# Patient Record
Sex: FEMALE | Race: BLACK OR AFRICAN AMERICAN | Employment: OTHER | URBAN - METROPOLITAN AREA
[De-identification: names, ages, dates, MRNs, and addresses within clinical notes are randomized per-mention and may not be internally consistent; named-entity substitution may affect disease eponyms.]

---

## 2017-01-08 PROBLEM — N39.0 UTI (URINARY TRACT INFECTION): Status: ACTIVE | Noted: 2017-01-08

## 2017-01-08 PROBLEM — J98.11 PULMONARY ATELECTASIS: Status: ACTIVE | Noted: 2017-01-08

## 2017-01-09 PROBLEM — N39.0 BACTERIAL UTI: Status: ACTIVE | Noted: 2017-01-09

## 2017-01-09 PROBLEM — N30.91 HEMORRHAGIC CYSTITIS: Status: ACTIVE | Noted: 2017-01-09

## 2017-01-09 PROBLEM — A49.9 BACTERIAL UTI: Status: ACTIVE | Noted: 2017-01-09

## 2017-01-09 PROBLEM — E87.6 HYPOKALEMIA: Status: ACTIVE | Noted: 2017-01-09

## 2017-01-11 PROBLEM — R31.0 GROSS HEMATURIA: Status: ACTIVE | Noted: 2017-01-11

## 2017-02-05 ENCOUNTER — HOSPITAL ENCOUNTER (EMERGENCY)
Age: 37
Discharge: OTHER INSTITUTION | End: 2017-02-05
Attending: EMERGENCY MEDICINE
Payer: MEDICARE

## 2017-02-05 VITALS
OXYGEN SATURATION: 100 % | HEART RATE: 72 BPM | RESPIRATION RATE: 16 BRPM | DIASTOLIC BLOOD PRESSURE: 76 MMHG | TEMPERATURE: 98.4 F | SYSTOLIC BLOOD PRESSURE: 105 MMHG

## 2017-02-05 DIAGNOSIS — R31.9 URINARY TRACT INFECTION WITH HEMATURIA, SITE UNSPECIFIED: ICD-10-CM

## 2017-02-05 DIAGNOSIS — R31.9 HEMATURIA: Primary | ICD-10-CM

## 2017-02-05 DIAGNOSIS — N39.0 URINARY TRACT INFECTION WITH HEMATURIA, SITE UNSPECIFIED: ICD-10-CM

## 2017-02-05 LAB
-: ABNORMAL
AMORPHOUS: ABNORMAL
ANION GAP SERPL CALCULATED.3IONS-SCNC: 13 MMOL/L (ref 9–17)
BACTERIA: ABNORMAL
BILIRUBIN URINE: ABNORMAL
BUN BLDV-MCNC: 7 MG/DL (ref 6–20)
BUN/CREAT BLD: ABNORMAL (ref 9–20)
CALCIUM SERPL-MCNC: 9.5 MG/DL (ref 8.6–10.4)
CASTS UA: ABNORMAL /LPF (ref 0–8)
CHLORIDE BLD-SCNC: 104 MMOL/L (ref 98–107)
CO2: 22 MMOL/L (ref 20–31)
COLOR: ABNORMAL
CREAT SERPL-MCNC: 0.31 MG/DL (ref 0.5–0.9)
CRYSTALS, UA: ABNORMAL /HPF
EPITHELIAL CELLS UA: ABNORMAL /HPF (ref 0–5)
GFR AFRICAN AMERICAN: >60 ML/MIN
GFR NON-AFRICAN AMERICAN: >60 ML/MIN
GFR SERPL CREATININE-BSD FRML MDRD: ABNORMAL ML/MIN/{1.73_M2}
GFR SERPL CREATININE-BSD FRML MDRD: ABNORMAL ML/MIN/{1.73_M2}
GLUCOSE BLD-MCNC: 74 MG/DL (ref 70–99)
GLUCOSE URINE: NEGATIVE
HCT VFR BLD CALC: 39.2 % (ref 36–46)
HEMOGLOBIN: 12.5 G/DL (ref 12–16)
KETONES, URINE: NEGATIVE
LEUKOCYTE ESTERASE, URINE: ABNORMAL
MCH RBC QN AUTO: 26.5 PG (ref 26–34)
MCHC RBC AUTO-ENTMCNC: 31.9 G/DL (ref 31–37)
MCV RBC AUTO: 82.8 FL (ref 80–100)
MUCUS: ABNORMAL
NITRITE, URINE: NEGATIVE
OTHER OBSERVATIONS UA: ABNORMAL
PDW BLD-RTO: 16.6 % (ref 12.5–15.4)
PH UA: >9 (ref 5–8)
PLATELET # BLD: 294 K/UL (ref 140–450)
PMV BLD AUTO: 9.6 FL (ref 6–12)
POC LACTIC ACID, VEN: 1.06 MMOL/L (ref 0.9–1.7)
POTASSIUM SERPL-SCNC: 4.3 MMOL/L (ref 3.7–5.3)
PROTEIN UA: ABNORMAL
RBC # BLD: 4.73 M/UL (ref 4–5.2)
RBC UA: ABNORMAL /HPF (ref 0–2)
RENAL EPITHELIAL, UA: ABNORMAL /HPF
SODIUM BLD-SCNC: 139 MMOL/L (ref 135–144)
SPECIFIC GRAVITY UA: 1.01 (ref 1–1.03)
TRICHOMONAS: ABNORMAL
TURBIDITY: ABNORMAL
URINE HGB: ABNORMAL
UROBILINOGEN, URINE: NORMAL
WBC # BLD: 7.1 K/UL (ref 3.5–11)
WBC UA: ABNORMAL /HPF (ref 0–5)
YEAST: ABNORMAL

## 2017-02-05 PROCEDURE — 6360000002 HC RX W HCPCS: Performed by: EMERGENCY MEDICINE

## 2017-02-05 PROCEDURE — 2580000003 HC RX 258: Performed by: EMERGENCY MEDICINE

## 2017-02-05 PROCEDURE — 81001 URINALYSIS AUTO W/SCOPE: CPT

## 2017-02-05 PROCEDURE — 85027 COMPLETE CBC AUTOMATED: CPT

## 2017-02-05 PROCEDURE — 99284 EMERGENCY DEPT VISIT MOD MDM: CPT

## 2017-02-05 PROCEDURE — 87086 URINE CULTURE/COLONY COUNT: CPT

## 2017-02-05 PROCEDURE — 96365 THER/PROPH/DIAG IV INF INIT: CPT

## 2017-02-05 PROCEDURE — 80048 BASIC METABOLIC PNL TOTAL CA: CPT

## 2017-02-05 PROCEDURE — 83605 ASSAY OF LACTIC ACID: CPT

## 2017-02-05 RX ORDER — CEPHALEXIN 500 MG/1
500 CAPSULE ORAL 3 TIMES DAILY
Qty: 30 CAPSULE | Refills: 0 | Status: SHIPPED | OUTPATIENT
Start: 2017-02-05 | End: 2017-02-15

## 2017-02-05 RX ORDER — 0.9 % SODIUM CHLORIDE 0.9 %
500 INTRAVENOUS SOLUTION INTRAVENOUS ONCE
Status: DISCONTINUED | OUTPATIENT
Start: 2017-02-05 | End: 2017-02-05

## 2017-02-05 RX ORDER — 0.9 % SODIUM CHLORIDE 0.9 %
1000 INTRAVENOUS SOLUTION INTRAVENOUS ONCE
Status: COMPLETED | OUTPATIENT
Start: 2017-02-05 | End: 2017-02-05

## 2017-02-05 RX ADMIN — SODIUM CHLORIDE 1000 ML: 9 INJECTION, SOLUTION INTRAVENOUS at 16:03

## 2017-02-05 RX ADMIN — CEFTRIAXONE SODIUM 1 G: 1 INJECTION, POWDER, FOR SOLUTION INTRAMUSCULAR; INTRAVENOUS at 16:53

## 2017-02-06 LAB
CULTURE: NORMAL
CULTURE: NORMAL
Lab: NORMAL
SPECIMEN DESCRIPTION: NORMAL
STATUS: NORMAL

## 2017-03-11 ENCOUNTER — HOSPITAL ENCOUNTER (OUTPATIENT)
Age: 37
Setting detail: SPECIMEN
Discharge: HOME | End: 2017-03-11

## 2017-03-11 LAB
-: ABNORMAL
AMORPHOUS: ABNORMAL
BACTERIA: ABNORMAL
BILIRUBIN URINE: NEGATIVE
CASTS UA: ABNORMAL /LPF (ref 0–2)
COLOR: ABNORMAL
COMMENT UA: ABNORMAL
CRYSTALS, UA: ABNORMAL /HPF
EPITHELIAL CELLS UA: ABNORMAL /HPF (ref 0–5)
GLUCOSE URINE: NEGATIVE
KETONES, URINE: NEGATIVE
LEUKOCYTE ESTERASE, URINE: ABNORMAL
MUCUS: ABNORMAL
NITRITE, URINE: POSITIVE
OTHER OBSERVATIONS UA: ABNORMAL
PH UA: >9 (ref 5–8)
PROTEIN UA: ABNORMAL
RBC UA: ABNORMAL /HPF (ref 0–2)
RENAL EPITHELIAL, UA: ABNORMAL /HPF
SPECIFIC GRAVITY UA: 1.02 (ref 1–1.03)
TRICHOMONAS: ABNORMAL
TURBIDITY: ABNORMAL
URINE HGB: ABNORMAL
UROBILINOGEN, URINE: NORMAL
WBC UA: ABNORMAL /HPF (ref 0–5)
YEAST: ABNORMAL

## 2017-03-12 LAB
CULTURE: ABNORMAL
CULTURE: ABNORMAL
Lab: ABNORMAL
Lab: ABNORMAL
ORGANISM: ABNORMAL
SPECIMEN DESCRIPTION: ABNORMAL
SPECIMEN DESCRIPTION: ABNORMAL
STATUS: ABNORMAL

## 2017-03-15 ENCOUNTER — HOSPITAL ENCOUNTER (OUTPATIENT)
Age: 37
Setting detail: SPECIMEN
Discharge: HOME | End: 2017-03-15
Payer: MEDICARE

## 2017-03-20 ENCOUNTER — HOSPITAL ENCOUNTER (OUTPATIENT)
Age: 37
Setting detail: SPECIMEN
Discharge: HOME | End: 2017-03-20
Payer: MEDICARE

## 2017-03-20 LAB
-: ABNORMAL
AMORPHOUS: ABNORMAL
BACTERIA: ABNORMAL
BILIRUBIN URINE: NEGATIVE
CASTS UA: ABNORMAL /LPF (ref 0–2)
COLOR: ABNORMAL
COMMENT UA: ABNORMAL
CRYSTALS, UA: ABNORMAL /HPF
EPITHELIAL CELLS UA: ABNORMAL /HPF (ref 0–5)
GLUCOSE URINE: NEGATIVE
KETONES, URINE: NEGATIVE
LEUKOCYTE ESTERASE, URINE: ABNORMAL
MUCUS: ABNORMAL
NITRITE, URINE: NEGATIVE
OTHER OBSERVATIONS UA: ABNORMAL
PH UA: 7 (ref 5–8)
PROTEIN UA: ABNORMAL
RBC UA: ABNORMAL /HPF (ref 0–2)
RENAL EPITHELIAL, UA: ABNORMAL /HPF
SPECIFIC GRAVITY UA: 1.02 (ref 1–1.03)
TRICHOMONAS: ABNORMAL
TURBIDITY: ABNORMAL
URINE HGB: ABNORMAL
UROBILINOGEN, URINE: NORMAL
WBC UA: ABNORMAL /HPF (ref 0–5)
YEAST: ABNORMAL

## 2017-03-20 PROCEDURE — 81001 URINALYSIS AUTO W/SCOPE: CPT

## 2017-03-20 PROCEDURE — 87186 SC STD MICRODIL/AGAR DIL: CPT

## 2017-03-20 PROCEDURE — 87077 CULTURE AEROBIC IDENTIFY: CPT

## 2017-03-20 PROCEDURE — 87086 URINE CULTURE/COLONY COUNT: CPT

## 2017-03-22 ENCOUNTER — HOSPITAL ENCOUNTER (OUTPATIENT)
Age: 37
Setting detail: SPECIMEN
Discharge: HOME | End: 2017-03-22
Payer: MEDICARE

## 2017-03-22 LAB
ANION GAP SERPL CALCULATED.3IONS-SCNC: 13 MMOL/L (ref 9–17)
BUN BLDV-MCNC: 12 MG/DL (ref 6–20)
BUN/CREAT BLD: ABNORMAL (ref 9–20)
CALCIUM SERPL-MCNC: 9.1 MG/DL (ref 8.6–10.4)
CHLORIDE BLD-SCNC: 106 MMOL/L (ref 98–107)
CO2: 24 MMOL/L (ref 20–31)
CREAT SERPL-MCNC: 0.32 MG/DL (ref 0.5–0.9)
GFR AFRICAN AMERICAN: >60 ML/MIN
GFR NON-AFRICAN AMERICAN: >60 ML/MIN
GFR SERPL CREATININE-BSD FRML MDRD: ABNORMAL ML/MIN/{1.73_M2}
GFR SERPL CREATININE-BSD FRML MDRD: ABNORMAL ML/MIN/{1.73_M2}
GLUCOSE BLD-MCNC: 73 MG/DL (ref 70–99)
HCT VFR BLD CALC: 37.7 % (ref 36–46)
HEMOGLOBIN: 12.2 G/DL (ref 12–16)
MCH RBC QN AUTO: 25.7 PG (ref 26–34)
MCHC RBC AUTO-ENTMCNC: 32.4 G/DL (ref 31–37)
MCV RBC AUTO: 79.1 FL (ref 80–100)
PDW BLD-RTO: 16.6 % (ref 12.5–15.4)
PLATELET # BLD: 271 K/UL (ref 140–450)
PMV BLD AUTO: 10.8 FL (ref 6–12)
POTASSIUM SERPL-SCNC: 3.8 MMOL/L (ref 3.7–5.3)
RBC # BLD: 4.77 M/UL (ref 4–5.2)
SODIUM BLD-SCNC: 143 MMOL/L (ref 135–144)
WBC # BLD: 3.3 K/UL (ref 3.5–11)

## 2017-03-22 PROCEDURE — 80048 BASIC METABOLIC PNL TOTAL CA: CPT

## 2017-03-22 PROCEDURE — 85027 COMPLETE CBC AUTOMATED: CPT

## 2017-03-22 PROCEDURE — 36415 COLL VENOUS BLD VENIPUNCTURE: CPT

## 2017-03-22 PROCEDURE — P9603 ONE-WAY ALLOW PRORATED MILES: HCPCS

## 2017-04-04 ENCOUNTER — HOSPITAL ENCOUNTER (OUTPATIENT)
Age: 37
Setting detail: SPECIMEN
Discharge: HOME | End: 2017-04-04
Payer: MEDICARE

## 2017-04-04 LAB
ALBUMIN SERPL-MCNC: 3.6 G/DL (ref 3.5–5.2)
TOTAL CK: 46 U/L (ref 26–192)

## 2017-04-04 PROCEDURE — P9603 ONE-WAY ALLOW PRORATED MILES: HCPCS

## 2017-04-04 PROCEDURE — 36415 COLL VENOUS BLD VENIPUNCTURE: CPT

## 2017-04-04 PROCEDURE — 82550 ASSAY OF CK (CPK): CPT

## 2017-04-04 PROCEDURE — 82040 ASSAY OF SERUM ALBUMIN: CPT

## 2017-04-19 ENCOUNTER — HOSPITAL ENCOUNTER (OUTPATIENT)
Age: 37
Setting detail: SPECIMEN
Discharge: HOME | End: 2017-04-19
Payer: MEDICARE

## 2017-04-19 LAB
ANION GAP SERPL CALCULATED.3IONS-SCNC: 17 MMOL/L (ref 9–17)
BUN BLDV-MCNC: 9 MG/DL (ref 6–20)
BUN/CREAT BLD: ABNORMAL (ref 9–20)
CALCIUM SERPL-MCNC: 8.9 MG/DL (ref 8.6–10.4)
CHLORIDE BLD-SCNC: 100 MMOL/L (ref 98–107)
CO2: 22 MMOL/L (ref 20–31)
CREAT SERPL-MCNC: 0.33 MG/DL (ref 0.5–0.9)
GFR AFRICAN AMERICAN: >60 ML/MIN
GFR NON-AFRICAN AMERICAN: >60 ML/MIN
GFR SERPL CREATININE-BSD FRML MDRD: ABNORMAL ML/MIN/{1.73_M2}
GFR SERPL CREATININE-BSD FRML MDRD: ABNORMAL ML/MIN/{1.73_M2}
GLUCOSE BLD-MCNC: 75 MG/DL (ref 70–99)
HCT VFR BLD CALC: 40.8 % (ref 36–46)
HEMOGLOBIN: 13.1 G/DL (ref 12–16)
MCH RBC QN AUTO: 24.8 PG (ref 26–34)
MCHC RBC AUTO-ENTMCNC: 32.1 G/DL (ref 31–37)
MCV RBC AUTO: 77.2 FL (ref 80–100)
PDW BLD-RTO: 17.1 % (ref 12.5–15.4)
PLATELET # BLD: 264 K/UL (ref 140–450)
PMV BLD AUTO: 10.9 FL (ref 6–12)
POTASSIUM SERPL-SCNC: 4.1 MMOL/L (ref 3.7–5.3)
RBC # BLD: 5.28 M/UL (ref 4–5.2)
SODIUM BLD-SCNC: 139 MMOL/L (ref 135–144)
WBC # BLD: 6 K/UL (ref 3.5–11)

## 2017-04-19 PROCEDURE — 85027 COMPLETE CBC AUTOMATED: CPT

## 2017-04-19 PROCEDURE — P9603 ONE-WAY ALLOW PRORATED MILES: HCPCS

## 2017-04-19 PROCEDURE — 36415 COLL VENOUS BLD VENIPUNCTURE: CPT

## 2017-04-19 PROCEDURE — 80048 BASIC METABOLIC PNL TOTAL CA: CPT

## 2017-05-08 ENCOUNTER — HOSPITAL ENCOUNTER (OUTPATIENT)
Age: 37
Setting detail: SPECIMEN
Discharge: HOME | End: 2017-05-08
Payer: MEDICARE

## 2017-05-08 LAB
ALBUMIN SERPL-MCNC: 3.2 G/DL (ref 3.5–5.2)
ALBUMIN/GLOBULIN RATIO: 0.7 (ref 1–2.5)
ALP BLD-CCNC: 57 U/L (ref 35–104)
ALT SERPL-CCNC: 21 U/L (ref 5–33)
AST SERPL-CCNC: 28 U/L
BILIRUB SERPL-MCNC: 0.21 MG/DL (ref 0.3–1.2)
BILIRUBIN DIRECT: <0.08 MG/DL
BILIRUBIN, INDIRECT: ABNORMAL MG/DL (ref 0–1)
CHOLESTEROL/HDL RATIO: 4
CHOLESTEROL: 145 MG/DL
GLOBULIN: ABNORMAL G/DL (ref 1.5–3.8)
HDLC SERPL-MCNC: 36 MG/DL
LDL CHOLESTEROL: 92 MG/DL (ref 0–130)
TOTAL PROTEIN: 8 G/DL (ref 6.4–8.3)
TRIGL SERPL-MCNC: 86 MG/DL
VITAMIN D 25-HYDROXY: 20.5 NG/ML (ref 30–100)
VLDLC SERPL CALC-MCNC: ABNORMAL MG/DL (ref 1–30)

## 2017-05-08 PROCEDURE — 80061 LIPID PANEL: CPT

## 2017-05-08 PROCEDURE — 80076 HEPATIC FUNCTION PANEL: CPT

## 2017-05-08 PROCEDURE — 36415 COLL VENOUS BLD VENIPUNCTURE: CPT

## 2017-05-08 PROCEDURE — P9603 ONE-WAY ALLOW PRORATED MILES: HCPCS

## 2017-05-08 PROCEDURE — 82306 VITAMIN D 25 HYDROXY: CPT

## 2017-05-17 ENCOUNTER — HOSPITAL ENCOUNTER (OUTPATIENT)
Age: 37
Setting detail: SPECIMEN
Discharge: HOME | End: 2017-05-17
Payer: MEDICAID

## 2017-05-17 LAB
ANION GAP SERPL CALCULATED.3IONS-SCNC: 16 MMOL/L (ref 9–17)
BUN BLDV-MCNC: 10 MG/DL (ref 6–20)
BUN/CREAT BLD: ABNORMAL (ref 9–20)
CALCIUM SERPL-MCNC: 9.5 MG/DL (ref 8.6–10.4)
CHLORIDE BLD-SCNC: 102 MMOL/L (ref 98–107)
CO2: 23 MMOL/L (ref 20–31)
CREAT SERPL-MCNC: 0.33 MG/DL (ref 0.5–0.9)
GFR AFRICAN AMERICAN: >60 ML/MIN
GFR NON-AFRICAN AMERICAN: >60 ML/MIN
GFR SERPL CREATININE-BSD FRML MDRD: ABNORMAL ML/MIN/{1.73_M2}
GFR SERPL CREATININE-BSD FRML MDRD: ABNORMAL ML/MIN/{1.73_M2}
GLUCOSE BLD-MCNC: 59 MG/DL (ref 70–99)
HCT VFR BLD CALC: 41.4 % (ref 36–46)
HEMOGLOBIN: 13.3 G/DL (ref 12–16)
MCH RBC QN AUTO: 25.2 PG (ref 26–34)
MCHC RBC AUTO-ENTMCNC: 32 G/DL (ref 31–37)
MCV RBC AUTO: 78.9 FL (ref 80–100)
PDW BLD-RTO: 18.4 % (ref 12.5–15.4)
PLATELET # BLD: 293 K/UL (ref 140–450)
PMV BLD AUTO: 10.4 FL (ref 6–12)
POTASSIUM SERPL-SCNC: 3.9 MMOL/L (ref 3.7–5.3)
RBC # BLD: 5.25 M/UL (ref 4–5.2)
SODIUM BLD-SCNC: 141 MMOL/L (ref 135–144)
WBC # BLD: 5.8 K/UL (ref 3.5–11)

## 2017-05-17 PROCEDURE — 80048 BASIC METABOLIC PNL TOTAL CA: CPT

## 2017-05-17 PROCEDURE — P9603 ONE-WAY ALLOW PRORATED MILES: HCPCS

## 2017-05-17 PROCEDURE — 85027 COMPLETE CBC AUTOMATED: CPT

## 2017-05-17 PROCEDURE — 36415 COLL VENOUS BLD VENIPUNCTURE: CPT

## 2017-05-22 ENCOUNTER — HOSPITAL ENCOUNTER (OUTPATIENT)
Age: 37
Setting detail: SPECIMEN
Discharge: HOME | End: 2017-05-22
Payer: MEDICARE

## 2017-05-22 LAB
C-REACTIVE PROTEIN: 60.6 MG/L (ref 0–5)
SEDIMENTATION RATE, ERYTHROCYTE: 52 MM (ref 0–20)

## 2017-05-22 PROCEDURE — 86403 PARTICLE AGGLUT ANTBDY SCRN: CPT

## 2017-05-22 PROCEDURE — 87186 SC STD MICRODIL/AGAR DIL: CPT

## 2017-05-22 PROCEDURE — P9603 ONE-WAY ALLOW PRORATED MILES: HCPCS

## 2017-05-22 PROCEDURE — 87070 CULTURE OTHR SPECIMN AEROBIC: CPT

## 2017-05-22 PROCEDURE — 87176 TISSUE HOMOGENIZATION CULTR: CPT

## 2017-05-22 PROCEDURE — 36415 COLL VENOUS BLD VENIPUNCTURE: CPT

## 2017-05-22 PROCEDURE — 87075 CULTR BACTERIA EXCEPT BLOOD: CPT

## 2017-05-22 PROCEDURE — 87040 BLOOD CULTURE FOR BACTERIA: CPT

## 2017-05-22 PROCEDURE — 87205 SMEAR GRAM STAIN: CPT

## 2017-05-22 PROCEDURE — 85651 RBC SED RATE NONAUTOMATED: CPT

## 2017-05-22 PROCEDURE — 87077 CULTURE AEROBIC IDENTIFY: CPT

## 2017-05-22 PROCEDURE — 86140 C-REACTIVE PROTEIN: CPT

## 2017-05-24 ENCOUNTER — HOSPITAL ENCOUNTER (OUTPATIENT)
Age: 37
Setting detail: SPECIMEN
Discharge: HOME | End: 2017-05-24
Payer: MEDICARE

## 2017-05-24 LAB
-: ABNORMAL
AMORPHOUS: ABNORMAL
BACTERIA: ABNORMAL
BILIRUBIN URINE: NEGATIVE
CASTS UA: ABNORMAL /LPF (ref 0–2)
COLOR: YELLOW
COMMENT UA: ABNORMAL
CRYSTALS, UA: ABNORMAL /HPF
CULTURE: ABNORMAL
EPITHELIAL CELLS UA: ABNORMAL /HPF (ref 0–5)
GLUCOSE URINE: NEGATIVE
KETONES, URINE: NEGATIVE
LEUKOCYTE ESTERASE, URINE: ABNORMAL
Lab: ABNORMAL
Lab: ABNORMAL
MUCUS: ABNORMAL
NITRITE, URINE: NEGATIVE
OTHER OBSERVATIONS UA: ABNORMAL
PH UA: 8 (ref 5–8)
PROTEIN UA: ABNORMAL
RBC UA: ABNORMAL /HPF (ref 0–2)
RENAL EPITHELIAL, UA: ABNORMAL /HPF
SPECIFIC GRAVITY UA: 1.01 (ref 1–1.03)
SPECIMEN DESCRIPTION: ABNORMAL
SPECIMEN DESCRIPTION: ABNORMAL
STATUS: ABNORMAL
TRICHOMONAS: ABNORMAL
TURBIDITY: ABNORMAL
URINE HGB: ABNORMAL
UROBILINOGEN, URINE: NORMAL
WBC UA: ABNORMAL /HPF (ref 0–5)
YEAST: ABNORMAL

## 2017-05-24 PROCEDURE — 87086 URINE CULTURE/COLONY COUNT: CPT

## 2017-05-24 PROCEDURE — 81001 URINALYSIS AUTO W/SCOPE: CPT

## 2017-05-25 ENCOUNTER — HOSPITAL ENCOUNTER (OUTPATIENT)
Age: 37
Setting detail: SPECIMEN
Discharge: HOME | End: 2017-05-25
Payer: MEDICAID

## 2017-05-25 LAB
CREAT SERPL-MCNC: <0.4 MG/DL (ref 0.5–0.9)
CULTURE: NORMAL
CULTURE: NORMAL
GFR AFRICAN AMERICAN: ABNORMAL ML/MIN
GFR NON-AFRICAN AMERICAN: ABNORMAL ML/MIN
GFR SERPL CREATININE-BSD FRML MDRD: ABNORMAL ML/MIN/{1.73_M2}
GFR SERPL CREATININE-BSD FRML MDRD: ABNORMAL ML/MIN/{1.73_M2}
Lab: NORMAL
Lab: NORMAL
SPECIMEN DESCRIPTION: NORMAL
SPECIMEN DESCRIPTION: NORMAL
STATUS: NORMAL
VANCOMYCIN TROUGH DATE LAST DOSE: ABNORMAL
VANCOMYCIN TROUGH DOSE AMOUNT: ABNORMAL
VANCOMYCIN TROUGH TIME LAST DOSE: ABNORMAL
VANCOMYCIN TROUGH: 7.1 UG/ML (ref 10–20)

## 2017-05-25 PROCEDURE — 82565 ASSAY OF CREATININE: CPT

## 2017-05-25 PROCEDURE — 36415 COLL VENOUS BLD VENIPUNCTURE: CPT

## 2017-05-25 PROCEDURE — P9603 ONE-WAY ALLOW PRORATED MILES: HCPCS

## 2017-05-25 PROCEDURE — 80202 ASSAY OF VANCOMYCIN: CPT

## 2017-05-27 ENCOUNTER — HOSPITAL ENCOUNTER (OUTPATIENT)
Age: 37
Setting detail: SPECIMEN
Discharge: HOME | End: 2017-05-27
Payer: MEDICAID

## 2017-05-27 LAB
CULTURE: ABNORMAL
DIRECT EXAM: ABNORMAL
DIRECT EXAM: ABNORMAL
Lab: ABNORMAL
Lab: ABNORMAL
ORGANISM: ABNORMAL
ORGANISM: ABNORMAL
SPECIMEN DESCRIPTION: ABNORMAL
SPECIMEN DESCRIPTION: ABNORMAL
STATUS: ABNORMAL
VANCOMYCIN TROUGH DATE LAST DOSE: NORMAL
VANCOMYCIN TROUGH DOSE AMOUNT: NORMAL
VANCOMYCIN TROUGH TIME LAST DOSE: NORMAL
VANCOMYCIN TROUGH: 11.9 UG/ML (ref 10–20)

## 2017-05-27 PROCEDURE — 80202 ASSAY OF VANCOMYCIN: CPT

## 2017-05-27 PROCEDURE — 36415 COLL VENOUS BLD VENIPUNCTURE: CPT

## 2017-05-27 PROCEDURE — P9603 ONE-WAY ALLOW PRORATED MILES: HCPCS

## 2017-05-28 LAB
CULTURE: NORMAL
CULTURE: NORMAL
Lab: NORMAL
Lab: NORMAL
SPECIMEN DESCRIPTION: NORMAL
SPECIMEN DESCRIPTION: NORMAL
STATUS: NORMAL

## 2017-05-31 ENCOUNTER — HOSPITAL ENCOUNTER (OUTPATIENT)
Age: 37
Setting detail: SPECIMEN
Discharge: HOME | End: 2017-05-31
Payer: MEDICARE

## 2017-05-31 LAB
VANCOMYCIN TROUGH DATE LAST DOSE: ABNORMAL
VANCOMYCIN TROUGH DOSE AMOUNT: ABNORMAL
VANCOMYCIN TROUGH TIME LAST DOSE: ABNORMAL
VANCOMYCIN TROUGH: 3.4 UG/ML (ref 10–20)

## 2017-05-31 PROCEDURE — 80202 ASSAY OF VANCOMYCIN: CPT

## 2017-05-31 PROCEDURE — P9603 ONE-WAY ALLOW PRORATED MILES: HCPCS

## 2017-05-31 PROCEDURE — 36415 COLL VENOUS BLD VENIPUNCTURE: CPT

## 2017-06-02 ENCOUNTER — HOSPITAL ENCOUNTER (OUTPATIENT)
Age: 37
Setting detail: SPECIMEN
Discharge: HOME | End: 2017-06-02
Payer: MEDICARE

## 2017-06-02 LAB
ABSOLUTE EOS #: 0.52 K/UL (ref 0–0.4)
ABSOLUTE LYMPH #: 1.68 K/UL (ref 1–4.8)
ABSOLUTE MONO #: 0.64 K/UL (ref 0.1–0.8)
ANION GAP SERPL CALCULATED.3IONS-SCNC: 17 MMOL/L (ref 9–17)
BASOPHILS # BLD: 0 %
BASOPHILS ABSOLUTE: 0 K/UL (ref 0–0.2)
BUN BLDV-MCNC: 9 MG/DL (ref 6–20)
BUN/CREAT BLD: ABNORMAL (ref 9–20)
CALCIUM SERPL-MCNC: 8.9 MG/DL (ref 8.6–10.4)
CHLORIDE BLD-SCNC: 105 MMOL/L (ref 98–107)
CO2: 19 MMOL/L (ref 20–31)
CREAT SERPL-MCNC: 0.36 MG/DL (ref 0.5–0.9)
DIFFERENTIAL TYPE: ABNORMAL
EOSINOPHILS RELATIVE PERCENT: 9 %
GFR AFRICAN AMERICAN: >60 ML/MIN
GFR NON-AFRICAN AMERICAN: >60 ML/MIN
GFR SERPL CREATININE-BSD FRML MDRD: ABNORMAL ML/MIN/{1.73_M2}
GFR SERPL CREATININE-BSD FRML MDRD: ABNORMAL ML/MIN/{1.73_M2}
GLUCOSE BLD-MCNC: 46 MG/DL (ref 70–99)
HCT VFR BLD CALC: 39.4 % (ref 36–46)
HEMOGLOBIN: 12.6 G/DL (ref 12–16)
LYMPHOCYTES # BLD: 29 %
MCH RBC QN AUTO: 25.5 PG (ref 26–34)
MCHC RBC AUTO-ENTMCNC: 32.1 G/DL (ref 31–37)
MCV RBC AUTO: 79.6 FL (ref 80–100)
MONOCYTES # BLD: 11 %
MORPHOLOGY: ABNORMAL
PDW BLD-RTO: 19.4 % (ref 12.5–15.4)
PLATELET # BLD: 299 K/UL (ref 140–450)
PLATELET ESTIMATE: ABNORMAL
PMV BLD AUTO: 10.6 FL (ref 6–12)
POTASSIUM SERPL-SCNC: 4.4 MMOL/L (ref 3.7–5.3)
RBC # BLD: 4.95 M/UL (ref 4–5.2)
RBC # BLD: ABNORMAL 10*6/UL
SEG NEUTROPHILS: 51 %
SEGMENTED NEUTROPHILS ABSOLUTE COUNT: 2.96 K/UL (ref 1.8–7.7)
SODIUM BLD-SCNC: 141 MMOL/L (ref 135–144)
VITAMIN D 25-HYDROXY: 24 NG/ML (ref 30–100)
WBC # BLD: 5.8 K/UL (ref 3.5–11)
WBC # BLD: ABNORMAL 10*3/UL

## 2017-06-02 PROCEDURE — 85025 COMPLETE CBC W/AUTO DIFF WBC: CPT

## 2017-06-02 PROCEDURE — P9603 ONE-WAY ALLOW PRORATED MILES: HCPCS

## 2017-06-02 PROCEDURE — 36415 COLL VENOUS BLD VENIPUNCTURE: CPT

## 2017-06-02 PROCEDURE — 87040 BLOOD CULTURE FOR BACTERIA: CPT

## 2017-06-02 PROCEDURE — 82306 VITAMIN D 25 HYDROXY: CPT

## 2017-06-02 PROCEDURE — 80048 BASIC METABOLIC PNL TOTAL CA: CPT

## 2017-06-04 ENCOUNTER — HOSPITAL ENCOUNTER (OUTPATIENT)
Age: 37
Setting detail: SPECIMEN
Discharge: HOME | End: 2017-06-04
Payer: MEDICARE

## 2017-06-04 PROCEDURE — 36415 COLL VENOUS BLD VENIPUNCTURE: CPT

## 2017-06-04 PROCEDURE — 87040 BLOOD CULTURE FOR BACTERIA: CPT

## 2017-06-08 ENCOUNTER — HOSPITAL ENCOUNTER (OUTPATIENT)
Age: 37
Setting detail: SPECIMEN
Discharge: HOME | End: 2017-06-08
Payer: MEDICARE

## 2017-06-08 LAB
ABSOLUTE EOS #: 0.58 K/UL (ref 0–0.4)
ABSOLUTE LYMPH #: 2.3 K/UL (ref 1–4.8)
ABSOLUTE MONO #: 0.77 K/UL (ref 0.1–0.8)
ANION GAP SERPL CALCULATED.3IONS-SCNC: 15 MMOL/L (ref 9–17)
BASOPHILS # BLD: 0 %
BASOPHILS ABSOLUTE: 0 K/UL (ref 0–0.2)
BUN BLDV-MCNC: 10 MG/DL (ref 6–20)
BUN/CREAT BLD: 24 (ref 9–20)
C-REACTIVE PROTEIN: 10.6 MG/L (ref 0–5)
CALCIUM SERPL-MCNC: 9.2 MG/DL (ref 8.6–10.4)
CHLORIDE BLD-SCNC: 99 MMOL/L (ref 98–107)
CO2: 25 MMOL/L (ref 20–31)
CREAT SERPL-MCNC: 0.42 MG/DL (ref 0.5–0.9)
CULTURE: NORMAL
DIFFERENTIAL TYPE: ABNORMAL
EOSINOPHILS RELATIVE PERCENT: 9 %
GFR AFRICAN AMERICAN: >60 ML/MIN
GFR NON-AFRICAN AMERICAN: >60 ML/MIN
GFR SERPL CREATININE-BSD FRML MDRD: ABNORMAL ML/MIN/{1.73_M2}
GFR SERPL CREATININE-BSD FRML MDRD: ABNORMAL ML/MIN/{1.73_M2}
GLUCOSE BLD-MCNC: 55 MG/DL (ref 70–99)
HCT VFR BLD CALC: 42.3 % (ref 36–46)
HEMOGLOBIN: 13.5 G/DL (ref 12–16)
LYMPHOCYTES # BLD: 36 %
Lab: NORMAL
MCH RBC QN AUTO: 25.8 PG (ref 26–34)
MCHC RBC AUTO-ENTMCNC: 31.9 G/DL (ref 31–37)
MCV RBC AUTO: 80.8 FL (ref 80–100)
MONOCYTES # BLD: 12 %
MORPHOLOGY: ABNORMAL
PDW BLD-RTO: 19.5 % (ref 12.5–15.4)
PLATELET # BLD: 276 K/UL (ref 140–450)
PLATELET ESTIMATE: ABNORMAL
PMV BLD AUTO: 10.5 FL (ref 6–12)
POTASSIUM SERPL-SCNC: 4.3 MMOL/L (ref 3.7–5.3)
RBC # BLD: 5.23 M/UL (ref 4–5.2)
RBC # BLD: ABNORMAL 10*6/UL
SEDIMENTATION RATE, ERYTHROCYTE: 51 MM (ref 0–20)
SEG NEUTROPHILS: 43 %
SEGMENTED NEUTROPHILS ABSOLUTE COUNT: 2.75 K/UL (ref 1.8–7.7)
SODIUM BLD-SCNC: 139 MMOL/L (ref 135–144)
SPECIMEN DESCRIPTION: NORMAL
STATUS: NORMAL
STATUS: NORMAL
WBC # BLD: 6.4 K/UL (ref 3.5–11)
WBC # BLD: ABNORMAL 10*3/UL

## 2017-06-08 PROCEDURE — 36415 COLL VENOUS BLD VENIPUNCTURE: CPT

## 2017-06-08 PROCEDURE — 85651 RBC SED RATE NONAUTOMATED: CPT

## 2017-06-08 PROCEDURE — 86140 C-REACTIVE PROTEIN: CPT

## 2017-06-08 PROCEDURE — 85025 COMPLETE CBC W/AUTO DIFF WBC: CPT

## 2017-06-08 PROCEDURE — 80048 BASIC METABOLIC PNL TOTAL CA: CPT

## 2017-06-08 PROCEDURE — P9603 ONE-WAY ALLOW PRORATED MILES: HCPCS

## 2017-06-10 LAB
CULTURE: NORMAL
Lab: NORMAL
SPECIMEN DESCRIPTION: NORMAL
STATUS: NORMAL
STATUS: NORMAL

## 2017-06-14 ENCOUNTER — HOSPITAL ENCOUNTER (OUTPATIENT)
Age: 37
Setting detail: SPECIMEN
Discharge: HOME | End: 2017-06-14
Payer: MEDICARE

## 2017-06-14 LAB
ABSOLUTE EOS #: 0.99 K/UL (ref 0–0.4)
ABSOLUTE LYMPH #: 1.99 K/UL (ref 1–4.8)
ABSOLUTE MONO #: 0.5 K/UL (ref 0.1–0.8)
ANION GAP SERPL CALCULATED.3IONS-SCNC: 18 MMOL/L (ref 9–17)
BASOPHILS # BLD: 0 %
BASOPHILS ABSOLUTE: 0 K/UL (ref 0–0.2)
BUN BLDV-MCNC: 8 MG/DL (ref 6–20)
BUN/CREAT BLD: ABNORMAL (ref 9–20)
C-REACTIVE PROTEIN: 8.7 MG/L (ref 0–5)
CALCIUM SERPL-MCNC: 9.2 MG/DL (ref 8.6–10.4)
CHLORIDE BLD-SCNC: 107 MMOL/L (ref 98–107)
CO2: 18 MMOL/L (ref 20–31)
CREAT SERPL-MCNC: 0.42 MG/DL (ref 0.5–0.9)
DIFFERENTIAL TYPE: ABNORMAL
EOSINOPHILS RELATIVE PERCENT: 14 %
GFR AFRICAN AMERICAN: >60 ML/MIN
GFR NON-AFRICAN AMERICAN: >60 ML/MIN
GFR SERPL CREATININE-BSD FRML MDRD: ABNORMAL ML/MIN/{1.73_M2}
GFR SERPL CREATININE-BSD FRML MDRD: ABNORMAL ML/MIN/{1.73_M2}
GLUCOSE BLD-MCNC: 69 MG/DL (ref 70–99)
HCT VFR BLD CALC: 42.2 % (ref 36–46)
HEMOGLOBIN: 13.3 G/DL (ref 12–16)
LYMPHOCYTES # BLD: 28 %
MCH RBC QN AUTO: 25.8 PG (ref 26–34)
MCHC RBC AUTO-ENTMCNC: 31.5 G/DL (ref 31–37)
MCV RBC AUTO: 82 FL (ref 80–100)
MONOCYTES # BLD: 7 %
MORPHOLOGY: ABNORMAL
PDW BLD-RTO: 20 % (ref 12.5–15.4)
PLATELET # BLD: 239 K/UL (ref 140–450)
PLATELET ESTIMATE: ABNORMAL
PMV BLD AUTO: 10.7 FL (ref 6–12)
POTASSIUM SERPL-SCNC: 4.2 MMOL/L (ref 3.7–5.3)
RBC # BLD: 5.15 M/UL (ref 4–5.2)
RBC # BLD: ABNORMAL 10*6/UL
SEDIMENTATION RATE, ERYTHROCYTE: 35 MM (ref 0–20)
SEG NEUTROPHILS: 51 %
SEGMENTED NEUTROPHILS ABSOLUTE COUNT: 3.62 K/UL (ref 1.8–7.7)
SODIUM BLD-SCNC: 143 MMOL/L (ref 135–144)
WBC # BLD: 7.1 K/UL (ref 3.5–11)
WBC # BLD: ABNORMAL 10*3/UL

## 2017-06-14 PROCEDURE — 86140 C-REACTIVE PROTEIN: CPT

## 2017-06-14 PROCEDURE — 80048 BASIC METABOLIC PNL TOTAL CA: CPT

## 2017-06-14 PROCEDURE — 36415 COLL VENOUS BLD VENIPUNCTURE: CPT

## 2017-06-14 PROCEDURE — 85651 RBC SED RATE NONAUTOMATED: CPT

## 2017-06-14 PROCEDURE — 85025 COMPLETE CBC W/AUTO DIFF WBC: CPT

## 2017-06-14 PROCEDURE — P9603 ONE-WAY ALLOW PRORATED MILES: HCPCS

## 2017-06-22 ENCOUNTER — HOSPITAL ENCOUNTER (OUTPATIENT)
Age: 37
Setting detail: SPECIMEN
Discharge: HOME | End: 2017-06-22
Payer: MEDICARE

## 2017-06-22 LAB
ABSOLUTE EOS #: 0.76 K/UL (ref 0–0.4)
ABSOLUTE LYMPH #: 1.64 K/UL (ref 1–4.8)
ABSOLUTE MONO #: 0.76 K/UL (ref 0.1–0.8)
ANION GAP SERPL CALCULATED.3IONS-SCNC: 16 MMOL/L (ref 9–17)
BASOPHILS # BLD: 0 %
BASOPHILS ABSOLUTE: 0 K/UL (ref 0–0.2)
BUN BLDV-MCNC: 10 MG/DL (ref 6–20)
BUN/CREAT BLD: ABNORMAL (ref 9–20)
C-REACTIVE PROTEIN: 15.7 MG/L (ref 0–5)
CALCIUM SERPL-MCNC: 9.7 MG/DL (ref 8.6–10.4)
CHLORIDE BLD-SCNC: 103 MMOL/L (ref 98–107)
CO2: 21 MMOL/L (ref 20–31)
CREAT SERPL-MCNC: 0.35 MG/DL (ref 0.5–0.9)
DIFFERENTIAL TYPE: ABNORMAL
EOSINOPHILS RELATIVE PERCENT: 12 %
GFR AFRICAN AMERICAN: >60 ML/MIN
GFR NON-AFRICAN AMERICAN: >60 ML/MIN
GFR SERPL CREATININE-BSD FRML MDRD: ABNORMAL ML/MIN/{1.73_M2}
GFR SERPL CREATININE-BSD FRML MDRD: ABNORMAL ML/MIN/{1.73_M2}
GLUCOSE BLD-MCNC: 57 MG/DL (ref 70–99)
HCT VFR BLD CALC: 42.3 % (ref 36–46)
HEMOGLOBIN: 13.6 G/DL (ref 12–16)
LYMPHOCYTES # BLD: 26 %
MCH RBC QN AUTO: 26.2 PG (ref 26–34)
MCHC RBC AUTO-ENTMCNC: 32.2 G/DL (ref 31–37)
MCV RBC AUTO: 81.3 FL (ref 80–100)
MONOCYTES # BLD: 12 %
MORPHOLOGY: ABNORMAL
PDW BLD-RTO: 19.6 % (ref 12.5–15.4)
PLATELET # BLD: 240 K/UL (ref 140–450)
PLATELET ESTIMATE: ABNORMAL
PMV BLD AUTO: 10.4 FL (ref 6–12)
POTASSIUM SERPL-SCNC: 4.5 MMOL/L (ref 3.7–5.3)
RBC # BLD: 5.2 M/UL (ref 4–5.2)
RBC # BLD: ABNORMAL 10*6/UL
SEDIMENTATION RATE, ERYTHROCYTE: 45 MM (ref 0–20)
SEG NEUTROPHILS: 50 %
SEGMENTED NEUTROPHILS ABSOLUTE COUNT: 3.14 K/UL (ref 1.8–7.7)
SODIUM BLD-SCNC: 140 MMOL/L (ref 135–144)
WBC # BLD: 6.3 K/UL (ref 3.5–11)
WBC # BLD: ABNORMAL 10*3/UL

## 2017-06-22 PROCEDURE — 85651 RBC SED RATE NONAUTOMATED: CPT

## 2017-06-22 PROCEDURE — 85025 COMPLETE CBC W/AUTO DIFF WBC: CPT

## 2017-06-22 PROCEDURE — 36415 COLL VENOUS BLD VENIPUNCTURE: CPT

## 2017-06-22 PROCEDURE — P9603 ONE-WAY ALLOW PRORATED MILES: HCPCS

## 2017-06-22 PROCEDURE — 86140 C-REACTIVE PROTEIN: CPT

## 2017-06-22 PROCEDURE — 80048 BASIC METABOLIC PNL TOTAL CA: CPT

## 2017-06-29 ENCOUNTER — HOSPITAL ENCOUNTER (OUTPATIENT)
Age: 37
Setting detail: SPECIMEN
Discharge: HOME | End: 2017-06-29
Payer: MEDICARE

## 2017-06-29 LAB
ABSOLUTE EOS #: 1.13 K/UL (ref 0–0.4)
ABSOLUTE LYMPH #: 1.64 K/UL (ref 1–4.8)
ABSOLUTE MONO #: 0.76 K/UL (ref 0.1–1.2)
ANION GAP SERPL CALCULATED.3IONS-SCNC: 14 MMOL/L (ref 9–17)
BASOPHILS # BLD: 0 %
BASOPHILS ABSOLUTE: 0 K/UL (ref 0–0.2)
BUN BLDV-MCNC: 7 MG/DL (ref 6–20)
BUN/CREAT BLD: ABNORMAL (ref 9–20)
C-REACTIVE PROTEIN: 13.5 MG/L (ref 0–5)
CALCIUM SERPL-MCNC: 9.4 MG/DL (ref 8.6–10.4)
CHLORIDE BLD-SCNC: 102 MMOL/L (ref 98–107)
CO2: 21 MMOL/L (ref 20–31)
CREAT SERPL-MCNC: 0.43 MG/DL (ref 0.5–0.9)
DIFFERENTIAL TYPE: ABNORMAL
EOSINOPHILS RELATIVE PERCENT: 18 %
GFR AFRICAN AMERICAN: >60 ML/MIN
GFR NON-AFRICAN AMERICAN: >60 ML/MIN
GFR SERPL CREATININE-BSD FRML MDRD: ABNORMAL ML/MIN/{1.73_M2}
GFR SERPL CREATININE-BSD FRML MDRD: ABNORMAL ML/MIN/{1.73_M2}
GLUCOSE BLD-MCNC: 82 MG/DL (ref 70–99)
HCT VFR BLD CALC: 42.3 % (ref 36–46)
HEMOGLOBIN: 13.9 G/DL (ref 12–16)
LYMPHOCYTES # BLD: 26 %
MCH RBC QN AUTO: 27 PG (ref 26–34)
MCHC RBC AUTO-ENTMCNC: 32.8 G/DL (ref 31–37)
MCV RBC AUTO: 82.2 FL (ref 80–100)
MONOCYTES # BLD: 12 %
MORPHOLOGY: ABNORMAL
PDW BLD-RTO: 19.4 % (ref 12.5–15.4)
PLATELET # BLD: 253 K/UL (ref 140–450)
PLATELET ESTIMATE: ABNORMAL
PMV BLD AUTO: 10.7 FL (ref 6–12)
POTASSIUM SERPL-SCNC: 4.2 MMOL/L (ref 3.7–5.3)
RBC # BLD: 5.14 M/UL (ref 4–5.2)
RBC # BLD: ABNORMAL 10*6/UL
SEDIMENTATION RATE, ERYTHROCYTE: 44 MM (ref 0–20)
SEG NEUTROPHILS: 44 %
SEGMENTED NEUTROPHILS ABSOLUTE COUNT: 2.77 K/UL (ref 1.8–7.7)
SODIUM BLD-SCNC: 137 MMOL/L (ref 135–144)
WBC # BLD: 6.3 K/UL (ref 3.5–11)
WBC # BLD: ABNORMAL 10*3/UL

## 2017-06-29 PROCEDURE — 80048 BASIC METABOLIC PNL TOTAL CA: CPT

## 2017-06-29 PROCEDURE — 85025 COMPLETE CBC W/AUTO DIFF WBC: CPT

## 2017-06-29 PROCEDURE — P9603 ONE-WAY ALLOW PRORATED MILES: HCPCS

## 2017-06-29 PROCEDURE — 85651 RBC SED RATE NONAUTOMATED: CPT

## 2017-06-29 PROCEDURE — 36415 COLL VENOUS BLD VENIPUNCTURE: CPT

## 2017-06-29 PROCEDURE — 86140 C-REACTIVE PROTEIN: CPT

## 2017-07-06 ENCOUNTER — HOSPITAL ENCOUNTER (OUTPATIENT)
Age: 37
Setting detail: SPECIMEN
Discharge: HOME | End: 2017-07-06
Payer: MEDICARE

## 2017-07-06 LAB
HEPATITIS B SURFACE ANTIGEN: NONREACTIVE
HEPATITIS C ANTIBODY: NONREACTIVE
HIV AG/AB: NONREACTIVE
T. PALLIDUM, IGG: NONREACTIVE

## 2017-07-06 PROCEDURE — 36415 COLL VENOUS BLD VENIPUNCTURE: CPT

## 2017-07-06 PROCEDURE — 87340 HEPATITIS B SURFACE AG IA: CPT

## 2017-07-06 PROCEDURE — P9603 ONE-WAY ALLOW PRORATED MILES: HCPCS

## 2017-07-06 PROCEDURE — 86803 HEPATITIS C AB TEST: CPT

## 2017-07-06 PROCEDURE — 86780 TREPONEMA PALLIDUM: CPT

## 2017-07-06 PROCEDURE — 87389 HIV-1 AG W/HIV-1&-2 AB AG IA: CPT

## 2017-07-07 ENCOUNTER — HOSPITAL ENCOUNTER (OUTPATIENT)
Age: 37
Setting detail: SPECIMEN
Discharge: HOME | End: 2017-07-07
Payer: MEDICARE

## 2017-07-07 LAB
ANION GAP SERPL CALCULATED.3IONS-SCNC: 17 MMOL/L (ref 9–17)
BUN BLDV-MCNC: 8 MG/DL (ref 6–20)
BUN/CREAT BLD: ABNORMAL (ref 9–20)
CALCIUM SERPL-MCNC: 9.6 MG/DL (ref 8.6–10.4)
CHLORIDE BLD-SCNC: 102 MMOL/L (ref 98–107)
CO2: 22 MMOL/L (ref 20–31)
CREAT SERPL-MCNC: 0.44 MG/DL (ref 0.5–0.9)
GFR AFRICAN AMERICAN: >60 ML/MIN
GFR NON-AFRICAN AMERICAN: >60 ML/MIN
GFR SERPL CREATININE-BSD FRML MDRD: ABNORMAL ML/MIN/{1.73_M2}
GFR SERPL CREATININE-BSD FRML MDRD: ABNORMAL ML/MIN/{1.73_M2}
GLUCOSE BLD-MCNC: 56 MG/DL (ref 70–99)
HBV SURFACE AB TITR SER: <3.5 MIU/ML
HCT VFR BLD CALC: 44.9 % (ref 36–46)
HEMOGLOBIN: 14.5 G/DL (ref 12–16)
HEPATITIS B SURFACE ANTIGEN: NONREACTIVE
HIV AG/AB: NONREACTIVE
MCH RBC QN AUTO: 27 PG (ref 26–34)
MCHC RBC AUTO-ENTMCNC: 32.3 G/DL (ref 31–37)
MCV RBC AUTO: 83.6 FL (ref 80–100)
PDW BLD-RTO: 18.4 % (ref 12.5–15.4)
PLATELET # BLD: 237 K/UL (ref 140–450)
PMV BLD AUTO: 11 FL (ref 6–12)
POTASSIUM SERPL-SCNC: 3.8 MMOL/L (ref 3.7–5.3)
RBC # BLD: 5.37 M/UL (ref 4–5.2)
SODIUM BLD-SCNC: 141 MMOL/L (ref 135–144)
T. PALLIDUM, IGG: NONREACTIVE
WBC # BLD: 5.2 K/UL (ref 3.5–11)

## 2017-07-07 PROCEDURE — 87389 HIV-1 AG W/HIV-1&-2 AB AG IA: CPT

## 2017-07-07 PROCEDURE — 86780 TREPONEMA PALLIDUM: CPT

## 2017-07-07 PROCEDURE — 85027 COMPLETE CBC AUTOMATED: CPT

## 2017-07-07 PROCEDURE — 80048 BASIC METABOLIC PNL TOTAL CA: CPT

## 2017-07-07 PROCEDURE — 87340 HEPATITIS B SURFACE AG IA: CPT

## 2017-07-07 PROCEDURE — 86706 HEP B SURFACE ANTIBODY: CPT

## 2017-07-07 PROCEDURE — 36415 COLL VENOUS BLD VENIPUNCTURE: CPT

## 2017-07-07 PROCEDURE — P9603 ONE-WAY ALLOW PRORATED MILES: HCPCS

## 2017-08-10 ENCOUNTER — HOSPITAL ENCOUNTER (OUTPATIENT)
Age: 37
Setting detail: SPECIMEN
Discharge: HOME | End: 2017-08-10
Payer: MEDICARE

## 2017-08-10 LAB
ANION GAP SERPL CALCULATED.3IONS-SCNC: 16 MMOL/L (ref 9–17)
BUN BLDV-MCNC: 14 MG/DL (ref 6–20)
BUN/CREAT BLD: ABNORMAL (ref 9–20)
CALCIUM SERPL-MCNC: 9.3 MG/DL (ref 8.6–10.4)
CHLORIDE BLD-SCNC: 103 MMOL/L (ref 98–107)
CO2: 20 MMOL/L (ref 20–31)
CREAT SERPL-MCNC: <0.4 MG/DL (ref 0.5–0.9)
GFR AFRICAN AMERICAN: ABNORMAL ML/MIN
GFR NON-AFRICAN AMERICAN: ABNORMAL ML/MIN
GFR SERPL CREATININE-BSD FRML MDRD: ABNORMAL ML/MIN/{1.73_M2}
GFR SERPL CREATININE-BSD FRML MDRD: ABNORMAL ML/MIN/{1.73_M2}
GLUCOSE BLD-MCNC: 66 MG/DL (ref 70–99)
HCT VFR BLD CALC: 43.9 % (ref 36–46)
HEMOGLOBIN: 14.4 G/DL (ref 12–16)
MCH RBC QN AUTO: 27.5 PG (ref 26–34)
MCHC RBC AUTO-ENTMCNC: 32.8 G/DL (ref 31–37)
MCV RBC AUTO: 84 FL (ref 80–100)
PDW BLD-RTO: 14.8 % (ref 11.5–14.5)
PLATELET # BLD: 267 K/UL (ref 130–400)
PMV BLD AUTO: ABNORMAL FL (ref 6–12)
POTASSIUM SERPL-SCNC: 4.5 MMOL/L (ref 3.7–5.3)
RBC # BLD: 5.23 M/UL (ref 4–5.2)
SODIUM BLD-SCNC: 139 MMOL/L (ref 135–144)
WBC # BLD: 6.6 K/UL (ref 3.5–11)

## 2017-08-10 PROCEDURE — 36415 COLL VENOUS BLD VENIPUNCTURE: CPT

## 2017-08-10 PROCEDURE — P9603 ONE-WAY ALLOW PRORATED MILES: HCPCS

## 2017-08-10 PROCEDURE — 85027 COMPLETE CBC AUTOMATED: CPT

## 2017-08-10 PROCEDURE — 80048 BASIC METABOLIC PNL TOTAL CA: CPT

## 2017-09-05 ENCOUNTER — HOSPITAL ENCOUNTER (OUTPATIENT)
Age: 37
Setting detail: SPECIMEN
Discharge: HOME | End: 2017-09-05
Payer: MEDICARE

## 2017-09-05 LAB
AMMONIA: 54 UMOL/L (ref 11–51)
ANION GAP SERPL CALCULATED.3IONS-SCNC: 15 MMOL/L (ref 9–17)
BUN BLDV-MCNC: 16 MG/DL (ref 6–20)
BUN/CREAT BLD: NORMAL (ref 9–20)
CALCIUM SERPL-MCNC: 9.4 MG/DL (ref 8.6–10.4)
CHLORIDE BLD-SCNC: 107 MMOL/L (ref 98–107)
CO2: 22 MMOL/L (ref 20–31)
CREAT SERPL-MCNC: 0.65 MG/DL (ref 0.5–0.9)
GFR AFRICAN AMERICAN: >60 ML/MIN
GFR NON-AFRICAN AMERICAN: >60 ML/MIN
GFR SERPL CREATININE-BSD FRML MDRD: NORMAL ML/MIN/{1.73_M2}
GFR SERPL CREATININE-BSD FRML MDRD: NORMAL ML/MIN/{1.73_M2}
GLUCOSE BLD-MCNC: 78 MG/DL (ref 70–99)
HCT VFR BLD CALC: 40.5 % (ref 36–46)
HEMOGLOBIN: 13.3 G/DL (ref 12–16)
MAGNESIUM: 2.2 MG/DL (ref 1.6–2.6)
MCH RBC QN AUTO: 27.6 PG (ref 26–34)
MCHC RBC AUTO-ENTMCNC: 32.7 G/DL (ref 31–37)
MCV RBC AUTO: 84.5 FL (ref 80–100)
PDW BLD-RTO: 16.2 % (ref 12.5–15.4)
PHOSPHORUS: 3.4 MG/DL (ref 2.6–4.5)
PLATELET # BLD: 282 K/UL (ref 140–450)
PMV BLD AUTO: 10.4 FL (ref 6–12)
POTASSIUM SERPL-SCNC: 4.1 MMOL/L (ref 3.7–5.3)
RBC # BLD: 4.8 M/UL (ref 4–5.2)
SODIUM BLD-SCNC: 144 MMOL/L (ref 135–144)
WBC # BLD: 11.3 K/UL (ref 3.5–11)

## 2017-09-05 PROCEDURE — 80048 BASIC METABOLIC PNL TOTAL CA: CPT

## 2017-09-05 PROCEDURE — P9603 ONE-WAY ALLOW PRORATED MILES: HCPCS

## 2017-09-05 PROCEDURE — 85027 COMPLETE CBC AUTOMATED: CPT

## 2017-09-05 PROCEDURE — 82140 ASSAY OF AMMONIA: CPT

## 2017-09-05 PROCEDURE — 84100 ASSAY OF PHOSPHORUS: CPT

## 2017-09-05 PROCEDURE — 36415 COLL VENOUS BLD VENIPUNCTURE: CPT

## 2017-09-05 PROCEDURE — 83735 ASSAY OF MAGNESIUM: CPT

## 2017-09-07 ENCOUNTER — HOSPITAL ENCOUNTER (OUTPATIENT)
Age: 37
Setting detail: SPECIMEN
Discharge: HOME | End: 2017-09-07
Payer: MEDICARE

## 2017-09-07 LAB
ANION GAP SERPL CALCULATED.3IONS-SCNC: 15 MMOL/L (ref 9–17)
BUN BLDV-MCNC: 19 MG/DL (ref 6–20)
BUN/CREAT BLD: NORMAL (ref 9–20)
CALCIUM SERPL-MCNC: 9.6 MG/DL (ref 8.6–10.4)
CHLORIDE BLD-SCNC: 101 MMOL/L (ref 98–107)
CO2: 20 MMOL/L (ref 20–31)
CREAT SERPL-MCNC: 0.68 MG/DL (ref 0.5–0.9)
GFR AFRICAN AMERICAN: >60 ML/MIN
GFR NON-AFRICAN AMERICAN: >60 ML/MIN
GFR SERPL CREATININE-BSD FRML MDRD: NORMAL ML/MIN/{1.73_M2}
GFR SERPL CREATININE-BSD FRML MDRD: NORMAL ML/MIN/{1.73_M2}
GLUCOSE BLD-MCNC: 84 MG/DL (ref 70–99)
HCT VFR BLD CALC: 42.4 % (ref 36–46)
HEMOGLOBIN: 13.5 G/DL (ref 12–16)
MCH RBC QN AUTO: 26.9 PG (ref 26–34)
MCHC RBC AUTO-ENTMCNC: 31.9 G/DL (ref 31–37)
MCV RBC AUTO: 84.3 FL (ref 80–100)
PDW BLD-RTO: 16.1 % (ref 12.5–15.4)
PLATELET # BLD: 345 K/UL (ref 140–450)
PMV BLD AUTO: 10.5 FL (ref 6–12)
POTASSIUM SERPL-SCNC: 4.3 MMOL/L (ref 3.7–5.3)
RBC # BLD: 5.03 M/UL (ref 4–5.2)
SODIUM BLD-SCNC: 136 MMOL/L (ref 135–144)
WBC # BLD: 9.8 K/UL (ref 3.5–11)

## 2017-09-07 PROCEDURE — 85027 COMPLETE CBC AUTOMATED: CPT

## 2017-09-07 PROCEDURE — P9603 ONE-WAY ALLOW PRORATED MILES: HCPCS

## 2017-09-07 PROCEDURE — 80048 BASIC METABOLIC PNL TOTAL CA: CPT

## 2017-09-07 PROCEDURE — 36415 COLL VENOUS BLD VENIPUNCTURE: CPT

## 2017-09-11 ENCOUNTER — HOSPITAL ENCOUNTER (OUTPATIENT)
Age: 37
Setting detail: SPECIMEN
Discharge: HOME | End: 2017-09-11
Payer: MEDICARE

## 2017-09-11 LAB — AMMONIA: 44 UMOL/L (ref 11–51)

## 2017-09-11 PROCEDURE — 82140 ASSAY OF AMMONIA: CPT

## 2017-09-11 PROCEDURE — 36415 COLL VENOUS BLD VENIPUNCTURE: CPT

## 2017-09-11 PROCEDURE — P9603 ONE-WAY ALLOW PRORATED MILES: HCPCS

## 2017-09-18 ENCOUNTER — HOSPITAL ENCOUNTER (OUTPATIENT)
Age: 37
Setting detail: SPECIMEN
Discharge: HOME | End: 2017-09-18
Payer: MEDICARE

## 2017-09-18 LAB — AMMONIA: 54 UMOL/L (ref 11–51)

## 2017-09-18 PROCEDURE — 82140 ASSAY OF AMMONIA: CPT

## 2017-09-18 PROCEDURE — 36415 COLL VENOUS BLD VENIPUNCTURE: CPT

## 2017-09-18 PROCEDURE — P9603 ONE-WAY ALLOW PRORATED MILES: HCPCS

## 2017-09-25 ENCOUNTER — HOSPITAL ENCOUNTER (OUTPATIENT)
Age: 37
Setting detail: SPECIMEN
Discharge: HOME | End: 2017-09-25
Payer: MEDICARE

## 2017-09-25 LAB
ALBUMIN SERPL-MCNC: 3.2 G/DL (ref 3.5–5.2)
ALBUMIN/GLOBULIN RATIO: 0.6 (ref 1–2.5)
ALP BLD-CCNC: 78 U/L (ref 35–104)
ALT SERPL-CCNC: 27 U/L (ref 5–33)
AMMONIA: 35 UMOL/L (ref 11–51)
AMYLASE: 45 U/L (ref 28–100)
AST SERPL-CCNC: 21 U/L
BILIRUB SERPL-MCNC: 0.26 MG/DL (ref 0.3–1.2)
BILIRUBIN DIRECT: <0.08 MG/DL
BILIRUBIN, INDIRECT: ABNORMAL MG/DL (ref 0–1)
GLOBULIN: ABNORMAL G/DL (ref 1.5–3.8)
LIPASE: 30 U/L (ref 13–60)
TOTAL PROTEIN: 8.7 G/DL (ref 6.4–8.3)

## 2017-09-25 PROCEDURE — 82150 ASSAY OF AMYLASE: CPT

## 2017-09-25 PROCEDURE — P9603 ONE-WAY ALLOW PRORATED MILES: HCPCS

## 2017-09-25 PROCEDURE — 83690 ASSAY OF LIPASE: CPT

## 2017-09-25 PROCEDURE — 36415 COLL VENOUS BLD VENIPUNCTURE: CPT

## 2017-09-25 PROCEDURE — 82140 ASSAY OF AMMONIA: CPT

## 2017-09-25 PROCEDURE — 80076 HEPATIC FUNCTION PANEL: CPT

## 2017-10-05 ENCOUNTER — HOSPITAL ENCOUNTER (OUTPATIENT)
Age: 37
Setting detail: SPECIMEN
Discharge: HOME | End: 2017-10-05
Payer: MEDICARE

## 2017-10-05 LAB
ANION GAP SERPL CALCULATED.3IONS-SCNC: 16 MMOL/L (ref 9–17)
BUN BLDV-MCNC: 15 MG/DL (ref 6–20)
BUN/CREAT BLD: ABNORMAL (ref 9–20)
CALCIUM SERPL-MCNC: 9.4 MG/DL (ref 8.6–10.4)
CHLORIDE BLD-SCNC: 102 MMOL/L (ref 98–107)
CO2: 22 MMOL/L (ref 20–31)
CREAT SERPL-MCNC: 0.37 MG/DL (ref 0.5–0.9)
GFR AFRICAN AMERICAN: >60 ML/MIN
GFR NON-AFRICAN AMERICAN: >60 ML/MIN
GFR SERPL CREATININE-BSD FRML MDRD: ABNORMAL ML/MIN/{1.73_M2}
GFR SERPL CREATININE-BSD FRML MDRD: ABNORMAL ML/MIN/{1.73_M2}
GLUCOSE BLD-MCNC: 58 MG/DL (ref 70–99)
HCT VFR BLD CALC: 40.1 % (ref 36–46)
HEMOGLOBIN: 12.9 G/DL (ref 12–16)
MCH RBC QN AUTO: 27.3 PG (ref 26–34)
MCHC RBC AUTO-ENTMCNC: 32.2 G/DL (ref 31–37)
MCV RBC AUTO: 84.7 FL (ref 80–100)
PDW BLD-RTO: 16.4 % (ref 12.5–15.4)
PLATELET # BLD: 280 K/UL (ref 140–450)
PMV BLD AUTO: 10 FL (ref 6–12)
POTASSIUM SERPL-SCNC: 4.3 MMOL/L (ref 3.7–5.3)
RBC # BLD: 4.74 M/UL (ref 4–5.2)
SODIUM BLD-SCNC: 140 MMOL/L (ref 135–144)
WBC # BLD: 7.3 K/UL (ref 3.5–11)

## 2017-10-05 PROCEDURE — P9603 ONE-WAY ALLOW PRORATED MILES: HCPCS

## 2017-10-05 PROCEDURE — 85027 COMPLETE CBC AUTOMATED: CPT

## 2017-10-05 PROCEDURE — 36415 COLL VENOUS BLD VENIPUNCTURE: CPT

## 2017-10-05 PROCEDURE — 80048 BASIC METABOLIC PNL TOTAL CA: CPT

## 2017-11-02 ENCOUNTER — HOSPITAL ENCOUNTER (OUTPATIENT)
Age: 37
Setting detail: SPECIMEN
Discharge: HOME | End: 2017-11-02
Payer: MEDICARE

## 2017-11-02 LAB
ANION GAP SERPL CALCULATED.3IONS-SCNC: 15 MMOL/L (ref 9–17)
BUN BLDV-MCNC: 12 MG/DL (ref 6–20)
BUN/CREAT BLD: ABNORMAL (ref 9–20)
CALCIUM SERPL-MCNC: 9.1 MG/DL (ref 8.6–10.4)
CHLORIDE BLD-SCNC: 102 MMOL/L (ref 98–107)
CO2: 22 MMOL/L (ref 20–31)
CREAT SERPL-MCNC: 0.39 MG/DL (ref 0.5–0.9)
GFR AFRICAN AMERICAN: >60 ML/MIN
GFR NON-AFRICAN AMERICAN: >60 ML/MIN
GFR SERPL CREATININE-BSD FRML MDRD: ABNORMAL ML/MIN/{1.73_M2}
GFR SERPL CREATININE-BSD FRML MDRD: ABNORMAL ML/MIN/{1.73_M2}
GLUCOSE BLD-MCNC: 66 MG/DL (ref 70–99)
HCT VFR BLD CALC: 43.1 % (ref 36.3–47.1)
HEMOGLOBIN: 13 G/DL (ref 11.9–15.1)
MCH RBC QN AUTO: 26.8 PG (ref 25.2–33.5)
MCHC RBC AUTO-ENTMCNC: 30.2 G/DL (ref 29.9–34.7)
MCV RBC AUTO: 88.9 FL (ref 82.6–102.9)
PDW BLD-RTO: 16.8 % (ref 11.8–14.4)
PLATELET # BLD: 264 K/UL (ref 138–453)
PMV BLD AUTO: 12.3 FL (ref 8.1–13.5)
POTASSIUM SERPL-SCNC: 3.8 MMOL/L (ref 3.7–5.3)
RBC # BLD: 4.85 M/UL (ref 3.95–5.11)
SODIUM BLD-SCNC: 139 MMOL/L (ref 135–144)
WBC # BLD: 5.4 K/UL (ref 3.5–11.3)

## 2017-11-02 PROCEDURE — 85027 COMPLETE CBC AUTOMATED: CPT

## 2017-11-02 PROCEDURE — P9603 ONE-WAY ALLOW PRORATED MILES: HCPCS

## 2017-11-02 PROCEDURE — 80048 BASIC METABOLIC PNL TOTAL CA: CPT

## 2017-11-02 PROCEDURE — 36415 COLL VENOUS BLD VENIPUNCTURE: CPT

## 2017-12-01 ENCOUNTER — HOSPITAL ENCOUNTER (OUTPATIENT)
Age: 37
Setting detail: SPECIMEN
Discharge: HOME | End: 2017-12-01
Payer: MEDICARE

## 2017-12-01 LAB
ANION GAP SERPL CALCULATED.3IONS-SCNC: 14 MMOL/L (ref 9–17)
BUN BLDV-MCNC: 12 MG/DL (ref 6–20)
BUN/CREAT BLD: ABNORMAL (ref 9–20)
CALCIUM SERPL-MCNC: 9.3 MG/DL (ref 8.6–10.4)
CHLORIDE BLD-SCNC: 103 MMOL/L (ref 98–107)
CO2: 23 MMOL/L (ref 20–31)
CREAT SERPL-MCNC: <0.4 MG/DL (ref 0.5–0.9)
GFR AFRICAN AMERICAN: ABNORMAL ML/MIN
GFR NON-AFRICAN AMERICAN: ABNORMAL ML/MIN
GFR SERPL CREATININE-BSD FRML MDRD: ABNORMAL ML/MIN/{1.73_M2}
GFR SERPL CREATININE-BSD FRML MDRD: ABNORMAL ML/MIN/{1.73_M2}
GLUCOSE BLD-MCNC: 70 MG/DL (ref 70–99)
HCT VFR BLD CALC: 42.6 % (ref 36–46)
HEMOGLOBIN: 13.8 G/DL (ref 12–16)
MCH RBC QN AUTO: 28 PG (ref 26–34)
MCHC RBC AUTO-ENTMCNC: 32.4 G/DL (ref 31–37)
MCV RBC AUTO: 86.5 FL (ref 80–100)
PDW BLD-RTO: 16 % (ref 11.5–14.5)
PLATELET # BLD: 314 K/UL (ref 130–400)
PMV BLD AUTO: 9.8 FL (ref 6–12)
POTASSIUM SERPL-SCNC: 3.9 MMOL/L (ref 3.7–5.3)
RBC # BLD: 4.93 M/UL (ref 4–5.2)
SODIUM BLD-SCNC: 140 MMOL/L (ref 135–144)
WBC # BLD: 6.5 K/UL (ref 3.5–11)

## 2017-12-01 PROCEDURE — 36415 COLL VENOUS BLD VENIPUNCTURE: CPT

## 2017-12-01 PROCEDURE — 80048 BASIC METABOLIC PNL TOTAL CA: CPT

## 2017-12-01 PROCEDURE — P9603 ONE-WAY ALLOW PRORATED MILES: HCPCS

## 2017-12-01 PROCEDURE — 85027 COMPLETE CBC AUTOMATED: CPT

## 2018-01-03 ENCOUNTER — HOSPITAL ENCOUNTER (OUTPATIENT)
Age: 38
Setting detail: SPECIMEN
Discharge: HOME | End: 2018-01-03
Payer: MEDICARE

## 2018-01-03 LAB
ANION GAP SERPL CALCULATED.3IONS-SCNC: 13 MMOL/L (ref 9–17)
BUN BLDV-MCNC: 11 MG/DL (ref 6–20)
BUN/CREAT BLD: ABNORMAL (ref 9–20)
CALCIUM SERPL-MCNC: 9.2 MG/DL (ref 8.6–10.4)
CHLORIDE BLD-SCNC: 103 MMOL/L (ref 98–107)
CO2: 25 MMOL/L (ref 20–31)
CREAT SERPL-MCNC: 0.47 MG/DL (ref 0.5–0.9)
GFR AFRICAN AMERICAN: >60 ML/MIN
GFR NON-AFRICAN AMERICAN: >60 ML/MIN
GFR SERPL CREATININE-BSD FRML MDRD: ABNORMAL ML/MIN/{1.73_M2}
GFR SERPL CREATININE-BSD FRML MDRD: ABNORMAL ML/MIN/{1.73_M2}
GLUCOSE BLD-MCNC: 65 MG/DL (ref 70–99)
HCT VFR BLD CALC: 50.1 % (ref 36.3–47.1)
HEMOGLOBIN: 15.3 G/DL (ref 11.9–15.1)
MCH RBC QN AUTO: 27.3 PG (ref 25.2–33.5)
MCHC RBC AUTO-ENTMCNC: 30.5 G/DL (ref 28.4–34.8)
MCV RBC AUTO: 89.3 FL (ref 82.6–102.9)
PDW BLD-RTO: 14.3 % (ref 11.8–14.4)
PLATELET # BLD: 307 K/UL (ref 138–453)
PMV BLD AUTO: 12 FL (ref 8.1–13.5)
POTASSIUM SERPL-SCNC: 4 MMOL/L (ref 3.7–5.3)
RBC # BLD: 5.61 M/UL (ref 3.95–5.11)
SODIUM BLD-SCNC: 141 MMOL/L (ref 135–144)
WBC # BLD: 7.2 K/UL (ref 3.5–11.3)

## 2018-01-03 PROCEDURE — 36415 COLL VENOUS BLD VENIPUNCTURE: CPT

## 2018-01-03 PROCEDURE — P9603 ONE-WAY ALLOW PRORATED MILES: HCPCS

## 2018-01-03 PROCEDURE — 85027 COMPLETE CBC AUTOMATED: CPT

## 2018-01-03 PROCEDURE — 80048 BASIC METABOLIC PNL TOTAL CA: CPT

## 2018-01-17 ENCOUNTER — HOSPITAL ENCOUNTER (OUTPATIENT)
Age: 38
Setting detail: SPECIMEN
Discharge: HOME | End: 2018-01-17
Payer: MEDICARE

## 2018-01-17 LAB
-: ABNORMAL
AMORPHOUS: ABNORMAL
BACTERIA: ABNORMAL
BILIRUBIN URINE: NEGATIVE
CASTS UA: ABNORMAL /LPF (ref 0–2)
COLOR: YELLOW
COMMENT UA: ABNORMAL
CRYSTALS, UA: ABNORMAL /HPF
EPITHELIAL CELLS UA: ABNORMAL /HPF (ref 0–5)
GLUCOSE URINE: NEGATIVE
KETONES, URINE: NEGATIVE
LEUKOCYTE ESTERASE, URINE: ABNORMAL
MUCUS: ABNORMAL
NITRITE, URINE: NEGATIVE
OTHER OBSERVATIONS UA: ABNORMAL
PH UA: >9 (ref 5–8)
PROTEIN UA: ABNORMAL
RBC UA: ABNORMAL /HPF (ref 0–2)
RENAL EPITHELIAL, UA: ABNORMAL /HPF
SPECIFIC GRAVITY UA: 1.01 (ref 1–1.03)
TRICHOMONAS: ABNORMAL
TURBIDITY: ABNORMAL
URINE HGB: ABNORMAL
UROBILINOGEN, URINE: NORMAL
WBC UA: ABNORMAL /HPF (ref 0–5)
YEAST: ABNORMAL

## 2018-01-17 PROCEDURE — 87186 SC STD MICRODIL/AGAR DIL: CPT

## 2018-01-17 PROCEDURE — 81001 URINALYSIS AUTO W/SCOPE: CPT

## 2018-01-17 PROCEDURE — 87086 URINE CULTURE/COLONY COUNT: CPT

## 2018-01-17 PROCEDURE — 87077 CULTURE AEROBIC IDENTIFY: CPT

## 2018-01-17 PROCEDURE — 87184 SC STD DISK METHOD PER PLATE: CPT

## 2018-01-21 LAB
CULTURE: ABNORMAL
Lab: ABNORMAL
Lab: ABNORMAL
ORGANISM: ABNORMAL
ORGANISM: ABNORMAL
SPECIMEN DESCRIPTION: ABNORMAL
SPECIMEN DESCRIPTION: ABNORMAL
STATUS: ABNORMAL

## 2018-02-02 ENCOUNTER — HOSPITAL ENCOUNTER (OUTPATIENT)
Age: 38
Setting detail: SPECIMEN
Discharge: HOME | End: 2018-02-02
Payer: MEDICARE

## 2018-02-02 LAB
ANION GAP SERPL CALCULATED.3IONS-SCNC: 15 MMOL/L (ref 9–17)
BUN BLDV-MCNC: 11 MG/DL (ref 6–20)
BUN/CREAT BLD: ABNORMAL (ref 9–20)
CALCIUM SERPL-MCNC: 9.1 MG/DL (ref 8.6–10.4)
CHLORIDE BLD-SCNC: 103 MMOL/L (ref 98–107)
CO2: 22 MMOL/L (ref 20–31)
CREAT SERPL-MCNC: 0.32 MG/DL (ref 0.5–0.9)
GFR AFRICAN AMERICAN: >60 ML/MIN
GFR NON-AFRICAN AMERICAN: >60 ML/MIN
GFR SERPL CREATININE-BSD FRML MDRD: ABNORMAL ML/MIN/{1.73_M2}
GFR SERPL CREATININE-BSD FRML MDRD: ABNORMAL ML/MIN/{1.73_M2}
GLUCOSE BLD-MCNC: 83 MG/DL (ref 70–99)
HCT VFR BLD CALC: 43.7 % (ref 36.3–47.1)
HEMOGLOBIN: 13.6 G/DL (ref 11.9–15.1)
MCH RBC QN AUTO: 26.8 PG (ref 25.2–33.5)
MCHC RBC AUTO-ENTMCNC: 31.1 G/DL (ref 28.4–34.8)
MCV RBC AUTO: 86 FL (ref 82.6–102.9)
NRBC AUTOMATED: 0 PER 100 WBC
PDW BLD-RTO: 14.7 % (ref 11.8–14.4)
PLATELET # BLD: 286 K/UL (ref 138–453)
PMV BLD AUTO: 11.5 FL (ref 8.1–13.5)
POTASSIUM SERPL-SCNC: 3.9 MMOL/L (ref 3.7–5.3)
RBC # BLD: 5.08 M/UL (ref 3.95–5.11)
SODIUM BLD-SCNC: 140 MMOL/L (ref 135–144)
WBC # BLD: 5.9 K/UL (ref 3.5–11.3)

## 2018-02-02 PROCEDURE — P9603 ONE-WAY ALLOW PRORATED MILES: HCPCS

## 2018-02-02 PROCEDURE — 80048 BASIC METABOLIC PNL TOTAL CA: CPT

## 2018-02-02 PROCEDURE — 85027 COMPLETE CBC AUTOMATED: CPT

## 2018-02-02 PROCEDURE — 36415 COLL VENOUS BLD VENIPUNCTURE: CPT

## 2018-03-02 ENCOUNTER — HOSPITAL ENCOUNTER (OUTPATIENT)
Age: 38
Setting detail: SPECIMEN
Discharge: HOME | End: 2018-03-02
Payer: MEDICARE

## 2018-03-02 LAB
ANION GAP SERPL CALCULATED.3IONS-SCNC: 14 MMOL/L (ref 9–17)
BUN BLDV-MCNC: 11 MG/DL (ref 6–20)
BUN/CREAT BLD: ABNORMAL (ref 9–20)
CALCIUM SERPL-MCNC: 9.3 MG/DL (ref 8.6–10.4)
CHLORIDE BLD-SCNC: 104 MMOL/L (ref 98–107)
CO2: 21 MMOL/L (ref 20–31)
CREAT SERPL-MCNC: 0.31 MG/DL (ref 0.5–0.9)
GFR AFRICAN AMERICAN: >60 ML/MIN
GFR NON-AFRICAN AMERICAN: >60 ML/MIN
GFR SERPL CREATININE-BSD FRML MDRD: ABNORMAL ML/MIN/{1.73_M2}
GFR SERPL CREATININE-BSD FRML MDRD: ABNORMAL ML/MIN/{1.73_M2}
GLUCOSE BLD-MCNC: 70 MG/DL (ref 70–99)
HCT VFR BLD CALC: 46.4 % (ref 36.3–47.1)
HEMOGLOBIN: 14.3 G/DL (ref 11.9–15.1)
MCH RBC QN AUTO: 27.2 PG (ref 25.2–33.5)
MCHC RBC AUTO-ENTMCNC: 30.8 G/DL (ref 28.4–34.8)
MCV RBC AUTO: 88.4 FL (ref 82.6–102.9)
NRBC AUTOMATED: 0 PER 100 WBC
PDW BLD-RTO: 15.7 % (ref 11.8–14.4)
PLATELET # BLD: 224 K/UL (ref 138–453)
PMV BLD AUTO: 12.8 FL (ref 8.1–13.5)
POTASSIUM SERPL-SCNC: 4.2 MMOL/L (ref 3.7–5.3)
RBC # BLD: 5.25 M/UL (ref 3.95–5.11)
SODIUM BLD-SCNC: 139 MMOL/L (ref 135–144)
WBC # BLD: 7.7 K/UL (ref 3.5–11.3)

## 2018-03-02 PROCEDURE — 80048 BASIC METABOLIC PNL TOTAL CA: CPT

## 2018-03-02 PROCEDURE — P9603 ONE-WAY ALLOW PRORATED MILES: HCPCS

## 2018-03-02 PROCEDURE — 85027 COMPLETE CBC AUTOMATED: CPT

## 2018-03-02 PROCEDURE — 36415 COLL VENOUS BLD VENIPUNCTURE: CPT

## 2018-04-02 ENCOUNTER — HOSPITAL ENCOUNTER (OUTPATIENT)
Age: 38
Setting detail: SPECIMEN
Discharge: HOME | End: 2018-04-02
Payer: MEDICARE

## 2018-04-02 LAB
ANION GAP SERPL CALCULATED.3IONS-SCNC: 16 MMOL/L (ref 9–17)
BUN BLDV-MCNC: 10 MG/DL (ref 6–20)
BUN/CREAT BLD: ABNORMAL (ref 9–20)
CALCIUM SERPL-MCNC: 8.9 MG/DL (ref 8.6–10.4)
CHLORIDE BLD-SCNC: 103 MMOL/L (ref 98–107)
CO2: 21 MMOL/L (ref 20–31)
CREAT SERPL-MCNC: 0.33 MG/DL (ref 0.5–0.9)
GFR AFRICAN AMERICAN: >60 ML/MIN
GFR NON-AFRICAN AMERICAN: >60 ML/MIN
GFR SERPL CREATININE-BSD FRML MDRD: ABNORMAL ML/MIN/{1.73_M2}
GFR SERPL CREATININE-BSD FRML MDRD: ABNORMAL ML/MIN/{1.73_M2}
GLUCOSE BLD-MCNC: 53 MG/DL (ref 70–99)
HCT VFR BLD CALC: 42.8 % (ref 36.3–47.1)
HEMOGLOBIN: 13.4 G/DL (ref 11.9–15.1)
MCH RBC QN AUTO: 27.2 PG (ref 25.2–33.5)
MCHC RBC AUTO-ENTMCNC: 31.3 G/DL (ref 28.4–34.8)
MCV RBC AUTO: 87 FL (ref 82.6–102.9)
NRBC AUTOMATED: 0 PER 100 WBC
PDW BLD-RTO: 15.1 % (ref 11.8–14.4)
PLATELET # BLD: 373 K/UL (ref 138–453)
PMV BLD AUTO: 11.2 FL (ref 8.1–13.5)
POTASSIUM SERPL-SCNC: 4 MMOL/L (ref 3.7–5.3)
RBC # BLD: 4.92 M/UL (ref 3.95–5.11)
SODIUM BLD-SCNC: 140 MMOL/L (ref 135–144)
WBC # BLD: 8.1 K/UL (ref 3.5–11.3)

## 2018-04-02 PROCEDURE — 36415 COLL VENOUS BLD VENIPUNCTURE: CPT

## 2018-04-02 PROCEDURE — 85027 COMPLETE CBC AUTOMATED: CPT

## 2018-04-02 PROCEDURE — P9603 ONE-WAY ALLOW PRORATED MILES: HCPCS

## 2018-04-02 PROCEDURE — 80048 BASIC METABOLIC PNL TOTAL CA: CPT

## 2018-05-02 ENCOUNTER — HOSPITAL ENCOUNTER (OUTPATIENT)
Age: 38
Setting detail: SPECIMEN
Discharge: HOME | End: 2018-05-02
Payer: MEDICARE

## 2018-05-02 LAB
ANION GAP SERPL CALCULATED.3IONS-SCNC: 13 MMOL/L (ref 9–17)
BUN BLDV-MCNC: 10 MG/DL (ref 6–20)
BUN/CREAT BLD: ABNORMAL (ref 9–20)
CALCIUM SERPL-MCNC: 9.1 MG/DL (ref 8.6–10.4)
CHLORIDE BLD-SCNC: 106 MMOL/L (ref 98–107)
CO2: 22 MMOL/L (ref 20–31)
CREAT SERPL-MCNC: 0.43 MG/DL (ref 0.5–0.9)
GFR AFRICAN AMERICAN: >60 ML/MIN
GFR NON-AFRICAN AMERICAN: >60 ML/MIN
GFR SERPL CREATININE-BSD FRML MDRD: ABNORMAL ML/MIN/{1.73_M2}
GFR SERPL CREATININE-BSD FRML MDRD: ABNORMAL ML/MIN/{1.73_M2}
GLUCOSE BLD-MCNC: 69 MG/DL (ref 70–99)
HCT VFR BLD CALC: 44.9 % (ref 36.3–47.1)
HEMOGLOBIN: 13.8 G/DL (ref 11.9–15.1)
MCH RBC QN AUTO: 27 PG (ref 25.2–33.5)
MCHC RBC AUTO-ENTMCNC: 30.7 G/DL (ref 28.4–34.8)
MCV RBC AUTO: 87.7 FL (ref 82.6–102.9)
NRBC AUTOMATED: 0 PER 100 WBC
PDW BLD-RTO: 14.9 % (ref 11.8–14.4)
PLATELET # BLD: 297 K/UL (ref 138–453)
PMV BLD AUTO: 11.4 FL (ref 8.1–13.5)
POTASSIUM SERPL-SCNC: 3.9 MMOL/L (ref 3.7–5.3)
RBC # BLD: 5.12 M/UL (ref 3.95–5.11)
SODIUM BLD-SCNC: 141 MMOL/L (ref 135–144)
WBC # BLD: 6.6 K/UL (ref 3.5–11.3)

## 2018-05-02 PROCEDURE — 85027 COMPLETE CBC AUTOMATED: CPT

## 2018-05-02 PROCEDURE — 80048 BASIC METABOLIC PNL TOTAL CA: CPT

## 2018-05-02 PROCEDURE — P9603 ONE-WAY ALLOW PRORATED MILES: HCPCS

## 2018-05-02 PROCEDURE — 36415 COLL VENOUS BLD VENIPUNCTURE: CPT

## 2018-06-25 ENCOUNTER — HOSPITAL ENCOUNTER (OUTPATIENT)
Age: 38
Setting detail: SPECIMEN
Discharge: HOME | End: 2018-06-25
Payer: MEDICARE

## 2018-06-25 LAB
-: ABNORMAL
AMORPHOUS: ABNORMAL
BACTERIA: ABNORMAL
BILIRUBIN URINE: NEGATIVE
CASTS UA: ABNORMAL /LPF (ref 0–2)
COLOR: YELLOW
COMMENT UA: ABNORMAL
CRYSTALS, UA: ABNORMAL /HPF
EPITHELIAL CELLS UA: ABNORMAL /HPF (ref 0–5)
GLUCOSE URINE: NEGATIVE
KETONES, URINE: NEGATIVE
LEUKOCYTE ESTERASE, URINE: ABNORMAL
MUCUS: ABNORMAL
NITRITE, URINE: NEGATIVE
OTHER OBSERVATIONS UA: ABNORMAL
PH UA: 7.5 (ref 5–8)
PROTEIN UA: NEGATIVE
RBC UA: ABNORMAL /HPF (ref 0–2)
RENAL EPITHELIAL, UA: ABNORMAL /HPF
SPECIFIC GRAVITY UA: 1 (ref 1–1.03)
TRICHOMONAS: ABNORMAL
TURBIDITY: ABNORMAL
URINE HGB: ABNORMAL
UROBILINOGEN, URINE: NORMAL
WBC UA: ABNORMAL /HPF (ref 0–5)
YEAST: ABNORMAL

## 2018-06-25 PROCEDURE — 81001 URINALYSIS AUTO W/SCOPE: CPT

## 2018-06-25 PROCEDURE — 87086 URINE CULTURE/COLONY COUNT: CPT

## 2018-06-26 LAB
CULTURE: ABNORMAL
Lab: ABNORMAL
SPECIMEN DESCRIPTION: ABNORMAL
STATUS: ABNORMAL

## 2018-06-27 ENCOUNTER — HOSPITAL ENCOUNTER (OUTPATIENT)
Age: 38
Setting detail: SPECIMEN
Discharge: HOME | End: 2018-06-27
Payer: MEDICARE

## 2018-06-27 LAB
-: ABNORMAL
AMORPHOUS: ABNORMAL
BACTERIA: ABNORMAL
BILIRUBIN URINE: NEGATIVE
CASTS UA: ABNORMAL /LPF (ref 0–2)
COLOR: YELLOW
COMMENT UA: ABNORMAL
CRYSTALS, UA: ABNORMAL /HPF
EPITHELIAL CELLS UA: ABNORMAL /HPF (ref 0–5)
GLUCOSE URINE: NEGATIVE
KETONES, URINE: NEGATIVE
LEUKOCYTE ESTERASE, URINE: ABNORMAL
MUCUS: ABNORMAL
NITRITE, URINE: POSITIVE
OTHER OBSERVATIONS UA: ABNORMAL
PH UA: 8.5 (ref 5–8)
PROTEIN UA: ABNORMAL
RBC UA: ABNORMAL /HPF (ref 0–2)
RENAL EPITHELIAL, UA: ABNORMAL /HPF
SPECIFIC GRAVITY UA: 1.01 (ref 1–1.03)
TRICHOMONAS: ABNORMAL
TURBIDITY: ABNORMAL
URINE HGB: ABNORMAL
UROBILINOGEN, URINE: NORMAL
WBC UA: ABNORMAL /HPF (ref 0–5)
YEAST: ABNORMAL

## 2018-06-27 PROCEDURE — 87086 URINE CULTURE/COLONY COUNT: CPT

## 2018-06-27 PROCEDURE — 81001 URINALYSIS AUTO W/SCOPE: CPT

## 2018-06-27 PROCEDURE — 87186 SC STD MICRODIL/AGAR DIL: CPT

## 2018-06-27 PROCEDURE — 87077 CULTURE AEROBIC IDENTIFY: CPT

## 2018-06-27 PROCEDURE — 87184 SC STD DISK METHOD PER PLATE: CPT

## 2018-06-30 LAB
CULTURE: ABNORMAL
Lab: ABNORMAL
ORGANISM: ABNORMAL
ORGANISM: ABNORMAL
SPECIMEN DESCRIPTION: ABNORMAL
STATUS: ABNORMAL

## 2018-07-02 ENCOUNTER — HOSPITAL ENCOUNTER (OUTPATIENT)
Age: 38
Setting detail: SPECIMEN
Discharge: HOME | End: 2018-07-02
Payer: MEDICARE

## 2018-07-02 LAB
ANION GAP SERPL CALCULATED.3IONS-SCNC: 19 MMOL/L (ref 9–17)
BUN BLDV-MCNC: 13 MG/DL (ref 6–20)
BUN/CREAT BLD: 31 (ref 9–20)
CALCIUM SERPL-MCNC: 9.2 MG/DL (ref 8.6–10.4)
CHLORIDE BLD-SCNC: 101 MMOL/L (ref 98–107)
CO2: 22 MMOL/L (ref 20–31)
CREAT SERPL-MCNC: 0.42 MG/DL (ref 0.5–0.9)
GFR AFRICAN AMERICAN: >60 ML/MIN
GFR NON-AFRICAN AMERICAN: >60 ML/MIN
GFR SERPL CREATININE-BSD FRML MDRD: ABNORMAL ML/MIN/{1.73_M2}
GFR SERPL CREATININE-BSD FRML MDRD: ABNORMAL ML/MIN/{1.73_M2}
GLUCOSE BLD-MCNC: 36 MG/DL (ref 70–99)
HCT VFR BLD CALC: 42 % (ref 36–46)
HEMOGLOBIN: 13.3 G/DL (ref 12–16)
MCH RBC QN AUTO: 27.3 PG (ref 26–34)
MCHC RBC AUTO-ENTMCNC: 31.5 G/DL (ref 31–37)
MCV RBC AUTO: 86.6 FL (ref 80–100)
NRBC AUTOMATED: ABNORMAL PER 100 WBC
PDW BLD-RTO: 16.7 % (ref 11.5–14.5)
PLATELET # BLD: 281 K/UL (ref 130–400)
PMV BLD AUTO: 9.7 FL (ref 6–12)
POTASSIUM SERPL-SCNC: 4.7 MMOL/L (ref 3.7–5.3)
RBC # BLD: 4.85 M/UL (ref 4–5.2)
SODIUM BLD-SCNC: 142 MMOL/L (ref 135–144)
WBC # BLD: 6.2 K/UL (ref 3.5–11)

## 2018-07-02 PROCEDURE — 80048 BASIC METABOLIC PNL TOTAL CA: CPT

## 2018-07-02 PROCEDURE — 36415 COLL VENOUS BLD VENIPUNCTURE: CPT

## 2018-07-02 PROCEDURE — P9603 ONE-WAY ALLOW PRORATED MILES: HCPCS

## 2018-07-02 PROCEDURE — 85027 COMPLETE CBC AUTOMATED: CPT

## 2018-07-09 ENCOUNTER — HOSPITAL ENCOUNTER (OUTPATIENT)
Age: 38
Setting detail: SPECIMEN
Discharge: HOME | End: 2018-07-09
Payer: MEDICARE

## 2018-07-09 LAB
-: NORMAL
AMORPHOUS: NORMAL
BACTERIA: NORMAL
BILIRUBIN URINE: NEGATIVE
CASTS UA: NORMAL /LPF (ref 0–8)
COLOR: YELLOW
COMMENT UA: ABNORMAL
CRYSTALS, UA: NORMAL /HPF
EPITHELIAL CELLS UA: NORMAL /HPF (ref 0–5)
GLUCOSE URINE: NEGATIVE
KETONES, URINE: NEGATIVE
LEUKOCYTE ESTERASE, URINE: ABNORMAL
MUCUS: NORMAL
NITRITE, URINE: NEGATIVE
OTHER OBSERVATIONS UA: NORMAL
PH UA: >9 (ref 5–8)
PROTEIN UA: ABNORMAL
RBC UA: NORMAL /HPF (ref 0–4)
RENAL EPITHELIAL, UA: NORMAL /HPF
SPECIFIC GRAVITY UA: 1.01 (ref 1–1.03)
TRICHOMONAS: NORMAL
TURBIDITY: ABNORMAL
URINE HGB: ABNORMAL
UROBILINOGEN, URINE: NORMAL
WBC UA: NORMAL /HPF (ref 0–5)
YEAST: NORMAL

## 2018-07-09 PROCEDURE — 87186 SC STD MICRODIL/AGAR DIL: CPT

## 2018-07-09 PROCEDURE — 87077 CULTURE AEROBIC IDENTIFY: CPT

## 2018-07-09 PROCEDURE — 87086 URINE CULTURE/COLONY COUNT: CPT

## 2018-07-09 PROCEDURE — 81001 URINALYSIS AUTO W/SCOPE: CPT

## 2018-07-11 LAB
CULTURE: ABNORMAL
Lab: ABNORMAL
ORGANISM: ABNORMAL
SPECIMEN DESCRIPTION: ABNORMAL
STATUS: ABNORMAL

## 2018-08-02 ENCOUNTER — HOSPITAL ENCOUNTER (OUTPATIENT)
Age: 38
Setting detail: SPECIMEN
Discharge: HOME | End: 2018-08-02
Payer: MEDICARE

## 2018-08-02 LAB
ANION GAP SERPL CALCULATED.3IONS-SCNC: 11 MMOL/L (ref 9–17)
BUN BLDV-MCNC: 10 MG/DL (ref 6–20)
BUN/CREAT BLD: ABNORMAL (ref 9–20)
CALCIUM SERPL-MCNC: 9.3 MG/DL (ref 8.6–10.4)
CHLORIDE BLD-SCNC: 104 MMOL/L (ref 98–107)
CO2: 23 MMOL/L (ref 20–31)
CREAT SERPL-MCNC: 0.37 MG/DL (ref 0.5–0.9)
GFR AFRICAN AMERICAN: >60 ML/MIN
GFR NON-AFRICAN AMERICAN: >60 ML/MIN
GFR SERPL CREATININE-BSD FRML MDRD: ABNORMAL ML/MIN/{1.73_M2}
GFR SERPL CREATININE-BSD FRML MDRD: ABNORMAL ML/MIN/{1.73_M2}
GLUCOSE BLD-MCNC: 83 MG/DL (ref 70–99)
HCT VFR BLD CALC: 43.7 % (ref 36.3–47.1)
HEMOGLOBIN: 13.5 G/DL (ref 11.9–15.1)
MCH RBC QN AUTO: 26.7 PG (ref 25.2–33.5)
MCHC RBC AUTO-ENTMCNC: 30.9 G/DL (ref 28.4–34.8)
MCV RBC AUTO: 86.5 FL (ref 82.6–102.9)
NRBC AUTOMATED: 0 PER 100 WBC
PDW BLD-RTO: 15.3 % (ref 11.8–14.4)
PLATELET # BLD: 357 K/UL (ref 138–453)
PMV BLD AUTO: 11.2 FL (ref 8.1–13.5)
POTASSIUM SERPL-SCNC: 4 MMOL/L (ref 3.7–5.3)
RBC # BLD: 5.05 M/UL (ref 3.95–5.11)
SODIUM BLD-SCNC: 138 MMOL/L (ref 135–144)
WBC # BLD: 7 K/UL (ref 3.5–11.3)

## 2018-08-02 PROCEDURE — 36415 COLL VENOUS BLD VENIPUNCTURE: CPT

## 2018-08-02 PROCEDURE — 80048 BASIC METABOLIC PNL TOTAL CA: CPT

## 2018-08-02 PROCEDURE — 85027 COMPLETE CBC AUTOMATED: CPT

## 2018-08-02 PROCEDURE — P9603 ONE-WAY ALLOW PRORATED MILES: HCPCS

## 2018-08-07 ENCOUNTER — HOSPITAL ENCOUNTER (OUTPATIENT)
Age: 38
Setting detail: SPECIMEN
Discharge: HOME | End: 2018-08-07
Payer: MEDICARE

## 2018-08-07 LAB
CHOLESTEROL: 149 MG/DL
T4 TOTAL: 9.5 UG/DL (ref 4.5–12)
TSH SERPL DL<=0.05 MIU/L-ACNC: 1.46 MIU/L (ref 0.3–5)
VITAMIN D 25-HYDROXY: 36.3 NG/ML (ref 30–100)

## 2018-08-07 PROCEDURE — 82306 VITAMIN D 25 HYDROXY: CPT

## 2018-08-07 PROCEDURE — 84443 ASSAY THYROID STIM HORMONE: CPT

## 2018-08-07 PROCEDURE — P9603 ONE-WAY ALLOW PRORATED MILES: HCPCS

## 2018-08-07 PROCEDURE — 36415 COLL VENOUS BLD VENIPUNCTURE: CPT

## 2018-08-07 PROCEDURE — 84436 ASSAY OF TOTAL THYROXINE: CPT

## 2018-08-07 PROCEDURE — 82465 ASSAY BLD/SERUM CHOLESTEROL: CPT

## 2018-09-06 ENCOUNTER — HOSPITAL ENCOUNTER (OUTPATIENT)
Age: 38
Setting detail: SPECIMEN
Discharge: HOME | End: 2018-09-06
Payer: MEDICARE

## 2018-09-06 LAB
ANION GAP SERPL CALCULATED.3IONS-SCNC: 12 MMOL/L (ref 9–17)
BUN BLDV-MCNC: 11 MG/DL (ref 6–20)
BUN/CREAT BLD: ABNORMAL (ref 9–20)
CALCIUM SERPL-MCNC: 9.2 MG/DL (ref 8.6–10.4)
CHLORIDE BLD-SCNC: 101 MMOL/L (ref 98–107)
CO2: 21 MMOL/L (ref 20–31)
CREAT SERPL-MCNC: 0.37 MG/DL (ref 0.5–0.9)
GFR AFRICAN AMERICAN: >60 ML/MIN
GFR NON-AFRICAN AMERICAN: >60 ML/MIN
GFR SERPL CREATININE-BSD FRML MDRD: ABNORMAL ML/MIN/{1.73_M2}
GFR SERPL CREATININE-BSD FRML MDRD: ABNORMAL ML/MIN/{1.73_M2}
GLUCOSE BLD-MCNC: 70 MG/DL (ref 70–99)
HCT VFR BLD CALC: 44.7 % (ref 36.3–47.1)
HEMOGLOBIN: 14.3 G/DL (ref 11.9–15.1)
MCH RBC QN AUTO: 28 PG (ref 25.2–33.5)
MCHC RBC AUTO-ENTMCNC: 32 G/DL (ref 28.4–34.8)
MCV RBC AUTO: 87.5 FL (ref 82.6–102.9)
NRBC AUTOMATED: 0 PER 100 WBC
PDW BLD-RTO: 15.1 % (ref 11.8–14.4)
PLATELET # BLD: 310 K/UL (ref 138–453)
PMV BLD AUTO: 11.4 FL (ref 8.1–13.5)
POTASSIUM SERPL-SCNC: 4.3 MMOL/L (ref 3.7–5.3)
RBC # BLD: 5.11 M/UL (ref 3.95–5.11)
SODIUM BLD-SCNC: 134 MMOL/L (ref 135–144)
WBC # BLD: 6.2 K/UL (ref 3.5–11.3)

## 2018-09-06 PROCEDURE — 85027 COMPLETE CBC AUTOMATED: CPT

## 2018-09-06 PROCEDURE — P9603 ONE-WAY ALLOW PRORATED MILES: HCPCS

## 2018-09-06 PROCEDURE — 80048 BASIC METABOLIC PNL TOTAL CA: CPT

## 2018-09-06 PROCEDURE — 36415 COLL VENOUS BLD VENIPUNCTURE: CPT

## 2018-10-03 ENCOUNTER — HOSPITAL ENCOUNTER (OUTPATIENT)
Age: 38
Setting detail: SPECIMEN
Discharge: HOME | End: 2018-10-03
Payer: MEDICARE

## 2018-10-03 LAB
ANION GAP SERPL CALCULATED.3IONS-SCNC: 12 MMOL/L (ref 9–17)
BUN BLDV-MCNC: 14 MG/DL (ref 6–20)
BUN/CREAT BLD: ABNORMAL (ref 9–20)
CALCIUM SERPL-MCNC: 9 MG/DL (ref 8.6–10.4)
CHLORIDE BLD-SCNC: 104 MMOL/L (ref 98–107)
CO2: 22 MMOL/L (ref 20–31)
CREAT SERPL-MCNC: 0.34 MG/DL (ref 0.5–0.9)
GFR AFRICAN AMERICAN: >60 ML/MIN
GFR NON-AFRICAN AMERICAN: >60 ML/MIN
GFR SERPL CREATININE-BSD FRML MDRD: ABNORMAL ML/MIN/{1.73_M2}
GFR SERPL CREATININE-BSD FRML MDRD: ABNORMAL ML/MIN/{1.73_M2}
GLUCOSE BLD-MCNC: 89 MG/DL (ref 70–99)
HCT VFR BLD CALC: 45.4 % (ref 36.3–47.1)
HEMOGLOBIN: 13.8 G/DL (ref 11.9–15.1)
MCH RBC QN AUTO: 27.2 PG (ref 25.2–33.5)
MCHC RBC AUTO-ENTMCNC: 30.4 G/DL (ref 28.4–34.8)
MCV RBC AUTO: 89.4 FL (ref 82.6–102.9)
NRBC AUTOMATED: 0 PER 100 WBC
PDW BLD-RTO: 14.8 % (ref 11.8–14.4)
PLATELET # BLD: 287 K/UL (ref 138–453)
PMV BLD AUTO: 11.9 FL (ref 8.1–13.5)
POTASSIUM SERPL-SCNC: 4 MMOL/L (ref 3.7–5.3)
RBC # BLD: 5.08 M/UL (ref 3.95–5.11)
SODIUM BLD-SCNC: 138 MMOL/L (ref 135–144)
WBC # BLD: 6.4 K/UL (ref 3.5–11.3)

## 2018-10-03 PROCEDURE — 36415 COLL VENOUS BLD VENIPUNCTURE: CPT

## 2018-10-03 PROCEDURE — 85027 COMPLETE CBC AUTOMATED: CPT

## 2018-10-03 PROCEDURE — 80048 BASIC METABOLIC PNL TOTAL CA: CPT

## 2018-10-03 PROCEDURE — P9603 ONE-WAY ALLOW PRORATED MILES: HCPCS

## 2018-10-17 ENCOUNTER — HOSPITAL ENCOUNTER (OUTPATIENT)
Age: 38
Setting detail: SPECIMEN
Discharge: HOME | End: 2018-10-17
Payer: MEDICARE

## 2018-10-17 LAB
-: ABNORMAL
AMORPHOUS: ABNORMAL
BACTERIA: ABNORMAL
BILIRUBIN URINE: ABNORMAL
CASTS UA: ABNORMAL /LPF (ref 0–2)
COLOR: ABNORMAL
COMMENT UA: ABNORMAL
CRYSTALS, UA: ABNORMAL /HPF
EPITHELIAL CELLS UA: ABNORMAL /HPF (ref 0–5)
GLUCOSE URINE: NEGATIVE
HCT VFR BLD CALC: 40.8 % (ref 36–46)
HEMOGLOBIN: 13.5 G/DL (ref 12–16)
KETONES, URINE: NEGATIVE
LEUKOCYTE ESTERASE, URINE: ABNORMAL
MCH RBC QN AUTO: 28.6 PG (ref 26–34)
MCHC RBC AUTO-ENTMCNC: 33.2 G/DL (ref 31–37)
MCV RBC AUTO: 85.9 FL (ref 80–100)
MUCUS: ABNORMAL
NITRITE, URINE: POSITIVE
NRBC AUTOMATED: ABNORMAL PER 100 WBC
OTHER OBSERVATIONS UA: ABNORMAL
PDW BLD-RTO: 15.4 % (ref 11.5–14.5)
PH UA: 7 (ref 5–8)
PLATELET # BLD: 256 K/UL (ref 130–400)
PMV BLD AUTO: 10 FL (ref 6–12)
PROTEIN UA: ABNORMAL
RBC # BLD: 4.74 M/UL (ref 4–5.2)
RBC UA: ABNORMAL /HPF (ref 0–2)
RENAL EPITHELIAL, UA: ABNORMAL /HPF
SPECIFIC GRAVITY UA: 1.02 (ref 1–1.03)
TRICHOMONAS: ABNORMAL
TURBIDITY: ABNORMAL
URINE HGB: ABNORMAL
UROBILINOGEN, URINE: NORMAL
WBC # BLD: 6.6 K/UL (ref 3.5–11)
WBC UA: ABNORMAL /HPF (ref 0–5)
YEAST: ABNORMAL

## 2018-10-17 PROCEDURE — 36415 COLL VENOUS BLD VENIPUNCTURE: CPT

## 2018-10-17 PROCEDURE — 81001 URINALYSIS AUTO W/SCOPE: CPT

## 2018-10-17 PROCEDURE — 85027 COMPLETE CBC AUTOMATED: CPT

## 2018-10-17 PROCEDURE — P9603 ONE-WAY ALLOW PRORATED MILES: HCPCS

## 2018-10-17 PROCEDURE — 87086 URINE CULTURE/COLONY COUNT: CPT

## 2018-10-19 LAB
CULTURE: ABNORMAL
Lab: ABNORMAL
SPECIMEN DESCRIPTION: ABNORMAL
STATUS: ABNORMAL

## 2018-11-02 ENCOUNTER — HOSPITAL ENCOUNTER (OUTPATIENT)
Age: 38
Setting detail: SPECIMEN
Discharge: HOME | End: 2018-11-02
Payer: MEDICARE

## 2018-11-02 LAB
ANION GAP SERPL CALCULATED.3IONS-SCNC: 15 MMOL/L (ref 9–17)
BUN BLDV-MCNC: 11 MG/DL (ref 6–20)
BUN/CREAT BLD: ABNORMAL (ref 9–20)
CALCIUM SERPL-MCNC: 9.1 MG/DL (ref 8.6–10.4)
CHLORIDE BLD-SCNC: 104 MMOL/L (ref 98–107)
CO2: 21 MMOL/L (ref 20–31)
CREAT SERPL-MCNC: 0.32 MG/DL (ref 0.5–0.9)
GFR AFRICAN AMERICAN: >60 ML/MIN
GFR NON-AFRICAN AMERICAN: >60 ML/MIN
GFR SERPL CREATININE-BSD FRML MDRD: ABNORMAL ML/MIN/{1.73_M2}
GFR SERPL CREATININE-BSD FRML MDRD: ABNORMAL ML/MIN/{1.73_M2}
GLUCOSE BLD-MCNC: 81 MG/DL (ref 70–99)
HCT VFR BLD CALC: 45 % (ref 36.3–47.1)
HEMOGLOBIN: 13.8 G/DL (ref 11.9–15.1)
MCH RBC QN AUTO: 27.4 PG (ref 25.2–33.5)
MCHC RBC AUTO-ENTMCNC: 30.7 G/DL (ref 28.4–34.8)
MCV RBC AUTO: 89.5 FL (ref 82.6–102.9)
NRBC AUTOMATED: 0 PER 100 WBC
PDW BLD-RTO: 14.9 % (ref 11.8–14.4)
PLATELET # BLD: 277 K/UL (ref 138–453)
PMV BLD AUTO: 12.1 FL (ref 8.1–13.5)
POTASSIUM SERPL-SCNC: 4.1 MMOL/L (ref 3.7–5.3)
RBC # BLD: 5.03 M/UL (ref 3.95–5.11)
SODIUM BLD-SCNC: 140 MMOL/L (ref 135–144)
WBC # BLD: 6.8 K/UL (ref 3.5–11.3)

## 2018-11-02 PROCEDURE — 80048 BASIC METABOLIC PNL TOTAL CA: CPT

## 2018-11-02 PROCEDURE — 36415 COLL VENOUS BLD VENIPUNCTURE: CPT

## 2018-11-02 PROCEDURE — 85027 COMPLETE CBC AUTOMATED: CPT

## 2018-11-02 PROCEDURE — P9603 ONE-WAY ALLOW PRORATED MILES: HCPCS

## 2018-12-03 ENCOUNTER — HOSPITAL ENCOUNTER (OUTPATIENT)
Age: 38
Setting detail: SPECIMEN
Discharge: HOME | End: 2018-12-03
Payer: COMMERCIAL

## 2018-12-03 LAB
ANION GAP SERPL CALCULATED.3IONS-SCNC: 12 MMOL/L (ref 9–17)
BUN BLDV-MCNC: 19 MG/DL (ref 6–20)
BUN/CREAT BLD: ABNORMAL (ref 9–20)
CALCIUM SERPL-MCNC: 9.6 MG/DL (ref 8.6–10.4)
CHLORIDE BLD-SCNC: 106 MMOL/L (ref 98–107)
CO2: 22 MMOL/L (ref 20–31)
CREAT SERPL-MCNC: 0.42 MG/DL (ref 0.5–0.9)
GFR AFRICAN AMERICAN: >60 ML/MIN
GFR NON-AFRICAN AMERICAN: >60 ML/MIN
GFR SERPL CREATININE-BSD FRML MDRD: ABNORMAL ML/MIN/{1.73_M2}
GFR SERPL CREATININE-BSD FRML MDRD: ABNORMAL ML/MIN/{1.73_M2}
GLUCOSE BLD-MCNC: 72 MG/DL (ref 70–99)
HCT VFR BLD CALC: 46.7 % (ref 36.3–47.1)
HEMOGLOBIN: 14.1 G/DL (ref 11.9–15.1)
MCH RBC QN AUTO: 27.3 PG (ref 25.2–33.5)
MCHC RBC AUTO-ENTMCNC: 30.2 G/DL (ref 28.4–34.8)
MCV RBC AUTO: 90.3 FL (ref 82.6–102.9)
NRBC AUTOMATED: 0 PER 100 WBC
PDW BLD-RTO: 14.8 % (ref 11.8–14.4)
PLATELET # BLD: 297 K/UL (ref 138–453)
PMV BLD AUTO: 12.2 FL (ref 8.1–13.5)
POTASSIUM SERPL-SCNC: 4.4 MMOL/L (ref 3.7–5.3)
RBC # BLD: 5.17 M/UL (ref 3.95–5.11)
SODIUM BLD-SCNC: 140 MMOL/L (ref 135–144)
WBC # BLD: 7.3 K/UL (ref 3.5–11.3)

## 2018-12-03 PROCEDURE — P9603 ONE-WAY ALLOW PRORATED MILES: HCPCS

## 2018-12-03 PROCEDURE — 36415 COLL VENOUS BLD VENIPUNCTURE: CPT

## 2018-12-03 PROCEDURE — 80048 BASIC METABOLIC PNL TOTAL CA: CPT

## 2018-12-03 PROCEDURE — 85027 COMPLETE CBC AUTOMATED: CPT

## 2018-12-10 ENCOUNTER — HOSPITAL ENCOUNTER (OUTPATIENT)
Age: 38
Setting detail: SPECIMEN
Discharge: HOME | End: 2018-12-10
Payer: MEDICARE

## 2018-12-10 LAB
-: ABNORMAL
AMORPHOUS: ABNORMAL
BACTERIA: ABNORMAL
BILIRUBIN URINE: NEGATIVE
CASTS UA: ABNORMAL /LPF (ref 0–8)
COLOR: YELLOW
COMMENT UA: ABNORMAL
CRYSTALS, UA: ABNORMAL /HPF
EPITHELIAL CELLS UA: ABNORMAL /HPF (ref 0–5)
GLUCOSE URINE: NEGATIVE
KETONES, URINE: NEGATIVE
LEUKOCYTE ESTERASE, URINE: ABNORMAL
MUCUS: ABNORMAL
NITRITE, URINE: POSITIVE
OTHER OBSERVATIONS UA: ABNORMAL
PH UA: 8.5 (ref 5–8)
PROTEIN UA: NEGATIVE
RBC UA: ABNORMAL /HPF (ref 0–4)
RENAL EPITHELIAL, UA: ABNORMAL /HPF
SPECIFIC GRAVITY UA: 1.01 (ref 1–1.03)
TRICHOMONAS: ABNORMAL
TURBIDITY: ABNORMAL
URINE HGB: ABNORMAL
UROBILINOGEN, URINE: NORMAL
WBC UA: ABNORMAL /HPF (ref 0–5)
YEAST: ABNORMAL

## 2018-12-10 PROCEDURE — 87186 SC STD MICRODIL/AGAR DIL: CPT

## 2018-12-10 PROCEDURE — 87077 CULTURE AEROBIC IDENTIFY: CPT

## 2018-12-10 PROCEDURE — 87086 URINE CULTURE/COLONY COUNT: CPT

## 2018-12-10 PROCEDURE — 87088 URINE BACTERIA CULTURE: CPT

## 2018-12-10 PROCEDURE — 81001 URINALYSIS AUTO W/SCOPE: CPT

## 2019-01-04 ENCOUNTER — HOSPITAL ENCOUNTER (OUTPATIENT)
Age: 39
Setting detail: SPECIMEN
Discharge: HOME | End: 2019-01-04
Payer: MEDICARE

## 2019-01-04 LAB
-: ABNORMAL
AMORPHOUS: ABNORMAL
BACTERIA: ABNORMAL
BILIRUBIN URINE: NEGATIVE
CASTS UA: ABNORMAL /LPF (ref 0–8)
COLOR: YELLOW
COMMENT UA: ABNORMAL
CRYSTALS, UA: ABNORMAL /HPF
EPITHELIAL CELLS UA: ABNORMAL /HPF (ref 0–5)
GLUCOSE URINE: NEGATIVE
KETONES, URINE: NEGATIVE
LEUKOCYTE ESTERASE, URINE: ABNORMAL
MUCUS: ABNORMAL
NITRITE, URINE: NEGATIVE
OTHER OBSERVATIONS UA: ABNORMAL
PH UA: >9 (ref 5–8)
PROTEIN UA: NEGATIVE
RBC UA: ABNORMAL /HPF (ref 0–4)
RENAL EPITHELIAL, UA: ABNORMAL /HPF
SPECIFIC GRAVITY UA: 1.01 (ref 1–1.03)
TRICHOMONAS: ABNORMAL
TURBIDITY: ABNORMAL
URINE HGB: ABNORMAL
UROBILINOGEN, URINE: NORMAL
WBC UA: ABNORMAL /HPF (ref 0–5)
YEAST: ABNORMAL

## 2019-01-04 PROCEDURE — 87086 URINE CULTURE/COLONY COUNT: CPT

## 2019-01-04 PROCEDURE — 81001 URINALYSIS AUTO W/SCOPE: CPT

## 2019-01-06 LAB
CULTURE: ABNORMAL
Lab: 124
SPECIMEN DESCRIPTION: ABNORMAL
STATUS: ABNORMAL

## 2019-01-10 ENCOUNTER — HOSPITAL ENCOUNTER (OUTPATIENT)
Age: 39
Setting detail: SPECIMEN
Discharge: HOME | End: 2019-01-10
Payer: MEDICARE

## 2019-01-10 LAB
-: ABNORMAL
AMORPHOUS: ABNORMAL
BACTERIA: ABNORMAL
BILIRUBIN URINE: NEGATIVE
CASTS UA: ABNORMAL /LPF (ref 0–2)
COLOR: YELLOW
COMMENT UA: ABNORMAL
CRYSTALS, UA: ABNORMAL /HPF
EPITHELIAL CELLS UA: ABNORMAL /HPF (ref 0–5)
GLUCOSE URINE: NEGATIVE
KETONES, URINE: ABNORMAL
LEUKOCYTE ESTERASE, URINE: ABNORMAL
MUCUS: ABNORMAL
NITRITE, URINE: POSITIVE
OTHER OBSERVATIONS UA: ABNORMAL
PH UA: >9 (ref 5–8)
PROTEIN UA: ABNORMAL
RBC UA: ABNORMAL /HPF (ref 0–2)
RENAL EPITHELIAL, UA: ABNORMAL /HPF
SPECIFIC GRAVITY UA: 1.02 (ref 1–1.03)
TRICHOMONAS: ABNORMAL
TURBIDITY: ABNORMAL
URINE HGB: ABNORMAL
UROBILINOGEN, URINE: NORMAL
WBC UA: ABNORMAL /HPF (ref 0–5)
YEAST: ABNORMAL

## 2019-01-10 PROCEDURE — 87086 URINE CULTURE/COLONY COUNT: CPT

## 2019-01-10 PROCEDURE — 81001 URINALYSIS AUTO W/SCOPE: CPT

## 2019-01-12 LAB
CULTURE: ABNORMAL
Lab: ABNORMAL
SPECIMEN DESCRIPTION: ABNORMAL
STATUS: ABNORMAL

## 2019-01-22 ENCOUNTER — OFFICE VISIT (OUTPATIENT)
Dept: INFECTIOUS DISEASES | Age: 39
End: 2019-01-22
Payer: MEDICARE

## 2019-01-22 VITALS
WEIGHT: 174 LBS | HEIGHT: 60 IN | RESPIRATION RATE: 18 BRPM | HEART RATE: 80 BPM | SYSTOLIC BLOOD PRESSURE: 102 MMHG | OXYGEN SATURATION: 90 % | DIASTOLIC BLOOD PRESSURE: 68 MMHG | BODY MASS INDEX: 34.16 KG/M2

## 2019-01-22 DIAGNOSIS — N31.9: ICD-10-CM

## 2019-01-22 DIAGNOSIS — Z97.8 CHRONIC INDWELLING FOLEY CATHETER: ICD-10-CM

## 2019-01-22 DIAGNOSIS — G35 MULTIPLE SCLEROSIS (HCC): Primary | ICD-10-CM

## 2019-01-22 DIAGNOSIS — Z22.39 ESBL E. COLI CARRIER: ICD-10-CM

## 2019-01-22 DIAGNOSIS — Z87.440 HISTORY OF RECURRENT UTIS: ICD-10-CM

## 2019-01-22 PROCEDURE — 1036F TOBACCO NON-USER: CPT | Performed by: INTERNAL MEDICINE

## 2019-01-22 PROCEDURE — G8417 CALC BMI ABV UP PARAM F/U: HCPCS | Performed by: INTERNAL MEDICINE

## 2019-01-22 PROCEDURE — G8484 FLU IMMUNIZE NO ADMIN: HCPCS | Performed by: INTERNAL MEDICINE

## 2019-01-22 PROCEDURE — 99204 OFFICE O/P NEW MOD 45 MIN: CPT | Performed by: INTERNAL MEDICINE

## 2019-01-22 PROCEDURE — G8427 DOCREV CUR MEDS BY ELIG CLIN: HCPCS | Performed by: INTERNAL MEDICINE

## 2019-02-01 ENCOUNTER — HOSPITAL ENCOUNTER (OUTPATIENT)
Age: 39
Setting detail: SPECIMEN
Discharge: HOME | End: 2019-02-01
Payer: MEDICARE

## 2019-02-01 LAB
ANION GAP SERPL CALCULATED.3IONS-SCNC: 10 MMOL/L (ref 9–17)
BUN BLDV-MCNC: 11 MG/DL (ref 6–20)
BUN/CREAT BLD: ABNORMAL (ref 9–20)
CALCIUM SERPL-MCNC: 9.8 MG/DL (ref 8.6–10.4)
CHLORIDE BLD-SCNC: 105 MMOL/L (ref 98–107)
CO2: 23 MMOL/L (ref 20–31)
CREAT SERPL-MCNC: 0.39 MG/DL (ref 0.5–0.9)
GFR AFRICAN AMERICAN: >60 ML/MIN
GFR NON-AFRICAN AMERICAN: >60 ML/MIN
GFR SERPL CREATININE-BSD FRML MDRD: ABNORMAL ML/MIN/{1.73_M2}
GFR SERPL CREATININE-BSD FRML MDRD: ABNORMAL ML/MIN/{1.73_M2}
GLUCOSE BLD-MCNC: 71 MG/DL (ref 70–99)
HCT VFR BLD CALC: 46.1 % (ref 36.3–47.1)
HEMOGLOBIN: 14.3 G/DL (ref 11.9–15.1)
MCH RBC QN AUTO: 27.8 PG (ref 25.2–33.5)
MCHC RBC AUTO-ENTMCNC: 31 G/DL (ref 28.4–34.8)
MCV RBC AUTO: 89.7 FL (ref 82.6–102.9)
NRBC AUTOMATED: 0 PER 100 WBC
PDW BLD-RTO: 15.1 % (ref 11.8–14.4)
PLATELET # BLD: 256 K/UL (ref 138–453)
PMV BLD AUTO: 11.8 FL (ref 8.1–13.5)
POTASSIUM SERPL-SCNC: 4.3 MMOL/L (ref 3.7–5.3)
RBC # BLD: 5.14 M/UL (ref 3.95–5.11)
SODIUM BLD-SCNC: 138 MMOL/L (ref 135–144)
WBC # BLD: 7 K/UL (ref 3.5–11.3)

## 2019-02-01 PROCEDURE — P9603 ONE-WAY ALLOW PRORATED MILES: HCPCS

## 2019-02-01 PROCEDURE — 85027 COMPLETE CBC AUTOMATED: CPT

## 2019-02-01 PROCEDURE — 36415 COLL VENOUS BLD VENIPUNCTURE: CPT

## 2019-02-01 PROCEDURE — 80048 BASIC METABOLIC PNL TOTAL CA: CPT

## 2019-03-05 ENCOUNTER — HOSPITAL ENCOUNTER (OUTPATIENT)
Age: 39
Setting detail: SPECIMEN
Discharge: HOME | End: 2019-03-05
Payer: MEDICARE

## 2019-03-05 LAB
ANION GAP SERPL CALCULATED.3IONS-SCNC: 15 MMOL/L (ref 9–17)
BUN BLDV-MCNC: 11 MG/DL (ref 6–20)
BUN/CREAT BLD: ABNORMAL (ref 9–20)
CALCIUM SERPL-MCNC: 9.9 MG/DL (ref 8.6–10.4)
CHLORIDE BLD-SCNC: 106 MMOL/L (ref 98–107)
CO2: 22 MMOL/L (ref 20–31)
CREAT SERPL-MCNC: 0.44 MG/DL (ref 0.5–0.9)
GFR AFRICAN AMERICAN: >60 ML/MIN
GFR NON-AFRICAN AMERICAN: >60 ML/MIN
GFR SERPL CREATININE-BSD FRML MDRD: ABNORMAL ML/MIN/{1.73_M2}
GFR SERPL CREATININE-BSD FRML MDRD: ABNORMAL ML/MIN/{1.73_M2}
GLUCOSE BLD-MCNC: 74 MG/DL (ref 70–99)
HCT VFR BLD CALC: 49.9 % (ref 36.3–47.1)
HEMOGLOBIN: 15.1 G/DL (ref 11.9–15.1)
MCH RBC QN AUTO: 27.3 PG (ref 25.2–33.5)
MCHC RBC AUTO-ENTMCNC: 30.3 G/DL (ref 28.4–34.8)
MCV RBC AUTO: 90.2 FL (ref 82.6–102.9)
NRBC AUTOMATED: 0 PER 100 WBC
PDW BLD-RTO: 15.4 % (ref 11.8–14.4)
PLATELET # BLD: 352 K/UL (ref 138–453)
PMV BLD AUTO: 11.5 FL (ref 8.1–13.5)
POTASSIUM SERPL-SCNC: 4.2 MMOL/L (ref 3.7–5.3)
RBC # BLD: 5.53 M/UL (ref 3.95–5.11)
SODIUM BLD-SCNC: 143 MMOL/L (ref 135–144)
WBC # BLD: 7.4 K/UL (ref 3.5–11.3)

## 2019-03-05 PROCEDURE — 80048 BASIC METABOLIC PNL TOTAL CA: CPT

## 2019-03-05 PROCEDURE — 85027 COMPLETE CBC AUTOMATED: CPT

## 2019-03-05 PROCEDURE — 36415 COLL VENOUS BLD VENIPUNCTURE: CPT

## 2019-03-05 PROCEDURE — P9603 ONE-WAY ALLOW PRORATED MILES: HCPCS

## 2019-03-19 ENCOUNTER — HOSPITAL ENCOUNTER (OUTPATIENT)
Age: 39
Setting detail: SPECIMEN
Discharge: HOME | End: 2019-03-19
Payer: MEDICARE

## 2019-03-19 LAB
ANION GAP SERPL CALCULATED.3IONS-SCNC: 11 MMOL/L (ref 9–17)
BUN BLDV-MCNC: 11 MG/DL (ref 6–20)
BUN/CREAT BLD: ABNORMAL (ref 9–20)
CALCIUM SERPL-MCNC: 10 MG/DL (ref 8.6–10.4)
CHLORIDE BLD-SCNC: 105 MMOL/L (ref 98–107)
CO2: 25 MMOL/L (ref 20–31)
CREAT SERPL-MCNC: 0.4 MG/DL (ref 0.5–0.9)
DIRECT EXAM: NORMAL
GFR AFRICAN AMERICAN: >60 ML/MIN
GFR NON-AFRICAN AMERICAN: >60 ML/MIN
GFR SERPL CREATININE-BSD FRML MDRD: ABNORMAL ML/MIN/{1.73_M2}
GFR SERPL CREATININE-BSD FRML MDRD: ABNORMAL ML/MIN/{1.73_M2}
GLUCOSE BLD-MCNC: 84 MG/DL (ref 70–99)
HCT VFR BLD CALC: 49.2 % (ref 36.3–47.1)
HEMOGLOBIN: 15 G/DL (ref 11.9–15.1)
Lab: NORMAL
MCH RBC QN AUTO: 27.7 PG (ref 25.2–33.5)
MCHC RBC AUTO-ENTMCNC: 30.5 G/DL (ref 28.4–34.8)
MCV RBC AUTO: 90.8 FL (ref 82.6–102.9)
NRBC AUTOMATED: 0 PER 100 WBC
PDW BLD-RTO: 15 % (ref 11.8–14.4)
PLATELET # BLD: 283 K/UL (ref 138–453)
PMV BLD AUTO: 11.8 FL (ref 8.1–13.5)
POTASSIUM SERPL-SCNC: 4.4 MMOL/L (ref 3.7–5.3)
RBC # BLD: 5.42 M/UL (ref 3.95–5.11)
SODIUM BLD-SCNC: 141 MMOL/L (ref 135–144)
SPECIMEN DESCRIPTION: NORMAL
WBC # BLD: 6.7 K/UL (ref 3.5–11.3)

## 2019-03-19 PROCEDURE — 36415 COLL VENOUS BLD VENIPUNCTURE: CPT

## 2019-03-19 PROCEDURE — 85027 COMPLETE CBC AUTOMATED: CPT

## 2019-03-19 PROCEDURE — P9603 ONE-WAY ALLOW PRORATED MILES: HCPCS

## 2019-03-19 PROCEDURE — 80048 BASIC METABOLIC PNL TOTAL CA: CPT

## 2019-03-19 PROCEDURE — 87804 INFLUENZA ASSAY W/OPTIC: CPT

## 2019-04-01 ENCOUNTER — HOSPITAL ENCOUNTER (OUTPATIENT)
Age: 39
Setting detail: SPECIMEN
Discharge: HOME | End: 2019-04-01
Payer: MEDICARE

## 2019-04-01 LAB
ANION GAP SERPL CALCULATED.3IONS-SCNC: 10 MMOL/L (ref 9–17)
BUN BLDV-MCNC: 10 MG/DL (ref 6–20)
BUN/CREAT BLD: ABNORMAL (ref 9–20)
CALCIUM SERPL-MCNC: 9.8 MG/DL (ref 8.6–10.4)
CHLORIDE BLD-SCNC: 106 MMOL/L (ref 98–107)
CO2: 25 MMOL/L (ref 20–31)
CREAT SERPL-MCNC: 0.41 MG/DL (ref 0.5–0.9)
GFR AFRICAN AMERICAN: >60 ML/MIN
GFR NON-AFRICAN AMERICAN: >60 ML/MIN
GFR SERPL CREATININE-BSD FRML MDRD: ABNORMAL ML/MIN/{1.73_M2}
GFR SERPL CREATININE-BSD FRML MDRD: ABNORMAL ML/MIN/{1.73_M2}
GLUCOSE BLD-MCNC: 76 MG/DL (ref 70–99)
HCT VFR BLD CALC: 46.7 % (ref 36.3–47.1)
HEMOGLOBIN: 14.4 G/DL (ref 11.9–15.1)
MCH RBC QN AUTO: 28.1 PG (ref 25.2–33.5)
MCHC RBC AUTO-ENTMCNC: 30.8 G/DL (ref 28.4–34.8)
MCV RBC AUTO: 91 FL (ref 82.6–102.9)
NRBC AUTOMATED: 0 PER 100 WBC
PDW BLD-RTO: 14.8 % (ref 11.8–14.4)
PLATELET # BLD: 273 K/UL (ref 138–453)
PMV BLD AUTO: 12 FL (ref 8.1–13.5)
POTASSIUM SERPL-SCNC: 4.1 MMOL/L (ref 3.7–5.3)
RBC # BLD: 5.13 M/UL (ref 3.95–5.11)
SODIUM BLD-SCNC: 141 MMOL/L (ref 135–144)
WBC # BLD: 6.3 K/UL (ref 3.5–11.3)

## 2019-04-01 PROCEDURE — 85027 COMPLETE CBC AUTOMATED: CPT

## 2019-04-01 PROCEDURE — P9603 ONE-WAY ALLOW PRORATED MILES: HCPCS

## 2019-04-01 PROCEDURE — 36415 COLL VENOUS BLD VENIPUNCTURE: CPT

## 2019-04-01 PROCEDURE — 80048 BASIC METABOLIC PNL TOTAL CA: CPT

## 2019-04-08 ENCOUNTER — HOSPITAL ENCOUNTER (OUTPATIENT)
Age: 39
Setting detail: SPECIMEN
Discharge: HOME | End: 2019-04-08
Payer: MEDICARE

## 2019-04-08 LAB — PREALBUMIN: 23.2 MG/DL (ref 20–40)

## 2019-04-08 PROCEDURE — 84134 ASSAY OF PREALBUMIN: CPT

## 2019-04-08 PROCEDURE — 36415 COLL VENOUS BLD VENIPUNCTURE: CPT

## 2019-04-08 PROCEDURE — P9603 ONE-WAY ALLOW PRORATED MILES: HCPCS

## 2019-05-01 ENCOUNTER — HOSPITAL ENCOUNTER (OUTPATIENT)
Age: 39
Setting detail: SPECIMEN
Discharge: HOME | End: 2019-05-01
Payer: MEDICARE

## 2019-05-01 LAB
ANION GAP SERPL CALCULATED.3IONS-SCNC: 14 MMOL/L (ref 9–17)
BUN BLDV-MCNC: 9 MG/DL (ref 6–20)
BUN/CREAT BLD: ABNORMAL (ref 9–20)
CALCIUM SERPL-MCNC: 9.8 MG/DL (ref 8.6–10.4)
CHLORIDE BLD-SCNC: 102 MMOL/L (ref 98–107)
CO2: 26 MMOL/L (ref 20–31)
CREAT SERPL-MCNC: <0.4 MG/DL (ref 0.5–0.9)
GFR AFRICAN AMERICAN: ABNORMAL ML/MIN
GFR NON-AFRICAN AMERICAN: ABNORMAL ML/MIN
GFR SERPL CREATININE-BSD FRML MDRD: ABNORMAL ML/MIN/{1.73_M2}
GFR SERPL CREATININE-BSD FRML MDRD: ABNORMAL ML/MIN/{1.73_M2}
GLUCOSE BLD-MCNC: 77 MG/DL (ref 70–99)
HCT VFR BLD CALC: 51.8 % (ref 36.3–47.1)
HEMOGLOBIN: 15.8 G/DL (ref 11.9–15.1)
MCH RBC QN AUTO: 26.8 PG (ref 25.2–33.5)
MCHC RBC AUTO-ENTMCNC: 30.5 G/DL (ref 28.4–34.8)
MCV RBC AUTO: 87.8 FL (ref 82.6–102.9)
NRBC AUTOMATED: ABNORMAL PER 100 WBC
PDW BLD-RTO: 14.5 % (ref 11.8–14.4)
PLATELET # BLD: 262 K/UL (ref 138–453)
PMV BLD AUTO: 11 FL (ref 8.1–13.5)
POTASSIUM SERPL-SCNC: 4.2 MMOL/L (ref 3.7–5.3)
RBC # BLD: 5.9 M/UL (ref 3.95–5.11)
SODIUM BLD-SCNC: 142 MMOL/L (ref 135–144)
WBC # BLD: 5.9 K/UL (ref 3.5–11.3)

## 2019-05-01 PROCEDURE — 85027 COMPLETE CBC AUTOMATED: CPT

## 2019-05-01 PROCEDURE — 36415 COLL VENOUS BLD VENIPUNCTURE: CPT

## 2019-05-01 PROCEDURE — 80048 BASIC METABOLIC PNL TOTAL CA: CPT

## 2019-05-01 PROCEDURE — P9603 ONE-WAY ALLOW PRORATED MILES: HCPCS

## 2019-05-17 ENCOUNTER — APPOINTMENT (OUTPATIENT)
Dept: GENERAL RADIOLOGY | Age: 39
DRG: 698 | End: 2019-05-17
Payer: MEDICARE

## 2019-05-17 ENCOUNTER — HOSPITAL ENCOUNTER (INPATIENT)
Age: 39
LOS: 8 days | Discharge: NURSING, REHAB OR LONG-TERM CARE | DRG: 698 | End: 2019-05-25
Attending: EMERGENCY MEDICINE | Admitting: INTERNAL MEDICINE
Payer: MEDICARE

## 2019-05-17 ENCOUNTER — APPOINTMENT (OUTPATIENT)
Dept: CT IMAGING | Age: 39
DRG: 698 | End: 2019-05-17
Payer: MEDICARE

## 2019-05-17 DIAGNOSIS — R41.82 ALTERED MENTAL STATUS, UNSPECIFIED ALTERED MENTAL STATUS TYPE: Primary | ICD-10-CM

## 2019-05-17 PROBLEM — I10 HYPERTENSION: Status: ACTIVE | Noted: 2019-05-17

## 2019-05-17 PROBLEM — Z86.711 HISTORY OF PULMONARY EMBOLISM: Status: ACTIVE | Noted: 2019-05-17

## 2019-05-17 PROBLEM — N39.0 RECURRENT UTI (URINARY TRACT INFECTION): Status: ACTIVE | Noted: 2019-05-17

## 2019-05-17 PROBLEM — N39.0 URINARY TRACT INFECTION: Status: ACTIVE | Noted: 2019-05-17

## 2019-05-17 PROBLEM — E87.20 LACTIC ACIDOSIS: Status: ACTIVE | Noted: 2019-05-17

## 2019-05-17 PROBLEM — Z97.8 CHRONIC INDWELLING FOLEY CATHETER: Status: ACTIVE | Noted: 2019-05-17

## 2019-05-17 PROBLEM — I73.9 PERIPHERAL VASCULAR DISEASE (HCC): Status: ACTIVE | Noted: 2019-05-17

## 2019-05-17 LAB
-: ABNORMAL
ABSOLUTE EOS #: 0.03 K/UL (ref 0–0.44)
ABSOLUTE IMMATURE GRANULOCYTE: 0.04 K/UL (ref 0–0.3)
ABSOLUTE LYMPH #: 0.94 K/UL (ref 1.1–3.7)
ABSOLUTE MONO #: 0.36 K/UL (ref 0.1–1.2)
ALBUMIN SERPL-MCNC: 4.1 G/DL (ref 3.5–5.2)
ALBUMIN/GLOBULIN RATIO: 0.7 (ref 1–2.5)
ALP BLD-CCNC: 72 U/L (ref 35–104)
ALT SERPL-CCNC: 33 U/L (ref 5–33)
AMORPHOUS: ABNORMAL
ANION GAP SERPL CALCULATED.3IONS-SCNC: 18 MMOL/L (ref 9–17)
AST SERPL-CCNC: 23 U/L
BACTERIA: ABNORMAL
BASOPHILS # BLD: 0 % (ref 0–2)
BASOPHILS ABSOLUTE: 0.03 K/UL (ref 0–0.2)
BILIRUB SERPL-MCNC: 0.26 MG/DL (ref 0.3–1.2)
BILIRUBIN URINE: NEGATIVE
BUN BLDV-MCNC: 40 MG/DL (ref 6–20)
BUN/CREAT BLD: ABNORMAL (ref 9–20)
CALCIUM SERPL-MCNC: 10.1 MG/DL (ref 8.6–10.4)
CASTS UA: ABNORMAL /LPF (ref 0–8)
CHLORIDE BLD-SCNC: 101 MMOL/L (ref 98–107)
CO2: 18 MMOL/L (ref 20–31)
COLOR: ABNORMAL
CREAT SERPL-MCNC: 0.93 MG/DL (ref 0.5–0.9)
CRYSTALS, UA: ABNORMAL /HPF
DIFFERENTIAL TYPE: ABNORMAL
EOSINOPHILS RELATIVE PERCENT: 0 % (ref 1–4)
EPITHELIAL CELLS UA: ABNORMAL /HPF (ref 0–5)
GFR AFRICAN AMERICAN: >60 ML/MIN
GFR NON-AFRICAN AMERICAN: >60 ML/MIN
GFR SERPL CREATININE-BSD FRML MDRD: ABNORMAL ML/MIN/{1.73_M2}
GFR SERPL CREATININE-BSD FRML MDRD: ABNORMAL ML/MIN/{1.73_M2}
GLUCOSE BLD-MCNC: 115 MG/DL (ref 70–99)
GLUCOSE URINE: NEGATIVE
HCT VFR BLD CALC: 57.4 % (ref 36.3–47.1)
HEMOGLOBIN: 17.5 G/DL (ref 11.9–15.1)
IMMATURE GRANULOCYTES: 0 %
INR BLD: 1
KETONES, URINE: ABNORMAL
LACTIC ACID, SEPSIS WHOLE BLOOD: 2.4 MMOL/L (ref 0.5–1.9)
LACTIC ACID, SEPSIS WHOLE BLOOD: 4 MMOL/L (ref 0.5–1.9)
LACTIC ACID, SEPSIS: ABNORMAL MMOL/L (ref 0.5–1.9)
LACTIC ACID, SEPSIS: ABNORMAL MMOL/L (ref 0.5–1.9)
LEUKOCYTE ESTERASE, URINE: ABNORMAL
LYMPHOCYTES # BLD: 9 % (ref 24–43)
MCH RBC QN AUTO: 26.7 PG (ref 25.2–33.5)
MCHC RBC AUTO-ENTMCNC: 30.5 G/DL (ref 28.4–34.8)
MCV RBC AUTO: 87.6 FL (ref 82.6–102.9)
MONOCYTES # BLD: 3 % (ref 3–12)
MUCUS: ABNORMAL
NITRITE, URINE: NEGATIVE
NRBC AUTOMATED: 0.2 PER 100 WBC
OTHER OBSERVATIONS UA: ABNORMAL
PARTIAL THROMBOPLASTIN TIME: 22.8 SEC (ref 20.5–30.5)
PDW BLD-RTO: 14.9 % (ref 11.8–14.4)
PH UA: 8 (ref 5–8)
PLATELET # BLD: 361 K/UL (ref 138–453)
PLATELET ESTIMATE: ABNORMAL
PMV BLD AUTO: 10.8 FL (ref 8.1–13.5)
POTASSIUM SERPL-SCNC: 4.7 MMOL/L (ref 3.7–5.3)
PROTEIN UA: ABNORMAL
PROTHROMBIN TIME: 11 SEC (ref 9–12)
RBC # BLD: 6.55 M/UL (ref 3.95–5.11)
RBC # BLD: ABNORMAL 10*6/UL
RBC UA: ABNORMAL /HPF (ref 0–4)
RENAL EPITHELIAL, UA: ABNORMAL /HPF
SEG NEUTROPHILS: 88 % (ref 36–65)
SEGMENTED NEUTROPHILS ABSOLUTE COUNT: 9.7 K/UL (ref 1.5–8.1)
SODIUM BLD-SCNC: 137 MMOL/L (ref 135–144)
SPECIFIC GRAVITY UA: 1.02 (ref 1–1.03)
TOTAL PROTEIN: 9.6 G/DL (ref 6.4–8.3)
TRICHOMONAS: ABNORMAL
TROPONIN INTERP: NORMAL
TROPONIN T: NORMAL NG/ML
TROPONIN, HIGH SENSITIVITY: <6 NG/L (ref 0–14)
TURBIDITY: ABNORMAL
URINE HGB: ABNORMAL
UROBILINOGEN, URINE: NORMAL
WBC # BLD: 11.1 K/UL (ref 3.5–11.3)
WBC # BLD: ABNORMAL 10*3/UL
WBC UA: ABNORMAL /HPF (ref 0–5)
YEAST: ABNORMAL

## 2019-05-17 PROCEDURE — 80053 COMPREHEN METABOLIC PANEL: CPT

## 2019-05-17 PROCEDURE — 84484 ASSAY OF TROPONIN QUANT: CPT

## 2019-05-17 PROCEDURE — 85610 PROTHROMBIN TIME: CPT

## 2019-05-17 PROCEDURE — 93005 ELECTROCARDIOGRAM TRACING: CPT

## 2019-05-17 PROCEDURE — 96368 THER/DIAG CONCURRENT INF: CPT

## 2019-05-17 PROCEDURE — 2580000003 HC RX 258: Performed by: EMERGENCY MEDICINE

## 2019-05-17 PROCEDURE — 6360000004 HC RX CONTRAST MEDICATION: Performed by: EMERGENCY MEDICINE

## 2019-05-17 PROCEDURE — 87077 CULTURE AEROBIC IDENTIFY: CPT

## 2019-05-17 PROCEDURE — 85025 COMPLETE CBC W/AUTO DIFF WBC: CPT

## 2019-05-17 PROCEDURE — 87205 SMEAR GRAM STAIN: CPT

## 2019-05-17 PROCEDURE — 96365 THER/PROPH/DIAG IV INF INIT: CPT

## 2019-05-17 PROCEDURE — 81001 URINALYSIS AUTO W/SCOPE: CPT

## 2019-05-17 PROCEDURE — 87040 BLOOD CULTURE FOR BACTERIA: CPT

## 2019-05-17 PROCEDURE — 87088 URINE BACTERIA CULTURE: CPT

## 2019-05-17 PROCEDURE — 99285 EMERGENCY DEPT VISIT HI MDM: CPT

## 2019-05-17 PROCEDURE — 87086 URINE CULTURE/COLONY COUNT: CPT

## 2019-05-17 PROCEDURE — 2500000003 HC RX 250 WO HCPCS: Performed by: EMERGENCY MEDICINE

## 2019-05-17 PROCEDURE — 83605 ASSAY OF LACTIC ACID: CPT

## 2019-05-17 PROCEDURE — 71045 X-RAY EXAM CHEST 1 VIEW: CPT

## 2019-05-17 PROCEDURE — 87186 SC STD MICRODIL/AGAR DIL: CPT

## 2019-05-17 PROCEDURE — 74177 CT ABD & PELVIS W/CONTRAST: CPT

## 2019-05-17 PROCEDURE — 87149 DNA/RNA DIRECT PROBE: CPT

## 2019-05-17 PROCEDURE — 99222 1ST HOSP IP/OBS MODERATE 55: CPT | Performed by: INTERNAL MEDICINE

## 2019-05-17 PROCEDURE — 6360000002 HC RX W HCPCS: Performed by: EMERGENCY MEDICINE

## 2019-05-17 PROCEDURE — 2060000000 HC ICU INTERMEDIATE R&B

## 2019-05-17 PROCEDURE — 85730 THROMBOPLASTIN TIME PARTIAL: CPT

## 2019-05-17 RX ORDER — 0.9 % SODIUM CHLORIDE 0.9 %
80 INTRAVENOUS SOLUTION INTRAVENOUS ONCE
Status: COMPLETED | OUTPATIENT
Start: 2019-05-17 | End: 2019-05-17

## 2019-05-17 RX ORDER — SODIUM CHLORIDE, SODIUM LACTATE, POTASSIUM CHLORIDE, AND CALCIUM CHLORIDE .6; .31; .03; .02 G/100ML; G/100ML; G/100ML; G/100ML
1000 INJECTION, SOLUTION INTRAVENOUS ONCE
Status: DISCONTINUED | OUTPATIENT
Start: 2019-05-17 | End: 2019-05-17

## 2019-05-17 RX ORDER — SODIUM CHLORIDE 0.9 % (FLUSH) 0.9 %
10 SYRINGE (ML) INJECTION
Status: COMPLETED | OUTPATIENT
Start: 2019-05-17 | End: 2019-05-17

## 2019-05-17 RX ORDER — 0.9 % SODIUM CHLORIDE 0.9 %
1000 INTRAVENOUS SOLUTION INTRAVENOUS ONCE
Status: COMPLETED | OUTPATIENT
Start: 2019-05-17 | End: 2019-05-18

## 2019-05-17 RX ORDER — 0.9 % SODIUM CHLORIDE 0.9 %
1000 INTRAVENOUS SOLUTION INTRAVENOUS ONCE
Status: COMPLETED | OUTPATIENT
Start: 2019-05-17 | End: 2019-05-17

## 2019-05-17 RX ADMIN — IOHEXOL 75 ML: 350 INJECTION, SOLUTION INTRAVENOUS at 15:52

## 2019-05-17 RX ADMIN — Medication 10 ML: at 15:53

## 2019-05-17 RX ADMIN — SODIUM CHLORIDE 1000 ML: 9 INJECTION, SOLUTION INTRAVENOUS at 22:30

## 2019-05-17 RX ADMIN — SODIUM CHLORIDE 80 ML: 9 INJECTION, SOLUTION INTRAVENOUS at 15:53

## 2019-05-17 RX ADMIN — METRONIDAZOLE 500 MG: 500 INJECTION, SOLUTION INTRAVENOUS at 15:38

## 2019-05-17 RX ADMIN — SODIUM CHLORIDE 1000 ML: 9 INJECTION, SOLUTION INTRAVENOUS at 15:26

## 2019-05-17 RX ADMIN — CEFTRIAXONE SODIUM 1 G: 1 INJECTION, POWDER, FOR SOLUTION INTRAMUSCULAR; INTRAVENOUS at 15:34

## 2019-05-17 NOTE — ED PROVIDER NOTES
Emergency Medicine Attending Note    I have seen and examined the patient in conjunction with the Resident/MLP. Please see my key portion documented:      I agree with the assessment and plan as discussed with Dr. Eduardo Godinez. Electronically signed:  Rita Byrd M.D.            Luis F Latif MD  05/17/19 [id number]

## 2019-05-17 NOTE — H&P
in 2013. Peripheral vascular disease. She has a history of chronic retention of urine and hydronephrosis. Recurrent urinary tract infections. In January 2017 she developed hemorrhagic cystitis. Since then she has the indwelling Davies catheter. She was seen by infectious diseases in January 2019. Patient has a history of ESBL E. coli but her last culture grew E. coli sensitive to Rocephin. Last infectious diseases note recommended catheter exchange with bladder irrigation for 24-48 hours with either Neosporin with polymyxin and gentamicin. HER-2 last urine cultures showed mixed bill. She also has a history of hypertension. Her last echo was in August 2017 which showed an ejection fraction of 55%. Normal left ventricular systolic function. Past Medical History:        Diagnosis Date    Aphasia     HTN (hypertension)     Hx MRSA infection resolved 1/2017    2 negative nasal screens 1/2017 (hx in heel in 2013)    Hydronephrosis     Multiple sclerosis ([facility] [address] [facility]     Neurogenic bladder     Peripheral vascular disease (HCC)     Polyneuropathy     Quadriplegia (HCC)     Tachycardia     UTI (lower urinary tract infection)        Past Surgical History:        Procedure Laterality Date    AMPUTATION Right     COLOSTOMY      GASTROSTOMY TUBE PLACEMENT      LEG AMPUTATION THROUGH FEMUR         Allergies:    No Known Allergies      Home Meds:   Prior to Admission medications    Medication Sig Start Date End Date Taking?  Authorizing Provider   rivaroxaban (XARELTO) 10 MG TABS tablet 1 tablet by Per G Tube route daily (with breakfast) 1/16/17   Carlos Hayward DO   potassium chloride 20 MEQ/15ML (10%) oral solution 10 mEq by Per G Tube route daily    Historical Provider, MD   Scopolamine Base (SCOPOLAMINE TD) Place 15 mg onto the skin every 72 hours    Historical Provider, MD   baclofen (LIORESAL) 10 MG tablet 5 mg by Per G Tube route every 6 hours Crush into G tube     Historical Provider, MD upstroke normal bilaterally, no bruits  Chest - clear to auscultation, no wheezes, rales or rhonchi, symmetric air entry  Heart - normal rate, regular rhythm, normal S1, S2, no murmurs, rubs, clicks or gallops, no JVD  Abdomen - soft, +BS, nontender, nondistended, no masses or organomegaly. G tube in place and colostomy in the LLQ. Davies catheter in place  Neurological - difficult to access  Extremities - peripheral pulses normal, no pedal edema, no clubbing or cyanosis. Right BKA  Skin - normal coloration and turgor, no rashes, no suspicious skin lesions noted     Any additional physical findings:    Laboratory findings:-    CBC:   Recent Labs     05/17/19  1529   WBC 11.1   HGB 17.5*        BMP:    Recent Labs     05/17/19  1529      K 4.7      CO2 18*   BUN 40*   CREATININE 0.93*   GLUCOSE 115*     S. Calcium:  Recent Labs     05/17/19  1529   CALCIUM 10.1     INR:   Recent Labs     05/17/19  1529   INR 1.0     Hepatic functions:   Recent Labs     05/17/19  1529   ALKPHOS 72   ALT 33   AST 23   PROT 9.6*   BILITOT 0.26*   LABALBU 4.1     Thyroid functions:   Lab Results   Component Value Date    TSH 1.46 08/07/2018      Assessment and Plan     Principal Problem:    Urinary tract infection  Active Problems:    Aphasia    Multiple sclerosis (HCC)    Nonverbal    Gastrostomy tube dependent (HCC)    Neurogenic bladder    Hemorrhagic cystitis    Tachycardia    Gross hematuria    Lactic acidosis    Chronic indwelling Davies catheter    Peripheral vascular disease (HCC)    Recurrent UTI (urinary tract infection)    History of pulmonary embolism    Hypertension  Resolved Problems:    * No resolved hospital problems. *    1. Urinary Tract Infection due to Neurogenic Bladder  Will start patient on Rocephin and Flagyl IV. We will follow urine cultures. Will follow blood cultures. Will recommend bladder irrigation every 24-48 hours. Phenergan 25 mg every 6 hours as needed through the G-tube for nausea. Scopolamine patch for nausea. Consulted infectious diseases. Patient likely has urinary tract colonization. 2. Multiple Sclerosis  Baclofen 5 mg every 6 hours through the G-tube. Neurontin 300 mg 3 times daily through the G-tube. 3. G Tube Dependent  Dietitian consult placed for ordering and managing tube feeds. 4. Tachycardia  Due to infection and dehydration. EKG shows normal sinus. Continue telemetry monitoring. Ordered Lopressor 25 mg daily. 5. Lactic Acidosis  Elevated due to recurrent urinary tract infections and dehydration. Ordered fluid bolus with maintenance IV normal saline at 100. Will check lactic acid at midnight. 6. Peripheral Vascular Disease and History of PE  Patient takes xeralto 10 mg daily. Will hold off on anti coaglation for now. 7.  Hypertension. We will hold off on antihypertensives for now. Diet: Consult placed to dietitian for ordering and management of 2 periods. DVT Prophylaxis: Lovenox 40 mg subcu daily. Discharge Planning: Consult placed to . The primary Internal medicine team assigned to the patient will be following up the patient on the floor. The above mentioned assessment and plan will be reviewed by the Internal medicine attending, and can be changed or modified accordingly by the attending physician.     Jennifer Whitney MD  PGY-1, Department of Internal Medicine  [place], [place]  5/17/2019 8:33 PM

## 2019-05-17 NOTE — ED NOTES
Bed: 20  Expected date:   Expected time:   Means of arrival:   Comments:  [address], RN  05/17/19 [id number]

## 2019-05-17 NOTE — ED TRIAGE NOTES
BIBA from [address] [place] [address] Pkwy for reported lethargy. Paperwork from facility states she is here for distended abd and no active bowel sounds. Pt is quadrapalegic and has multiple sclerosis. Unknown what pt's baseline is. Pt is currently nonverbal. Pt does open eyes to vigorous stimulation. Pt is tachycardic on the monitor. Pt does have a colostomy bag on L side of abd. Peg tube on L epigastric area, and omer catheter.

## 2019-05-17 NOTE — PROGRESS NOTES
SENIOR NOTE  46 y/o F with PMHx of MS, quadriplegic, PAD s/p right above knee amputation, neurogenic bladder with chronic omer, colostomy bag, HTN, DVT's in past on Xarelto lives at a nursing facility and was brought to ED for AMS. Per report, patient is nonverbal at baseline but nurse at facility felt she was more altered than usual. Patient was found to have UTI in ED, patient has had UTI's multiple times in past. She has seen ID in Jan of 2019. Patient has hx of ESBL E.coli- but her last culture grew E.coli sensitive to Rocephin. Last ID note recommended catheter exchange with bladder irrigation for 24-48hrs with either Neosporin with polymyxin B or gentamicin.     Continue with Rocephin for now  Follow up urine culture sensitivities  Patient is afebrile and nonverbal so unable to communicate if she is having symptoms  Unsure if this is chronic colonization or if in fact is UTI causing AMS  Will consult ID  Continue Xarelto for hx of DVTs  Continue baclofen for MS  Continue Lopressor for HTN    Dominik Bynum MD, PGY-2  Internal Medicine  [address]  5/17/19  7:33 PM

## 2019-05-17 NOTE — ED PROVIDER NOTES
[facility]  ED  Emergency Department Encounter  EmergencyMedicine Resident     Pt Name:Melinda Gael Bosworth  MRN: [id number]  [place] 1980  Date of evaluation: 5/17/19  PCP:  Bruce Goodwin MD    [address] [place] [address]       Chief Complaint   Patient presents with   [facility]     Distended. No bowel sounds. Colostomy and PEG tube.  Fatigue       HISTORY OF PRESENT ILLNESS  (Location/Symptom, Timing/Onset, Context/Setting, Quality, Duration, Modifying Factors, Severity.)      Cherie Gaitan is a 45 y.o. female who presents as a transfer from group home, patient transferred from group home, patient has a history of paraplegia, indwelling Davies secondary to multiple sclerosis. From reports patient a saline is fairly nonverbal on exam patient is not responding, she is protecting her airway, vitals are stable however she is tachycardic. Pupils are equal and reactive. Abdomen does appear fairly tense with diminished bowel sounds. PAST MEDICAL / SURGICAL / SOCIAL / FAMILY HISTORY      has a past medical history of Aphasia, HTN (hypertension), Hx MRSA infection, Hydronephrosis, Multiple sclerosis ([address] Neurogenic bladder, Peripheral vascular disease ([address] Polyneuropathy, Quadriplegia ([address] Tachycardia, and UTI (lower urinary tract infection). has a past surgical history that includes Gastrostomy tube placement; colostomy; amputation (Right); and Leg amputation through femur.     Social History     Socioeconomic History    Marital status: Single     Spouse name: Not on file    Number of children: Not on file    Years of education: Not on file    Highest education level: Not on file   Occupational History    Not on file   Social Needs    Financial resource strain: Not on file    Food insecurity:     Worry: Not on file     Inability: Not on file    Transportation needs:     Medical: Not on file     Non-medical: Not on file   Tobacco Use    Smoking status: Never Smoker    Smokeless tobacco: Never Used   Substance and Sexual Activity    Alcohol use: No    Drug use: No    Sexual activity: Never   Lifestyle    Physical activity:     Days per week: Not on file     Minutes per session: Not on file    Stress: Not on file   Relationships    Social connections:     Talks on phone: Not on file     Gets together: Not on file     Attends Shinto service: Not on file     Active member of club or organization: Not on file     Attends meetings of clubs or organizations: Not on file     Relationship status: Not on file    Intimate partner violence:     Fear of current or ex partner: Not on file     Emotionally abused: Not on file     Physically abused: Not on file     Forced sexual activity: Not on file   Other Topics Concern    Not on file   Social History Narrative    Not on file       Family History   Problem Relation Age of Onset    No Known Problems Mother     No Known Problems Father        Allergies:  Patient has no known allergies. Home Medications:  Prior to Admission medications    Medication Sig Start Date End Date Taking?  Authorizing Provider   rivaroxaban (XARELTO) 10 MG TABS tablet 1 tablet by Per G Tube route daily (with breakfast) 1/16/17   Alex Guzmán,    potassium chloride 20 MEQ/15ML (10%) oral solution 10 mEq by Per G Tube route daily    Historical Provider, MD   Scopolamine Base (SCOPOLAMINE TD) Place 15 mg onto the skin every 72 hours    Historical Provider, MD   baclofen (LIORESAL) 10 MG tablet 5 mg by Per G Tube route every 6 hours Crush into G tube     Historical Provider, MD   docusate (COLACE) 50 MG/5ML liquid 100 mg by Per G Tube route daily     Historical Provider, MD   gabapentin (NEURONTIN) 300 MG capsule 300 mg by Per G Tube route 3 times daily    Historical Provider, MD   medroxyPROGESTERone (DEPO-PROVERA) 150 MG/ML injection Inject 150 mg into the muscle every 3 months Indications: next due on 2/11/17 On the 11th     Historical Provider, MD noted. She is not diaphoretic. No erythema. No pallor.    Psychiatric: Her speech is normal. Cognition and memory are normal.       DIFFERENTIAL  DIAGNOSIS     PLAN (Kirstin Tillman / Dori Alfaro / EKG):  Orders Placed This Encounter   Procedures    Culture Blood #1    Culture Blood #1    Urine Culture    XR CHEST PORTABLE    CT ABDOMEN PELVIS W IV CONTRAST Additional Contrast? None    CBC Auto Differential    Comprehensive Metabolic Panel    Lactate, Sepsis    Protime-INR    APTT    Troponin    Urinalysis with Microscopic    Telemetry Monitoring    Inpatient consult to Internal Medicine    Inpatient consult to Infectious Diseases    EKG 12 Lead    Insert peripheral IV    PATIENT STATUS (FROM ED OR OR/PROCEDURAL) Inpatient       MEDICATIONS ORDERED:  Orders Placed This Encounter   Medications    DISCONTD: cefOXitin (MEFOXIN) 1 g in dextrose 5% 50 mL (mini-bag)    cefTRIAXone (ROCEPHIN) 1 g IVPB in 50 mL D5W minibag    metronidazole (FLAGYL) 500 mg in NaCl 100 mL IVPB premix    DISCONTD: lactated ringers bolus    0.9 % sodium chloride bolus    0.9 % sodium chloride bolus    iohexol (OMNIPAQUE 350) solution 75 mL    sodium chloride flush 0.9 % injection 10 mL       DDX: Ingestion, infectious, trauma, seizure, AMS, electrolytes, encephalopathy, insulin, opiates, uremia, toxins, tumor, thyrotoxicosis, psychiatric, sepsis, stroke, N/V, seizure, headache, elderly age, alcohol / drugs / toxidromes, signs of trauma      DIAGNOSTIC RESULTS / EMERGENCY DEPARTMENT COURSE / MDM     LABS:  Results for orders placed or performed during the hospital encounter of 05/17/19   CBC Auto Differential   Result Value Ref Range    WBC 11.1 3.5 - 11.3 k/uL    RBC 6.55 (H) 3.95 - 5.11 m/uL    Hemoglobin 17.5 (H) 11.9 - 15.1 g/dL    Hematocrit 57.4 (H) 36.3 - 47.1 %    MCV 87.6 82.6 - 102.9 fL    MCH 26.7 25.2 - 33.5 pg    MCHC 30.5 28.4 - 34.8 g/dL    RDW 14.9 (H) 11.8 - 14.4 %    Platelets 632 978 - 962 k/uL    MPV 10.8 8.1 - 13.5 fL    NRBC Automated 0.2 (H) 0.0 per 100 WBC    Differential Type NOT REPORTED     Seg Neutrophils 88 (H) 36 - 65 %    Lymphocytes 9 (L) 24 - 43 %    Monocytes 3 3 - 12 %    Eosinophils % 0 (L) 1 - 4 %    Basophils 0 0 - 2 %    Immature Granulocytes 0 0 %    Segs Absolute 9.70 (H) 1.50 - 8.10 k/uL    Absolute Lymph # 0.94 (L) 1.10 - 3.70 k/uL    Absolute Mono # 0.36 0.10 - 1.20 k/uL    Absolute Eos # 0.03 0.00 - 0.44 k/uL    Basophils # 0.03 0.00 - 0.20 k/uL    Absolute Immature Granulocyte 0.04 0.00 - 0.30 k/uL    WBC Morphology NOT REPORTED     RBC Morphology ANISOCYTOSIS PRESENT     Platelet Estimate NOT REPORTED    Comprehensive Metabolic Panel   Result Value Ref Range    Glucose 115 (H) 70 - 99 mg/dL    BUN 40 (H) 6 - 20 mg/dL    CREATININE 0.93 (H) 0.50 - 0.90 mg/dL    Bun/Cre Ratio NOT REPORTED 9 - 20    Calcium 10.1 8.6 - 10.4 mg/dL    Sodium 137 135 - 144 mmol/L    Potassium 4.7 3.7 - 5.3 mmol/L    Chloride 101 98 - 107 mmol/L    CO2 18 (L) 20 - 31 mmol/L    Anion Gap 18 (H) 9 - 17 mmol/L    Alkaline Phosphatase 72 35 - 104 U/L    ALT 33 5 - 33 U/L    AST 23 <32 U/L    Total Bilirubin 0.26 (L) 0.3 - 1.2 mg/dL    Total Protein 9.6 (H) 6.4 - 8.3 g/dL    Alb 4.1 3.5 - 5.2 g/dL    Albumin/Globulin Ratio 0.7 (L) 1.0 - 2.5    GFR Non-African American >60 >60 mL/min    GFR African American >60 >60 mL/min    GFR Comment          GFR Staging NOT REPORTED    Lactate, Sepsis   Result Value Ref Range    Lactic Acid, Sepsis NOT REPORTED 0.5 - 1.9 mmol/L    Lactic Acid, Sepsis, Whole Blood 2.4 (H) 0.5 - 1.9 mmol/L   Protime-INR   Result Value Ref Range    Protime 11.0 9.0 - 12.0 sec    INR 1.0    APTT   Result Value Ref Range    PTT 22.8 20.5 - 30.5 sec   Troponin   Result Value Ref Range    Troponin, High Sensitivity <6 0 - 14 ng/L    Troponin T NOT REPORTED <0.03 ng/mL    Troponin Interp NOT REPORTED    Urinalysis with Microscopic   Result Value Ref Range    Color, UA RED (A) YELLOW    Turbidity UA TURBID (A) CLEAR Glucose, Ur NEGATIVE NEGATIVE    Bilirubin Urine NEGATIVE NEGATIVE    Ketones, Urine TRACE (A) NEGATIVE    Specific Gravity, UA 1.021 1.005 - 1.030    Urine Hgb LARGE (A) NEGATIVE    pH, UA 8.0 5.0 - 8.0    Protein, UA 4+ (A) NEGATIVE    Urobilinogen, Urine Normal Normal    Nitrite, Urine NEGATIVE NEGATIVE    Leukocyte Esterase, Urine LARGE (A) NEGATIVE    -          WBC, UA TOO NUMEROUS TO COUNT 0 - 5 /HPF    RBC, UA TOO NUMEROUS TO COUNT 0 - 4 /HPF    Casts UA  0 - 8 /LPF    Crystals UA NOT REPORTED None /HPF    Epithelial Cells UA 0 TO 2 0 - 5 /HPF    Renal Epithelial, Urine NOT REPORTED 0 /HPF    Bacteria, UA MODERATE (A) None    Mucus, UA NOT REPORTED None    Trichomonas, UA NOT REPORTED None    Amorphous, UA NOT REPORTED None    Other Observations UA NOT REPORTED NOT REQ. Yeast, UA NOT REPORTED None       RADIOLOGY:     Ct Abdomen Pelvis W Iv Contrast Additional Contrast? None    Result Date: 5/17/2019  EXAMINATION: CT OF THE ABDOMEN AND PELVIS WITH CONTRAST 5/17/2019 3:52 pm TECHNIQUE: CT of the abdomen and pelvis was performed with the administration of intravenous contrast. Multiplanar reformatted images are provided for review. Dose modulation, iterative reconstruction, and/or weight based adjustment of the mA/kV was utilized to reduce the radiation dose to as low as reasonably achievable. COMPARISON: 01/08/2017 HISTORY: ORDERING SYSTEM PROVIDED HISTORY: firm abdomen TECHNOLOGIST PROVIDED HISTORY: FINDINGS: Lower Chest: Dependent changes at the lung bases, likely atelectasis. Organs: The liver, spleen, pancreas and adrenal glands are unremarkable. Mild gallbladder distention. No significant biliary dilatation. Nonobstructive bilateral nephrolithiasis. No significant hydronephrosis. No cystic or solid renal mass. Mild renal cortical scarring on the right, unchanged GI/Bowel: Left lower quadrant colostomy with mucous fistula distally. No evidence of bowel obstruction. No small bowel distention.   A interstitial and nodular opacities are noted, right greater than left. While a component of this likely represents atelectasis, developing infiltrate or inflammatory process should also be considered. MDM/EMERGENCY DEPARTMENT COURSE:      ED Course as of May 17 2150   Fri May 17, 2019   1452 Patient nonverbal at baseline, history of MS with colostomy indwelling Davies paraplegia, patient sent over due to increased lethargy as well as worsening abdominal distention and decreased bowel sounds, I agree with the assessment of abdominal distention and decreased bowel sounds, plan for CT abdomen as well as Septic workup. [KW]   1610 Lactic Acid, Sepsis, Whole Blood(!): 2.4 [KW]   1655 Consult placed to medicine. [KW]   1718 Leukocyte Esterase, Urine(!): LARGE [KW]   1718 Bacteria, UA(!): MODERATE [KW]   1718 WBC, UA: TOO NUMEROUS TO COUNT [KW]   1718 Patient has urinary source of infection, will admit to internal medicine for further antibiotics    [KW]   1743 Patient admitted to internal medicine explained to them the reason for Rocephin as well as Flagyl as initially we thought that potentially cousin intra-abdominal source of infection due to the tense abdomen after CT imaging and urinalysis resulted it's more clear that this is urinary tract as the source of infection.     [KW]   2150 Patient signed out to Dr. Harley Claros awaiting a bed on the floor.     [KW]      ED Course User Index  [KW] [facility], DO         Sepsis Times and Checklist  Vital Signs: BP: (!) 97/92  Pulse: 152  Resp: 23  Temp: 98.9 °F (37.2 °C) SpO2: 98 %  SIRS (>2)   Temp > 38.3C or < 36C   HR > 90   RR > 20   WBC > 12 or < 4 or >10% bands  SIRS (>2) and confirmed or suspected source of infection = Sepsis    Sepsis Identified   Date: 5/17/19  Time: 2:40  Sepsis Orders:   CBC: Yes   CMP: Yes   PT/PTT: Yes   Blood Cultures x2: Yes   Urinalysis and Urine Culture: Yes   Lactate: Yes   Broad Spectrum Antibiotics Given (within 3 hours of sepsis identification, after blood cultures): Yes              IV Crystalloid given: Yes    If lactate >2.0 MUST repeat within 6 hours    Is lactate > 4.0:  No  If lactate >4.0 OR hypotension 30ml/kg crystalloid MUST be ordered. Fluids must be completed within 3 hours of sepsis identification. PROCEDURES:  None    CONSULTS:  IP CONSULT TO INTERNAL MEDICINE  IP CONSULT TO INFECTIOUS DISEASES  IP CONSULT TO CASE MANAGEMENT  IP CONSULT TO DIETITIAN    CRITICAL CARE:  None    FINAL IMPRESSION      1.  Altered mental status, unspecified altered mental status type          DISPOSITION / PLAN     DISPOSITION Admitted    PATIENT REFERRED TO:  Danilo Multani MD  [address] [place] [address] [phone]            DISCHARGE MEDICATIONS:  New Prescriptions    No medications on file       Teodoro Portillo DO  Emergency Medicine Resident    (Please note that portions of this note were completed with a voicerecognition program.  Efforts were made to edit the dictations but occasionally words are mis-transcribed.)       Teodoro Portillo DO  05/17/19 2147       Teodoro Portillo DO  05/17/19 2159

## 2019-05-18 ENCOUNTER — APPOINTMENT (OUTPATIENT)
Dept: GENERAL RADIOLOGY | Age: 39
DRG: 698 | End: 2019-05-18
Payer: MEDICARE

## 2019-05-18 ENCOUNTER — APPOINTMENT (OUTPATIENT)
Dept: CT IMAGING | Age: 39
DRG: 698 | End: 2019-05-18
Payer: MEDICARE

## 2019-05-18 LAB
ABSOLUTE EOS #: 0 K/UL (ref 0–0.4)
ABSOLUTE IMMATURE GRANULOCYTE: 0.58 K/UL (ref 0–0.3)
ABSOLUTE LYMPH #: 0.77 K/UL (ref 1–4.8)
ABSOLUTE MONO #: 0 K/UL (ref 0.1–0.8)
ALBUMIN SERPL-MCNC: 2.6 G/DL (ref 3.5–5.2)
ALBUMIN/GLOBULIN RATIO: 0.7 (ref 1–2.5)
ALLEN TEST: ABNORMAL
ALP BLD-CCNC: 37 U/L (ref 35–104)
ALT SERPL-CCNC: 22 U/L (ref 5–33)
AMMONIA: 57 UMOL/L (ref 11–51)
ANION GAP SERPL CALCULATED.3IONS-SCNC: 15 MMOL/L (ref 9–17)
AST SERPL-CCNC: 44 U/L
BASOPHILS # BLD: 0 % (ref 0–2)
BASOPHILS ABSOLUTE: 0 K/UL (ref 0–0.2)
BILIRUB SERPL-MCNC: 0.25 MG/DL (ref 0.3–1.2)
BILIRUBIN DIRECT: <0.08 MG/DL
BILIRUBIN, INDIRECT: ABNORMAL MG/DL (ref 0–1)
BUN BLDV-MCNC: 26 MG/DL (ref 6–20)
BUN/CREAT BLD: ABNORMAL (ref 9–20)
CALCIUM SERPL-MCNC: 8.1 MG/DL (ref 8.6–10.4)
CARBOXYHEMOGLOBIN: 0.6 % (ref 0–5)
CHLORIDE BLD-SCNC: 114 MMOL/L (ref 98–107)
CO2: 11 MMOL/L (ref 20–31)
CREAT SERPL-MCNC: 0.72 MG/DL (ref 0.5–0.9)
DIFFERENTIAL TYPE: ABNORMAL
EOSINOPHILS RELATIVE PERCENT: 0 % (ref 1–4)
FIO2: ABNORMAL
GFR AFRICAN AMERICAN: >60 ML/MIN
GFR NON-AFRICAN AMERICAN: >60 ML/MIN
GFR SERPL CREATININE-BSD FRML MDRD: ABNORMAL ML/MIN/{1.73_M2}
GFR SERPL CREATININE-BSD FRML MDRD: ABNORMAL ML/MIN/{1.73_M2}
GLOBULIN: ABNORMAL G/DL (ref 1.5–3.8)
GLUCOSE BLD-MCNC: 126 MG/DL (ref 65–105)
GLUCOSE BLD-MCNC: 132 MG/DL (ref 70–99)
HCO3 VENOUS: 17.6 MMOL/L (ref 24–30)
HCT VFR BLD CALC: 50.1 % (ref 36.3–47.1)
HEMOGLOBIN: 16 G/DL (ref 11.9–15.1)
IMMATURE GRANULOCYTES: 3 %
LACTIC ACID, WHOLE BLOOD: 4.1 MMOL/L (ref 0.7–2.1)
LACTIC ACID, WHOLE BLOOD: 5.4 MMOL/L (ref 0.7–2.1)
LIPASE: 7 U/L (ref 13–60)
LYMPHOCYTES # BLD: 4 % (ref 24–44)
MCH RBC QN AUTO: 28 PG (ref 25.2–33.5)
MCHC RBC AUTO-ENTMCNC: 31.9 G/DL (ref 28.4–34.8)
MCV RBC AUTO: 87.6 FL (ref 82.6–102.9)
METHEMOGLOBIN: ABNORMAL % (ref 0–1.5)
MODE: ABNORMAL
MONOCYTES # BLD: 0 % (ref 1–7)
MORPHOLOGY: ABNORMAL
MORPHOLOGY: ABNORMAL
NEGATIVE BASE EXCESS, VEN: 8.8 MMOL/L (ref 0–2)
NOTIFICATION TIME: ABNORMAL
NOTIFICATION: ABNORMAL
NRBC AUTOMATED: 0 PER 100 WBC
O2 DEVICE/FLOW/%: ABNORMAL
O2 SAT, VEN: 59.2 % (ref 60–85)
OXYHEMOGLOBIN: ABNORMAL % (ref 95–98)
PATIENT TEMP: 37
PCO2, VEN, TEMP ADJ: ABNORMAL MMHG (ref 39–55)
PCO2, VEN: 41.1 (ref 39–55)
PDW BLD-RTO: 14.8 % (ref 11.8–14.4)
PEEP/CPAP: ABNORMAL
PH VENOUS: 7.25 (ref 7.32–7.42)
PH, VEN, TEMP ADJ: ABNORMAL (ref 7.32–7.42)
PLATELET # BLD: 332 K/UL (ref 138–453)
PLATELET ESTIMATE: ABNORMAL
PMV BLD AUTO: 10.7 FL (ref 8.1–13.5)
PO2, VEN, TEMP ADJ: ABNORMAL MMHG (ref 30–50)
PO2, VEN: 33.6 (ref 30–50)
POSITIVE BASE EXCESS, VEN: ABNORMAL MMOL/L (ref 0–2)
POTASSIUM SERPL-SCNC: 3.9 MMOL/L (ref 3.7–5.3)
PSV: ABNORMAL
PT. POSITION: ABNORMAL
RBC # BLD: 5.72 M/UL (ref 3.95–5.11)
RBC # BLD: ABNORMAL 10*6/UL
RESPIRATORY RATE: ABNORMAL
SAMPLE SITE: ABNORMAL
SEG NEUTROPHILS: 93 % (ref 36–66)
SEGMENTED NEUTROPHILS ABSOLUTE COUNT: 17.95 K/UL (ref 1.8–7.7)
SET RATE: ABNORMAL
SODIUM BLD-SCNC: 140 MMOL/L (ref 135–144)
TEXT FOR RESPIRATORY: ABNORMAL
THYROXINE, FREE: 1.44 NG/DL (ref 0.93–1.7)
TOTAL HB: ABNORMAL G/DL (ref 12–16)
TOTAL PROTEIN: 6.5 G/DL (ref 6.4–8.3)
TOTAL RATE: ABNORMAL
TSH SERPL DL<=0.05 MIU/L-ACNC: 0.24 MIU/L (ref 0.3–5)
VT: ABNORMAL
WBC # BLD: 19.3 K/UL (ref 3.5–11.3)
WBC # BLD: ABNORMAL 10*3/UL

## 2019-05-18 PROCEDURE — 82805 BLOOD GASES W/O2 SATURATION: CPT

## 2019-05-18 PROCEDURE — 82947 ASSAY GLUCOSE BLOOD QUANT: CPT

## 2019-05-18 PROCEDURE — 36415 COLL VENOUS BLD VENIPUNCTURE: CPT

## 2019-05-18 PROCEDURE — 6360000004 HC RX CONTRAST MEDICATION: Performed by: EMERGENCY MEDICINE

## 2019-05-18 PROCEDURE — 6370000000 HC RX 637 (ALT 250 FOR IP): Performed by: STUDENT IN AN ORGANIZED HEALTH CARE EDUCATION/TRAINING PROGRAM

## 2019-05-18 PROCEDURE — 2500000003 HC RX 250 WO HCPCS: Performed by: EMERGENCY MEDICINE

## 2019-05-18 PROCEDURE — 2580000003 HC RX 258: Performed by: STUDENT IN AN ORGANIZED HEALTH CARE EDUCATION/TRAINING PROGRAM

## 2019-05-18 PROCEDURE — 85025 COMPLETE CBC W/AUTO DIFF WBC: CPT

## 2019-05-18 PROCEDURE — C9113 INJ PANTOPRAZOLE SODIUM, VIA: HCPCS | Performed by: STUDENT IN AN ORGANIZED HEALTH CARE EDUCATION/TRAINING PROGRAM

## 2019-05-18 PROCEDURE — 2500000003 HC RX 250 WO HCPCS: Performed by: STUDENT IN AN ORGANIZED HEALTH CARE EDUCATION/TRAINING PROGRAM

## 2019-05-18 PROCEDURE — 82140 ASSAY OF AMMONIA: CPT

## 2019-05-18 PROCEDURE — 2000000000 HC ICU R&B

## 2019-05-18 PROCEDURE — 6360000002 HC RX W HCPCS: Performed by: STUDENT IN AN ORGANIZED HEALTH CARE EDUCATION/TRAINING PROGRAM

## 2019-05-18 PROCEDURE — 94762 N-INVAS EAR/PLS OXIMTRY CONT: CPT

## 2019-05-18 PROCEDURE — 93005 ELECTROCARDIOGRAM TRACING: CPT | Performed by: NURSE PRACTITIONER

## 2019-05-18 PROCEDURE — 84439 ASSAY OF FREE THYROXINE: CPT

## 2019-05-18 PROCEDURE — 51702 INSERT TEMP BLADDER CATH: CPT

## 2019-05-18 PROCEDURE — 99291 CRITICAL CARE FIRST HOUR: CPT | Performed by: INTERNAL MEDICINE

## 2019-05-18 PROCEDURE — 83605 ASSAY OF LACTIC ACID: CPT

## 2019-05-18 PROCEDURE — 70450 CT HEAD/BRAIN W/O DYE: CPT

## 2019-05-18 PROCEDURE — 2060000000 HC ICU INTERMEDIATE R&B

## 2019-05-18 PROCEDURE — 71045 X-RAY EXAM CHEST 1 VIEW: CPT

## 2019-05-18 PROCEDURE — 83690 ASSAY OF LIPASE: CPT

## 2019-05-18 PROCEDURE — 6360000002 HC RX W HCPCS: Performed by: EMERGENCY MEDICINE

## 2019-05-18 PROCEDURE — 80076 HEPATIC FUNCTION PANEL: CPT

## 2019-05-18 PROCEDURE — 80048 BASIC METABOLIC PNL TOTAL CA: CPT

## 2019-05-18 PROCEDURE — 71260 CT THORAX DX C+: CPT

## 2019-05-18 PROCEDURE — 84443 ASSAY THYROID STIM HORMONE: CPT

## 2019-05-18 PROCEDURE — 2700000000 HC OXYGEN THERAPY PER DAY

## 2019-05-18 PROCEDURE — 6360000002 HC RX W HCPCS

## 2019-05-18 RX ORDER — METOPROLOL TARTRATE 5 MG/5ML
5 INJECTION INTRAVENOUS ONCE
Status: DISCONTINUED | OUTPATIENT
Start: 2019-05-18 | End: 2019-05-18

## 2019-05-18 RX ORDER — CRANBERRY FRUIT EXTRACT 250 MG
250 CAPSULE ORAL 2 TIMES DAILY
Status: DISCONTINUED | OUTPATIENT
Start: 2019-05-18 | End: 2019-05-18

## 2019-05-18 RX ORDER — SCOLOPAMINE TRANSDERMAL SYSTEM 1 MG/1
1 PATCH, EXTENDED RELEASE TRANSDERMAL
Status: DISCONTINUED | OUTPATIENT
Start: 2019-05-18 | End: 2019-05-25 | Stop reason: HOSPADM

## 2019-05-18 RX ORDER — 0.9 % SODIUM CHLORIDE 0.9 %
1000 INTRAVENOUS SOLUTION INTRAVENOUS ONCE
Status: COMPLETED | OUTPATIENT
Start: 2019-05-18 | End: 2019-05-18

## 2019-05-18 RX ORDER — SODIUM CHLORIDE 0.9 % (FLUSH) 0.9 %
50 SYRINGE (ML) INJECTION DAILY
Status: DISCONTINUED | OUTPATIENT
Start: 2019-05-18 | End: 2019-05-25 | Stop reason: HOSPADM

## 2019-05-18 RX ORDER — GABAPENTIN 300 MG/1
300 CAPSULE ORAL 3 TIMES DAILY
Status: DISCONTINUED | OUTPATIENT
Start: 2019-05-18 | End: 2019-05-18

## 2019-05-18 RX ORDER — SODIUM CHLORIDE 450 MG/100ML
INJECTION, SOLUTION INTRAVENOUS CONTINUOUS
Status: DISCONTINUED | OUTPATIENT
Start: 2019-05-18 | End: 2019-05-25 | Stop reason: HOSPADM

## 2019-05-18 RX ORDER — METOCLOPRAMIDE HYDROCHLORIDE 5 MG/5ML
5 SOLUTION ORAL 2 TIMES DAILY
Status: ON HOLD | COMMUNITY
End: 2019-05-24 | Stop reason: HOSPADM

## 2019-05-18 RX ORDER — BACLOFEN 10 MG/1
5 TABLET ORAL EVERY 6 HOURS
Status: DISCONTINUED | OUTPATIENT
Start: 2019-05-18 | End: 2019-05-18

## 2019-05-18 RX ORDER — ONDANSETRON 2 MG/ML
4 INJECTION INTRAMUSCULAR; INTRAVENOUS EVERY 6 HOURS PRN
Status: DISCONTINUED | OUTPATIENT
Start: 2019-05-18 | End: 2019-05-25 | Stop reason: HOSPADM

## 2019-05-18 RX ORDER — SODIUM CHLORIDE 0.9 % (FLUSH) 0.9 %
10 SYRINGE (ML) INJECTION PRN
Status: DISCONTINUED | OUTPATIENT
Start: 2019-05-18 | End: 2019-05-25 | Stop reason: HOSPADM

## 2019-05-18 RX ORDER — METOPROLOL TARTRATE 5 MG/5ML
5 INJECTION INTRAVENOUS ONCE
Status: COMPLETED | OUTPATIENT
Start: 2019-05-18 | End: 2019-05-18

## 2019-05-18 RX ORDER — RANITIDINE HYDROCHLORIDE 15 MG/ML
75 SOLUTION ORAL 2 TIMES DAILY
Status: DISCONTINUED | OUTPATIENT
Start: 2019-05-18 | End: 2019-05-19

## 2019-05-18 RX ORDER — RANITIDINE HYDROCHLORIDE 15 MG/ML
75 SOLUTION ORAL 2 TIMES DAILY
Status: ON HOLD | COMMUNITY
End: 2020-01-25 | Stop reason: HOSPADM

## 2019-05-18 RX ORDER — ACETAMINOPHEN 160 MG/5ML
650 SOLUTION ORAL EVERY 6 HOURS PRN
Status: DISCONTINUED | OUTPATIENT
Start: 2019-05-18 | End: 2019-05-25 | Stop reason: HOSPADM

## 2019-05-18 RX ORDER — SODIUM CHLORIDE 0.9 % (FLUSH) 0.9 %
250 SYRINGE (ML) INJECTION EVERY 4 HOURS
Status: DISCONTINUED | OUTPATIENT
Start: 2019-05-18 | End: 2019-05-18

## 2019-05-18 RX ORDER — PANTOPRAZOLE SODIUM 40 MG/10ML
40 INJECTION, POWDER, LYOPHILIZED, FOR SOLUTION INTRAVENOUS DAILY
Status: DISCONTINUED | OUTPATIENT
Start: 2019-05-18 | End: 2019-05-19

## 2019-05-18 RX ORDER — SODIUM CHLORIDE 0.9 % (FLUSH) 0.9 %
10 SYRINGE (ML) INJECTION EVERY 12 HOURS SCHEDULED
Status: DISCONTINUED | OUTPATIENT
Start: 2019-05-18 | End: 2019-05-25 | Stop reason: HOSPADM

## 2019-05-18 RX ORDER — METOPROLOL TARTRATE 5 MG/5ML
2.5 INJECTION INTRAVENOUS EVERY 8 HOURS PRN
Status: DISCONTINUED | OUTPATIENT
Start: 2019-05-18 | End: 2019-05-18

## 2019-05-18 RX ORDER — METOCLOPRAMIDE HYDROCHLORIDE 5 MG/5ML
5 SOLUTION ORAL 2 TIMES DAILY
Status: DISCONTINUED | OUTPATIENT
Start: 2019-05-18 | End: 2019-05-23

## 2019-05-18 RX ORDER — GLYCOPYRROLATE 1 MG/1
1 TABLET ORAL 3 TIMES DAILY PRN
Status: DISCONTINUED | OUTPATIENT
Start: 2019-05-18 | End: 2019-05-25 | Stop reason: HOSPADM

## 2019-05-18 RX ORDER — MULTIVIT-MIN/FERROUS GLUCONATE 9 MG/15 ML
15 LIQUID (ML) ORAL DAILY
Status: DISCONTINUED | OUTPATIENT
Start: 2019-05-18 | End: 2019-05-25 | Stop reason: HOSPADM

## 2019-05-18 RX ORDER — 0.9 % SODIUM CHLORIDE 0.9 %
500 INTRAVENOUS SOLUTION INTRAVENOUS ONCE
Status: DISCONTINUED | OUTPATIENT
Start: 2019-05-18 | End: 2019-05-18

## 2019-05-18 RX ORDER — 0.9 % SODIUM CHLORIDE 0.9 %
10 VIAL (ML) INJECTION DAILY
Status: DISCONTINUED | OUTPATIENT
Start: 2019-05-18 | End: 2019-05-19

## 2019-05-18 RX ORDER — SODIUM CHLORIDE 9 MG/ML
INJECTION, SOLUTION INTRAVENOUS CONTINUOUS
Status: DISCONTINUED | OUTPATIENT
Start: 2019-05-18 | End: 2019-05-18

## 2019-05-18 RX ORDER — PROMETHAZINE HYDROCHLORIDE 25 MG/ML
25 INJECTION, SOLUTION INTRAMUSCULAR; INTRAVENOUS EVERY 4 HOURS PRN
Status: DISCONTINUED | OUTPATIENT
Start: 2019-05-18 | End: 2019-05-25 | Stop reason: HOSPADM

## 2019-05-18 RX ORDER — METOPROLOL TARTRATE 5 MG/5ML
2.5 INJECTION INTRAVENOUS EVERY 4 HOURS PRN
Status: DISCONTINUED | OUTPATIENT
Start: 2019-05-18 | End: 2019-05-20

## 2019-05-18 RX ORDER — PROMETHAZINE HYDROCHLORIDE 25 MG/1
25 TABLET ORAL EVERY 6 HOURS PRN
Status: DISCONTINUED | OUTPATIENT
Start: 2019-05-18 | End: 2019-05-18

## 2019-05-18 RX ORDER — FENTANYL CITRATE 50 UG/ML
25 INJECTION, SOLUTION INTRAMUSCULAR; INTRAVENOUS
Status: DISCONTINUED | OUTPATIENT
Start: 2019-05-18 | End: 2019-05-25 | Stop reason: HOSPADM

## 2019-05-18 RX ORDER — CRANBERRY FRUIT EXTRACT 250 MG
250 CAPSULE ORAL 2 TIMES DAILY
COMMUNITY

## 2019-05-18 RX ORDER — MULTIVIT-MIN/FERROUS GLUCONATE 9 MG/15 ML
2.5 LIQUID (ML) ORAL DAILY
Status: ON HOLD | COMMUNITY
End: 2019-05-24 | Stop reason: HOSPADM

## 2019-05-18 RX ORDER — METOPROLOL TARTRATE 5 MG/5ML
2.5 INJECTION INTRAVENOUS ONCE
Status: DISCONTINUED | OUTPATIENT
Start: 2019-05-18 | End: 2019-05-18

## 2019-05-18 RX ORDER — SERTRALINE HYDROCHLORIDE 25 MG/1
25 TABLET, FILM COATED ORAL DAILY
Status: DISCONTINUED | OUTPATIENT
Start: 2019-05-18 | End: 2019-05-25 | Stop reason: HOSPADM

## 2019-05-18 RX ORDER — BACLOFEN 10 MG/1
20 TABLET ORAL 3 TIMES DAILY
Status: DISCONTINUED | OUTPATIENT
Start: 2019-05-18 | End: 2019-05-18

## 2019-05-18 RX ORDER — ONDANSETRON 2 MG/ML
INJECTION INTRAMUSCULAR; INTRAVENOUS
Status: COMPLETED
Start: 2019-05-18 | End: 2019-05-18

## 2019-05-18 RX ADMIN — ONDANSETRON 4 MG: 2 INJECTION INTRAMUSCULAR; INTRAVENOUS at 15:15

## 2019-05-18 RX ADMIN — SODIUM CHLORIDE 1000 ML: 4.5 INJECTION, SOLUTION INTRAVENOUS at 08:28

## 2019-05-18 RX ADMIN — METRONIDAZOLE 500 MG: 500 INJECTION, SOLUTION INTRAVENOUS at 01:26

## 2019-05-18 RX ADMIN — FENTANYL CITRATE 25 MCG: 50 INJECTION, SOLUTION INTRAMUSCULAR; INTRAVENOUS at 23:31

## 2019-05-18 RX ADMIN — ACETAMINOPHEN 650 MG: 650 SOLUTION ORAL at 01:26

## 2019-05-18 RX ADMIN — Medication 1250 MG: at 21:50

## 2019-05-18 RX ADMIN — PIPERACILLIN AND TAZOBACTAM 3.38 G: 3; .375 INJECTION, POWDER, FOR SOLUTION INTRAVENOUS at 11:22

## 2019-05-18 RX ADMIN — Medication 1250 MG: at 12:37

## 2019-05-18 RX ADMIN — PANTOPRAZOLE SODIUM 40 MG: 40 INJECTION, POWDER, FOR SOLUTION INTRAVENOUS at 21:54

## 2019-05-18 RX ADMIN — SODIUM CHLORIDE 1000 ML: 9 INJECTION, SOLUTION INTRAVENOUS at 06:45

## 2019-05-18 RX ADMIN — BACLOFEN 5 MG: 10 TABLET ORAL at 01:26

## 2019-05-18 RX ADMIN — IOHEXOL 75 ML: 350 INJECTION, SOLUTION INTRAVENOUS at 21:38

## 2019-05-18 RX ADMIN — SERTRALINE 25 MG: 25 TABLET, FILM COATED ORAL at 14:56

## 2019-05-18 RX ADMIN — MULTIVITAMIN 15 ML: LIQUID ORAL at 14:57

## 2019-05-18 RX ADMIN — SODIUM CHLORIDE 10 ML: 9 INJECTION, SOLUTION INTRAMUSCULAR; INTRAVENOUS; SUBCUTANEOUS at 20:49

## 2019-05-18 RX ADMIN — Medication 10 ML: at 21:55

## 2019-05-18 RX ADMIN — HYDROCORTISONE SODIUM SUCCINATE 100 MG: 100 INJECTION, POWDER, FOR SOLUTION INTRAMUSCULAR; INTRAVENOUS at 22:02

## 2019-05-18 RX ADMIN — RIVAROXABAN 20 MG: 20 TABLET, FILM COATED ORAL at 21:53

## 2019-05-18 RX ADMIN — SODIUM CHLORIDE 1000 ML: 9 INJECTION, SOLUTION INTRAVENOUS at 00:40

## 2019-05-18 RX ADMIN — HYDROCORTISONE SODIUM SUCCINATE 100 MG: 100 INJECTION, POWDER, FOR SOLUTION INTRAMUSCULAR; INTRAVENOUS at 14:59

## 2019-05-18 RX ADMIN — METOCLOPRAMIDE HYDROCHLORIDE 5 MG: 5 SOLUTION ORAL at 22:00

## 2019-05-18 RX ADMIN — PIPERACILLIN AND TAZOBACTAM 3.38 G: 3; .375 INJECTION, POWDER, FOR SOLUTION INTRAVENOUS at 20:54

## 2019-05-18 RX ADMIN — SODIUM CHLORIDE: 9 INJECTION, SOLUTION INTRAVENOUS at 00:40

## 2019-05-18 RX ADMIN — BACLOFEN 5 MG: 10 TABLET ORAL at 05:31

## 2019-05-18 RX ADMIN — SODIUM CHLORIDE: 4.5 INJECTION, SOLUTION INTRAVENOUS at 09:15

## 2019-05-18 RX ADMIN — METOPROLOL TARTRATE 5 MG: 5 INJECTION, SOLUTION INTRAVENOUS at 20:49

## 2019-05-18 RX ADMIN — METOPROLOL TARTRATE 2.5 MG: 5 INJECTION, SOLUTION INTRAVENOUS at 16:42

## 2019-05-18 RX ADMIN — GLYCOPYRROLATE 1 MG: 1 TABLET ORAL at 22:08

## 2019-05-18 RX ADMIN — PROMETHAZINE HYDROCHLORIDE 25 MG: 25 INJECTION INTRAMUSCULAR; INTRAVENOUS at 20:49

## 2019-05-18 RX ADMIN — RANITIDINE 75 MG: 15 SYRUP ORAL at 14:56

## 2019-05-18 RX ADMIN — METOCLOPRAMIDE HYDROCHLORIDE 5 MG: 5 SOLUTION ORAL at 14:57

## 2019-05-18 RX ADMIN — SODIUM CHLORIDE: 9 INJECTION, SOLUTION INTRAVENOUS at 07:57

## 2019-05-18 RX ADMIN — RANITIDINE 75 MG: 15 SYRUP ORAL at 22:00

## 2019-05-18 ASSESSMENT — PAIN SCALES - GENERAL
PAINLEVEL_OUTOF10: 7
PAINLEVEL_OUTOF10: 7

## 2019-05-18 ASSESSMENT — PAIN - FUNCTIONAL ASSESSMENT: PAIN_FUNCTIONAL_ASSESSMENT: FLACC

## 2019-05-18 NOTE — PROGRESS NOTES
on floor, advised her of stat lactic. Sts I will get it after I finish the others on the unit. Reminded her that it was ordered stat;  sts \"it has to be done with in 30-60 minutes, I'll get it\". Requested she get is ASAP r/t possible decision to transfer patient.

## 2019-05-18 NOTE — ED NOTES
Spoke with Dr Amauri Reid regarding pt vs trend and request to upgrade pt statsu for admission .  Dr Amauri Reid agreeable      Racheal Jacob RN  05/17/19 [id number]

## 2019-05-18 NOTE — PROGRESS NOTES
[employer], to get a little for information on patient. No answer, I left a message, requested they call us here. BP 85/47 map 57 , no urine output. Patient groaning, moaning, appears uncomfortable. No urine output. Still hasn't gotten the full 30ml/kg bolus for sepsis. Last lactic acid 4.0. Leg still cold. Paged internal med advised above.

## 2019-05-18 NOTE — PROGRESS NOTES
Left leg is angled posteriorly, contracted, not sure what position it usually rests in. Cold from thigh to toes, no palpable pulse. DP pulse with doppler, unable to get DT pulse with doppler. BP 66/56, 86/56, map 64. Bolus completed, (3rd bag in since admission), no urine output since arrival to floor. ? Do we need a repeat lactic? Does she need the full 30ml/kg bolus? Paged internal med resident on call advised above.

## 2019-05-18 NOTE — PROGRESS NOTES
Occupational Therapy Not Seen Note    DATE: 2019  Name: Leticia Ashton  : 1980  MRN: [id number]    Patient not available for Occupational Therapy due to:     Other: pt not responsive and not appropriate for OT at  this time     Next Scheduled Treatment: check back 19    Electronically signed by ART Teresa on 2019 at 11:01 AM

## 2019-05-18 NOTE — PROGRESS NOTES
Patient arrives to unit, unresponsive, moaning. Warm upper extremities, cold lower extremities. Purulent drainage and possible urine coming from ashley area. No palpable radial pulses. Respirations 24, a little labored.

## 2019-05-18 NOTE — PLAN OF CARE
Problem: Pain:  Description  Pain management should include both nonpharmacologic and pharmacologic interventions. Goal: Pain level will decrease  Description  Pain level will decrease  Outcome: Met This Shift  Goal: Control of acute pain  Description  Control of acute pain  Outcome: Met This Shift  Note:   Tylenol given for pain control, pt appears a little more comfortable.       Problem: Falls - Risk of:  Goal: Will remain free from falls  Description  Will remain free from falls  Outcome: Met This Shift  Goal: Absence of physical injury  Description  Absence of physical injury  Outcome: Met This Shift     Problem: Confusion - Acute:  Goal: Absence of continued neurological deterioration signs and symptoms  Description  Absence of continued neurological deterioration signs and symptoms  Outcome: Met This Shift  Goal: Mental status will be restored to baseline  Description  Mental status will be restored to baseline  Outcome: Met This Shift     Problem: Discharge Planning:  Goal: Discharged to appropriate level of care  Description  Discharged to appropriate level of care  Outcome: Met This Shift     Problem: Injury - Risk of, Physical Injury:  Goal: Will remain free from falls  Description  Will remain free from falls  Outcome: Met This Shift  Goal: Absence of physical injury  Description  Absence of physical injury  Outcome: Met This Shift     Problem: Sensory Perception - Impaired:  Goal: Demonstrations of improved sensory functioning will increase  Description  Demonstrations of improved sensory functioning will increase  Outcome: Met This Shift  Goal: Decrease in sensory misperception frequency  Description  Decrease in sensory misperception frequency  Outcome: Met This Shift  Goal: Able to refrain from responding to false sensory perceptions  Description  Able to refrain from responding to false sensory perceptions  Outcome: Met This Shift  Goal: Demonstrates accurate environmental perceptions  Description  Demonstrates accurate environmental perceptions  Outcome: Met This Shift  Goal: Able to distinguish between reality-based and nonreality-based thinking  Description  Able to distinguish between reality-based and nonreality-based thinking  Outcome: Met This Shift  Goal: Able to interrupt nonreality-based thinking  Description  Able to interrupt nonreality-based thinking  Outcome: Met This Shift     Problem: Sleep Pattern Disturbance:  Goal: Appears well-rested  Description  Appears well-rested  Outcome: Met This Shift     Problem: Gas Exchange - Impaired:  Goal: Levels of oxygenation will improve  Description  Levels of oxygenation will improve  Outcome: Met This Shift     Problem: Serum Glucose Level - Abnormal:  Goal: Ability to maintain appropriate glucose levels will improve  Description  Ability to maintain appropriate glucose levels will improve  Outcome: Met This Shift     Problem: Venous Thromboembolism:  Goal: Will show no signs or symptoms of venous thromboembolism  Description  Will show no signs or symptoms of venous thromboembolism  Outcome: Met This Shift  Goal: Absence of signs or symptoms of impaired coagulation  Description  Absence of signs or symptoms of impaired coagulation  Outcome: Met This Shift     Problem: Urinary Elimination:  Goal: Signs and symptoms of infection will decrease  Description  Signs and symptoms of infection will decrease  Outcome: Met This Shift  Goal: Complications related to the disease process, condition or treatment will be avoided or minimized  Description  Complications related to the disease process, condition or treatment will be avoided or minimized  Outcome: Met This Shift  Goal: Ability to reestablish a normal urinary elimination pattern will improve - after catheter removal  Description  Ability to reestablish a normal urinary elimination pattern will improve  Outcome: Met This Shift     Problem: Sensory:  Goal: Pain level will decrease  Description  Pain level will decrease  Outcome: Met This Shift  Goal: General experience of comfort will improve  Description  General experience of comfort will improve  Outcome: Met This Shift     Problem: Physical Regulation:  Goal: Complications related to the disease process, condition or treatment will be avoided or minimized  Description  Complications related to the disease process, condition or treatment will be avoided or minimized  Outcome: Met This Shift     Problem: Skin Integrity:  Goal: Skin integrity will be maintained  Description  Skin integrity will be maintained  Outcome: Met This Shift  Goal: Skin integrity will improve  Description  Skin integrity will improve  Outcome: Met This Shift     Problem: Tissue Perfusion:  Goal: Ability to maintain adequate tissue perfusion will improve  Description  Ability to maintain adequate tissue perfusion will improve  Outcome: Met This Shift

## 2019-05-18 NOTE — PROGRESS NOTES
DATE: 2019    NAME: Larry Baker  MRN: [id number]   : 1980    Patient not seen this date for Physical Therapy due to:  [] Blood transfusion in progress  [] Hemodialysis  []  Patient Declined  [] Spine Precautions   [] Strict Bedrest  [] Surgery/ Procedure  [] Testing      [x] Other-Cx per RN (Marcia), hold PT until , Pt is septic, unresponsive. [] PT being discontinued at this time. Patient independent. No further needs. [] PT being discontinued at this time as the patient has been transferred to palliative care. No further needs.     Gutierrez Gates, PT

## 2019-05-18 NOTE — CONSULTS
Critical Care - History and Physical Examination    Patient's name:  Etahn Choe  Medical Record Number: [id number]  Patient's account/billing number: [de-identified]  Patient's YOB: 1980  Age: 45 y.o. Date of Admission: 5/17/2019  2:31 PM  Reason of ICU consultation: Hypotension  Date of History and Physical Examination: 5/18/2019      Primary Care Physician: Barney Fishman MD  Attending Physician: Dr. Moi Ramirez Status: Full Code    Chief complaint: Altered mental status      History Of Present Illness:   History was obtained from chart review and unobtainable from patient due to mental status. Ethan Choe is a 45 y.o. female past medical history of multiple sclerosis, chronic indwelling Davies, chronic PEG tube, chronic vegetative state on Xarelto presents from facility for altered mental status. Patient was found to have possible UTI. Upon arrival to ED patient did not have a fever, but was tachycardic. Initial WBC was 11.1 however increased to 19.3 overnight hemoglobin decreased from 17.5-16.0. Urinalysis showing red turbid with large leukocyte esterase, 4+ protein. Urinalysis microscopic also showing too many WBCs and moderate bacteria. Per chart review patient has been seen by infectious disease in January for suspected UTI. At that time as recommended that patient have Davies exchange as needed and possible gentamicin. Upon examining patient, urine flowing around Davies catheter that is currently in place. Patient appears in no acute distress, however patient is nonverbal.  Patient tachycardic, but improving with fluid boluses, blood pressure also improving with fluid boluses. Lactic acid increasing 2.4-4-4. 1. Bicarb dropped 18-11. TSH low 0.24.         Past Medical History:        Diagnosis Date    Aphasia     Aphasia     Contracture of joint, lower leg     HTN (hypertension)     Hx MRSA infection resolved 1/2017    2 negative nasal screens 1/2017 (hx in heel in 2013)    Hydronephrosis     Multiple sclerosis ([facility] [address]     Neurogenic bladder     Peripheral vascular disease (HCC)     Polyneuropathy     Pressure ulcer     Coccyx, Heel    Pulmonary embolism (HCC)     Pulmonary infarction (HCC)     Quadriplegia (HCC)     Tachycardia     UTI (lower urinary tract infection)        Past Surgical History:        Procedure Laterality Date    AMPUTATION Right     COLOSTOMY      GASTROSTOMY TUBE PLACEMENT      LEG AMPUTATION THROUGH FEMUR         Allergies:    No Known Allergies      Home Medications:   Prior to Admission medications    Medication Sig Start Date End Date Taking?  Authorizing Provider   Multiple Vitamins-Minerals (CENTRUM/CERTA-DERICK WITH MINERALS ORAL) solution Take 2.5 mLs by mouth daily   Yes Historical Provider, MD   Cranberry 250 MG CAPS Take 250 mg by mouth 2 times daily   Yes Historical Provider, MD   metoclopramide (REGLAN) 5 MG/5ML solution Take 5 mg by mouth 2 times daily   Yes Historical Provider, MD   ranitidine (ZANTAC) 75 MG/5ML syrup 75 mg by Per G Tube route 2 times daily   Yes Historical Provider, MD   vitamin D (CHOLECALCIFEROL) 1000 UNIT TABS tablet 1,000 Units by Per G Tube route daily   Yes Historical Provider, MD   rivaroxaban (XARELTO) 10 MG TABS tablet 1 tablet by Per G Tube route daily (with breakfast) 1/16/17  Yes Hyacinth Moura,    potassium chloride 20 MEQ/15ML (10%) oral solution 10 mEq by Per G Tube route daily   Yes Historical Provider, MD   Scopolamine Base (SCOPOLAMINE TD) Place 1 mg onto the skin every 72 hours    Yes Historical Provider, MD   baclofen (LIORESAL) 10 MG tablet 10 mg by Per G Tube route 3 times daily Crush into G tube    Yes Historical Provider, MD   gabapentin (NEURONTIN) 300 MG capsule 300 mg by Per G Tube route 3 times daily   Yes Historical Provider, MD   medroxyPROGESTERone (DEPO-PROVERA) 150 MG/ML injection Inject 150 mg into the muscle every 3 months On the 11th    Yes Historical Provider, MD metoprolol (LOPRESSOR) 25 MG tablet 12.5 mg 2 times daily    Yes Historical Provider, MD   acetaminophen (TYLENOL) 80 MG/0.8ML suspension 650 mg by Per G Tube route every 6 hours as needed for Fever or Pain    Yes Historical Provider, MD   glycopyrrolate (ROBINUL) 1 MG tablet Take 1 mg by mouth 3 times daily     Historical Provider, MD       Social History:   TOBACCO:   reports that she has never smoked. She has never used smokeless tobacco.  ETOH:   reports that she does not drink alcohol. DRUGS:  reports that she does not use drugs. OCCUPATION:      Family History:       Problem Relation Age of Onset    No Known Problems Mother     No Known Problems Father            REVIEW OF SYSTEMS (ROS):  Review of Systems - unable to obtain due to patient being nonverbal.        Physical Exam:    Vitals: BP 95/68   Pulse 143   Temp 97.4 °F (36.3 °C) (Axillary)   Resp 20   Ht 5' (1.524 m)   Wt 188 lb 0.8 oz (85.3 kg)   SpO2 98%   Breastfeeding? No   BMI 36.73 kg/m²     Body weight:   Wt Readings from Last 3 Encounters:   05/18/19 188 lb 0.8 oz (85.3 kg)   01/22/19 174 lb (78.9 kg)   01/12/17 182 lb 1.6 oz (82.6 kg)       Body Mass Index : Body mass index is 36.73 kg/m². PHYSICAL EXAMINATION :  Constitutional: Chronically ill-appearing  EENT: PERRLA, EOMI  Neck: Soft, slightly flexed. Respiratory: Clear to auscultation, poor inspiratory effort  Cardiovascular: Tachycardic, sinus, S1-S2, no murmur  Abdomen: soft, nontender, nondistended, bowel sounds present, no suprapubic tenderness, Davies catheter seen with some urine dribbling around   Neurological: Patient is nonverbal and does not follow commands but sometimes tracks  Extremities:  Flexed, cool to touch in lower extremities. Upper extremities warm, good capillary refill.   Laboratory findings:-    CBC:   Recent Labs     05/18/19  0612   WBC PENDING   HGB 16.0*   PLT PENDING     BMP:    Recent Labs     05/17/19  1529      K 4.7      CO2 18* BUN 40*   CREATININE 0.93*   GLUCOSE 115*     S. Calcium:  Recent Labs     05/17/19  1529   CALCIUM 10.1     S. Ionized Calcium:No results for input(s): IONCA in the last 72 hours. S. Magnesium:No results for input(s): MG in the last 72 hours. S. Phosphorus:No results for input(s): PHOS in the last 72 hours. S. Glucose:No results for input(s): POCGLU in the last 72 hours. Glycosylated hemoglobin A1C: No results for input(s): LABA1C in the last 72 hours. INR:   Recent Labs     05/17/19  1529   INR 1.0     Hepatic functions:   Recent Labs     05/17/19  1529   ALKPHOS 72   ALT 33   AST 23   PROT 9.6*   BILITOT 0.26*   LABALBU 4.1     Pancreatic functions:No results for input(s): LACTA, AMYLASE in the last 72 hours. S. Lactic Acid: No results for input(s): LACTA in the last 72 hours. Cardiac enzymes:No results for input(s): CKTOTAL, CKMB, CKMBINDEX, TROPONINI in the last 72 hours. BNP:No results for input(s): BNP in the last 72 hours. Lipid profile: No results for input(s): CHOL, TRIG, HDL, LDLCALC in the last 72 hours.     Invalid input(s): LDL  Blood Gases: No results found for: PH, PCO2, PO2, HCO3, O2SAT  Thyroid functions:   Lab Results   Component Value Date    TSH 1.46 08/07/2018        Urinalysis:     Microbiology:  Cultures during this admission:     Blood cultures:                 [] None drawn      [] Negative             []  Positive (Details:  Pending)  Urine Culture:                   [] None drawn      [] Negative             []  Positive (Details:  Pending)  Sputum Culture:               [] None drawn       [] Negative             []  Positive (Details:  )   Endotracheal aspirate:     [] None drawn       [] Negative             []  Positive (Details:  )         -----------------------------------------------------------------  Radiological reports:    CXR:  Shallow inflation.  Perihilar interstitial and nodular opacities are noted,   right greater than left.  While a component of this likely represents   atelectasis, developing infiltrate or inflammatory process should also be   considered. Electrocardiogram:   Sinus tachycardia  Last Echocardiogram findings:   8/31/17:  · The estimated left ventricular ejection fraction is 55 - 60%. Normal   left ventricular ejection fraction. No regional wall motion abnormalities. Normal left ventricular diastolic function. · Normal size right ventricle. Normal right ventricular systolic function. · The RVSP is normal.       Assessment and Plan     Patient Active Problem List   Diagnosis    Aphasia    Multiple sclerosis ([address]    Nonverbal    Gastrostomy tube dependent ([address]    Dislodged gastrostomy tube ([address]    Neurogenic bladder    Hemorrhagic cystitis    Hypokalemia    Bacterial UTI    Tachycardia    Gross hematuria    Urinary tract infection    Lactic acidosis    Chronic indwelling Davies catheter    Peripheral vascular disease ([address]    Recurrent UTI (urinary tract infection)    History of pulmonary embolism    Hypertension         Additional assessment:  Principal Problem:    Urinary tract infection  Active Problems:    Aphasia    Multiple sclerosis (HCC)    Nonverbal    Gastrostomy tube dependent (HCC)    Neurogenic bladder    Hemorrhagic cystitis    Tachycardia    Gross hematuria    Lactic acidosis    Chronic indwelling Davies catheter    Peripheral vascular disease (HCC)    Recurrent UTI (urinary tract infection)    History of pulmonary embolism    Hypertension  Resolved Problems:    * No resolved hospital problems. *          Plan:  1. Septic shock secondary to UTI sepsis. With decreasing mentation. Increase antibiotics to vancomycin Zosyn, follow-up ID recommendations. Trend lactic acid. Change Davies as previous Davies  is not draining properly. 2. Continue IV fluids as patient's blood pressure does improve, we will try Solu-Cortef for short course to see if it will help improve blood pressures as well  3.  Tachycardia

## 2019-05-18 NOTE — PLAN OF CARE
Problem: Pain:  Description  Pain management should include both nonpharmacologic and pharmacologic interventions. Goal: Pain level will decrease  Description  Pain level will decrease  Outcome: Ongoing  Goal: Control of acute pain  Description  Control of acute pain  Outcome: Ongoing  Goal: Control of chronic pain  Description  Control of chronic pain  Outcome: Ongoing     Problem: Risk for Impaired Skin Integrity  Goal: Tissue integrity - skin and mucous membranes  Description  Structural intactness and normal physiological function of skin and  mucous membranes.   Outcome: Ongoing     Problem: Falls - Risk of:  Goal: Will remain free from falls  Description  Will remain free from falls  Outcome: Ongoing  Goal: Absence of physical injury  Description  Absence of physical injury  Outcome: Ongoing

## 2019-05-18 NOTE — DISCHARGE INSTR - COC
Continuity of Care Form    Patient Name: Troy Atkinson   :  1980  MRN:  [id number]    [facility] date:  2019  Discharge date:  2019      Code Status Order: Full Code   Advance Directives:   Advance Care Flowsheet Documentation     Date/Time Healthcare Directive Type of Healthcare Directive Copy in [address] Agent's Name Healthcare Agent's Phone Number    [phone]  No, patient does not have an advance directive for healthcare treatment -- -- -- -- --          Admitting Physician:  Dasha Shore DO  PCP: Lee Mcdaniel MD    Discharging Nurse: Heather Brown Unit/Room#: 6968/0828-09  Discharging Unit Phone Number: [phone]    Emergency Contact:   Extended Emergency Contact Information  Primary Emergency Contact: [address] Phone: [phone]  Mobile Phone: [phone]  Relation: Parent   needed? No  Secondary Emergency Contact: frances esparza  Mobile Phone: [phone]  Relation: Brother/Sister   needed?  No    Past Surgical History:  Past Surgical History:   Procedure Laterality Date    AMPUTATION Right     COLOSTOMY      GASTROSTOMY TUBE PLACEMENT      LEG AMPUTATION THROUGH FEMUR         Immunization History:   Immunization History   Administered Date(s) Administered    Influenza Vaccine, unspecified formulation 10/09/2016    Pneumococcal 13-valent Conjugate ([id number]) 10/09/2016       Active Problems:  Patient Active Problem List   Diagnosis Code    Aphasia R47.01    Multiple sclerosis ([address] G35    Nonverbal R47.01    Gastrostomy tube dependent ([address] Z93.1    Dislodged gastrostomy tube ([facility] [address] Z43.1    Neurogenic bladder N31.9    Hemorrhagic cystitis N30.91    Hypokalemia E87.6    Bacterial UTI N39.0, A49.9    Tachycardia R00.0    Gross hematuria R31.0    Urinary tract infection N39.0    Lactic acidosis E87.2    Chronic indwelling Davies catheter Z92.89    Peripheral vascular disease Wound Width (cm) 1.8 cm 5/20/2019  1:11 PM   Wound Depth (cm) 0.4 cm 5/20/2019  1:11 PM   Wound Surface Area (cm^2) 2.7 cm^2 5/20/2019  1:11 PM   Wound Volume (cm^3) 1.08 cm^3 5/20/2019  1:11 PM   Wound Assessment Clean;Red 5/24/2019  8:00 PM   Drainage Amount Moderate 5/24/2019  8:00 PM   Drainage Description Serosanguinous 5/24/2019  8:00 PM   Odor None 5/24/2019  8:00 PM   Hilda-wound Assessment Dry 5/24/2019  8:00 PM   Red%Wound Bed 100 5/20/2019  1:11 PM   Number of days: 5       Wound 05/20/19 Knee Left;Posterior (Active)   Wound Image   5/20/2019  1:11 PM   Wound Skin Tear 5/24/2019  8:00 AM   Dressing Status Dry; Intact 5/24/2019  8:00 PM   Dressing Changed Changed/New 5/24/2019  8:00 AM   Dressing/Treatment Foam 5/24/2019  8:00 PM   Wound Cleansed Rinsed/Irrigated with saline 5/24/2019  8:00 AM   Dressing Change Due 05/23/19 5/20/2019  1:11 PM   Wound Length (cm) 0.7 cm 5/20/2019  1:11 PM   Wound Width (cm) 3 cm 5/20/2019  1:11 PM   Wound Depth (cm) 0.1 cm 5/20/2019  1:11 PM   Wound Surface Area (cm^2) 2.1 cm^2 5/20/2019  1:11 PM   Wound Volume (cm^3) 0.21 cm^3 5/20/2019  1:11 PM   Wound Assessment Bleeding;Red 5/21/2019 12:00 AM   Odor None 5/24/2019  8:00 PM   Red%Wound Bed 100 5/20/2019  1:11 PM   Number of days: 4        Elimination:  Continence:   · Bowel: No  · Bladder: No  Urinary Catheter: Last Change Date 5/22/19   Colostomy/Ileostomy/Ileal Conduit: Yes  Colostomy LLQ-Stomal Appliance: 1 piece  Colostomy LLQ-Flange Size (inches): 2.5 Inches  Colostomy LLQ-Stoma  Assessment: Pink  Colostomy LLQ-Mucocutaneous Junction: Intact  Colostomy LLQ-Peristomal Assessment: Clean, Intact  Colostomy LLQ-Treatment: Liquid skin barrier, Bag change  Colostomy LLQ-Stool Appearance: Loose  Colostomy LLQ-Stool Color: Brown  Colostomy LLQ-Stool Amount: Small  Colostomy LLQ-Output (mL): 500 ml    Date of Last BM: 5/25/2019      Intake/Output Summary (Last 24 hours) at 5/25/2019 1241  Last data filed at 5/25/2019 3579  Gross per 24 hour   Intake 120 ml   Output 2850 ml   Net -2730 ml     I/O last 3 completed shifts: In: 120 [NG/GT:120]  Out: 3250 [Urine:2000; Stool:1250]    Safety Concerns: At Risk for Falls    Impairments/Disabilities:      Contractures left leg, amputation right BKA, nonverbal     Nutrition Therapy:  Current Nutrition Therapy:   - Tube Feedings:  Standard without fiber    Routes of Feeding: Gastrostomy Tube  Liquids: No Liquids  Daily Fluid Restriction: no  Last Modified Barium Swallow with Video (Video Swallowing Test): not done    Treatments at the Time of Hospital Discharge:   Respiratory Treatments: none   Oxygen Therapy:  is not on home oxygen therapy. Ventilator:    - No ventilator support    Rehab Therapies: Physical Therapy  Weight Bearing Status/Restrictions: No weight bearing restirctions, pt is non ambulatory   Other Medical Equipment (for information only, NOT a DME order):  hospital bed  Other Treatments:     Patient's personal belongings (please select all that are sent with patient):  None    RN SIGNATURE:  {Esignature:[id number]}     CASE MANAGEMENT/SOCIAL WORK SECTION    Inpatient Status Date: 5/17/19    Readmission Risk Assessment Score:  Readmission Risk              Risk of Unplanned Readmission:        14           Discharging to Facility/ Agency   · Name: [facility]  · Address:  · Phone:    [phone]  · Fax:     [phone]    Dialysis Facility (if applicable)   · Name:  · Address:  · Dialysis Schedule:  · Phone:  · Fax:    / signature: Electronically signed by Lilian Helms RN on 5/24/19 at 9:28 AM    PHYSICIAN SECTION    Prognosis: Fair    Condition at Discharge: Stable    Rehab Potential (if transferring to Rehab):  Fair    Recommended Labs or Other Treatments After Discharge: Monitoring of blood glucose on tube feeds to prevent hypoglycemia    Physician Certification: I certify the above information and transfer of Leonor Lombardi  is necessary for the continuing treatment of the diagnosis listed and that she requires [place] for greater 30 days.      Update Admission H&P: No change in H&P    PHYSICIAN SIGNATURE:  Electronically signed by Dyana Mai MD on 5/25/19 at 11:11 AM

## 2019-05-18 NOTE — CONSULTS
[facility] - Initial Consult Note  Today's Date and Time: 5/17/2019, 8:56 PM    Impression :   · Altered mental status   · Urinary tract infection  · Neurogenic bladder  · Chronic indwelling catheter  · Hx e coli ESBL +    Recommendations:   · Continue ceftriaxone  · Await urine culture results    Medical Decision Making/Summary/Discussion:5/17/2019     ·   Infection Control Recommendations   · [place] Precautions  · Contact Isolation ESBL +    Antimicrobial Stewardship Recommendations     · Simplification of therapy  · Targeted therapy    Coordination of Outpatient Care:   · Estimated Length of IV antimicrobials:TBD  · Patient will need Midline Catheter Insertion: No  · Patient will need PICC line Insertion:No  · Patient will need: Home IV , [place],  SNF,  LTAC:TBD  · Patient will need outpatient wound care:No    Chief complaint/reason for consultation:   · AMS changes  · UTI      History of Present Illness:   Honey Prado is a 45y.o.-year-old  female who was initially admitted on 5/17/2019. Patient seen at the request of Pepito Mathews. INITIAL HISTORY:  Patient has multiple sclerosis, quadriplegia, neurogenic bladder, aphasia, hypertension, chronic indwelling Davies catheter, depression, DVTs in the past and contractures. Patient presented through ER with complaints of altered mental status. She resides at a SNF and was felt to have altered mental status. This is difficult to determine as she is non verbal. However, the feeling at the SNF was that there was a change. There is a Hx of recurrent urinary tract infections with prior ESBL+ E coli in December 2018. After that she has experienced UTI's with E coli not ESBL +.     In the ER the 5-17-19 UA obtained through the indwelling Davies shows large leukocyte esterase, negative nitrite, many WBC. The blood and urine cultures are in progress.     The patient is nonverbal so it is difficult to week: Not on file     Minutes per session: Not on file    Stress: Not on file   Relationships    Social connections:     Talks on phone: Not on file     Gets together: Not on file     Attends Cheondo[employer] service: Not on file     Active member of club or organization: Not on file     Attends meetings of clubs or organizations: Not on file     Relationship status: Not on file    Intimate partner violence:     Fear of current or ex partner: Not on file     Emotionally abused: Not on file     Physically abused: Not on file     Forced sexual activity: Not on file   Other Topics Concern    Not on file   Social History Narrative    Not on file       Family History:     Family History   Problem Relation Age of Onset    No Known Problems Mother     No Known Problems Father         Allergies:   Patient has no known allergies. Review of Systems:   Unable to provide. Non verbal      Physical Examination :     Patient Vitals for the past 8 hrs:   BP Temp Temp src Pulse Resp SpO2   05/17/19 2000 99/73 -- -- 148 19 97 %   05/17/19 1900 -- -- -- 148 22 97 %   05/17/19 1856 -- -- -- 146 21 97 %   05/17/19 1732 104/84 -- -- 134 17 98 %   05/17/19 1702 107/80 -- -- 139 19 100 %   05/17/19 1630 103/76 -- -- 137 16 --   05/17/19 1618 99/76 -- -- 141 20 --   05/17/19 1544 104/72 -- -- -- -- --   05/17/19 1531 104/72 -- -- 136 18 97 %   05/17/19 1530 104/77 -- -- 136 18 --   05/17/19 1442 111/74 98.9 °F (37.2 °C) Oral 122 18 99 %     General Appearance: Somnolent and in no apparent distress  Head:  Normocephalic, no trauma  Eyes: Pupils equal, round, reactive to light; sclera anicteric; conjunctivae pink. No embolic phenomena. ENT: Oropharynx clear, without erythema, exudate, or thrush. No tenderness of sinuses. Mouth/throat: mucosa pink and moist. No lesions. Dentition in good repair. Neck:Supple, without lymphadenopathy. Thyroid normal, No bruits. Pulmonary/Chest: Clear to auscultation, without wheezes, rales, or rhonchi. No dullness to percussion. Cardiovascular: Regular rate and rhythm without murmurs, rubs, or gallops. Abdomen: Soft, non tender. Bowel sounds normal. No organomegaly. G tube in place. Colostomy in LLQ. Chronic Davies in place  All four Extremities: No cyanosis, clubbing, edema, or effusions. Rt BKA. .Contractures present. Neurologic: Difficult to  mentation, non verbal.   Skin: Warm and dry with good turgor. No signs of peripheral arterial or venous insufficiency. No ulcerations. No open wounds. Medical Decision Making -Laboratory:   I have independently reviewed/ordered the following labs:    CBC with Differential:   Recent Labs     05/17/19  1529   WBC 11.1   HGB 17.5*   HCT 57.4*      LYMPHOPCT 9*   MONOPCT 3     BMP:   Recent Labs     05/17/19  1529      K 4.7      CO2 18*   BUN 40*   CREATININE 0.93*     Hepatic Function Panel:   Recent Labs     05/17/19  1529   PROT 9.6*   LABALBU 4.1   BILITOT 0.26*   ALKPHOS 72   ALT 33   AST 23     No results for input(s): RPR in the last 72 hours. No results for input(s): HIV in the last 72 hours. No results for input(s): BC in the last 72 hours. Lab Results   Component Value Date    MUCUS NOT REPORTED 05/17/2019    RBC 6.55 05/17/2019    RBC 4.56 03/11/2012    TRICHOMONAS NOT REPORTED 05/17/2019    WBC 11.1 05/17/2019    YEAST NOT REPORTED 05/17/2019    TURBIDITY TURBID 05/17/2019     Lab Results   Component Value Date    CREATININE 0.93 05/17/2019    GLUCOSE 115 05/17/2019    GLUCOSE 69 03/11/2012       Medical Decision Making-Imaging:     EXAMINATION:   CT OF THE ABDOMEN AND PELVIS WITH CONTRAST 5/17/2019 3:52 pm       TECHNIQUE:   CT of the abdomen and pelvis was performed with the administration of   intravenous contrast. Multiplanar reformatted images are provided for review.    Dose modulation, iterative reconstruction, and/or weight based adjustment of   the mA/kV was utilized to reduce the radiation dose to as low as reasonably achievable.       COMPARISON:   01/08/2017       HISTORY:   ORDERING SYSTEM PROVIDED HISTORY: firm abdomen   TECHNOLOGIST PROVIDED HISTORY:           FINDINGS:   Lower Chest: Dependent changes at the lung bases, likely atelectasis.       Organs: The liver, spleen, pancreas and adrenal glands are unremarkable. Mild gallbladder distention.  No significant biliary dilatation. Nonobstructive bilateral nephrolithiasis.  No significant hydronephrosis.  No   cystic or solid renal mass.  Mild renal cortical scarring on the right,   unchanged       GI/Bowel: Left lower quadrant colostomy with mucous fistula distally.  No   evidence of bowel obstruction.  No small bowel distention.  A gastrostomy   tube is in place.       Pelvis: Moderate free pelvic fluid, simple by attenuation characteristics.  A   Davies catheter is in place. Jennifer Sans is some calcification along the posterior   bladder wall.  Mild enhancement of the bladder mucosa with mild infiltration   of the pericystic fat.  The uterus and adnexal structures are unremarkable.       Peritoneum/Retroperitoneum: Small quantity of diffuse abdominal ascites. Mild infiltration of the mesenteric fat.  No free intraperitoneal air.  No   focal abscess is seen.  The abdominal aorta is normal in caliber.  There is   no retroperitoneal adenopathy.       Bones/Soft Tissues: No acute osseous or soft tissue abnormality.  Small fat   containing umbilical hernia.           Impression   1. Small quantity of diffuse abdominal ascites of indeterminate etiology. Mild infiltration of the mesenteric fat.  There is no free air or focal   abscess. 2. Mild enhancement of the bladder mucosa with infiltration of the pericystic   fat.  Findings are similar on the previous study.  A mild chronic cystitis is   suggested. 3. Left lower quadrant colostomy and G-tube.  No evidence of bowel   obstruction. 4. Nonobstructive bilateral nephrolithiasis.    5. Dependent changes at the lung bases most consistent with atelectasis.           EXAMINATION:   ONE XRAY VIEW OF THE CHEST       5/17/2019 2:50 pm       COMPARISON:   January 8, 2017       HISTORY:   ORDERING SYSTEM PROVIDED HISTORY: Septic work up   TECHNOLOGIST PROVIDED HISTORY:   Septic work up   Ordering Physician Provided Reason for Exam: septic/tachycardia   Acuity: Unknown   Type of Exam: Unknown       FINDINGS:   Shallow inflation.  Cardiac silhouette enlargement is noted.  Interstitial   and nodular opacities are noted in the mid right lung field.  These appear   less prominent on the left.  The left costophrenic angle is obscured, a   chronic finding.  No pneumothorax identified.  No acute osseous abnormality   appreciated.  A gastrostomy tube is noted.           Impression   Shallow inflation.  Perihilar interstitial and nodular opacities are noted,   right greater than left.  While a component of this likely represents   atelectasis, developing infiltrate or inflammatory process should also be   considered.           Medical Decision [id number]:       Medical Decision Making-Other:     Note:  · Labs, medications, radiologic studies were reviewed with personal review of films  · Large amounts of data were reviewed  · Discussed with nursing Staff. · Infection Control and Prevention measures reviewed  · All prior entries were reviewed  · Administer medications as ordered  · Prognosis: Good  · Discharge planning reviewed. Thank you for allowing us to participate in the care of this patient. Please call with questions.     Em Sawant MD  Pager: [phone] - Office: [phone]

## 2019-05-18 NOTE — ED PROVIDER NOTES
14.9 (*)     NRBC Automated 0.2 (*)     Seg Neutrophils 88 (*)     Lymphocytes 9 (*)     Eosinophils % 0 (*)     Segs Absolute 9.70 (*)     Absolute Lymph # 0.94 (*)     All other components within normal limits   COMPREHENSIVE METABOLIC PANEL - Abnormal; Notable for the following components:    Glucose 115 (*)     BUN 40 (*)     CREATININE 0.93 (*)     CO2 18 (*)     Anion Gap 18 (*)     Total Bilirubin 0.26 (*)     Total Protein 9.6 (*)     Albumin/Globulin Ratio 0.7 (*)     All other components within normal limits   LACTATE, SEPSIS - Abnormal; Notable for the following components:    Lactic Acid, Sepsis, Whole Blood 2.4 (*)     All other components within normal limits   LACTATE, SEPSIS - Abnormal; Notable for the following components:    Lactic Acid, Sepsis, Whole Blood 4.0 (*)     All other components within normal limits   URINALYSIS WITH MICROSCOPIC - Abnormal; Notable for the following components:    Color, UA RED (*)     Turbidity UA TURBID (*)     Ketones, Urine TRACE (*)     Urine Hgb LARGE (*)     Protein, UA 4+ (*)     Leukocyte Esterase, Urine LARGE (*)     Bacteria, UA MODERATE (*)     All other components within normal limits   CULTURE BLOOD #1   CULTURE BLOOD #1   URINE CULTURE   PROTIME-INR   APTT   TROPONIN       Ct Abdomen Pelvis W Iv Contrast Additional Contrast? None    Result Date: 5/17/2019  EXAMINATION: CT OF THE ABDOMEN AND PELVIS WITH CONTRAST 5/17/2019 3:52 pm TECHNIQUE: CT of the abdomen and pelvis was performed with the administration of intravenous contrast. Multiplanar reformatted images are provided for review. Dose modulation, iterative reconstruction, and/or weight based adjustment of the mA/kV was utilized to reduce the radiation dose to as low as reasonably achievable. COMPARISON: 01/08/2017 HISTORY: ORDERING SYSTEM PROVIDED HISTORY: firm abdomen TECHNOLOGIST PROVIDED HISTORY: FINDINGS: Lower Chest: Dependent changes at the lung bases, likely atelectasis. Organs:  The liver, spleen, pancreas and adrenal glands are unremarkable. Mild gallbladder distention. No significant biliary dilatation. Nonobstructive bilateral nephrolithiasis. No significant hydronephrosis. No cystic or solid renal mass. Mild renal cortical scarring on the right, unchanged GI/Bowel: Left lower quadrant colostomy with mucous fistula distally. No evidence of bowel obstruction. No small bowel distention. A gastrostomy tube is in place. Pelvis: Moderate free pelvic fluid, simple by attenuation characteristics. A Davies catheter is in place. There is some calcification along the posterior bladder wall. Mild enhancement of the bladder mucosa with mild infiltration of the pericystic fat. The uterus and adnexal structures are unremarkable. Peritoneum/Retroperitoneum: Small quantity of diffuse abdominal ascites. Mild infiltration of the mesenteric fat. No free intraperitoneal air. No focal abscess is seen. The abdominal aorta is normal in caliber. There is no retroperitoneal adenopathy. Bones/Soft Tissues: No acute osseous or soft tissue abnormality. Small fat containing umbilical hernia. 1. Small quantity of diffuse abdominal ascites of indeterminate etiology. Mild infiltration of the mesenteric fat. There is no free air or focal abscess. 2. Mild enhancement of the bladder mucosa with infiltration of the pericystic fat. Findings are similar on the previous study. A mild chronic cystitis is suggested. 3. Left lower quadrant colostomy and G-tube. No evidence of bowel obstruction. 4. Nonobstructive bilateral nephrolithiasis. 5. Dependent changes at the lung bases most consistent with atelectasis.      Xr Chest Portable    Result Date: 5/17/2019  EXAMINATION: ONE XRAY VIEW OF THE CHEST 5/17/2019 2:50 pm COMPARISON: January 8, 2017 HISTORY: ORDERING SYSTEM PROVIDED HISTORY: Septic work up TECHNOLOGIST PROVIDED HISTORY: Septic work up Ordering Physician Provided Reason for Exam: septic/tachycardia Acuity: Unknown Type of Exam: Unknown FINDINGS: Shallow inflation. Cardiac silhouette enlargement is noted. Interstitial and nodular opacities are noted in the mid right lung field. These appear less prominent on the left. The left costophrenic angle is obscured, a chronic finding. No pneumothorax identified. No acute osseous abnormality appreciated. A gastrostomy tube is noted. Shallow inflation. Perihilar interstitial and nodular opacities are noted, right greater than left. While a component of this likely represents atelectasis, developing infiltrate or inflammatory process should also be considered. RECENT VITALS:     Temp: 98.9 °F (37.2 °C),  Pulse: 148, Resp: 18, BP: (!) 107/92, SpO2: 100 %    This patient is a 45 y.o. Female with chronic tachycardia, paraplegia and nonverbal sent from care home. Urosepsis. 2L NS given, 3L to be started. Rocephin and Flagyl given. Abd CT negative. ED Course as of May 18 0003   Fri May 17, 2019   1452 Patient nonverbal at baseline, history of MS with colostomy indwelling Davies paraplegia, patient sent over due to increased lethargy as well as worsening abdominal distention and decreased bowel sounds, I agree with the assessment of abdominal distention and decreased bowel sounds, plan for CT abdomen as well as Septic workup. [KW]   1610 Lactic Acid, Sepsis, Whole Blood(!): 2.4 [KW]   1655 Consult placed to medicine.     [KW]   1718 Leukocyte Esterase, Urine(!): LARGE [KW]   1718 Bacteria, UA(!): MODERATE [KW]   1718 WBC, UA: TOO NUMEROUS TO COUNT [KW]   1718 Patient has urinary source of infection, will admit to internal medicine for further antibiotics    [KW]   1743 Patient admitted to internal medicine explained to them the reason for Rocephin as well as Flagyl as initially we thought that potentially cousin intra-abdominal source of infection due to the tense abdomen after CT imaging and urinalysis resulted it's more clear that this is urinary tract as the

## 2019-05-18 NOTE — PROGRESS NOTES
Paged internal med resident on call advised above. And only 100mL of urine output emptied. Unsure if / how much day shift ER nurse dumped. Requested clarification of IVF orders  / bolus from 1800 hours.

## 2019-05-18 NOTE — PROGRESS NOTES
Attempted to call all three numbers listed under emergency contacts without success.   The parent number has message listing someone else's name and the sibling numbers both ring busy signals

## 2019-05-18 NOTE — PROGRESS NOTES
[facility]  Internal Medicine Residency Program  Inpatient Daily Progress Note  ______________________________________________________________________________    Patient: Kadie Cuenca  YOB: 1980   MRN: [id number]    Acct: [de-identified]     Admit date: 5/17/2019  Today's date: 05/18/19  Number of days in the hospital: 1  Expected Discharge Date: 05/21/19    Admitting Diagnosis: Urinary tract infection    Subjective:   Patient seen and examined at bedside. She is more alert and responsive today. Opening her eyes and turning her head on commands. Tachycardiac. EKG shows sinus tachycardia. BP is low    The patient has received close to 5L of fluid bolus. She has remained tachycardiac with low BP. Her EKG in the ED showed sinus tachycardia. Her last lactic acid is 4 which is higher compared to initial value. Urine output has been zero as per the RN. Her MAP was stable but according to last vitals, her MAP is 57. ROS cannot be obtained due to patient's mentation. Objective:   Vital Sign:  BP 97/66   Pulse 138   Temp 97.4 °F (36.3 °C) (Axillary)   Resp 19   Ht 5' (1.524 m)   Wt 188 lb 0.8 oz (85.3 kg)   SpO2 98%   Breastfeeding? No   BMI 36.73 kg/m²       Physical Exam:  General appearance:   More awake and responsive to commands this morning  Mentation shows little improvement. Lungs:  clear to auscultation, no wheezes, rales or rhonchi, symmetric air entry  Heart[de-identified] normal rate, regular rhythm, normal S1, S2, no murmurs, rubs, clicks or gallops  Abdomen:  soft, nontender, nondistended, no masses or organomegaly. G tube in place. Colostomy in the LLQ. Davies catheter in place. Sacral ulcer present. Extremities: peripheral pulses normal, no pedal edema, no clubbing or cyanosis.  Right BKA   Skin: normal coloration and turgor, no rashes, no suspicious skin lesions noted    Medications:  Scheduled Medications   cefTRIAXone (ROCEPHIN) IV  1 g Intravenous Q24H    metroNIDAZOLE  500 mg Intravenous Q8H    scopolamine  1 patch Transdermal Q72H    sertraline  25 mg Per G Tube Daily    sodium chloride flush  10 mL Intravenous 2 times per day    metoprolol  2.5 mg Intravenous Once    sodium chloride  500 mL Intravenous Once    sodium chloride  500 mL Intravenous Once    sodium chloride  1,000 mL Intravenous Once    hydrocortisone sodium succinate PF  50 mg Intravenous Q8H    sodium chloride flush  250 mL Intravenous Q4H    sodium chloride flush  50 mL Intravenous Daily    CENTRUM/CERTA-DERICK with minerals oral  15 mL Per G Tube Daily    metoclopramide  5 mg Oral BID    ranitidine  75 mg Per G Tube BID    rivaroxaban  20 mg Oral Daily       PRN Medications  acetaminophen 650 mg Q6H PRN   glycopyrrolate 1 mg TID PRN   promethazine 25 mg Q6H PRN   sodium chloride flush 10 mL PRN   magnesium hydroxide 30 mL Daily PRN       Diagnostic Labs and Imaging:  CBC:  Recent Labs     05/17/19  1529 05/18/19  0612   WBC 11.1 PENDING   HGB 17.5* 16.0*    PENDING     BMP: Recent Labs     05/17/19  1529 05/18/19  0612    140   K 4.7 3.9    114*   CO2 18* 11*   BUN 40* 26*   CREATININE 0.93* 0.72   GLUCOSE 115* 132*     Hepatic: Recent Labs     05/17/19  1529   AST 23   ALT 33   BILITOT 0.26*   ALKPHOS 72       Assessment and Plan:     Principal Problem:    Urinary tract infection  Active Problems:    Aphasia    Multiple sclerosis (HCC)    Nonverbal    Gastrostomy tube dependent (HCC)    Neurogenic bladder    Hemorrhagic cystitis    Tachycardia    Gross hematuria    Lactic acidosis    Chronic indwelling Davies catheter    Peripheral vascular disease (HCC)    Recurrent UTI (urinary tract infection)    History of pulmonary embolism    Hypertension  Resolved Problems:    * No resolved hospital problems. *    1. Urinary Tract Infection due to Neurogenic Bladder  Will start patient on Rocephin and Flagyl IV. We will follow urine cultures.   Will follow blood cultures. Will recommend bladder irrigation every 24-48 hours. Phenergan 25 mg every 6 hours as needed through the G-tube for nausea. Scopolamine patch for nausea. Consulted infectious diseases. Patient likely has urinary tract colonization.     2. Multiple Sclerosis  Will hold off on Baclofen and Neurontin for now due to patient's mentation.     3. G Tube Dependent  Dietitian consult placed for ordering and managing tube feeds.     4. Tachycardia  Due to infection and dehydration. EKG shows sinus tachycardia. Continue telemetry monitoring. Will hold off on lopressor due to low BP.     5. Lactic Acidosis  Elevated due to recurrent urinary tract infections and dehydration. Ordered fluid bolus with maintenance IV normal saline at 100. Lactic acid still elevated. Will continue to follow     6. Peripheral Vascular Disease and History of PE  Xeralto 20 mg daily     7. Hypertension. We will hold off on antihypertensives for now.     Diet: Consult placed to dietitian for ordering and management of 2 periods. DVT Prophylaxis: Patient is on [email or web address]  Discharge Planning: Consult placed to . Consult placed to Critical care. Discussed the case over the phone. Patient seen by critical care. Awaiting recommendations. Might need an ICU transfer.      Micky James MD  PGY-1, Department of Internal Medicine  [address] [place] [address]  5/18/2019 7:17 AM

## 2019-05-18 NOTE — PROGRESS NOTES
Dr. Daniel Zavala here to see patient. Advised of low urine output 60mL since 0010 hours, lactic acid 4.1, old omer dc'd foul odor,  cold left leg, home meds updated in Epic, (different from previous entries), probable future need of some sort of access, midline, PICC, Central line, wound on coccyx. Tal sent if he wants to add labs.

## 2019-05-18 NOTE — PROGRESS NOTES
The patient has received close to 5L of fluid bolus. She has remained tachycardiac with low BP. Her EKG in the ED showed sinus tachycardia. Her last lactic acid is 4 which is higher compared to initial value. Urine output has been zero as per the RN. Her MAP was stable but according to last vitals, her MAP is 57. Consult placed to Critical care. Discussed the case over the phone. Patient will be seen by critical care. Might need an ICU transfer.     Yessica Orr MD  PGY-1, Department of Internal Medicine  [place], [place]  5/18/2019 5:52 AM

## 2019-05-18 NOTE — PROGRESS NOTES
Nutrition Assessment    Type and Reason for Visit: Initial, Positive Nutrition Screen(Home tube feeding, Wounds), Consult - TF ordering and management     Nutrition Recommendations:   -Continue NPO status   -Recommend tube feeding of Glucerna 1.2 (diabetic) via PEG @ goal rate of 60 mL/hr x 24 hrs ~>1728 kcals, 86 gms protein, 1159 mLs water  -Will monitor EN/Labs;adjust as needed. Nutrition Assessment:  RD consulted for tube feeding to start today. Pt admitted w/ UTI - hx of multiple sclerosis, R AKA, PEG tube dependent and nonverbal. Pt admitted w/ sacral wound. Per EMR, no significant wt loss noted. Will monitor EN and adjust as needed. Malnutrition Assessment:  · Malnutrition Status: Insufficient data  · Context: Acute illness or injury  · Findings of the 6 clinical characteristics of malnutrition (Minimum of 2 out of 6 clinical characteristics is required to make the diagnosis of moderate or severe Protein Calorie Malnutrition based on AND/ASPEN Guidelines):  1. Energy Intake-Unable to assess,      2. Weight Loss-No significant weight loss,    3. Fat Loss-No significant subcutaneous fat loss,    4. Muscle Loss-No significant muscle mass loss,    5. Fluid Accumulation-No significant fluid accumulation,    6.   Strength-Not measured    Nutrition Risk Level: High    Nutrient Needs:  · Estimated Daily Total Kcal: 1.1-1.2 ~>6231-6423 kcals/d   · Estimated Daily Protein (g): 1.3-1.5 gm/kg ~>49-57 gms/d     Nutrition Diagnosis:   · Problem: Inadequate oral intake  · Etiology: related to Insufficient energy/nutrient consumption, Alteration in GI function     Signs and symptoms:  as evidenced by NPO status due to medical condition, Nutrition support - EN    Objective Information:  · Nutrition-Focused Physical Findings: PEG   · Wound Type: Sacral wound, PI  · Current Nutrition Therapies:  · Oral Diet Orders: NPO   · Anthropometric Measures:  · Ht: 5' (152.4 cm)   · Current Body Wt: 197 lb (89.4 kg)  · Admission Body Wt: 188 lb (85.3 kg)  · % Weight Change:  ,  174 lbs x 4 mo ago per EMR   · Ideal Body Wt: 84 lb (38.1 kg)(per AKA), % Ideal Body 223% (adm/ideal)  · Adjusted Body Wt:  , body weight adjusted for AKA  · BMI Classification: BMI 35.0 - 39.9 Obese Class II    Nutrition Interventions:   Continue NPO, Start Tube Feeding  Continued Inpatient Monitoring, Education not appropriate at this time    Nutrition Evaluation:   · Evaluation: Goals set   · Goals: Pt to meet % of est'd needs via EN    · Monitoring: Nutrition Progression, TF Intake, TF Tolerance, I&O, Weight, Pertinent Labs, Monitor Hemodynamic Status, Monitor Bowel Function      Electronically signed by Juliane Ramsey RD, LD on 5/18/19 at 12:30 PM    Contact Number: [phone]

## 2019-05-18 NOTE — ED NOTES
Patient noted with sacral wound. Provider notified and consult will be notified.      Malena Mayen RN  05/17/19 [id number]

## 2019-05-18 NOTE — PROGRESS NOTES
Internal med here to see the patient.  jcarlos blood, requested a rainbow in anticipation for possible orders.

## 2019-05-18 NOTE — PROGRESS NOTES
PHARMACY NOTE:    Piyush Pedraza was ordered Cranberry. As per the [address], herbals and certain dietary supplements will be discontinued. The herbal or dietary supplement may be continued after discharge from the hospital.      Jassi MARTÍNEZ    5/18/2019  7:10 AM

## 2019-05-18 NOTE — PROGRESS NOTES
Pharmacy Note  Vancomycin Consult    Sridevi Rico is a 45 y.o. female started on Vancomycin for UTI; consult received from Dr. Eriberto Hill to manage therapy. Also receiving the following antibiotics: Zosyn. Patient Active Problem List   Diagnosis    Aphasia    Multiple sclerosis (HCC)    Nonverbal    Gastrostomy tube dependent ([facility] [address]    Dislodged gastrostomy tube (HCC)    Neurogenic bladder    Hemorrhagic cystitis    Hypokalemia    Bacterial UTI    Tachycardia    Gross hematuria    Urinary tract infection    Lactic acidosis    Chronic indwelling Davies catheter    Peripheral vascular disease (HCC)    Recurrent UTI (urinary tract infection)    History of pulmonary embolism    Hypertension       Allergies:  Patient has no known allergies. Temp max: afebrile    Recent Labs     05/17/19  1529 05/18/19  0612   BUN 40* 26*       Recent Labs     05/17/19  1529 05/18/19  0612   CREATININE 0.93* 0.72       Recent Labs     05/17/19  1529 05/18/19  0612   WBC 11.1 19.3*         Intake/Output Summary (Last 24 hours) at 5/18/2019 0903  Last data filed at 5/18/2019 0645  Gross per 24 hour   Intake 4837 ml   Output 160 ml   Net 4677 ml       Culture Date      Source                       Results  5/17/2019            Urine                          Pending  5/17/2019            Blood x 2                    Pending    Ht Readings from Last 1 Encounters:   05/18/19 5' (1.524 m)        Wt Readings from Last 1 Encounters:   05/18/19 188 lb 0.8 oz (85.3 kg)         Body mass index is 36.73 kg/m². Estimated Creatinine Clearance: 103 mL/min (based on SCr of 0.72 mg/dL). Goal Trough Level: 10-20 mcg/mL    Assessment/Plan:  Will initiate vancomycin 1250 mg IV every 12 hours. Timing of trough level will be determined based on culture results, renal function, and clinical response. Vancomycin trough ordered for Sunday 5/19/19 at 2100. Thank you for the consult. Will continue to follow.   Juan Garcia [facility]  5/18/2019  9:05 AM

## 2019-05-18 NOTE — PROGRESS NOTES
Report from [facility] in ER. Patient with AMS, on Marvin Drafts, no reported fall, ? Need head CT, concern for BP, and rising lactate 4.0, does omer cath need changed?, recent temp documented.  with hx of tachycardia. She is going to clarify above with ER resident Car 3 charge nurse Kishore, aware, we are confirming patient is stable for step down at this time.

## 2019-05-18 NOTE — ED NOTES
Patient in bed resting. Handoff report given. Patient bed status upgrade due to low blood pressures. Fluids bolus ordered and infusing. Blood pressure responding to fluid bolus. Patient has orders for continous fluids and 500 fluid bolus.       Carolann Zhu RN  05/18/19 0041       Carolann Zhu RN  05/18/19 [phone]

## 2019-05-19 ENCOUNTER — APPOINTMENT (OUTPATIENT)
Dept: GENERAL RADIOLOGY | Age: 39
DRG: 698 | End: 2019-05-19
Payer: MEDICARE

## 2019-05-19 ENCOUNTER — APPOINTMENT (OUTPATIENT)
Dept: MRI IMAGING | Age: 39
DRG: 698 | End: 2019-05-19
Payer: MEDICARE

## 2019-05-19 ENCOUNTER — APPOINTMENT (OUTPATIENT)
Dept: ULTRASOUND IMAGING | Age: 39
DRG: 698 | End: 2019-05-19
Payer: MEDICARE

## 2019-05-19 PROBLEM — G93.41 METABOLIC ENCEPHALOPATHY: Status: ACTIVE | Noted: 2019-05-19

## 2019-05-19 PROBLEM — Z93.3 STATUS POST COLOSTOMY (HCC): Status: ACTIVE | Noted: 2019-05-19

## 2019-05-19 LAB
ABSOLUTE EOS #: 0 K/UL (ref 0–0.4)
ABSOLUTE IMMATURE GRANULOCYTE: 0.57 K/UL (ref 0–0.3)
ABSOLUTE LYMPH #: 1.14 K/UL (ref 1–4.8)
ABSOLUTE MONO #: 0 K/UL (ref 0.1–0.8)
ANION GAP SERPL CALCULATED.3IONS-SCNC: 13 MMOL/L (ref 9–17)
BASOPHILS # BLD: 0 % (ref 0–2)
BASOPHILS ABSOLUTE: 0 K/UL (ref 0–0.2)
BUN BLDV-MCNC: 9 MG/DL (ref 6–20)
BUN/CREAT BLD: ABNORMAL (ref 9–20)
CALCIUM SERPL-MCNC: 8.1 MG/DL (ref 8.6–10.4)
CHLORIDE BLD-SCNC: 111 MMOL/L (ref 98–107)
CO2: 17 MMOL/L (ref 20–31)
CREAT SERPL-MCNC: 0.34 MG/DL (ref 0.5–0.9)
CULTURE: ABNORMAL
DIFFERENTIAL TYPE: ABNORMAL
EOSINOPHILS RELATIVE PERCENT: 0 % (ref 1–4)
GFR AFRICAN AMERICAN: >60 ML/MIN
GFR NON-AFRICAN AMERICAN: >60 ML/MIN
GFR SERPL CREATININE-BSD FRML MDRD: ABNORMAL ML/MIN/{1.73_M2}
GFR SERPL CREATININE-BSD FRML MDRD: ABNORMAL ML/MIN/{1.73_M2}
GLUCOSE BLD-MCNC: 108 MG/DL (ref 70–99)
HCT VFR BLD CALC: 45.9 % (ref 36.3–47.1)
HEMOGLOBIN: 14.5 G/DL (ref 11.9–15.1)
IMMATURE GRANULOCYTES: 2 %
LACTIC ACID, WHOLE BLOOD: 1 MMOL/L (ref 0.7–2.1)
LACTIC ACID, WHOLE BLOOD: 1.4 MMOL/L (ref 0.7–2.1)
LACTIC ACID, WHOLE BLOOD: 2 MMOL/L (ref 0.7–2.1)
LACTIC ACID, WHOLE BLOOD: 3.4 MMOL/L (ref 0.7–2.1)
LYMPHOCYTES # BLD: 4 % (ref 24–44)
Lab: ABNORMAL
MAGNESIUM: 1.7 MG/DL (ref 1.6–2.6)
MCH RBC QN AUTO: 28 PG (ref 25.2–33.5)
MCHC RBC AUTO-ENTMCNC: 31.6 G/DL (ref 28.4–34.8)
MCV RBC AUTO: 88.8 FL (ref 82.6–102.9)
MONOCYTES # BLD: 0 % (ref 1–7)
MORPHOLOGY: ABNORMAL
MORPHOLOGY: ABNORMAL
NRBC AUTOMATED: 0 PER 100 WBC
PDW BLD-RTO: 15.1 % (ref 11.8–14.4)
PLATELET # BLD: 277 K/UL (ref 138–453)
PLATELET ESTIMATE: ABNORMAL
PMV BLD AUTO: 11.6 FL (ref 8.1–13.5)
POTASSIUM SERPL-SCNC: 2.9 MMOL/L (ref 3.7–5.3)
RBC # BLD: 5.17 M/UL (ref 3.95–5.11)
RBC # BLD: ABNORMAL 10*6/UL
SEG NEUTROPHILS: 94 % (ref 36–66)
SEGMENTED NEUTROPHILS ABSOLUTE COUNT: 26.79 K/UL (ref 1.8–7.7)
SODIUM BLD-SCNC: 141 MMOL/L (ref 135–144)
SPECIMEN DESCRIPTION: ABNORMAL
TSH SERPL DL<=0.05 MIU/L-ACNC: 0.84 MIU/L (ref 0.3–5)
VANCOMYCIN TROUGH DATE LAST DOSE: ABNORMAL
VANCOMYCIN TROUGH DOSE AMOUNT: ABNORMAL
VANCOMYCIN TROUGH TIME LAST DOSE: ABNORMAL
VANCOMYCIN TROUGH: 9.8 UG/ML (ref 10–20)
WBC # BLD: 28.5 K/UL (ref 3.5–11.3)
WBC # BLD: ABNORMAL 10*3/UL

## 2019-05-19 PROCEDURE — A9576 INJ PROHANCE MULTIPACK: HCPCS | Performed by: INTERNAL MEDICINE

## 2019-05-19 PROCEDURE — 2700000000 HC OXYGEN THERAPY PER DAY

## 2019-05-19 PROCEDURE — 80048 BASIC METABOLIC PNL TOTAL CA: CPT

## 2019-05-19 PROCEDURE — 71045 X-RAY EXAM CHEST 1 VIEW: CPT

## 2019-05-19 PROCEDURE — 2580000003 HC RX 258: Performed by: STUDENT IN AN ORGANIZED HEALTH CARE EDUCATION/TRAINING PROGRAM

## 2019-05-19 PROCEDURE — 2580000003 HC RX 258: Performed by: INTERNAL MEDICINE

## 2019-05-19 PROCEDURE — 99233 SBSQ HOSP IP/OBS HIGH 50: CPT | Performed by: INTERNAL MEDICINE

## 2019-05-19 PROCEDURE — 6370000000 HC RX 637 (ALT 250 FOR IP): Performed by: STUDENT IN AN ORGANIZED HEALTH CARE EDUCATION/TRAINING PROGRAM

## 2019-05-19 PROCEDURE — 94762 N-INVAS EAR/PLS OXIMTRY CONT: CPT

## 2019-05-19 PROCEDURE — 84443 ASSAY THYROID STIM HORMONE: CPT

## 2019-05-19 PROCEDURE — P9047 ALBUMIN (HUMAN), 25%, 50ML: HCPCS | Performed by: STUDENT IN AN ORGANIZED HEALTH CARE EDUCATION/TRAINING PROGRAM

## 2019-05-19 PROCEDURE — 2000000000 HC ICU R&B

## 2019-05-19 PROCEDURE — 6360000002 HC RX W HCPCS: Performed by: EMERGENCY MEDICINE

## 2019-05-19 PROCEDURE — 85025 COMPLETE CBC W/AUTO DIFF WBC: CPT

## 2019-05-19 PROCEDURE — 6360000002 HC RX W HCPCS: Performed by: PSYCHIATRY & NEUROLOGY

## 2019-05-19 PROCEDURE — 6360000002 HC RX W HCPCS: Performed by: STUDENT IN AN ORGANIZED HEALTH CARE EDUCATION/TRAINING PROGRAM

## 2019-05-19 PROCEDURE — 2060000000 HC ICU INTERMEDIATE R&B

## 2019-05-19 PROCEDURE — 36415 COLL VENOUS BLD VENIPUNCTURE: CPT

## 2019-05-19 PROCEDURE — 70553 MRI BRAIN STEM W/O & W/DYE: CPT

## 2019-05-19 PROCEDURE — 83735 ASSAY OF MAGNESIUM: CPT

## 2019-05-19 PROCEDURE — 6370000000 HC RX 637 (ALT 250 FOR IP): Performed by: EMERGENCY MEDICINE

## 2019-05-19 PROCEDURE — 83605 ASSAY OF LACTIC ACID: CPT

## 2019-05-19 PROCEDURE — 99223 1ST HOSP IP/OBS HIGH 75: CPT | Performed by: PSYCHIATRY & NEUROLOGY

## 2019-05-19 PROCEDURE — 76705 ECHO EXAM OF ABDOMEN: CPT

## 2019-05-19 PROCEDURE — 2580000003 HC RX 258: Performed by: EMERGENCY MEDICINE

## 2019-05-19 PROCEDURE — 02HV33Z INSERTION OF INFUSION DEVICE INTO SUPERIOR VENA CAVA, PERCUTANEOUS APPROACH: ICD-10-PCS | Performed by: INTERNAL MEDICINE

## 2019-05-19 PROCEDURE — 99291 CRITICAL CARE FIRST HOUR: CPT | Performed by: INTERNAL MEDICINE

## 2019-05-19 PROCEDURE — 80202 ASSAY OF VANCOMYCIN: CPT

## 2019-05-19 PROCEDURE — C9113 INJ PANTOPRAZOLE SODIUM, VIA: HCPCS | Performed by: STUDENT IN AN ORGANIZED HEALTH CARE EDUCATION/TRAINING PROGRAM

## 2019-05-19 PROCEDURE — 2500000003 HC RX 250 WO HCPCS: Performed by: EMERGENCY MEDICINE

## 2019-05-19 PROCEDURE — 72156 MRI NECK SPINE W/O & W/DYE: CPT

## 2019-05-19 PROCEDURE — 6360000004 HC RX CONTRAST MEDICATION: Performed by: INTERNAL MEDICINE

## 2019-05-19 PROCEDURE — 6360000002 HC RX W HCPCS: Performed by: INTERNAL MEDICINE

## 2019-05-19 RX ORDER — POTASSIUM CHLORIDE 29.8 MG/ML
20 INJECTION INTRAVENOUS PRN
Status: DISCONTINUED | OUTPATIENT
Start: 2019-05-19 | End: 2019-05-25 | Stop reason: HOSPADM

## 2019-05-19 RX ORDER — POTASSIUM CHLORIDE 7.45 MG/ML
10 INJECTION INTRAVENOUS PRN
Status: DISCONTINUED | OUTPATIENT
Start: 2019-05-19 | End: 2019-05-25 | Stop reason: HOSPADM

## 2019-05-19 RX ORDER — LORAZEPAM 2 MG/ML
1 INJECTION INTRAMUSCULAR
Status: DISCONTINUED | OUTPATIENT
Start: 2019-05-19 | End: 2019-05-25 | Stop reason: HOSPADM

## 2019-05-19 RX ORDER — 0.9 % SODIUM CHLORIDE 0.9 %
1000 INTRAVENOUS SOLUTION INTRAVENOUS ONCE
Status: COMPLETED | OUTPATIENT
Start: 2019-05-19 | End: 2019-05-19

## 2019-05-19 RX ORDER — PANTOPRAZOLE SODIUM 40 MG/10ML
40 INJECTION, POWDER, LYOPHILIZED, FOR SOLUTION INTRAVENOUS DAILY
Status: DISCONTINUED | OUTPATIENT
Start: 2019-05-19 | End: 2019-05-22 | Stop reason: CLARIF

## 2019-05-19 RX ORDER — 0.9 % SODIUM CHLORIDE 0.9 %
10 VIAL (ML) INJECTION DAILY
Status: DISCONTINUED | OUTPATIENT
Start: 2019-05-19 | End: 2019-05-22 | Stop reason: CLARIF

## 2019-05-19 RX ORDER — MIDODRINE HYDROCHLORIDE 5 MG/1
10 TABLET ORAL
Status: DISCONTINUED | OUTPATIENT
Start: 2019-05-19 | End: 2019-05-25 | Stop reason: HOSPADM

## 2019-05-19 RX ORDER — FUROSEMIDE 10 MG/ML
20 INJECTION INTRAMUSCULAR; INTRAVENOUS ONCE
Status: DISCONTINUED | OUTPATIENT
Start: 2019-05-19 | End: 2019-05-20

## 2019-05-19 RX ORDER — POTASSIUM CHLORIDE 20 MEQ/1
40 TABLET, EXTENDED RELEASE ORAL PRN
Status: DISCONTINUED | OUTPATIENT
Start: 2019-05-19 | End: 2019-05-25 | Stop reason: HOSPADM

## 2019-05-19 RX ORDER — ALBUMIN (HUMAN) 12.5 G/50ML
25 SOLUTION INTRAVENOUS ONCE
Status: COMPLETED | OUTPATIENT
Start: 2019-05-19 | End: 2019-05-19

## 2019-05-19 RX ADMIN — METOCLOPRAMIDE HYDROCHLORIDE 5 MG: 5 SOLUTION ORAL at 10:42

## 2019-05-19 RX ADMIN — PANTOPRAZOLE SODIUM 40 MG: 40 INJECTION, POWDER, FOR SOLUTION INTRAVENOUS at 10:30

## 2019-05-19 RX ADMIN — SODIUM CHLORIDE 1000 ML: 9 INJECTION, SOLUTION INTRAVENOUS at 02:30

## 2019-05-19 RX ADMIN — MIDODRINE HYDROCHLORIDE 10 MG: 5 TABLET ORAL at 09:01

## 2019-05-19 RX ADMIN — MEROPENEM 1 G: 1 INJECTION, POWDER, FOR SOLUTION INTRAVENOUS at 16:12

## 2019-05-19 RX ADMIN — HYDROCORTISONE SODIUM SUCCINATE 100 MG: 100 INJECTION, POWDER, FOR SOLUTION INTRAMUSCULAR; INTRAVENOUS at 06:39

## 2019-05-19 RX ADMIN — HYDROCORTISONE SODIUM SUCCINATE 100 MG: 100 INJECTION, POWDER, FOR SOLUTION INTRAMUSCULAR; INTRAVENOUS at 23:49

## 2019-05-19 RX ADMIN — SODIUM CHLORIDE: 4.5 INJECTION, SOLUTION INTRAVENOUS at 02:04

## 2019-05-19 RX ADMIN — Medication 1250 MG: at 11:02

## 2019-05-19 RX ADMIN — Medication 50 ML: at 09:45

## 2019-05-19 RX ADMIN — METOCLOPRAMIDE HYDROCHLORIDE 5 MG: 5 SOLUTION ORAL at 23:49

## 2019-05-19 RX ADMIN — POTASSIUM CHLORIDE 20 MEQ: 400 INJECTION, SOLUTION INTRAVENOUS at 10:41

## 2019-05-19 RX ADMIN — POTASSIUM CHLORIDE 20 MEQ: 400 INJECTION, SOLUTION INTRAVENOUS at 09:53

## 2019-05-19 RX ADMIN — LORAZEPAM 1 MG: 2 INJECTION INTRAMUSCULAR; INTRAVENOUS at 10:34

## 2019-05-19 RX ADMIN — Medication 10 ML: at 09:46

## 2019-05-19 RX ADMIN — MULTIVITAMIN 15 ML: LIQUID ORAL at 09:08

## 2019-05-19 RX ADMIN — Medication 2 MCG/MIN: at 03:10

## 2019-05-19 RX ADMIN — Medication 10 ML: at 21:53

## 2019-05-19 RX ADMIN — SODIUM CHLORIDE 10 ML: 9 INJECTION, SOLUTION INTRAMUSCULAR; INTRAVENOUS; SUBCUTANEOUS at 08:55

## 2019-05-19 RX ADMIN — MIDODRINE HYDROCHLORIDE 10 MG: 5 TABLET ORAL at 16:05

## 2019-05-19 RX ADMIN — GADOTERIDOL 20 ML: 279.3 INJECTION, SOLUTION INTRAVENOUS at 15:32

## 2019-05-19 RX ADMIN — PIPERACILLIN AND TAZOBACTAM 3.38 G: 3; .375 INJECTION, POWDER, FOR SOLUTION INTRAVENOUS at 04:49

## 2019-05-19 RX ADMIN — POTASSIUM CHLORIDE 20 MEQ: 400 INJECTION, SOLUTION INTRAVENOUS at 11:55

## 2019-05-19 RX ADMIN — ACETAMINOPHEN 650 MG: 650 SOLUTION ORAL at 16:04

## 2019-05-19 RX ADMIN — RANITIDINE 75 MG: 15 SYRUP ORAL at 09:08

## 2019-05-19 RX ADMIN — METOPROLOL TARTRATE 2.5 MG: 5 INJECTION, SOLUTION INTRAVENOUS at 21:53

## 2019-05-19 RX ADMIN — ALBUMIN (HUMAN) 25 G: 0.25 INJECTION, SOLUTION INTRAVENOUS at 08:56

## 2019-05-19 RX ADMIN — ACETAMINOPHEN 650 MG: 650 SOLUTION ORAL at 00:59

## 2019-05-19 RX ADMIN — HYDROCORTISONE SODIUM SUCCINATE 100 MG: 100 INJECTION, POWDER, FOR SOLUTION INTRAMUSCULAR; INTRAVENOUS at 16:07

## 2019-05-19 RX ADMIN — SERTRALINE 25 MG: 25 TABLET, FILM COATED ORAL at 09:07

## 2019-05-19 RX ADMIN — RIVAROXABAN 20 MG: 20 TABLET, FILM COATED ORAL at 21:52

## 2019-05-19 RX ADMIN — SODIUM CHLORIDE: 4.5 INJECTION, SOLUTION INTRAVENOUS at 23:50

## 2019-05-19 RX ADMIN — Medication 10 ML: at 11:00

## 2019-05-19 ASSESSMENT — PAIN SCALES - GENERAL
PAINLEVEL_OUTOF10: 0

## 2019-05-19 ASSESSMENT — PAIN SCALES - WONG BAKER
WONGBAKER_NUMERICALRESPONSE: 0
WONGBAKER_NUMERICALRESPONSE: 0

## 2019-05-19 NOTE — PROGRESS NOTES
Physical Therapy  DATE: 2019    NAME: Erasto Gonzalez  MRN: [id number]   : 1980    Patient not seen this date for Physical Therapy due to:  [] Blood transfusion in progress  [] Cancel by RN  [] Hemodialysis  []  Refusal by Patient   [] Spine Precautions   [] Strict Bedrest  [] Surgery  [] Testing      [x] Other  Pt baseline is dependent, quad, non-verbal.  Recommend maintenance program at [facility]. [] PT being discontinued at this time. Patient independent. No further needs. [] PT being discontinued at this time as the patient has been transferred to hospice care. No further needs.     Omid Greenwood, PT

## 2019-05-19 NOTE — PROGRESS NOTES
INTENSIVE CARE UNIT  Resident Physician Progress Note    Patient - Giorgi Roys  Date of Admission -  2019  2:31 PM  Date of Evaluation -  2019  Room and Bed Number -  0107/0107-01   Hospital Day - 2      SUBJECTIVE:     OVERNIGHT EVENTS:    Reported to be hypotensive overnight requiring IVF boluses and pressors. Had and episode of vomiting and had NGT placed. Was noticed to be in respiratory distress with low SpO2 despite nasal cannula. Switched to high flow O2.    TODAY:  Awake, non verbal, spontaneous eye opening. Withdraws from pain    AWAKE & FOLLOWING COMMANDS:  [] No   [] Yes    SECRETIONS Amount:  [] Small [] Moderate  [] Large  [] None  Color:     [] White [] Colored  [] Bloody    SEDATION:  RAAS Score:  [] Propofol gtt  [] Versed gtt  [] Ativan gtt   [x] No Sedation    PARALYZED:  [x] No    [] Yes    VASOPRESSORS:  [x] No    [] Yes  [] Levophed [] Dopamine [] Vasopressin  [] Dobutamine [] Phenylephrine [] Epinephrine      OBJECTIVE:     VITAL SIGNS:  BP (!) 104/57   Pulse 144   Temp 99 °F (37.2 °C) (Axillary)   Resp 22   Ht 5' (1.524 m)   Wt 197 lb 5 oz (89.5 kg)   SpO2 99%   Breastfeeding?  No   BMI 38.53 kg/m²   Tmax over 24 hours:  Temp (24hrs), Av.2 °F (37.3 °C), Min:98.7 °F (37.1 °C), Max:100 °F (37.8 °C)      Patient Vitals for the past 8 hrs:   BP Temp Temp src Pulse Resp SpO2   19 1200 -- 99 °F (37.2 °C) Axillary -- -- --   19 1030 (!) 104/57 -- -- 144 22 99 %   19 1000 109/65 -- -- 144 24 100 %   19 0930 103/89 -- -- 143 21 99 %   19 0900 90/64 -- -- 144 21 99 %   19 0830 (!) 95/59 -- -- 148 25 99 %   19 0800 100/63 100 °F (37.8 °C) Oral 140 17 99 %         Intake/Output Summary (Last 24 hours) at 2019 1445  Last data filed at 2019 1200  Gross per 24 hour   Intake 5709.11 ml   Output 2275 ml   Net 3434.11 ml     Date 19 0000 - 19 2359   Shift 7826-9847 8850-5552 3475-1953 24 Hour Total   INTAKE   I.V.(mL/kg) 1062.1(11.9)   1062. 1(11.9)   IV Piggyback(mL/kg) 50(0.6)   50(0.6)   Shift Total(mL/kg) 1112. 1(12.4)   1112. 1(12.4)   OUTPUT   Urine(mL/kg/hr) 725(1) 300  1025   Shift Total(mL/kg) 725(8.1) 300(3.4)  1025(11.5)   Weight (kg) 89.5 89.5 89.5 89.5     Wt Readings from Last 3 Encounters:   05/18/19 197 lb 5 oz (89.5 kg)   01/22/19 174 lb (78.9 kg)   01/12/17 182 lb 1.6 oz (82.6 kg)     Body mass index is 38.53 kg/m². PHYSICAL EXAM:  GEN:  Awake, non verbal, spontaneous eye opening, not in obvious distress  EYES:  pupils equal, round, and reactive to light  HEENT:  Normocepalic, without obvious abnormality  Atramatic  LUNGS:  no increased work of breathing, good air exchange, clear to auscultation and no crackles or wheezing  CV:    tachycardic, normal apical impulse and normal S1 and S2  ABDOMEN:   G-tube and colostomy in situ(normal color and consistency of stool), normal bowel sounds, soft and non-tender  :    Davies in situ draining clear urine. MSK:    there is no redness, warmth, or swelling of the joints  NEURO[de-identified]   Awake, non verbal, spontaneous eye opening. Withdraws from pain  SKIN:   Normal skin color, texture, turgor  EXTREMITIES:  S/p Right BKA, no left pedal edema, no appreciable sacral edema, no calf tenderness/swelling, no erythema, distal pulses poor.       MEDICATIONS:  Scheduled Meds:   midodrine  10 mg Oral TID WC    furosemide  20 mg Intravenous Once    pantoprazole  40 mg Intravenous Daily    And    sodium chloride (PF)  10 mL Intravenous Daily    meropenem  1 g Intravenous Q8H    scopolamine  1 patch Transdermal Q72H    sertraline  25 mg Per G Tube Daily    sodium chloride flush  10 mL Intravenous 2 times per day    sodium chloride flush  50 mL Intravenous Daily    CENTRUM/CERTA-DERICK with minerals oral  15 mL Per G Tube Daily    metoclopramide  5 mg Oral BID    rivaroxaban  20 mg Oral Daily    hydrocortisone sodium succinate PF  100 mg Intravenous Q8H     Continuous Infusions:   norepinephrine 5.973 mcg/min (05/19/19 0600)    sodium chloride 150 mL/hr at 05/19/19 0600     PRN Meds:     potassium chloride 40 mEq PRN   Or     potassium alternative oral replacement 40 mEq PRN   Or     potassium chloride 10 mEq PRN   potassium chloride 10 mEq PRN   potassium chloride 20 mEq PRN   LORazepam 1 mg As Directed RT PRN   acetaminophen 650 mg Q6H PRN   glycopyrrolate 1 mg TID PRN   sodium chloride flush 10 mL PRN   magnesium hydroxide 30 mL Daily PRN   ondansetron 4 mg Q6H PRN   metoprolol 2.5 mg Q4H PRN   promethazine 25 mg Q4H PRN   fentanNYL 25 mcg Q2H PRN       SUPPORT DEVICES: [] Ventilator [] BIPAP  [x] High flow Nasal Cannula [] Room Air    VENT SETTINGS (Comprehensive) (if applicable):  Vent Information  FiO2 : 40 %  Humidification Source: Heated wire  Humidification Temp: 33  Humidification Temp Measured: 33  Additional Respiratory  Assessments  Pulse: 144  Resp: 22  SpO2: 99 %  Humidification Source: Heated wire  Humidification Temp: 33  Oral Care: Teeth brushed, Mouth swabbed, Suction toothette    ABGs:     Lab Results   Component Value Date    GMX8XAH 10 01/08/2017    FIO2 INFORMATION NOT PROVIDED 05/18/2019         DATA:  Complete Blood Count:   Recent Labs     05/17/19  1529 05/18/19  0612 05/19/19  0637   WBC 11.1 19.3* 28.5*   RBC 6.55* 5.72* 5.17*   HGB 17.5* 16.0* 14.5   HCT 57.4* 50.1* 45.9   MCV 87.6 87.6 88.8   MCH 26.7 28.0 28.0   MCHC 30.5 31.9 31.6   RDW 14.9* 14.8* 15.1*    332 277   MPV 10.8 10.7 11.6        Last 3 Blood Glucose:   Recent Labs     05/17/19  1529 05/18/19  0612 05/19/19  0750   GLUCOSE 115* 132* 108*        PT/INR:    Lab Results   Component Value Date    PROTIME 11.0 05/17/2019    INR 1.0 05/17/2019     PTT:    Lab Results   Component Value Date    APTT 22.8 05/17/2019       Comprehensive Metabolic Profile:   Recent Labs     05/17/19  1529 05/18/19  0612 05/18/19  2107 05/19/19  0750    140  --  141   K 4.7 3.9  --  2.9*    114*  --  111*   CO2 18* 11*  --  17*   BUN 40* 26*  --  9   CREATININE 0.93* 0.72  --  0.34*   GLUCOSE 115* 132*  --  108*   CALCIUM 10.1 8.1*  --  8.1*   PROT 9.6*  --  6.5  --    LABALBU 4.1  --  2.6*  --    BILITOT 0.26*  --  0.25*  --    ALKPHOS 72  --  37  --    AST 23  --  44*  --    ALT 33  --  22  --       Magnesium:   Lab Results   Component Value Date    MG 1.7 05/19/2019    MG 2.2 09/05/2017    MG 1.9 01/12/2017     Phosphorus:   Lab Results   Component Value Date    PHOS 3.4 09/05/2017    PHOS 3.2 02/03/2014    PHOS 2.5 04/08/2013     Ionized Calcium:   Lab Results   Component Value Date    CAION 4.66 04/08/2013    CAION 4.66 04/07/2013    CAION 4.76 04/06/2013        Urinalysis:   Lab Results   Component Value Date    NITRU NEGATIVE 05/17/2019    COLORU RED 05/17/2019    PHUR 8.0 05/17/2019    WBCUA TOO NUMEROUS TO COUNT 05/17/2019    RBCUA TOO NUMEROUS TO COUNT 05/17/2019    MUCUS NOT REPORTED 05/17/2019    TRICHOMONAS NOT REPORTED 05/17/2019    YEAST NOT REPORTED 05/17/2019    BACTERIA MODERATE 05/17/2019    SPECGRAV 1.021 05/17/2019    LEUKOCYTESUR LARGE 05/17/2019    UROBILINOGEN Normal 05/17/2019    BILIRUBINUR NEGATIVE 05/17/2019    BILIRUBINUR NEGATIVE 03/11/2012    GLUCOSEU NEGATIVE 05/17/2019    GLUCOSEU NEGATIVE 03/11/2012    KETUA TRACE 05/17/2019    AMORPHOUS NOT REPORTED 05/17/2019       HgBA1c:  No results found for: LABA1C  TSH:    Lab Results   Component Value Date    TSH 0.24 05/18/2019     Lactic Acid:   Lab Results   Component Value Date    LACTA 2.0 01/08/2017      Troponin: No results for input(s): TROPONINI in the last 72 hours.     Microbiology:  Urine Culture:  No components found for: CURINE      Other Labs:  CBC with Differential:    Lab Results   Component Value Date    WBC 28.5 05/19/2019    RBC 5.17 05/19/2019    RBC 4.56 03/11/2012    HGB 14.5 05/19/2019    HCT 45.9 05/19/2019     05/19/2019     03/11/2012    MCV 88.8 05/19/2019    MCH 28.0 05/19/2019    [facility] to UTI. ID follow. Single positive blood culture with coagulase neg staph likely contaminant. Antibiotics changed to IV Meropenem 1 g Q 8 hr.  2. Hypotension due to sepsis. Received 2 L of N/saline and was started on midodrine and levophed. Improved. Levophed weaned off. Continue IVF. Monitor urine and other stoma output. 3. Sinus tachycardia. Possibly due to volume depletion. Continue IVF. 4. UTI secondary to chronic indwelling omer. On meropenem. ID follow. Follow Urine culture. 5. Acute respiratory failure. Possibly from sepsis. On high flow NC.  6. Acute toxic metabolic encephalopathy. Will ascertain baseline mental status. Neurology consulted. MRI brain ordered. 7. Lactic acidosis. Resolved with IV hydration. 8. Hypokalemia. Replace K as needed. Monitor BMP. 9. Multiple sclerosis with Quadriplegia. 10. PAD s/p Right BKA. Stable. 11. Neurogenic bladder. Chronic. Continue omer drainage. 12. Diet. NPO. Tube feeds held. NGT to low suction due to vomiting. 13. GI prophylaxis. IV protonix 40 mg daily. Zantac discontinued. 14. DVT prophylaxis.  On Xarelto    Others- continue Xarelto for chronic PE.  PT/OT    Rubi Elder MD  PGY-1 Resident  Internal Medicine  [address] [facility]  5/19/2019 2:45 PM

## 2019-05-19 NOTE — PROGRESS NOTES
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 Acute toxic metabolic encephalopathy. · Quadriplegia  · PAD with Rt BKA  · Indwelling long term PEG and trache  · Single blood culture positive for Coagulase negative Staphylococci on 5-17-19. Likely contaminant  Recommendations:   · Discontinue Zosyn, vancomycin  · Meropenem 1 gm IV q 8 hr   · Await urine culture results  · Supportive care    Medical Decision Making/Summary/Discussion:5/19/2019     ·   Infection Control Recommendations   · [place] Precautions  · Contact Isolation ESBL +    Antimicrobial Stewardship Recommendations     · Simplification of therapy  · Targeted therapy    Coordination of Outpatient Care:   · Estimated Length of IV antimicrobials:TBD  · Patient will need Midline Catheter Insertion: No  · Patient will need PICC line Insertion:No  · Patient will need: Home IV , [place],  SNF,  LTAC:TBD  · Patient will need outpatient wound care:No    Chief complaint/reason for consultation:   · AMS changes  · UTI      History of Present Illness:   Rinku Dietrich is a 45y.o.-year-old  female who was initially admitted on 5/17/2019. Patient seen at the request of Joel Berrios. INITIAL HISTORY:  Patient has multiple sclerosis, quadriplegia, neurogenic bladder, aphasia, hypertension, chronic indwelling Davies catheter, depression, DVTs in the past and contractures. Patient presented through ER with complaints of altered mental status. She resides at a SNF and was felt to have altered mental status. This is difficult to determine as she is non verbal. However, the feeling at the SNF was that there was a change.     There is a Hx of recurrent urinary tract infections with prior ESBL+ E coli in December 2018. After that she has experienced UTI's with E coli not ESBL +.     In the ER the 5-17-19 UA obtained through the indwelling Davies shows large leukocyte esterase, negative nitrite, many WBC. The blood and urine cultures are in progress. The patient is nonverbal so it is difficult to determine if the urinary findings relate to the reported mental status changes or simply represent the chronic Davies effect. CURRENT EVALUATION : 5/19/2019    Afebrile  VS stable. BP supported with norepi    Patient was transferred to ICU because of hypotension despite fluid boluses and toxic metabolic encephalopathy. She has shown lactic acidosis. Patient is on norepi. U/O is limited. Labs, X rays reviewed: 5/19/2019    BUN:40-->9  Cr: 0.93-->0.34    WBC:11.1-->20.5  Hb: 17.5-->14.5  Plat: 361-->277    Cultures:  Urine:  · 5-17-19: pending. Multiple organisms. Discussed with Micro  Blood:  · 5-17-19: No growth  ·     5-17-19: CXR: Poor inspiratory effort. Atelectasis  5-17-19: CT abdomen: renal stones, thickened bladder wall (chronic cystitis)    Discussed with MARK RN. I have personally reviewed the past medical history, past surgical history, medications, social history, and family history, and I have updated the database accordingly.   Past Medical History:     Past Medical History:   Diagnosis Date    Aphasia     Aphasia     Contracture of joint, lower leg     HTN (hypertension)     Hx MRSA infection resolved 1/2017    2 negative nasal screens 1/2017 (hx in heel in 2013)    Hydronephrosis     Multiple sclerosis (HCC)     Neurogenic bladder     Peripheral vascular disease (HCC)     Polyneuropathy     Pressure ulcer     Coccyx, Heel    Pulmonary embolism (HCC)     Pulmonary infarction (HCC)     Quadriplegia (HCC)     Tachycardia     UTI (lower urinary tract infection)        Past Surgical  History:     Past Surgical History:   Procedure Laterality Date    AMPUTATION Right     COLOSTOMY      GASTROSTOMY TUBE PLACEMENT      LEG AMPUTATION THROUGH FEMUR         Medications:       Social History:     Social History     Socioeconomic History    Marital status: Single     Spouse name: Not on file    Number of children: Not on file    Years of education: Not on file    Highest education level: Not on file   Occupational History    Not on file   Social Needs    Financial resource strain: Not on file    Food insecurity:     Worry: Not on file     Inability: Not on file    Transportation needs:     Medical: Not on file     Non-medical: Not on file   Tobacco Use    Smoking status: Never Smoker    Smokeless tobacco: Never Used   Substance and Sexual Activity    Alcohol use: No    Drug use: No    Sexual activity: Never   Lifestyle    Physical activity:     Days per week: Not on file     Minutes per session: Not on file    Stress: Not on file   Relationships    Social connections:     Talks on phone: Not on file     Gets together: Not on file     Attends Gnosticism service: Not on file     Active member of club or organization: Not on file     Attends meetings of clubs or organizations: Not on file     Relationship status: Not on file    Intimate partner violence:     Fear of current or ex partner: Not on file     Emotionally abused: Not on file     Physically abused: Not on file     Forced sexual activity: Not on file   Other Topics Concern    Not on file   Social History Narrative    Not on file       Family History:     Family History   Problem Relation Age of Onset    No Known Problems Mother     No Known Problems Father         Allergies:   Patient has no known allergies. Review of Systems:   Unable to provide.  Non verbal      Physical Examination :     Patient Vitals for the past 8 hrs:   BP Temp Temp src Pulse Resp SpO2   05/19/19 1200 -- 99 °F (37.2 °C) Axillary -- -- --   05/19/19 1030 (!) 104/57 -- -- 144 22 99 %   05/19/19 1000 109/65 -- -- 144 24 100 %   05/19/19 0930 103/89 -- -- 143 21 99 %   05/19/19 0900 90/64 -- -- 144 21 99 %   05/19/19 0830 (!) 95/59 -- -- 148 25 99 %   05/19/19 0800 100/63 100 °F (37.8 °C) Oral 140 17 99 %   05/19/19 0645 (!) 103/56 -- -- 141 17 98 %   05/19/19 0630 (!) 99/52 -- -- 146 21 99 %   05/19/19 0615 86/62 -- -- 146 21 100 %   05/19/19 0600 (!) 95/57 -- -- 145 22 100 %   05/19/19 0545 (!) 106/49 -- -- 146 22 99 %     General Appearance: Somnolent and in no apparent distress  Head:  Normocephalic, no trauma  Eyes: Pupils equal, round, reactive to light; sclera anicteric; conjunctivae pink. No embolic phenomena. ENT: Oropharynx clear, without erythema, exudate, or thrush. No tenderness of sinuses. Mouth/throat: mucosa pink and moist. No lesions. Dentition in good repair. Neck:Supple, without lymphadenopathy. Thyroid normal, No bruits. Pulmonary/Chest: Coarse sounds. Cardiovascular: Regular rate and rhythm without murmurs, rubs, or gallops. Abdomen: Soft, non tender. Bowel sounds normal. No organomegaly. G tube in place. Colostomy in LLQ. Chronic Davies in place  All four Extremities: No cyanosis, clubbing, edema, or effusions. Rt BKA. .Contractures present. Neurologic: Difficult to  mentation, non verbal. Withdraws arms. Lt leg contracted. Skin: Warm and dry with good turgor. No signs of peripheral arterial or venous insufficiency. No ulcerations. No open wounds.     Medical Decision Making -Laboratory:   I have independently reviewed/ordered the following labs:    CBC with Differential:   Recent Labs     05/18/19  0612 05/19/19  0637   WBC 19.3* 28.5*   HGB 16.0* 14.5   HCT 50.1* 45.9    277   LYMPHOPCT 4* 4*   MONOPCT 0* 0*     BMP:   Recent Labs     05/18/19  0612 05/19/19  0750    141   K 3.9 2.9*   * 111*   CO2 11* 17*   BUN 26* 9   CREATININE 0.72 0.34*   MG  --  1.7     Hepatic Function Panel:   Recent Labs     05/17/19  1529 05/18/19  2107   PROT 9.6* 6.5   LABALBU 4.1 2.6*   BILIDIR  --  <0.08   IBILI  --  CANNOT BE enhancement of the bladder mucosa with mild infiltration   of the pericystic fat.  The uterus and adnexal structures are unremarkable.       Peritoneum/Retroperitoneum: Small quantity of diffuse abdominal ascites. Mild infiltration of the mesenteric fat.  No free intraperitoneal air.  No   focal abscess is seen.  The abdominal aorta is normal in caliber.  There is   no retroperitoneal adenopathy.       Bones/Soft Tissues: No acute osseous or soft tissue abnormality.  Small fat   containing umbilical hernia.           Impression   1. Small quantity of diffuse abdominal ascites of indeterminate etiology. Mild infiltration of the mesenteric fat.  There is no free air or focal   abscess. 2. Mild enhancement of the bladder mucosa with infiltration of the pericystic   fat.  Findings are similar on the previous study.  A mild chronic cystitis is   suggested. 3. Left lower quadrant colostomy and G-tube.  No evidence of bowel   obstruction. 4. Nonobstructive bilateral nephrolithiasis. 5. Dependent changes at the lung bases most consistent with atelectasis.              EXAMINATION:   ONE XRAY VIEW OF THE CHEST       5/17/2019 2:50 pm       COMPARISON:   January 8, 2017       HISTORY:   ORDERING SYSTEM PROVIDED HISTORY: Septic work up   TECHNOLOGIST PROVIDED HISTORY:   Septic work up   Ordering Physician Provided Reason for Exam: septic/tachycardia   Acuity: Unknown   Type of Exam: Unknown       FINDINGS:   Shallow inflation.  Cardiac silhouette enlargement is noted.  Interstitial   and nodular opacities are noted in the mid right lung field.  These appear   less prominent on the left.  The left costophrenic angle is obscured, a   chronic finding.  No pneumothorax identified.  No acute osseous abnormality   appreciated.  A gastrostomy tube is noted.           Impression   Shallow inflation.  Perihilar interstitial and nodular opacities are noted,   right greater than left.  While a component of this likely represents   atelectasis, developing infiltrate or inflammatory process should also be   considered.           Medical Decision Cadvvl-Xjxyrish-Lpyru:       Medical Decision Making-Other:     Note:  · Labs, medications, radiologic studies were reviewed with personal review of films  · Moderate amounts of data were reviewed  · Discussed with nursing Staff  · Infection Control and Prevention measures reviewed  · All prior entries were reviewed  · Administer medications as ordered  · Prognosis: Good  · Discharge planning reviewed    Thank you for allowing us to participate in the care of this patient. Please call with questions.     Joey Chavez MD  Pager: [phone] - Office: [phone]

## 2019-05-19 NOTE — PROCEDURES
PROCEDURE NOTE - CENTRAL VENOUS LINE PLACEMENT    PATIENT NAME: Sangeeta Oshea [place] RECORD NO. [id number]  DATE: 5/19/2019  ATTENDING PHYSICIAN: Dr. Corazon Ervin DIAGNOSIS:  Need for IV access  POSTOPERATIVE DIAGNOSIS:  Same  PROCEDURE PERFORMED:  Right Internal Jugular Vein Central Line Insertion  PERFORMING PHYSICIAN: Holger Benitez MD  ANESTHESIA:  Local utilizing 1% lidocaine  ESTIMATED BLOOD LOSS:  Less than 25 ml  COMPLICATIONS:  None immediately appreciated. DISCUSSION:  Honey Prado is a 45y.o.-year-old female who requires central IV access. The history and physical examination were reviewed and confirmed. The diagnoses, proposed procedure, risks, possible complications, benefits and alternatives were not discussed with the patient or family, patient is non-verbal and family was not available. Consent was urgent, not obtained. The patient was then prepared for the procedure. PROCEDURE:  A timeout was initiated by the bedside nurse and was confirmed by those present. The patient was placed in a supine position. The skin overlying the Right Internal Jugular Vein was prepped with chlorhexadine and draped in sterile fashion. The skin was infiltrated with local anesthetic. The vessel and surrounding anatomy was visualized using ultrasound with sterile probe cover. Through the anesthetized region, the introducer needle was inserted into the internal jugular vein returning dark red non pulsatile blood. A guidewire was placed through the center of the needle with no resistance. Ultrasound confirmed presence of wire in the vein. A small incision made in the skin with a #11 scalpel blade. The dilator was inserted into the skin and vein over guidewire using Seldinger technique. The dilator was then removed and the  7 F 20 cm catheter was placed in the vein over the guidewire using Seldinger technique. The guidewire was then removed and all ports aspirated and flushed appropriately.  The catheter then secured using silk suture and a temporary sterile dressing was applied. No immediate complication was evident. All sponge, instrument and needle counts were correct at the completion of the procedure. Postprocedural chest x-ray showed good position of the catheter with no evidence of hemothorax or pneumothorax. The patient tolerated the procedure well with no immediate complication evident.      Willy Azar MD  9:01 AM, 5/19/19

## 2019-05-19 NOTE — PROGRESS NOTES
nasal cannula. She appears to lethargic and opens eyes to noxious stimulus, able to follow finger for visual field testing, he draws right and left upper extremities and is nonverbal at baseline                No current facility-administered medications on file prior to encounter. Current Outpatient Medications on File Prior to Encounter   Medication Sig Dispense Refill    Multiple Vitamins-Minerals (CENTRUM/CERTA-DERICK WITH MINERALS ORAL) solution Take 2.5 mLs by mouth daily      Cranberry 250 MG CAPS Take 250 mg by mouth 2 times daily      metoclopramide (REGLAN) 5 MG/5ML solution Take 5 mg by mouth 2 times daily      ranitidine (ZANTAC) 75 MG/5ML syrup 75 mg by Per G Tube route 2 times daily      vitamin D (CHOLECALCIFEROL) 1000 UNIT TABS tablet 1,000 Units by Per G Tube route daily      rivaroxaban (XARELTO) 10 MG TABS tablet 1 tablet by Per G Tube route daily (with breakfast) 30 tablet 0    potassium chloride 20 MEQ/15ML (10%) oral solution 10 mEq by Per G Tube route daily      Scopolamine Base (SCOPOLAMINE TD) Place 1 mg onto the skin every 72 hours       baclofen (LIORESAL) 10 MG tablet 10 mg by Per G Tube route 3 times daily Crush into G tube       gabapentin (NEURONTIN) 300 MG capsule 300 mg by Per G Tube route 3 times daily      medroxyPROGESTERone (DEPO-PROVERA) 150 MG/ML injection Inject 150 mg into the muscle every 3 months On the 11th       metoprolol (LOPRESSOR) 25 MG tablet 12.5 mg 2 times daily       acetaminophen (TYLENOL) 80 MG/0.8ML suspension 650 mg by Per G Tube route every 6 hours as needed for Fever or Pain       glycopyrrolate (ROBINUL) 1 MG tablet Take 1 mg by mouth 3 times daily          Allergies: Ethan Meyers has No Known Allergies.     Past Medical History:   Diagnosis Date    Aphasia     Aphasia     Contracture of joint, lower leg     HTN (hypertension)     Hx MRSA infection resolved 1/2017    2 negative nasal screens 1/2017 (hx in heel in 2013)    Hydronephrosis     Multiple sclerosis (HCC)     Neurogenic bladder     Peripheral vascular disease (HCC)     Polyneuropathy     Pressure ulcer     Coccyx, Heel    Pulmonary embolism (HCC)     Pulmonary infarction (HCC)     Quadriplegia (HCC)     Tachycardia     UTI (lower urinary tract infection)        Past Surgical History:   Procedure Laterality Date    AMPUTATION Right     COLOSTOMY      GASTROSTOMY TUBE PLACEMENT      LEG AMPUTATION THROUGH FEMUR             Medications:     midodrine  10 mg Oral TID WC    scopolamine  1 patch Transdermal Q72H    sertraline  25 mg Per G Tube Daily    sodium chloride flush  10 mL Intravenous 2 times per day    sodium chloride flush  50 mL Intravenous Daily    CENTRUM/CERTA-DERICK with minerals oral  15 mL Per G Tube Daily    metoclopramide  5 mg Oral BID    ranitidine  75 mg Per G Tube BID    rivaroxaban  20 mg Oral Daily    hydrocortisone sodium succinate PF  100 mg Intravenous Q8H    piperacillin-tazobactam  3.375 g Intravenous Q8H    vancomycin  1,250 mg Intravenous Q12H    vancomycin (VANCOCIN) intermittent dosing (placeholder)   Other RX Placeholder    pantoprazole  40 mg Intravenous Daily    And    sodium chloride (PF)  10 mL Intravenous Daily     PRN Meds include: acetaminophen, glycopyrrolate, sodium chloride flush, magnesium hydroxide, ondansetron, metoprolol, promethazine, fentanNYL    Objective:   BP (!) 103/56   Pulse 141   Temp 99 °F (37.2 °C) (Axillary)   Resp 17   Ht 5' (1.524 m)   Wt 197 lb 5 oz (89.5 kg)   SpO2 98%   Breastfeeding?  No   BMI 38.53 kg/m²     Blood pressure range: Systolic (51PSU), EGR:77 , Min:73 , ENT:570   ; Diastolic (66BAQ), EP, Min:28, Max:86      Review of Systems        NEUROLOGICEXAMINATION  GENERAL Tired  Able to open eye eyes on noxious stimuli   HEENT  NC/ AT   cardiovascular  S1 and S2 heard; palpation of pulses: radial pulse    NECK  Supple and no bruits heard   MENTAL STATUS: Alert, oriented, intact memory, no confusion, normal speech, normal language, no hallucination or delusion   CRANIAL NERVES: II     -       disconjugate gaze with left eye deviation to left side  III,IV,VI -  PEERLA, clear conjuctiva  V     -     Cannot be assessed  VII    -     Cannot be assessed  VIII-     Intact hearing   IX,X -     Symmetrical palate  XI    -     Symmetrical shoulder shrug  XII   -     Midline tongue, no atrophy    MOTOR FUNCTION: Spontaneously moves ,flexed, R below the knee amputation   SENSORY FUNCTION:  Withdraws RUE and LUe to pain   CEREBELLAR FUNCTION:  Cannot assess   REFLEX FUNCTION:  Symmetric, no Babinski sign   STATION and GAIT  Not tested         Data:    Lab Results:   CBC:   Recent Labs     05/17/19  1529 05/18/19  0612 05/19/19  0637   WBC 11.1 19.3* PENDING   HGB 17.5* 16.0* 14.5    332 PENDING     BMP:    Recent Labs     05/17/19  1529 05/18/19  0612    140   K 4.7 3.9    114*   CO2 18* 11*   BUN 40* 26*   CREATININE 0.93* 0.72   GLUCOSE 115* 132*         Lab Results   Component Value Date    CHOL 149 08/07/2018    LDLCHOLESTEROL 92 05/08/2017    HDL 36 (L) 05/08/2017    TRIG 86 05/08/2017    ALT 22 05/18/2019    AST 44 (H) 05/18/2019    TSH 0.24 (L) 05/18/2019    INR 1.0 05/17/2019    LECRTHEG94 1047 (H) 04/05/2013       No results found for: PHENYTOIN, PHENYTOIN, PHENOBARB, VALPROATE, CBMZ        Impression:  44 y/o F  Non verbal at baseline pmhx of MS, chronic omer in in place due to neurogenic bladder and peg in place transferred from nursing facility to alternating mental status  Her symptoms are most likely due to UTI given history of MS and neurogenic bladder, ID on board on Vanc Zosyn awaiting urine cultures  On Levophed for maintaining blood pressure  MRI brain with and without contrast ordered due to history of MS to rule out MS flare  ICU team on board for rest for management of comorbid.             Geronimo Boyd MD  Neurology Resident PGY-2  5/19/2019 at 7:23 AM

## 2019-05-19 NOTE — CONSULTS
Neurology Consult Note          CHIEF COMPLAINT:  Altered mental status    History Obtained From:  electronic medical record       HISTORY OF PRESENT ILLNESS:              The patient is a 45 y.o. female with significant past medical history of multiple sclerosis with baseline quadriplegia, and aphasia who presents with worsening mental status and lethargy. She lives in [facility] and was brought to the ED for these symptoms. She has history of recurrent UTI. Last urine culture and December grew ESBL E. Coli however 2 cultures after that grew only mixed bill. In the ED, she was drowsy however but responding to commands. She was afebrile but tachycardic and blood pressure was in the lower side. Lactic acid 2.4 and WBCs of 11.1. Urinalysis showed hematuria and turbidity. He has chronic indwelling urinary catheter and she has neurogenic bladder and hemorrhagic cystitis. Blood culture reported coagulase-negative staph aureus on one sample and no growth on the second sample. Urine culture reported multiple bacteria which could be due to contamination. Leukocytes increased to 17 lactic acid also increased to 4.1 then 5.4 today. BUN 26, creatinine 0.72. Sodium 137 potassium 4.7 serum ammonia 57. Glucose 115. She was on baclofen and gabapentin which were held due to the patient's mental status. She is on Xeralto 20 mg daily due to history of PE. Was started on Rocephin and Flagyl for UTI. Brain MRI with and without contrast in March 2013 showed multiple patchy areas of T2 FLAIR hyperintensity in the periventricular subcortical white matter with involvement of bilateral cerebellum, middle cerebral peduncle and aquiles. Records were reviewed and there is no data regarding previous neurology follow-up or management with disease modifying therapy.     Past Medical History:        Diagnosis Date    Aphasia     Aphasia     Contracture of joint, lower leg     HTN (hypertension)     Hx MRSA infection 4 mg, 4 mg, Intravenous, Q6H PRN  pantoprazole (PROTONIX) injection 40 mg, 40 mg, Intravenous, Daily **AND** sodium chloride (PF) 0.9 % injection 10 mL, 10 mL, Intravenous, Daily  metoprolol (LOPRESSOR) injection 2.5 mg, 2.5 mg, Intravenous, Q4H PRN  promethazine (PHENERGAN) injection 25 mg, 25 mg, Intravenous, Q4H PRN  fentaNYL (SUBLIMAZE) injection 25 mcg, 25 mcg, Intravenous, Q2H PRN  Allergies:  Patient has no known allergies. Social History:  TOBACCO:   reports that she has never smoked. She has never used smokeless tobacco.  ETOH:   reports that she does not drink alcohol. DRUGS:   reports that she does not use drugs. ACTIVITIES OF DAILY LIVING:    INSTRUMENTAL ACTIVITIES OF DAILY LIVING:  Patient is able to perform all instrumental activities of daily living. Family History:       Problem Relation Age of Onset    No Known Problems Mother     No Known Problems Father        REVIEW OF SYSTEMS:  Review of Systems   Unable to perform ROS: Patient nonverbal         PHYSICAL EXAM:  Vitals:  /80   Pulse 145   Temp 98.7 °F (37.1 °C) (Axillary)   Resp 20   Ht 5' (1.524 m)   Wt 197 lb 5 oz (89.5 kg)   SpO2 100%   Breastfeeding? No   BMI 38.53 kg/m²    Physical Exam   Constitutional: She appears well-developed and well-nourished. She appears lethargic. She is sleeping and uncooperative. Nasal cannula in place. HENT:   Head: Normocephalic and atraumatic. Eyes: Pupils are equal, round, and reactive to light. Conjunctivae are normal. Right conjunctiva is not injected. Right conjunctiva has no hemorrhage. Left conjunctiva is not injected. Left conjunctiva has no hemorrhage. Left eye exhibits abnormal extraocular motion (Disconjugate gaze with left eye deviated to the left side at baseline. ). Neck: Neck supple. Cardiovascular: Regular rhythm, S1 normal, S2 normal and normal heart sounds. Tachycardia present. Pulmonary/Chest: She is in respiratory distress. She has no wheezes.  She has no rales.   Abdominal: Soft. She exhibits distension. Musculoskeletal:        Right knee: She exhibits deformity (Right above-knee amputation). Neurological: She appears lethargic. A cranial nerve deficit is present. GCS eye subscore is 3. GCS verbal subscore is 1. GCS motor subscore is 4. She displays no Babinski's sign on the left side. Reflex Scores:       Tricep reflexes are 1+ on the right side and 1+ on the left side. Bicep reflexes are 1+ on the right side and 1+ on the left side. Brachioradialis reflexes are 1+ on the right side and 1+ on the left side. Patellar reflexes are 1+ on the left side. Achilles reflexes are 1+ on the left side. motor responses  Movements.  Sontaneous or to noxious stimuli  Posturing: Decorticate (Flexor) Decerebrate (Extensor)   tone   Alarcon: Negative  clonus negative  deep tendon reflexes symmetrical  planatar relexes downgoing on the left side  Involuntary movements absent  Asymmetry : None              DATA  Recent Results (from the past 24 hour(s))   Lactate, Sepsis    Collection Time: 05/17/19 10:44 PM   Result Value Ref Range    Lactic Acid, Sepsis NOT REPORTED 0.5 - 1.9 mmol/L    Lactic Acid, Sepsis, Whole Blood 4.0 (H) 0.5 - 1.9 mmol/L   EKG 12 Lead    Collection Time: 05/18/19  5:04 AM   Result Value Ref Range    Ventricular Rate 144 BPM    Atrial Rate 144 BPM    P-R Interval 122 ms    QRS Duration 68 ms    Q-T Interval 270 ms    QTc Calculation (Bazett) 418 ms    P Axis 52 degrees    R Axis -7 degrees    T Axis 45 degrees   Basic Metabolic Panel w/ Reflex to MG    Collection Time: 05/18/19  6:12 AM   Result Value Ref Range    Glucose 132 (H) 70 - 99 mg/dL    BUN 26 (H) 6 - 20 mg/dL    CREATININE 0.72 0.50 - 0.90 mg/dL    Bun/Cre Ratio NOT REPORTED 9 - 20    Calcium 8.1 (L) 8.6 - 10.4 mg/dL    Sodium 140 135 - 144 mmol/L    Potassium 3.9 3.7 - 5.3 mmol/L    Chloride 114 (H) 98 - 107 mmol/L    CO2 11 (L) 20 - 31 mmol/L    Anion Gap 15 9 - 17 mmol/L    GFR Non-African American >60 >60 mL/min    GFR African American >60 >60 mL/min    GFR Comment          GFR Staging NOT REPORTED    CBC auto differential    Collection Time: 05/18/19  6:12 AM   Result Value Ref Range    WBC 19.3 (H) 3.5 - 11.3 k/uL    RBC 5.72 (H) 3.95 - 5.11 m/uL    Hemoglobin 16.0 (H) 11.9 - 15.1 g/dL    Hematocrit 50.1 (H) 36.3 - 47.1 %    MCV 87.6 82.6 - 102.9 fL    MCH 28.0 25.2 - 33.5 pg    MCHC 31.9 28.4 - 34.8 g/dL    RDW 14.8 (H) 11.8 - 14.4 %    Platelets 560 759 - 473 k/uL    MPV 10.7 8.1 - 13.5 fL    NRBC Automated 0.0 0.0 per 100 WBC    Differential Type NOT REPORTED     WBC Morphology NOT REPORTED     RBC Morphology NOT REPORTED     Platelet Estimate NOT REPORTED     Immature Granulocytes 3 (H) 0 %    Seg Neutrophils 93 (H) 36 - 66 %    Lymphocytes 4 (L) 24 - 44 %    Monocytes 0 (L) 1 - 7 %    Eosinophils % 0 (L) 1 - 4 %    Basophils 0 0 - 2 %    Absolute Immature Granulocyte 0.58 (H) 0.00 - 0.30 k/uL    Segs Absolute 17.95 (H) 1.8 - 7.7 k/uL    Absolute Lymph # 0.77 (L) 1.0 - 4.8 k/uL    Absolute Mono # 0.00 (L) 0.1 - 0.8 k/uL    Absolute Eos # 0.00 0.0 - 0.4 k/uL    Basophils # 0.00 0.0 - 0.2 k/uL    Morphology ANISOCYTOSIS PRESENT     Morphology INCREASED BANDS PRESENT    LACTIC ACID, WHOLE BLOOD    Collection Time: 05/18/19  6:12 AM   Result Value Ref Range    Lactic Acid, Whole Blood 4.1 (H) 0.7 - 2.1 mmol/L   TSH with Reflex    Collection Time: 05/18/19  6:12 AM   Result Value Ref Range    TSH 0.24 (L) 0.30 - 5.00 mIU/L   T4, Free    Collection Time: 05/18/19  6:12 AM   Result Value Ref Range    Thyroxine, Free 1.44 0.93 - 1.70 ng/dL   POC Glucose Fingerstick    Collection Time: 05/18/19  3:19 PM   Result Value Ref Range    POC Glucose 126 (H) 65 - 105 mg/dL   AMMONIA    Collection Time: 05/18/19  9:06 PM   Result Value Ref Range    Ammonia 57 (H) 11 - 51 umol/L   Lactic Acid, Whole Blood    Collection Time: 05/18/19  9:06 PM   Result Value Ref Range    Lactic Acid, Whole Blood 5.4 (H) 0.7 - 2.1 mmol/L   Blood Gas, Venous    Collection Time: 05/18/19  9:06 PM   Result Value Ref Range    pH, Niraj 7.255 (L) 7.320 - 7.420    pCO2, Niraj 41.1 39 - 55    pO2, Niraj 33.6 30 - 50    HCO3, Venous 17.6 (L) 24 - 30 mmol/L    Positive Base Excess, Niraj NOT REPORTED 0.0 - 2.0 mmol/L    Negative Base Excess, Niraj 8.8 (H) 0.0 - 2.0 mmol/L    O2 Sat, Niraj 59.2 (L) 60.0 - 85.0 %    Total Hb NOT REPORTED 12.0 - 16.0 g/dl    Oxyhemoglobin NOT REPORTED 95.0 - 98.0 %    Carboxyhemoglobin 0.6 0 - 5 %    Methemoglobin NOT REPORTED 0.0 - 1.5 %    Pt Temp 37.0     pH, Niraj, Temp Adj NOT REPORTED 7.320 - 7.420    pCO2, Niraj, Temp Adj NOT REPORTED 39 - 55 mmHg    pO2, Niraj, Temp Adj NOT REPORTED 30 - 50 mmHg    O2 Device/Flow/% NOT REPORTED     Respiratory Rate NOT REPORTED     Hugo Test NOT REPORTED     Sample Site NOT REPORTED     Pt. Position NOT REPORTED     Mode NOT REPORTED     Set Rate NOT REPORTED     Total Rate NOT REPORTED     VT NOT REPORTED     FIO2 INFORMATION NOT PROVIDED     Peep/Cpap NOT REPORTED     PSV NOT REPORTED     Text for Respiratory NOT REPORTED     NOTIFICATION NOT REPORTED     NOTIFICATION TIME NOT REPORTED           IMPRESSION  Acute toxic metabolic encephalopathy with worsening of multiple sclerosis due to UTI. Multiple sclerosis. 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 Patient does not follow command and proper exam wasn't done. This finding could be her baseline   Urinary tract infection  Neurogenic bladder  Sinus tachycardia    RECOMMENDATIONS:   Continue management of UTI. MRI brain with and without contrast         Thank you for the consultation. Will follow.  Case consulted with Dr. Mirian Briggs MD  Neurology Resident PGY-2  5/18/2019 at 10:37 PM

## 2019-05-20 PROBLEM — G92.8 TOXIC METABOLIC ENCEPHALOPATHY: Status: ACTIVE | Noted: 2019-05-19

## 2019-05-20 LAB
ABSOLUTE EOS #: 0 K/UL (ref 0–0.4)
ABSOLUTE IMMATURE GRANULOCYTE: 0 K/UL (ref 0–0.3)
ABSOLUTE LYMPH #: 1.06 K/UL (ref 1–4.8)
ABSOLUTE MONO #: 0.26 K/UL (ref 0.1–0.8)
ANION GAP SERPL CALCULATED.3IONS-SCNC: 10 MMOL/L (ref 9–17)
BASOPHILS # BLD: 0 % (ref 0–2)
BASOPHILS ABSOLUTE: 0 K/UL (ref 0–0.2)
BUN BLDV-MCNC: 8 MG/DL (ref 6–20)
BUN/CREAT BLD: ABNORMAL (ref 9–20)
CALCIUM SERPL-MCNC: 8.5 MG/DL (ref 8.6–10.4)
CHLORIDE BLD-SCNC: 113 MMOL/L (ref 98–107)
CO2: 20 MMOL/L (ref 20–31)
CREAT SERPL-MCNC: 0.32 MG/DL (ref 0.5–0.9)
DIFFERENTIAL TYPE: ABNORMAL
EKG ATRIAL RATE: 140 BPM
EKG ATRIAL RATE: 144 BPM
EKG P AXIS: -15 DEGREES
EKG P AXIS: 52 DEGREES
EKG P-R INTERVAL: 104 MS
EKG P-R INTERVAL: 122 MS
EKG Q-T INTERVAL: 270 MS
EKG Q-T INTERVAL: 354 MS
EKG QRS DURATION: 56 MS
EKG QRS DURATION: 68 MS
EKG QTC CALCULATION (BAZETT): 418 MS
EKG QTC CALCULATION (BAZETT): 540 MS
EKG R AXIS: -33 DEGREES
EKG R AXIS: -7 DEGREES
EKG T AXIS: 29 DEGREES
EKG T AXIS: 45 DEGREES
EKG VENTRICULAR RATE: 140 BPM
EKG VENTRICULAR RATE: 144 BPM
EOSINOPHILS RELATIVE PERCENT: 0 % (ref 1–4)
GFR AFRICAN AMERICAN: >60 ML/MIN
GFR NON-AFRICAN AMERICAN: >60 ML/MIN
GFR SERPL CREATININE-BSD FRML MDRD: ABNORMAL ML/MIN/{1.73_M2}
GFR SERPL CREATININE-BSD FRML MDRD: ABNORMAL ML/MIN/{1.73_M2}
GLUCOSE BLD-MCNC: 85 MG/DL (ref 70–99)
HCT VFR BLD CALC: 39.3 % (ref 36.3–47.1)
HEMOGLOBIN: 12.3 G/DL (ref 11.9–15.1)
IMMATURE GRANULOCYTES: 0 %
LYMPHOCYTES # BLD: 4 % (ref 24–44)
MAGNESIUM: 2 MG/DL (ref 1.6–2.6)
MCH RBC QN AUTO: 27.6 PG (ref 25.2–33.5)
MCHC RBC AUTO-ENTMCNC: 31.3 G/DL (ref 28.4–34.8)
MCV RBC AUTO: 88.1 FL (ref 82.6–102.9)
MONOCYTES # BLD: 1 % (ref 1–7)
MORPHOLOGY: ABNORMAL
NRBC AUTOMATED: 0 PER 100 WBC
PDW BLD-RTO: 15.3 % (ref 11.8–14.4)
PLATELET # BLD: 245 K/UL (ref 138–453)
PLATELET ESTIMATE: ABNORMAL
PMV BLD AUTO: 11.5 FL (ref 8.1–13.5)
POTASSIUM SERPL-SCNC: 3.4 MMOL/L (ref 3.7–5.3)
RBC # BLD: 4.46 M/UL (ref 3.95–5.11)
RBC # BLD: ABNORMAL 10*6/UL
SEG NEUTROPHILS: 95 % (ref 36–66)
SEGMENTED NEUTROPHILS ABSOLUTE COUNT: 25.08 K/UL (ref 1.8–7.7)
SODIUM BLD-SCNC: 143 MMOL/L (ref 135–144)
WBC # BLD: 26.4 K/UL (ref 3.5–11.3)
WBC # BLD: ABNORMAL 10*3/UL

## 2019-05-20 PROCEDURE — 83735 ASSAY OF MAGNESIUM: CPT

## 2019-05-20 PROCEDURE — 6370000000 HC RX 637 (ALT 250 FOR IP): Performed by: STUDENT IN AN ORGANIZED HEALTH CARE EDUCATION/TRAINING PROGRAM

## 2019-05-20 PROCEDURE — C9113 INJ PANTOPRAZOLE SODIUM, VIA: HCPCS | Performed by: STUDENT IN AN ORGANIZED HEALTH CARE EDUCATION/TRAINING PROGRAM

## 2019-05-20 PROCEDURE — 2580000003 HC RX 258: Performed by: STUDENT IN AN ORGANIZED HEALTH CARE EDUCATION/TRAINING PROGRAM

## 2019-05-20 PROCEDURE — 2700000000 HC OXYGEN THERAPY PER DAY

## 2019-05-20 PROCEDURE — 94762 N-INVAS EAR/PLS OXIMTRY CONT: CPT

## 2019-05-20 PROCEDURE — 99233 SBSQ HOSP IP/OBS HIGH 50: CPT | Performed by: INTERNAL MEDICINE

## 2019-05-20 PROCEDURE — 80048 BASIC METABOLIC PNL TOTAL CA: CPT

## 2019-05-20 PROCEDURE — 2000000000 HC ICU R&B

## 2019-05-20 PROCEDURE — 93970 EXTREMITY STUDY: CPT

## 2019-05-20 PROCEDURE — 99233 SBSQ HOSP IP/OBS HIGH 50: CPT | Performed by: PSYCHIATRY & NEUROLOGY

## 2019-05-20 PROCEDURE — 85025 COMPLETE CBC W/AUTO DIFF WBC: CPT

## 2019-05-20 PROCEDURE — 36415 COLL VENOUS BLD VENIPUNCTURE: CPT

## 2019-05-20 PROCEDURE — 99213 OFFICE O/P EST LOW 20 MIN: CPT

## 2019-05-20 PROCEDURE — 6360000002 HC RX W HCPCS: Performed by: INTERNAL MEDICINE

## 2019-05-20 PROCEDURE — 99291 CRITICAL CARE FIRST HOUR: CPT | Performed by: INTERNAL MEDICINE

## 2019-05-20 PROCEDURE — 6360000002 HC RX W HCPCS: Performed by: STUDENT IN AN ORGANIZED HEALTH CARE EDUCATION/TRAINING PROGRAM

## 2019-05-20 PROCEDURE — 2580000003 HC RX 258: Performed by: INTERNAL MEDICINE

## 2019-05-20 PROCEDURE — 6370000000 HC RX 637 (ALT 250 FOR IP): Performed by: EMERGENCY MEDICINE

## 2019-05-20 RX ORDER — DOCUSATE SODIUM 100 MG/1
100 CAPSULE, LIQUID FILLED ORAL DAILY
Status: DISCONTINUED | OUTPATIENT
Start: 2019-05-20 | End: 2019-05-21

## 2019-05-20 RX ORDER — METOPROLOL TARTRATE 5 MG/5ML
2.5 INJECTION INTRAVENOUS EVERY 4 HOURS PRN
Status: DISCONTINUED | OUTPATIENT
Start: 2019-05-20 | End: 2019-05-25 | Stop reason: HOSPADM

## 2019-05-20 RX ADMIN — MIDODRINE HYDROCHLORIDE 10 MG: 5 TABLET ORAL at 09:40

## 2019-05-20 RX ADMIN — SERTRALINE 25 MG: 25 TABLET, FILM COATED ORAL at 09:37

## 2019-05-20 RX ADMIN — MIDODRINE HYDROCHLORIDE 10 MG: 5 TABLET ORAL at 17:51

## 2019-05-20 RX ADMIN — Medication 10 ML: at 21:28

## 2019-05-20 RX ADMIN — MEROPENEM 1 G: 1 INJECTION, POWDER, FOR SOLUTION INTRAVENOUS at 23:03

## 2019-05-20 RX ADMIN — Medication 10 ML: at 09:39

## 2019-05-20 RX ADMIN — MEROPENEM 1 G: 1 INJECTION, POWDER, FOR SOLUTION INTRAVENOUS at 06:28

## 2019-05-20 RX ADMIN — Medication 50 ML: at 09:39

## 2019-05-20 RX ADMIN — PANTOPRAZOLE SODIUM 40 MG: 40 INJECTION, POWDER, FOR SOLUTION INTRAVENOUS at 09:37

## 2019-05-20 RX ADMIN — MULTIVITAMIN 15 ML: LIQUID ORAL at 09:35

## 2019-05-20 RX ADMIN — MEROPENEM 1 G: 1 INJECTION, POWDER, FOR SOLUTION INTRAVENOUS at 15:14

## 2019-05-20 RX ADMIN — MIDODRINE HYDROCHLORIDE 10 MG: 5 TABLET ORAL at 10:00

## 2019-05-20 RX ADMIN — METOCLOPRAMIDE HYDROCHLORIDE 5 MG: 5 SOLUTION ORAL at 09:36

## 2019-05-20 RX ADMIN — MEROPENEM 1 G: 1 INJECTION, POWDER, FOR SOLUTION INTRAVENOUS at 00:50

## 2019-05-20 RX ADMIN — METOCLOPRAMIDE HYDROCHLORIDE 5 MG: 5 SOLUTION ORAL at 21:28

## 2019-05-20 RX ADMIN — RIVAROXABAN 20 MG: 20 TABLET, FILM COATED ORAL at 21:29

## 2019-05-20 RX ADMIN — Medication 10 ML: at 09:38

## 2019-05-20 ASSESSMENT — PAIN SCALES - GENERAL
PAINLEVEL_OUTOF10: 0
PAINLEVEL_OUTOF10: 0

## 2019-05-20 ASSESSMENT — PAIN SCALES - WONG BAKER
WONGBAKER_NUMERICALRESPONSE: 0

## 2019-05-20 NOTE — PLAN OF CARE
Problem: Gas Exchange - Impaired:  Goal: Levels of oxygenation will improve  Description  Levels of oxygenation will improve  5/20/2019 0812 by Susan Devlin RCP  Outcome: Ongoing  5/20/2019 0417 by Luz Hodgkins, RN  Outcome: Ongoing

## 2019-05-20 NOTE — PROGRESS NOTES
Nutrition Assessment (Enteral Nutrition)    Type and Reason for Visit: Reassess    Nutrition Recommendations:   - Continue TF of Glucerna (diabetic) advancing as tolerated to goal rate of 60 mL/hr. Monitor TF tolerance/adequacy of intakes - modify as needed. - If alternative formula requested, suggest Osmolite 1.2 (std without fiber) goal rate 60 ml/hr.  - Monitor plan of care. Nutrition Assessment: TF held yesterday due to vomiting. NG has been placed. TF restarted this morning and running at 10 mL/hr. Discussed with RN. Malnutrition Assessment:  · Malnutrition Status: At risk for malnutrition  · Context: Acute illness or injury  · Findings of the 6 clinical characteristics of malnutrition (Minimum of 2 out of 6 clinical characteristics is required to make the diagnosis of moderate or severe Protein Calorie Malnutrition based on AND/ASPEN Guidelines):  1. Energy Intake-Less than or equal to 75% of estimated energy requirement,as TF not at goal rate; likely has been meeting more than 75% of estimated needs with Tube Feedings at goal rate    2. Weight Loss-No significant weight loss  3. Fat Loss-No significant subcutaneous fat loss  4. Muscle Loss-No significant muscle mass loss  5. Fluid Accumulation-No significant fluid accumulation    Nutrition Risk Level: High    Nutrition Needs:  · Estimated Daily Total Kcal: 1.1-1.2 ~>1643-0232 kcals/d   · Estimated Daily Protein (g): 1.5-1.8 gm/kg - 55-70 gm pro/day    Nutrition Diagnosis:   · Problem: Inadequate oral intake  · Etiology: related to Difficulty swallowing, Cognitive or neurological impairment, Quadriplegia     Signs and symptoms:  as evidenced by NPO status due to medical condition, Nutrition support - EN    Objective Information:  · Nutrition-Focused Physical Findings: PEG. Colostomy. Distended abdomen. Hypoactive bowel sounds.   · Wound Type: Stage II, Pressure Ulcer(to coccyx)  · Current Nutrition Therapies:  · Oral Diet Orders: NPO

## 2019-05-20 NOTE — PROGRESS NOTES
[facility] Wound Ostomy Continence Nurse  Consult Note       NAME:  Willy Dasilva  MEDICAL RECORD NUMBER:  [id number]  AGE: 45 y.o. GENDER: female  : 1980  TODAY'S DATE:  2019    Subjective:     Reason for WOCN Evaluation and Assessment: \"Pressure sore to coccyx on admission\"  End colostomy   Intertrigo of the left knee fold. Willy Dasilva is a 45 y.o. female referred by:   [x] Physician  [] Nursing  [] Other:     Wound Identification:  Wound Type: pressure  Contributing Factors: chronic pressure, decreased mobility, shear force and obesity        PAST MEDICAL HISTORY        Diagnosis Date    Aphasia     Aphasia     Contracture of joint, lower leg     HTN (hypertension)     Hx MRSA infection resolved 2017    2 negative nasal screens 2017 (hx in heel in )    Hydronephrosis     Multiple sclerosis ([address]     Neurogenic bladder     Peripheral vascular disease ([address]     Polyneuropathy     Pressure ulcer     Coccyx, Heel    Pulmonary embolism (HCC)     Pulmonary infarction ([address]     Quadriplegia ([address]     Tachycardia     UTI (lower urinary tract infection)        PAST SURGICAL HISTORY    Past Surgical History:   Procedure Laterality Date    AMPUTATION Right     COLOSTOMY      GASTROSTOMY TUBE PLACEMENT      LEG AMPUTATION THROUGH FEMUR         FAMILY HISTORY    Family History   Problem Relation Age of Onset    No Known Problems Mother     No Known Problems Father        SOCIAL HISTORY    Social History     Tobacco Use    Smoking status: Never Smoker    Smokeless tobacco: Never Used   Substance Use Topics    Alcohol use: No    Drug use: No       ALLERGIES    No Known Allergies        Objective:      /75   Pulse 115   Temp 98.6 °F (37 °C) (Axillary)   Resp 16   Ht 5' (1.524 m)   Wt 199 lb 8.3 oz (90.5 kg)   SpO2 100%   Breastfeeding?  No   BMI 38.97 kg/m²   Lonnie Risk Score: Lonnie Scale Score: 9    LABS    CBC:   Lab Results   Component Value Date    WBC 26.4 05/20/2019    RBC 4.46 05/20/2019    RBC 4.56 03/11/2012    HGB 12.3 05/20/2019     CMP:  Albumin:    Lab Results   Component Value Date    LABALBU 2.6 05/18/2019     PT/INR:    Lab Results   Component Value Date    PROTIME 11.0 05/17/2019    INR 1.0 05/17/2019     HgBA1c:  No results found for: LABA1C  PTT: No components found for: LABPTT      Assessment:     Patient Active Problem List   Diagnosis    Aphasia    [facility] sclerosis [facility]    Nonverbal    Gastrostomy tube dependent ([facility]    Dislodged gastrostomy tube ([facility]    Neurogenic bladder    Hemorrhagic cystitis    Hypokalemia    Bacterial UTI    Tachycardia    Gross hematuria    Urinary tract infection    Lactic acidosis    Chronic indwelling Davies catheter    Peripheral vascular disease ([facility]    Recurrent UTI (urinary tract infection)    History of pulmonary embolism    Hypertension    Altered mental status    Toxic metabolic encephalopathy    ESBL E. coli carrier    [facility]    Status post colostomy ([facility]       Measurements:     05/20/19 1311   Colostomy LLQ   No Placement Date or Time found. Pre-existing: Yes  Location: LLQ   Stomal Appliance 1 piece; Changed   Flange Size (inches) 2.5 Inches   Stoma  Assessment Moist;Pink;Protrudes  (43mm circular)   Mucocutaneous Junction Intact   Peristomal Assessment Clean; Intact   Treatment Bag change   Stool Appearance Soft   Stool Color Brown   Stool Amount Small   Wound 05/20/19 Coccyx   Date First Assessed/Time First Assessed: 05/20/19 0814   Present on Hospital Admission: Yes  Location: Sacrum   Wound Image    Wound Pressure Stage  3   Dressing Status Changed   Dressing Changed Changed/New   Dressing/Treatment Alginate; Foam   Wound Cleansed Rinsed/Irrigated with saline   Dressing Change Due 05/22/19   Wound Length (cm) 1.5 cm   Wound Width (cm) 1.8 cm   Wound Depth (cm) 0.4 cm   Wound Surface Area (cm^2) 2.7 cm^2   Wound Volume (cm^3) 1.08 cm^3   Wound Assessment Clean;Red Drainage Amount Moderate   Drainage Description Serosanguinous   Odor None   Hilda-wound Assessment Dry  (scars)   Red%Wound Bed 100   Wound 05/20/19 Knee Left;Posterior   Date First Assessed/Time First Assessed: 05/20/19 1313   Present on Hospital Admission: Yes  Location: Knee  Wound Location Orientation: Left;Posterior   Wound Image    Wound Other   Dressing Status Changed   Dressing Changed Changed/New   Dressing/Treatment Foam   Wound Cleansed Rinsed/Irrigated with saline   Dressing Change Due 05/23/19   Wound Length (cm) 0.7 cm   Wound Width (cm) 3 cm   Wound Depth (cm) 0.1 cm   Wound Surface Area (cm^2) 2.1 cm^2   Wound Volume (cm^3) 0.21 cm^3   Wound Assessment Red;Bleeding;Fragile   Odor None   Red%Wound Bed 100       Response to treatment:  Well tolerated by patient. Plan:     Plan of Care: Turn every 2 hours  Float heel off of bed with pillows under calves. Protect bony prominences of the left leg. Use lift sling to reposition patient to minimize potential for shear injury. Calcium alginate to fill and foam dressing as cover to the coccygeal wound. Change every 48 hours. Foam dressing to the left posterior knee. Change every 72 hours.       Moisture wicking under pads vs cloth backed briefs    Specialty Bed Required : Yes   [] Low Air Loss   [x] Pressure Redistribution  [] Fluid Immersion  [] Bariatric  [] Total Pressure Relief  [] Other:     Discharge Plan:  Placement for patient upon discharge: skilled nursing   Hospice Care: No   Patient appropriate for Outpatient [address]: No    Patient/Caregiver Teaching:    [] Indicates understanding       [] Needs reinforcement  [] Unsuccessful      [] Verbal Understanding  [] Demonstrated understanding       [x] No evidence of learning  [] Refused teaching         [] N/A       Electronically signed by Ramone Alexandra RN, CWON on 5/20/2019 at 1:14 PM

## 2019-05-20 NOTE — PROGRESS NOTES
Occupational Therapy    Occupational Therapy Not Seen Note    DATE: 2019  Name: Wilner Colon  : 1980  MRN: [id number]    Patient not available for Occupational Therapy due to: Other: Pt is a quadriplegic with MS, non verbal with R BKA. Pt is currently at baseline and dependent for all care, resides in a nursing home and will return upon discharge. Pt is a poor rehab candidate, will defer OT eval at this time.        Electronically signed by Antonietta Negro OT on 2019 at 10:19 AM

## 2019-05-20 NOTE — PROGRESS NOTES
INTENSIVE CARE UNIT  Resident Physician Progress Note    Patient - José Luis Luna  Date of Admission -  2019  2:31 PM  Date of Evaluation -  2019  Room and Bed Number -  0107/0107-01   Hospital Day - 3    Chief Complaint   Patient presents with    Bloated     Distended. No bowel sounds. Colostomy and PEG tube.  Fatigue      SUBJECTIVE:     OVERNIGHT EVENTS:    Patient reported to be more awake and obeying commands. Appears to be close to baseline mental status. No reports of fever, vomiting. Tube feeds were restarted as NG output became negligible. TODAY:  Awake, non verbal, responds to commands. She was successfully weaned off high flow O2. Now on 4L/min O2 (per report patient has a history of intermittent O2 requirement at nursing home). AWAKE & FOLLOWING COMMANDS:  [] No   [x] Yes    SECRETIONS Amount:  [] Small [] Moderate  [] Large  [] None  Color:     [] White [] Colored  [] Bloody    SEDATION:  RAAS Score:  [] Propofol gtt  [] Versed gtt  [] Ativan gtt   [x] No Sedation    PARALYZED:  [x] No    [] Yes    VASOPRESSORS:  [x] No    [] Yes  [] Levophed [] Dopamine [] Vasopressin  [] Dobutamine [] Phenylephrine [] Epinephrine  Ros unable to perform due to non verbal     OBJECTIVE:     VITAL SIGNS:  /75   Pulse 115   Temp 98.6 °F (37 °C) (Axillary)   Resp 18   Ht 5' (1.524 m)   Wt 199 lb 8.3 oz (90.5 kg)   SpO2 100%   Breastfeeding?  No   BMI 38.97 kg/m²   Tmax over 24 hours:  Temp (24hrs), Av.4 °F (37.4 °C), Min:98.6 °F (37 °C), Max:100.4 °F (38 °C)      Patient Vitals for the past 8 hrs:   BP Temp Temp src Pulse Resp SpO2 Weight   19 0807 -- -- -- 115 18 100 % --   19 0630 108/75 -- -- 112 15 100 % --   19 0600 104/69 -- -- 116 17 100 % --   19 0530 119/75 -- -- 118 18 99 % --   19 0500 116/80 -- -- 115 17 99 % --   19 0430 104/76 -- -- 116 16 99 % --   19 0419 -- -- -- -- 17 100 % --   19 0400 104/69 98.6 °F (37 °C) Axillary 118 17 99 % 199 lb 8.3 oz (90.5 kg)   05/20/19 0330 111/69 -- -- 120 16 99 % --   05/20/19 0300 106/71 -- -- 118 18 99 % --   05/20/19 0230 110/71 -- -- 120 18 99 % --   05/20/19 0200 111/70 -- -- 118 17 99 % --   05/20/19 0130 109/70 -- -- 118 18 99 % --         Intake/Output Summary (Last 24 hours) at 5/20/2019 0914  Last data filed at 5/20/2019 3170  Gross per 24 hour   Intake 921 ml   Output 1620 ml   Net -699 ml     Date 05/20/19 0000 - 05/20/19 2359   Shift 9727-7898 1650-3362 5122-2323 24 Hour Total   INTAKE   I.V.(mL/kg) 793(8.8)   793(8.8)   NG/GT(mL/kg) 128(1.4)   128(1.4)   Shift Total(mL/kg) 921(10.2)   921(10.2)   OUTPUT   Urine(mL/kg/hr) 485(0.7)   485   Shift Total(mL/kg) 485(5.4)   485(5.4)   Weight (kg) 90.5 90.5 90.5 90.5     Wt Readings from Last 3 Encounters:   05/20/19 199 lb 8.3 oz (90.5 kg)   01/22/19 174 lb (78.9 kg)   01/12/17 182 lb 1.6 oz (82.6 kg)     Body mass index is 38.97 kg/m². PHYSICAL EXAM:  GEN:  Awake, non verbal, spontaneous eye opening, not in obvious distress  EYES:  pupils equal, round, and reactive to light  HEENT:  Normocepalic, without obvious abnormality  Atramatic  LUNGS:  On 4L/min NC. no increased work of breathing, good air exchange, clear to auscultation and no crackles or wheezing  CV:    tachycardic, normal apical impulse and normal S1 and S2  ABDOMEN:   G-tube and colostomy in situ(normal color and consistency of stool), normal bowel sounds, soft and non-tender  :    Davies in situ draining clear urine. MSK:    there is no redness, warmth, or swelling of the joints  NEURO[de-identified]   Awake, non verbal, spontaneous eye opening. Withdraws from pain  SKIN:   Normal skin color, texture, turgor  EXTREMITIES:  S/p Right BKA, no left pedal edema, no appreciable sacral edema, no calf tenderness/swelling, no erythema, distal pulses poor.       MEDICATIONS:  Scheduled Meds:   midodrine  10 mg Oral TID WC    pantoprazole  40 mg Intravenous Daily    And    sodium chloride (PF)  10 mL Intravenous Daily    meropenem  1 g Intravenous Q8H    scopolamine  1 patch Transdermal Q72H    sertraline  25 mg Per G Tube Daily    sodium chloride flush  10 mL Intravenous 2 times per day    sodium chloride flush  50 mL Intravenous Daily    CENTRUM/CERTA-DERICK with minerals oral  15 mL Per G Tube Daily    metoclopramide  5 mg Oral BID    rivaroxaban  20 mg Oral Daily     Continuous Infusions:   norepinephrine Stopped (05/20/19 0252)    sodium chloride 150 mL/hr at 05/19/19 2350     PRN Meds:     metoprolol 2.5 mg Q4H PRN   potassium chloride 40 mEq PRN   Or     potassium alternative oral replacement 40 mEq PRN   Or     potassium chloride 10 mEq PRN   potassium chloride 10 mEq PRN   potassium chloride 20 mEq PRN   LORazepam 1 mg As Directed RT PRN   acetaminophen 650 mg Q6H PRN   glycopyrrolate 1 mg TID PRN   sodium chloride flush 10 mL PRN   magnesium hydroxide 30 mL Daily PRN   ondansetron 4 mg Q6H PRN   promethazine 25 mg Q4H PRN   fentanNYL 25 mcg Q2H PRN       SUPPORT DEVICES: [] Ventilator [] BIPAP  [x] Nasal Cannula [] Room Air    VENT SETTINGS (Comprehensive) (if applicable):  Vent Information  FiO2 : (S) 30 %  Humidification Source: Heated wire  Humidification Temp: 37  Humidification Temp Measured: 37  Additional Respiratory  Assessments  Pulse: 115  Resp: 18  SpO2: 100 %  Humidification Source: Heated wire  Humidification Temp: 37  Oral Care: Teeth brushed, Mouth swabbed, Suction toothette    ABGs:     Lab Results   Component Value Date    EMS2YCI 10 01/08/2017    FIO2 INFORMATION NOT PROVIDED 05/18/2019         DATA:  Complete Blood Count:   Recent Labs     05/18/19  0612 05/19/19  0637 05/20/19  0620   WBC 19.3* 28.5* 26.4*   RBC 5.72* 5.17* 4.46   HGB 16.0* 14.5 12.3   HCT 50.1* 45.9 39.3   MCV 87.6 88.8 88.1   MCH 28.0 28.0 27.6   MCHC 31.9 31.6 31.3   RDW 14.8* 15.1* 15.3*    277 245   MPV 10.7 11.6 11.5        Last 3 Blood Glucose:   Recent Labs     05/17/19  1499 05/18/19  0612 05/19/19  0750 05/20/19  0620   GLUCOSE 115* 132* 108* 85        PT/INR:    Lab Results   Component Value Date    PROTIME 11.0 05/17/2019    INR 1.0 05/17/2019     PTT:    Lab Results   Component Value Date    APTT 22.8 05/17/2019       Comprehensive Metabolic Profile:   Recent Labs     05/17/19  1529 05/18/19  0612 05/18/19  2107 05/19/19  0750 05/20/19  0620    140  --  141 143   K 4.7 3.9  --  2.9* 3.4*    114*  --  111* 113*   CO2 18* 11*  --  17* 20   BUN 40* 26*  --  9 8   CREATININE 0.93* 0.72  --  0.34* 0.32*   GLUCOSE 115* 132*  --  108* 85   CALCIUM 10.1 8.1*  --  8.1* 8.5*   PROT 9.6*  --  6.5  --   --    LABALBU 4.1  --  2.6*  --   --    BILITOT 0.26*  --  0.25*  --   --    ALKPHOS 72  --  37  --   --    AST 23  --  44*  --   --    ALT 33  --  22  --   --       Magnesium:   Lab Results   Component Value Date    MG 2.0 05/20/2019    MG 1.7 05/19/2019    MG 2.2 09/05/2017     Phosphorus:   Lab Results   Component Value Date    PHOS 3.4 09/05/2017    PHOS 3.2 02/03/2014    PHOS 2.5 04/08/2013     Ionized Calcium:   Lab Results   Component Value Date    CAION 4.66 04/08/2013    CAION 4.66 04/07/2013    CAION 4.76 04/06/2013        Urinalysis:   Lab Results   Component Value Date    NITRU NEGATIVE 05/17/2019    COLORU RED 05/17/2019    PHUR 8.0 05/17/2019    WBCUA TOO NUMEROUS TO COUNT 05/17/2019    RBCUA TOO NUMEROUS TO COUNT 05/17/2019    MUCUS NOT REPORTED 05/17/2019    TRICHOMONAS NOT REPORTED 05/17/2019    YEAST NOT REPORTED 05/17/2019    BACTERIA MODERATE 05/17/2019    SPECGRAV 1.021 05/17/2019    LEUKOCYTESUR LARGE 05/17/2019    UROBILINOGEN Normal 05/17/2019    BILIRUBINUR NEGATIVE 05/17/2019    BILIRUBINUR NEGATIVE 03/11/2012    GLUCOSEU NEGATIVE 05/17/2019    GLUCOSEU NEGATIVE 03/11/2012    KETUA TRACE 05/17/2019    AMORPHOUS NOT REPORTED 05/17/2019       HgBA1c:  No results found for: LABA1C  TSH:    Lab Results   Component Value Date    TSH 0.84 05/19/2019     Lactic Acid:   Lab Results   Component Value Date    LACTA 2.0 01/08/2017      Troponin: No results for input(s): TROPONINI in the last 72 hours. Microbiology:  Urine Culture:  No components found for: CHLOE      Other Labs:  CBC with Differential:    Lab Results   Component Value Date    WBC 26.4 05/20/2019    RBC 4.46 05/20/2019    RBC 4.56 03/11/2012    HGB 12.3 05/20/2019    HCT 39.3 05/20/2019     05/20/2019     03/11/2012    MCV 88.1 05/20/2019    MCH 27.6 05/20/2019    MCHC 31.3 05/20/2019    RDW 15.3 05/20/2019    METASPCT 3 04/04/2013    LYMPHOPCT 4 05/20/2019    MONOPCT 1 05/20/2019    BASOPCT 0 05/20/2019    MONOSABS 0.26 05/20/2019    LYMPHSABS 1.06 05/20/2019    EOSABS 0.00 05/20/2019    BASOSABS 0.00 05/20/2019    DIFFTYPE NOT REPORTED 05/20/2019     CMP:    Lab Results   Component Value Date     05/20/2019    K 3.4 05/20/2019     05/20/2019    CO2 20 05/20/2019    BUN 8 05/20/2019    CREATININE 0.32 05/20/2019    GFRAA >60 05/20/2019    LABGLOM >60 05/20/2019    GLUCOSE 85 05/20/2019    GLUCOSE 69 03/11/2012    PROT 6.5 05/18/2019    LABALBU 2.6 05/18/2019    CALCIUM 8.5 05/20/2019    BILITOT 0.25 05/18/2019    ALKPHOS 37 05/18/2019    AST 44 05/18/2019    ALT 22 05/18/2019         Radiology/Imaging:  XR chest 5/19/19  Bibasal opacities with bilateral small pleural effusions.     ASSESSMENT:   Principal Problem:    Urinary tract infection  Active Problems:    Aphasia    Multiple sclerosis (HCC)    Nonverbal    Gastrostomy tube dependent (HCC)    Neurogenic bladder    Hemorrhagic cystitis    Tachycardia    Gross hematuria    Lactic acidosis    Chronic indwelling Davies catheter    Peripheral vascular disease (HCC)    Recurrent UTI (urinary tract infection)    History of pulmonary embolism    Hypertension    Altered mental status    Metabolic encephalopathy    ESBL E. coli carrier    Septic shock ([address]    Status post colostomy ([address]  Resolved Problems:    * No resolved hospital problems. *             PLAN:     WEAN PER PROTOCOL:  [] No   [] Yes  [x] N/A    ICU PROPHYLAXIS:  Stress ulcer:  [x] PPI Agent  [] [id number] [] Sucralfate  [] Other:  VTE:   [] Enoxaparin  [] Unfract. Heparin Subcut  [x] Xarelto    NUTRITION:  [] NPO [x] Tube Feeding (Specify: G-tube ) [] TPN  [] PO    HOME MEDS RECONCILED: [] No  [] Yes    CONSULTATION NEEDED:  [] No  [x] Yes    FAMILY UPDATED:    [] No  [] Yes    TRANSFER OUT OF ICU:   [x] No  [] Yes            Plan:  1. Sepsis with shock secondary to UTI. 2. Shock resolved   3. . ID follow. Single positive blood culture with coagulase neg staph likely contaminant. Continue IV Meropenem 1 g Q 8 hr.  4. Hypotension due to sepsis. Improved. Continue IVF. Monitor urine and other stoma output. 5. Sinus tachycardia. Chronic. Patient on lopressor in nursing home. Possibly worsened by volume depletion. Continue IVF. Prn IV lopressor 2.5 mg.  6. UTI secondary to chronic indwelling omer. On meropenem. ID follow. Follow Urine culture. 7. Acute respiratory failure. Possibly from sepsis. On high flow NC.  8. Acute toxic metabolic encephalopathy. Improved. Patient appears to be back to baseline. Neurology consulted. MRI brain pending (patient not still enough for study, despite 2 doses of lorazepam). 9. Lactic acidosis. Resolved with IV hydration. 10. Hypokalemia. Replace K as needed. Monitor BMP. 11. Multiple sclerosis with Quadriplegia. Neurology follow for possible flare. 12. PAD s/p Right BKA. Stable. 13. S/p Colostomy. Monitor stoma output. Stoma care. 14. Neurogenic bladder. Chronic. Continue omer drainage. 15. Diet. On tube feeds. Gradually up titrate feeds until at goal. Total fluids 75 ml/hr. 16. GI prophylaxis. IV protonix 40 mg daily. Zantac discontinued. Add colace for bowel regimen. 17. DVT prophylaxis.  On Xarelto    Others- continue Xarelto for chronic PE.  PT/OT    James Garcia MD  PGY-1 Resident  Internal Medicine  [address] [facility]  5/20/2019 9:14 AM  Attending Physician Statement  I have discussed the care of Sharath York, including pertinent history and exam findings,  with the resident. I have seen and examined the patient and the key elements of all parts of the encounter have been performed by me. I agree with the assessment, plan and orders as documented by the resident with additions . Dec iv fluids to 75 ml/hr   Cont antibiotics   Agree with tf to be kep at 10 ml/hr for now due to residuals   Cont midodrine   Clamp ng and plan for removal kelly        Total critical care time caring for this patient with life threatening, unstable organ failure, including direct patient contact, management of life support systems, review of data including imaging and labs, discussions with other team members and physicians at least 27   Min so far today, excluding procedures. Treatment plan Discussed with nursing staff in detail , all questions answered . Electronically signed by Wilner Montalvo MD on   5/20/19 at 4:34 PM    Please note that this chart was generated using voice recognition Dragon dictation software. Although every effort was made to ensure the accuracy of this automated transcription, some errors in transcription may have occurred.

## 2019-05-20 NOTE — CARE COORDINATION
Transitional Planning  Met with patient and family. Mother Rebeca Carmona ([phone]) would like to take patient home to her apartment. States her daughter (the patient) has been at [facility] for the past 2 years due to mother was living in apartment with accessibility difficulties. Mother states she is now in a lower level apartment that is accessible for her daughter to get into ([address], [place], [address]). Patient's sister Lonell Doctor ([phone]) also at bedside. They would like referral to [facility] and [place] [facility] [place] tube feeding, lift equipment, and wheelchair. Second choice for home care is [facility].

## 2019-05-20 NOTE — PLAN OF CARE
Problem: Pain:  Goal: Pain level will decrease  Description  Pain level will decrease  Outcome: Met This Shift  Goal: Control of acute pain  Description  Control of acute pain  Outcome: Met This Shift  Goal: Control of chronic pain  Description  Control of chronic pain  Outcome: Met This Shift     Problem: Falls - Risk of:  Goal: Will remain free from falls  Description  Will remain free from falls  Outcome: Met This Shift  Goal: Absence of physical injury  Description  Absence of physical injury  Outcome: Met This Shift     Problem: Confusion - Acute:  Goal: Absence of continued neurological deterioration signs and symptoms  Description  Absence of continued neurological deterioration signs and symptoms  Outcome: Met This Shift  Goal: Mental status will be restored to baseline  Description  Mental status will be restored to baseline  Outcome: Met This Shift     Problem: Discharge Planning:  Goal: Ability to perform activities of daily living will improve  Description  Ability to perform activities of daily living will improve  Outcome: Met This Shift  Goal: Participates in care planning  Description  Participates in care planning  Outcome: Met This Shift  Goal: Discharged to appropriate level of care  Description  Discharged to appropriate level of care  Outcome: Met This Shift     Problem: Injury - Risk of, Physical Injury:  Goal: Will remain free from falls  Description  Will remain free from falls  Outcome: Met This Shift  Goal: Absence of physical injury  Description  Absence of physical injury  Outcome: Met This Shift     Problem: Mood - Altered:  Goal: Mood stable  Description  Mood stable  Outcome: Met This Shift  Goal: Absence of abusive behavior  Description  Absence of abusive behavior  Outcome: Met This Shift     Problem: Psychomotor Activity - Altered:  Goal: Absence of psychomotor disturbance signs and symptoms  Description  Absence of psychomotor disturbance signs and symptoms  Outcome: Met This Shift     Problem: Sleep Pattern Disturbance:  Goal: Appears well-rested  Description  Appears well-rested  Outcome: Met This Shift     Problem: Serum Glucose Level - Abnormal:  Goal: Ability to maintain appropriate glucose levels will improve  Description  Ability to maintain appropriate glucose levels will improve  Outcome: Met This Shift     Problem: Venous Thromboembolism:  Goal: Absence of signs or symptoms of impaired coagulation  Description  Absence of signs or symptoms of impaired coagulation  Outcome: Met This Shift     Problem: Sensory:  Goal: Pain level will decrease  Description  Pain level will decrease  Outcome: Met This Shift     Problem: Risk for Impaired Skin Integrity  Goal: Tissue integrity - skin and mucous membranes  Description  Structural intactness and normal physiological function of skin and  mucous membranes.   Outcome: Ongoing     Problem: Sensory Perception - Impaired:  Goal: Demonstrations of improved sensory functioning will increase  Description  Demonstrations of improved sensory functioning will increase  Outcome: Ongoing  Goal: Decrease in sensory misperception frequency  Description  Decrease in sensory misperception frequency  Outcome: Ongoing     Problem: Gas Exchange - Impaired:  Goal: Levels of oxygenation will improve  Description  Levels of oxygenation will improve  Outcome: Ongoing     Problem: Infection, Septic Shock:  Goal: Will show no infection signs and symptoms  Description  Will show no infection signs and symptoms  Outcome: Ongoing     Problem: Tissue Perfusion, Altered:  Goal: Circulatory function within specified parameters  Description  Circulatory function within specified parameters  Outcome: Ongoing     Problem: Venous Thromboembolism:  Goal: Will show no signs or symptoms of venous thromboembolism  Description  Will show no signs or symptoms of venous thromboembolism  Outcome: Ongoing     Problem: Urinary Elimination:  Goal: Signs and symptoms of infection will decrease  Description  Signs and symptoms of infection will decrease  Outcome: Ongoing  Goal: Complications related to the disease process, condition or treatment will be avoided or minimized  Description  Complications related to the disease process, condition or treatment will be avoided or minimized  Outcome: Ongoing  Goal: Ability to reestablish a normal urinary elimination pattern will improve - after catheter removal  Description  Ability to reestablish a normal urinary elimination pattern will improve  Outcome: Ongoing     Problem: Sensory:  Goal: General experience of comfort will improve  Description  General experience of comfort will improve  Outcome: Ongoing     Problem: Physical Regulation:  Goal: Complications related to the disease process, condition or treatment will be avoided or minimized  Description  Complications related to the disease process, condition or treatment will be avoided or minimized  Outcome: Ongoing     Problem: Skin Integrity:  Goal: Skin integrity will be maintained  Description  Skin integrity will be maintained  Outcome: Ongoing  Goal: Skin integrity will improve  Description  Skin integrity will improve  Outcome: Ongoing     Problem: Tissue Perfusion:  Goal: Ability to maintain adequate tissue perfusion will improve  Description  Ability to maintain adequate tissue perfusion will improve  Outcome: Ongoing     Problem: Nutrition  Goal: Optimal nutrition therapy  Outcome: Ongoing

## 2019-05-20 NOTE — PROGRESS NOTES
 No Known Problems Mother     No Known Problems Father         Allergies:   Patient has no known allergies. Review of Systems:   Unable to provide. Non verbal      Physical Examination :     Patient Vitals for the past 8 hrs:   BP Temp Temp src Pulse Resp SpO2 Weight   05/20/19 0807 -- -- -- 115 18 100 % --   05/20/19 0630 108/75 -- -- 112 15 100 % --   05/20/19 0600 104/69 -- -- 116 17 100 % --   05/20/19 0530 119/75 -- -- 118 18 99 % --   05/20/19 0500 116/80 -- -- 115 17 99 % --   05/20/19 0430 104/76 -- -- 116 16 99 % --   05/20/19 0419 -- -- -- -- 17 100 % --   05/20/19 0400 104/69 98.6 °F (37 °C) Axillary 118 17 99 % 199 lb 8.3 oz (90.5 kg)   05/20/19 0330 111/69 -- -- 120 16 99 % --   05/20/19 0300 106/71 -- -- 118 18 99 % --   05/20/19 0230 110/71 -- -- 120 18 99 % --   05/20/19 0200 111/70 -- -- 118 17 99 % --     General Appearance: Somnolent and in no apparent distress  Head:  Normocephalic, no trauma  Eyes: Pupils equal, round, reactive to light; sclera anicteric; conjunctivae pink. No embolic phenomena. ENT: Oropharynx clear, without erythema, exudate, or thrush. No tenderness of sinuses. Mouth/throat: mucosa pink and moist. No lesions. Dentition in good repair. Neck:Supple, without lymphadenopathy. Thyroid normal, No bruits. Pulmonary/Chest: Coarse sounds. Cardiovascular: Regular rate and rhythm without murmurs, rubs, or gallops. Abdomen: Soft, non tender. Bowel sounds normal. No organomegaly. G tube in place. Colostomy in LLQ. Chronic Davies in place  All four Extremities: No cyanosis, clubbing, edema, or effusions. Rt BKA. .Contractures present. Neurologic: Difficult to  mentation, non verbal. Withdraws arms. Lt leg contracted. Skin: Warm and dry with good turgor. No signs of peripheral arterial or venous insufficiency. No ulcerations. No open wounds.     Medical Decision Making -Laboratory:   I have independently reviewed/ordered the following labs:    CBC with Differential: Recent Labs     05/19/19  0637 05/20/19  0620   WBC 28.5* 26.4*   HGB 14.5 12.3   HCT 45.9 39.3    245   LYMPHOPCT 4* 4*   MONOPCT 0* 1     BMP:   Recent Labs     05/19/19  0750 05/20/19  0620    143   K 2.9* 3.4*   * 113*   CO2 17* 20   BUN 9 8   CREATININE 0.34* 0.32*   MG 1.7 2.0     Hepatic Function Panel:   Recent Labs     05/17/19  1529 05/18/19  2107   PROT 9.6* 6.5   LABALBU 4.1 2.6*   BILIDIR  --  <0.08   IBILI  --  CANNOT BE CALCULATED   BILITOT 0.26* 0.25*   ALKPHOS 72 37   ALT 33 22   AST 23 44*     No results for input(s): RPR in the last 72 hours. No results for input(s): HIV in the last 72 hours. No results for input(s): BC in the last 72 hours. Lab Results   Component Value Date    MUCUS NOT REPORTED 05/17/2019    RBC 4.46 05/20/2019    RBC 4.56 03/11/2012    TRICHOMONAS NOT REPORTED 05/17/2019    WBC 26.4 05/20/2019    YEAST NOT REPORTED 05/17/2019    TURBIDITY TURBID 05/17/2019     Lab Results   Component Value Date    CREATININE 0.32 05/20/2019    GLUCOSE 85 05/20/2019    GLUCOSE 69 03/11/2012       Medical Decision Making-Imaging:     EXAMINATION:   CT OF THE ABDOMEN AND PELVIS WITH CONTRAST 5/17/2019 3:52 pm       TECHNIQUE:   CT of the abdomen and pelvis was performed with the administration of   intravenous contrast. Multiplanar reformatted images are provided for review. Dose modulation, iterative reconstruction, and/or weight based adjustment of   the mA/kV was utilized to reduce the radiation dose to as low as reasonably   achievable.       COMPARISON:   01/08/2017       HISTORY:   ORDERING SYSTEM PROVIDED HISTORY: firm abdomen   TECHNOLOGIST PROVIDED HISTORY:           FINDINGS:   Lower Chest: Dependent changes at the lung bases, likely atelectasis.       Organs: The liver, spleen, pancreas and adrenal glands are unremarkable. Mild gallbladder distention.  No significant biliary dilatation.    Nonobstructive bilateral nephrolithiasis.  No significant hydronephrosis.  No   cystic or solid renal mass.  Mild renal cortical scarring on the right,   unchanged       GI/Bowel: Left lower quadrant colostomy with mucous fistula distally.  No   evidence of bowel obstruction.  No small bowel distention.  A gastrostomy   tube is in place.       Pelvis: Moderate free pelvic fluid, simple by attenuation characteristics.  A   Davies catheter is in place. Any Pe[place] is some calcification along the posterior   bladder wall.  Mild enhancement of the bladder mucosa with mild infiltration   of the pericystic fat.  The uterus and adnexal structures are unremarkable.       Peritoneum/Retroperitoneum: Small quantity of diffuse abdominal ascites. Mild infiltration of the mesenteric fat.  No free intraperitoneal air.  No   focal abscess is seen.  The abdominal aorta is normal in caliber.  There is   no retroperitoneal adenopathy.       Bones/Soft Tissues: No acute osseous or soft tissue abnormality.  Small fat   containing umbilical hernia.           Impression   1. Small quantity of diffuse abdominal ascites of indeterminate etiology. Mild infiltration of the mesenteric fat.  There is no free air or focal   abscess. 2. Mild enhancement of the bladder mucosa with infiltration of the pericystic   fat.  Findings are similar on the previous study.  A mild chronic cystitis is   suggested. 3. Left lower quadrant colostomy and G-tube.  No evidence of bowel   obstruction. 4. Nonobstructive bilateral nephrolithiasis. 5. Dependent changes at the lung bases most consistent with atelectasis.              EXAMINATION:   ONE XRAY VIEW OF THE CHEST       5/17/2019 2:50 pm       COMPARISON:   January 8, 2017       HISTORY:   ORDERING SYSTEM PROVIDED HISTORY: Septic work up   TECHNOLOGIST PROVIDED HISTORY:   Septic work up   Ordering Physician Provided Reason for Exam: septic/tachycardia   Acuity: Unknown   Type of Exam: Unknown       FINDINGS:   Shallow inflation.  Cardiac silhouette enlargement is noted.  Interstitial   and nodular opacities are noted in the mid right lung field.  These appear   less prominent on the left.  The left costophrenic angle is obscured, a   chronic finding.  No pneumothorax identified.  No acute osseous abnormality   appreciated.  A gastrostomy tube is noted.           Impression   Shallow inflation.  Perihilar interstitial and nodular opacities are noted,   right greater than left.  While a component of this likely represents   atelectasis, developing infiltrate or inflammatory process should also be   considered.           Medical Decision Cgbotr-Hdzxtcnr-Divcz:       Medical Decision Making-Other:     Note:  · Labs, medications, radiologic studies were reviewed with personal review of films  · Moderate amounts of data were reviewed  · Discussed with nursing Staff  · Infection Control and Prevention measures reviewed  · All prior entries were reviewed  · Administer medications as ordered  · Prognosis: Guarded  · Discharge planning reviewed    Thank you for allowing us to participate in the care of this patient. Please call with questions.     Fiorella Burns MD  Pager: [phone] - Office: [phone]

## 2019-05-20 NOTE — PROGRESS NOTES
NEUROLOGY INPATIENT PROGRESS NOTE    5/20/2019         Subjective: Stephanie Lowery is a  45 y.o. female admitted on5/17/2019 with Urinary tract infection [N39.0]  Urinary tract infection [N39.0]      Briefly, this is a  45 y.o. female admitted on 5/17/2019 with pmhx of MS, chronic omer in in place due to neurogenic bladder and peg in place transferred from nursing facility to [facility] status. As per the records of the nursing facility patient is nonverbal at baseline but didn't notice for the past few days she has been more tired and confused and therefore send her to the emergency department for further evaluation.     On arrival in the ED patient. Blood pressure was 111/74 heart rate 122 RR 18 temp 98.9. Lactic acid 2.4, H&H 17.5/7.4 WBC 11.1 her UA was red/turbid  numerous WBCs, large Leukocyte Esterase. His x-ray showed perihilar interstitial and nodular opacities. CT abdomen and pelvis showed diffuse abdominal ascites chronic cystitis left lower quadrant quadrant colostomy and G-tube and bilateral nephrolithiasis. Patient was admitted to medical [facility] for UTI. CT chest shows bibasilar airspace's disease and small effusion distended gallbladder and gallstone recommending gallbladder ultrasound     Of note patient has a history of PE in 2013 and is on Xaletro 10 mg and chronic retention of urine and hydronephrosis frequent UTIs. Developed hemorrhagic cystitis in 2017 and since then has indwelling Omer's catheter. He also has a history of ESBL E. coli her last culture grew E. coli sensitive to Rocephin. She had an echo done in 2017 showed EF of 55% normal left ventricular systolic function      Patient is evaluated at bedside, as per ID patient to be on meropenem 1 g IV every 8 discontinue Zosyn and vanco underwent urine culture results are back. She is better blood pressure control and the levo is weaned off.  Patient was afebrile, WBCs trending down    MRI of the brain could not be completed due to motion. MRI cervical and thoracic spine did not show any acute fracture or spinal stenosis or narrowing of the canal.      Patient appears more awake today, giving appropriate to questions asked denies any pain at this time, on NG tube feedings. Able to give me a thumbs up and show 2 fingers on on both sides              No current facility-administered medications on file prior to encounter. Current Outpatient Medications on File Prior to Encounter   Medication Sig Dispense Refill    Multiple Vitamins-Minerals (CENTRUM/CERTA-DERICK WITH MINERALS ORAL) solution Take 2.5 mLs by mouth daily      Cranberry 250 MG CAPS Take 250 mg by mouth 2 times daily      metoclopramide (REGLAN) 5 MG/5ML solution Take 5 mg by mouth 2 times daily      ranitidine (ZANTAC) 75 MG/5ML syrup 75 mg by Per G Tube route 2 times daily      vitamin D (CHOLECALCIFEROL) 1000 UNIT TABS tablet 1,000 Units by Per G Tube route daily      rivaroxaban (XARELTO) 10 MG TABS tablet 1 tablet by Per G Tube route daily (with breakfast) 30 tablet 0    potassium chloride 20 MEQ/15ML (10%) oral solution 10 mEq by Per G Tube route daily      Scopolamine Base (SCOPOLAMINE TD) Place 1 mg onto the skin every 72 hours       baclofen (LIORESAL) 10 MG tablet 10 mg by Per G Tube route 3 times daily Crush into G tube       gabapentin (NEURONTIN) 300 MG capsule 300 mg by Per G Tube route 3 times daily      medroxyPROGESTERone (DEPO-PROVERA) 150 MG/ML injection Inject 150 mg into the muscle every 3 months On the 11th       metoprolol (LOPRESSOR) 25 MG tablet 12.5 mg 2 times daily       acetaminophen (TYLENOL) 80 MG/0.8ML suspension 650 mg by Per G Tube route every 6 hours as needed for Fever or Pain       glycopyrrolate (ROBINUL) 1 MG tablet Take 1 mg by mouth 3 times daily          Allergies: Jay Webster has No Known Allergies.     Past Medical History:   Diagnosis Date    Aphasia     Aphasia     Contracture of joint, lower leg  HTN (hypertension)     Hx MRSA infection resolved 1/2017    2 negative nasal screens 1/2017 (hx in heel in 2013)    Hydronephrosis     Multiple sclerosis ([facility] [address]     Neurogenic bladder     Peripheral vascular disease (HCC)     Polyneuropathy     Pressure ulcer     Coccyx, Heel    Pulmonary embolism (HCC)     Pulmonary infarction (HCC)     Quadriplegia (HCC)     Tachycardia     UTI (lower urinary tract infection)        Past Surgical History:   Procedure Laterality Date    AMPUTATION Right     COLOSTOMY      GASTROSTOMY TUBE PLACEMENT      LEG AMPUTATION THROUGH FEMUR             Medications:     midodrine  10 mg Oral TID WC    furosemide  20 mg Intravenous Once    pantoprazole  40 mg Intravenous Daily    And    sodium chloride (PF)  10 mL Intravenous Daily    meropenem  1 g Intravenous Q8H    scopolamine  1 patch Transdermal Q72H    sertraline  25 mg Per G Tube Daily    sodium chloride flush  10 mL Intravenous 2 times per day    sodium chloride flush  50 mL Intravenous Daily    CENTRUM/CERTA-DERICK with minerals oral  15 mL Per G Tube Daily    metoclopramide  5 mg Oral BID    rivaroxaban  20 mg Oral Daily     PRN Meds include: potassium chloride **OR** potassium alternative oral replacement **OR** potassium chloride, potassium chloride, potassium chloride, LORazepam, acetaminophen, glycopyrrolate, sodium chloride flush, magnesium hydroxide, ondansetron, metoprolol, promethazine, fentanNYL    Objective:   /75   Pulse 112   Temp 98.6 °F (37 °C) (Axillary)   Resp 15   Ht 5' (1.524 m)   Wt 199 lb 8.3 oz (90.5 kg)   SpO2 100%   Breastfeeding?  No   BMI 38.97 kg/m²     Blood pressure range: Systolic (44FWU), AYH:452 , Min:86 , HFY:572   ; Diastolic (35VSE), TZL:96, Min:48, Max:89      Review of Systems         NEUROLOGICEXAMINATION  GENERAL Alert and awake, nods appropirately   HEENT  NC/ AT   cardiovascular  S1 and S2 heard; palpation of pulses: radial pulse    NECK  Supple and no bruits heard   MENTAL STATUS: Alert, oriented, intact memory, no confusion, normal speech, normal language, no hallucination or delusion   CRANIAL NERVES: II     -       disconjugate gaze with left eye deviation to left side  III,IV,VI -  PEERLA, clear conjuctiva  V     -     Cannot be assessed  VII    -     Cannot be assessed  VIII-     Intact hearing   IX,X -     Symmetrical palate  XI    -     Symmetrical shoulder shrug  XII   -     Midline tongue, no atrophy    MOTOR FUNCTION: Spontaneously moves able to show two finger and give a thumbs up,flexed, R below the knee amputation   SENSORY FUNCTION:  Withdraws RUE and LUe to pain   CEREBELLAR FUNCTION:  Cannot assess   REFLEX FUNCTION:  Symmetric, no Babinski sign   STATION and GAIT  Not tested               Data:    Lab Results:   CBC:   Recent Labs     05/18/19  0612 05/19/19  0637 05/20/19  0620   WBC 19.3* 28.5* 26.4*   HGB 16.0* 14.5 12.3    277 245     BMP:    Recent Labs     05/18/19  0612 05/19/19  0750 05/20/19  0620    141 143   K 3.9 2.9* 3.4*   * 111* 113*   CO2 11* 17* 20   BUN 26* 9 8   CREATININE 0.72 0.34* 0.32*   GLUCOSE 132* 108* 85         Lab Results   Component Value Date    CHOL 149 08/07/2018    LDLCHOLESTEROL 92 05/08/2017    HDL 36 (L) 05/08/2017    TRIG 86 05/08/2017    ALT 22 05/18/2019    AST 44 (H) 05/18/2019    TSH 0.84 05/19/2019    INR 1.0 05/17/2019    [id number] 1047 (H) 04/05/2013       No results found for: PHENYTOIN, PHENYTOIN, PHENOBARB, VALPROATE, CBMZ        Impression:  46 y/o F  Non verbal at baseline pmhx of MS, chronic omer in in place due to neurogenic bladder and peg in place transferred from nursing facility to alternating mental status  Her symptoms are most likely due to UTI given history of MS and neurogenic bladder, ID on board on meropenem awaiting urine cultures   MRI of the brain with and without contrast could not be done due to patient moving her head.   MRI of the cervical and thoracic unremarkable for any acute pathology.   Off Levophed as blood pressure is better maintain  ICU team on board for rest for management of comorbid.               Harry Benson MD  Neurology Resident PGY-2  5/20/2019 at 7:22 AM

## 2019-05-21 LAB
ABSOLUTE EOS #: <0.03 K/UL (ref 0–0.44)
ABSOLUTE IMMATURE GRANULOCYTE: 0.06 K/UL (ref 0–0.3)
ABSOLUTE LYMPH #: 0.94 K/UL (ref 1.1–3.7)
ABSOLUTE MONO #: 0.76 K/UL (ref 0.1–1.2)
ANION GAP SERPL CALCULATED.3IONS-SCNC: 10 MMOL/L (ref 9–17)
ANION GAP SERPL CALCULATED.3IONS-SCNC: 10 MMOL/L (ref 9–17)
BASOPHILS # BLD: 0 % (ref 0–2)
BASOPHILS ABSOLUTE: <0.03 K/UL (ref 0–0.2)
BUN BLDV-MCNC: 10 MG/DL (ref 6–20)
BUN/CREAT BLD: ABNORMAL (ref 9–20)
CALCIUM SERPL-MCNC: 8.6 MG/DL (ref 8.6–10.4)
CHLORIDE BLD-SCNC: 110 MMOL/L (ref 98–107)
CHLORIDE BLD-SCNC: 110 MMOL/L (ref 98–107)
CO2: 22 MMOL/L (ref 20–31)
CO2: 23 MMOL/L (ref 20–31)
CREAT SERPL-MCNC: 0.31 MG/DL (ref 0.5–0.9)
DIFFERENTIAL TYPE: ABNORMAL
EOSINOPHILS RELATIVE PERCENT: 0 % (ref 1–4)
GFR AFRICAN AMERICAN: >60 ML/MIN
GFR NON-AFRICAN AMERICAN: >60 ML/MIN
GFR SERPL CREATININE-BSD FRML MDRD: ABNORMAL ML/MIN/{1.73_M2}
GFR SERPL CREATININE-BSD FRML MDRD: ABNORMAL ML/MIN/{1.73_M2}
GLUCOSE BLD-MCNC: 57 MG/DL (ref 65–105)
GLUCOSE BLD-MCNC: 61 MG/DL (ref 65–105)
GLUCOSE BLD-MCNC: 71 MG/DL (ref 65–105)
GLUCOSE BLD-MCNC: 73 MG/DL (ref 70–99)
GLUCOSE BLD-MCNC: 74 MG/DL (ref 65–105)
GLUCOSE BLD-MCNC: 85 MG/DL (ref 65–105)
GLUCOSE BLD-MCNC: 86 MG/DL (ref 65–105)
HCT VFR BLD CALC: 39.9 % (ref 36.3–47.1)
HEMOGLOBIN: 12.3 G/DL (ref 11.9–15.1)
IMMATURE GRANULOCYTES: 0 %
LYMPHOCYTES # BLD: 6 % (ref 24–43)
MAGNESIUM: 1.7 MG/DL (ref 1.6–2.6)
MAGNESIUM: 1.7 MG/DL (ref 1.6–2.6)
MCH RBC QN AUTO: 26.5 PG (ref 25.2–33.5)
MCHC RBC AUTO-ENTMCNC: 30.8 G/DL (ref 28.4–34.8)
MCV RBC AUTO: 86 FL (ref 82.6–102.9)
MONOCYTES # BLD: 5 % (ref 3–12)
NRBC AUTOMATED: 0 PER 100 WBC
PDW BLD-RTO: 15.3 % (ref 11.8–14.4)
PLATELET # BLD: 265 K/UL (ref 138–453)
PLATELET ESTIMATE: ABNORMAL
PMV BLD AUTO: 11.4 FL (ref 8.1–13.5)
POTASSIUM SERPL-SCNC: 2.6 MMOL/L (ref 3.7–5.3)
POTASSIUM SERPL-SCNC: 3.7 MMOL/L (ref 3.7–5.3)
RBC # BLD: 4.64 M/UL (ref 3.95–5.11)
RBC # BLD: ABNORMAL 10*6/UL
SEG NEUTROPHILS: 89 % (ref 36–65)
SEGMENTED NEUTROPHILS ABSOLUTE COUNT: 14.57 K/UL (ref 1.5–8.1)
SODIUM BLD-SCNC: 142 MMOL/L (ref 135–144)
SODIUM BLD-SCNC: 143 MMOL/L (ref 135–144)
WBC # BLD: 16.4 K/UL (ref 3.5–11.3)
WBC # BLD: ABNORMAL 10*3/UL

## 2019-05-21 PROCEDURE — 2580000003 HC RX 258: Performed by: INTERNAL MEDICINE

## 2019-05-21 PROCEDURE — 6360000002 HC RX W HCPCS: Performed by: INTERNAL MEDICINE

## 2019-05-21 PROCEDURE — 36415 COLL VENOUS BLD VENIPUNCTURE: CPT

## 2019-05-21 PROCEDURE — 6370000000 HC RX 637 (ALT 250 FOR IP): Performed by: EMERGENCY MEDICINE

## 2019-05-21 PROCEDURE — 6370000000 HC RX 637 (ALT 250 FOR IP): Performed by: STUDENT IN AN ORGANIZED HEALTH CARE EDUCATION/TRAINING PROGRAM

## 2019-05-21 PROCEDURE — 2580000003 HC RX 258: Performed by: STUDENT IN AN ORGANIZED HEALTH CARE EDUCATION/TRAINING PROGRAM

## 2019-05-21 PROCEDURE — 85025 COMPLETE CBC W/AUTO DIFF WBC: CPT

## 2019-05-21 PROCEDURE — 2060000000 HC ICU INTERMEDIATE R&B

## 2019-05-21 PROCEDURE — 99233 SBSQ HOSP IP/OBS HIGH 50: CPT | Performed by: INTERNAL MEDICINE

## 2019-05-21 PROCEDURE — C9113 INJ PANTOPRAZOLE SODIUM, VIA: HCPCS | Performed by: STUDENT IN AN ORGANIZED HEALTH CARE EDUCATION/TRAINING PROGRAM

## 2019-05-21 PROCEDURE — 6360000002 HC RX W HCPCS: Performed by: STUDENT IN AN ORGANIZED HEALTH CARE EDUCATION/TRAINING PROGRAM

## 2019-05-21 PROCEDURE — 80048 BASIC METABOLIC PNL TOTAL CA: CPT

## 2019-05-21 PROCEDURE — 6360000002 HC RX W HCPCS: Performed by: EMERGENCY MEDICINE

## 2019-05-21 PROCEDURE — 80051 ELECTROLYTE PANEL: CPT

## 2019-05-21 PROCEDURE — 99291 CRITICAL CARE FIRST HOUR: CPT | Performed by: INTERNAL MEDICINE

## 2019-05-21 PROCEDURE — 2580000003 HC RX 258

## 2019-05-21 PROCEDURE — 2000000000 HC ICU R&B

## 2019-05-21 PROCEDURE — 99232 SBSQ HOSP IP/OBS MODERATE 35: CPT | Performed by: PSYCHIATRY & NEUROLOGY

## 2019-05-21 PROCEDURE — 83735 ASSAY OF MAGNESIUM: CPT

## 2019-05-21 PROCEDURE — 82947 ASSAY GLUCOSE BLOOD QUANT: CPT

## 2019-05-21 RX ORDER — DEXTROSE MONOHYDRATE 25 G/50ML
12.5 INJECTION, SOLUTION INTRAVENOUS PRN
Status: DISCONTINUED | OUTPATIENT
Start: 2019-05-21 | End: 2019-05-25 | Stop reason: HOSPADM

## 2019-05-21 RX ORDER — NICOTINE POLACRILEX 4 MG
15 LOZENGE BUCCAL PRN
Status: DISCONTINUED | OUTPATIENT
Start: 2019-05-21 | End: 2019-05-25 | Stop reason: HOSPADM

## 2019-05-21 RX ORDER — DEXTROSE MONOHYDRATE 50 MG/ML
100 INJECTION, SOLUTION INTRAVENOUS PRN
Status: DISCONTINUED | OUTPATIENT
Start: 2019-05-21 | End: 2019-05-25 | Stop reason: HOSPADM

## 2019-05-21 RX ORDER — DEXTROSE MONOHYDRATE 100 MG/ML
INJECTION, SOLUTION INTRAVENOUS CONTINUOUS
Status: DISCONTINUED | OUTPATIENT
Start: 2019-05-21 | End: 2019-05-24

## 2019-05-21 RX ORDER — MAGNESIUM SULFATE 1 G/100ML
1 INJECTION INTRAVENOUS ONCE
Status: COMPLETED | OUTPATIENT
Start: 2019-05-21 | End: 2019-05-21

## 2019-05-21 RX ORDER — POTASSIUM CHLORIDE 7.45 MG/ML
40 INJECTION INTRAVENOUS ONCE
Status: COMPLETED | OUTPATIENT
Start: 2019-05-21 | End: 2019-05-21

## 2019-05-21 RX ORDER — DEXTROSE MONOHYDRATE 25 G/50ML
INJECTION, SOLUTION INTRAVENOUS
Status: COMPLETED
Start: 2019-05-21 | End: 2019-05-21

## 2019-05-21 RX ORDER — FUROSEMIDE 20 MG/1
20 TABLET ORAL ONCE
Status: COMPLETED | OUTPATIENT
Start: 2019-05-21 | End: 2019-05-21

## 2019-05-21 RX ADMIN — MEROPENEM 1 G: 1 INJECTION, POWDER, FOR SOLUTION INTRAVENOUS at 14:32

## 2019-05-21 RX ADMIN — METOCLOPRAMIDE HYDROCHLORIDE 5 MG: 5 SOLUTION ORAL at 20:31

## 2019-05-21 RX ADMIN — Medication 10 ML: at 20:31

## 2019-05-21 RX ADMIN — MEROPENEM 1 G: 1 INJECTION, POWDER, FOR SOLUTION INTRAVENOUS at 08:04

## 2019-05-21 RX ADMIN — POTASSIUM CHLORIDE 20 MEQ: 400 INJECTION, SOLUTION INTRAVENOUS at 11:20

## 2019-05-21 RX ADMIN — POTASSIUM CHLORIDE 20 MEQ: 400 INJECTION, SOLUTION INTRAVENOUS at 07:45

## 2019-05-21 RX ADMIN — DEXTROSE MONOHYDRATE: 100 INJECTION, SOLUTION INTRAVENOUS at 17:24

## 2019-05-21 RX ADMIN — POTASSIUM CHLORIDE 20 MEQ: 400 INJECTION, SOLUTION INTRAVENOUS at 10:00

## 2019-05-21 RX ADMIN — METOCLOPRAMIDE HYDROCHLORIDE 5 MG: 5 SOLUTION ORAL at 08:06

## 2019-05-21 RX ADMIN — DEXTROSE MONOHYDRATE 12.5 G: 500 INJECTION PARENTERAL at 07:59

## 2019-05-21 RX ADMIN — Medication 50 ML: at 08:08

## 2019-05-21 RX ADMIN — POTASSIUM CHLORIDE 20 MEQ: 400 INJECTION, SOLUTION INTRAVENOUS at 08:50

## 2019-05-21 RX ADMIN — MULTIVITAMIN 15 ML: LIQUID ORAL at 08:04

## 2019-05-21 RX ADMIN — RIVAROXABAN 20 MG: 20 TABLET, FILM COATED ORAL at 20:31

## 2019-05-21 RX ADMIN — MIDODRINE HYDROCHLORIDE 10 MG: 5 TABLET ORAL at 11:34

## 2019-05-21 RX ADMIN — MIDODRINE HYDROCHLORIDE 10 MG: 5 TABLET ORAL at 17:27

## 2019-05-21 RX ADMIN — POTASSIUM CHLORIDE 20 MEQ: 400 INJECTION, SOLUTION INTRAVENOUS at 13:58

## 2019-05-21 RX ADMIN — DEXTROSE MONOHYDRATE 12.5 G: 500 INJECTION PARENTERAL at 16:00

## 2019-05-21 RX ADMIN — SERTRALINE 25 MG: 25 TABLET, FILM COATED ORAL at 08:05

## 2019-05-21 RX ADMIN — PANTOPRAZOLE SODIUM 40 MG: 40 INJECTION, POWDER, FOR SOLUTION INTRAVENOUS at 08:06

## 2019-05-21 RX ADMIN — MAGNESIUM SULFATE HEPTAHYDRATE 1 G: 1 INJECTION, SOLUTION INTRAVENOUS at 11:21

## 2019-05-21 RX ADMIN — Medication 10 ML: at 08:07

## 2019-05-21 RX ADMIN — MIDODRINE HYDROCHLORIDE 10 MG: 5 TABLET ORAL at 08:05

## 2019-05-21 RX ADMIN — DOCUSATE SODIUM 100 MG: 50 LIQUID ORAL at 09:15

## 2019-05-21 RX ADMIN — FUROSEMIDE 20 MG: 20 TABLET ORAL at 11:31

## 2019-05-21 RX ADMIN — POTASSIUM CHLORIDE 20 MEQ: 400 INJECTION, SOLUTION INTRAVENOUS at 12:00

## 2019-05-21 ASSESSMENT — PAIN SCALES - GENERAL: PAINLEVEL_OUTOF10: 0

## 2019-05-21 NOTE — PROGRESS NOTES
Nutrition Assessment (Enteral Nutrition)    Type and Reason for Visit: Reassess    Nutrition Recommendations:   - Modify TF formula to Osmolite 1.2 (std without fiber) slowly advancing to goal rate of 55 mL/hr. Monitor TF tolerance/adequacy of intakes - modify as needed. - Monitor labs and bowel function.  - If bolus feedings requested for home feedings, suggest boluses of Osmolite 1.2 (or standard without fiber equivalent) of 250 mL x 5 per day with free water flushes of 50 mL before and after each bolus. Will provide 1440 kcal, 69 gm protein, and 1,025 mL free water per day. Nutrition Assessment: Resident requests TF formula change to Osmolite 1.2 (std without fiber). RN states diabetic formula was restarted but pt did have a residual of 140 mL while running at 10 mL/hr. Labs reviewed - K 2.6 mmol/L and low blood sugars noted.  requests bolus recommendations for discharge. Malnutrition Assessment:  · Malnutrition Status: At risk for malnutrition    Nutrition Risk Level: High    Nutrition Needs:  · Estimated Daily Total Kcal: 1.1-1.2 ~>1158-0727 kcals/d   · Estimated Daily Protein (g): 1.5-1.8 gm/kg - 55-70 gm pro/day    Nutrition Diagnosis:   · Problem: Inadequate oral intake  · Etiology: related to Difficulty swallowing, Cognitive or neurological impairment     Signs and symptoms:  as evidenced by NPO status due to medical condition, Nutrition support - EN    Objective Information:  · Nutrition-Focused Physical Findings: PEG. Colostomy. Distended abdomen. Hypoactive bowel sounds.   · Wound Type: Stage II, Pressure Ulcer(to coccyx)  · Current Nutrition Therapies:  · Oral Diet Orders: NPO   · Tube Feeding (TF) Orders:   · Feeding Route: Gastrostomy  · Formula: Diabetic  · Rate (ml/hr):20 mL/hr    · Volume (ml/day): 1440 mL/day  · Duration: Continuous  · Current TF & Flush Orders Provides: 576 kcal and 27 gm protein per day  · Goal TF & Flush Orders Provides: Osmolite 1.2 (std without fiber) at goal 55 mL/hr = 1584 kcal and 73 gm protein per day  · Anthropometric Measures:  · Ht: 5' (152.4 cm)   · Current Body Wt: 199 lb 8.3 oz (90.5 kg)  · Admission Body Wt: 188 lb (85.3 kg)  · Weight Change: 174 lbs x 4 mo ago per EMR    · Ideal Body Wt: 84 lb (38.1 kg)(per AKA), % Ideal Body 223% (adm/ideal)  · Adjusted Body Wt:  , body weight adjusted for AKA  · BMI Classification: BMI 35.0 - 39.9 Obese Class II    Nutrition Interventions:   Modify current Tube Feeding - Suggest Osmolite 1.2 (std without fiber) goal rate 55 mL/hr.   Continued Inpatient Monitoring, Education Not Indicated    Nutrition Evaluation:   · Evaluation: Progressing toward goals   · Goals: Pt to meet % of est'd needs via EN   · Monitoring: TF Intake, TF Tolerance, Skin Integrity, Wound Healing, I&O, Weight, Pertinent Labs, Monitor Hemodynamic Status, Monitor Bowel Function    Electronically signed by Sidra Shone, RD, LD on 5/21/19 at 2:36 PM    Contact Number: [phone]

## 2019-05-21 NOTE — PROGRESS NOTES
NEUROLOGY INPATIENT PROGRESS NOTE    5/21/2019         Subjective: Chica Huntley is a  45 y.o. female admitted on5/17/2019 with Urinary tract infection [N39.0]  Urinary tract infection [N39.0]      Briefly, this is a  45 y.o. female admitted on 5/17/2019 with with pmhx of MS, chronic omer in in place due to neurogenic bladder and peg in place transferred from nursing facility to [facility] status.  As per the records of the nursing facility patient is nonverbal at baseline but didn't notice for the past few days she has been more tired and confused and therefore send her to the emergency department for further evaluation.     On arrival in the ED patient.  Blood pressure was 111/74 heart rate 122 RR 18 temp 98.9.  Lactic acid 2.4, H&H 17.5/7.4 WBC 11.1 her UA was red/turbid  numerous WBCs, large Leukocyte Esterase.  His x-ray showed perihilar interstitial and nodular opacities.  CT abdomen and pelvis showed diffuse abdominal ascites chronic cystitis left lower quadrant quadrant colostomy and G-tube and bilateral nephrolithiasis.  Patient was admitted to medical Icu for UTI.  CT chest shows bibasilar airspace's disease and small effusion distended gallbladder and gallstone recommending gallbladder ultrasound     Of note patient has a history of PE in 2013 and is on Xaletro 10 mg and chronic retention of urine and hydronephrosis frequent UTIs.  Developed hemorrhagic cystitis in 2017 and since then has indwelling Omer's catheter.  He also has a history of ESBL E. coli her last culture grew E. coli sensitive to Rocephin.  She had an echo done in 2017 showed EF of 55% normal left ventricular systolic function        Patient is evaluated at bedside, as per ID patient to be on meropenem 1 g IV every 8 discontinue Zosyn and vanco underwent urine culture results are back. She is better blood pressure control and the levo is weaned off.  Patient was afebrile, WBCs trending down     MRI of the brain could not be completed due to motion. MRI cervical and thoracic spine did not show any acute fracture or spinal stenosis or narrowing of the canal.     Patient appears more awake today, giving appropriate response to questions asked by head nodding denies any pain at this time, on NG tube feedings. No current facility-administered medications on file prior to encounter. Current Outpatient Medications on File Prior to Encounter   Medication Sig Dispense Refill    Multiple Vitamins-Minerals (CENTRUM/CERTA-DERICK WITH MINERALS ORAL) solution Take 2.5 mLs by mouth daily      Cranberry 250 MG CAPS Take 250 mg by mouth 2 times daily      metoclopramide (REGLAN) 5 MG/5ML solution Take 5 mg by mouth 2 times daily      ranitidine (ZANTAC) 75 MG/5ML syrup 75 mg by Per G Tube route 2 times daily      vitamin D (CHOLECALCIFEROL) 1000 UNIT TABS tablet 1,000 Units by Per G Tube route daily      rivaroxaban (XARELTO) 10 MG TABS tablet 1 tablet by Per G Tube route daily (with breakfast) 30 tablet 0    potassium chloride 20 MEQ/15ML (10%) oral solution 10 mEq by Per G Tube route daily      Scopolamine Base (SCOPOLAMINE TD) Place 1 mg onto the skin every 72 hours       baclofen (LIORESAL) 10 MG tablet 10 mg by Per G Tube route 3 times daily Crush into G tube       gabapentin (NEURONTIN) 300 MG capsule 300 mg by Per G Tube route 3 times daily      medroxyPROGESTERone (DEPO-PROVERA) 150 MG/ML injection Inject 150 mg into the muscle every 3 months On the 11th       metoprolol (LOPRESSOR) 25 MG tablet 12.5 mg 2 times daily       acetaminophen (TYLENOL) 80 MG/0.8ML suspension 650 mg by Per G Tube route every 6 hours as needed for Fever or Pain       glycopyrrolate (ROBINUL) 1 MG tablet Take 1 mg by mouth 3 times daily          Allergies: Stevan Rosado has No Known Allergies.     Past Medical History:   Diagnosis Date    Aphasia     Aphasia     Contracture of joint, lower leg     HTN (hypertension)  Hx MRSA infection resolved 2017    2 negative nasal screens 2017 (hx in heel in )    Hydronephrosis     Multiple sclerosis ([facility] [address]     Neurogenic bladder     Peripheral vascular disease (HCC)     Polyneuropathy     Pressure ulcer     Coccyx, Heel    Pulmonary embolism (HCC)     Pulmonary infarction (HCC)     Quadriplegia (HCC)     Tachycardia     UTI (lower urinary tract infection)        Past Surgical History:   Procedure Laterality Date    AMPUTATION Right     COLOSTOMY      GASTROSTOMY TUBE PLACEMENT      LEG AMPUTATION THROUGH FEMUR             Medications:     docusate  100 mg Oral Daily    magnesium sulfate  1 g Intravenous Once    furosemide  20 mg Oral Once    potassium chloride  40 mEq Intravenous Once    midodrine  10 mg Oral TID WC    pantoprazole  40 mg Intravenous Daily    And    sodium chloride (PF)  10 mL Intravenous Daily    meropenem  1 g Intravenous Q8H    scopolamine  1 patch Transdermal Q72H    sertraline  25 mg Per G Tube Daily    sodium chloride flush  10 mL Intravenous 2 times per day    sodium chloride flush  50 mL Intravenous Daily    CENTRUM/CERTA-DERICK with minerals oral  15 mL Per G Tube Daily    metoclopramide  5 mg Oral BID    rivaroxaban  20 mg Oral Daily     PRN Meds include: glucose, dextrose, glucagon (rDNA), dextrose, [Held by provider] metoprolol, potassium chloride **OR** potassium alternative oral replacement **OR** potassium chloride, potassium chloride, potassium chloride, LORazepam, acetaminophen, glycopyrrolate, sodium chloride flush, magnesium hydroxide, ondansetron, promethazine, fentanNYL    Objective:   /83   Pulse 101   Temp 98.4 °F (36.9 °C) (Axillary)   Resp 19   Ht 5' (1.524 m)   Wt 199 lb 8.3 oz (90.5 kg)   SpO2 100%   Breastfeeding?  No   BMI 38.97 kg/m²     Blood pressure range: Systolic (41MCY), XYC:039 , Min:102 , NZ   ; Diastolic (60OPP), DIR:63, Min:62, Max:87      Review of Systems      NEUROLOGICEXAMINATION  GENERAL Alert and awake, nods appropirately   HEENT  NC/ AT   cardiovascular  S1 and S2 heard; palpation of pulses: radial pulse    NECK  Supple and no bruits heard   MENTAL STATUS: Alert, oriented, intact memory, no confusion, normal speech, normal language, no hallucination or delusion   CRANIAL NERVES: II     -       disconjugate gaze with left eye deviation to left side  III,IV,VI -  PEERLA, clear conjuctiva  V     -     Cannot be assessed  VII    -     Cannot be assessed  VIII-     Intact hearing   IX,X -     Symmetrical palate  XI    -     Symmetrical shoulder shrug  XII   -     Midline tongue, no atrophy    MOTOR FUNCTION: Spontaneously moves upper extremities to pain,flexed, R below the knee amputation   SENSORY FUNCTION:  Withdraws RUE and LUe to pain   CEREBELLAR FUNCTION:  Cannot assess   REFLEX FUNCTION:  Symmetric, no Babinski sign   STATION and GAIT  Not tested                          Data:    Lab Results:   CBC:   Recent Labs     05/19/19  0637 05/20/19  0620 05/21/19  0440   WBC 28.5* 26.4* 16.4*   HGB 14.5 12.3 12.3    245 265     BMP:    Recent Labs     05/19/19  0750 05/20/19  0620 05/21/19  0440    143 142   K 2.9* 3.4* 2.6*   * 113* 110*   CO2 17* 20 22   BUN 9 8 10   CREATININE 0.34* 0.32* 0.31*   GLUCOSE 108* 85 73         Lab Results   Component Value Date    CHOL 149 08/07/2018    LDLCHOLESTEROL 92 05/08/2017    HDL 36 (L) 05/08/2017    TRIG 86 05/08/2017    ALT 22 05/18/2019    AST 44 (H) 05/18/2019    TSH 0.84 05/19/2019    INR 1.0 05/17/2019    [id number] 1047 (H) 04/05/2013       No results found for: PHENYTOIN, PHENYTOIN, PHENOBARB, VALPROATE, CBMZ          Impression:  46 y/o F  Non verbal at baseline pmhx of MS, chronic omer in in place due to neurogenic bladder and peg in place transferred from nursing facility to alternating mental status  Her symptoms are most likely due to toxic metabolic encephaoathy due to UTI given history of MS and neurogenic bladder, ID on board on meropenem awaiting urine cultures   MRI of the brain with and without contrast could not be done due to patient moving her head. MRI of the cervical and thoracic unremarkable for any acute pathology. Off Levophed as blood pressure is better maintain  ICU team on board for rest for management of comorbid.           Yennifer Odell MD  Neurology Resident PGY-2  5/21/2019 at 10:55 AM

## 2019-05-21 NOTE — PROGRESS NOTES
INTENSIVE CARE UNIT  Resident Physician Progress Note    Patient - Sridevi Rico  Date of Admission -  2019  2:31 PM  Date of Evaluation -  2019  Room and Bed Number -  0107/0107-01   Hospital Day - 4    Chief Complaint   Patient presents with    Bloated     Distended. No bowel sounds. Colostomy and PEG tube.  Fatigue      SUBJECTIVE:     OVERNIGHT EVENTS:    No acute events reported. Patient mental status at baseline. Nonverbal. Denies pain, SOB (motions with head). TODAY:      AWAKE & FOLLOWING COMMANDS:  [] No   [x] Yes    SECRETIONS Amount:  [] Small [] Moderate  [] Large  [] None  Color:     [] White [] Colored  [] Bloody    SEDATION:  RAAS Score:  [] Propofol gtt  [] Versed gtt  [] Ativan gtt   [x] No Sedation    PARALYZED:  [x] No    [] Yes    VASOPRESSORS:  [x] No    [] Yes  [] Levophed [] Dopamine [] Vasopressin  [] Dobutamine [] Phenylephrine [] Epinephrine    ROS: unable to perform due to non verbal.     OBJECTIVE:     VITAL SIGNS:  /83   Pulse 101   Temp 98.4 °F (36.9 °C) (Axillary)   Resp 19   Ht 5' (1.524 m)   Wt 199 lb 8.3 oz (90.5 kg)   SpO2 100%   Breastfeeding?  No   BMI 38.97 kg/m²   Tmax over 24 hours:  Temp (24hrs), Av.4 °F (36.9 °C), Min:98.4 °F (36.9 °C), Max:98.5 °F (36.9 °C)      Patient Vitals for the past 8 hrs:   BP Temp Temp src Pulse Resp SpO2   19 0600 117/83 -- -- 101 19 --   19 0500 123/87 -- -- 93 14 --   19 0400 130/80 98.4 °F (36.9 °C) Axillary 107 18 100 %   19 0300 116/80 -- -- 95 19 100 %   19 0200 112/75 -- -- 97 16 99 %   19 0100 116/71 -- -- 102 21 100 %   19 0000 107/70 98.4 °F (36.9 °C) Axillary 97 16 100 %         Intake/Output Summary (Last 24 hours) at 2019 0723  Last data filed at 2019 0600  Gross per 24 hour   Intake 3002.5 ml   Output 1195 ml   Net 1807.5 ml     Date 19 - 19 2359   Shift 1478-4629 4071-2040 4644-1300 24 Hour Total   INTAKE   I.V.(mL/kg) 400(4.4) 05/20/19 1651     PRN Meds:     [Held by provider] metoprolol 2.5 mg Q4H PRN   potassium chloride 40 mEq PRN   Or     potassium alternative oral replacement 40 mEq PRN   Or     potassium chloride 10 mEq PRN   potassium chloride 10 mEq PRN   potassium chloride 20 mEq PRN   LORazepam 1 mg As Directed RT PRN   acetaminophen 650 mg Q6H PRN   glycopyrrolate 1 mg TID PRN   sodium chloride flush 10 mL PRN   magnesium hydroxide 30 mL Daily PRN   ondansetron 4 mg Q6H PRN   promethazine 25 mg Q4H PRN   fentanNYL 25 mcg Q2H PRN       SUPPORT DEVICES: [] Ventilator [] BIPAP  [x] Nasal Cannula [] Room Air    VENT SETTINGS (Comprehensive) (if applicable):  Vent Information  FiO2 : (S) 30 %  Humidification Source: Heated wire  Humidification Temp: 37  Humidification Temp Measured: 37  Additional Respiratory  Assessments  Pulse: 101  Resp: 19  SpO2: 100 %  Humidification Source: Heated wire  Humidification Temp: 37  Oral Care: Teeth brushed, Mouth swabbed, Suction toothette    ABGs:     Lab Results   Component Value Date    XRI0HDQ 10 01/08/2017    FIO2 INFORMATION NOT PROVIDED 05/18/2019         DATA:  Complete Blood Count:   Recent Labs     05/19/19  0637 05/20/19  0620 05/21/19  0440   WBC 28.5* 26.4* 16.4*   RBC 5.17* 4.46 4.64   HGB 14.5 12.3 12.3   HCT 45.9 39.3 39.9   MCV 88.8 88.1 86.0   MCH 28.0 27.6 26.5   MCHC 31.6 31.3 30.8   RDW 15.1* 15.3* 15.3*    245 265   MPV 11.6 11.5 11.4        Last 3 Blood Glucose:   Recent Labs     05/19/19  0750 05/20/19  0620 05/21/19  0440   GLUCOSE 108* 85 73        PT/INR:    Lab Results   Component Value Date    PROTIME 11.0 05/17/2019    INR 1.0 05/17/2019     PTT:    Lab Results   Component Value Date    APTT 22.8 05/17/2019       Comprehensive Metabolic Profile:   Recent Labs     05/18/19  2107 05/19/19  0750 05/20/19  0620 05/21/19  0440   NA  --  141 143 142   K  --  2.9* 3.4* 2.6*   CL  --  111* 113* 110*   CO2  --  17* 20 22   BUN  --  9 8 10   CREATININE  --  0.34* 0.32* 0.31*   GLUCOSE  --  108* 85 73   CALCIUM  --  8.1* 8.5* 8.6   PROT 6.5  --   --   --    LABALBU 2.6*  --   --   --    BILITOT 0.25*  --   --   --    ALKPHOS 37  --   --   --    AST 44*  --   --   --    ALT 22  --   --   --       Magnesium:   Lab Results   Component Value Date    MG 1.7 05/21/2019    MG 2.0 05/20/2019    MG 1.7 05/19/2019     Phosphorus:   Lab Results   Component Value Date    PHOS 3.4 09/05/2017    PHOS 3.2 02/03/2014    PHOS 2.5 04/08/2013     Ionized Calcium:   Lab Results   Component Value Date    CAION 4.66 04/08/2013    CAION 4.66 04/07/2013    CAION 4.76 04/06/2013        Urinalysis:   Lab Results   Component Value Date    NITRU NEGATIVE 05/17/2019    COLORU RED 05/17/2019    PHUR 8.0 05/17/2019    WBCUA TOO NUMEROUS TO COUNT 05/17/2019    RBCUA TOO NUMEROUS TO COUNT 05/17/2019    MUCUS NOT REPORTED 05/17/2019    TRICHOMONAS NOT REPORTED 05/17/2019    YEAST NOT REPORTED 05/17/2019    BACTERIA MODERATE 05/17/2019    SPECGRAV 1.021 05/17/2019    LEUKOCYTESUR LARGE 05/17/2019    UROBILINOGEN Normal 05/17/2019    BILIRUBINUR NEGATIVE 05/17/2019    BILIRUBINUR NEGATIVE 03/11/2012    GLUCOSEU NEGATIVE 05/17/2019    GLUCOSEU NEGATIVE 03/11/2012    KETUA TRACE 05/17/2019    AMORPHOUS NOT REPORTED 05/17/2019       HgBA1c:  No results found for: LABA1C  TSH:    Lab Results   Component Value Date    TSH 0.84 05/19/2019     Lactic Acid:   Lab Results   Component Value Date    LACTA 2.0 01/08/2017      Troponin: No results for input(s): TROPONINI in the last 72 hours.     Microbiology:  Urine Culture:  No components found for: CHLOE      Other Labs:  CBC with Differential:    Lab Results   Component Value Date    WBC 16.4 05/21/2019    RBC 4.64 05/21/2019    RBC 4.56 03/11/2012    HGB 12.3 05/21/2019    HCT 39.9 05/21/2019     05/21/2019     03/11/2012    MCV 86.0 05/21/2019    MCH 26.5 05/21/2019    MCHC 30.8 05/21/2019    RDW 15.3 05/21/2019    METASPCT 3 04/04/2013    LYMPHOPCT 6 05/21/2019    MONOPCT 5 05/21/2019    BASOPCT 0 05/21/2019    MONOSABS 0.76 05/21/2019    LYMPHSABS 0.94 05/21/2019    EOSABS <0.03 05/21/2019    BASOSABS <0.03 05/21/2019    DIFFTYPE NOT REPORTED 05/21/2019     CMP:    Lab Results   Component Value Date     05/21/2019    K 2.6 05/21/2019     05/21/2019    CO2 22 05/21/2019    BUN 10 05/21/2019    CREATININE 0.31 05/21/2019    GFRAA >60 05/21/2019    LABGLOM >60 05/21/2019    GLUCOSE 73 05/21/2019    GLUCOSE 69 03/11/2012    PROT 6.5 05/18/2019    LABALBU 2.6 05/18/2019    CALCIUM 8.6 05/21/2019    BILITOT 0.25 05/18/2019    ALKPHOS 37 05/18/2019    AST 44 05/18/2019    ALT 22 05/18/2019         Radiology/Imaging:  XR chest 5/19/19  Bibasal opacities with bilateral small pleural effusions. ASSESSMENT:   Principal Problem:    Urinary tract infection  Active Problems:    Aphasia    Multiple sclerosis (HCC)    Nonverbal    Gastrostomy tube dependent (HCC)    Neurogenic bladder    Hemorrhagic cystitis    Tachycardia    Gross hematuria    Lactic acidosis    Chronic indwelling Davies catheter    Peripheral vascular disease (HCC)    Recurrent UTI (urinary tract infection)    History of pulmonary embolism    Hypertension    Altered mental status    Toxic metabolic encephalopathy    ESBL E. coli carrier    Septic shock (HCC)    Status post colostomy ([address]  Resolved Problems:    * No resolved hospital problems. *             PLAN:     WEAN PER PROTOCOL:  [] No   [] Yes  [x] N/A    ICU PROPHYLAXIS:  Stress ulcer:  [x] PPI Agent  [] [id number] [] Sucralfate  [] Other:  VTE:   [] Enoxaparin  [] Unfract. Heparin Subcut  [x] Xarelto    NUTRITION:  [] NPO [x] Tube Feeding (Specify: G-tube ) [] TPN  [] PO    HOME MEDS RECONCILED: [] No  [] Yes    CONSULTATION NEEDED:  [] No  [x] Yes    FAMILY UPDATED:    [] No  [] Yes    TRANSFER OUT OF ICU:   [] No  [x] Yes            Plan:  1. Sepsis with shock secondary to UTI. Resolved. ID follow.  Single positive blood culture with coagulase neg staph likely contaminant. Continue IV Meropenem 1 g Q 8 hr.  2. Hypotension due to sepsis. Resolved. D/C  IVF. Monitor urine and other stoma output. 3. Sinus tachycardia. Chronic. Improved. Patient on lopressor in nursing home. 4. UTI secondary to Proteus. Patient on chronic indwelling omer. On meropenem. ID follow. 5. Acute respiratory failure. Possibly from sepsis. Resolved. 6. Acute toxic metabolic encephalopathy. Resolved. Patient appears to be back to baseline. 7. Lactic acidosis. Resolved with IV hydration. 8. Hypokalemia. Replace K as needed. Monitor BMP. Check magnesium. 9. Multiple sclerosis with Quadriplegia. Neurology follow for possible flare. 10. PAD s/p Right BKA. Stable. 11. S/p Colostomy. Monitor stoma output. Stoma care. Continue colace for bowel regimen. 12. Neurogenic bladder. Chronic. Continue omer drainage. 13. Diet. On tube feeds. Gradually up titrate feeds until at goal.  Prefer fluid restricted tube feeds. 14. GI prophylaxis. IV protonix 40 mg daily. 15. DVT prophylaxis. On Xarelto    Others- continue Xarelto for chronic PE. Po lasix 20 mg stat for LE edema. PT/OT  Disposition. Transfer stepdown unit. Danii Meyer MD  PGY-1 Resident  Internal Medicine  [facility] [facility]  5/21/2019 7:23 AM   Attending Physician Statement  I have discussed the care of Stephanie Lowery, including pertinent history and exam findings,  with the resident. I have seen and examined the patient and the key elements of all parts of the encounter have been performed by me. I agree with the assessment, plan and orders as documented by the resident with additions .     Advance tf and change to Osmolite   Monitor bs start d10   intermittent oral lasix        Total critical care time caring for this patient with life threatening, unstable organ failure, including direct patient contact, management of life support systems, review of data including imaging and labs, discussions with other team members and physicians at least 27   Min so far today, excluding procedures. Treatment plan Discussed with nursing staff in detail , all questions answered . Electronically signed by Milvia Lorenzo MD on   5/21/19 at 4:42 PM    Please note that this chart was generated using voice recognition Dragon dictation software. Although every effort was made to ensure the accuracy of this automated transcription, some errors in transcription may have occurred.

## 2019-05-21 NOTE — PROGRESS NOTES
Yuniel Dockery was evaluated today and a DME order was entered for a semi-electric hospital bed because she requires assistance for positioning needs not possible in an ordinary bed, complexity of body positioning needs. Patient requires frequent and immediate positioning changes for relief of pain and care needs related to medical diagnoses. Patient needs variability of bed height requirements to perform patient transfers and for personal cares and hygiene, head of bed elevated to reduce aspiration risk. Patient receives tube feeding. Current body Weight: 199 lb 8.3 oz (90.5 kg). The need for this equipment was discussed with the patient and she understands and is in agreement. Yuniel Dockery requires a patient lift for the transfer between bed and a wheelchair. Without the use of the lift, the patient would be bed confined. Yuniel Dockery was evaluated today and a DME order was entered for a standard wheelchair because she requires this to successfully complete daily living tasks of personal cares. A standard manual wheelchair is necessary due to patient's impaired ambulation and mobility restrictions and would be unable to resolve these daily living tasks using a cane or walker. The patient's family is capable of using a standard wheelchair safely in their home and can maneuver within their home with adequate access. There is a caregiver available to provide necessary assistance. The need for this equipment was discussed with the patient and she understands, is in agreement, and has not expressed an unwillingness to use the wheelchair.

## 2019-05-21 NOTE — PROGRESS NOTES
Critical care team - Resident sign-out to medicine service      Date and time: 5/21/2019 3:55 PM  Patient's name:  Edson Real Record Number: [id number]  Patient's account/billing number: [de-identified]  Patient's YOB: 1980  Age: 45 y.o. Date of Admission: 5/17/2019  2:31 PM  Length of stay during current admission: 4    Primary Care Physician: Charlene Ramos MD    Code Status: Full Code    Mode of physician to physician communication:        [] Via telephone   [x] In person     Date and time of sign-out: 5/21/2019 3:55 PM    Accepting Internal Medicine resident: Dr. Uli Boggs    Accepting Medicine team: IM Team 1    Accepting team's attending: Dr. Carolann Gómez     Patient's current ICU Bed:  107      Patient's assigned bed on floor:  421        [] Med-Surg Monitored [x] Step-down       [] Psychiatry ICU       [] Psych floor     Reason for ICU admission:     Acute toxic metabolic encephalopathy    ICU course summary:     40-year-old female with a significant past medical history of multiple sclerosis with aphasia, quadriplegia, neurogenic bladder with chronic Davies drainage, PAD status post right BKA who presented from a nursing home with increased somnolence and lethargy. At presentation, patient was tachycardic with low normal blood pressure. Initial lactic acid 2.4, WBC 11.1. Urinalysis revealed turbid urine with gross hematuria. Patient was initially placed on Rocephin and Flagyl IV in ED. Infectious disease was consulted. Rocephin was continued pending urine culture. Urine culture(straight cath specimen) grew Proteus mirabilis. Neurology was consulted due to suspicion for MS flare. However patient was unable to do brain MRI (couldn't lay still). Patient developed hypotension requiring pressors(Levophed), midodrine and IV fluid boluses. Patient had one episode of vomiting and was also noticed to be in respiratory distress with SPO2 in the 80s.   She was placed on high flow oxygen. NG tube was inserted to low suction. Infectious disease changed antibiotics to IV meropenem every 8 hours. Levophed was weaned off as blood pressure improved. Patient was continued on midodrine. Patient showed clinical improvement. She was weaned off the high flow oxygen and is currently breathing in room air. Her mental status improved and patient is currently motioning appropriately with her head when questioned. Patient also noticed to have hypokalemia which was corrected. Tube feeds restarted via her G-tube. NG tube was discontinued. She had an episode of hypoglycemia this morning which was corrected with IV 50 percent dextrose. Tube feeds were changed from diabetic to regular. She received 1 dose of IV Lasix 20 mg daily due to left lower extremity and bilateral sacral edema. IV fluids were discontinued as well. Patient's mother was present at evaluation yesterday, states patient is back to baseline and will like patient home with her. Patient cleared for stepdown status     Procedures during patient's ICU stay:     MRI; cervical spine and thoracic spine    Current Vitals:     /81   Pulse 96   Temp 98.6 °F (37 °C) (Axillary)   Resp 20   Ht 5' (1.524 m)   Wt 199 lb 8.3 oz (90.5 kg)   SpO2 97%   Breastfeeding? No   BMI 38.97 kg/m²       Cultures:     Blood cultures:                 [x] None drawn      [] Negative             [x]  Positive (Details: Coagulase negative staph-likely contaminant  )  Urine Culture(straight cath specimen):                   [] None drawn      [] Negative             [x]  Positive (Details: Proteus mirabilis )  Sputum Culture:               [] None drawn       [] Negative             []  Positive (Details:  )   Endotracheal aspirate:     [] None drawn       [] Negative             []  Positive (Details:  )       Consults:     1. Infectious disease  2.   Neurology     Assessment:     Principal Problem:    Urinary tract infection  Active Problems:    Aphasia    Multiple sclerosis (HCC)    Nonverbal    Gastrostomy tube dependent (HCC)    Neurogenic bladder    Hemorrhagic cystitis    Tachycardia    Gross hematuria    Lactic acidosis    Chronic indwelling Omer catheter    Peripheral vascular disease (HCC)    Recurrent UTI (urinary tract infection)    History of pulmonary embolism    Hypertension    Altered mental status    Toxic metabolic encephalopathy    ESBL E. coli carrier    Septic shock (HCC)    Status post colostomy ([facility] [address]  Resolved Problems:    * No resolved hospital problems. *        Recommended Follow-up:     1. Sepsis with shock secondary to UTI. Resolved. ID follow. Single positive blood culture with coagulase neg staph likely contaminant. Continue IV Meropenem 1 g Q 8 hr.  2. Hypotension due to sepsis. Resolved. D/C  IVF. Monitor urine and other stoma output. 3. Sinus tachycardia. Chronic. Improved. Patient on lopressor in nursing home. 4. UTI secondary to Proteus. Patient on chronic indwelling omer. On meropenem. ID follow. 5. Acute respiratory failure. Possibly from sepsis. Resolved. 6. Acute toxic metabolic encephalopathy. Resolved. Patient appears to be back to baseline. 7. Lactic acidosis. Resolved with IV hydration. 8. Hypokalemia. Replace K as needed. Monitor BMP. Check magnesium. 9. Multiple sclerosis with Quadriplegia. Neurology follow for possible flare. 10. PAD s/p Right BKA : Stable. 11. S/p Colostomy. Monitor stoma output. Stoma care. Continue colace for bowel regimen. 12. Neurogenic bladder. Chronic. Continue omer drainage. 13. Diet. On tube feeds. Gradually up titrate feeds until at goal.  Prefer fluid restricted tube feeds. 14. GI prophylaxis. IV protonix 40 mg daily. 15. DVT prophylaxis. On Xarelto     Others- continue Xarelto for chronic PE. Po lasix 20 mg stat for LE edema. PT/OT  Disposition. Transfer stepdown unit.       Above mentioned assessment and plan was discussed by me with the admitting

## 2019-05-21 NOTE — PROGRESS NOTES
[facility] - Progress Note    Today's Date and Time: 5/21/2019, 7:23 AM    Impression :     · Urinary tract infection-Proteus mirabilis  · Shock requiring pressors  · Hx of recurrent UTIs  · ESBL+  E coli carrier  · Multiple sclerosis  · Acute mental status changes. Acute toxic metabolic encephalopathy. · Quadriplegia  · PAD with Rt BKA  · Indwelling long term PEG and trache  · Single blood culture positive for Coagulase negative Staphylococci on 5-17-19. Likely contaminant  Recommendations:     · Meropenem 1 gm IV q 8 hr   · Await final urine culture results  · Supportive care    Medical Decision Making/Summary/Discussion:5/21/2019     · Patient with MS, quadriplegia, neurogenic bladder  · Admitted with AMS changes- acute toxic metabolic encephalopathy  · Concern that she had UTI as a potential cause of AMS changes  · Urine culture with Proteus and several other organisms. Lab identifying them  · Past HX of ESBL + E coli  · Receiving treatment with Meropenem. · Patient improving with current treatment  Infection Control Recommendations   · [place] Precautions  · Contact Isolation ESBL +    Antimicrobial Stewardship Recommendations     · Simplification of therapy  · Targeted therapy    Coordination of Outpatient Care:   · Estimated Length of IV antimicrobials:TBD  · Patient will need Midline Catheter Insertion: No  · Patient will need PICC line Insertion:No  · Patient will need: Home IV , [place],  SNF,  LTAC:TBD  · Patient will need outpatient wound care:No    Chief complaint/reason for consultation:   · AMS changes  · UTI      History of Present Illness:   Sharath York is a 45y.o.-year-old  female who was initially admitted on 5/17/2019. Patient seen at the request of Elden Goodell.     INITIAL HISTORY:  Patient has multiple sclerosis, quadriplegia, neurogenic bladder, aphasia, hypertension, chronic indwelling Davies catheter, depression, DVTs in the past and contractures. Patient presented through ER with complaints of altered mental status. She resides at a SNF and was felt to have altered mental status. This is difficult to determine as she is non verbal. However, the feeling at the SNF was that there was a change. There is a Hx of recurrent urinary tract infections with prior ESBL+ E coli in December 2018. After that she has experienced UTI's with E coli not ESBL +.     In the ER the 5-17-19 UA obtained through the indwelling Davies shows large leukocyte esterase, negative nitrite, many WBC. The blood and urine cultures are in progress. The patient is nonverbal so it is difficult to determine if the urinary findings relate to the reported mental status changes or simply represent the chronic Davies effect. CURRENT EVALUATION : 5/21/2019    Afebrile  VS stable    Patient was transferred to ICU because of hypotension despite fluid boluses, toxic metabolic encephalopathy and lactic acidosis. Blood pressure has stabilized. Off pressors  Respiratory failure persists  Patient remains non verbal  Quite comfortable at present    Urine isolates being worked up. Proteus identified thus far  Plan for patient to remain on meropenem until final results are available  Discharge to home with family care is being investigated. K 2.6    Labs, X rays reviewed: 5/21/2019    BUN:40-->10  Cr: 0.93-->0.31    WBC:11.1-->20.-->26.4-->16.4  Hb: 17.5-->14.5-->12.3  Plat: 361-->277-->265    Cultures:  Urine:  · 5-17-19: pending. Proteus and Multiple organisms. Discussed with Micro  Blood:  · 5-17-19: No growth  ·     5-17-19: CXR: Poor inspiratory effort. Atelectasis  5-17-19: CT abdomen: renal stones, thickened bladder wall (chronic cystitis)    Discussed with MÓNICA FLORES. I have personally reviewed the past medical history, past surgical history, medications, social history, and family history, and I have updated the database accordingly.   Past Medical History: Past Medical History:   Diagnosis Date    Aphasia     Aphasia     Contracture of joint, lower leg     HTN (hypertension)     Hx MRSA infection resolved 1/2017    2 negative nasal screens 1/2017 (hx in heel in 2013)    Hydronephrosis     Multiple sclerosis ([facility] [address]     Neurogenic bladder     Peripheral vascular disease (HCC)     Polyneuropathy     Pressure ulcer     Coccyx, Heel    Pulmonary embolism (HCC)     Pulmonary infarction (HCC)     Quadriplegia (HCC)     Tachycardia     UTI (lower urinary tract infection)        Past Surgical  History:     Past Surgical History:   Procedure Laterality Date    AMPUTATION Right     COLOSTOMY      GASTROSTOMY TUBE PLACEMENT      LEG AMPUTATION THROUGH FEMUR         Medications:       Social History:     Social History     Socioeconomic History    Marital status: Single     Spouse name: Not on file    Number of children: Not on file    Years of education: Not on file    Highest education level: Not on file   Occupational History    Not on file   Social Needs    Financial resource strain: Not on file    Food insecurity:     Worry: Not on file     Inability: Not on file    Transportation needs:     Medical: Not on file     Non-medical: Not on file   Tobacco Use    Smoking status: Never Smoker    Smokeless tobacco: Never Used   Substance and Sexual Activity    Alcohol use: No    Drug use: No    Sexual activity: Never   Lifestyle    Physical activity:     Days per week: Not on file     Minutes per session: Not on file    Stress: Not on file   Relationships    Social connections:     Talks on phone: Not on file     Gets together: Not on file     Attends [employer] service: Not on file     Active member of club or organization: Not on file     Attends meetings of clubs or organizations: Not on file     Relationship status: Not on file    Intimate partner violence:     Fear of current or ex partner: Not on file     Emotionally abused: Not on file Physically abused: Not on file     Forced sexual activity: Not on file   Other Topics Concern    Not on file   Social History Narrative    Not on file       Family History:     Family History   Problem Relation Age of Onset    No Known Problems Mother     No Known Problems Father         Allergies:   Patient has no known allergies. Review of Systems:   Unable to provide. Non verbal      Physical Examination :     Patient Vitals for the past 8 hrs:   BP Temp Temp src Pulse Resp SpO2   05/21/19 0600 117/83 -- -- 101 19 --   05/21/19 0500 123/87 -- -- 93 14 --   05/21/19 0400 130/80 98.4 °F (36.9 °C) Axillary 107 18 100 %   05/21/19 0300 116/80 -- -- 95 19 100 %   05/21/19 0200 112/75 -- -- 97 16 99 %   05/21/19 0100 116/71 -- -- 102 21 100 %   05/21/19 0000 107/70 98.4 °F (36.9 °C) Axillary 97 16 100 %     General Appearance: Somnolent and in no apparent distress  Head:  Normocephalic, no trauma  Eyes: Pupils equal, round, reactive to light; sclera anicteric; conjunctivae pink. No embolic phenomena. ENT: Oropharynx clear, without erythema, exudate, or thrush. No tenderness of sinuses. Mouth/throat: mucosa pink and moist. No lesions. Dentition in good repair. Neck:Supple, without lymphadenopathy. Thyroid normal, No bruits. Pulmonary/Chest: Coarse sounds. Cardiovascular: Regular rate and rhythm without murmurs, rubs, or gallops. Abdomen: Soft, non tender. Bowel sounds normal. No organomegaly. G tube in place. Colostomy in LLQ. Chronic Davies in place  All four Extremities: No cyanosis, clubbing, edema, or effusions. Rt BKA. .Contractures present. Neurologic: Difficult to  mentation, non verbal. Withdraws arms. Lt leg contracted. Skin: Warm and dry with good turgor. No signs of peripheral arterial or venous insufficiency. No ulcerations. No open wounds.     Medical Decision Making -Laboratory:   I have independently reviewed/ordered the following labs:    CBC with Differential:   Recent Labs 05/20/19  0620 05/21/19  0440   WBC 26.4* 16.4*   HGB 12.3 12.3   HCT 39.3 39.9    265   LYMPHOPCT 4* 6*   MONOPCT 1 5     BMP:   Recent Labs     05/20/19  0620 05/21/19  0440    142   K 3.4* 2.6*   * 110*   CO2 20 22   BUN 8 10   CREATININE 0.32* 0.31*   MG 2.0 1.7     Hepatic Function Panel:   Recent Labs     05/18/19  2107   PROT 6.5   LABALBU 2.6*   BILIDIR <0.08   IBILI CANNOT BE CALCULATED   BILITOT 0.25*   ALKPHOS 37   ALT 22   AST 44*     No results for input(s): RPR in the last 72 hours. No results for input(s): HIV in the last 72 hours. No results for input(s): BC in the last 72 hours. Lab Results   Component Value Date    MUCUS NOT REPORTED 05/17/2019    RBC 4.64 05/21/2019    RBC 4.56 03/11/2012    TRICHOMONAS NOT REPORTED 05/17/2019    WBC 16.4 05/21/2019    YEAST NOT REPORTED 05/17/2019    TURBIDITY TURBID 05/17/2019     Lab Results   Component Value Date    CREATININE 0.31 05/21/2019    GLUCOSE 73 05/21/2019    GLUCOSE 69 03/11/2012       Medical Decision Making-Imaging:     EXAMINATION:   CT OF THE ABDOMEN AND PELVIS WITH CONTRAST 5/17/2019 3:52 pm       TECHNIQUE:   CT of the abdomen and pelvis was performed with the administration of   intravenous contrast. Multiplanar reformatted images are provided for review. Dose modulation, iterative reconstruction, and/or weight based adjustment of   the mA/kV was utilized to reduce the radiation dose to as low as reasonably   achievable.       COMPARISON:   01/08/2017       HISTORY:   ORDERING SYSTEM PROVIDED HISTORY: firm abdomen   TECHNOLOGIST PROVIDED HISTORY:           FINDINGS:   Lower Chest: Dependent changes at the lung bases, likely atelectasis.       Organs: The liver, spleen, pancreas and adrenal glands are unremarkable. Mild gallbladder distention.  No significant biliary dilatation.    Nonobstructive bilateral nephrolithiasis.  No significant hydronephrosis.  No   cystic or solid renal mass.  Mild renal cortical scarring on the right,   unchanged       GI/Bowel: Left lower quadrant colostomy with mucous fistula distally.  No   evidence of bowel obstruction.  No small bowel distention.  A gastrostomy   tube is in place.       Pelvis: Moderate free pelvic fluid, simple by attenuation characteristics.  A   Davies catheter is in place. Gradie Bran is some calcification along the posterior   bladder wall.  Mild enhancement of the bladder mucosa with mild infiltration   of the pericystic fat.  The uterus and adnexal structures are unremarkable.       Peritoneum/Retroperitoneum: Small quantity of diffuse abdominal ascites. Mild infiltration of the mesenteric fat.  No free intraperitoneal air.  No   focal abscess is seen.  The abdominal aorta is normal in caliber.  There is   no retroperitoneal adenopathy.       Bones/Soft Tissues: No acute osseous or soft tissue abnormality.  Small fat   containing umbilical hernia.           Impression   1. Small quantity of diffuse abdominal ascites of indeterminate etiology. Mild infiltration of the mesenteric fat.  There is no free air or focal   abscess. 2. Mild enhancement of the bladder mucosa with infiltration of the pericystic   fat.  Findings are similar on the previous study.  A mild chronic cystitis is   suggested. 3. Left lower quadrant colostomy and G-tube.  No evidence of bowel   obstruction. 4. Nonobstructive bilateral nephrolithiasis. 5. Dependent changes at the lung bases most consistent with atelectasis.              EXAMINATION:   ONE XRAY VIEW OF THE CHEST       5/17/2019 2:50 pm       COMPARISON:   January 8, 2017       HISTORY:   ORDERING SYSTEM PROVIDED HISTORY: Septic work up   TECHNOLOGIST PROVIDED HISTORY:   Septic work up   Ordering Physician Provided Reason for Exam: septic/tachycardia   Acuity: Unknown   Type of Exam: Unknown       FINDINGS:   Shallow inflation.  Cardiac silhouette enlargement is noted.  Interstitial   and nodular opacities are noted in the mid right lung field. Valente Pheasant appear   less prominent on the left.  The left costophrenic angle is obscured, a   chronic finding.  No pneumothorax identified.  No acute osseous abnormality   appreciated.  A gastrostomy tube is noted.           Impression   Shallow inflation.  Perihilar interstitial and nodular opacities are noted,   right greater than left.  While a component of this likely represents   atelectasis, developing infiltrate or inflammatory process should also be   considered.           Medical Decision Ilijwo-Licbacrh-Kdiji:       Medical Decision Making-Other:     Note:  · Labs, medications, radiologic studies were reviewed with personal review of films  · Moderate amounts of data were reviewed  · Discussed with nursing Staff  · Infection Control and Prevention measures reviewed  · All prior entries were reviewed  · Administer medications as ordered  · Prognosis: Guarded  · Discharge planning reviewed    Thank you for allowing us to participate in the care of this patient. Please call with questions.     Jenise Gomez MD  Pager: [phone] - Office: [phone]

## 2019-05-21 NOTE — CARE COORDINATION
Spoke with Cassidy at [address] [facility].. She states she is trying to get in contact with pt's CM from [employer] for waiver services to see how many hours of aide service pt is eligible for. Cassidy has spoken to pt's sister this am.    Spoke with Ondina Hancock at [place] regarding home tube feedings. She will check benefits. Called mom Luke Carias [phone] answer. Called and left message with pt's sister Delmy Matthew [phone] re:equipment needs for home. Spoke with pt's mom Luke Carias Drew Orellana). She states pt was able to get in a wheelchair at the nursing home. She is wanting a lift, hospital bed, w/c, tube feeding and colostomy supplies for home. Updated her that we would need to confirm with home care about aide services. She states she does not know who pt's CM for [employer] is. Spoke with Rita Friedman at [employer] to update that mom requesting to take pt home. Rita Friedman states pt has been at [facility] since July 2013. Faxed DME orders to [facility].    [id number] spoke with mom Pati Aguilera and sister at bedside. They are also requesting portable oral suction. DME order faxed to Nick Ahmadi with them regarding transition home. Home is one level, no steps to enter. Informed them DME orders were submitted. Informed them that [address] was waiting to see how many hours of aide services will be provided by insurance/waiver. Mom states she will be home 24 hours. Sister also available to help. Informed them pt would need 24 hour care and whatever hours were not approved for aide family would have to provide. They verbalized understanding.     Ondina Hancock from [place] to meet with mom and sister tomorrow around 2pm.

## 2019-05-22 ENCOUNTER — APPOINTMENT (OUTPATIENT)
Dept: GENERAL RADIOLOGY | Age: 39
DRG: 698 | End: 2019-05-22
Payer: MEDICARE

## 2019-05-22 LAB
ABSOLUTE EOS #: 0.1 K/UL (ref 0–0.44)
ABSOLUTE IMMATURE GRANULOCYTE: 0.09 K/UL (ref 0–0.3)
ABSOLUTE LYMPH #: 1.33 K/UL (ref 1.1–3.7)
ABSOLUTE MONO #: 1.02 K/UL (ref 0.1–1.2)
ANION GAP SERPL CALCULATED.3IONS-SCNC: 12 MMOL/L (ref 9–17)
BASOPHILS # BLD: 0 % (ref 0–2)
BASOPHILS ABSOLUTE: <0.03 K/UL (ref 0–0.2)
BUN BLDV-MCNC: 5 MG/DL (ref 6–20)
BUN/CREAT BLD: ABNORMAL (ref 9–20)
CALCIUM SERPL-MCNC: 8.5 MG/DL (ref 8.6–10.4)
CHLORIDE BLD-SCNC: 108 MMOL/L (ref 98–107)
CO2: 23 MMOL/L (ref 20–31)
CREAT SERPL-MCNC: 0.26 MG/DL (ref 0.5–0.9)
CULTURE: ABNORMAL
DIFFERENTIAL TYPE: ABNORMAL
EOSINOPHILS RELATIVE PERCENT: 1 % (ref 1–4)
GFR AFRICAN AMERICAN: >60 ML/MIN
GFR NON-AFRICAN AMERICAN: >60 ML/MIN
GFR SERPL CREATININE-BSD FRML MDRD: ABNORMAL ML/MIN/{1.73_M2}
GFR SERPL CREATININE-BSD FRML MDRD: ABNORMAL ML/MIN/{1.73_M2}
GLUCOSE BLD-MCNC: 108 MG/DL (ref 65–105)
GLUCOSE BLD-MCNC: 80 MG/DL (ref 65–105)
GLUCOSE BLD-MCNC: 82 MG/DL (ref 65–105)
GLUCOSE BLD-MCNC: 84 MG/DL (ref 65–105)
GLUCOSE BLD-MCNC: 86 MG/DL (ref 65–105)
GLUCOSE BLD-MCNC: 86 MG/DL (ref 70–99)
HCT VFR BLD CALC: 40.3 % (ref 36.3–47.1)
HEMOGLOBIN: 13.1 G/DL (ref 11.9–15.1)
IMMATURE GRANULOCYTES: 1 %
LYMPHOCYTES # BLD: 12 % (ref 24–43)
Lab: ABNORMAL
MAGNESIUM: 1.6 MG/DL (ref 1.6–2.6)
MCH RBC QN AUTO: 27.2 PG (ref 25.2–33.5)
MCHC RBC AUTO-ENTMCNC: 32.5 G/DL (ref 28.4–34.8)
MCV RBC AUTO: 83.8 FL (ref 82.6–102.9)
MONOCYTES # BLD: 9 % (ref 3–12)
NRBC AUTOMATED: 0 PER 100 WBC
PDW BLD-RTO: 15.1 % (ref 11.8–14.4)
PLATELET # BLD: 298 K/UL (ref 138–453)
PLATELET ESTIMATE: ABNORMAL
PMV BLD AUTO: 11 FL (ref 8.1–13.5)
POTASSIUM SERPL-SCNC: 3.4 MMOL/L (ref 3.7–5.3)
RBC # BLD: 4.81 M/UL (ref 3.95–5.11)
RBC # BLD: ABNORMAL 10*6/UL
SEG NEUTROPHILS: 77 % (ref 36–65)
SEGMENTED NEUTROPHILS ABSOLUTE COUNT: 8.64 K/UL (ref 1.5–8.1)
SODIUM BLD-SCNC: 143 MMOL/L (ref 135–144)
SPECIMEN DESCRIPTION: ABNORMAL
WBC # BLD: 11.2 K/UL (ref 3.5–11.3)
WBC # BLD: ABNORMAL 10*3/UL

## 2019-05-22 PROCEDURE — 2580000003 HC RX 258: Performed by: STUDENT IN AN ORGANIZED HEALTH CARE EDUCATION/TRAINING PROGRAM

## 2019-05-22 PROCEDURE — 6370000000 HC RX 637 (ALT 250 FOR IP): Performed by: STUDENT IN AN ORGANIZED HEALTH CARE EDUCATION/TRAINING PROGRAM

## 2019-05-22 PROCEDURE — 6360000002 HC RX W HCPCS: Performed by: STUDENT IN AN ORGANIZED HEALTH CARE EDUCATION/TRAINING PROGRAM

## 2019-05-22 PROCEDURE — 85025 COMPLETE CBC W/AUTO DIFF WBC: CPT

## 2019-05-22 PROCEDURE — 74018 RADEX ABDOMEN 1 VIEW: CPT

## 2019-05-22 PROCEDURE — 94761 N-INVAS EAR/PLS OXIMETRY MLT: CPT

## 2019-05-22 PROCEDURE — 51702 INSERT TEMP BLADDER CATH: CPT

## 2019-05-22 PROCEDURE — 83735 ASSAY OF MAGNESIUM: CPT

## 2019-05-22 PROCEDURE — 6360000002 HC RX W HCPCS: Performed by: INTERNAL MEDICINE

## 2019-05-22 PROCEDURE — 2580000003 HC RX 258: Performed by: INTERNAL MEDICINE

## 2019-05-22 PROCEDURE — 82947 ASSAY GLUCOSE BLOOD QUANT: CPT

## 2019-05-22 PROCEDURE — C9113 INJ PANTOPRAZOLE SODIUM, VIA: HCPCS | Performed by: STUDENT IN AN ORGANIZED HEALTH CARE EDUCATION/TRAINING PROGRAM

## 2019-05-22 PROCEDURE — 99232 SBSQ HOSP IP/OBS MODERATE 35: CPT | Performed by: INTERNAL MEDICINE

## 2019-05-22 PROCEDURE — 36415 COLL VENOUS BLD VENIPUNCTURE: CPT

## 2019-05-22 PROCEDURE — 6370000000 HC RX 637 (ALT 250 FOR IP): Performed by: EMERGENCY MEDICINE

## 2019-05-22 PROCEDURE — 80048 BASIC METABOLIC PNL TOTAL CA: CPT

## 2019-05-22 PROCEDURE — 99233 SBSQ HOSP IP/OBS HIGH 50: CPT | Performed by: INTERNAL MEDICINE

## 2019-05-22 PROCEDURE — 99233 SBSQ HOSP IP/OBS HIGH 50: CPT | Performed by: PSYCHIATRY & NEUROLOGY

## 2019-05-22 PROCEDURE — 2060000000 HC ICU INTERMEDIATE R&B

## 2019-05-22 RX ORDER — LORAZEPAM 2 MG/ML
1 INJECTION INTRAMUSCULAR ONCE
Status: COMPLETED | OUTPATIENT
Start: 2019-05-22 | End: 2019-05-23

## 2019-05-22 RX ORDER — MAGNESIUM SULFATE 1 G/100ML
1 INJECTION INTRAVENOUS ONCE
Status: COMPLETED | OUTPATIENT
Start: 2019-05-22 | End: 2019-05-22

## 2019-05-22 RX ORDER — POTASSIUM CHLORIDE 20 MEQ/1
40 TABLET, EXTENDED RELEASE ORAL 2 TIMES DAILY WITH MEALS
Status: DISCONTINUED | OUTPATIENT
Start: 2019-05-22 | End: 2019-05-23

## 2019-05-22 RX ADMIN — MULTIVITAMIN 15 ML: LIQUID ORAL at 13:03

## 2019-05-22 RX ADMIN — MEROPENEM 1 G: 1 INJECTION, POWDER, FOR SOLUTION INTRAVENOUS at 17:43

## 2019-05-22 RX ADMIN — MIDODRINE HYDROCHLORIDE 10 MG: 5 TABLET ORAL at 09:46

## 2019-05-22 RX ADMIN — DOCUSATE SODIUM 100 MG: 50 LIQUID ORAL at 09:47

## 2019-05-22 RX ADMIN — MEROPENEM 1 G: 1 INJECTION, POWDER, FOR SOLUTION INTRAVENOUS at 13:03

## 2019-05-22 RX ADMIN — METOCLOPRAMIDE HYDROCHLORIDE 5 MG: 5 SOLUTION ORAL at 21:52

## 2019-05-22 RX ADMIN — GLYCOPYRROLATE 1 MG: 1 TABLET ORAL at 09:47

## 2019-05-22 RX ADMIN — RIVAROXABAN 20 MG: 20 TABLET, FILM COATED ORAL at 21:52

## 2019-05-22 RX ADMIN — ACETAMINOPHEN 650 MG: 650 SOLUTION ORAL at 18:11

## 2019-05-22 RX ADMIN — SERTRALINE 25 MG: 25 TABLET, FILM COATED ORAL at 09:47

## 2019-05-22 RX ADMIN — MEROPENEM 1 G: 1 INJECTION, POWDER, FOR SOLUTION INTRAVENOUS at 00:12

## 2019-05-22 RX ADMIN — Medication 50 ML: at 09:00

## 2019-05-22 RX ADMIN — Medication 10 ML: at 22:12

## 2019-05-22 RX ADMIN — POTASSIUM CHLORIDE 40 MEQ: 1500 TABLET, EXTENDED RELEASE ORAL at 17:44

## 2019-05-22 RX ADMIN — MAGNESIUM HYDROXIDE 30 ML: 400 SUSPENSION ORAL at 18:07

## 2019-05-22 RX ADMIN — MIDODRINE HYDROCHLORIDE 10 MG: 5 TABLET ORAL at 17:44

## 2019-05-22 RX ADMIN — Medication 10 ML: at 10:03

## 2019-05-22 RX ADMIN — PANTOPRAZOLE SODIUM 40 MG: 40 INJECTION, POWDER, FOR SOLUTION INTRAVENOUS at 09:46

## 2019-05-22 RX ADMIN — Medication 10 ML: at 10:04

## 2019-05-22 RX ADMIN — METOCLOPRAMIDE HYDROCHLORIDE 5 MG: 5 SOLUTION ORAL at 10:03

## 2019-05-22 RX ADMIN — MAGNESIUM SULFATE HEPTAHYDRATE 1 G: 1 INJECTION, SOLUTION INTRAVENOUS at 13:48

## 2019-05-22 ASSESSMENT — PAIN SCALES - WONG BAKER: WONGBAKER_NUMERICALRESPONSE: 0

## 2019-05-22 ASSESSMENT — PAIN SCALES - GENERAL
PAINLEVEL_OUTOF10: 0
PAINLEVEL_OUTOF10: 0
PAINLEVEL_OUTOF10: 6

## 2019-05-22 NOTE — PROGRESS NOTES
INTERNAL MEDICINE                                                                             ICU PATIENT TRANSFER NOTE        Patient:  Ethan Choe  YOB: 1980  MRN: [id number]     Acct: [de-identified]     Admit date: 5/17/2019    Code Status:-  Full code     Reason for ICU Admission:-       SUPPORT DEVICES: [] Ventilator [] BIPAP  [x] Nasal Cannula [] Room Air    Consultations:- [] Cardiology [] Nephrology  [] Hemo onco  [] GI                               [x] ID [] ENT  [] Rheum [] Endo   []Physiotherapy                                 Others:- Neurology    NUTRITION:  [] NPO [x] Tube Feeding (Specify: ) [] TPN  [] PO    Central Lines:- [] No   [x] Yes           If yes - Days/Date of Insertion. Pt seen and Chart reviewed. ICU COURSE :-     History was obtained from chart review. Patient was non verbal so history could not be obtained from the patient. Ethan Choe is a 45 y.o. female that came to the ED from [address] [place] [address]. As per the people in the [facility], the patient is on nonverbal at baseline but from the last few days she was very fatigued and lethargic and mentation was altered. For this reason, the patient was sent to the emergency department for evaluation. During this encounter, the patient was drowsy, responsive to commands.     In the ED, the patient was afebrile. Blood pressure was on the lower side. Tachycardic. Lactic acid was 2.4. Hemoglobin was 17.5. WBC 11.1. Urinalysis showed gross hematuria, turbid, suggestive of urinary tract infection. Chest x-ray showed shallow inflation, perihilar interstitial and nodular opacities. CT scan of the abdomen and pelvis showed small quantity of diffuse abdominal ascites. Mesenteric fat. Mild chronic cystitis is suggested. Left lower quadrant colostomy NG tube in place. Nonobstructive bilateral nephrolithiasis. Patient admitted for urinary tract infection.     The patient has a history of multiple sclerosis, paraplegia, history of indwelling Omer secondary to neurogenic bladder. It looks like she has a baseline tachycardia as well. She also has a history of chronic PEG tube placement. Timing of the PEG tube placement is not known. She also has a colostomy in the left lower quadrant. She also has a history of right lower leg above knee amputation. She has a history of pulmonary embolism in 2013. Peripheral vascular disease. She has a history of chronic retention of urine and hydronephrosis. Recurrent urinary tract infections. In January 2017 she developed hemorrhagic cystitis. Since then she has the indwelling Omer catheter. She was seen by infectious diseases in January 2019. Patient has a history of ESBL E. coli but her last culture grew E. coli sensitive to Rocephin. Last infectious diseases note recommended catheter exchange with bladder irrigation for 24-48 hours with either Neosporin with polymyxin and gentamicin. HER-2 last urine cultures showed mixed bill. She also has a history of hypertension. Her last echo was in August 2017 which showed an ejection fraction of 55%. Normal left ventricular systolic function. The patient was transferred to MICU for hypotension and tachycardia. Her BP dropped to 83/32 with lactic acid of 4 and a heart rate of 145. She developed hyperchloremic non anion gap acidosis. Her omer was changed in the ICU. Infectious diseases was consulted. Her urine culture grew Proteus mirabilis. ID later changed antibiotic to meropenem Q8H. Neurology was consulted for MS flare. Patient could not get brain MRI as he could not lay still. She was started on vasopressor. She was placed on high flow O2 for respiratory distress as she was intolerant to BIPAP. She has been weaned off high flow as well.  Tube feeds were started via her G tube.  NG tube was discontinued today. She had an episode of hypoglycemia this morning which was corrected with IV 50 percent dextrose. Tube feeds were changed from diabetic to regular. She had hypokalemia with K of 2.9. She received 1 dose of IV Lasix 20 mg daily due to left lower extremity and bilateral sacral edema. IV fluids were discontinued as well. Her mentation continues to improve so she is being transferred out of ICU. Physical Exam:   Vitals: /78   Pulse 110   Temp 98.6 °F (37 °C) (Axillary)   Resp 22   Ht 5' (1.524 m)   Wt 199 lb 8.3 oz (90.5 kg)   SpO2 100%   Breastfeeding? No   BMI 38.97 kg/m²   24 hour intake/output:    Intake/Output Summary (Last 24 hours) at 5/21/2019 2222  Last data filed at 5/21/2019 2000  Gross per 24 hour   Intake 1202 ml   Output 1840 ml   Net -638 ml     Last 3 weights: Wt Readings from Last 3 Encounters:   05/20/19 199 lb 8.3 oz (90.5 kg)   01/22/19 174 lb (78.9 kg)   01/12/17 182 lb 1.6 oz (82.6 kg)       General appearance: alert, non verbal, responses to commands  HEENT: Head: Normocephalic, no lesions, without obvious abnormality. Neck: no adenopathy, no carotid bruit, no JVD, supple, symmetrical, trachea midline and thyroid not enlarged, symmetric, no tenderness/mass/nodules  Lungs: clear to auscultation bilaterally  Heart: regular rate and rhythm, loud S1, S2 normal, no murmur, click, rub or gallop  Abdomen: soft, non-tender; bowel sounds normal; no masses,  no organomegaly. G tube in place  Extremities: extremities normal, atraumatic, no cyanosis or edema.  Right BKA, mild left pedal edema  Neurologic: Mental status: Alert    Medications:Current Inpatient  Scheduled Meds:   docusate  100 mg Oral Daily    midodrine  10 mg Oral TID WC    pantoprazole  40 mg Intravenous Daily    And    sodium chloride (PF)  10 mL Intravenous Daily    meropenem  1 g Intravenous Q8H    scopolamine  1 patch Transdermal Q72H    sertraline  25 mg Per G Tube Daily  sodium chloride flush  10 mL Intravenous 2 times per day    sodium chloride flush  50 mL Intravenous Daily    CENTRUM/CERTA-DERICK with minerals oral  15 mL Per G Tube Daily    metoclopramide  5 mg Oral BID    rivaroxaban  20 mg Oral Daily     Continuous Infusions:   dextrose      dextrose 20 mL/hr at 05/21/19 1724    sodium chloride 10 mL/hr at 05/21/19 1102     PRN Meds:glucose, dextrose, glucagon (rDNA), dextrose, [Held by provider] metoprolol, potassium chloride **OR** potassium alternative oral replacement **OR** potassium chloride, potassium chloride, potassium chloride, LORazepam, acetaminophen, glycopyrrolate, sodium chloride flush, magnesium hydroxide, ondansetron, promethazine, fentanNYL    Objective:    CBC:   Recent Labs     05/19/19  0637 05/20/19  0620 05/21/19  0440   WBC 28.5* 26.4* 16.4*   HGB 14.5 12.3 12.3    245 265     BMP:    Recent Labs     05/19/19  0750 05/20/19  0620 05/21/19  0440 05/21/19  1825    143 142 143   K 2.9* 3.4* 2.6* 3.7   * 113* 110* 110*   CO2 17* 20 22 23   BUN 9 8 10  --    CREATININE 0.34* 0.32* 0.31*  --    GLUCOSE 108* 85 73  --      Calcium:  Recent Labs     05/21/19  0440   CALCIUM 8.6     Magnesium:  Recent Labs     05/21/19  1825   MG 1.7     Glucose:  Recent Labs     05/21/19  1648 05/21/19  1901 05/21/19  1958   POCGLU 74 86 85     Thyroid:   Lab Results   Component Value Date    TSH 0.84 05/19/2019        CULTURES:     Assessment:  Patient Active Problem List   Diagnosis    Aphasia    Multiple sclerosis ([address]    Nonverbal    Gastrostomy tube dependent ([address]    Dislodged gastrostomy tube ([address]    Neurogenic bladder    Hemorrhagic cystitis    Hypokalemia    Bacterial UTI    Tachycardia    Gross hematuria    Urinary tract infection    Lactic acidosis    Chronic indwelling Davies catheter    Peripheral vascular disease ([address]    Recurrent UTI (urinary tract infection)    History of pulmonary embolism    Hypertension   

## 2019-05-22 NOTE — CARE COORDINATION
Spoke with Cassidy from [address]. They cannot accept pt. Could only get aide services 3x week for bath and pt will need more hours than that for a safe discharge. Cassidy spoke with [facility]. Pt is \"in the system\" from previous services but is not current. Family will need to contact [facility] to get assigned a  who could then get more aide services for home. Pt will also need a PCP.

## 2019-05-22 NOTE — PROGRESS NOTES
NEUROLOGY INPATIENT PROGRESS NOTE    5/22/2019         Subjective: Jah Greenberg is a  45 y.o. female admitted on 5/17/2019 with Urinary tract infection [N39.0]  Urinary tract infection [N39.0]      Briefly, this is a  45 y.o. female, F resident with long standing hx of MS and paraplegia was admitted on 5/17/2019 with worsening lethargy found to have urosepsis and resultant toxic metabolic encephalopathy. Her mentation has a significantly improved with antibiotic therapy. Regarding multiple sclerosis; she has a long-standing history of multiple sclerosis and baseline paraplegia and nonverbal state. She has history of PEG tube placement and Rt above knee amputation. Patient was not on any disease modifying therapy for multiple sclerosis. Caregivers at [facility] were unable to provide any history regarding her prior therapies for multiple sclerosis. No information regarding her prior MRI studies of brain and spine and no information regarding her prior encounters with neurologists were obtainable through 'care everywhere\" search. On admit; patient has had MRI brain and spine and those studies were nondiagnostic as those were significantly motion degraded. MRI spine and brain were not repeated under sedation due to ongoing encephalopathy on admit. As her mentation has been improving; discussion done repeatedly with patient yesterday and today and patient is agreeable to get MRI studies. Her history is also significant for pulmonary embolism in 2013, peripheral vascular disease, hydronephrosis. She also had hemorrhagic cystitis in January 2017. Since then she has indwelling Davies catheter. She has hx of infection with a multidrug resistant organism; hx of ESBL E. Coli but her last culture grew E. Coli sensitive to Rocephin. No current facility-administered medications on file prior to encounter.       Current Outpatient Medications on File Prior to Encounter   Medication Sig Dispense Refill    Multiple Vitamins-Minerals (CENTRUM/CERTA-DERICK WITH MINERALS ORAL) solution Take 2.5 mLs by mouth daily      Cranberry 250 MG CAPS Take 250 mg by mouth 2 times daily      metoclopramide (REGLAN) 5 MG/5ML solution Take 5 mg by mouth 2 times daily      ranitidine (ZANTAC) 75 MG/5ML syrup 75 mg by Per G Tube route 2 times daily      vitamin D (CHOLECALCIFEROL) 1000 UNIT TABS tablet 1,000 Units by Per G Tube route daily      rivaroxaban (XARELTO) 10 MG TABS tablet 1 tablet by Per G Tube route daily (with breakfast) 30 tablet 0    potassium chloride 20 MEQ/15ML (10%) oral solution 10 mEq by Per G Tube route daily      Scopolamine Base (SCOPOLAMINE TD) Place 1 mg onto the skin every 72 hours       baclofen (LIORESAL) 10 MG tablet 10 mg by Per G Tube route 3 times daily Crush into G tube       gabapentin (NEURONTIN) 300 MG capsule 300 mg by Per G Tube route 3 times daily      medroxyPROGESTERone (DEPO-PROVERA) 150 MG/ML injection Inject 150 mg into the muscle every 3 months On the 11th       metoprolol (LOPRESSOR) 25 MG tablet 12.5 mg 2 times daily       acetaminophen (TYLENOL) 80 MG/0.8ML suspension 650 mg by Per G Tube route every 6 hours as needed for Fever or Pain       glycopyrrolate (ROBINUL) 1 MG tablet Take 1 mg by mouth 3 times daily          Allergies: Dario Jeans has No Known Allergies.     Past Medical History:   Diagnosis Date    Aphasia     Aphasia     Contracture of joint, lower leg     HTN (hypertension)     Hx MRSA infection resolved 1/2017    2 negative nasal screens 1/2017 (hx in heel in 2013)    Hydronephrosis     [facility] sclerosis [facility]     Neurogenic bladder     Peripheral vascular disease (HCC)     Polyneuropathy     Pressure ulcer     Coccyx, Heel    Pulmonary embolism (HCC)     Pulmonary infarction (HCC)     Quadriplegia (HCC)     Tachycardia     UTI (lower urinary tract infection)        Past Surgical History:   Procedure Laterality Date    AMPUTATION Right     COLOSTOMY      GASTROSTOMY TUBE PLACEMENT      LEG AMPUTATION THROUGH FEMUR             Medications:     magnesium sulfate  1 g Intravenous Once    docusate  100 mg Oral Daily    midodrine  10 mg Oral TID WC    pantoprazole  40 mg Intravenous Daily    And    sodium chloride (PF)  10 mL Intravenous Daily    meropenem  1 g Intravenous Q8H    scopolamine  1 patch Transdermal Q72H    sertraline  25 mg Per G Tube Daily    sodium chloride flush  10 mL Intravenous 2 times per day    sodium chloride flush  50 mL Intravenous Daily    CENTRUM/CERTA-DERICK with minerals oral  15 mL Per G Tube Daily    metoclopramide  5 mg Oral BID    rivaroxaban  20 mg Oral Daily     PRN Meds include: glucose, dextrose, glucagon (rDNA), dextrose, [Held by provider] metoprolol, potassium chloride **OR** potassium alternative oral replacement **OR** potassium chloride, potassium chloride, potassium chloride, LORazepam, acetaminophen, glycopyrrolate, sodium chloride flush, magnesium hydroxide, ondansetron, promethazine, fentanNYL    Objective:   /66   Pulse 112   Temp 97.5 °F (36.4 °C) (Oral)   Resp 15   Ht 5' (1.524 m)   Wt 190 lb 4.8 oz (86.3 kg)   SpO2 95%   Breastfeeding? No   BMI 37.17 kg/m²     Blood pressure range: Systolic (40QJE), BFA:130 , Min:108 , CDC:342   ; Diastolic (67JKG), UWT:99, Min:66, Max:90        NEUROLOGIC EXAMINATION  GENERAL  Appears comfortable and in no distress   HEENT  NC/ AT   cardiovascular  S1 and S2 heard; palpation of pulses: radial pulse    NECK  Supple and no bruits heard   MENTAL STATUS:  Alert, oriented to name call; trying to vocalize and nods her head appropriately.    Baseline nonverbal; no hallucination or delusion   CRANIAL NERVES: II     -      PERRLA, optic discs; clear; blinks to threat bilaterally   III,IV,VI -  Left esotropia; left ROMA with abducting nystagmus noted on right gaze; no ptosis  V     -     Normal facial sensation   VII    -     Normal facial symmetry  VIII -     Intact hearing   IX,X -     Symmetrical palate  XI    -     Symmetrical shoulder shrug  XII   -     Midline tongue, no atrophy    MOTOR FUNCTION:  significant for spastic left upper and left lower extremities with contracture in left lower extremity and extensor plantar response on left side; able to move right upper extremity spontaneously and purposefully and right above knee and palpitations noted. No abnormal involuntary movements noted. SENSORY FUNCTION:   withdraws to pinprick in right upper extremity greater than left upper and left lower extremities   CEREBELLAR FUNCTION:   no ataxia noted in right upper extremity and deferred in left upper extremity    REFLEX FUNCTION:  hyperactive DTRs in spastic left upper and left lower extremities with extensor plantar response on left side    STATION and GAIT  Not tested         Data:    Lab Results:   CBC:   Recent Labs     05/20/19  0620 05/21/19  0440 05/22/19  0812   WBC 26.4* 16.4* 11.2   HGB 12.3 12.3 13.1    265 298     BMP:    Recent Labs     05/20/19  0620 05/21/19  0440 05/21/19  1825 05/22/19  0812    142 143 143   K 3.4* 2.6* 3.7 3.4*   * 110* 110* 108*   CO2 20 22 23 23   BUN 8 10  --  5*   CREATININE 0.32* 0.31*  --  0.26*   GLUCOSE 85 73  --  86         Lab Results   Component Value Date    CHOL 149 08/07/2018    LDLCHOLESTEROL 92 05/08/2017    HDL 36 (L) 05/08/2017    TRIG 86 05/08/2017    ALT 22 05/18/2019    AST 44 (H) 05/18/2019    TSH 0.84 05/19/2019    INR 1.0 05/17/2019    [id number] 1047 (H) 04/05/2013     CT head 5/18/19: No acute intracranial abnormality. Severe chronic white matter changes. MRI cervical spine & thoracic spine 5/19/19: Severely motion degraded exam resulting in evaluation of the cord to be nondiagnostic. Impression and Plan: Ms. Sharath York is a 45 y.o. female with   Toxic metabolic encephalopathy with septic shock secondary to UTI: Resolved.   Multiple sclerosis with quadriplegia with baseline nonverbal state: Initial spine MRIs were  Nondiagnostic; because of motion degraded state. Patient is much more alert and awake and agreeable to get brain and spine MRI under sedation. We'll schedule those studies for today. Will follow with you.
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
## 2019-05-22 NOTE — PROGRESS NOTES
Nutrition Assessment (Enteral Nutrition)    Type and Reason for Visit: Reassess    Nutrition Recommendations:   - Continue TF of Osmolite 1.2 (std without fiber) slowly advancing to goal rate of 55 mL/hr. Monitor TF tolerance/adequacy of intakes - modify as needed. - If unable to advance TF to goal rate, suggest trying Vital AF 1.2 (semi-elemental) formula goal rate 55 mL/hr.  - Monitor labs and bowel function. Nutrition Assessment: RN reports pt continues to have residuals with TF - currently running at 20 ml/hr. No colostomy output yesterday or today. Discussed with RN slowly advancing TF to goal rate. Malnutrition Assessment:  · Malnutrition Status: At risk for malnutrition  · Context: Acute illness or injury  · Findings of the 6 clinical characteristics of malnutrition (Minimum of 2 out of 6 clinical characteristics is required to make the diagnosis of moderate or severe Protein Calorie Malnutrition based on AND/ASPEN Guidelines):  1. Energy Intake-Less than or equal to 75% of estimated energy requirement, (as TF not at goal rate; likely has been meeting more than 75% of estimated needs with Tube Feedings at goal rate)    2. Weight Loss-No significant weight loss  3. Fat Loss-No significant subcutaneous fat loss  4. Muscle Loss-No significant muscle mass loss  5. Fluid Accumulation-Mild fluid accumulation, Generalized    Nutrition Risk Level: High    Nutrition Needs:  · Estimated Daily Total Kcal: 1.1-1.2 ~>1465-9356 kcals/d   · Estimated Daily Protein (g): 1.5-1.8 gm/kg - 55-70 gm pro/day    Nutrition Diagnosis:   · Problem: Inadequate oral intake  · Etiology: related to Difficulty swallowing, Cognitive or neurological impairment     Signs and symptoms:  as evidenced by NPO status due to medical condition, Nutrition support - EN    Objective Information:  · Nutrition-Focused Physical Findings: PEG. Colostomy. Distended abdomen. Hypoactive bowel sounds.   · Wound Type: Stage II, Pressure Ulcer(to coccyx)  · Current Nutrition Therapies:  · Oral Diet Orders: NPO   · Tube Feeding (TF) Orders:   · Feeding Route: Gastrostomy  · Formula: Standard without Fiber  · Rate (ml/hr):20 mL/hr - goal rate 55 mL/hr    · Volume (ml/day): 1320 mL/day  · Duration: Continuous  · Current TF & Flush Orders Provides: 576 kcal and 29 gm protein per day  · Goal TF & Flush Orders Provides: Osmolite 1.2 (std without fiber) at goal 55 mL/hr = 1584 kcal and 73 gm protein per day  · Anthropometric Measures:  · Ht: 5' (152.4 cm)   · Current Body Wt: 190 lb 4.8 oz (86.3 kg)  · Admission Body Wt: 188 lb (85.3 kg)  · Weight Change: 174 lbs x 4 mo ago per EMR    · Ideal Body Wt: 84 lb (38.1 kg)(per AKA), % Ideal Body 223% (adm/ideal)  · Adjusted Body Wt:  , body weight adjusted for AKA  · BMI Classification: BMI 35.0 - 39.9 Obese Class II    Nutrition Interventions:   Continue current Tube Feeding - As able slowly advance TF of Osmolite 1.2 (std without fiber) to goal rate of 55 ml/hr.   Continued Inpatient Monitoring    Nutrition Evaluation:   · Evaluation: Progressing toward goals   · Goals: Pt to meet % of est'd needs via EN   · Monitoring: TF Intake, TF Tolerance, Skin Integrity, Wound Healing, I&O, Weight, Pertinent Labs, Monitor Hemodynamic Status, Monitor Bowel Function    Electronically signed by Christianne Cunha RD, ALAN on 5/22/19 at 3:00 PM    Contact Number: [phone]

## 2019-05-22 NOTE — PLAN OF CARE
Problem: Pain:  Goal: Pain level will decrease  Description  Pain level will decrease  Outcome: Met This Shift     Problem: Pain:  Goal: Control of chronic pain  Description  Control of chronic pain  Outcome: Met This Shift     Problem: Falls - Risk of:  Goal: Absence of physical injury  Description  Absence of physical injury  Outcome: Met This Shift

## 2019-05-22 NOTE — PROGRESS NOTES
Pt received from icu, non verbal. Peg feeding at 20 ml/hr with osmolyte 1.2. Residual of 40ml. Will hold increasing rate.

## 2019-05-22 NOTE — PLAN OF CARE
Problem: Serum Glucose Level - Abnormal:  Goal: Ability to maintain appropriate glucose levels will improve  Description  Ability to maintain appropriate glucose levels will improve  Outcome: Met This Shift     Problem: Urinary Elimination:  Goal: Signs and symptoms of infection will decrease  Description  Signs and symptoms of infection will decrease  Outcome: Met This Shift     Problem: Risk for Impaired Skin Integrity  Goal: Tissue integrity - skin and mucous membranes  Description  Structural intactness and normal physiological function of skin and  mucous membranes.   Outcome: Ongoing     Problem: Sensory:  Goal: Pain level will decrease  Description  Pain level will decrease  Outcome: Met This Shift

## 2019-05-22 NOTE — PROGRESS NOTES
of Present Illness:   Sharath York is a 45y.o.-year-old  female who was initially admitted on 5/17/2019. Patient seen at the request of Elden Goodell. INITIAL HISTORY:  Patient has multiple sclerosis, quadriplegia, neurogenic bladder, aphasia, hypertension, chronic indwelling Omer catheter, depression, DVTs in the past and contractures. Patient presented through ER with complaints of altered mental status. She resides at a SNF and was felt to have altered mental status. This is difficult to determine as she is non verbal. However, the feeling at the SNF was that there was a change. There is a Hx of recurrent urinary tract infections with prior ESBL+ E coli in December 2018. After that she has experienced UTI's with E coli not ESBL +.     In the ER the 5-17-19 UA obtained through the indwelling Omer shows large leukocyte esterase, negative nitrite, many WBC. The blood and urine cultures are in progress. The patient is nonverbal so it is difficult to determine if the urinary findings relate to the reported mental status changes or simply from the chronic omer. Blood pressure has stabilized. Off pressors    CURRENT EVALUATION : 5/22/2019    Afebrile  VS stable    Patient was transferred to ICU because of hypotension despite fluid boluses, toxic metabolic encephalopathy and lactic acidosis. Has improved    Pts abdomen is distended and firm. Bowel sounds are very hypoactive. She appears to be tender to palpation. Tube feeds have been turned off and abd xray ordered. Pt has generalized anasarca    She is currently on RA  Lactic acid level is WNL    Urine culture has shown multiple organisms    Rt IJ clean    K 3.4    Labs, X rays reviewed: 5/22/2019    BUN:5  Cr: 0.26    WBC:11.1-->20.-->26.4-->16.4->11.2  Hb: 17.5-->14.5-->12.3->13.1  Plat: 361-->277-->265->298    Cultures:  Urine:  · 5-17-19: pending.  Proteus, Pseudomonas, ESBL E coli, Aerococcus viridans  Blood:  · 5-17-19: 1/2 showing coag neg staph, likely a contaminate      5-17-19: CXR: Poor inspiratory effort. Atelectasis  5-17-19: CT abdomen: renal stones, thickened bladder wall (chronic cystitis)  5-20 Bilateral venous doppler negative for clots    Discussed with RN. I have personally reviewed the past medical history, past surgical history, medications, social history, and family history, and I have updated the database accordingly.   Past Medical History:     Past Medical History:   Diagnosis Date    Aphasia     Aphasia     Contracture of joint, lower leg     HTN (hypertension)     Hx MRSA infection resolved 1/2017    2 negative nasal screens 1/2017 (hx in heel in 2013)    Hydronephrosis     Multiple sclerosis ([facility] [address]     Neurogenic bladder     Peripheral vascular disease (HCC)     Polyneuropathy     Pressure ulcer     Coccyx, Heel    Pulmonary embolism (HCC)     Pulmonary infarction (HCC)     Quadriplegia (HCC)     Tachycardia     UTI (lower urinary tract infection)        Past Surgical  History:     Past Surgical History:   Procedure Laterality Date    AMPUTATION Right     COLOSTOMY      GASTROSTOMY TUBE PLACEMENT      LEG AMPUTATION THROUGH FEMUR         Medications:       Social History:     Social History     Socioeconomic History    Marital status: Single     Spouse name: Not on file    Number of children: Not on file    Years of education: Not on file    Highest education level: Not on file   Occupational History    Not on file   Social Needs    Financial resource strain: Not on file    Food insecurity:     Worry: Not on file     Inability: Not on file    Transportation needs:     Medical: Not on file     Non-medical: Not on file   Tobacco Use    Smoking status: Never Smoker    Smokeless tobacco: Never Used   Substance and Sexual Activity    Alcohol use: No    Drug use: No    Sexual activity: Never   Lifestyle    Physical activity:     Days per week: Not on file     Minutes per session: Not  No significant biliary dilatation. Nonobstructive bilateral nephrolithiasis.  No significant hydronephrosis.  No   cystic or solid renal mass.  Mild renal cortical scarring on the right,   unchanged       GI/Bowel: Left lower quadrant colostomy with mucous fistula distally.  No   evidence of bowel obstruction.  No small bowel distention.  A gastrostomy   tube is in place.       Pelvis: Moderate free pelvic fluid, simple by attenuation characteristics.  A   Davies catheter is in place. Edita Kvng is some calcification along the posterior   bladder wall.  Mild enhancement of the bladder mucosa with mild infiltration   of the pericystic fat.  The uterus and adnexal structures are unremarkable.       Peritoneum/Retroperitoneum: Small quantity of diffuse abdominal ascites. Mild infiltration of the mesenteric fat.  No free intraperitoneal air.  No   focal abscess is seen.  The abdominal aorta is normal in caliber.  There is   no retroperitoneal adenopathy.       Bones/Soft Tissues: No acute osseous or soft tissue abnormality.  Small fat   containing umbilical hernia.           Impression   1. Small quantity of diffuse abdominal ascites of indeterminate etiology. Mild infiltration of the mesenteric fat.  There is no free air or focal   abscess. 2. Mild enhancement of the bladder mucosa with infiltration of the pericystic   fat.  Findings are similar on the previous study.  A mild chronic cystitis is   suggested. 3. Left lower quadrant colostomy and G-tube.  No evidence of bowel   obstruction. 4. Nonobstructive bilateral nephrolithiasis. 5. Dependent changes at the lung bases most consistent with atelectasis.              EXAMINATION:   ONE XRAY VIEW OF THE CHEST       5/17/2019 2:50 pm       COMPARISON:   January 8, 2017       HISTORY:   ORDERING SYSTEM PROVIDED HISTORY: Septic work up   TECHNOLOGIST PROVIDED HISTORY:   Septic work up   Ordering Physician Provided Reason for Exam: septic/tachycardia   Acuity: Unknown   Type of Exam: Unknown       FINDINGS:   Shallow inflation.  Cardiac silhouette enlargement is noted.  Interstitial   and nodular opacities are noted in the mid right lung field.  These appear   less prominent on the left.  The left costophrenic angle is obscured, a   chronic finding.  No pneumothorax identified.  No acute osseous abnormality   appreciated.  A gastrostomy tube is noted.           Impression   Shallow inflation.  Perihilar interstitial and nodular opacities are noted,   right greater than left.  While a component of this likely represents   atelectasis, developing infiltrate or inflammatory process should also be   considered.           Medical Decision Zrwgch-Orgffejh-Kjone:   5/22/2019  5:46 AM - Lew, pn Incoming Lab Results From [facility]     Specimen Information: Urine, straight catheter        Component Collected Lab   Specimen Description 05/17/2019  4:34 PM [employer]   . URINE,STRAIGHT CATHETER    Special Requests 05/17/2019  4:34 PM [employer]   NOT REPORTED    Culture Abnormal  05/17/2019  4:34 PM [address] <[id number] CFU/ML    Culture Abnormal  05/17/2019  4:34 PM [address] <[id number] CFU/ML DIFFERENT COLONY TYPE    Culture Abnormal  05/17/2019  4:34 PM [address] <03076 CFU/ML    Culture Abnormal  05/17/2019  4:34 PM [address] <20923 CFU/ML THIS ORGANISM IS AN EXTENDED-SPECTRUM BETA-LACTAMASE  AND RESISTANCE TO THERAPY WITH PENICILLINS, CEPHALOSPORINS AND AZTREONAM IS EXPECTED.  THESE ORGANISMS GENERALLY REMAIN SUSCEPTIBLE TO CARBAPENEMS.  CONSIDER ID CONSULTATION.    Culture Abnormal  05/17/2019  4:34 PM [address] >[id number] CFU/ML There are no CLSI interpretive guidelines for routine susceptibility testing.  Aerococcus species are reported to be susceptible to penicillin. Culture Abnormal  05/17/2019  4:34  [address]   Several types of bacteria were identified in this specimen.  Further ID and susceptibility testing is generally not helpful in this circumstance and has not been performed.  Consider recollection if clinically indicated.  Please contact the Micro lab ([phone]) if you feel the management ofthis particular situation would be helped by further testing. Culture 05/17/2019  4:34  [address]   Work up all organisms per physician request.    Testing Performed By     Edvin Parker Name Director Address Valid Date Range   [address] [address] [address] [facility] [address] 08/30/17 0801-Present   Susceptibility     Proteus mirabilis (1)     Antibiotic Interpretation JOAQUIN Status    amikacin   Final     NOT REPORTED   ampicillin Sensitive  Final     <=2  SUSCEPTIBLE   ampicillin-sulbactam   Final     NOT REPORTED   aztreonam Sensitive  Final     <=1  SUSCEPTIBLE   ceFAZolin Sensitive  Final     <=4  SUSCEPTIBLE   ceFAZolin Sensitive Cefazolin sensitivity results can be used to predict the effectiveness of oral cephalosporins (eg.  Cephalexin) in uncomplicated Urinary Tract Infections due to E. coli, K. pneumoniae, and P. mirabilis Final    cefepime   Final     NOT REPORTED   cefTRIAXone Sensitive  Final     <=1  SUSCEPTIBLE   ciprofloxacin Resistant  Final     >=4  RESISTANT   ertapenem   Final     NOT REPORTED   gentamicin Intermediate  Final     8  INTERMEDIATE   meropenem   Final     NOT REPORTED   nitrofurantoin Resistant  Final     64  RESISTANT   tigecycline   Final     NOT REPORTED   tobramycin Sensitive  Final     4  SUSCEPTIBLE   trimethoprim-sulfamethoxazole Resistant  Final     >=320  RESISTANT   piperacillin-tazobactam Sensitive  Final     <=4  SUSCEPTIBLE   Proteus mirabilis (2)     Antibiotic Interpretation JOAQUIN Status    amikacin   Final     NOT REPORTED   ampicillin Sensitive  Final     <=2  SUSCEPTIBLE   ampicillin-sulbactam   Final     NOT REPORTED   aztreonam Sensitive  Final     <=1  SUSCEPTIBLE   ceFAZolin   Final     NOT REPORTED   cefepime   Final     NOT REPORTED   cefTRIAXone Sensitive  Final     <=1  SUSCEPTIBLE   ciprofloxacin Resistant  Final     >=4  RESISTANT   ertapenem   Final     NOT REPORTED   gentamicin Intermediate  Final     8  INTERMEDIATE   meropenem   Final     NOT REPORTED   nitrofurantoin Resistant  Final     64  RESISTANT   tigecycline   Final     NOT REPORTED   tobramycin Intermediate  Final     8  INTERMEDIATE   trimethoprim-sulfamethoxazole Resistant  Final     >=320  RESISTANT   piperacillin-tazobactam Sensitive  Final     <=4  SUSCEPTIBLE   Pseudomonas aeruginosa (3)     Antibiotic Interpretation JOAQUIN Status    amikacin   Final     NOT REPORTED   ceFAZolin   Final     NOT REPORTED   cefepime Sensitive  Final     2  SUSCEPTIBLE   ciprofloxacin Resistant  Final     >=4  RESISTANT   gentamicin Sensitive  Final     2  SUSCEPTIBLE   meropenem   Final     NOT REPORTED   tigecycline   Final     NOT REPORTED   tobramycin Sensitive  Final     <=1  SUSCEPTIBLE   piperacillin-tazobactam Sensitive  Final     <=4  SUSCEPTIBLE   Escherichia coli (4)     Antibiotic Interpretation JOAQUIN Status    amikacin   Final     NOT REPORTED   ampicillin Resistant  Final     >=32  RESISTANT   ampicillin-sulbactam   Final     NOT REPORTED   aztreonam Resistant  Final     16  RESISTANT   ceFAZolin Resistant  Final     >=64  RESISTANT   ceFAZolin Resistant Cefazolin sensitivity results can be used to predict the effectiveness of oral cephalosporins (eg.  Cephalexin) in uncomplicated Urinary Tract Infections due to E. coli, K. pneumoniae, and P. mirabilis Final    cefepime Resistant  Final     2  RESISTANT   cefTRIAXone Resistant  Final     >=64  RESISTANT   ciprofloxacin Sensitive  Final     1  SUSCEPTIBLE   ertapenem   Final     NOT REPORTED   Confirmatory Extended Spectrum Beta-Lactamase Positive POSITIVE Final    gentamicin Sensitive  Final     <=1  SUSCEPTIBLE   meropenem Sensitive  Final     <=0.25  SUSCEPTIBLE   nitrofurantoin Sensitive  Final     <=16  SUSCEPTIBLE   tigecycline   Final     NOT REPORTED   tobramycin Sensitive  Final     <=1  SUSCEPTIBLE   trimethoprim-sulfamethoxazole Sensitive  Final     <=20  SUSCEPTIBLE   piperacillin-tazobactam Resistant  Final     <=4  RESISTANT           5/20/2019  5:24 PM - Lew, Nathalie Incoming Lab Results From [facility]     Specimen Information: Blood        Component Collected Lab   Specimen Description 05/17/2019  4:06  [address]   . BLOOD    Special Requests 05/17/2019  4:06  [address]   NO LOCATION GIVEN    Culture Abnormal  05/17/2019  4:06 PM [address] Blood Culture Results called to and read back by: ALEXANDREA DEJESUS [id number]    Culture 05/17/2019  4:06 PM [facility] FROM BOTTLE: GRAM POSITIVE COCCI IN CLUSTERS    Culture 05/17/2019  4:06  [address]   ID by [employer] [place] [employer]: STAPHYLOCOCCUS SPECIES, COAGULASE NEGATIVE    Culture Abnormal  05/17/2019  4:06 PM [address] A single positive blood culture of coagulase negative staphylococci, micrococci, diphtheroids or Bacillus species should be interpreted with caution and viewed as likely a skin contaminant. Medical Decision Making-Other:     Note:  · Labs, medications, radiologic studies were reviewed with personal review of films  · Moderate amounts of data were reviewed  · Discussed with nursing Staff  · Infection Control and Prevention measures reviewed  · All prior entries were reviewed  · Administer medications as ordered  · Prognosis: Guarded  · Discharge planning reviewed    Thank you for allowing us to participate in the care of this patient. Please call with questions.     Leigh Koch

## 2019-05-22 NOTE — PROGRESS NOTES
[facility]  Internal Medicine Residency Program  Inpatient Daily Progress Note  ______________________________________________________________________________    Patient: Ham Simms  YOB: 1980   MRN: [id number]    Acct: [de-identified]     Admit date: 5/17/2019  Today's date: 05/22/19  Number of days in the hospital: 5  Expected Discharge Date: 05/21/19    Admitting Diagnosis: Urinary tract infection    Subjective:   Patient seen and examined at bedside. Tachycardic. Blood pressure is stable. On room air saturating well. Urine output has been good. Almost 1-1/2 L in the last 24 hours. Lactic acid is normal.  Urine culture grew ESBL E. coli. WBC 16.4. Review of systems cannot be obtained as the patient is nonverbal.  Objective:   Vital Sign:  /66   Pulse 112   Temp 97.5 °F (36.4 °C) (Oral)   Resp 15   Ht 5' (1.524 m)   Wt 190 lb 4.8 oz (86.3 kg)   SpO2 95%   Breastfeeding? No   BMI 37.17 kg/m²       Physical Exam:  General appearance: alert, non verbal, responses to commands  HEENT: Head: Normocephalic, no lesions, without obvious abnormality. Neck: no adenopathy, no carotid bruit, no JVD, supple, symmetrical, trachea midline and thyroid not enlarged, symmetric, no tenderness/mass/nodules  Lungs: clear to auscultation bilaterally  Heart: regular rate and rhythm, loud S1, S2 normal, no murmur, click, rub or gallop  Abdomen: soft, non-tender; bowel sounds normal; no masses,  no organomegaly. G tube in place  Extremities: extremities normal, atraumatic, no cyanosis or edema.  Right BKA, mild left pedal edema  Neurologic: Mental status: Alert    Medications:  Scheduled Medications   magnesium sulfate  1 g Intravenous Once    [START ON 5/23/2019] lansoprazole  30 mg Per G Tube QAM AC    potassium chloride  40 mEq Oral BID WC    docusate  100 mg Oral Daily    midodrine  10 mg Oral TID WC    meropenem  1 g Intravenous Q8H    scopolamine  1 patch Transdermal Q72H    sertraline  25 mg Per G Tube Daily    sodium chloride flush  10 mL Intravenous 2 times per day    sodium chloride flush  50 mL Intravenous Daily    CENTRUM/CERTA-DERICK with minerals oral  15 mL Per G Tube Daily    metoclopramide  5 mg Oral BID    rivaroxaban  20 mg Oral Daily       PRN Medications  glucose 15 g PRN   dextrose 12.5 g PRN   glucagon (rDNA) 1 mg PRN   dextrose 100 mL/hr PRN   [Held by provider] metoprolol 2.5 mg Q4H PRN   potassium chloride 40 mEq PRN   Or     potassium alternative oral replacement 40 mEq PRN   Or     potassium chloride 10 mEq PRN   potassium chloride 10 mEq PRN   potassium chloride 20 mEq PRN   LORazepam 1 mg As Directed RT PRN   acetaminophen 650 mg Q6H PRN   glycopyrrolate 1 mg TID PRN   sodium chloride flush 10 mL PRN   magnesium hydroxide 30 mL Daily PRN   ondansetron 4 mg Q6H PRN   promethazine 25 mg Q4H PRN   fentanNYL 25 mcg Q2H PRN       Diagnostic Labs and Imaging:  CBC:  Recent Labs     05/20/19  0620 05/21/19  0440 05/22/19  0812   WBC 26.4* 16.4* 11.2   HGB 12.3 12.3 13.1    265 298     BMP: Recent Labs     05/20/19  0620 05/21/19  0440 05/21/19  1825 05/22/19  0812    142 143 143   K 3.4* 2.6* 3.7 3.4*   * 110* 110* 108*   CO2 20 22 23 23   BUN 8 10  --  5*   CREATININE 0.32* 0.31*  --  0.26*   GLUCOSE 85 73  --  86     Hepatic: No results for input(s): AST, ALT, ALB, BILITOT, ALKPHOS in the last 72 hours.     Assessment and Plan:     Principal Problem:    Urinary tract infection  Active Problems:    Aphasia    Multiple sclerosis (HCC)    Nonverbal    Gastrostomy tube dependent (HCC)    Neurogenic bladder    Hemorrhagic cystitis    Tachycardia    Gross hematuria    Lactic acidosis    Chronic indwelling Davies catheter    Peripheral vascular disease (HCC)    Recurrent UTI (urinary tract infection)    History of pulmonary embolism    Hypertension    Altered mental status    Toxic metabolic encephalopathy    ESBL E. coli carrier    Septic shock ([facility] [address]    Status post colostomy ([facility] [address]  Resolved Problems:    * No resolved hospital problems. *    1. Sepsis with shock secondary to UTI. Resolved. ID follow. Single positive blood culture with coagulase neg staph likely contaminant. Continue IV Meropenem 1 g Q 8 hr. Will discuss with ID regarding duration of antibiotics. 2. Hypotension due to sepsis. Resolved. D/C  IVF. Monitor urine and other stoma output. 3. Sinus tachycardia. Chronic. Improved. Patient on lopressor in nursing home. 4. UTI secondary to Proteus. Patient on chronic indwelling omer. On meropenem. ID follow. 5. Acute respiratory failure. Possibly from sepsis. Resolved.   6. Acute toxic metabolic encephalopathy. Resolved. Patient appears to be back to baseline. 7. Lactic acidosis. Resolved with IV hydration. 8. Hypokalemia. Replace K as needed. Monitor BMP. Check magnesium. 9. Multiple sclerosis with Quadriplegia. Neurology follow for possible flare. 10. PAD s/p Right BKA : Stable. 11. S/p Colostomy. Monitor stoma output. Stoma care. Continue colace for bowel regimen. 12. Neurogenic bladder. Chronic. Continue omer drainage. 13. Diet. On tube feeds. Gradually up titrate feeds until at goal.  Prefer fluid restricted tube feeds. 14. GI prophylaxis. IV protonix 40 mg daily. 15. DVT prophylaxis. On To Ritchie MD  PGY-1, Department of Internal Medicine  [place], [place]  5/22/2019 12:05 PM      Attending Physician Statement  I have discussed the care of Jarocho Montilla, including pertinent history and exam findings with the resident. I have reviewed the key elements of all parts of the encounter with the resident. I have seen and examined the patient with the resident. I agree with the assessment and plan and status of the problem list as documented.     Please see ICU transfer note attested today    Anita Castillo MD  5/22/2019 5:40 PM

## 2019-05-23 ENCOUNTER — APPOINTMENT (OUTPATIENT)
Dept: MRI IMAGING | Age: 39
DRG: 698 | End: 2019-05-23
Payer: MEDICARE

## 2019-05-23 LAB
ABSOLUTE EOS #: 0.5 K/UL (ref 0–0.44)
ABSOLUTE IMMATURE GRANULOCYTE: 0.1 K/UL (ref 0–0.3)
ABSOLUTE LYMPH #: 1.57 K/UL (ref 1.1–3.7)
ABSOLUTE MONO #: 0.76 K/UL (ref 0.1–1.2)
ANION GAP SERPL CALCULATED.3IONS-SCNC: 10 MMOL/L (ref 9–17)
BASOPHILS # BLD: 0 % (ref 0–2)
BASOPHILS ABSOLUTE: 0.03 K/UL (ref 0–0.2)
BUN BLDV-MCNC: 5 MG/DL (ref 6–20)
BUN/CREAT BLD: ABNORMAL (ref 9–20)
CALCIUM SERPL-MCNC: 8.5 MG/DL (ref 8.6–10.4)
CHLORIDE BLD-SCNC: 103 MMOL/L (ref 98–107)
CO2: 26 MMOL/L (ref 20–31)
CREAT SERPL-MCNC: 0.26 MG/DL (ref 0.5–0.9)
CULTURE: NORMAL
DIFFERENTIAL TYPE: ABNORMAL
EOSINOPHILS RELATIVE PERCENT: 5 % (ref 1–4)
GFR AFRICAN AMERICAN: >60 ML/MIN
GFR NON-AFRICAN AMERICAN: >60 ML/MIN
GFR SERPL CREATININE-BSD FRML MDRD: ABNORMAL ML/MIN/{1.73_M2}
GFR SERPL CREATININE-BSD FRML MDRD: ABNORMAL ML/MIN/{1.73_M2}
GLUCOSE BLD-MCNC: 68 MG/DL (ref 65–105)
GLUCOSE BLD-MCNC: 71 MG/DL (ref 65–105)
GLUCOSE BLD-MCNC: 78 MG/DL (ref 65–105)
GLUCOSE BLD-MCNC: 80 MG/DL (ref 65–105)
GLUCOSE BLD-MCNC: 84 MG/DL (ref 70–99)
HCT VFR BLD CALC: 41.2 % (ref 36.3–47.1)
HEMOGLOBIN: 13.1 G/DL (ref 11.9–15.1)
IMMATURE GRANULOCYTES: 1 %
LYMPHOCYTES # BLD: 16 % (ref 24–43)
Lab: NORMAL
MAGNESIUM: 2 MG/DL (ref 1.6–2.6)
MCH RBC QN AUTO: 26.8 PG (ref 25.2–33.5)
MCHC RBC AUTO-ENTMCNC: 31.8 G/DL (ref 28.4–34.8)
MCV RBC AUTO: 84.3 FL (ref 82.6–102.9)
MONOCYTES # BLD: 8 % (ref 3–12)
NRBC AUTOMATED: 0 PER 100 WBC
PDW BLD-RTO: 15.1 % (ref 11.8–14.4)
PLATELET # BLD: 328 K/UL (ref 138–453)
PLATELET ESTIMATE: ABNORMAL
PMV BLD AUTO: 10.8 FL (ref 8.1–13.5)
POTASSIUM SERPL-SCNC: 3.6 MMOL/L (ref 3.7–5.3)
RBC # BLD: 4.89 M/UL (ref 3.95–5.11)
RBC # BLD: ABNORMAL 10*6/UL
SEG NEUTROPHILS: 70 % (ref 36–65)
SEGMENTED NEUTROPHILS ABSOLUTE COUNT: 6.61 K/UL (ref 1.5–8.1)
SODIUM BLD-SCNC: 139 MMOL/L (ref 135–144)
SPECIMEN DESCRIPTION: NORMAL
WBC # BLD: 9.6 K/UL (ref 3.5–11.3)
WBC # BLD: ABNORMAL 10*3/UL

## 2019-05-23 PROCEDURE — 82947 ASSAY GLUCOSE BLOOD QUANT: CPT

## 2019-05-23 PROCEDURE — A9576 INJ PROHANCE MULTIPACK: HCPCS | Performed by: INTERNAL MEDICINE

## 2019-05-23 PROCEDURE — 99232 SBSQ HOSP IP/OBS MODERATE 35: CPT | Performed by: PSYCHIATRY & NEUROLOGY

## 2019-05-23 PROCEDURE — 80048 BASIC METABOLIC PNL TOTAL CA: CPT

## 2019-05-23 PROCEDURE — 6370000000 HC RX 637 (ALT 250 FOR IP): Performed by: STUDENT IN AN ORGANIZED HEALTH CARE EDUCATION/TRAINING PROGRAM

## 2019-05-23 PROCEDURE — 6370000000 HC RX 637 (ALT 250 FOR IP): Performed by: EMERGENCY MEDICINE

## 2019-05-23 PROCEDURE — 6360000002 HC RX W HCPCS: Performed by: INTERNAL MEDICINE

## 2019-05-23 PROCEDURE — 2580000003 HC RX 258: Performed by: STUDENT IN AN ORGANIZED HEALTH CARE EDUCATION/TRAINING PROGRAM

## 2019-05-23 PROCEDURE — 2580000003 HC RX 258

## 2019-05-23 PROCEDURE — 72157 MRI CHEST SPINE W/O & W/DYE: CPT

## 2019-05-23 PROCEDURE — 2060000000 HC ICU INTERMEDIATE R&B

## 2019-05-23 PROCEDURE — 6370000000 HC RX 637 (ALT 250 FOR IP): Performed by: INTERNAL MEDICINE

## 2019-05-23 PROCEDURE — 36415 COLL VENOUS BLD VENIPUNCTURE: CPT

## 2019-05-23 PROCEDURE — 99232 SBSQ HOSP IP/OBS MODERATE 35: CPT | Performed by: INTERNAL MEDICINE

## 2019-05-23 PROCEDURE — 83735 ASSAY OF MAGNESIUM: CPT

## 2019-05-23 PROCEDURE — 85025 COMPLETE CBC W/AUTO DIFF WBC: CPT

## 2019-05-23 PROCEDURE — 72156 MRI NECK SPINE W/O & W/DYE: CPT

## 2019-05-23 PROCEDURE — 70553 MRI BRAIN STEM W/O & W/DYE: CPT

## 2019-05-23 PROCEDURE — 6360000004 HC RX CONTRAST MEDICATION: Performed by: INTERNAL MEDICINE

## 2019-05-23 PROCEDURE — 6360000002 HC RX W HCPCS: Performed by: PSYCHIATRY & NEUROLOGY

## 2019-05-23 PROCEDURE — 2580000003 HC RX 258: Performed by: INTERNAL MEDICINE

## 2019-05-23 RX ORDER — MAGNESIUM HYDROXIDE 1200 MG/15ML
LIQUID ORAL
Status: COMPLETED
Start: 2019-05-23 | End: 2019-05-23

## 2019-05-23 RX ORDER — METOCLOPRAMIDE HYDROCHLORIDE 5 MG/5ML
5 SOLUTION ORAL EVERY 6 HOURS
Status: DISCONTINUED | OUTPATIENT
Start: 2019-05-23 | End: 2019-05-25 | Stop reason: HOSPADM

## 2019-05-23 RX ORDER — SENNA AND DOCUSATE SODIUM 50; 8.6 MG/1; MG/1
2 TABLET, FILM COATED ORAL DAILY PRN
Status: DISCONTINUED | OUTPATIENT
Start: 2019-05-23 | End: 2019-05-25

## 2019-05-23 RX ORDER — SODIUM CHLORIDE 0.9 % (FLUSH) 0.9 %
10 SYRINGE (ML) INJECTION PRN
Status: DISCONTINUED | OUTPATIENT
Start: 2019-05-23 | End: 2019-05-25 | Stop reason: HOSPADM

## 2019-05-23 RX ORDER — POTASSIUM CHLORIDE 20 MEQ/1
40 TABLET, EXTENDED RELEASE ORAL 2 TIMES DAILY WITH MEALS
Status: DISCONTINUED | OUTPATIENT
Start: 2019-05-23 | End: 2019-05-23

## 2019-05-23 RX ORDER — POTASSIUM CHLORIDE 20MEQ/15ML
40 LIQUID (ML) ORAL 2 TIMES DAILY
Status: COMPLETED | OUTPATIENT
Start: 2019-05-23 | End: 2019-05-23

## 2019-05-23 RX ADMIN — GADOTERIDOL 17 ML: 279.3 INJECTION, SOLUTION INTRAVENOUS at 12:37

## 2019-05-23 RX ADMIN — DEXTROSE MONOHYDRATE: 100 INJECTION, SOLUTION INTRAVENOUS at 09:32

## 2019-05-23 RX ADMIN — DEXTROSE MONOHYDRATE: 100 INJECTION, SOLUTION INTRAVENOUS at 04:32

## 2019-05-23 RX ADMIN — MIDODRINE HYDROCHLORIDE 10 MG: 5 TABLET ORAL at 14:31

## 2019-05-23 RX ADMIN — SODIUM CHLORIDE 1000 ML: 900 IRRIGANT IRRIGATION at 09:57

## 2019-05-23 RX ADMIN — DOCUSATE SODIUM 100 MG: 50 LIQUID ORAL at 09:33

## 2019-05-23 RX ADMIN — MIDODRINE HYDROCHLORIDE 10 MG: 5 TABLET ORAL at 17:09

## 2019-05-23 RX ADMIN — SERTRALINE 25 MG: 25 TABLET, FILM COATED ORAL at 09:36

## 2019-05-23 RX ADMIN — MEROPENEM 1 G: 1 INJECTION, POWDER, FOR SOLUTION INTRAVENOUS at 07:01

## 2019-05-23 RX ADMIN — GLYCOPYRROLATE 1 MG: 1 TABLET ORAL at 09:33

## 2019-05-23 RX ADMIN — MAGNESIUM HYDROXIDE 30 ML: 400 SUSPENSION ORAL at 09:53

## 2019-05-23 RX ADMIN — MULTIVITAMIN 15 ML: LIQUID ORAL at 09:33

## 2019-05-23 RX ADMIN — METOCLOPRAMIDE HYDROCHLORIDE 5 MG: 5 SOLUTION ORAL at 21:54

## 2019-05-23 RX ADMIN — RIVAROXABAN 20 MG: 20 TABLET, FILM COATED ORAL at 23:09

## 2019-05-23 RX ADMIN — Medication 10 ML: at 09:35

## 2019-05-23 RX ADMIN — MIDODRINE HYDROCHLORIDE 10 MG: 5 TABLET ORAL at 09:32

## 2019-05-23 RX ADMIN — LANSOPRAZOLE 30 MG: 30 TABLET, ORALLY DISINTEGRATING, DELAYED RELEASE ORAL at 09:34

## 2019-05-23 RX ADMIN — LORAZEPAM 1 MG: 2 INJECTION INTRAMUSCULAR; INTRAVENOUS at 10:12

## 2019-05-23 RX ADMIN — METOCLOPRAMIDE HYDROCHLORIDE 5 MG: 5 SOLUTION ORAL at 09:34

## 2019-05-23 RX ADMIN — POTASSIUM CHLORIDE 40 MEQ: 40 SOLUTION ORAL at 21:55

## 2019-05-23 RX ADMIN — Medication 10 ML: at 21:56

## 2019-05-23 RX ADMIN — MEROPENEM 1 G: 1 INJECTION, POWDER, FOR SOLUTION INTRAVENOUS at 00:34

## 2019-05-23 RX ADMIN — POTASSIUM CHLORIDE 40 MEQ: 40 SOLUTION ORAL at 09:31

## 2019-05-23 ASSESSMENT — PAIN SCALES - WONG BAKER
WONGBAKER_NUMERICALRESPONSE: 0

## 2019-05-23 ASSESSMENT — PAIN SCALES - GENERAL: PAINLEVEL_OUTOF10: 0

## 2019-05-23 NOTE — PROGRESS NOTES
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 Acute toxic metabolic encephalopathy. · Quadriplegia  · PAD with Rt BKA  · Indwelling long term PEG and trache  · Single blood culture positive for Coagulase negative Staphylococci on 5-17-19. Likely contaminant  Recommendations:     · Discontinue Meropenem 1 gm IV q 8 hr (5/19-5/22)  · Stop tube feeds  · Supportive care    Medical Decision Making/Summary/Discussion:5/23/2019     · Patient with MS, quadriplegia, neurogenic bladder  · Admitted with AMS changes- acute toxic metabolic encephalopathy  · Concern that she had UTI as a potential cause of AMS changes  · Urine culture with Proteus and several other organisms. Lab identifying them  · Past HX of ESBL + E coli  · Receiving treatment with Meropenem. · Patient improving with current treatment  · Urine culture showing multiple bacteria. It appears that the urine culture sample was taken from the old omer catheter on 5-17. A new one was placed on 5-20  · 5-21 Pt has not had any output in her colostomy overnight or today. Tube feeds are running at 20/hr. Abd is distended and bowel sounds are very hypoactive. · 5-23-19: Bowel sounds remain hypoactive. Little to mo output through ostomy.  5-22-19 KUB normal  Infection Control Recommendations   · Norden Precautions  · Contact Isolation ESBL +    Antimicrobial Stewardship Recommendations     · Completion of therapy    Coordination of Outpatient Care:   · Estimated Length of IV antimicrobials: 5-22-19  · Patient will need Midline Catheter Insertion: No  · Patient will need PICC line Insertion:No  · Patient will need: Home IV , [place],  SNF,  LTAC:TBD  · Patient will need outpatient wound care:No    Chief complaint/reason for consultation:   · AMS changes  · UTI      History of Present Illness:   Larry Baker is a 45y.o.-year-old  female who was initially admitted on 5/17/2019. Patient seen at the request of Ofelia Vaughan. INITIAL HISTORY:  Patient has multiple sclerosis, quadriplegia, neurogenic bladder, aphasia, hypertension, chronic indwelling Omer catheter, depression, DVTs in the past and contractures. Patient presented through ER with complaints of altered mental status. She resides at a SNF and was felt to have altered mental status. This is difficult to determine as she is non verbal. However, the feeling at the SNF was that there was a change. There is a Hx of recurrent urinary tract infections with prior ESBL+ E coli in December 2018. After that she has experienced UTI's with E coli not ESBL +.     In the ER the 5-17-19 UA obtained through the indwelling Omer shows large leukocyte esterase, negative nitrite, many WBC. The blood and urine cultures are in progress. The patient is nonverbal so it is difficult to determine if the urinary findings relate to the reported mental status changes or simply from the chronic omer. Blood pressure has stabilized. Off pressors    CURRENT EVALUATION : 5/23/2019    Afebrile  VS stable    Patient was transferred to ICU because of hypotension despite fluid boluses, toxic metabolic encephalopathy and lactic acidosis. Has improved and transferred out of ICU. Pts abdomen is distended and firm. Bowel sounds are very hypoactive. She appears to be tender to palpation. Tube feeds have been turned off and abd xray ordered. Abdomen continues to be monitored. Pt has generalized anasarca    She is currently on RA  Lactic acid level is WNL    Urine culture has shown multiple organisms.  E coli ESBL +, Pseudomonas, Proteus    Rt IJ clean    Labs, X rays reviewed: 5/23/2019    BUN:5  Cr: 0.26  K 3. 6    WBC:11.1-->20.-->26.4-->16.4->11.2-->9.6  Hb: 17.5-->14.5-->12.3->13.1  Plat: 361-->277-->265->298-->328    Cultures:  Urine:  · 5-17-19: pending. Proteus, Pseudomonas, ESBL E coli, Aerococcus viridans  Blood:  · 5-17-19: 1/2 showing coag neg staph, likely a contaminate      5-17-19: CXR: Poor inspiratory effort. Atelectasis  5-17-19: CT abdomen: renal stones, thickened bladder wall (chronic cystitis)  5-20 Bilateral venous doppler negative for clots    Discussed with RN. I have personally reviewed the past medical history, past surgical history, medications, social history, and family history, and I have updated the database accordingly.   Past Medical History:     Past Medical History:   Diagnosis Date    Aphasia     Aphasia     Contracture of joint, lower leg     HTN (hypertension)     Hx MRSA infection resolved 1/2017    2 negative nasal screens 1/2017 (hx in heel in 2013)    Hydronephrosis     Multiple sclerosis ([facility] [address]     Neurogenic bladder     Peripheral vascular disease (HCC)     Polyneuropathy     Pressure ulcer     Coccyx, Heel    Pulmonary embolism (HCC)     Pulmonary infarction (HCC)     Quadriplegia (HCC)     Tachycardia     UTI (lower urinary tract infection)        Past Surgical  History:     Past Surgical History:   Procedure Laterality Date    AMPUTATION Right     COLOSTOMY      GASTROSTOMY TUBE PLACEMENT      LEG AMPUTATION THROUGH FEMUR         Medications:       Social History:     Social History     Socioeconomic History    Marital status: Single     Spouse name: Not on file    Number of children: Not on file    Years of education: Not on file    Highest education level: Not on file   Occupational History    Not on file   Social Needs    Financial resource strain: Not on file    Food insecurity:     Worry: Not on file     Inability: Not on file    Transportation needs:     Medical: Not on file     Non-medical: Not on file   Tobacco Use    Smoking status: Never Smoker    Smokeless tobacco: Never Used   Substance and Sexual Activity    Alcohol use: No    Drug use: No    Sexual activity: Never   Lifestyle    Physical activity:     Days per week: Not on file     Minutes per session: Not on file    Stress: Not on file   Relationships    Social connections:     Talks on phone: Not on file     Gets together: Not on file     Attends Cheondoism service: Not on file     Active member of club or organization: Not on file     Attends meetings of clubs or organizations: Not on file     Relationship status: Not on file    Intimate partner violence:     Fear of current or ex partner: Not on file     Emotionally abused: Not on file     Physically abused: Not on file     Forced sexual activity: Not on file   Other Topics Concern    Not on file   Social History Narrative    Not on file       Family History:     Family History   Problem Relation Age of Onset    No Known Problems Mother     No Known Problems Father         Allergies:   Patient has no known allergies. Review of Systems:   Unable to provide. Non verbal      Physical Examination :     Patient Vitals for the past 8 hrs:   BP Temp Temp src Pulse Resp SpO2 Weight   05/23/19 0823 111/72 97.6 °F (36.4 °C) Tympanic 106 19 97 % --   05/23/19 0600 -- -- -- -- -- -- 198 lb 8 oz (90 kg)   05/23/19 0400 (!) 96/56 98 °F (36.7 °C) Temporal 111 21 96 % 198 lb 3.2 oz (89.9 kg)     General Appearance: AA and in no apparent distress  Head:  Normocephalic, no trauma  Eyes: Pupils equal, round, reactive to light; sclera anicteric; conjunctivae pink. No embolic phenomena. ENT: Oropharynx clear, without erythema, exudate, or thrush. No tenderness of sinuses. Mouth/throat: mucosa pink and moist. No lesions. Dentition in good repair. Neck:Supple, without lymphadenopathy. Thyroid normal, No bruits. Pulmonary/Chest: Clear   Cardiovascular: Regular rate and rhythm without murmurs, rubs, or gallops.    Abdomen: Distended, firm, appears to be tender to palpation. Colostomy in LLQ - no output overnight or today. Gtube with feeds at 20  Chronic Davies in place  All four Extremities: Generalized anasarca  Neurologic: Difficult to  mentation, non verbal. Withdraws arms. Lt leg contracted. Skin: Warm and dry with good turgor. No signs of peripheral arterial or venous insufficiency. No ulcerations. No open wounds. Medical Decision Making -Laboratory:   I have independently reviewed/ordered the following labs:    CBC with Differential:   Recent Labs     05/22/19  0812 05/23/19 0548   WBC 11.2 9.6   HGB 13.1 13.1   HCT 40.3 41.2    328   LYMPHOPCT 12* 16*   MONOPCT 9 8     BMP:   Recent Labs     05/22/19  0812 05/23/19 0548    139   K 3.4* 3.6*   * 103   CO2 23 26   BUN 5* 5*   CREATININE 0.26* 0.26*   MG 1.6 2.0     Hepatic Function Panel:   No results for input(s): PROT, LABALBU, BILIDIR, IBILI, BILITOT, ALKPHOS, ALT, AST in the last 72 hours. No results for input(s): RPR in the last 72 hours. No results for input(s): HIV in the last 72 hours. No results for input(s): BC in the last 72 hours. Lab Results   Component Value Date    MUCUS NOT REPORTED 05/17/2019    RBC 4.89 05/23/2019    RBC 4.56 03/11/2012    TRICHOMONAS NOT REPORTED 05/17/2019    WBC 9.6 05/23/2019    YEAST NOT REPORTED 05/17/2019    TURBIDITY TURBID 05/17/2019     Lab Results   Component Value Date    CREATININE 0.26 05/23/2019    GLUCOSE 84 05/23/2019    GLUCOSE 69 03/11/2012       Medical Decision Making-Imaging:     EXAMINATION:   CT OF THE ABDOMEN AND PELVIS WITH CONTRAST 5/17/2019 3:52 pm       TECHNIQUE:   CT of the abdomen and pelvis was performed with the administration of   intravenous contrast. Multiplanar reformatted images are provided for review.    Dose modulation, iterative reconstruction, and/or weight based adjustment of   the mA/kV was utilized to reduce the radiation dose to as low as reasonably   achievable.       COMPARISON: 2017       HISTORY:   ORDERING SYSTEM PROVIDED HISTORY: firm abdomen   TECHNOLOGIST PROVIDED HISTORY:           FINDINGS:   Lower Chest: Dependent changes at the lung bases, likely atelectasis.       Organs: The liver, spleen, pancreas and adrenal glands are unremarkable. Mild gallbladder distention.  No significant biliary dilatation. Nonobstructive bilateral nephrolithiasis.  No significant hydronephrosis.  No   cystic or solid renal mass.  Mild renal cortical scarring on the right,   unchanged       GI/Bowel: Left lower quadrant colostomy with mucous fistula distally.  No   evidence of bowel obstruction.  No small bowel distention.  A gastrostomy   tube is in place.       Pelvis: Moderate free pelvic fluid, simple by attenuation characteristics.  A   Davies catheter is in place. Xochilt  is some calcification along the posterior   bladder wall.  Mild enhancement of the bladder mucosa with mild infiltration   of the pericystic fat.  The uterus and adnexal structures are unremarkable.       Peritoneum/Retroperitoneum: Small quantity of diffuse abdominal ascites. Mild infiltration of the mesenteric fat.  No free intraperitoneal air.  No   focal abscess is seen.  The abdominal aorta is normal in caliber.  There is   no retroperitoneal adenopathy.       Bones/Soft Tissues: No acute osseous or soft tissue abnormality.  Small fat   containing umbilical hernia.           Impression   1. Small quantity of diffuse abdominal ascites of indeterminate etiology. Mild infiltration of the mesenteric fat.  There is no free air or focal   abscess. 2. Mild enhancement of the bladder mucosa with infiltration of the pericystic   fat.  Findings are similar on the previous study.  A mild chronic cystitis is   suggested. 3. Left lower quadrant colostomy and G-tube.  No evidence of bowel   obstruction. 4. Nonobstructive bilateral nephrolithiasis.    5. Dependent changes at the lung bases most consistent with atelectasis.           EXAMINATION:   ONE XRAY VIEW OF THE CHEST       5/17/2019 2:50 pm       COMPARISON:   January 8, 2017       HISTORY:   ORDERING SYSTEM PROVIDED HISTORY: Septic work up   TECHNOLOGIST PROVIDED HISTORY:   Septic work up   Ordering Physician Provided Reason for Exam: septic/tachycardia   Acuity: Unknown   Type of Exam: Unknown       FINDINGS:   Shallow inflation.  Cardiac silhouette enlargement is noted.  Interstitial   and nodular opacities are noted in the mid right lung field.  These appear   less prominent on the left.  The left costophrenic angle is obscured, a   chronic finding.  No pneumothorax identified.  No acute osseous abnormality   appreciated.  A gastrostomy tube is noted.           Impression   Shallow inflation.  Perihilar interstitial and nodular opacities are noted,   right greater than left.  While a component of this likely represents   atelectasis, developing infiltrate or inflammatory process should also be   considered.           Medical Decision Spzsmd-[id number]-Moaqr:   5/22/2019  5:46 AM - LewNathalie Incoming Lab Results From [email or web address]     Specimen Information: Urine, straight catheter        Component Collected Lab   Specimen Description 05/17/2019  4:[address]   . URINE,STRAIGHT CATHETER    Special Requests 05/17/2019  4:[address]  [address]   NOT REPORTED    Culture Abnormal  05/17/2019  4:34 PM [address] <[id number] CFU/ML    Culture Abnormal  05/17/2019  4:34 PM [address] <[id number] CFU/ML DIFFERENT COLONY TYPE    Culture Abnormal  05/17/2019  4:34 PM [address] <[id number] CFU/ML    Culture Abnormal  05/17/2019  4:34 PM [address] <[id number] CFU/ML THIS ORGANISM IS AN EXTENDED-SPECTRUM BETA-LACTAMASE  AND RESISTANCE TO THERAPY WITH PENICILLINS, CEPHALOSPORINS AND AZTREONAM IS EXPECTED.  THESE ORGANISMS GENERALLY REMAIN SUSCEPTIBLE TO CARBAPENEMS.  CONSIDER ID CONSULTATION. Culture Abnormal  05/17/2019  4:34 PM [address] >[id number] CFU/ML There are no CLSI interpretive guidelines for routine susceptibility testing.  Aerococcus species are reported to be susceptible to penicillin. Culture Abnormal  05/17/2019  4:34  [address]   Several types of bacteria were identified in this specimen.  Further ID and susceptibility testing is generally not helpful in this circumstance and has not been performed.  Consider recollection if clinically indicated.  Please contact the Micro lab ([phone]) if you feel the management ofthis particular situation would be helped by further testing. Culture 05/17/2019  4:34  [address]   Work up all organisms per physician request.    Testing Performed By     Edvin Parker Name Director Address Valid Date Range   [address] [address] [address] [facility] [address] 08/30/17 0801-Present   Susceptibility     Proteus mirabilis (1)     Antibiotic Interpretation JOAQUIN Status    amikacin   Final     NOT REPORTED   ampicillin Sensitive  Final     <=2  SUSCEPTIBLE   ampicillin-sulbactam   Final     NOT REPORTED   aztreonam Sensitive  Final     <=1  SUSCEPTIBLE   ceFAZolin Sensitive  Final     <=4  SUSCEPTIBLE   ceFAZolin Sensitive Cefazolin sensitivity results can be used to predict the effectiveness of oral cephalosporins (eg.  Cephalexin) in uncomplicated Urinary Tract Infections due to E. coli, K. pneumoniae, and P. mirabilis Final    cefepime   Final     NOT REPORTED   cefTRIAXone Sensitive  Final     <=1  SUSCEPTIBLE   ciprofloxacin Resistant  Final     >=4  RESISTANT   ertapenem   Final     NOT REPORTED   gentamicin Intermediate  Final     8  INTERMEDIATE   meropenem   Final     NOT REPORTED   nitrofurantoin Resistant  Final 64  RESISTANT   tigecycline   Final     NOT REPORTED   tobramycin Sensitive  Final     4  SUSCEPTIBLE   trimethoprim-sulfamethoxazole Resistant  Final     >=320  RESISTANT   piperacillin-tazobactam Sensitive  Final     <=4  SUSCEPTIBLE   Proteus mirabilis (2)     Antibiotic Interpretation JOAQUIN Status    amikacin   Final     NOT REPORTED   ampicillin Sensitive  Final     <=2  SUSCEPTIBLE   ampicillin-sulbactam   Final     NOT REPORTED   aztreonam Sensitive  Final     <=1  SUSCEPTIBLE   ceFAZolin   Final     NOT REPORTED   cefepime   Final     NOT REPORTED   cefTRIAXone Sensitive  Final     <=1  SUSCEPTIBLE   ciprofloxacin Resistant  Final     >=4  RESISTANT   ertapenem   Final     NOT REPORTED   gentamicin Intermediate  Final     8  INTERMEDIATE   meropenem   Final     NOT REPORTED   nitrofurantoin Resistant  Final     64  RESISTANT   tigecycline   Final     NOT REPORTED   tobramycin Intermediate  Final     8  INTERMEDIATE   trimethoprim-sulfamethoxazole Resistant  Final     >=320  RESISTANT   piperacillin-tazobactam Sensitive  Final     <=4  SUSCEPTIBLE   Pseudomonas aeruginosa (3)     Antibiotic Interpretation JOAQUIN Status    amikacin   Final     NOT REPORTED   ceFAZolin   Final     NOT REPORTED   cefepime Sensitive  Final     2  SUSCEPTIBLE   ciprofloxacin Resistant  Final     >=4  RESISTANT   gentamicin Sensitive  Final     2  SUSCEPTIBLE   meropenem   Final     NOT REPORTED   tigecycline   Final     NOT REPORTED   tobramycin Sensitive  Final     <=1  SUSCEPTIBLE   piperacillin-tazobactam Sensitive  Final     <=4  SUSCEPTIBLE   Escherichia coli (4)     Antibiotic Interpretation JOAQUIN Status    amikacin   Final     NOT REPORTED   ampicillin Resistant  Final     >=32  RESISTANT   ampicillin-sulbactam   Final     NOT REPORTED   aztreonam Resistant  Final     16  RESISTANT   ceFAZolin Resistant  Final     >=64  RESISTANT   ceFAZolin Resistant Cefazolin sensitivity results can be used to predict the effectiveness of oral cephalosporins (eg. Cephalexin) in uncomplicated Urinary Tract Infections due to E. coli, K. pneumoniae, and P. mirabilis Final    cefepime Resistant  Final     2  RESISTANT   cefTRIAXone Resistant  Final     >=64  RESISTANT   ciprofloxacin Sensitive  Final     1  SUSCEPTIBLE   ertapenem   Final     NOT REPORTED   Confirmatory Extended Spectrum Beta-Lactamase Positive POSITIVE Final    gentamicin Sensitive  Final     <=1  SUSCEPTIBLE   meropenem Sensitive  Final     <=0.25  SUSCEPTIBLE   nitrofurantoin Sensitive  Final     <=16  SUSCEPTIBLE   tigecycline   Final     NOT REPORTED   tobramycin Sensitive  Final     <=1  SUSCEPTIBLE   trimethoprim-sulfamethoxazole Sensitive  Final     <=20  SUSCEPTIBLE   piperacillin-tazobactam Resistant  Final     <=4  RESISTANT           5/20/2019  5:24 PM - LewNathalie Incoming Lab Results From [facility]     Specimen Information: Blood        Component Collected Lab   Specimen Description 05/17/2019  4:06  [address]   . BLOOD    Special Requests 05/17/2019  4:06  [address]   NO LOCATION GIVEN    Culture Abnormal  05/17/2019  4:06 PM [address]  Blood Culture Results called to and read back by: ALEXANDREA DEJESUS [id number]    Culture 05/17/2019  4:06 PM [facility] FROM BOTTLE: GRAM POSITIVE COCCI IN CLUSTERS    Culture 05/17/2019  4:06  [address]   ID by [employer]: STAPHYLOCOCCUS SPECIES, COAGULASE NEGATIVE    Culture Abnormal  05/17/2019  4:06 PM [address] A single positive blood culture of coagulase negative staphylococci, micrococci, diphtheroids or Bacillus species should be interpreted with caution and viewed as likely a skin contaminant.      5/22/2019  5:46 AM - Nathalie Hackett Incoming Lab Results From [facility]     Specimen Information: Urine, straight catheter        Component Collected Lab   Specimen Description 05/17/2019  4:[address]  [address]   . URINE,STRAIGHT CATHETER    Special Requests 05/17/2019  4:34  [address]   NOT REPORTED    Culture Abnormal  05/17/2019  4:34 PM [address] <[id number] CFU/ML    Culture Abnormal  05/17/2019  4:34 PM [address] <[id number] CFU/ML DIFFERENT COLONY TYPE    Culture Abnormal  05/17/2019  4:34 PM [address] <[id number] CFU/ML    Culture Abnormal  05/17/2019  4:34 PM [address] <[id number] CFU/ML THIS ORGANISM IS AN EXTENDED-SPECTRUM BETA-LACTAMASE  AND RESISTANCE TO THERAPY WITH PENICILLINS, CEPHALOSPORINS AND AZTREONAM IS EXPECTED.  THESE ORGANISMS GENERALLY REMAIN SUSCEPTIBLE TO CARBAPENEMS.  CONSIDER ID CONSULTATION. Culture Abnormal  05/17/2019  4:34 PM [address] >[id number] CFU/ML There are no CLSI interpretive guidelines for routine susceptibility testing.  Aerococcus species are reported to be susceptible to penicillin. Culture Abnormal  05/17/2019  4:34  [address]   Several types of bacteria were identified in this specimen.  Further ID and susceptibility testing is generally not helpful in this circumstance and has not been performed.  Consider recollection if clinically indicated.  Please contact the Micro lab ([phone]) if you feel the management ofthis particular situation would be helped by further testing.    Culture 05/17/2019  4:34  [address]   Work up all organisms per physician request.    Testing Performed By     Edvin Parker Name Director Address Valid Date Range   [address] [address] [place] [facility] [address] 08/30/17 0801-Present   Susceptibility     Proteus mirabilis (1)     Antibiotic Interpretation JOAQUIN Status    amikacin   Final     NOT REPORTED ampicillin Sensitive  Final     <=2  SUSCEPTIBLE   ampicillin-sulbactam   Final     NOT REPORTED   aztreonam Sensitive  Final     <=1  SUSCEPTIBLE   ceFAZolin Sensitive  Final     <=4  SUSCEPTIBLE   ceFAZolin Sensitive Cefazolin sensitivity results can be used to predict the effectiveness of oral cephalosporins (eg.  Cephalexin) in uncomplicated Urinary Tract Infections due to E. coli, K. pneumoniae, and P. mirabilis Final    cefepime   Final     NOT REPORTED   cefTRIAXone Sensitive  Final     <=1  SUSCEPTIBLE   ciprofloxacin Resistant  Final     >=4  RESISTANT   ertapenem   Final     NOT REPORTED   gentamicin Intermediate  Final     8  INTERMEDIATE   meropenem   Final     NOT REPORTED   nitrofurantoin Resistant  Final     64  RESISTANT   tigecycline   Final     NOT REPORTED   tobramycin Sensitive  Final     4  SUSCEPTIBLE   trimethoprim-sulfamethoxazole Resistant  Final     >=320  RESISTANT   piperacillin-tazobactam Sensitive  Final     <=4  SUSCEPTIBLE   Proteus mirabilis (2)     Antibiotic Interpretation JOAQUIN Status    amikacin   Final     NOT REPORTED   ampicillin Sensitive  Final     <=2  SUSCEPTIBLE   ampicillin-sulbactam   Final     NOT REPORTED   aztreonam Sensitive  Final     <=1  SUSCEPTIBLE   ceFAZolin   Final     NOT REPORTED   cefepime   Final     NOT REPORTED   cefTRIAXone Sensitive  Final     <=1  SUSCEPTIBLE   ciprofloxacin Resistant  Final     >=4  RESISTANT   ertapenem   Final     NOT REPORTED   gentamicin Intermediate  Final     8  INTERMEDIATE   meropenem   Final     NOT REPORTED   nitrofurantoin Resistant  Final     64  RESISTANT   tigecycline   Final     NOT REPORTED   tobramycin Intermediate  Final     8  INTERMEDIATE   trimethoprim-sulfamethoxazole Resistant  Final     >=320  RESISTANT   piperacillin-tazobactam Sensitive  Final     <=4  SUSCEPTIBLE   Pseudomonas aeruginosa (3)     Antibiotic Interpretation JOAQUIN Status    amikacin   Final     NOT REPORTED   ceFAZolin   Final     NOT REPORTED cefepime Sensitive  Final     2  SUSCEPTIBLE   ciprofloxacin Resistant  Final     >=4  RESISTANT   gentamicin Sensitive  Final     2  SUSCEPTIBLE   meropenem   Final     NOT REPORTED   tigecycline   Final     NOT REPORTED   tobramycin Sensitive  Final     <=1  SUSCEPTIBLE   piperacillin-tazobactam Sensitive  Final     <=4  SUSCEPTIBLE   Escherichia coli (4)     Antibiotic Interpretation JOAQUIN Status    amikacin   Final     NOT REPORTED   ampicillin Resistant  Final     >=32  RESISTANT   ampicillin-sulbactam   Final     NOT REPORTED   aztreonam Resistant  Final     16  RESISTANT   ceFAZolin Resistant  Final     >=64  RESISTANT   ceFAZolin Resistant Cefazolin sensitivity results can be used to predict the effectiveness of oral cephalosporins (eg.  Cephalexin) in uncomplicated Urinary Tract Infections due to E. coli, K. pneumoniae, and P. mirabilis Final    cefepime Resistant  Final     2  RESISTANT   cefTRIAXone Resistant  Final     >=64  RESISTANT   ciprofloxacin Sensitive  Final     1  SUSCEPTIBLE   ertapenem   Final     NOT REPORTED   Confirmatory Extended Spectrum Beta-Lactamase Positive POSITIVE Final    gentamicin Sensitive  Final     <=1  SUSCEPTIBLE   meropenem Sensitive  Final     <=0.25  SUSCEPTIBLE   nitrofurantoin Sensitive  Final     <=16  SUSCEPTIBLE   tigecycline   Final     NOT REPORTED   tobramycin Sensitive  Final     <=1  SUSCEPTIBLE   trimethoprim-sulfamethoxazole Sensitive  Final     <=20  SUSCEPTIBLE   piperacillin-tazobactam Resistant  Final     <=4  RESISTANT    Condensed View         Medical Decision Making-Other:     Note:  · Labs, medications, radiologic studies were reviewed with personal review of films  · Moderate amounts of data were reviewed  · Discussed with nursing Staff  · Infection Control and Prevention measures reviewed  · All prior entries were reviewed  · Administer medications as ordered  · Prognosis: Guarded  · Discharge planning reviewed    Thank you for allowing us to

## 2019-05-23 NOTE — PROGRESS NOTES
NEUROLOGY INPATIENT PROGRESS NOTE    5/23/2019         Subjective: Kadie Cuenca is a  45 y.o. female admitted on 5/17/2019 with Urinary tract infection [N39.0]  Urinary tract infection [N39.0]  Chart reviewed. Discussed with caregivers. No acute issues overnight. She is on schedule for MRI brain and MRI spine. Briefly, this is a  45 y.o. female, ECF resident with long standing hx of MS and paraplegia was admitted on 5/17/2019 with worsening lethargy found to have urosepsis and resultant toxic metabolic encephalopathy. Her mentation has a significantly improved with antibiotic therapy. Regarding multiple sclerosis; she has a long-standing history of multiple sclerosis and baseline paraplegia and nonverbal state. She has history of PEG tube placement and Rt above knee amputation. Patient was not on any disease modifying therapy for multiple sclerosis. Caregivers at [facility] were unable to provide any history regarding her prior therapies for multiple sclerosis. No information regarding her prior MRI studies of brain and spine and no information regarding her prior encounters with neurologists were obtainable through 'care everywhere\" search. On admit; patient has had MRI brain and spine and those studies were nondiagnostic as those were significantly motion degraded. MRI spine and brain were not repeated under sedation due to ongoing encephalopathy on admit. As her mentation has been improving; discussion done repeatedly with patient yesterday and today and patient is agreeable to get MRI studies. Her history is also significant for pulmonary embolism in 2013, peripheral vascular disease, hydronephrosis. She also had hemorrhagic cystitis in January 2017. Since then she has indwelling Davies catheter. She has hx of infection with a multidrug resistant organism; hx of ESBL E. Coli but her last culture grew E. Coli sensitive to Rocephin.       No current facility-administered medications on file UTI (lower urinary tract infection)        Past Surgical History:   Procedure Laterality Date    AMPUTATION Right     COLOSTOMY      GASTROSTOMY TUBE PLACEMENT      LEG AMPUTATION THROUGH FEMUR             Medications:     potassium chloride  40 mEq Oral BID    lansoprazole  30 mg Per G Tube QAM AC    docusate  100 mg Oral Daily    midodrine  10 mg Oral TID WC    scopolamine  1 patch Transdermal Q72H    sertraline  25 mg Per G Tube Daily    sodium chloride flush  10 mL Intravenous 2 times per day    sodium chloride flush  50 mL Intravenous Daily    CENTRUM/CERTA-DERICK with minerals oral  15 mL Per G Tube Daily    metoclopramide  5 mg Oral BID    rivaroxaban  20 mg Oral Daily     PRN Meds include: glucose, dextrose, glucagon (rDNA), dextrose, [Held by provider] metoprolol, potassium chloride **OR** potassium alternative oral replacement **OR** potassium chloride, potassium chloride, potassium chloride, LORazepam, acetaminophen, glycopyrrolate, sodium chloride flush, magnesium hydroxide, ondansetron, promethazine, fentanNYL    Objective:   /72   Pulse 106   Temp 97.6 °F (36.4 °C) (Tympanic)   Resp 19   Ht 5' (1.524 m)   Wt 198 lb 8 oz (90 kg)   SpO2 97%   Breastfeeding? No   BMI 38.77 kg/m²     Blood pressure range: Systolic (33IIP), PWD:701 , Min:96 , XR   ; Diastolic (32QNQ), VZP:04, Min:56, Max:72        NEUROLOGIC EXAMINATION  GENERAL  Appears comfortable and in no distress   HEENT  NC/ AT   cardiovascular  S1 and S2 heard; palpation of pulses: radial pulse    NECK  Supple and no bruits heard   MENTAL STATUS:  Alert, oriented to name call; trying to vocalize and nods her head appropriately.    Baseline nonverbal; no hallucination or delusion   CRANIAL NERVES: II     -      PERRLA, optic discs; clear; blinks to threat bilaterally   III,IV,VI -  Left esotropia; left ROMA with abducting nystagmus noted on right gaze; no ptosis  V     -     Normal facial sensation   VII    -     Normal facial symmetry  VIII   -     Intact hearing   IX,X -     Symmetrical palate  XI    -     Symmetrical shoulder shrug  XII   -     Midline tongue, no atrophy    MOTOR FUNCTION:  significant for spastic left upper and left lower extremities with contracture in left lower extremity and extensor plantar response on left side; able to move right upper extremity spontaneously and purposefully and right above knee and palpitations noted. No abnormal involuntary movements noted. SENSORY FUNCTION:   withdraws to pinprick in right upper extremity greater than left upper and left lower extremities   CEREBELLAR FUNCTION:   no ataxia noted in right upper extremity and deferred in left upper extremity    REFLEX FUNCTION:  hyperactive DTRs in spastic left upper and left lower extremities with extensor plantar response on left side    STATION and GAIT  Not tested         Data:    Lab Results:   CBC:   Recent Labs     05/21/19  0440 05/22/19  0812 05/23/19  0548   WBC 16.4* 11.2 9.6   HGB 12.3 13.1 13.1    298 328     BMP:    Recent Labs     05/21/19  0440 05/21/19  1825 05/22/19  0812 05/23/19  0548    143 143 139   K 2.6* 3.7 3.4* 3.6*   * 110* 108* 103   CO2 22 23 23 26   BUN 10  --  5* 5*   CREATININE 0.31*  --  0.26* 0.26*   GLUCOSE 73  --  86 84         Lab Results   Component Value Date    CHOL 149 08/07/2018    LDLCHOLESTEROL 92 05/08/2017    HDL 36 (L) 05/08/2017    TRIG 86 05/08/2017    ALT 22 05/18/2019    AST 44 (H) 05/18/2019    TSH 0.84 05/19/2019    INR 1.0 05/17/2019    [id number] 1047 (H) 04/05/2013     CT head 5/18/19: No acute intracranial abnormality. Severe chronic white matter changes. MRI cervical spine & thoracic spine 5/19/19: Severely motion degraded exam resulting in evaluation of the cord to be nondiagnostic. Impression and Plan: Ms. Walt Simmons is a 45 y.o. female with   Toxic metabolic encephalopathy with septic shock secondary to UTI: Resolved.   Multiple sclerosis with quadriplegia with baseline nonverbal state: Initial spine MRIs were  Nondiagnostic; because of motion degraded state. Patient is much more alert and awake and agreeable to get brain and spine MRI under sedation. Patient's nurse is contacting radiology department to make sure that she should get sedation for the study so that we get  study. Will follow with you.

## 2019-05-23 NOTE — PROGRESS NOTES
NEUROLOGY INPATIENT PROGRESS NOTE    5/23/2019         Subjective: Nick Morocho is a  45 y.o. female admitted on5/17/2019 with Urinary tract infection [N39.0]  Urinary tract infection [N39.0]      Briefly, this is a  45 y.o. female admitted on 5/17/2019 with pmhx of MS, chronic omer in in place due to neurogenic bladder and peg in place transferred from nursing facility to [facility].  As per the records of the nursing facility patient is nonverbal at baseline but didn't notice for the past few days she has been more tired and confused and therefore send her to the emergency department for further evaluation.     On arrival in the ED patient.  Blood pressure was 111/74 heart rate 122 RR 18 temp 98.9.  Lactic acid 2.4, H&H 17.5/7.4 WBC 11.1 her UA was red/turbid  numerous WBCs, large Leukocyte Esterase.  His x-ray showed perihilar interstitial and nodular opacities.  CT abdomen and pelvis showed diffuse abdominal ascites chronic cystitis left lower quadrant quadrant colostomy and G-tube and bilateral nephrolithiasis.  Patient was admitted to medical [facility] for UTI.  CT chest shows bibasilar airspace's disease and small effusion distended gallbladder and gallstone recommending gallbladder ultrasound     Of note patient has a history of PE in 2013 and is on Xaletro 10 mg and chronic retention of urine and hydronephrosis frequent UTIs.  Developed hemorrhagic cystitis in 2017 and since then has indwelling Omer's catheter.  He also has a history of ESBL E. coli her last culture grew E. coli sensitive to Rocephin.  She had an echo done in 2017 showed EF of 55% normal left ventricular systolic function. ID was consulted and patient to be on meropenem 1 g IV every 8  and urine culture showed Proteus organism. , abx discontinued on 5/22.     MRI of the brain could not be completed due to motion.  MRI cervical and thoracic spine did not show any acute fracture or spinal stenosis or narrowing of the canal.     Patient appears more awake today, giving appropriate response to questions asked by head nodding denies any pain at this time, on NG tube feedings.        Patient is seen and evaluated at bedside, no acute overnight event. Vitals:    05/23/19 0000 05/23/19 0400 05/23/19 0600 05/23/19 0823   BP: 97/71 (!) 96/56  111/72   Pulse: 116 111  106   Resp: 21 21 19   Temp: 98.2 °F (36.8 °C) 98 °F (36.7 °C)  97.6 °F (36.4 °C)   TempSrc: Temporal Temporal  Tympanic   SpO2: 97% 96%  97%   Weight:  198 lb 3.2 oz (89.9 kg) 198 lb 8 oz (90 kg)    Height:           Patient scheduled for repeat MRI of brain and spine to get better evaluation of MS                No current facility-administered medications on file prior to encounter.       Current Outpatient Medications on File Prior to Encounter   Medication Sig Dispense Refill    Multiple Vitamins-Minerals (CENTRUM/CERTA-DERICK WITH MINERALS ORAL) solution Take 2.5 mLs by mouth daily      Cranberry 250 MG CAPS Take 250 mg by mouth 2 times daily      metoclopramide (REGLAN) 5 MG/5ML solution Take 5 mg by mouth 2 times daily      ranitidine (ZANTAC) 75 MG/5ML syrup 75 mg by Per G Tube route 2 times daily      vitamin D (CHOLECALCIFEROL) 1000 UNIT TABS tablet 1,000 Units by Per G Tube route daily      rivaroxaban (XARELTO) 10 MG TABS tablet 1 tablet by Per G Tube route daily (with breakfast) 30 tablet 0    potassium chloride 20 MEQ/15ML (10%) oral solution 10 mEq by Per G Tube route daily      Scopolamine Base (SCOPOLAMINE TD) Place 1 mg onto the skin every 72 hours       baclofen (LIORESAL) 10 MG tablet 10 mg by Per G Tube route 3 times daily Crush into G tube       gabapentin (NEURONTIN) 300 MG capsule 300 mg by Per G Tube route 3 times daily      medroxyPROGESTERone (DEPO-PROVERA) 150 MG/ML injection Inject 150 mg into the muscle every 3 months On the 11th       metoprolol (LOPRESSOR) 25 MG tablet 12.5 mg 2 times daily       acetaminophen (TYLENOL) 80 MG/0.8ML suspension 650 mg by Per G Tube route every 6 hours as needed for Fever or Pain       glycopyrrolate (ROBINUL) 1 MG tablet Take 1 mg by mouth 3 times daily          Allergies: Stephanie Lowery has No Known Allergies.     Past Medical History:   Diagnosis Date    Aphasia     Aphasia     Contracture of joint, lower leg     HTN (hypertension)     Hx MRSA infection resolved 1/2017    2 negative nasal screens 1/2017 (hx in heel in 2013)    Hydronephrosis     Multiple sclerosis ([address]     Neurogenic bladder     Peripheral vascular disease (HCC)     Polyneuropathy     Pressure ulcer     Coccyx, Heel    Pulmonary embolism (HCC)     Pulmonary infarction (HCC)     Quadriplegia (HCC)     Tachycardia     UTI (lower urinary tract infection)        Past Surgical History:   Procedure Laterality Date    AMPUTATION Right     COLOSTOMY      GASTROSTOMY TUBE PLACEMENT      LEG AMPUTATION THROUGH FEMUR             Medications:     potassium chloride  40 mEq Oral BID    lansoprazole  30 mg Per G Tube QAM AC    LORazepam  1 mg Intravenous Once    docusate  100 mg Oral Daily    midodrine  10 mg Oral TID WC    scopolamine  1 patch Transdermal Q72H    sertraline  25 mg Per G Tube Daily    sodium chloride flush  10 mL Intravenous 2 times per day    sodium chloride flush  50 mL Intravenous Daily    CENTRUM/CERTA-DERICK with minerals oral  15 mL Per G Tube Daily    metoclopramide  5 mg Oral BID    rivaroxaban  20 mg Oral Daily     PRN Meds include: glucose, dextrose, glucagon (rDNA), dextrose, [Held by provider] metoprolol, potassium chloride **OR** potassium alternative oral replacement **OR** potassium chloride, potassium chloride, potassium chloride, LORazepam, acetaminophen, glycopyrrolate, sodium chloride flush, magnesium hydroxide, ondansetron, promethazine, fentanNYL    Objective:   /72   Pulse 106   Temp 97.6 °F (36.4 °C) (Tympanic)   Resp 19   Ht 5' (1.524 m)   Wt 198 lb 8 oz (90 kg) * 110* 108* 103   CO2 22 23 23 26   BUN 10  --  5* 5*   CREATININE 0.31*  --  0.26* 0.26*   GLUCOSE 73  --  86 84         Lab Results   Component Value Date    CHOL 149 08/07/2018    LDLCHOLESTEROL 92 05/08/2017    HDL 36 (L) 05/08/2017    TRIG 86 05/08/2017    ALT 22 05/18/2019    AST 44 (H) 05/18/2019    TSH 0.84 05/19/2019    INR 1.0 05/17/2019    [id number] 1047 (H) 04/05/2013       No results found for: PHENYTOIN, PHENYTOIN, PHENOBARB, VALPROATE, CBMZ    Impression:  44 y/o F  Non verbal at baseline pmhx of MS, chronic omer in in place due to neurogenic bladder and peg in place transferred from nursing facility to alternating mental status  Her symptoms are most likely due to toxic metabolic encephaoathy due to UTI given history of MS and neurogenic bladder  Will repeat Mri spine and carlos to further evaluate about MS    Yennifer Odell MD  Neurology Resident PGY-2  5/23/2019 at 8:26 AM

## 2019-05-23 NOTE — PROGRESS NOTES
[facility]  Internal Medicine Residency Program  Inpatient Daily Progress Note  ______________________________________________________________________________    Patient: Cherie Gaitan  YOB: 1980   MRN: [id number]    Acct: [de-identified]     Admit date: 5/17/2019  Today's date: 05/23/19  Number of days in the hospital: 6  Expected Discharge Date: 05/21/19    Admitting Diagnosis: Urinary tract infection    Subjective:   Patient seen and examined at bedside. She was awake and alert this morning. Blood pressure overnight was 96/56. This morning it was stable. The patient was on dextrose 10% solution this morning. Tube feeds were held due to high residuals. X-ray KUB shows nonobstructive pattern. No stool burden noted. Meropenem was discontinued. Patient is nonverbal so review of systems cannot be obtained. Objective:   Vital Sign:  /72   Pulse 106   Temp 97.6 °F (36.4 °C) (Tympanic)   Resp 19   Ht 5' (1.524 m)   Wt 198 lb 8 oz (90 kg)   SpO2 97%   Breastfeeding? No   BMI 38.77 kg/m²       Physical Exam:  General appearance: alert, non verbal, responses to commands  HEENT: Head: Normocephalic, no lesions, without obvious abnormality. Neck: no adenopathy, no carotid bruit, no JVD, supple, symmetrical, trachea midline and thyroid not enlarged, symmetric, no tenderness/mass/nodules  Lungs: clear to auscultation bilaterally  Heart: regular rate and rhythm, loud S1, S2 normal, no murmur, click, rub or gallop  Abdomen: soft, non-tender; bowel sounds normal; no masses,  no organomegaly. G tube in place  Extremities: extremities normal, atraumatic, no cyanosis or edema.  Right AKA, mild left pedal edema  Neurologic: Mental status: Alert    Medications:  Scheduled Medications   potassium chloride  40 mEq Oral BID    lansoprazole  30 mg Per G Tube QAM AC    docusate  100 mg Oral Daily    midodrine  10 mg Oral TID WC    scopolamine  1 patch Transdermal Q72H    sertraline  25 mg Per G Tube Daily    sodium chloride flush  10 mL Intravenous 2 times per day    sodium chloride flush  50 mL Intravenous Daily    CENTRUM/CERTA-DERICK with minerals oral  15 mL Per G Tube Daily    metoclopramide  5 mg Oral BID    rivaroxaban  20 mg Oral Daily       PRN Medications  glucose 15 g PRN   dextrose 12.5 g PRN   glucagon (rDNA) 1 mg PRN   dextrose 100 mL/hr PRN   [Held by provider] metoprolol 2.5 mg Q4H PRN   potassium chloride 40 mEq PRN   Or     potassium alternative oral replacement 40 mEq PRN   Or     potassium chloride 10 mEq PRN   potassium chloride 10 mEq PRN   potassium chloride 20 mEq PRN   LORazepam 1 mg As Directed RT PRN   acetaminophen 650 mg Q6H PRN   glycopyrrolate 1 mg TID PRN   sodium chloride flush 10 mL PRN   magnesium hydroxide 30 mL Daily PRN   ondansetron 4 mg Q6H PRN   promethazine 25 mg Q4H PRN   fentanNYL 25 mcg Q2H PRN       Diagnostic Labs and Imaging:  CBC:  Recent Labs     05/21/19  0440 05/22/19  0812 05/23/19  0548   WBC 16.4* 11.2 9.6   HGB 12.3 13.1 13.1    298 328     BMP: Recent Labs     05/21/19  0440 05/21/19  1825 05/22/19  0812 05/23/19  0548    143 143 139   K 2.6* 3.7 3.4* 3.6*   * 110* 108* 103   CO2 22 23 23 26   BUN 10  --  5* 5*   CREATININE 0.31*  --  0.26* 0.26*   GLUCOSE 73  --  86 84     Assessment and Plan:     Principal Problem:    Urinary tract infection  Active Problems:    Aphasia    Multiple sclerosis (HCC)    Nonverbal    Gastrostomy tube dependent (HCC)    Neurogenic bladder    Hemorrhagic cystitis    Tachycardia    Gross hematuria    Lactic acidosis    Chronic indwelling Davies catheter    Peripheral vascular disease (HCC)    Recurrent UTI (urinary tract infection)    History of pulmonary embolism    Hypertension    Altered mental status    Toxic metabolic encephalopathy    ESBL E. coli carrier    Septic shock (HCC)    Status post colostomy (HCC)    Lower paraplegia (HCC)  Resolved

## 2019-05-23 NOTE — PROGRESS NOTES
Pt has 30 cc of residual this am. No output noted in colostomy times 2 days. MD aware, KUB negative. Pt received PRN dose of MOM on yesterday, with no results. Bowel sounds are hypoactive. Abdomen soft non-tender.  Tube feedings are on hold at this time per MD.

## 2019-05-23 NOTE — PROGRESS NOTES
Nutrition Assessment (Enteral Nutrition)    Type and Reason for Visit: Reassess    Nutrition Recommendations:   - As able, suggest restarting TF of Osmolite 1.2 (std without fiber) at 10 mL/hr and slowly advance to goal rate of 55 mL/hr.  - If high residuals/tolerance issues continue, suggest trying Vital AF 1.2 (semi-elemental) goal rate 55 mL/hr.  - Monitor bowel function and plan of care. Nutrition Assessment: TF held d/t high residuals. KUB completed which shows no obstruction. Pt recieving colace - no colostomy output x 2 days. Discussed with RN trying alternative formula as feedings restarted. Malnutrition Assessment:  · Malnutrition Status: At risk for malnutrition    Nutrition Risk Level: High    Nutrition Needs:  · Estimated Daily Total Kcal: 1.1-1.2 ~>0382-7289 kcals/d   · Estimated Daily Protein (g): 1.5-1.8 gm/kg - 55-70 gm pro/day    Nutrition Diagnosis:   · Problem: Inadequate oral intake  · Etiology: related to Difficulty swallowing, Cognitive or neurological impairment     Signs and symptoms:  as evidenced by NPO status due to medical condition, Nutrition support - EN    Objective Information:  · Nutrition-Focused Physical Findings: PEG. Colostomy. Distended abdomen. Hypoactive bowel sounds. · Wound Type: Stage II, Pressure Ulcer(to coccyx)  · Current Nutrition Therapies:  · Oral Diet Orders: NPO   · Tube Feeding (TF) Orders:   · Feeding Route: Gastrostomy  · Formula: Standard without Fiber  · Rate (ml/hr):0 mL/hr d/t high residuals - goal rate 55 mL/hr    · Volume (ml/day): 1320 ml/day  · Duration: Continuous  · Current TF & Flush Orders Provides: 0 kcal and 0 gm protein per day  · Goal TF & Flush Orders Provides: Osmolite 1.2 (std w/o fiber) at 55 mL/hr = 1584 kcal, 73 gm protein.     · Additional Calories: D10% at 20 mL/hr = 163 kcal/day  · Anthropometric Measures:  · Ht: 5' (152.4 cm)   · Current Body Wt: 198 lb 8 oz (90 kg)  · Admission Body Wt: 188 lb (85.3 kg)  · Weight Change: 174 lbs x 4 mo ago per EMR    · Ideal Body Wt: 84 lb (38.1 kg)(per AKA), % Ideal Body 223% (adm/ideal)  · Adjusted Body Wt:  , body weight adjusted for AKA  · BMI Classification: BMI 35.0 - 39.9 Obese Class II    Nutrition Interventions:   Continue current Tube Feeding - As able, restart TF of Osmolite 1.2 (std without fiber) slowly advancing to goal rate of 55 mL/hr. If alternative formula requested, suggest Vital AF 1.2 (semi-elemental) at 55 ml/hr.   Continued Inpatient Monitoring, Education Not Indicated    Nutrition Evaluation:   · Evaluation: No progress toward goals   · Goals: Pt to meet % of est'd needs via EN   · Monitoring: TF Intake, TF Tolerance, Skin Integrity, Wound Healing, I&O, Weight, Pertinent Labs, Monitor Hemodynamic Status, Monitor Bowel Function, Constipation    Electronically signed by Barbi Laurent RD, LD on 5/23/19 at 1:48 PM    Contact Number: [phone]

## 2019-05-23 NOTE — PROGRESS NOTES
Pt assessed this am, no distress noted. VSS no temp noted. Pt awakens to verbal stimuli. IVF infusing as ordered. No S/S hypoglycemia. PEG tube in place 30 ml residual noted. Off-going nurse reports no output overnight. Md notified and aware. PEG tube flushed as ordered. No stool noted. Will cont to monitor.

## 2019-05-24 LAB
ANION GAP SERPL CALCULATED.3IONS-SCNC: 10 MMOL/L (ref 9–17)
BUN BLDV-MCNC: 4 MG/DL (ref 6–20)
BUN/CREAT BLD: ABNORMAL (ref 9–20)
CALCIUM SERPL-MCNC: 9 MG/DL (ref 8.6–10.4)
CHLORIDE BLD-SCNC: 104 MMOL/L (ref 98–107)
CO2: 25 MMOL/L (ref 20–31)
CREAT SERPL-MCNC: 0.26 MG/DL (ref 0.5–0.9)
GFR AFRICAN AMERICAN: >60 ML/MIN
GFR NON-AFRICAN AMERICAN: >60 ML/MIN
GFR SERPL CREATININE-BSD FRML MDRD: ABNORMAL ML/MIN/{1.73_M2}
GFR SERPL CREATININE-BSD FRML MDRD: ABNORMAL ML/MIN/{1.73_M2}
GLUCOSE BLD-MCNC: 104 MG/DL (ref 65–105)
GLUCOSE BLD-MCNC: 81 MG/DL (ref 65–105)
GLUCOSE BLD-MCNC: 88 MG/DL (ref 70–99)
GLUCOSE BLD-MCNC: 93 MG/DL (ref 65–105)
POTASSIUM SERPL-SCNC: 4.6 MMOL/L (ref 3.7–5.3)
SODIUM BLD-SCNC: 139 MMOL/L (ref 135–144)

## 2019-05-24 PROCEDURE — 2580000003 HC RX 258: Performed by: STUDENT IN AN ORGANIZED HEALTH CARE EDUCATION/TRAINING PROGRAM

## 2019-05-24 PROCEDURE — 82947 ASSAY GLUCOSE BLOOD QUANT: CPT

## 2019-05-24 PROCEDURE — 6370000000 HC RX 637 (ALT 250 FOR IP): Performed by: INTERNAL MEDICINE

## 2019-05-24 PROCEDURE — 99232 SBSQ HOSP IP/OBS MODERATE 35: CPT | Performed by: INTERNAL MEDICINE

## 2019-05-24 PROCEDURE — 6370000000 HC RX 637 (ALT 250 FOR IP): Performed by: EMERGENCY MEDICINE

## 2019-05-24 PROCEDURE — 6370000000 HC RX 637 (ALT 250 FOR IP): Performed by: STUDENT IN AN ORGANIZED HEALTH CARE EDUCATION/TRAINING PROGRAM

## 2019-05-24 PROCEDURE — 2060000000 HC ICU INTERMEDIATE R&B

## 2019-05-24 PROCEDURE — 36415 COLL VENOUS BLD VENIPUNCTURE: CPT

## 2019-05-24 PROCEDURE — 80048 BASIC METABOLIC PNL TOTAL CA: CPT

## 2019-05-24 PROCEDURE — 99232 SBSQ HOSP IP/OBS MODERATE 35: CPT | Performed by: PSYCHIATRY & NEUROLOGY

## 2019-05-24 RX ORDER — SENNA AND DOCUSATE SODIUM 50; 8.6 MG/1; MG/1
2 TABLET, FILM COATED ORAL DAILY PRN
Qty: 30 TABLET | Refills: 1 | Status: SHIPPED | OUTPATIENT
Start: 2019-05-24 | End: 2019-05-25 | Stop reason: HOSPADM

## 2019-05-24 RX ORDER — METOCLOPRAMIDE HYDROCHLORIDE 5 MG/5ML
5 SOLUTION ORAL EVERY 6 HOURS
Qty: 750 ML | Refills: 3 | Status: SHIPPED | OUTPATIENT
Start: 2019-05-24 | End: 2019-05-25

## 2019-05-24 RX ORDER — SERTRALINE HYDROCHLORIDE 25 MG/1
25 TABLET, FILM COATED ORAL DAILY
Qty: 30 TABLET | Refills: 1 | Status: SHIPPED | OUTPATIENT
Start: 2019-05-24

## 2019-05-24 RX ORDER — MIDODRINE HYDROCHLORIDE 10 MG/1
10 TABLET ORAL
Qty: 90 TABLET | Refills: 3 | Status: ON HOLD | OUTPATIENT
Start: 2019-05-24 | End: 2020-01-25 | Stop reason: SDUPTHER

## 2019-05-24 RX ORDER — MULTIVIT-MIN/FERROUS GLUCONATE 9 MG/15 ML
15 LIQUID (ML) ORAL DAILY
Qty: 1 BOTTLE | Refills: 3 | Status: SHIPPED | OUTPATIENT
Start: 2019-05-24

## 2019-05-24 RX ADMIN — SERTRALINE 25 MG: 25 TABLET, FILM COATED ORAL at 09:37

## 2019-05-24 RX ADMIN — DEXTROSE MONOHYDRATE: 100 INJECTION, SOLUTION INTRAVENOUS at 05:43

## 2019-05-24 RX ADMIN — ACETAMINOPHEN 650 MG: 650 SOLUTION ORAL at 02:08

## 2019-05-24 RX ADMIN — ACETAMINOPHEN 650 MG: 650 SOLUTION ORAL at 18:32

## 2019-05-24 RX ADMIN — METOCLOPRAMIDE HYDROCHLORIDE 5 MG: 5 SOLUTION ORAL at 15:15

## 2019-05-24 RX ADMIN — Medication 10 ML: at 23:34

## 2019-05-24 RX ADMIN — MIDODRINE HYDROCHLORIDE 10 MG: 5 TABLET ORAL at 17:47

## 2019-05-24 RX ADMIN — DOCUSATE SODIUM 100 MG: 50 LIQUID ORAL at 09:41

## 2019-05-24 RX ADMIN — Medication 10 ML: at 09:41

## 2019-05-24 RX ADMIN — LANSOPRAZOLE 30 MG: 30 TABLET, ORALLY DISINTEGRATING, DELAYED RELEASE ORAL at 09:38

## 2019-05-24 RX ADMIN — METOCLOPRAMIDE HYDROCHLORIDE 5 MG: 5 SOLUTION ORAL at 09:41

## 2019-05-24 RX ADMIN — MIDODRINE HYDROCHLORIDE 10 MG: 5 TABLET ORAL at 12:15

## 2019-05-24 RX ADMIN — RIVAROXABAN 20 MG: 20 TABLET, FILM COATED ORAL at 20:29

## 2019-05-24 RX ADMIN — Medication 50 ML: at 09:44

## 2019-05-24 RX ADMIN — GLYCOPYRROLATE 1 MG: 1 TABLET ORAL at 09:41

## 2019-05-24 RX ADMIN — MULTIVITAMIN 15 ML: LIQUID ORAL at 09:41

## 2019-05-24 RX ADMIN — METOCLOPRAMIDE HYDROCHLORIDE 5 MG: 5 SOLUTION ORAL at 20:29

## 2019-05-24 RX ADMIN — METOCLOPRAMIDE HYDROCHLORIDE 5 MG: 5 SOLUTION ORAL at 02:00

## 2019-05-24 RX ADMIN — MIDODRINE HYDROCHLORIDE 10 MG: 5 TABLET ORAL at 09:40

## 2019-05-24 RX ADMIN — STANDARDIZED SENNA CONCENTRATE AND DOCUSATE SODIUM 2 TABLET: 8.6; 5 TABLET ORAL at 09:40

## 2019-05-24 ASSESSMENT — PAIN SCALES - GENERAL
PAINLEVEL_OUTOF10: 3
PAINLEVEL_OUTOF10: 3
PAINLEVEL_OUTOF10: 0

## 2019-05-24 ASSESSMENT — PAIN SCALES - WONG BAKER
WONGBAKER_NUMERICALRESPONSE: 0
WONGBAKER_NUMERICALRESPONSE: 0
WONGBAKER_NUMERICALRESPONSE: 2
WONGBAKER_NUMERICALRESPONSE: 0

## 2019-05-24 NOTE — PROGRESS NOTES
[facility]  [facility] Medicine Res[facility] Program  Inpatient Daily Progress Note  ______________________________________________________________________________    Patient: Willy Dasilva  YOB: 1980   MRN: [id number]    Acct: [de-identified]     Admit date: 5/17/2019  Today's date: 05/24/19  Number of days in the hospital: 7  Expected Discharge Date: 05/21/19    Admitting Diagnosis: Urinary tract infection    Subjective:   Patient seen and examined at bedside. Alert and awake this morning. Vitals have been stable. Glucose 88 this morning. Patient is on tube feeds. Colostomy has output. MRI of the brain and spine show no acute abnormality. Patient is nonverbal so review of systems cannot be obtained. Objective:   Vital Sign:  /73   Pulse 123   Temp 97.6 °F (36.4 °C) (Temporal)   Resp 20   Ht 5' (1.524 m)   Wt 198 lb 8 oz (90 kg)   SpO2 97%   Breastfeeding? No   BMI 38.77 kg/m²       Physical Exam:  General appearance: alert, non verbal, responses to commands  HEENT: Head: Normocephalic, no lesions, without obvious abnormality. Neck: no adenopathy, no carotid bruit, no JVD, supple, symmetrical, trachea midline and thyroid not enlarged, symmetric, no tenderness/mass/nodules  Lungs: clear to auscultation bilaterally  Heart: regular rate and rhythm, loud S1, S2 normal, no murmur, click, rub or gallop  Abdomen: soft, non-tender; bowel sounds normal; no masses,  no organomegaly. G tube in place  Extremities: extremities normal, atraumatic, no cyanosis or edema.  Right AKA, mild left pedal edema  Neurologic: Mental status: Alert    Medications:  Scheduled Medications   metoclopramide  5 mg Oral Q6H    lansoprazole  30 mg Per G Tube QAM AC    docusate  100 mg Oral Daily    midodrine  10 mg Oral TID WC    scopolamine  1 patch Transdermal Q72H    sertraline  25 mg Per G Tube Daily    sodium chloride flush  10 mL Intravenous 2 times per day    sodium chloride flush  50 mL Intravenous Daily    CENTRUM/CERTA-DERICK with minerals oral  15 mL Per G Tube Daily    rivaroxaban  20 mg Oral Daily       PRN Medications  sennosides-docusate sodium 2 tablet Daily PRN   sodium chloride flush 10 mL PRN   glucose 15 g PRN   dextrose 12.5 g PRN   glucagon (rDNA) 1 mg PRN   dextrose 100 mL/hr PRN   [Held by provider] metoprolol 2.5 mg Q4H PRN   potassium chloride 40 mEq PRN   Or     potassium alternative oral replacement 40 mEq PRN   Or     potassium chloride 10 mEq PRN   potassium chloride 10 mEq PRN   potassium chloride 20 mEq PRN   LORazepam 1 mg As Directed RT PRN   acetaminophen 650 mg Q6H PRN   glycopyrrolate 1 mg TID PRN   sodium chloride flush 10 mL PRN   magnesium hydroxide 30 mL Daily PRN   ondansetron 4 mg Q6H PRN   promethazine 25 mg Q4H PRN   fentanNYL 25 mcg Q2H PRN       Diagnostic Labs and Imaging:  CBC:  Recent Labs     05/22/19  0812 05/23/19  0548   WBC 11.2 9.6   HGB 13.1 13.1    328     BMP: Recent Labs     05/22/19  0812 05/23/19  0548 05/24/19  0450    139 139   K 3.4* 3.6* 4.6   * 103 104   CO2 23 26 25   BUN 5* 5* 4*   CREATININE 0.26* 0.26* 0.26*   GLUCOSE 86 84 88     Assessment and Plan:     Principal Problem:    Urinary tract infection  Active Problems:    Aphasia    Multiple sclerosis (HCC)    Nonverbal    Gastrostomy tube dependent (HCC)    Neurogenic bladder    Hemorrhagic cystitis    Tachycardia    Gross hematuria    Lactic acidosis    Chronic indwelling Davies catheter    Peripheral vascular disease (HCC)    Recurrent UTI (urinary tract infection)    History of pulmonary embolism    Hypertension    Altered mental status    Toxic metabolic encephalopathy    ESBL E. coli carrier    Septic shock (HCC)    Status post colostomy (HCC)    Lower paraplegia (HCC)  Resolved Problems:    * No resolved hospital problems. *    1. Sepsis with shock secondary to UTI. Resolved. ID following.  Single positive blood culture with coagulase neg staph likely contaminant. IV meropenem discontinued.   2. Hypotension due to sepsis. Resolved. D/C  IVF. Monitor urine and other stoma output. 3. Sinus tachycardia. Chronic. Improved. Patient on lopressor in nursing home. 4. UTI secondary to Proteus. Patient on chronic indwelling omer. Meropenem was discontinued. ID follow. 5. Acute respiratory failure. Possibly from sepsis. Resolved.   6. Acute toxic metabolic encephalopathy. Resolved. Patient appears to be back to baseline. 7. Lactic acidosis. Resolved with IV hydration. 8. Hypokalemia. Replace K as needed. Monitor BMP. Check magnesium. 9. Multiple sclerosis with Quadriplegia. Neurology follow for possible flare. Patient underwent MRI of the brain and cervical spine. No acute abnormalities seen. 10. PAD s/p Right AKA : Stable. 11. S/p Colostomy. Monitor stoma output. Stoma care. Continue colace for bowel regimen. Reglan Q6H.  12. Neurogenic bladder. Chronic. Continue omer drainage. 13. Diet. Patient on tube feeds this morning. 14. GI prophylaxis. Lansoprazole 30 mg per G tube  15. DVT prophylaxis. On Xarelto  16. Patient will be discharged back to nursing home. Debbie Bar MD  PGY-1, Department of Internal Medicine  [place], [place]  5/24/2019 9:18 AM    Attending Physician Statement  I have discussed the care of Dewayne Laura, including pertinent history and exam findings with the resident. I have reviewed the key elements of all parts of the encounter with the resident. I have seen and examined the patient with the resident. I agree with the assessment and plan and status of the problem list as documented. I have seen the patient during my round today, I have reviewed the chart, labs and medications reviewed. She looked comfortable at rest she is on room air is saturating well.   She still on D10 at 20 mL an hour, she was started on tube feeding yesterday and she's been tolerating well so far she does have colostomy output, abdomen is soft nontender, slightly distended only no rigidity. MRI cervical thoracic and brain MRI done in the finding by radiologist suggestive of demyelinating process, follow-up with neurology. Will increase to feeding to goal feeding and will discontinue D10 and continue to monitor blood sugar while she is on D10 to determine if she will tolerate  If no hypoglycemia on D10 then will transfer her to a SNF tomorrow    Master Encinas MD  5/24/2019 12:34 PM    Please note that this chart was generated using voice recognition Dragon dictation software. Although every effort was made to ensure the accuracy of this automated transcription, some errors in transcription may have occurred.

## 2019-05-24 NOTE — CARE COORDINATION
Transitional Planning  Notified by IM resident of plan to discharge to SNF today. Call to [facility], spoke to Hannah Manrique, notified of anticipate discharge. States they can take patient back at any time. Informed her will update on official discharge order and transport time when available. RN to call report to [phone].

## 2019-05-24 NOTE — PROGRESS NOTES
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 Acute toxic metabolic encephalopathy. · Quadriplegia  · PAD with Rt BKA  · Indwelling long term PEG and trache  · Single blood culture positive for Coagulase negative Staphylococci on 5-17-19. Likely contaminant  Recommendations:     · Discontinue Meropenem 1 gm IV q 8 hr (5/19-5/22)  · Supportive care  · OK for discharge from ID standpoint    Medical Decision Making/Summary/Discussion:5/24/2019     · Patient with MS, quadriplegia, neurogenic bladder  · Admitted with AMS changes- acute toxic metabolic encephalopathy  · Concern that she had UTI as a potential cause of AMS changes  · Urine culture with Proteus and several other organisms. Lab identifying them  · Past HX of ESBL + E coli  · Receiving treatment with Meropenem. · Patient improving with current treatment  · Urine culture showing multiple bacteria. It appears that the urine culture sample was taken from the old omer catheter on 5-17. A new one was placed on 5-20  · 5-21 Pt has not had any output in her colostomy overnight or today. Tube feeds are running at 20/hr. Abd is distended and bowel sounds are very hypoactive. · 5-23-19: Bowel sounds remain hypoactive. Little to no output through ostomy.  5-22-19 KUB normal  Infection Control Recommendations   · Jean Precautions  · Contact Isolation ESBL +    Antimicrobial Stewardship Recommendations     · Completion of therapy    Coordination of Outpatient Care:   · Estimated Length of IV antimicrobials: 5-22-19  · Patient will need Midline Catheter Insertion: No  · Patient will need PICC line Insertion:No  · Patient will need: Home IV , ELISABETH Ludwig, LTAC:TBD  · Patient will need outpatient wound care:No    Chief complaint/reason for consultation:   · AMS changes  · UTI      History of Present Illness:   Honey Prado is a 45y.o.-year-old  female who was initially admitted on 5/17/2019. Patient seen at the request of Pepito Mathews. INITIAL HISTORY:  Patient has multiple sclerosis, quadriplegia, neurogenic bladder, aphasia, hypertension, chronic indwelling Omer catheter, depression, DVTs in the past and contractures. Patient presented through ER with complaints of altered mental status. She resides at a SNF and was felt to have altered mental status. This is difficult to determine as she is non verbal. However, the feeling at the SNF was that there was a change. There is a Hx of recurrent urinary tract infections with prior ESBL+ E coli in December 2018. After that she has experienced UTI's with E coli not ESBL +.     In the ER the 5-17-19 UA obtained through the indwelling Omer shows large leukocyte esterase, negative nitrite, many WBC. The blood and urine cultures are in progress. The patient is nonverbal so it is difficult to determine if the urinary findings relate to the reported mental status changes or simply from the chronic omer. Patient was transferred to ICU because of hypotension despite fluid boluses, toxic metabolic encephalopathy and lactic acidosis. Has improved and transferred out of ICU. Pts abdomen is distended and firm. Bowel sounds were very hypoactive. She appears to be tender to palpation. Tube feeds were turned off and abd xray ordered. Abdomen continues to be monitored. Tube feeds were resumed, ostomy now with stool, abd nontender    Pt has generalized anasarca    Blood pressure has stabilized.   Off pressors  Completed treatment for UTI  Mentation to baseline    CURRENT EVALUATION : 5/24/2019    Afebrile - tmax 99.2  VS stable    She is currently on 3L O2 NC  AA, nods to questions    Omer with clear urine  Rt IJ clean  Abd soft, non tender. Ostomy with stool  Tube feeds at 50    Labs, X rays reviewed: 5/24/2019    BUN:4  Cr: 0.26  K 4.6    WBC:11.1-->20.-->26.4-->16.4->11.2-->9.6  Hb: 17.5-->14.5-->12.3->13.1  Plat: 361-->277-->265->298-->328    MRI thoracic spine:  Scattered loss of spinal cord volume and scattered T2 hyperintense foci   throughout the thoracic spinal cord that are nonspecific although may relate   to patient's history of a demyelinating process.  Correlation is needed. Cultures:  Urine:  · 5-17-19: pending. Proteus, Pseudomonas, ESBL E coli, Aerococcus viridans  Blood:  · 5-17-19: 1/2 showing coag neg staph, likely a contaminate      5-17-19: CXR: Poor inspiratory effort. Atelectasis  5-17-19: CT abdomen: renal stones, thickened bladder wall (chronic cystitis)  5-20 Bilateral venous doppler negative for clots    Discussed with RN. I have personally reviewed the past medical history, past surgical history, medications, social history, and family history, and I have updated the database accordingly.   Past Medical History:     Past Medical History:   Diagnosis Date    Aphasia     Aphasia     Contracture of joint, lower leg     HTN (hypertension)     Hx MRSA infection resolved 1/2017    2 negative nasal screens 1/2017 (hx in heel in 2013)    Hydronephrosis     Multiple sclerosis ([facility] [address]     Neurogenic bladder     Peripheral vascular disease (HCC)     Polyneuropathy     Pressure ulcer     Coccyx, Heel    Pulmonary embolism (HCC)     Pulmonary infarction (HCC)     Quadriplegia (HCC)     Tachycardia     UTI (lower urinary tract infection)        Past Surgical  History:     Past Surgical History:   Procedure Laterality Date    AMPUTATION Right     COLOSTOMY      GASTROSTOMY TUBE PLACEMENT      LEG AMPUTATION THROUGH FEMUR         Medications:       Social History:     Social History     Socioeconomic History    Marital status: Single     Spouse name: Not on file    and moist. No lesions. Dentition in good repair. Neck:Supple, without lymphadenopathy. Thyroid normal, No bruits. Pulmonary/Chest: Clear   Cardiovascular: Regular rate and rhythm without murmurs, rubs, or gallops. Abdomen:Obese, soft, nontender. Colostomy in LLQ - with liquid stool. Gtube with feeds at 50  Chronic Davies in place  All four Extremities: Generalized anasarca  Neurologic: Difficult to  mentation, non verbal. Nods to questions Withdraws arms. Lt leg contracted. Skin: Warm and dry with good turgor. No signs of peripheral arterial or venous insufficiency. No ulcerations. Medical Decision Making -Laboratory:   I have independently reviewed/ordered the following labs:    CBC with Differential:   Recent Labs     05/22/19  0812 05/23/19  0548   WBC 11.2 9.6   HGB 13.1 13.1   HCT 40.3 41.2    328   LYMPHOPCT 12* 16*   MONOPCT 9 8     BMP:   Recent Labs     05/22/19  0812 05/23/19  0548 05/24/19  0450    139 139   K 3.4* 3.6* 4.6   * 103 104   CO2 23 26 25   BUN 5* 5* 4*   CREATININE 0.26* 0.26* 0.26*   MG 1.6 2.0  --      Hepatic Function Panel:   No results for input(s): PROT, LABALBU, BILIDIR, IBILI, BILITOT, ALKPHOS, ALT, AST in the last 72 hours. No results for input(s): RPR in the last 72 hours. No results for input(s): HIV in the last 72 hours. No results for input(s): BC in the last 72 hours.   Lab Results   Component Value Date    MUCUS NOT REPORTED 05/17/2019    RBC 4.89 05/23/2019    RBC 4.56 03/11/2012    TRICHOMONAS NOT REPORTED 05/17/2019    WBC 9.6 05/23/2019    YEAST NOT REPORTED 05/17/2019    TURBIDITY TURBID 05/17/2019     Lab Results   Component Value Date    CREATININE 0.26 05/24/2019    GLUCOSE 88 05/24/2019    GLUCOSE 69 03/11/2012       Medical Decision Making-Imaging:     EXAMINATION:   MRI OF THE THORACIC SPINE WITHOUT AND WITH CONTRAST  5/23/2019 11:26 am       TECHNIQUE:   Multiplanar multisequence MRI of the thoracic spine was performed without and with the administration of intravenous contrast.       COMPARISON:   None       HISTORY:   ORDERING SYSTEM PROVIDED HISTORY: MYELOPATHY, SLOWLY PROGRESSING   TECHNOLOGIST PROVIDED HISTORY:   NEEDS SEDATION; PRIOR STUDY WAS MOTION DEGRADED.       FINDINGS:   BONES/ALIGNMENT: There is a normal thoracic kyphosis.  The vertebral body   heights are maintained.  There is an atypical hemangioma within the posterior   aspect of the T8 vertebral body.  There is otherwise age-appropriate bone   marrow signal.       SPINAL CORD: There are a few scattered T2 hyperintense foci throughout the   thoracic spinal cord.  There is mild scattered loss in spinal cord volume   that is similar compared to prior examination within the midthoracic region. There is no abnormal postcontrast enhancement.       SOFT TISSUES:  The posterior paraspinal soft tissues are unremarkable.  The   visualized thoracic and upper abdominal soft tissues are grossly unremarkable   except for bilateral pleural effusions.       DEGENERATIVE CHANGES: The disc spaces are maintained.  There is no disc space   narrowing.  There is no spondylolisthesis.  There is no canal stenosis or   foraminal narrowing.           Impression   Scattered loss of spinal cord volume and scattered T2 hyperintense foci   throughout the thoracic spinal cord that are nonspecific although may relate   to patient's history of a demyelinating process.  Correlation is needed. EXAMINATION:   CT OF THE ABDOMEN AND PELVIS WITH CONTRAST 5/17/2019 3:52 pm       TECHNIQUE:   CT of the abdomen and pelvis was performed with the administration of   intravenous contrast. Multiplanar reformatted images are provided for review.    Dose modulation, iterative reconstruction, and/or weight based adjustment of   the mA/kV was utilized to reduce the radiation dose to as low as reasonably   achievable.       COMPARISON:   01/08/2017       HISTORY:   ORDERING SYSTEM PROVIDED HISTORY: firm abdomen TECHNOLOGIST PROVIDED HISTORY:           FINDINGS:   Lower Chest: Dependent changes at the lung bases, likely atelectasis.       Organs: The liver, spleen, pancreas and adrenal glands are unremarkable. Mild gallbladder distention.  No significant biliary dilatation. Nonobstructive bilateral nephrolithiasis.  No significant hydronephrosis.  No   cystic or solid renal mass.  Mild renal cortical scarring on the right,   unchanged       GI/Bowel: Left lower quadrant colostomy with mucous fistula distally.  No   evidence of bowel obstruction.  No small bowel distention.  A gastrostomy   tube is in place.       Pelvis: Moderate free pelvic fluid, simple by attenuation characteristics.  A   Davies catheter is in place. Marge Last is some calcification along the posterior   bladder wall.  Mild enhancement of the bladder mucosa with mild infiltration   of the pericystic fat.  The uterus and adnexal structures are unremarkable.       Peritoneum/Retroperitoneum: Small quantity of diffuse abdominal ascites. Mild infiltration of the mesenteric fat.  No free intraperitoneal air.  No   focal abscess is seen.  The abdominal aorta is normal in caliber.  There is   no retroperitoneal adenopathy.       Bones/Soft Tissues: No acute osseous or soft tissue abnormality.  Small fat   containing umbilical hernia.           Impression   1. Small quantity of diffuse abdominal ascites of indeterminate etiology. Mild infiltration of the mesenteric fat.  There is no free air or focal   abscess. 2. Mild enhancement of the bladder mucosa with infiltration of the pericystic   fat.  Findings are similar on the previous study.  A mild chronic cystitis is   suggested. 3. Left lower quadrant colostomy and G-tube.  No evidence of bowel   obstruction. 4. Nonobstructive bilateral nephrolithiasis. 5. Dependent changes at the lung bases most consistent with atelectasis.              EXAMINATION:   ONE XRAY VIEW OF THE CHEST       5/17/2019 2:50 4  SUSCEPTIBLE   trimethoprim-sulfamethoxazole Resistant  Final     >=320  RESISTANT   piperacillin-tazobactam Sensitive  Final     <=4  SUSCEPTIBLE   Proteus mirabilis (2)     Antibiotic Interpretation JOAQUIN Status    amikacin   Final     NOT REPORTED   ampicillin Sensitive  Final     <=2  SUSCEPTIBLE   ampicillin-sulbactam   Final     NOT REPORTED   aztreonam Sensitive  Final     <=1  SUSCEPTIBLE   ceFAZolin   Final     NOT REPORTED   cefepime   Final     NOT REPORTED   cefTRIAXone Sensitive  Final     <=1  SUSCEPTIBLE   ciprofloxacin Resistant  Final     >=4  RESISTANT   ertapenem   Final     NOT REPORTED   gentamicin Intermediate  Final     8  INTERMEDIATE   meropenem   Final     NOT REPORTED   nitrofurantoin Resistant  Final     64  RESISTANT   tigecycline   Final     NOT REPORTED   tobramycin Intermediate  Final     8  INTERMEDIATE   trimethoprim-sulfamethoxazole Resistant  Final     >=320  RESISTANT   piperacillin-tazobactam Sensitive  Final     <=4  SUSCEPTIBLE   Pseudomonas aeruginosa (3)     Antibiotic Interpretation JOAQUIN Status    amikacin   Final     NOT REPORTED   ceFAZolin   Final     NOT REPORTED   cefepime Sensitive  Final     2  SUSCEPTIBLE   ciprofloxacin Resistant  Final     >=4  RESISTANT   gentamicin Sensitive  Final     2  SUSCEPTIBLE   meropenem   Final     NOT REPORTED   tigecycline   Final     NOT REPORTED   tobramycin Sensitive  Final     <=1  SUSCEPTIBLE   piperacillin-tazobactam Sensitive  Final     <=4  SUSCEPTIBLE   Escherichia coli (4)     Antibiotic Interpretation JOAQUIN Status    amikacin   Final     NOT REPORTED   ampicillin Resistant  Final     >=32  RESISTANT   ampicillin-sulbactam   Final     NOT REPORTED   aztreonam Resistant  Final     16  RESISTANT   ceFAZolin Resistant  Final     >=64  RESISTANT   ceFAZolin Resistant Cefazolin sensitivity results can be used to predict the effectiveness of oral cephalosporins (eg.  Cephalexin) in uncomplicated Urinary Tract Infections due to E. coli, K. pneumoniae, and P. mirabilis Final    cefepime Resistant  Final     2  RESISTANT   cefTRIAXone Resistant  Final     >=64  RESISTANT   ciprofloxacin Sensitive  Final     1  SUSCEPTIBLE   ertapenem   Final     NOT REPORTED   Confirmatory Extended Spectrum Beta-Lactamase Positive POSITIVE Final    gentamicin Sensitive  Final     <=1  SUSCEPTIBLE   meropenem Sensitive  Final     <=0.25  SUSCEPTIBLE   nitrofurantoin Sensitive  Final     <=16  SUSCEPTIBLE   tigecycline   Final     NOT REPORTED   tobramycin Sensitive  Final     <=1  SUSCEPTIBLE   trimethoprim-sulfamethoxazole Sensitive  Final     <=20  SUSCEPTIBLE   piperacillin-tazobactam Resistant  Final     <=4  RESISTANT           5/20/2019  5:24 PM - Lew, chandan Incoming Lab Results From [facility]     Specimen Information: Blood        Component Collected Lab   Specimen Description 05/17/2019  4:06  [address]   . BLOOD    Special Requests 05/17/2019  4:06  [address]   NO LOCATION GIVEN    Culture Abnormal  05/17/2019  4:06 PM [address] Blood Culture Results called to and read back by: ALEXANDREA DEJESUS [id number]    Culture 05/17/2019  4:06 PM [facility] FROM BOTTLE: GRAM POSITIVE COCCI IN CLUSTERS    Culture 05/17/2019  4:06  [address]   ID by [employer]: STAPHYLOCOCCUS SPECIES, COAGULASE NEGATIVE    Culture Abnormal  05/17/2019  4:06 PM [address] A single positive blood culture of coagulase negative staphylococci, micrococci, diphtheroids or Bacillus species should be interpreted with caution and viewed as likely a skin contaminant. 5/22/2019  5:46 AM - Nathalie Hackett Incoming Lab Results From [facility]     Specimen Information: Urine, straight catheter        Component Collected Lab   Specimen Description 05/17/2019  4:34  [address]   . URINE,STRAIGHT CATHETER Special Requests 05/17/2019  4:34  [address]   NOT REPORTED    Culture Abnormal  05/17/2019  4:34 PM [address] <[id number] CFU/ML    Culture Abnormal  05/17/2019  4:34 PM [address] <[id number] CFU/ML DIFFERENT COLONY TYPE    Culture Abnormal  05/17/2019  4:34 PM [address] <[id number] CFU/ML    Culture Abnormal  05/17/2019  4:34 PM [address] <11957 CFU/ML THIS ORGANISM IS AN EXTENDED-SPECTRUM BETA-LACTAMASE  AND RESISTANCE TO THERAPY WITH PENICILLINS, CEPHALOSPORINS AND AZTREONAM IS EXPECTED.  THESE ORGANISMS GENERALLY REMAIN SUSCEPTIBLE TO CARBAPENEMS.  CONSIDER ID CONSULTATION. Culture Abnormal  05/17/2019  4:34 PM [address] >[id number] CFU/ML There are no CLSI interpretive guidelines for routine susceptibility testing.  Aerococcus species are reported to be susceptible to penicillin. Culture Abnormal  05/17/2019  4:34  [address]   Several types of bacteria were identified in this specimen.  Further ID and susceptibility testing is generally not helpful in this circumstance and has not been performed.  Consider recollection if clinically indicated.  Please contact the Micro lab ([phone]) if you feel the management ofthis particular situation would be helped by further testing.    Culture 05/17/2019  4:34  [address]   Work up all organisms per physician request.    Testing Performed By     Edvin Parker Name Director Address Valid Date Range   [address] [place] [address] [facility] [address] 08/30/17 0801-Present   Susceptibility     Proteus mirabilis (1)     Antibiotic Interpretation JOAQUIN Status    amikacin   Final     NOT REPORTED   ampicillin Sensitive  Final     <=2  SUSCEPTIBLE   ampicillin-sulbactam Final     >=4  RESISTANT   gentamicin Sensitive  Final     2  SUSCEPTIBLE   meropenem   Final     NOT REPORTED   tigecycline   Final     NOT REPORTED   tobramycin Sensitive  Final     <=1  SUSCEPTIBLE   piperacillin-tazobactam Sensitive  Final     <=4  SUSCEPTIBLE   Escherichia coli (4)     Antibiotic Interpretation JOAQUIN Status    amikacin   Final     NOT REPORTED   ampicillin Resistant  Final     >=32  RESISTANT   ampicillin-sulbactam   Final     NOT REPORTED   aztreonam Resistant  Final     16  RESISTANT   ceFAZolin Resistant  Final     >=64  RESISTANT   ceFAZolin Resistant Cefazolin sensitivity results can be used to predict the effectiveness of oral cephalosporins (eg. Cephalexin) in uncomplicated Urinary Tract Infections due to E. coli, K. pneumoniae, and P. mirabilis Final    cefepime Resistant  Final     2  RESISTANT   cefTRIAXone Resistant  Final     >=64  RESISTANT   ciprofloxacin Sensitive  Final     1  SUSCEPTIBLE   ertapenem   Final     NOT REPORTED   Confirmatory Extended Spectrum Beta-Lactamase Positive POSITIVE Final    gentamicin Sensitive  Final     <=1  SUSCEPTIBLE   meropenem Sensitive  Final     <=0.25  SUSCEPTIBLE   nitrofurantoin Sensitive  Final     <=16  SUSCEPTIBLE   tigecycline   Final     NOT REPORTED   tobramycin Sensitive  Final     <=1  SUSCEPTIBLE   trimethoprim-sulfamethoxazole Sensitive  Final     <=20  SUSCEPTIBLE   piperacillin-tazobactam Resistant  Final     <=4  RESISTANT    Condensed View         Medical Decision Making-Other:     Note:  · Labs, medications, radiologic studies were reviewed with personal review of films  · Moderate amounts of data were reviewed  · Discussed with nursing Staff  · Infection Control and Prevention measures reviewed  · All prior entries were reviewed  · Administer medications as ordered  · Prognosis: Guarded  · Discharge planning reviewed    Thank you for allowing us to participate in the care of this patient. Please call with questions.     Day Sharp

## 2019-05-24 NOTE — PLAN OF CARE
Problem: Falls - Risk of:  Goal: Will remain free from falls  Description  Will remain free from falls  Outcome: Met This Shift  Note:   No falls noted, safety measures maintained this shift      Problem: Injury - Risk of, Physical Injury:  Goal: Will remain free from falls  Description  Will remain free from falls  Outcome: Met This Shift  Note:   No falls noted, safety measures maintained this shift      Problem: Infection, Septic Shock:  Goal: Will show no infection signs and symptoms  Description  Will show no infection signs and symptoms  Outcome: Met This Shift  Note:   Pt O2 sat and RR are within defined ranges

## 2019-05-24 NOTE — PROGRESS NOTES
Medical Nutrition Therapy    RD was reconsulted because D10% has been discontinued. Pt did become hypoglycemic earlier in admit. Pt's tube feed was off at that time causing the hypoglycemia. Recommend continue Osmolite at 55 ml per hour as much as possible during transport and on admission to [facility]. Plan is for discharge later today.     Corbin Dash, TASHA, LD

## 2019-05-25 VITALS
BODY MASS INDEX: 38.97 KG/M2 | HEIGHT: 60 IN | DIASTOLIC BLOOD PRESSURE: 79 MMHG | HEART RATE: 93 BPM | TEMPERATURE: 99.3 F | OXYGEN SATURATION: 97 % | WEIGHT: 198.5 LBS | SYSTOLIC BLOOD PRESSURE: 121 MMHG | RESPIRATION RATE: 18 BRPM

## 2019-05-25 LAB
ANION GAP SERPL CALCULATED.3IONS-SCNC: 12 MMOL/L (ref 9–17)
BUN BLDV-MCNC: 6 MG/DL (ref 6–20)
BUN/CREAT BLD: ABNORMAL (ref 9–20)
CALCIUM SERPL-MCNC: 9 MG/DL (ref 8.6–10.4)
CHLORIDE BLD-SCNC: 100 MMOL/L (ref 98–107)
CO2: 26 MMOL/L (ref 20–31)
CREAT SERPL-MCNC: 0.28 MG/DL (ref 0.5–0.9)
GFR AFRICAN AMERICAN: >60 ML/MIN
GFR NON-AFRICAN AMERICAN: >60 ML/MIN
GFR SERPL CREATININE-BSD FRML MDRD: ABNORMAL ML/MIN/{1.73_M2}
GFR SERPL CREATININE-BSD FRML MDRD: ABNORMAL ML/MIN/{1.73_M2}
GLUCOSE BLD-MCNC: 102 MG/DL (ref 65–105)
GLUCOSE BLD-MCNC: 80 MG/DL (ref 65–105)
GLUCOSE BLD-MCNC: 88 MG/DL (ref 70–99)
POTASSIUM SERPL-SCNC: 4.3 MMOL/L (ref 3.7–5.3)
SODIUM BLD-SCNC: 138 MMOL/L (ref 135–144)

## 2019-05-25 PROCEDURE — 36415 COLL VENOUS BLD VENIPUNCTURE: CPT

## 2019-05-25 PROCEDURE — 6370000000 HC RX 637 (ALT 250 FOR IP): Performed by: INTERNAL MEDICINE

## 2019-05-25 PROCEDURE — 80048 BASIC METABOLIC PNL TOTAL CA: CPT

## 2019-05-25 PROCEDURE — 6370000000 HC RX 637 (ALT 250 FOR IP): Performed by: EMERGENCY MEDICINE

## 2019-05-25 PROCEDURE — 82947 ASSAY GLUCOSE BLOOD QUANT: CPT

## 2019-05-25 PROCEDURE — 2580000003 HC RX 258: Performed by: STUDENT IN AN ORGANIZED HEALTH CARE EDUCATION/TRAINING PROGRAM

## 2019-05-25 PROCEDURE — 99239 HOSP IP/OBS DSCHRG MGMT >30: CPT | Performed by: INTERNAL MEDICINE

## 2019-05-25 PROCEDURE — 6370000000 HC RX 637 (ALT 250 FOR IP): Performed by: STUDENT IN AN ORGANIZED HEALTH CARE EDUCATION/TRAINING PROGRAM

## 2019-05-25 RX ORDER — METOCLOPRAMIDE HYDROCHLORIDE 5 MG/5ML
5 SOLUTION ORAL 2 TIMES DAILY
Qty: 750 ML | Refills: 3 | Status: ON HOLD | OUTPATIENT
Start: 2019-05-25 | End: 2019-08-02 | Stop reason: HOSPADM

## 2019-05-25 RX ORDER — METOCLOPRAMIDE HYDROCHLORIDE 5 MG/5ML
5 SOLUTION ORAL 2 TIMES DAILY
Qty: 750 ML | Refills: 3 | Status: SHIPPED | OUTPATIENT
Start: 2019-05-25 | End: 2019-05-25

## 2019-05-25 RX ADMIN — LANSOPRAZOLE 30 MG: 30 TABLET, ORALLY DISINTEGRATING, DELAYED RELEASE ORAL at 06:29

## 2019-05-25 RX ADMIN — Medication 50 ML: at 09:20

## 2019-05-25 RX ADMIN — MULTIVITAMIN 15 ML: LIQUID ORAL at 09:50

## 2019-05-25 RX ADMIN — METOCLOPRAMIDE HYDROCHLORIDE 5 MG: 5 SOLUTION ORAL at 01:44

## 2019-05-25 RX ADMIN — Medication 10 ML: at 09:19

## 2019-05-25 RX ADMIN — MIDODRINE HYDROCHLORIDE 10 MG: 5 TABLET ORAL at 12:04

## 2019-05-25 RX ADMIN — SERTRALINE 25 MG: 25 TABLET, FILM COATED ORAL at 09:17

## 2019-05-25 RX ADMIN — MIDODRINE HYDROCHLORIDE 10 MG: 5 TABLET ORAL at 09:17

## 2019-05-25 RX ADMIN — ACETAMINOPHEN 650 MG: 650 SOLUTION ORAL at 04:20

## 2019-05-25 RX ADMIN — METOCLOPRAMIDE HYDROCHLORIDE 5 MG: 5 SOLUTION ORAL at 10:11

## 2019-05-25 ASSESSMENT — PAIN SCALES - GENERAL
PAINLEVEL_OUTOF10: 0
PAINLEVEL_OUTOF10: 0

## 2019-05-25 ASSESSMENT — PAIN SCALES - WONG BAKER
WONGBAKER_NUMERICALRESPONSE: 0
WONGBAKER_NUMERICALRESPONSE: 4

## 2019-05-25 NOTE — PROGRESS NOTES
times per day    sodium chloride flush  50 mL Intravenous Daily    CENTRUM/CERTA-DERICK with minerals oral  15 mL Per G Tube Daily    rivaroxaban  20 mg Oral Daily       PRN Medications  sennosides-docusate sodium 2 tablet Daily PRN   sodium chloride flush 10 mL PRN   glucose 15 g PRN   dextrose 12.5 g PRN   glucagon (rDNA) 1 mg PRN   dextrose 100 mL/hr PRN   [Held by provider] metoprolol 2.5 mg Q4H PRN   potassium chloride 40 mEq PRN   Or     potassium alternative oral replacement 40 mEq PRN   Or     potassium chloride 10 mEq PRN   potassium chloride 10 mEq PRN   potassium chloride 20 mEq PRN   LORazepam 1 mg As Directed RT PRN   acetaminophen 650 mg Q6H PRN   glycopyrrolate 1 mg TID PRN   sodium chloride flush 10 mL PRN   magnesium hydroxide 30 mL Daily PRN   ondansetron 4 mg Q6H PRN   promethazine 25 mg Q4H PRN   fentanNYL 25 mcg Q2H PRN       Diagnostic Labs and Imaging:  CBC:  Recent Labs     05/23/19  0548   WBC 9.6   HGB 13.1        BMP: Recent Labs     05/23/19  0548 05/24/19  0450 05/25/19  0652    139 138   K 3.6* 4.6 4.3    104 100   CO2 26 25 26   BUN 5* 4* 6   CREATININE 0.26* 0.26* 0.28*   GLUCOSE 84 88 88     Hepatic: No results for input(s): AST, ALT, ALB, BILITOT, ALKPHOS in the last 72 hours. Assessment and Plan:     Principal Problem:    Urinary tract infection  Active Problems:    Aphasia    Multiple sclerosis (HCC)    Nonverbal    Gastrostomy tube dependent (HCC)    Neurogenic bladder    Hemorrhagic cystitis    Tachycardia    Gross hematuria    Lactic acidosis    Chronic indwelling Davies catheter    Peripheral vascular disease (HCC)    Recurrent UTI (urinary tract infection)    History of pulmonary embolism    Hypertension    Altered mental status    Toxic metabolic encephalopathy    ESBL E. coli carrier    Septic shock ([facility]    Status post colostomy ([facility]    Lower paraplegia (HCC)  Resolved Problems:    * No resolved hospital problems. *    1.  Sepsis with shock secondary to UTI. Resolved. ID following. Single positive blood culture with coagulase neg staph likely contaminant. IV meropenem discontinued.   2. Hypotension due to sepsis. Resolved. D/C  IVF. Monitor urine and other stoma output. 3. Sinus tachycardia. Chronic. Improved. Patient on lopressor in nursing home. 4. UTI secondary to Proteus. Patient on chronic indwelling omer. Meropenem was discontinued. ID follow. 5. Acute respiratory failure. Possibly from sepsis. Resolved.   6. Acute toxic metabolic encephalopathy. Resolved. Patient appears to be back to baseline. 7. Lactic acidosis. Resolved with IV hydration. 8. Hypokalemia. Replace K as needed. Monitor BMP. Check magnesium. 9. Multiple sclerosis with Quadriplegia. Neurology follow for possible flare. Patient underwent MRI of the brain and cervical spine. No acute abnormalities seen. 10. PAD s/p Right AKA : Stable. 11. S/p Colostomy. Monitor stoma output. Stoma care. Continue colace for bowel regimen. Reglan Q6H. Colostomy output has been 1200 in the last 24 hours. The patient might need Metamucil or Imodium on discharge. 12. Neurogenic bladder. Chronic. Continue omer drainage. 13. Diet. Patient on tube feeds this morning. 14. GI prophylaxis. Lansoprazole 30 mg per G tube  15. DVT prophylaxis. On Xarelto  16. Patient will be discharged back to nursing home today. Ernestine James MD  PGY-1, Department of Internal Medicine  [address] [place] [address]  5/25/2019 8:18 AM      Attending Physician Statement    I have discussed the care of Ethan Choe, including pertinent history and exam findings with the resident. I have reviewed the key elements of all parts of the encounter with the resident. I have seen and examined the patient with the resident. I agree with the assessment and plan and status of the problem list as documented.     She was seen during my round this morning, she is alert and arousable and did not follow commands no

## 2019-05-25 NOTE — PROGRESS NOTES
NEUROLOGY INPATIENT PROGRESS NOTE    5/24/2019         Subjective: Ashley Wagoner is a  45 y.o. female admitted on 5/17/2019 with Urinary tract infection [N39.0]  Urinary tract infection [N39.0]  Chart reviewed. Discussed with caregivers. No acute issues overnight. She had MRI brain and MRI spine yesterday. Briefly, this is a  45 y.o. female, ECF resident with long standing hx of MS and paraplegia was admitted on 5/17/2019 with worsening lethargy found to have urosepsis and resultant toxic metabolic encephalopathy. Her mentation has a significantly improved with antibiotic therapy. Regarding multiple sclerosis; she has a long-standing history of multiple sclerosis and baseline paraplegia and nonverbal state. She has history of PEG tube placement and Rt above knee amputation. Patient was not on any disease modifying therapy for multiple sclerosis. Caregivers at [facility] were unable to provide any history regarding her prior therapies for multiple sclerosis. No information regarding her prior MRI studies of brain and spine and no information regarding her prior encounters with neurologists were obtainable through 'care everywhere\" search. On admit; patient has had MRI brain and spine and those studies were nondiagnostic as those were significantly motion degraded. MRI spine and brain were not repeated under sedation due to ongoing encephalopathy on admit. As her mentation has been improving; discussion done repeatedly with patient yesterday and today and patient is agreeable to get MRI studies. Her history is also significant for pulmonary embolism in 2013, peripheral vascular disease, hydronephrosis. She also had hemorrhagic cystitis in January 2017. Since then she has indwelling Davies catheter. She has hx of infection with a multidrug resistant organism; hx of ESBL E. Coli but her last culture grew E. Coli sensitive to Rocephin.       No current facility-administered medications on file prior to encounter. Current Outpatient Medications on File Prior to Encounter   Medication Sig Dispense Refill    Cranberry 250 MG CAPS Take 250 mg by mouth 2 times daily      ranitidine (ZANTAC) 75 MG/5ML syrup 75 mg by Per G Tube route 2 times daily      Scopolamine Base (SCOPOLAMINE TD) Place 1 mg onto the skin every 72 hours       baclofen (LIORESAL) 10 MG tablet 10 mg by Per G Tube route 3 times daily Crush into G tube       gabapentin (NEURONTIN) 300 MG capsule 300 mg by Per G Tube route 3 times daily      medroxyPROGESTERone (DEPO-PROVERA) 150 MG/ML injection Inject 150 mg into the muscle every 3 months On the 11th       metoprolol (LOPRESSOR) 25 MG tablet 12.5 mg 2 times daily       acetaminophen (TYLENOL) 80 MG/0.8ML suspension 650 mg by Per G Tube route every 6 hours as needed for Fever or Pain       glycopyrrolate (ROBINUL) 1 MG tablet Take 1 mg by mouth 3 times daily          Allergies: Rinku Dietrich has No Known Allergies.     Past Medical History:   Diagnosis Date    Aphasia     Aphasia     Contracture of joint, lower leg     HTN (hypertension)     Hx MRSA infection resolved 1/2017    2 negative nasal screens 1/2017 (hx in heel in 2013)    Hydronephrosis     Multiple sclerosis ([facility]     Neurogenic bladder     Peripheral vascular disease (HCC)     Polyneuropathy     Pressure ulcer     Coccyx, Heel    Pulmonary embolism (HCC)     Pulmonary infarction (HCC)     Quadriplegia (HCC)     Tachycardia     UTI (lower urinary tract infection)        Past Surgical History:   Procedure Laterality Date    AMPUTATION Right     COLOSTOMY      GASTROSTOMY TUBE PLACEMENT      LEG AMPUTATION THROUGH FEMUR             Medications:     metoclopramide  5 mg Oral Q6H    lansoprazole  30 mg Per G Tube QAM AC    docusate  100 mg Oral Daily    midodrine  10 mg Oral TID WC    scopolamine  1 patch Transdermal Q72H    sertraline  25 mg Per G Tube Daily    sodium chloride flush  10 mL process. Impression and Plan: Ms. Cherie Gaitan is a 45 y.o. female with   Toxic metabolic encephalopathy with septic shock secondary to UTI: Resolved. Multiple sclerosis with quadriplegia with baseline nonverbal state: Initial spine MRIs were  Nondiagnostic; because of motion degraded state. Patient is much more alert and awake and agreeable to get brain and spine MRI under sedation. Therefore MRI brain and MRI cervical/thoracic spine studies are done. These studies did not demonstrate any evidence of active demyelinating disease with enhancement. No family members available at bedside. Tried calling listed phone number and no one to answer the phone. Will try again tomorrow to get more info regarding prior trials of disease modifying therapies for MS. Phone:  [phone]     Will follow with you.

## 2019-05-25 NOTE — CARE COORDINATION
Transitional Planning  Discharge order noted. [facility] transport requested. Call to [facility] to notify of discharge today, left VM at nurses' station requesting return call. 1135 - Repeat call to [facility], spoke to [facility], notified of plan to discharge today. Transferred to nurses' station, again no answer. Notified patient's mother Creed Showman via phone of discharge today. AVS with completed [address] faxed to [facility].      Discharge [address] [facility] Case Management Department  Written by: Marilou Paz RN    Patient Name: Giorgi Pugh  Attending Provider: Stephan Aguilar DO  Admit Date: 2019  2:31 PM  MRN: [id number]  Account: [de-identified]                     : 1980  Discharge Date:  19       Disposition: Hayder Rosas RN

## 2019-05-25 NOTE — PLAN OF CARE
Problem: Pain:  Goal: Pain level will decrease  Description  Pain level will decrease  Outcome: Ongoing  Note:   Monitoring for S&S of pain, non pharm and pharm interventions provided, FACES scale used      Problem: Risk for Impaired Skin Integrity  Goal: Tissue integrity - skin and mucous membranes  Description  Structural intactness and normal physiological function of skin and  mucous membranes.   Outcome: Ongoing  Note:   Noted areas of concern, treatment orders continued and followed, repositioning per protocol      Problem: Falls - Risk of:  Goal: Will remain free from falls  Description  Will remain free from falls  Outcome: Ongoing  Note:   No falls noted, safety measures maintained      Problem: Injury - Risk of, Physical Injury:  Goal: Will remain free from falls  Description  Will remain free from falls  Outcome: Ongoing  Note:   No falls noted, safety measures maintained      Problem: Sensory:  Goal: Pain level will decrease  Description  Pain level will decrease  Outcome: Ongoing  Note:   Monitoring for S&S of pain, non pharm and pharm interventions provided, FACES scale used

## 2019-06-01 NOTE — DISCHARGE SUMMARY
[address] [place] [address]     Department of Internal Medicine - Staff Internal Medicine Service    INPATIENT DISCHARGE SUMMARY        Patient Identification:  Leticia Ashton is a 45 y.o. female. :  1980  MRN: [id number]     Acct: [de-identified]   Admit Date:  2019  Discharge date and time: 2019 12:45 PM   Attending Provider: No att. providers found                                     Admission Diagnoses:   Urinary tract infection [N39.0]  Urinary tract infection [N39.0]    Discharge Diagnoses:   Principal Problem:    Urinary tract infection  Active Problems:    Aphasia    Multiple sclerosis ([address]    Nonverbal    Gastrostomy tube dependent ([address]    Neurogenic bladder    Hemorrhagic cystitis    Tachycardia    Gross hematuria    Lactic acidosis    Chronic indwelling Davies catheter    Peripheral vascular disease ([address]    Recurrent UTI (urinary tract infection)    History of pulmonary embolism    Hypertension    Altered mental status    Toxic metabolic encephalopathy    ESBL E. coli carrier    Septic shock ([address]    Status post colostomy ([address]    Lower paraplegia (HCC)  Resolved Problems:    * No resolved hospital problems. *       Consults:   pulmonary/intensive care, ID and neurology    Brief Inpatient course:    Blank Hearing a 45 y. o. female that came to the ED from [address] [place] [address] Road per the people in the [facility] house, the patient is on nonverbal at baseline but from the last few days she was very fatigued and lethargic and mentation was altered.  For this reason, the patient was sent to the emergency department for evaluation.  During this encounter, the patient was drowsy, responsive to commands.     In the ED, the patient was afebrile.  Blood pressure was on the lower side.  Tachycardic.  Lactic acid was 2.4.  Hemoglobin was 17.5.  WBC 11.1.  Urinalysis showed gross hematuria, turbid, suggestive of urinary tract infection.  Chest x-ray showed shallow inflation, perihilar interstitial and nodular opacities.  CT scan of the abdomen and pelvis showed small quantity of diffuse abdominal ascites.  Mesenteric fat.  Mild chronic cystitis is suggested.  Left lower quadrant colostomy NG tube in place.  Nonobstructive bilateral nephrolithiasis.  Patient admitted for urinary tract infection.     The patient has a history of multiple sclerosis, paraplegia, history of indwelling Omer secondary to neurogenic bladder.  It looks like she has a baseline tachycardia as well.  She also has a history of chronic PEG tube placement.  Timing of the PEG tube placement is not known.  She also has a colostomy in the left lower quadrant.  She also has a history of right lower leg above knee amputation.  She has a history of pulmonary embolism in 2013.  Peripheral vascular disease.  She has a history of chronic retention of urine and hydronephrosis.  Recurrent urinary tract infections.  In January 2017 she developed hemorrhagic cystitis.  Since then she has the indwelling Omer catheter.  She was seen by infectious diseases in January 2019. Bob Barthel has a history of ESBL E. coli but her last culture grew E. coli sensitive to Rocephin.  Last infectious diseases note recommended catheter exchange with bladder irrigation for 24-48 hours with either Neosporin with polymyxin and gentamicin.  HER-2 last urine cultures showed mixed bill.  She also has a history of hypertension. SHIRIN YOUSSEF [facility] last echo was in August 2017 which showed an ejection fraction of 55%.  Normal left ventricular systolic function.     The patient was transferred to MICU for hypotension and tachycardia. Her BP dropped to 83/32 with lactic acid of 4 and a heart rate of 145. She developed hyperchloremic non anion gap acidosis. Her omer was changed in the ICU. Infectious diseases was consulted. Her urine culture grew Proteus mirabilis. ID later changed antibiotic to meropenem Q8H. Neurology was consulted for MS flare.  Patient could not get brain MRI as he could not lay still.      She was started on vasopressor. She was placed on high flow O2 for respiratory distress as she was intolerant to BIPAP. She has been weaned off high flow as well. Tube feeds were started via her G tube.  NG tube was discontinued today.  She had an episode of hypoglycemia this morning which was corrected with IV 50 percent dextrose.  Tube feeds were changed from diabetic to regular. She had hypokalemia with K of 2.9. She received 1 dose of IV Lasix 20 mg daily due to left lower extremity and bilateral sacral edema.  IV fluids were discontinued as well. Her mentation continues to improve so she is being transferred out of ICU. The patient was kept on IV meropenem by infectious diseases. Her hypertension and acute metabolic encephalopathy improved. Neurology saw the patient for multiple sclerosis with quadriplegia. MRI of the brain and spine show no acute abnormality. The patient was becoming hypoglycemic so she was started on D10 percent solution. After starting her to feeds, dextrose 10% solution was stopped and she was monitored for hypoglycemia. The patient tolerated the tube feeds well. X-ray KUB shows nonobstructive pattern. She had good output from the colostomy.   The patient was then discharged to [facility] in stable condition from the hospital.    Procedures:  Central Line placement    Any Hospital Acquired Infections: none    Discharge Functional Status:  stable    Disposition: [address]    Patient Instructions:   Discharge Medication List as of 5/25/2019 12:42 PM      START taking these medications    Details   docusate (COLACE) 50 MG/5ML liquid Take 10 mLs by mouth daily, Disp-5 mL, R-2Print      midodrine (PROAMATINE) 10 MG tablet Take 1 tablet by mouth 3 times daily (with meals), Disp-90 tablet, R-3Normal      lansoprazole (PREVACID SOLUTAB) 30 MG disintegrating tablet 1 tablet by Per G Tube route every morning (before breakfast), Disp-30 tablet, R-3Normal CONTINUE these medications which have CHANGED    Details   metoclopramide (REGLAN) 5 MG/5ML solution Take 5 mLs by mouth 2 times daily, Disp-750 mL, R-3Print      rivaroxaban (XARELTO) 20 MG TABS tablet Take 1 tablet by mouth daily, Disp-30 tablet, R-2Print      sertraline (ZOLOFT) 25 MG tablet 1 tablet by Per G Tube route daily, Disp-30 tablet, R-1Print      Multiple Vitamins-Minerals (CENTRUM/CERTA-DERICK WITH MINERALS ORAL) solution 15 mLs by Per G Tube route daily, Disp-1 Bottle, R-3Print         CONTINUE these medications which have NOT CHANGED    Details   Cranberry 250 MG CAPS Take 250 mg by mouth 2 times dailyHistorical Med      ranitidine (ZANTAC) 75 MG/5ML syrup 75 mg by Per G Tube route 2 times dailyHistorical Med      Scopolamine Base (SCOPOLAMINE TD) Place 1 mg onto the skin every 72 hours Historical Med      baclofen (LIORESAL) 10 MG tablet 10 mg by Per G Tube route 3 times daily Crush into G tube Historical Med      gabapentin (NEURONTIN) 300 MG capsule 300 mg by Per G Tube route 3 times daily      medroxyPROGESTERone (DEPO-PROVERA) 150 MG/ML injection Inject 150 mg into the muscle every 3 months On the 11th Historical Med      metoprolol (LOPRESSOR) 25 MG tablet 12.5 mg 2 times daily       acetaminophen (TYLENOL) 80 MG/0.8ML suspension 650 mg by Per G Tube route every 6 hours as needed for Fever or Pain       glycopyrrolate (ROBINUL) 1 MG tablet Take 1 mg by mouth 3 times daily Historical Med         STOP taking these medications       sennosides-docusate sodium (SENOKOT-S) 8.6-50 MG tablet Comments:   Reason for Stopping:         vitamin D (CHOLECALCIFEROL) 1000 UNIT TABS tablet Comments:   Reason for Stopping:         potassium chloride 20 MEQ/15ML (10%) oral solution Comments:   Reason for Stopping:               Activity: activity as tolerated    Diet: Tube feeds    Follow-up:    Marcus Lee MD  [address] [address] [phone]          [place] [address]  [address] [facility] [address] [phone]          Follow up labs: Follow up imaging:     Note that over 30 minutes was spent in preparing discharge papers, discussing discharge with patient, medication review, etc.      Jennifer Whitney MD         Department of Internal Medicine  [facility]         6/1/2019, 7:03 PM       Attending Physician Statement  I have discussed the care of Ethan Meyers, including pertinent history and exam findings with the resident. I have reviewed the key elements of all parts of the encounter with the resident. I have seen and examined the patient with the resident. I agree with the assessment and plan and status of the problem list as documented.     Martina Foster MD  6/1/2019 7:16 PM

## 2019-06-04 ENCOUNTER — TELEPHONE (OUTPATIENT)
Dept: INFECTIOUS DISEASES | Age: 39
End: 2019-06-04

## 2019-06-04 ENCOUNTER — HOSPITAL ENCOUNTER (OUTPATIENT)
Age: 39
Setting detail: SPECIMEN
Discharge: HOME | End: 2019-06-04
Payer: MEDICARE

## 2019-06-04 LAB
ANION GAP SERPL CALCULATED.3IONS-SCNC: 15 MMOL/L (ref 9–17)
BUN BLDV-MCNC: 28 MG/DL (ref 6–20)
BUN/CREAT BLD: ABNORMAL (ref 9–20)
CALCIUM SERPL-MCNC: 10.3 MG/DL (ref 8.6–10.4)
CHLORIDE BLD-SCNC: 103 MMOL/L (ref 98–107)
CO2: 22 MMOL/L (ref 20–31)
CREAT SERPL-MCNC: 0.37 MG/DL (ref 0.5–0.9)
GFR AFRICAN AMERICAN: >60 ML/MIN
GFR NON-AFRICAN AMERICAN: >60 ML/MIN
GFR SERPL CREATININE-BSD FRML MDRD: ABNORMAL ML/MIN/{1.73_M2}
GFR SERPL CREATININE-BSD FRML MDRD: ABNORMAL ML/MIN/{1.73_M2}
GLUCOSE BLD-MCNC: 81 MG/DL (ref 70–99)
HCT VFR BLD CALC: 44.9 % (ref 36.3–47.1)
HEMOGLOBIN: 13.6 G/DL (ref 11.9–15.1)
MCH RBC QN AUTO: 26.4 PG (ref 25.2–33.5)
MCHC RBC AUTO-ENTMCNC: 30.3 G/DL (ref 28.4–34.8)
MCV RBC AUTO: 87 FL (ref 82.6–102.9)
NRBC AUTOMATED: 0 PER 100 WBC
PDW BLD-RTO: 15 % (ref 11.8–14.4)
PLATELET # BLD: 644 K/UL (ref 138–453)
PMV BLD AUTO: 11 FL (ref 8.1–13.5)
POTASSIUM SERPL-SCNC: 4 MMOL/L (ref 3.7–5.3)
RBC # BLD: 5.16 M/UL (ref 3.95–5.11)
SODIUM BLD-SCNC: 140 MMOL/L (ref 135–144)
WBC # BLD: 8 K/UL (ref 3.5–11.3)

## 2019-06-04 PROCEDURE — 80048 BASIC METABOLIC PNL TOTAL CA: CPT

## 2019-06-04 PROCEDURE — 85027 COMPLETE CBC AUTOMATED: CPT

## 2019-06-04 PROCEDURE — 36415 COLL VENOUS BLD VENIPUNCTURE: CPT

## 2019-06-04 PROCEDURE — P9603 ONE-WAY ALLOW PRORATED MILES: HCPCS

## 2019-06-04 NOTE — TELEPHONE ENCOUNTER
Patient was seen in office as a new patient w/Dr Janelle Gaming. He at that time recommended bladder irrigations with Gentamycin. Patient recently saw you in the hospital and [facility] is asking if you still want the bladder irrigations. If so, they need a new order.      Let me know

## 2019-06-11 ENCOUNTER — HOSPITAL ENCOUNTER (OUTPATIENT)
Age: 39
Setting detail: SPECIMEN
Discharge: HOME | End: 2019-06-11
Payer: MEDICARE

## 2019-06-11 LAB
ALBUMIN SERPL-MCNC: 3.7 G/DL (ref 3.5–5.2)
ANION GAP SERPL CALCULATED.3IONS-SCNC: 17 MMOL/L (ref 9–17)
BUN BLDV-MCNC: 22 MG/DL (ref 6–20)
BUN/CREAT BLD: ABNORMAL (ref 9–20)
CALCIUM SERPL-MCNC: 10 MG/DL (ref 8.6–10.4)
CHLORIDE BLD-SCNC: 100 MMOL/L (ref 98–107)
CO2: 22 MMOL/L (ref 20–31)
CREAT SERPL-MCNC: 0.29 MG/DL (ref 0.5–0.9)
GFR AFRICAN AMERICAN: >60 ML/MIN
GFR NON-AFRICAN AMERICAN: >60 ML/MIN
GFR SERPL CREATININE-BSD FRML MDRD: ABNORMAL ML/MIN/{1.73_M2}
GFR SERPL CREATININE-BSD FRML MDRD: ABNORMAL ML/MIN/{1.73_M2}
GLUCOSE BLD-MCNC: 67 MG/DL (ref 70–99)
HCT VFR BLD CALC: 45.3 % (ref 36.3–47.1)
HEMOGLOBIN: 13.5 G/DL (ref 11.9–15.1)
MCH RBC QN AUTO: 26 PG (ref 25.2–33.5)
MCHC RBC AUTO-ENTMCNC: 29.8 G/DL (ref 28.4–34.8)
MCV RBC AUTO: 87.1 FL (ref 82.6–102.9)
NRBC AUTOMATED: 0 PER 100 WBC
PDW BLD-RTO: 15.7 % (ref 11.8–14.4)
PLATELET # BLD: 230 K/UL (ref 138–453)
PMV BLD AUTO: 11.8 FL (ref 8.1–13.5)
POTASSIUM SERPL-SCNC: 3.8 MMOL/L (ref 3.7–5.3)
RBC # BLD: 5.2 M/UL (ref 3.95–5.11)
SODIUM BLD-SCNC: 139 MMOL/L (ref 135–144)
WBC # BLD: 7.1 K/UL (ref 3.5–11.3)

## 2019-06-11 PROCEDURE — 82040 ASSAY OF SERUM ALBUMIN: CPT

## 2019-06-11 PROCEDURE — P9603 ONE-WAY ALLOW PRORATED MILES: HCPCS

## 2019-06-11 PROCEDURE — 85027 COMPLETE CBC AUTOMATED: CPT

## 2019-06-11 PROCEDURE — 36415 COLL VENOUS BLD VENIPUNCTURE: CPT

## 2019-06-11 PROCEDURE — 80048 BASIC METABOLIC PNL TOTAL CA: CPT

## 2019-06-13 NOTE — TELEPHONE ENCOUNTER
Other (Bladder irrigations)     LIT James CNP  You 29 minutes ago (12:04 PM)      See previous message    Routing comment       LIT James CNP  You 29 minutes ago (12:04 PM)      Stop bladder irrigation with Gentamicin. It can lead to more resistant organisms. She should start getting Dmannose 1500 mg TID po/pt. If she needs an order written (it is an OTC medication) I can put one in.      Routing comment

## 2019-06-13 NOTE — TELEPHONE ENCOUNTER
Left the message w/Tanja regarding the orders. Told her to call with any questions or if she needs written orders for the [facility].

## 2019-06-16 PROBLEM — N39.0 URINARY TRACT INFECTION: Status: RESOLVED | Noted: 2019-05-17 | Resolved: 2019-06-16

## 2019-06-17 ENCOUNTER — OFFICE VISIT (OUTPATIENT)
Dept: NEUROLOGY | Age: 39
End: 2019-06-17
Payer: MEDICARE

## 2019-06-17 VITALS — SYSTOLIC BLOOD PRESSURE: 112 MMHG | DIASTOLIC BLOOD PRESSURE: 78 MMHG | HEART RATE: 83 BPM

## 2019-06-17 DIAGNOSIS — Z93.1 GASTROSTOMY TUBE DEPENDENT (HCC): ICD-10-CM

## 2019-06-17 DIAGNOSIS — N30.91 HEMORRHAGIC CYSTITIS: ICD-10-CM

## 2019-06-17 DIAGNOSIS — G82.20 LOWER PARAPLEGIA (HCC): ICD-10-CM

## 2019-06-17 DIAGNOSIS — G35 MULTIPLE SCLEROSIS (HCC): Primary | ICD-10-CM

## 2019-06-17 DIAGNOSIS — Z86.711 HISTORY OF PULMONARY EMBOLISM: ICD-10-CM

## 2019-06-17 DIAGNOSIS — Z16.12 EXTENDED SPECTRUM BETA LACTAMASE (ESBL) RESISTANCE: ICD-10-CM

## 2019-06-17 DIAGNOSIS — Z22.39 ESBL E. COLI CARRIER: ICD-10-CM

## 2019-06-17 DIAGNOSIS — R47.01 NONVERBAL: ICD-10-CM

## 2019-06-17 DIAGNOSIS — Z97.8 CHRONIC INDWELLING FOLEY CATHETER: ICD-10-CM

## 2019-06-17 DIAGNOSIS — N31.9 NEUROGENIC BLADDER: ICD-10-CM

## 2019-06-17 DIAGNOSIS — G92.8 TOXIC METABOLIC ENCEPHALOPATHY: ICD-10-CM

## 2019-06-17 DIAGNOSIS — N39.0 RECURRENT UTI (URINARY TRACT INFECTION): ICD-10-CM

## 2019-06-17 PROCEDURE — G8417 CALC BMI ABV UP PARAM F/U: HCPCS | Performed by: STUDENT IN AN ORGANIZED HEALTH CARE EDUCATION/TRAINING PROGRAM

## 2019-06-17 PROCEDURE — 1036F TOBACCO NON-USER: CPT | Performed by: STUDENT IN AN ORGANIZED HEALTH CARE EDUCATION/TRAINING PROGRAM

## 2019-06-17 PROCEDURE — G8427 DOCREV CUR MEDS BY ELIG CLIN: HCPCS | Performed by: STUDENT IN AN ORGANIZED HEALTH CARE EDUCATION/TRAINING PROGRAM

## 2019-06-17 PROCEDURE — 99215 OFFICE O/P EST HI 40 MIN: CPT | Performed by: STUDENT IN AN ORGANIZED HEALTH CARE EDUCATION/TRAINING PROGRAM

## 2019-06-17 PROCEDURE — 1111F DSCHRG MED/CURRENT MED MERGE: CPT | Performed by: STUDENT IN AN ORGANIZED HEALTH CARE EDUCATION/TRAINING PROGRAM

## 2019-06-17 NOTE — PROGRESS NOTES
above-knee amputation noted, patient's left lower extremity was spastic and contracted. Patient did follow simple commands with opening and closing her eyes and showing 2 fingers.        PATIENT HISTORY:     Past Medical History:   Diagnosis Date    Aphasia     Aphasia     Contracture of joint, lower leg     HTN (hypertension)     Hx MRSA infection resolved 1/2017    2 negative nasal screens 1/2017 (hx in heel in 2013)    Hydronephrosis     Multiple sclerosis ([address]     Neurogenic bladder     Peripheral vascular disease (HCC)     Polyneuropathy     Pressure ulcer     Coccyx, Heel    Pulmonary embolism (HCC)     Pulmonary infarction (HCC)     Quadriplegia (HCC)     Tachycardia     UTI (lower urinary tract infection)         Past Surgical History:   Procedure Laterality Date    AMPUTATION Right     COLOSTOMY      GASTROSTOMY TUBE PLACEMENT      LEG AMPUTATION THROUGH FEMUR          Social History     Socioeconomic History    Marital status: Single     Spouse name: Not on file    Number of children: Not on file    Years of education: Not on file    Highest education level: Not on file   Occupational History    Not on file   Social Needs    Financial resource strain: Not on file    Food insecurity:     Worry: Not on file     Inability: Not on file    Transportation needs:     Medical: Not on file     Non-medical: Not on file   Tobacco Use    Smoking status: Never Smoker    Smokeless tobacco: Never Used   Substance and Sexual Activity    Alcohol use: No    Drug use: No    Sexual activity: Never   Lifestyle    Physical activity:     Days per week: Not on file     Minutes per session: Not on file    Stress: Not on file   Relationships    Social connections:     Talks on phone: Not on file     Gets together: Not on file     Attends Jain service: Not on file     Active member of club or organization: Not on file     Attends meetings of clubs or organizations: Not on file Allergies     REVIEW OF SYSTEMS:     Review of Systems : Unable to obtain as patient is nonverbal and noncommunicative    VITALS  /78 (Site: Right Lower Arm, Position: Supine, Cuff Size: Large Adult)   Pulse 83      PHYSICAL EXAMINATION:     Constitutional: Well developed, well nourished and in no acute distress. Head:  normocephalic, atraumatic. Neck: supple, no carotid bruits, thyroid not palpable  Respiratory: Clear to auscultation bilaterally with no use of accessory muscles during respiration. Cardiovascular: normal rate, regular rhythm. Abdomen: Soft, nontender, nondistended, normal bowel sounds  Extremities:  peripheral pulses palpable  Psych: normal affect      NEUROLOGICAL EXAMINATION:     Mental status   Patient was awake, did follow simple commands by opening and closing her eyes. She was nonverbal and noncommunicative     Cranial nerves   II - response to visual threat bilaterally                                          III, IV, VI - left esotropia, left ROMA with abducting nystagmus noted on right gaze                                                           VII - normal facial symmetry                                                             VIII - intact hearing                                                                             IX, X - unable to assess                                                          XI -   unable to assess                                             XII -unable to assess     Motor function  Patient was able to move right upper extremity, significant spasticity noted in left upper and left lower extremities with contracture of the left lower extremity, right above-knee amputation noted. Sensory function Withdraws to pinprick in right upper extremity. Cerebellar Unable to assess finger to nose bilaterally. No involuntary movements or tremors     Reflex function Hyperactive DTRs, Babinski positive on the left.   Right AKA     Gait

## 2019-06-18 ENCOUNTER — TELEPHONE (OUTPATIENT)
Dept: NEUROSURGERY | Age: 39
End: 2019-06-18

## 2019-06-18 NOTE — TELEPHONE ENCOUNTER
Called the pts home care [facility] NP looks after her. Says her med list is current from yesterday. Will have notes from pt NP who looks after her faxed over. Says NP thinks pt needed to be seen her for her MS. No other reason for follow ups. SO after you review the notes from there I can call and get her a follow up here if you want.

## 2019-06-25 ENCOUNTER — HOSPITAL ENCOUNTER (INPATIENT)
Age: 39
LOS: 2 days | Discharge: NURSING, REHAB OR LONG-TERM CARE | DRG: 393 | End: 2019-06-27
Attending: EMERGENCY MEDICINE | Admitting: INTERNAL MEDICINE
Payer: MEDICARE

## 2019-06-25 ENCOUNTER — APPOINTMENT (OUTPATIENT)
Dept: GENERAL RADIOLOGY | Age: 39
DRG: 393 | End: 2019-06-25
Payer: MEDICARE

## 2019-06-25 DIAGNOSIS — J18.9 PNEUMONIA DUE TO ORGANISM: Primary | ICD-10-CM

## 2019-06-25 PROBLEM — A41.9 SEPSIS (HCC): Status: ACTIVE | Noted: 2019-06-25

## 2019-06-25 LAB
-: ABNORMAL
ABSOLUTE EOS #: 0.22 K/UL (ref 0–0.44)
ABSOLUTE IMMATURE GRANULOCYTE: <0.03 K/UL (ref 0–0.3)
ABSOLUTE LYMPH #: 2.07 K/UL (ref 1.1–3.7)
ABSOLUTE MONO #: 0.71 K/UL (ref 0.1–1.2)
ALBUMIN SERPL-MCNC: 3.8 G/DL (ref 3.5–5.2)
ALBUMIN/GLOBULIN RATIO: 0.7 (ref 1–2.5)
ALP BLD-CCNC: 81 U/L (ref 35–104)
ALT SERPL-CCNC: 20 U/L (ref 5–33)
AMORPHOUS: ABNORMAL
ANION GAP SERPL CALCULATED.3IONS-SCNC: 11 MMOL/L (ref 9–17)
AST SERPL-CCNC: 17 U/L
BACTERIA: ABNORMAL
BASOPHILS # BLD: 0 % (ref 0–2)
BASOPHILS ABSOLUTE: 0.03 K/UL (ref 0–0.2)
BILIRUB SERPL-MCNC: 0.31 MG/DL (ref 0.3–1.2)
BILIRUBIN URINE: NEGATIVE
BUN BLDV-MCNC: 17 MG/DL (ref 6–20)
BUN/CREAT BLD: ABNORMAL (ref 9–20)
CALCIUM SERPL-MCNC: 9.8 MG/DL (ref 8.6–10.4)
CASTS UA: ABNORMAL /LPF (ref 0–2)
CASTS UA: ABNORMAL /LPF (ref 0–2)
CHLORIDE BLD-SCNC: 105 MMOL/L (ref 98–107)
CO2: 23 MMOL/L (ref 20–31)
COLOR: YELLOW
CREAT SERPL-MCNC: 0.37 MG/DL (ref 0.5–0.9)
CRYSTALS, UA: ABNORMAL /HPF
DIFFERENTIAL TYPE: ABNORMAL
EOSINOPHILS RELATIVE PERCENT: 3 % (ref 1–4)
EPITHELIAL CELLS UA: ABNORMAL /HPF (ref 0–5)
GFR AFRICAN AMERICAN: >60 ML/MIN
GFR NON-AFRICAN AMERICAN: >60 ML/MIN
GFR SERPL CREATININE-BSD FRML MDRD: ABNORMAL ML/MIN/{1.73_M2}
GFR SERPL CREATININE-BSD FRML MDRD: ABNORMAL ML/MIN/{1.73_M2}
GLUCOSE BLD-MCNC: 69 MG/DL (ref 70–99)
GLUCOSE URINE: NEGATIVE
HCT VFR BLD CALC: 46.2 % (ref 36.3–47.1)
HEMOGLOBIN: 14.2 G/DL (ref 11.9–15.1)
IMMATURE GRANULOCYTES: 0 %
INR BLD: 1.1
KETONES, URINE: NEGATIVE
LACTIC ACID, WHOLE BLOOD: 1.3 MMOL/L (ref 0.7–2.1)
LEUKOCYTE ESTERASE, URINE: ABNORMAL
LYMPHOCYTES # BLD: 28 % (ref 24–43)
MCH RBC QN AUTO: 26.4 PG (ref 25.2–33.5)
MCHC RBC AUTO-ENTMCNC: 30.7 G/DL (ref 28.4–34.8)
MCV RBC AUTO: 85.9 FL (ref 82.6–102.9)
MONOCYTES # BLD: 10 % (ref 3–12)
MUCUS: ABNORMAL
NITRITE, URINE: POSITIVE
NRBC AUTOMATED: 0 PER 100 WBC
OTHER OBSERVATIONS UA: ABNORMAL
PARTIAL THROMBOPLASTIN TIME: 26.4 SEC (ref 20.5–30.5)
PDW BLD-RTO: 16.3 % (ref 11.8–14.4)
PH UA: >9 (ref 5–8)
PLATELET # BLD: 381 K/UL (ref 138–453)
PLATELET ESTIMATE: ABNORMAL
PMV BLD AUTO: 10.3 FL (ref 8.1–13.5)
POTASSIUM SERPL-SCNC: 3.9 MMOL/L (ref 3.7–5.3)
PROTEIN UA: ABNORMAL
PROTHROMBIN TIME: 11.2 SEC (ref 9–12)
RBC # BLD: 5.38 M/UL (ref 3.95–5.11)
RBC # BLD: ABNORMAL 10*6/UL
RBC UA: ABNORMAL /HPF (ref 0–2)
RENAL EPITHELIAL, UA: ABNORMAL /HPF
SEG NEUTROPHILS: 59 % (ref 36–65)
SEGMENTED NEUTROPHILS ABSOLUTE COUNT: 4.23 K/UL (ref 1.5–8.1)
SODIUM BLD-SCNC: 139 MMOL/L (ref 135–144)
SPECIFIC GRAVITY UA: 1.02 (ref 1–1.03)
TOTAL PROTEIN: 9.4 G/DL (ref 6.4–8.3)
TRICHOMONAS: ABNORMAL
TURBIDITY: ABNORMAL
URINE HGB: ABNORMAL
UROBILINOGEN, URINE: NORMAL
WBC # BLD: 7.3 K/UL (ref 3.5–11.3)
WBC # BLD: ABNORMAL 10*3/UL
WBC UA: ABNORMAL /HPF (ref 0–5)
YEAST: ABNORMAL

## 2019-06-25 PROCEDURE — 6360000004 HC RX CONTRAST MEDICATION: Performed by: STUDENT IN AN ORGANIZED HEALTH CARE EDUCATION/TRAINING PROGRAM

## 2019-06-25 PROCEDURE — 0D20XUZ CHANGE FEEDING DEVICE IN UPPER INTESTINAL TRACT, EXTERNAL APPROACH: ICD-10-PCS | Performed by: EMERGENCY MEDICINE

## 2019-06-25 PROCEDURE — 1200000000 HC SEMI PRIVATE

## 2019-06-25 PROCEDURE — 87186 SC STD MICRODIL/AGAR DIL: CPT

## 2019-06-25 PROCEDURE — 81001 URINALYSIS AUTO W/SCOPE: CPT

## 2019-06-25 PROCEDURE — 83605 ASSAY OF LACTIC ACID: CPT

## 2019-06-25 PROCEDURE — 6360000002 HC RX W HCPCS: Performed by: STUDENT IN AN ORGANIZED HEALTH CARE EDUCATION/TRAINING PROGRAM

## 2019-06-25 PROCEDURE — 99284 EMERGENCY DEPT VISIT MOD MDM: CPT

## 2019-06-25 PROCEDURE — 87150 DNA/RNA AMPLIFIED PROBE: CPT

## 2019-06-25 PROCEDURE — 85610 PROTHROMBIN TIME: CPT

## 2019-06-25 PROCEDURE — 87040 BLOOD CULTURE FOR BACTERIA: CPT

## 2019-06-25 PROCEDURE — 87205 SMEAR GRAM STAIN: CPT

## 2019-06-25 PROCEDURE — 74018 RADEX ABDOMEN 1 VIEW: CPT

## 2019-06-25 PROCEDURE — 85025 COMPLETE CBC W/AUTO DIFF WBC: CPT

## 2019-06-25 PROCEDURE — 71045 X-RAY EXAM CHEST 1 VIEW: CPT

## 2019-06-25 PROCEDURE — 87086 URINE CULTURE/COLONY COUNT: CPT

## 2019-06-25 PROCEDURE — 96365 THER/PROPH/DIAG IV INF INIT: CPT

## 2019-06-25 PROCEDURE — 80053 COMPREHEN METABOLIC PANEL: CPT

## 2019-06-25 PROCEDURE — 85730 THROMBOPLASTIN TIME PARTIAL: CPT

## 2019-06-25 PROCEDURE — 2580000003 HC RX 258: Performed by: STUDENT IN AN ORGANIZED HEALTH CARE EDUCATION/TRAINING PROGRAM

## 2019-06-25 PROCEDURE — 87088 URINE BACTERIA CULTURE: CPT

## 2019-06-25 PROCEDURE — 96367 TX/PROPH/DG ADDL SEQ IV INF: CPT

## 2019-06-25 RX ADMIN — PIPERACILLIN AND TAZOBACTAM 3.38 G: 3; .375 INJECTION, POWDER, LYOPHILIZED, FOR SOLUTION INTRAVENOUS; PARENTERAL at 18:23

## 2019-06-25 RX ADMIN — Medication 1250 MG: at 20:31

## 2019-06-25 RX ADMIN — DIATRIZOATE MEGLUMINE AND DIATRIZOATE SODIUM 20 ML: 660; 100 LIQUID ORAL; RECTAL at 18:35

## 2019-06-25 NOTE — ED PROVIDER NOTES
I performed a history and physical examination of the patient and discussed management with the resident. I reviewed the residents note and agree with the documented findings and plan of care. Any areas of disagreement are noted on the chart. I was personally present for the key portions of any procedures. I have documented in the chart those procedures where I was not present during the key portions. I have reviewed the emergency nurses triage note. I agree with the chief complaint, past medical history, past surgical history, allergies, medications, social and family history as documented unless otherwise noted below. Documentation of the HPI, Physical Exam and Medical Decision Making performed by medical students or scribes is based on my personal performance of the HPI, PE and MDM. For Phys Assistant/ Nurse Practitioner cases/documentation I have personally evaluated this patient and have completed at least one if not all key elements of the E/M (history, physical exam, and MDM). I find the patient's history and physical exam are consistent with the NP/PA documentation. I agree with the care provided, treatment rendered, disposition and followup plan. Additional findings are as noted. Getachew Bynum. Shital Meza MD  Attending Emergency  Physician    SENT TO ED FROM [facility] WITH C/O 'CRACKED/LEAKING G-TUBE'. HX OF MS, QUADRIPLEGIA, INDWELLING BURRIS. NO OTHER HX AVAILABLE. NO ABD PAIN, CHEST PAIN, SOB, HEADACHE, NAUSEA. AWAKE,ALERT, NONVERBAL, BUT RESPONDING TO QUESTIONS BY NODDING/SHAKING HEAD. LUNGS-CLEAR JOSE ANTONIO. CARDIAC-S1S2, RRR, NO MRG. ABD SOFT, NONDISTENDED, NONTENDER. COLOSTOMY DRAINING OK. G-TUBE WITH TAPED OVER LEAKING FITTING. G-TUBE SITE APPEARS NORMAL, WITHOUT SIGNS OF INFECTION. IMP-DAMAGED G-TUBE. FEVER, POSS UTI/UROSEPSIS. PLAN-WILL REPLACE G-TUBE, CONFIRM PLACEMENT RADIOGRAPHICALLY. LABWORK. REASSESS.          Kim Kiran MD  06/25/19 1721    G-TUBE REPLACED, POSITION CONFIRMED WITH
Tobacco Use    Smoking status: Never Smoker    Smokeless tobacco: Never Used   Substance and Sexual Activity    Alcohol use: No    Drug use: No    Sexual activity: Never   Lifestyle    Physical activity:     Days per week: Not on file     Minutes per session: Not on file    Stress: Not on file   Relationships    Social connections:     Talks on phone: Not on file     Gets together: Not on file     Attends Episcopalian service: Not on file     Active member of club or organization: Not on file     Attends meetings of clubs or organizations: Not on file     Relationship status: Not on file    Intimate partner violence:     Fear of current or ex partner: Not on file     Emotionally abused: Not on file     Physically abused: Not on file     Forced sexual activity: Not on file   Other Topics Concern    Not on file   Social History Narrative    Not on file       Family History   Problem Relation Age of Onset    No Known Problems Mother     No Known Problems Father        Allergies:  Patient has no known allergies. Home Medications:  Prior to Admission medications    Medication Sig Start Date End Date Taking?  Authorizing Provider   metoclopramide (REGLAN) 5 MG/5ML solution Take 5 mLs by mouth 2 times daily 5/25/19   Prashanth Jane MD   rivaroxaban (XARELTO) 20 MG TABS tablet Take 1 tablet by mouth daily 5/24/19   Prashanth Jane MD   sertraline (ZOLOFT) 25 MG tablet 1 tablet by Per G Tube route daily 5/24/19   Prashanth Jane MD   docusate (COLACE) 50 MG/5ML liquid Take 10 mLs by mouth daily 5/24/19   Prashanth Jane MD   Multiple Vitamins-Minerals (CENTRUM/CERTA-DERICK WITH MINERALS ORAL) solution 15 mLs by Per G Tube route daily 5/24/19   Prashanth Jane MD   midodrine (PROAMATINE) 10 MG tablet Take 1 tablet by mouth 3 times daily (with meals) 5/24/19   Prashanth Jane MD   lansoprazole (PREVACID SOLUTAB) 30 MG disintegrating tablet 1 tablet by Per G Tube route every morning (before breakfast) 5/24/19

## 2019-06-26 LAB
ABSOLUTE EOS #: 0.2 K/UL (ref 0–0.44)
ABSOLUTE IMMATURE GRANULOCYTE: 0.04 K/UL (ref 0–0.3)
ABSOLUTE LYMPH #: 1.51 K/UL (ref 1.1–3.7)
ABSOLUTE MONO #: 1.01 K/UL (ref 0.1–1.2)
ANION GAP SERPL CALCULATED.3IONS-SCNC: 12 MMOL/L (ref 9–17)
BASOPHILS # BLD: 0 % (ref 0–2)
BASOPHILS ABSOLUTE: 0.04 K/UL (ref 0–0.2)
BUN BLDV-MCNC: 10 MG/DL (ref 6–20)
BUN/CREAT BLD: ABNORMAL (ref 9–20)
CALCIUM SERPL-MCNC: 9.1 MG/DL (ref 8.6–10.4)
CHLORIDE BLD-SCNC: 107 MMOL/L (ref 98–107)
CO2: 19 MMOL/L (ref 20–31)
CREAT SERPL-MCNC: 0.33 MG/DL (ref 0.5–0.9)
CULTURE: ABNORMAL
DIFFERENTIAL TYPE: ABNORMAL
EOSINOPHILS RELATIVE PERCENT: 2 % (ref 1–4)
GFR AFRICAN AMERICAN: >60 ML/MIN
GFR NON-AFRICAN AMERICAN: >60 ML/MIN
GFR SERPL CREATININE-BSD FRML MDRD: ABNORMAL ML/MIN/{1.73_M2}
GFR SERPL CREATININE-BSD FRML MDRD: ABNORMAL ML/MIN/{1.73_M2}
GLUCOSE BLD-MCNC: 78 MG/DL (ref 70–99)
HCT VFR BLD CALC: 43.4 % (ref 36.3–47.1)
HEMOGLOBIN: 13 G/DL (ref 11.9–15.1)
IMMATURE GRANULOCYTES: 0 %
LYMPHOCYTES # BLD: 16 % (ref 24–43)
Lab: ABNORMAL
MCH RBC QN AUTO: 26.4 PG (ref 25.2–33.5)
MCHC RBC AUTO-ENTMCNC: 30 G/DL (ref 28.4–34.8)
MCV RBC AUTO: 88.2 FL (ref 82.6–102.9)
MONOCYTES # BLD: 10 % (ref 3–12)
NRBC AUTOMATED: 0 PER 100 WBC
PDW BLD-RTO: 16.5 % (ref 11.8–14.4)
PLATELET # BLD: 369 K/UL (ref 138–453)
PLATELET ESTIMATE: ABNORMAL
PMV BLD AUTO: 10.5 FL (ref 8.1–13.5)
POTASSIUM SERPL-SCNC: 3.6 MMOL/L (ref 3.7–5.3)
RBC # BLD: 4.92 M/UL (ref 3.95–5.11)
RBC # BLD: ABNORMAL 10*6/UL
SEG NEUTROPHILS: 72 % (ref 36–65)
SEGMENTED NEUTROPHILS ABSOLUTE COUNT: 6.92 K/UL (ref 1.5–8.1)
SODIUM BLD-SCNC: 138 MMOL/L (ref 135–144)
SPECIMEN DESCRIPTION: ABNORMAL
WBC # BLD: 9.7 K/UL (ref 3.5–11.3)
WBC # BLD: ABNORMAL 10*3/UL

## 2019-06-26 PROCEDURE — 1200000000 HC SEMI PRIVATE

## 2019-06-26 PROCEDURE — 97162 PT EVAL MOD COMPLEX 30 MIN: CPT

## 2019-06-26 PROCEDURE — 6360000002 HC RX W HCPCS: Performed by: STUDENT IN AN ORGANIZED HEALTH CARE EDUCATION/TRAINING PROGRAM

## 2019-06-26 PROCEDURE — 99221 1ST HOSP IP/OBS SF/LOW 40: CPT | Performed by: INTERNAL MEDICINE

## 2019-06-26 PROCEDURE — 2580000003 HC RX 258: Performed by: STUDENT IN AN ORGANIZED HEALTH CARE EDUCATION/TRAINING PROGRAM

## 2019-06-26 PROCEDURE — 36415 COLL VENOUS BLD VENIPUNCTURE: CPT

## 2019-06-26 PROCEDURE — 99213 OFFICE O/P EST LOW 20 MIN: CPT

## 2019-06-26 PROCEDURE — 85025 COMPLETE CBC W/AUTO DIFF WBC: CPT

## 2019-06-26 PROCEDURE — 80048 BASIC METABOLIC PNL TOTAL CA: CPT

## 2019-06-26 PROCEDURE — 6370000000 HC RX 637 (ALT 250 FOR IP): Performed by: STUDENT IN AN ORGANIZED HEALTH CARE EDUCATION/TRAINING PROGRAM

## 2019-06-26 PROCEDURE — 99223 1ST HOSP IP/OBS HIGH 75: CPT | Performed by: INTERNAL MEDICINE

## 2019-06-26 RX ORDER — GABAPENTIN 300 MG/1
300 CAPSULE ORAL 3 TIMES DAILY
Status: DISCONTINUED | OUTPATIENT
Start: 2019-06-26 | End: 2019-06-27 | Stop reason: HOSPADM

## 2019-06-26 RX ORDER — MIDODRINE HYDROCHLORIDE 5 MG/1
10 TABLET ORAL
Status: DISCONTINUED | OUTPATIENT
Start: 2019-06-26 | End: 2019-06-27 | Stop reason: HOSPADM

## 2019-06-26 RX ORDER — GLYCOPYRROLATE 1 MG/1
1 TABLET ORAL 3 TIMES DAILY
Status: DISCONTINUED | OUTPATIENT
Start: 2019-06-26 | End: 2019-06-27 | Stop reason: HOSPADM

## 2019-06-26 RX ORDER — ONDANSETRON 2 MG/ML
4 INJECTION INTRAMUSCULAR; INTRAVENOUS EVERY 6 HOURS PRN
Status: DISCONTINUED | OUTPATIENT
Start: 2019-06-26 | End: 2019-06-27 | Stop reason: HOSPADM

## 2019-06-26 RX ORDER — POTASSIUM CHLORIDE 20 MEQ/1
40 TABLET, EXTENDED RELEASE ORAL PRN
Status: DISCONTINUED | OUTPATIENT
Start: 2019-06-26 | End: 2019-06-27 | Stop reason: HOSPADM

## 2019-06-26 RX ORDER — METOCLOPRAMIDE HYDROCHLORIDE 5 MG/5ML
5 SOLUTION ORAL 2 TIMES DAILY
Status: DISCONTINUED | OUTPATIENT
Start: 2019-06-26 | End: 2019-06-27 | Stop reason: HOSPADM

## 2019-06-26 RX ORDER — RANITIDINE HYDROCHLORIDE 15 MG/ML
75 SOLUTION ORAL 2 TIMES DAILY
Status: DISCONTINUED | OUTPATIENT
Start: 2019-06-26 | End: 2019-06-27 | Stop reason: HOSPADM

## 2019-06-26 RX ORDER — BACLOFEN 10 MG/1
10 TABLET ORAL 3 TIMES DAILY
Status: DISCONTINUED | OUTPATIENT
Start: 2019-06-26 | End: 2019-06-27 | Stop reason: HOSPADM

## 2019-06-26 RX ORDER — SODIUM CHLORIDE 0.9 % (FLUSH) 0.9 %
10 SYRINGE (ML) INJECTION EVERY 12 HOURS SCHEDULED
Status: DISCONTINUED | OUTPATIENT
Start: 2019-06-26 | End: 2019-06-27 | Stop reason: HOSPADM

## 2019-06-26 RX ORDER — SCOLOPAMINE TRANSDERMAL SYSTEM 1 MG/1
1 PATCH, EXTENDED RELEASE TRANSDERMAL
Status: DISCONTINUED | OUTPATIENT
Start: 2019-06-26 | End: 2019-06-27 | Stop reason: HOSPADM

## 2019-06-26 RX ORDER — POTASSIUM CHLORIDE 7.45 MG/ML
10 INJECTION INTRAVENOUS PRN
Status: DISCONTINUED | OUTPATIENT
Start: 2019-06-26 | End: 2019-06-27 | Stop reason: HOSPADM

## 2019-06-26 RX ORDER — MULTIVIT-MIN/FERROUS GLUCONATE 9 MG/15 ML
15 LIQUID (ML) ORAL DAILY
Status: DISCONTINUED | OUTPATIENT
Start: 2019-06-26 | End: 2019-06-27 | Stop reason: HOSPADM

## 2019-06-26 RX ORDER — SODIUM CHLORIDE 0.9 % (FLUSH) 0.9 %
10 SYRINGE (ML) INJECTION PRN
Status: DISCONTINUED | OUTPATIENT
Start: 2019-06-26 | End: 2019-06-27 | Stop reason: HOSPADM

## 2019-06-26 RX ORDER — SERTRALINE HYDROCHLORIDE 25 MG/1
25 TABLET, FILM COATED ORAL DAILY
Status: DISCONTINUED | OUTPATIENT
Start: 2019-06-26 | End: 2019-06-27 | Stop reason: HOSPADM

## 2019-06-26 RX ADMIN — GABAPENTIN 300 MG: 300 CAPSULE ORAL at 14:56

## 2019-06-26 RX ADMIN — METOPROLOL TARTRATE 12.5 MG: 25 TABLET ORAL at 11:03

## 2019-06-26 RX ADMIN — GLYCOPYRROLATE 1 MG: 1 TABLET ORAL at 14:56

## 2019-06-26 RX ADMIN — GABAPENTIN 300 MG: 300 CAPSULE ORAL at 22:42

## 2019-06-26 RX ADMIN — METOPROLOL TARTRATE 12.5 MG: 25 TABLET ORAL at 02:30

## 2019-06-26 RX ADMIN — RIVAROXABAN 20 MG: 20 TABLET, FILM COATED ORAL at 11:03

## 2019-06-26 RX ADMIN — MIDODRINE HYDROCHLORIDE 10 MG: 5 TABLET ORAL at 17:34

## 2019-06-26 RX ADMIN — METOCLOPRAMIDE HYDROCHLORIDE 5 MG: 5 SOLUTION ORAL at 02:30

## 2019-06-26 RX ADMIN — Medication 10 ML: at 11:11

## 2019-06-26 RX ADMIN — Medication 10 ML: at 21:24

## 2019-06-26 RX ADMIN — BACLOFEN 10 MG: 10 TABLET ORAL at 22:43

## 2019-06-26 RX ADMIN — MEROPENEM 1 G: 1 INJECTION, POWDER, FOR SOLUTION INTRAVENOUS at 02:10

## 2019-06-26 RX ADMIN — LANSOPRAZOLE 30 MG: 30 TABLET, ORALLY DISINTEGRATING, DELAYED RELEASE ORAL at 06:49

## 2019-06-26 RX ADMIN — Medication 15 ML: at 11:03

## 2019-06-26 RX ADMIN — MIDODRINE HYDROCHLORIDE 10 MG: 5 TABLET ORAL at 11:02

## 2019-06-26 RX ADMIN — GLYCOPYRROLATE 1 MG: 1 TABLET ORAL at 11:02

## 2019-06-26 RX ADMIN — METOCLOPRAMIDE HYDROCHLORIDE 5 MG: 5 SOLUTION ORAL at 22:42

## 2019-06-26 RX ADMIN — VANCOMYCIN HYDROCHLORIDE 1500 MG: 100 INJECTION, POWDER, LYOPHILIZED, FOR SOLUTION INTRAVENOUS at 21:24

## 2019-06-26 RX ADMIN — BACLOFEN 10 MG: 10 TABLET ORAL at 14:56

## 2019-06-26 RX ADMIN — METOCLOPRAMIDE HYDROCHLORIDE 5 MG: 5 SOLUTION ORAL at 11:03

## 2019-06-26 RX ADMIN — DOCUSATE SODIUM 100 MG: 50 LIQUID ORAL at 11:03

## 2019-06-26 RX ADMIN — GLYCOPYRROLATE 1 MG: 1 TABLET ORAL at 22:42

## 2019-06-26 RX ADMIN — RANITIDINE 75 MG: 15 SYRUP ORAL at 11:26

## 2019-06-26 RX ADMIN — BACLOFEN 10 MG: 10 TABLET ORAL at 11:02

## 2019-06-26 RX ADMIN — GABAPENTIN 300 MG: 300 CAPSULE ORAL at 11:02

## 2019-06-26 RX ADMIN — RANITIDINE 75 MG: 15 SYRUP ORAL at 02:30

## 2019-06-26 RX ADMIN — SERTRALINE 25 MG: 25 TABLET, FILM COATED ORAL at 11:03

## 2019-06-26 RX ADMIN — RANITIDINE 75 MG: 15 SYRUP ORAL at 22:41

## 2019-06-26 NOTE — PROGRESS NOTES
Physical Therapy    Facility/Department: [facility]  Initial Assessment    NAME: Rafael Sanderson  : 1980  MRN: [id number]    Date of Service: 2019    Discharge Recommendations:    No further therapy required at discharge. Assessment   Body structures, Functions, Activity limitations: Decreased functional mobility ; Decreased ROM  Assessment: Pt seen for ROM B UE's , contracture L LE. She is non verbal but did participate with ROM of her UE's  Prognosis: Fair  Decision Making: Medium Complexity  Patient Education: PT  POC, Goals  REQUIRES PT FOLLOW UP: Yes  Activity Tolerance  Activity Tolerance: Treatment limited secondary to medical complications (free text)       Patient Diagnosis(es): The encounter diagnosis was Pneumonia due to organism. has a past medical history of Aphasia, Aphasia, Contracture of joint, lower leg, HTN (hypertension), Hx MRSA infection, Hydronephrosis, Multiple sclerosis ([address] Neurogenic bladder, Peripheral vascular disease ([address] Polyneuropathy, Pressure ulcer, Pulmonary embolism ([address] Pulmonary infarction ([address] Quadriplegia ([address] Tachycardia, and UTI (lower urinary tract infection). has a past surgical history that includes Gastrostomy tube placement; colostomy; amputation (Right); and Leg amputation through femur.     Restrictions     Vision/Hearing  Vision: Within Functional Limits  Hearing: Within functional limits     Subjective  General  Patient assessed for rehabilitation services?: Yes  Response To Previous Treatment: Not applicable  Family / Caregiver Present: No  Follows Commands: Impaired  Pain Screening  Patient Currently in Pain: No  Vital Signs  Patient Currently in Pain: No       Orientation  Orientation  Overall Orientation Status: Impaired(Pt is non verbal)  Orientation Level: Oriented to person  Social/Functional History  Social/Functional History  Lives With: ([facility] resident)  Type of Home: Facility  Additional Comments: PT is a [facility] resident and

## 2019-06-26 NOTE — PROGRESS NOTES
RBC 4.56 03/11/2012    HGB 13.0 06/26/2019     CMP:  Albumin:    Lab Results   Component Value Date    LABALBU 3.8 06/25/2019     PT/INR:    Lab Results   Component Value Date    PROTIME 11.2 06/25/2019    INR 1.1 06/25/2019     HgBA1c:  No results found for: LABA1C  PTT: No components found for: LABPTT      Assessment:     Patient Active Problem List   Diagnosis    Aphasia    Multiple sclerosis ([address]    Nonverbal    Gastrostomy tube dependent ([address]    Dislodged gastrostomy tube ([address]    Neurogenic bladder    Hemorrhagic cystitis    Hypokalemia    Bacterial UTI    Tachycardia    Gross hematuria    Lactic acidosis    Chronic indwelling Davies catheter    Peripheral vascular disease ([address]    Recurrent UTI (urinary tract infection)    History of pulmonary embolism    Hypertension    Altered mental status    Toxic metabolic encephalopathy    ESBL E. coli carrier    Septic shock ([address]    Status post colostomy ([address]    Lower paraplegia ([address]    Sepsis ([facility]       Measurements:     06/26/19 1548   Wound 05/20/19 Coccyx   Date First Assessed/Time First Assessed: 05/20/19 0814   Present on Hospital Admission: Yes  Location: Coccyx   Wound Image    Wound Pressure Stage  4   Dressing Status Changed   Dressing Changed Changed/New   Dressing/Treatment Moist to moist   Wound Cleansed Rinsed/Irrigated with saline   Dressing Change Due 06/27/19   Wound Length (cm) 2 cm   Wound Width (cm) 2.9 cm   Wound Depth (cm) 2 cm   Wound Surface Area (cm^2) 5.8 cm^2   Change in Wound Size % (l*w) -114.81   Wound Volume (cm^3) 11.6 cm^3   Wound Healing % -974   Distance Tunneling (cm) 2 cm   Tunneling Position ___ O'Clock 10   Drainage Amount Moderate   Drainage Description Serosanguinous   Odor None   Hilda-wound Assessment Fragile  (scars, epidermal peeling)   Red%Wound Bed 100       Response to treatment:  Well tolerated by patient. Plan:     Plan of Care:   Normal saline dressing applied.  Please leave

## 2019-06-26 NOTE — CONSULTS
input(s): HIV in the last 72 hours. No results for input(s): BC in the last 72 hours. Lab Results   Component Value Date    MUCUS NOT REPORTED 06/25/2019    RBC 4.92 06/26/2019    RBC 4.56 03/11/2012    TRICHOMONAS NOT REPORTED 06/25/2019    WBC 9.7 06/26/2019    YEAST NOT REPORTED 06/25/2019    TURBIDITY TURBID 06/25/2019     Lab Results   Component Value Date    CREATININE 0.33 06/26/2019    GLUCOSE 78 06/26/2019    GLUCOSE 69 03/11/2012       Medical Decision Making-Imaging:       Medical Decision Aezzbk-Mihhkdpw-Ubtui:   6/26/2019  8:23 AM - Lew, [facility] Incoming Lab Results From [facility]     Specimen Information: Urine, straight catheter        Component Collected Lab   Specimen Description 06/25/2019  5:15  [address]   . URINE,STRAIGHT CATHETER    Special Requests 06/25/2019  5:[address]  [address]   NOT REPORTED    Culture Abnormal  06/25/2019  5:15 PM [address] 50 to 100,000 CFU/ML          Medical Decision Making-Other:     Note:  · Labs, medications, radiologic studies were reviewed with personal review of films  · Moderate Large amounts of data were reviewed  · Discussed with nursing Staff, Discharge planner  · Infection Control and Prevention measures reviewed  · All prior entries were reviewed  · Administer medications as ordered  · Prognosis: Good  · Discharge planning reviewed  · Follow up as outpatient. Thank you for allowing us to participate in the care of this patient. Please call with questions.     Baltazar Gilford, MD  Pager: [phone] - Office: [phone]

## 2019-06-27 VITALS
SYSTOLIC BLOOD PRESSURE: 90 MMHG | HEIGHT: 60 IN | RESPIRATION RATE: 17 BRPM | WEIGHT: 165 LBS | TEMPERATURE: 99.4 F | DIASTOLIC BLOOD PRESSURE: 52 MMHG | HEART RATE: 106 BPM | BODY MASS INDEX: 32.39 KG/M2 | OXYGEN SATURATION: 98 %

## 2019-06-27 LAB
ABSOLUTE EOS #: 0.3 K/UL (ref 0–0.44)
ABSOLUTE IMMATURE GRANULOCYTE: 0.04 K/UL (ref 0–0.3)
ABSOLUTE LYMPH #: 1.94 K/UL (ref 1.1–3.7)
ABSOLUTE MONO #: 1 K/UL (ref 0.1–1.2)
ANION GAP SERPL CALCULATED.3IONS-SCNC: 12 MMOL/L (ref 9–17)
BASOPHILS # BLD: 1 % (ref 0–2)
BASOPHILS ABSOLUTE: 0.04 K/UL (ref 0–0.2)
BUN BLDV-MCNC: 7 MG/DL (ref 6–20)
BUN/CREAT BLD: ABNORMAL (ref 9–20)
CALCIUM SERPL-MCNC: 9.3 MG/DL (ref 8.6–10.4)
CHLORIDE BLD-SCNC: 107 MMOL/L (ref 98–107)
CO2: 21 MMOL/L (ref 20–31)
CREAT SERPL-MCNC: 0.41 MG/DL (ref 0.5–0.9)
CULTURE: ABNORMAL
DIFFERENTIAL TYPE: ABNORMAL
EOSINOPHILS RELATIVE PERCENT: 3 % (ref 1–4)
GFR AFRICAN AMERICAN: >60 ML/MIN
GFR NON-AFRICAN AMERICAN: >60 ML/MIN
GFR SERPL CREATININE-BSD FRML MDRD: ABNORMAL ML/MIN/{1.73_M2}
GFR SERPL CREATININE-BSD FRML MDRD: ABNORMAL ML/MIN/{1.73_M2}
GLUCOSE BLD-MCNC: 101 MG/DL (ref 70–99)
GLUCOSE BLD-MCNC: 76 MG/DL (ref 65–105)
GLUCOSE BLD-MCNC: 77 MG/DL (ref 65–105)
GLUCOSE BLD-MCNC: 79 MG/DL (ref 65–105)
GLUCOSE BLD-MCNC: 95 MG/DL (ref 65–105)
HCT VFR BLD CALC: 40.5 % (ref 36.3–47.1)
HEMOGLOBIN: 12.1 G/DL (ref 11.9–15.1)
IMMATURE GRANULOCYTES: 1 %
INTERVENTION: NORMAL
LYMPHOCYTES # BLD: 22 % (ref 24–43)
Lab: ABNORMAL
MCH RBC QN AUTO: 26 PG (ref 25.2–33.5)
MCHC RBC AUTO-ENTMCNC: 29.9 G/DL (ref 28.4–34.8)
MCV RBC AUTO: 86.9 FL (ref 82.6–102.9)
MONOCYTES # BLD: 11 % (ref 3–12)
NRBC AUTOMATED: 0 PER 100 WBC
PDW BLD-RTO: 16.7 % (ref 11.8–14.4)
PLATELET # BLD: 370 K/UL (ref 138–453)
PLATELET ESTIMATE: ABNORMAL
PMV BLD AUTO: 11 FL (ref 8.1–13.5)
POTASSIUM SERPL-SCNC: 3.8 MMOL/L (ref 3.7–5.3)
RBC # BLD: 4.66 M/UL (ref 3.95–5.11)
RBC # BLD: ABNORMAL 10*6/UL
SEG NEUTROPHILS: 62 % (ref 36–65)
SEGMENTED NEUTROPHILS ABSOLUTE COUNT: 5.42 K/UL (ref 1.5–8.1)
SODIUM BLD-SCNC: 140 MMOL/L (ref 135–144)
SPECIMEN DESCRIPTION: ABNORMAL
VANCOMYCIN RANDOM DATE LAST DOSE: NORMAL
VANCOMYCIN RANDOM DOSE AMOUNT: NORMAL
VANCOMYCIN RANDOM TIME LAST DOSE: NORMAL
VANCOMYCIN RANDOM: 22.9 UG/ML
WBC # BLD: 8.7 K/UL (ref 3.5–11.3)
WBC # BLD: ABNORMAL 10*3/UL

## 2019-06-27 PROCEDURE — 99213 OFFICE O/P EST LOW 20 MIN: CPT

## 2019-06-27 PROCEDURE — 85025 COMPLETE CBC W/AUTO DIFF WBC: CPT

## 2019-06-27 PROCEDURE — 2580000003 HC RX 258: Performed by: STUDENT IN AN ORGANIZED HEALTH CARE EDUCATION/TRAINING PROGRAM

## 2019-06-27 PROCEDURE — 82947 ASSAY GLUCOSE BLOOD QUANT: CPT

## 2019-06-27 PROCEDURE — 99232 SBSQ HOSP IP/OBS MODERATE 35: CPT | Performed by: INTERNAL MEDICINE

## 2019-06-27 PROCEDURE — 36415 COLL VENOUS BLD VENIPUNCTURE: CPT

## 2019-06-27 PROCEDURE — 76937 US GUIDE VASCULAR ACCESS: CPT

## 2019-06-27 PROCEDURE — 99239 HOSP IP/OBS DSCHRG MGMT >30: CPT | Performed by: INTERNAL MEDICINE

## 2019-06-27 PROCEDURE — 6370000000 HC RX 637 (ALT 250 FOR IP): Performed by: STUDENT IN AN ORGANIZED HEALTH CARE EDUCATION/TRAINING PROGRAM

## 2019-06-27 PROCEDURE — 80048 BASIC METABOLIC PNL TOTAL CA: CPT

## 2019-06-27 PROCEDURE — 80202 ASSAY OF VANCOMYCIN: CPT

## 2019-06-27 RX ORDER — SODIUM CHLORIDE 9 MG/ML
INJECTION, SOLUTION INTRAVENOUS CONTINUOUS
Status: ACTIVE | OUTPATIENT
Start: 2019-06-27 | End: 2019-06-27

## 2019-06-27 RX ADMIN — GABAPENTIN 300 MG: 300 CAPSULE ORAL at 08:16

## 2019-06-27 RX ADMIN — GLYCOPYRROLATE 1 MG: 1 TABLET ORAL at 13:13

## 2019-06-27 RX ADMIN — MIDODRINE HYDROCHLORIDE 10 MG: 5 TABLET ORAL at 16:59

## 2019-06-27 RX ADMIN — MIDODRINE HYDROCHLORIDE 10 MG: 5 TABLET ORAL at 13:14

## 2019-06-27 RX ADMIN — GABAPENTIN 300 MG: 300 CAPSULE ORAL at 13:13

## 2019-06-27 RX ADMIN — RIVAROXABAN 20 MG: 20 TABLET, FILM COATED ORAL at 08:16

## 2019-06-27 RX ADMIN — Medication 10 ML: at 08:26

## 2019-06-27 RX ADMIN — METOCLOPRAMIDE HYDROCHLORIDE 5 MG: 5 SOLUTION ORAL at 08:16

## 2019-06-27 RX ADMIN — DOCUSATE SODIUM 100 MG: 50 LIQUID ORAL at 08:16

## 2019-06-27 RX ADMIN — Medication 15 ML: at 08:17

## 2019-06-27 RX ADMIN — BACLOFEN 10 MG: 10 TABLET ORAL at 08:16

## 2019-06-27 RX ADMIN — SODIUM CHLORIDE: 9 INJECTION, SOLUTION INTRAVENOUS at 07:21

## 2019-06-27 RX ADMIN — SERTRALINE 25 MG: 25 TABLET, FILM COATED ORAL at 08:16

## 2019-06-27 RX ADMIN — BACLOFEN 10 MG: 10 TABLET ORAL at 13:14

## 2019-06-27 RX ADMIN — MIDODRINE HYDROCHLORIDE 10 MG: 5 TABLET ORAL at 08:16

## 2019-06-27 RX ADMIN — RANITIDINE 75 MG: 15 SYRUP ORAL at 08:16

## 2019-06-27 RX ADMIN — GLYCOPYRROLATE 1 MG: 1 TABLET ORAL at 08:16

## 2019-06-27 NOTE — CARE COORDINATION
Discharge [facility] Case Management Department  Written by: Tobias Vazquez RN    Patient Name: Kiran Celeste  Attending Provider: Kenny Sanderson MD  Admit Date: 2019  4:28 PM  MRN: [id number]  Account: [de-identified]                     : 1980  Discharge Date: 2019      [facility]     Tobias Vazquez RN

## 2019-06-27 NOTE — PROGRESS NOTES
Joanna De Luna MD   sertraline (ZOLOFT) 25 MG tablet 1 tablet by Per G Tube route daily 5/24/19   Dionte Styles MD   docusate (COLACE) 50 MG/5ML liquid Take 10 mLs by mouth daily 5/24/19   Dionte Styles MD   Multiple Vitamins-Minerals (CENTRUM/CERTA-DERICK WITH MINERALS ORAL) solution 15 mLs by Per G Tube route daily 5/24/19   Dionte Styles MD   midodrine (PROAMATINE) 10 MG tablet Take 1 tablet by mouth 3 times daily (with meals) 5/24/19   Dionte Styles MD   lansoprazole (PREVACID SOLUTAB) 30 MG disintegrating tablet 1 tablet by Per G Tube route every morning (before breakfast) 5/24/19   Dionte Styles MD   Cranberry 250 MG CAPS Take 250 mg by mouth 2 times daily    Historical Provider, MD   ranitidine (ZANTAC) 75 MG/5ML syrup 75 mg by Per G Tube route 2 times daily    Historical Provider, MD   Scopolamine Base (SCOPOLAMINE TD) Place 1 mg onto the skin every 72 hours     Historical Provider, MD   baclofen (LIORESAL) 10 MG tablet 10 mg by Per G Tube route 3 times daily Crush into G tube     Historical Provider, MD   gabapentin (NEURONTIN) 300 MG capsule 300 mg by Per G Tube route 3 times daily    Historical Provider, MD   medroxyPROGESTERone (DEPO-PROVERA) 150 MG/ML injection Inject 150 mg into the muscle every 3 months On the 11th     Historical Provider, MD   metoprolol (LOPRESSOR) 25 MG tablet 12.5 mg 2 times daily     Historical Provider, MD   acetaminophen (TYLENOL) 80 MG/0.8ML suspension 650 mg by Per G Tube route every 6 hours as needed for Fever or Pain     Historical Provider, MD   glycopyrrolate (ROBINUL) 1 MG tablet Take 1 mg by mouth 3 times daily     Historical Provider, MD       ALLERGIES      Patient has no known allergies. SOCIAL HISTORY       reports that she has never smoked. She has never used smokeless tobacco. She reports that she does not drink alcohol or use drugs. FAMILY HISTORY      family history includes No Known Problems in her father and mother.     REVIEW OF SYSTEMS Glycosylated hemoglobin A1C: No results for input(s): LABA1C in the last 72 hours. INR:   Recent Labs     06/25/19  1709   INR 1.1     Hepatic functions:   Recent Labs     06/25/19  1709   ALKPHOS 81   ALT 20   AST 17   PROT 9.4*   BILITOT 0.31   LABALBU 3.8     Pancreatic functions:No results for input(s): LACTA, AMYLASE in the last 72 hours. S. Lactic Acid: No results for input(s): LACTA in the last 72 hours. Cardiac enzymes:No results for input(s): CKTOTAL, CKMB, CKMBINDEX, TROPONINI in the last 72 hours. BNP:No results for input(s): BNP in the last 72 hours. Lipid profile: No results for input(s): CHOL, TRIG, HDL, LDLCALC in the last 72 hours. Invalid input(s): LDL  Blood Gases: No results found for: PH, PCO2, PO2, HCO3, O2SAT  Thyroid functions:   Lab Results   Component Value Date    TSH 0.84 05/19/2019        Imaging/Diagonstics:    ASSESSMENT     Active Problems:    Sepsis ([address]    Pneumonia due to organism    Extended spectrum beta lactamase (ESBL) resistance  Resolved Problems:    * No resolved hospital problems. *      PLAN        1. Sepsis with shock secondary to UTI. . ID following. No ABx as per ID. IVF@ 100 ml/hr. 2. Sinus tachycardia. Chronic. Improved. On lopressor. Will hold it for now as patient is hypotensive. 3. Acute toxic metabolic encephalopathy. Resolved. Patient appears to be back to baseline  4. G tube displacement: G tube replaced. On tube feeds   5. Multiple sclerosis with Quadriplegia. N  6. PAD s/p Right AKA : Stable. 7. S/p Colostomy. Monitor stoma output. 8. Neurogenic bladder. Chronic. Continue omer drainage. 9. Diet. Patient on tube feeds this morning. 10. GI prophylaxis. Lansoprazole 30 mg per G tube  11. DVT prophylaxis.  On Xarelto  Patient will be discharged back to nursing home.  Nick Boogie MD  Internal Medicine [address], [facility]  PGY-2  Attending Physician Statement  I Independently seen and examined the

## 2019-06-27 NOTE — PROGRESS NOTES
Hilda-wound Assessment Hyperpigmented;Fragile  (scars)   Red%Wound Bed 100             Response to treatment:  Well tolerated by patient.               Plan:      Plan of Care:     Coccyx: calcium alginate rope, silicone foam. Change every 48 hours and prn soilage.   Routine colostomy care:  Empty when 1/3 to 1/2 full  Change twice weekly and prn leakage.      Specialty Bed Required : Yes   [x] Low Air Loss   [x] Pressure Redistribution  [] Fluid Immersion  [] Bariatric  [] Total Pressure Relief  [] Other:      Discharge Plan:  Placement for patient upon discharge: skilled nursing   Hospice Care: No   Patient appropriate for Outpatient [address] [place] [address] Street: No     Patient/Caregiver Teaching:     [] Indicates understanding        [] Needs reinforcement  [x] Unsuccessful                          [] Verbal Understanding  [] Demonstrated understanding            [] No evidence of learning  [] Refused teaching                               [] N/A                          Electronically signed by Jerri Díaz RN, Janeth Ann on 6/27/2019 at 1:49 PM

## 2019-06-27 NOTE — PROGRESS NOTES
mg/dL (L)). Goal Trough Level: 10-20 mcg/mL    Assessment/Plan:  Will order vancomycin 1500 mg IV once. Patient is quadriplegic causing a falsely elevated creatinine clearance due to the serum creatinine. To better indicate how the patient is eliminating the vancomycin, a random level is scheduled for 6/27/19 at 0700. Thank you for the consult. Will continue to follow.     Cassidy Mariscal PharmD  PGY-1 Pharmacy Resident   6/26/2019  8:22 PM

## 2019-07-01 LAB
CULTURE: NORMAL
Lab: NORMAL
SPECIMEN DESCRIPTION: NORMAL

## 2019-07-02 ENCOUNTER — HOSPITAL ENCOUNTER (OUTPATIENT)
Age: 39
Setting detail: SPECIMEN
Discharge: HOME | End: 2019-07-02
Payer: MEDICARE

## 2019-07-02 LAB
ANION GAP SERPL CALCULATED.3IONS-SCNC: 11 MMOL/L (ref 9–17)
BUN BLDV-MCNC: 18 MG/DL (ref 6–20)
BUN/CREAT BLD: ABNORMAL (ref 9–20)
CALCIUM SERPL-MCNC: 9.2 MG/DL (ref 8.6–10.4)
CHLORIDE BLD-SCNC: 104 MMOL/L (ref 98–107)
CO2: 24 MMOL/L (ref 20–31)
CREAT SERPL-MCNC: 0.35 MG/DL (ref 0.5–0.9)
GFR AFRICAN AMERICAN: >60 ML/MIN
GFR NON-AFRICAN AMERICAN: >60 ML/MIN
GFR SERPL CREATININE-BSD FRML MDRD: ABNORMAL ML/MIN/{1.73_M2}
GFR SERPL CREATININE-BSD FRML MDRD: ABNORMAL ML/MIN/{1.73_M2}
GLUCOSE BLD-MCNC: 70 MG/DL (ref 70–99)
HCT VFR BLD CALC: 43.8 % (ref 36.3–47.1)
HEMOGLOBIN: 13.2 G/DL (ref 11.9–15.1)
MCH RBC QN AUTO: 26.3 PG (ref 25.2–33.5)
MCHC RBC AUTO-ENTMCNC: 30.1 G/DL (ref 28.4–34.8)
MCV RBC AUTO: 87.3 FL (ref 82.6–102.9)
NRBC AUTOMATED: 0 PER 100 WBC
PDW BLD-RTO: 16.4 % (ref 11.8–14.4)
PLATELET # BLD: 335 K/UL (ref 138–453)
PMV BLD AUTO: 11.1 FL (ref 8.1–13.5)
POTASSIUM SERPL-SCNC: 3.7 MMOL/L (ref 3.7–5.3)
RBC # BLD: 5.02 M/UL (ref 3.95–5.11)
SODIUM BLD-SCNC: 139 MMOL/L (ref 135–144)
WBC # BLD: 6.7 K/UL (ref 3.5–11.3)

## 2019-07-02 PROCEDURE — 80048 BASIC METABOLIC PNL TOTAL CA: CPT

## 2019-07-02 PROCEDURE — 36415 COLL VENOUS BLD VENIPUNCTURE: CPT

## 2019-07-02 PROCEDURE — 85027 COMPLETE CBC AUTOMATED: CPT

## 2019-07-02 PROCEDURE — P9603 ONE-WAY ALLOW PRORATED MILES: HCPCS

## 2019-07-28 ENCOUNTER — HOSPITAL ENCOUNTER (OUTPATIENT)
Age: 39
Setting detail: SPECIMEN
Discharge: HOME | End: 2019-07-28
Payer: MEDICARE

## 2019-07-28 ENCOUNTER — APPOINTMENT (OUTPATIENT)
Dept: GENERAL RADIOLOGY | Age: 39
DRG: 698 | End: 2019-07-28
Payer: MEDICARE

## 2019-07-28 ENCOUNTER — APPOINTMENT (OUTPATIENT)
Dept: CT IMAGING | Age: 39
DRG: 698 | End: 2019-07-28
Payer: MEDICARE

## 2019-07-28 ENCOUNTER — HOSPITAL ENCOUNTER (INPATIENT)
Age: 39
LOS: 5 days | Discharge: NURSING, REHAB OR LONG-TERM CARE | DRG: 698 | End: 2019-08-02
Attending: EMERGENCY MEDICINE | Admitting: INTERNAL MEDICINE
Payer: MEDICARE

## 2019-07-28 DIAGNOSIS — R00.0 TACHYCARDIA: ICD-10-CM

## 2019-07-28 DIAGNOSIS — N39.0 URINARY TRACT INFECTION WITHOUT HEMATURIA, SITE UNSPECIFIED: ICD-10-CM

## 2019-07-28 DIAGNOSIS — K65.9 PERITONITIS (HCC): ICD-10-CM

## 2019-07-28 DIAGNOSIS — K56.7 ILEUS (HCC): Primary | ICD-10-CM

## 2019-07-28 PROBLEM — R10.9 ABDOMINAL PAIN: Status: ACTIVE | Noted: 2019-07-28

## 2019-07-28 LAB
-: ABNORMAL
-: ABNORMAL
ALBUMIN SERPL-MCNC: 3 G/DL (ref 3.5–5.2)
ALBUMIN/GLOBULIN RATIO: 0.5 (ref 1–2.5)
ALP BLD-CCNC: 98 U/L (ref 35–104)
ALT SERPL-CCNC: 15 U/L (ref 5–33)
AMMONIA: 59 UMOL/L (ref 11–51)
AMORPHOUS: ABNORMAL
AMORPHOUS: ABNORMAL
ANION GAP SERPL CALCULATED.3IONS-SCNC: 18 MMOL/L (ref 9–17)
AST SERPL-CCNC: 37 U/L
BACTERIA: ABNORMAL
BACTERIA: ABNORMAL
BILIRUB SERPL-MCNC: 0.48 MG/DL (ref 0.3–1.2)
BILIRUBIN URINE: ABNORMAL
BILIRUBIN URINE: NEGATIVE
BUN BLDV-MCNC: 19 MG/DL (ref 6–20)
BUN/CREAT BLD: ABNORMAL (ref 9–20)
CALCIUM SERPL-MCNC: 10.1 MG/DL (ref 8.6–10.4)
CASTS UA: ABNORMAL /LPF (ref 0–2)
CASTS UA: ABNORMAL /LPF (ref 0–2)
CASTS UA: ABNORMAL /LPF (ref 0–8)
CHLORIDE BLD-SCNC: 97 MMOL/L (ref 98–107)
CO2: 19 MMOL/L (ref 20–31)
COLOR: YELLOW
COLOR: YELLOW
COMMENT UA: ABNORMAL
CREAT SERPL-MCNC: 0.36 MG/DL (ref 0.5–0.9)
CRYSTALS, UA: ABNORMAL /HPF
CRYSTALS, UA: ABNORMAL /HPF
EPITHELIAL CELLS UA: ABNORMAL /HPF (ref 0–5)
EPITHELIAL CELLS UA: ABNORMAL /HPF (ref 0–5)
GFR AFRICAN AMERICAN: >60 ML/MIN
GFR NON-AFRICAN AMERICAN: >60 ML/MIN
GFR SERPL CREATININE-BSD FRML MDRD: ABNORMAL ML/MIN/{1.73_M2}
GFR SERPL CREATININE-BSD FRML MDRD: ABNORMAL ML/MIN/{1.73_M2}
GLUCOSE BLD-MCNC: 100 MG/DL (ref 70–99)
GLUCOSE URINE: NEGATIVE
GLUCOSE URINE: NEGATIVE
HCG QUALITATIVE: NEGATIVE
HCT VFR BLD CALC: 48.9 % (ref 36.3–47.1)
HEMOGLOBIN: 15.1 G/DL (ref 11.9–15.1)
INR BLD: 1.1
KETONES, URINE: NEGATIVE
KETONES, URINE: NEGATIVE
LACTIC ACID, WHOLE BLOOD: 1.9 MMOL/L (ref 0.7–2.1)
LEUKOCYTE ESTERASE, URINE: ABNORMAL
LEUKOCYTE ESTERASE, URINE: ABNORMAL
LIPASE: 11 U/L (ref 13–60)
MAGNESIUM: 2.4 MG/DL (ref 1.6–2.6)
MCH RBC QN AUTO: 26.2 PG (ref 25.2–33.5)
MCHC RBC AUTO-ENTMCNC: 30.9 G/DL (ref 28.4–34.8)
MCV RBC AUTO: 84.7 FL (ref 82.6–102.9)
MUCUS: ABNORMAL
MUCUS: ABNORMAL
NITRITE, URINE: NEGATIVE
NITRITE, URINE: POSITIVE
NRBC AUTOMATED: 0 PER 100 WBC
OTHER OBSERVATIONS UA: ABNORMAL
OTHER OBSERVATIONS UA: ABNORMAL
PARTIAL THROMBOPLASTIN TIME: 28.5 SEC (ref 20.5–30.5)
PDW BLD-RTO: 17.5 % (ref 11.8–14.4)
PH UA: 8.5 (ref 5–8)
PH UA: >9 (ref 5–8)
PHOSPHORUS: 2.9 MG/DL (ref 2.6–4.5)
PLATELET # BLD: 318 K/UL (ref 138–453)
PMV BLD AUTO: 11.8 FL (ref 8.1–13.5)
POTASSIUM SERPL-SCNC: 4.7 MMOL/L (ref 3.7–5.3)
PROTEIN UA: ABNORMAL
PROTEIN UA: ABNORMAL
PROTHROMBIN TIME: 11.7 SEC (ref 9–12)
RBC # BLD: 5.77 M/UL (ref 3.95–5.11)
RBC UA: ABNORMAL /HPF (ref 0–2)
RBC UA: ABNORMAL /HPF (ref 0–4)
RENAL EPITHELIAL, UA: ABNORMAL /HPF
RENAL EPITHELIAL, UA: ABNORMAL /HPF
SODIUM BLD-SCNC: 134 MMOL/L (ref 135–144)
SPECIFIC GRAVITY UA: 1.03 (ref 1–1.03)
SPECIFIC GRAVITY UA: 1.04 (ref 1–1.03)
TOTAL PROTEIN: 9.6 G/DL (ref 6.4–8.3)
TRICHOMONAS: ABNORMAL
TRICHOMONAS: ABNORMAL
TROPONIN INTERP: NORMAL
TROPONIN T: NORMAL NG/ML
TROPONIN, HIGH SENSITIVITY: <6 NG/L (ref 0–14)
TURBIDITY: ABNORMAL
TURBIDITY: ABNORMAL
URINE HGB: ABNORMAL
URINE HGB: ABNORMAL
UROBILINOGEN, URINE: NORMAL
UROBILINOGEN, URINE: NORMAL
WBC # BLD: 16.4 K/UL (ref 3.5–11.3)
WBC UA: ABNORMAL /HPF (ref 0–5)
WBC UA: ABNORMAL /HPF (ref 0–5)
YEAST: ABNORMAL
YEAST: ABNORMAL

## 2019-07-28 PROCEDURE — 84484 ASSAY OF TROPONIN QUANT: CPT

## 2019-07-28 PROCEDURE — 96365 THER/PROPH/DIAG IV INF INIT: CPT

## 2019-07-28 PROCEDURE — 96368 THER/DIAG CONCURRENT INF: CPT

## 2019-07-28 PROCEDURE — 2580000003 HC RX 258: Performed by: EMERGENCY MEDICINE

## 2019-07-28 PROCEDURE — 87086 URINE CULTURE/COLONY COUNT: CPT

## 2019-07-28 PROCEDURE — 6360000004 HC RX CONTRAST MEDICATION: Performed by: PHYSICIAN ASSISTANT

## 2019-07-28 PROCEDURE — 80053 COMPREHEN METABOLIC PANEL: CPT

## 2019-07-28 PROCEDURE — 87088 URINE BACTERIA CULTURE: CPT

## 2019-07-28 PROCEDURE — 71045 X-RAY EXAM CHEST 1 VIEW: CPT

## 2019-07-28 PROCEDURE — 2580000003 HC RX 258: Performed by: NURSE PRACTITIONER

## 2019-07-28 PROCEDURE — 74177 CT ABD & PELVIS W/CONTRAST: CPT

## 2019-07-28 PROCEDURE — 82140 ASSAY OF AMMONIA: CPT

## 2019-07-28 PROCEDURE — 36556 INSERT NON-TUNNEL CV CATH: CPT

## 2019-07-28 PROCEDURE — 87329 GIARDIA AG IA: CPT

## 2019-07-28 PROCEDURE — 87186 SC STD MICRODIL/AGAR DIL: CPT

## 2019-07-28 PROCEDURE — 1200000000 HC SEMI PRIVATE

## 2019-07-28 PROCEDURE — 84100 ASSAY OF PHOSPHORUS: CPT

## 2019-07-28 PROCEDURE — 2580000003 HC RX 258: Performed by: PHYSICIAN ASSISTANT

## 2019-07-28 PROCEDURE — 87040 BLOOD CULTURE FOR BACTERIA: CPT

## 2019-07-28 PROCEDURE — 83735 ASSAY OF MAGNESIUM: CPT

## 2019-07-28 PROCEDURE — 87077 CULTURE AEROBIC IDENTIFY: CPT

## 2019-07-28 PROCEDURE — 6360000002 HC RX W HCPCS: Performed by: PHYSICIAN ASSISTANT

## 2019-07-28 PROCEDURE — 83630 LACTOFERRIN FECAL (QUAL): CPT

## 2019-07-28 PROCEDURE — 99285 EMERGENCY DEPT VISIT HI MDM: CPT

## 2019-07-28 PROCEDURE — 85027 COMPLETE CBC AUTOMATED: CPT

## 2019-07-28 PROCEDURE — 6370000000 HC RX 637 (ALT 250 FOR IP): Performed by: NURSE PRACTITIONER

## 2019-07-28 PROCEDURE — 93005 ELECTROCARDIOGRAM TRACING: CPT | Performed by: EMERGENCY MEDICINE

## 2019-07-28 PROCEDURE — 87205 SMEAR GRAM STAIN: CPT

## 2019-07-28 PROCEDURE — 83605 ASSAY OF LACTIC ACID: CPT

## 2019-07-28 PROCEDURE — 85730 THROMBOPLASTIN TIME PARTIAL: CPT

## 2019-07-28 PROCEDURE — 82272 OCCULT BLD FECES 1-3 TESTS: CPT

## 2019-07-28 PROCEDURE — 6360000002 HC RX W HCPCS: Performed by: EMERGENCY MEDICINE

## 2019-07-28 PROCEDURE — 71260 CT THORAX DX C+: CPT

## 2019-07-28 PROCEDURE — 85610 PROTHROMBIN TIME: CPT

## 2019-07-28 PROCEDURE — 81001 URINALYSIS AUTO W/SCOPE: CPT

## 2019-07-28 PROCEDURE — 84703 CHORIONIC GONADOTROPIN ASSAY: CPT

## 2019-07-28 PROCEDURE — 2500000003 HC RX 250 WO HCPCS: Performed by: NURSE PRACTITIONER

## 2019-07-28 PROCEDURE — 83690 ASSAY OF LIPASE: CPT

## 2019-07-28 PROCEDURE — 96375 TX/PRO/DX INJ NEW DRUG ADDON: CPT

## 2019-07-28 RX ORDER — NICOTINE 21 MG/24HR
1 PATCH, TRANSDERMAL 24 HOURS TRANSDERMAL DAILY PRN
Status: DISCONTINUED | OUTPATIENT
Start: 2019-07-28 | End: 2019-08-03 | Stop reason: HOSPADM

## 2019-07-28 RX ORDER — MAGNESIUM SULFATE 1 G/100ML
1 INJECTION INTRAVENOUS PRN
Status: DISCONTINUED | OUTPATIENT
Start: 2019-07-28 | End: 2019-08-03 | Stop reason: HOSPADM

## 2019-07-28 RX ORDER — ONDANSETRON 2 MG/ML
4 INJECTION INTRAMUSCULAR; INTRAVENOUS EVERY 6 HOURS PRN
Status: DISCONTINUED | OUTPATIENT
Start: 2019-07-28 | End: 2019-08-03 | Stop reason: HOSPADM

## 2019-07-28 RX ORDER — ONDANSETRON 2 MG/ML
8 INJECTION INTRAMUSCULAR; INTRAVENOUS ONCE
Status: COMPLETED | OUTPATIENT
Start: 2019-07-28 | End: 2019-07-28

## 2019-07-28 RX ORDER — VANCOMYCIN HYDROCHLORIDE 1 G/200ML
1000 INJECTION, SOLUTION INTRAVENOUS EVERY 12 HOURS
Status: DISCONTINUED | OUTPATIENT
Start: 2019-07-28 | End: 2019-07-29

## 2019-07-28 RX ORDER — 0.9 % SODIUM CHLORIDE 0.9 %
1000 INTRAVENOUS SOLUTION INTRAVENOUS ONCE
Status: DISCONTINUED | OUTPATIENT
Start: 2019-07-28 | End: 2019-07-28

## 2019-07-28 RX ORDER — SCOLOPAMINE TRANSDERMAL SYSTEM 1 MG/1
1 PATCH, EXTENDED RELEASE TRANSDERMAL
Status: DISCONTINUED | OUTPATIENT
Start: 2019-07-28 | End: 2019-08-03 | Stop reason: HOSPADM

## 2019-07-28 RX ORDER — SODIUM CHLORIDE, SODIUM LACTATE, POTASSIUM CHLORIDE, AND CALCIUM CHLORIDE .6; .31; .03; .02 G/100ML; G/100ML; G/100ML; G/100ML
1000 INJECTION, SOLUTION INTRAVENOUS ONCE
Status: COMPLETED | OUTPATIENT
Start: 2019-07-28 | End: 2019-07-28

## 2019-07-28 RX ORDER — POTASSIUM CHLORIDE 20 MEQ/1
40 TABLET, EXTENDED RELEASE ORAL PRN
Status: DISCONTINUED | OUTPATIENT
Start: 2019-07-28 | End: 2019-08-03 | Stop reason: HOSPADM

## 2019-07-28 RX ORDER — POTASSIUM CHLORIDE 7.45 MG/ML
10 INJECTION INTRAVENOUS PRN
Status: DISCONTINUED | OUTPATIENT
Start: 2019-07-28 | End: 2019-08-03 | Stop reason: HOSPADM

## 2019-07-28 RX ORDER — ACETAMINOPHEN 325 MG/1
650 TABLET ORAL EVERY 4 HOURS PRN
Status: DISCONTINUED | OUTPATIENT
Start: 2019-07-28 | End: 2019-08-03 | Stop reason: HOSPADM

## 2019-07-28 RX ORDER — SODIUM CHLORIDE 0.9 % (FLUSH) 0.9 %
10 SYRINGE (ML) INJECTION PRN
Status: DISCONTINUED | OUTPATIENT
Start: 2019-07-28 | End: 2019-08-03 | Stop reason: HOSPADM

## 2019-07-28 RX ORDER — GLYCOPYRROLATE 1 MG/1
1 TABLET ORAL 3 TIMES DAILY
Status: DISCONTINUED | OUTPATIENT
Start: 2019-07-28 | End: 2019-08-03 | Stop reason: HOSPADM

## 2019-07-28 RX ORDER — DEXTROSE, SODIUM CHLORIDE, AND POTASSIUM CHLORIDE 5; .45; .15 G/100ML; G/100ML; G/100ML
INJECTION INTRAVENOUS CONTINUOUS
Status: DISCONTINUED | OUTPATIENT
Start: 2019-07-28 | End: 2019-08-02

## 2019-07-28 RX ORDER — ONDANSETRON 2 MG/ML
4 INJECTION INTRAMUSCULAR; INTRAVENOUS ONCE
Status: DISCONTINUED | OUTPATIENT
Start: 2019-07-28 | End: 2019-08-03 | Stop reason: HOSPADM

## 2019-07-28 RX ORDER — MIDODRINE HYDROCHLORIDE 5 MG/1
10 TABLET ORAL
Status: DISCONTINUED | OUTPATIENT
Start: 2019-07-29 | End: 2019-08-03 | Stop reason: HOSPADM

## 2019-07-28 RX ORDER — FENTANYL CITRATE 50 UG/ML
75 INJECTION, SOLUTION INTRAMUSCULAR; INTRAVENOUS ONCE
Status: COMPLETED | OUTPATIENT
Start: 2019-07-28 | End: 2019-07-28

## 2019-07-28 RX ORDER — 0.9 % SODIUM CHLORIDE 0.9 %
1000 INTRAVENOUS SOLUTION INTRAVENOUS ONCE
Status: COMPLETED | OUTPATIENT
Start: 2019-07-28 | End: 2019-07-28

## 2019-07-28 RX ORDER — SODIUM CHLORIDE 0.9 % (FLUSH) 0.9 %
10 SYRINGE (ML) INJECTION EVERY 12 HOURS SCHEDULED
Status: DISCONTINUED | OUTPATIENT
Start: 2019-07-28 | End: 2019-08-03 | Stop reason: HOSPADM

## 2019-07-28 RX ORDER — SENNA PLUS 8.6 MG/1
2 TABLET ORAL 2 TIMES DAILY
Status: DISCONTINUED | OUTPATIENT
Start: 2019-07-28 | End: 2019-08-03 | Stop reason: HOSPADM

## 2019-07-28 RX ORDER — SERTRALINE HYDROCHLORIDE 25 MG/1
25 TABLET, FILM COATED ORAL DAILY
Status: DISCONTINUED | OUTPATIENT
Start: 2019-07-29 | End: 2019-08-03 | Stop reason: HOSPADM

## 2019-07-28 RX ADMIN — POTASSIUM CHLORIDE, DEXTROSE MONOHYDRATE AND SODIUM CHLORIDE: 150; 5; 450 INJECTION, SOLUTION INTRAVENOUS at 23:03

## 2019-07-28 RX ADMIN — Medication 1500 MG: at 20:12

## 2019-07-28 RX ADMIN — IOHEXOL 75 ML: 350 INJECTION, SOLUTION INTRAVENOUS at 19:19

## 2019-07-28 RX ADMIN — ONDANSETRON 8 MG: 2 INJECTION INTRAMUSCULAR; INTRAVENOUS at 20:12

## 2019-07-28 RX ADMIN — Medication 10 ML: at 23:04

## 2019-07-28 RX ADMIN — SODIUM CHLORIDE 1000 ML: 9 INJECTION, SOLUTION INTRAVENOUS at 21:01

## 2019-07-28 RX ADMIN — PIPERACILLIN AND TAZOBACTAM 3.38 G: 3; .375 INJECTION, POWDER, FOR SOLUTION INTRAVENOUS at 20:12

## 2019-07-28 RX ADMIN — FENTANYL CITRATE 75 MCG: 50 INJECTION, SOLUTION INTRAMUSCULAR; INTRAVENOUS at 20:46

## 2019-07-28 RX ADMIN — FAMOTIDINE 20 MG: 10 INJECTION, SOLUTION INTRAVENOUS at 23:04

## 2019-07-28 RX ADMIN — SODIUM CHLORIDE, POTASSIUM CHLORIDE, SODIUM LACTATE AND CALCIUM CHLORIDE 1000 ML: 600; 310; 30; 20 INJECTION, SOLUTION INTRAVENOUS at 20:11

## 2019-07-28 ASSESSMENT — PAIN SCALES - GENERAL
PAINLEVEL_OUTOF10: 6
PAINLEVEL_OUTOF10: 1
PAINLEVEL_OUTOF10: 1

## 2019-07-28 NOTE — ED PROVIDER NOTES
Emergency Medicine Attending Note    I have seen and examined the patient concurrently at the bedside with the Resident/MLP. Please see my key portion documented:      I agree with the assessment and plan as discussed with  MERLINE Leonardo. I was present for the entire procedure of central line placement. Electronically signed:  Cleavon Schilder, M.D. Janiece Meissner, MD  07/28/19 [address] Tawanda MD  07/28/19 2173

## 2019-07-28 NOTE — ED PROVIDER NOTES
Tachycardia present. Exam reveals no gallop. No murmur heard. Pulmonary/Chest: Effort normal. No stridor. No respiratory distress. Abdominal: There is generalized tenderness. There is no rebound and no guarding. Distended abdomen, colostomy in place, well-healed surgical incision   Neurological: She is alert. Skin: Skin is warm and dry. No rash (on exposed surfaces) noted. She is not diaphoretic. No pallor. DIFFERENTIAL  DIAGNOSIS       Obstruction, elevated liver enzymes, pneumonia    PLAN (LABS / IMAGING / EKG):  Orders Placed This Encounter   Procedures    Urine Culture    CULTURE BLOOD #1    CULTURE BLOOD #2    CT ABDOMEN PELVIS W IV CONTRAST Additional Contrast? None    XR CHEST PORTABLE    CT CHEST PULMONARY EMBOLISM W CONTRAST    CBC    Comprehensive Metabolic Panel    LIPASE    PROTIME-INR    Urinalysis with microscopic    AMMONIA    Lactic Acid, Whole Blood    MAGNESIUM    PHOSPHORUS    HCG Qualitative, Serum    APTT    Troponin    Height and weight    Inpatient consult to General Surgery    Inpatient consult to Internal Medicine    POC Blood Gas    EKG 12 Lead       MEDICATIONS ORDERED:  Orders Placed This Encounter   Medications    lactated ringers bolus    DISCONTD: 0.9 % sodium chloride bolus    vancomycin (VANCOCIN) 1500 mg in dextrose 5 % 250 mL IVPB    piperacillin-tazobactam (ZOSYN) 3.375 g in dextrose 5 % 50 mL IVPB (mini-bag)    ondansetron (ZOFRAN) injection 4 mg    iohexol (OMNIPAQUE 350) solution 75 mL    ondansetron (ZOFRAN) injection 8 mg    fentaNYL (SUBLIMAZE) injection 75 mcg    0.9 % sodium chloride bolus       Controlled Substances Monitoring:      DIAGNOSTIC RESULTS / EMERGENCY DEPARTMENT COURSE / MDM     Patient with possible peritonitis, no pneumonia seen on CT scan, does have elevated white blood cell count, tachycardic, poor output from colostomy.   Patient to be signed out pending surgery input, admission    RADIOLOGY:   I directly 0.5 (L) 1.0 - 2.5    GFR Non-African American >60 >60 mL/min    GFR African American >60 >60 mL/min    GFR Comment          GFR Staging NOT REPORTED    LIPASE   Result Value Ref Range    Lipase 11 (L) 13 - 60 U/L   PROTIME-INR   Result Value Ref Range    Protime 11.7 9.0 - 12.0 sec    INR 1.1    AMMONIA   Result Value Ref Range    Ammonia 59 (H) 11 - 51 umol/L   Lactic Acid, Whole Blood   Result Value Ref Range    Lactic Acid, Whole Blood 1.9 0.7 - 2.1 mmol/L   MAGNESIUM   Result Value Ref Range    Magnesium 2.4 1.6 - 2.6 mg/dL   PHOSPHORUS   Result Value Ref Range    Phosphorus 2.9 2.6 - 4.5 mg/dL   HCG Qualitative, Serum   Result Value Ref Range    hCG Qual NEGATIVE NEGATIVE   APTT   Result Value Ref Range    PTT 28.5 20.5 - 30.5 sec   Troponin   Result Value Ref Range    Troponin, High Sensitivity <6 0 - 14 ng/L    Troponin T NOT REPORTED <0.03 ng/mL    Troponin Interp NOT REPORTED          CONSULTS:  None    PROCEDURES:  None    FINAL IMPRESSION      1. Ileus ([address]    2. Peritonitis ([address]    3. Tachycardia    4. Urinary tract infection without hematuria, site unspecified          DISPOSITION / PLAN     DISPOSITION Decision To Admit    PATIENT REFERRED TO:  No follow-up provider specified.     DISCHARGE MEDICATIONS:  New Prescriptions    No medications on file       Melissa Perez PA-C   Emergency Medicine Physician Assistant    (Please note that portions of this note were completed with a voice recognition program.  Efforts were made to edit thedictations but occasionally words are mis-transcribed.)       Melissa Perez PA-C  07/28/19 0364

## 2019-07-28 NOTE — ED TRIAGE NOTES
Pt was brought in by EMS. They stated that she had no colostomy output past 2 days.  She had 2 occurences of emesis today and tube feeding was stopped at 2pm.

## 2019-07-29 PROBLEM — A49.9 BACTERIAL UTI: Status: ACTIVE | Noted: 2019-07-28

## 2019-07-29 PROBLEM — N39.0 BACTERIAL UTI: Status: ACTIVE | Noted: 2019-07-28

## 2019-07-29 PROBLEM — T83.511A URINARY TRACT INFECTION ASSOCIATED WITH INDWELLING URETHRAL CATHETER (HCC): Status: ACTIVE | Noted: 2019-07-28

## 2019-07-29 PROBLEM — K56.7 ILEUS (HCC): Status: ACTIVE | Noted: 2019-07-29

## 2019-07-29 LAB
ALBUMIN SERPL-MCNC: 2.6 G/DL (ref 3.5–5.2)
ALBUMIN/GLOBULIN RATIO: 0.6 (ref 1–2.5)
ALP BLD-CCNC: 67 U/L (ref 35–104)
ALT SERPL-CCNC: 9 U/L (ref 5–33)
AMYLASE: 39 U/L (ref 28–100)
ANION GAP SERPL CALCULATED.3IONS-SCNC: 12 MMOL/L (ref 9–17)
AST SERPL-CCNC: 15 U/L
BILIRUB SERPL-MCNC: 0.34 MG/DL (ref 0.3–1.2)
BUN BLDV-MCNC: 13 MG/DL (ref 6–20)
BUN/CREAT BLD: ABNORMAL (ref 9–20)
CALCIUM SERPL-MCNC: 8.2 MG/DL (ref 8.6–10.4)
CHLORIDE BLD-SCNC: 106 MMOL/L (ref 98–107)
CO2: 20 MMOL/L (ref 20–31)
CREAT SERPL-MCNC: 0.29 MG/DL (ref 0.5–0.9)
DATE, STOOL #1: NORMAL
DATE, STOOL #2: NORMAL
DATE, STOOL #3: NORMAL
DIRECT EXAM: NORMAL
DIRECT EXAM: NORMAL
EKG ATRIAL RATE: 149 BPM
EKG P AXIS: 45 DEGREES
EKG P-R INTERVAL: 128 MS
EKG Q-T INTERVAL: 318 MS
EKG QRS DURATION: 58 MS
EKG QTC CALCULATION (BAZETT): 500 MS
EKG R AXIS: -33 DEGREES
EKG T AXIS: 1 DEGREES
EKG VENTRICULAR RATE: 149 BPM
GFR AFRICAN AMERICAN: >60 ML/MIN
GFR NON-AFRICAN AMERICAN: >60 ML/MIN
GFR SERPL CREATININE-BSD FRML MDRD: ABNORMAL ML/MIN/{1.73_M2}
GFR SERPL CREATININE-BSD FRML MDRD: ABNORMAL ML/MIN/{1.73_M2}
GLUCOSE BLD-MCNC: 107 MG/DL (ref 70–99)
HCT VFR BLD CALC: 38 % (ref 36.3–47.1)
HEMOCCULT SP1 STL QL: NEGATIVE
HEMOCCULT SP2 STL QL: NORMAL
HEMOCCULT SP3 STL QL: NORMAL
HEMOGLOBIN: 11.7 G/DL (ref 11.9–15.1)
LACTIC ACID, WHOLE BLOOD: 1.2 MMOL/L (ref 0.7–2.1)
LACTOFERRIN, QUAL: ABNORMAL
LIPASE: 14 U/L (ref 13–60)
Lab: NORMAL
Lab: NORMAL
MAGNESIUM: 1.7 MG/DL (ref 1.6–2.6)
MCH RBC QN AUTO: 26.6 PG (ref 25.2–33.5)
MCHC RBC AUTO-ENTMCNC: 30.8 G/DL (ref 28.4–34.8)
MCV RBC AUTO: 86.4 FL (ref 82.6–102.9)
NRBC AUTOMATED: 0 PER 100 WBC
PDW BLD-RTO: 16.9 % (ref 11.8–14.4)
PHOSPHORUS: 2.7 MG/DL (ref 2.6–4.5)
PLATELET # BLD: 271 K/UL (ref 138–453)
PMV BLD AUTO: 11.1 FL (ref 8.1–13.5)
POTASSIUM SERPL-SCNC: 3.8 MMOL/L (ref 3.7–5.3)
RBC # BLD: 4.4 M/UL (ref 3.95–5.11)
SODIUM BLD-SCNC: 138 MMOL/L (ref 135–144)
SPECIMEN DESCRIPTION: NORMAL
SPECIMEN DESCRIPTION: NORMAL
TIME, STOOL #1: NORMAL
TIME, STOOL #2: NORMAL
TIME, STOOL #3: NORMAL
TOTAL PROTEIN: 7.3 G/DL (ref 6.4–8.3)
WBC # BLD: 10.4 K/UL (ref 3.5–11.3)

## 2019-07-29 PROCEDURE — 2500000003 HC RX 250 WO HCPCS: Performed by: NURSE PRACTITIONER

## 2019-07-29 PROCEDURE — 6360000002 HC RX W HCPCS: Performed by: EMERGENCY MEDICINE

## 2019-07-29 PROCEDURE — 94761 N-INVAS EAR/PLS OXIMETRY MLT: CPT

## 2019-07-29 PROCEDURE — 2580000003 HC RX 258: Performed by: NURSE PRACTITIONER

## 2019-07-29 PROCEDURE — 82150 ASSAY OF AMYLASE: CPT

## 2019-07-29 PROCEDURE — 99222 1ST HOSP IP/OBS MODERATE 55: CPT | Performed by: INTERNAL MEDICINE

## 2019-07-29 PROCEDURE — 84100 ASSAY OF PHOSPHORUS: CPT

## 2019-07-29 PROCEDURE — 2700000000 HC OXYGEN THERAPY PER DAY

## 2019-07-29 PROCEDURE — 2580000003 HC RX 258: Performed by: INTERNAL MEDICINE

## 2019-07-29 PROCEDURE — 6360000002 HC RX W HCPCS: Performed by: INTERNAL MEDICINE

## 2019-07-29 PROCEDURE — 83735 ASSAY OF MAGNESIUM: CPT

## 2019-07-29 PROCEDURE — 83605 ASSAY OF LACTIC ACID: CPT

## 2019-07-29 PROCEDURE — 6370000000 HC RX 637 (ALT 250 FOR IP): Performed by: NURSE PRACTITIONER

## 2019-07-29 PROCEDURE — 83690 ASSAY OF LIPASE: CPT

## 2019-07-29 PROCEDURE — 36415 COLL VENOUS BLD VENIPUNCTURE: CPT

## 2019-07-29 PROCEDURE — 80053 COMPREHEN METABOLIC PANEL: CPT

## 2019-07-29 PROCEDURE — 1200000000 HC SEMI PRIVATE

## 2019-07-29 PROCEDURE — 85027 COMPLETE CBC AUTOMATED: CPT

## 2019-07-29 RX ORDER — 0.9 % SODIUM CHLORIDE 0.9 %
1000 INTRAVENOUS SOLUTION INTRAVENOUS ONCE
Status: COMPLETED | OUTPATIENT
Start: 2019-07-29 | End: 2019-07-29

## 2019-07-29 RX ORDER — VANCOMYCIN HYDROCHLORIDE 1 G/200ML
1000 INJECTION, SOLUTION INTRAVENOUS EVERY 8 HOURS
Status: DISCONTINUED | OUTPATIENT
Start: 2019-07-29 | End: 2019-07-29

## 2019-07-29 RX ADMIN — SERTRALINE 25 MG: 25 TABLET, FILM COATED ORAL at 09:00

## 2019-07-29 RX ADMIN — FAMOTIDINE 20 MG: 10 INJECTION, SOLUTION INTRAVENOUS at 20:21

## 2019-07-29 RX ADMIN — MIDODRINE HYDROCHLORIDE 10 MG: 5 TABLET ORAL at 17:05

## 2019-07-29 RX ADMIN — VANCOMYCIN HYDROCHLORIDE 1000 MG: 1 INJECTION, SOLUTION INTRAVENOUS at 11:43

## 2019-07-29 RX ADMIN — GLYCOPYRROLATE 1 MG: 1 TABLET ORAL at 09:00

## 2019-07-29 RX ADMIN — SENNOSIDES 17.2 MG: 8.6 TABLET, FILM COATED ORAL at 20:21

## 2019-07-29 RX ADMIN — GLYCOPYRROLATE 1 MG: 1 TABLET ORAL at 20:21

## 2019-07-29 RX ADMIN — FAMOTIDINE 20 MG: 10 INJECTION, SOLUTION INTRAVENOUS at 08:43

## 2019-07-29 RX ADMIN — CEFTRIAXONE SODIUM 1 G: 1 INJECTION, POWDER, FOR SOLUTION INTRAMUSCULAR; INTRAVENOUS at 13:00

## 2019-07-29 RX ADMIN — Medication 10 ML: at 08:44

## 2019-07-29 RX ADMIN — Medication 10 ML: at 20:21

## 2019-07-29 RX ADMIN — VANCOMYCIN HYDROCHLORIDE 1000 MG: 1 INJECTION, SOLUTION INTRAVENOUS at 04:33

## 2019-07-29 RX ADMIN — RIVAROXABAN 20 MG: 20 TABLET, FILM COATED ORAL at 09:00

## 2019-07-29 RX ADMIN — SODIUM CHLORIDE 1000 ML: 9 INJECTION, SOLUTION INTRAVENOUS at 04:31

## 2019-07-29 ASSESSMENT — PAIN SCALES - GENERAL
PAINLEVEL_OUTOF10: 0

## 2019-07-29 NOTE — H&P
NOT REPORTED NEGATIVE    Date, Stool #2 NOT REPORTED     Time, Stool #2 NOT REPORTED     Occult Blood, Stool #3 NOT REPORTED NEGATIVE    Date, Stool #3 NOT REPORTED     Time, Stool #3 NOT REPORTED    Giardia antigen    Collection Time: 07/29/19  2:50 AM   Result Value Ref Range    Specimen Description . FECES     Special Requests NOT REPORTED     Direct Exam Giardia Antigen Assay Negative    Fecal Leukocytes    Collection Time: 07/29/19  2:50 AM   Result Value Ref Range    Specimen Description . FECES     Special Requests NOT REPORTED     Direct Exam WRONG TEST ORDERED    Comprehensive Metabolic Panel w/ Reflex to MG    Collection Time: 07/29/19  5:59 AM   Result Value Ref Range    Glucose 107 (H) 70 - 99 mg/dL    BUN 13 6 - 20 mg/dL    CREATININE 0.29 (L) 0.50 - 0.90 mg/dL    Bun/Cre Ratio NOT REPORTED 9 - 20    Calcium 8.2 (L) 8.6 - 10.4 mg/dL    Sodium 138 135 - 144 mmol/L    Potassium 3.8 3.7 - 5.3 mmol/L    Chloride 106 98 - 107 mmol/L    CO2 20 20 - 31 mmol/L    Anion Gap 12 9 - 17 mmol/L    Alkaline Phosphatase 67 35 - 104 U/L    ALT 9 5 - 33 U/L    AST 15 <32 U/L    Total Bilirubin 0.34 0.3 - 1.2 mg/dL    Total Protein 7.3 6.4 - 8.3 g/dL    Alb 2.6 (L) 3.5 - 5.2 g/dL    Albumin/Globulin Ratio 0.6 (L) 1.0 - 2.5    GFR Non-African American >60 >60 mL/min    GFR African American >60 >60 mL/min    GFR Comment          GFR Staging NOT REPORTED    Magnesium    Collection Time: 07/29/19  5:59 AM   Result Value Ref Range    Magnesium 1.7 1.6 - 2.6 mg/dL   Phosphorus    Collection Time: 07/29/19  5:59 AM   Result Value Ref Range    Phosphorus 2.7 2.6 - 4.5 mg/dL   Amylase    Collection Time: 07/29/19  5:59 AM   Result Value Ref Range    Amylase 39 28 - 100 U/L   Lipase    Collection Time: 07/29/19  5:59 AM   Result Value Ref Range    Lipase 14 13 - 60 U/L   CBC    Collection Time: 07/29/19  5:59 AM   Result Value Ref Range    WBC 10.4 3.5 - 11.3 k/uL    RBC 4.40 3.95 - 5.11 m/uL    Hemoglobin 11.7 (L) 11.9 - 15.1 g/dL ileus    Consultations:   IP CONSULT TO GENERAL SURGERY  IP CONSULT TO INTERNAL MEDICINE  PHARMACY TO DOSE VANCOMYCIN  IP CONSULT TO GENERAL SURGERY     Patient is admitted as inpatient status because of co-morbidities listed above, severity of signs and symptoms as outlined, requirement for current medical therapies and most importantly because of direct risk to patient if care not provided in a hospital setting.     Homer Oconnor Blood, DO  7/29/2019  12:30 PM    Copy sent to Dr. Jeramy Muniz MD

## 2019-07-29 NOTE — ED PROVIDER NOTES
Emergency Department  Emergency Medicine Resident Sign-out     Care of Baldo De La O was assumed from [facility] and is being seen for Constipation (x2days)  . The patient's initial evaluation and plan have been discussed with the prior provider who initially evaluated the patient. EMERGENCY DEPARTMENT COURSE / MEDICAL DECISION MAKING:       MEDICATIONS GIVEN:  Orders Placed This Encounter   Medications    lactated ringers bolus    DISCONTD: 0.9 % sodium chloride bolus    vancomycin (VANCOCIN) 1500 mg in dextrose 5 % 250 mL IVPB    piperacillin-tazobactam (ZOSYN) 3.375 g in dextrose 5 % 50 mL IVPB (mini-bag)    ondansetron (ZOFRAN) injection 4 mg    iohexol (OMNIPAQUE 350) solution 75 mL    ondansetron (ZOFRAN) injection 8 mg    fentaNYL (SUBLIMAZE) injection 75 mcg    0.9 % sodium chloride bolus    DISCONTD: vancomycin (VANCOCIN) 1000 mg in dextrose 5% 200 mL IVPB    docusate (COLACE) 50 MG/5ML liquid 100 mg    glycopyrrolate (ROBINUL) tablet 1 mg    midodrine (PROAMATINE) tablet 10 mg    rivaroxaban (XARELTO) tablet 20 mg    sertraline (ZOLOFT) tablet 25 mg    scopolamine (TRANSDERM-SCOP) transdermal patch 1 patch    dextrose 5 % and 0.45 % NaCl with KCl 20 mEq infusion    sodium chloride flush 0.9 % injection 10 mL    sodium chloride flush 0.9 % injection 10 mL    OR Linked Order Group     potassium chloride (KLOR-CON M) extended release tablet 40 mEq     potassium bicarb-citric acid (EFFER-K) effervescent tablet 40 mEq     potassium chloride 10 mEq/100 mL IVPB (Peripheral Line)    magnesium sulfate 1 g in dextrose 5% 100 mL IVPB    ondansetron (ZOFRAN) injection 4 mg    famotidine (PEPCID) injection 20 mg    nicotine (NICODERM CQ) 21 MG/24HR 1 patch     As needed if a smoker and requested by patient.     acetaminophen (TYLENOL) tablet 650 mg    senna (SENOKOT) tablet 17.2 mg    vancomycin (VANCOCIN) 1000 mg in dextrose 5% 200 mL IVPB    vancomycin (VANCOCIN) for review. Dose modulation, iterative reconstruction, and/or weight based adjustment of the mA/kV was utilized to reduce the radiation dose to as low as reasonably achievable. COMPARISON: CT from 05/17/2019 HISTORY: ORDERING SYSTEM PROVIDED HISTORY: NEOPLASM - OTHER ABDOMINAL PRIMARY TECHNOLOGIST PROVIDED HISTORY: Reason for Exam: Neoplasm - other abdominal primary; Decreased ostomy output Acuity: Unknown Type of Exam: Unknown FINDINGS: Lower Chest:  Visualized portion of the lower chest demonstrates no acute abnormality. Organs: There is no acute abnormality of the liver, pancreas, spleen, adrenals, or kidneys. GI/Bowel: There has been a sigmoid colectomy. There is a Zoraida's pouch. There is a left lower quadrant end colostomy. The remaining portions of the colon have slightly unusual appearance. There are multiple areas of substantial narrowing but without dilation of segments proximal to the areas of narrowing. These may represent prominent haustra or strictures. Small bowel in the right mid abdomen is mildly dilated up to 3.6 cm in diameter. Bowel is fluid filled. However, no discrete transition point is identified. Pelvis: There is a Davies catheter within the urinary bladder. The uterus and adnexa are unremarkable. Peritoneum/Retroperitoneum: There is ascites around these small bowel loops in the right abdomen. The peritoneal surface enhances surrounding the ascites which could indicate infection. There is no free air. Bones/Soft Tissues: No acute osseous abnormality. Small amount of ascites and peritoneal enhancement which could indicate infection/peritonitis. Mildly dilated small bowel loops without transition point suggests ileus.      Xr Chest Portable    Result Date: 7/28/2019  EXAMINATION: ONE XRAY VIEW OF THE CHEST 7/28/2019 6:17 pm COMPARISON: 06/25/2019 HISTORY: ORDERING SYSTEM PROVIDED HISTORY: SOB TECHNOLOGIST PROVIDED HISTORY: SOB Reason for Exam: short of breath FINDINGS: Cardiomediastinal silhouette is stable. Right parahilar airspace opacification. Pulmonary vascular congestion. Bibasilar hypoaeration. Right parahilar airspace opacification could represent scarring, atelectasis or pneumonia Bibasilar hypoaeration right worse than left     Ct Chest Pulmonary Embolism W Contrast    Result Date: 7/28/2019  EXAMINATION: CTA OF THE CHEST 7/28/2019 7:06 pm TECHNIQUE: CTA of the chest was performed after the administration of intravenous contrast.  Multiplanar reformatted images are provided for review. MIP images are provided for review. Dose modulation, iterative reconstruction, and/or weight based adjustment of the mA/kV was utilized to reduce the radiation dose to as low as reasonably achievable. COMPARISON: 05/18/2019 HISTORY: ORDERING SYSTEM PROVIDED HISTORY: NEOPLASM - OTHER ABDOMINAL PRIMARY TECHNOLOGIST PROVIDED HISTORY: Reason for Exam: Neoplasm - other abdominal primary Acuity: Unknown Type of Exam: Unknown FINDINGS: Pulmonary Arteries: Pulmonary arteries are adequately opacified for evaluation. No evidence of intraluminal filling defect to suggest pulmonary embolism. Main pulmonary artery is normal in caliber. Mediastinum: No evidence of mediastinal lymphadenopathy. The heart and pericardium demonstrate no acute abnormality. There is no acute abnormality of the thoracic aorta. Lungs/pleura: Right lower lobe atelectatic change is noted. No evidence of active pleural disease. Upper Abdomen: Gallbladder appears distended and contains stones but no obvious thickening. Soft Tissues/Bones: No acute abnormality. 1. No evidence of acute pulmonary embolism or acute aortic disease. 2. Redemonstration of atelectatic changes in the right lower lobe and elevation of the right hemidiaphragm. 3. Redemonstration of cholelithiasis. RECENT VITALS:     Temp: 98.9 °F (37.2 °C),  Pulse: 107, Resp: 18, BP: 96/67, SpO2: 100 %    This patient is a 45 y.o.  Female with 3 days of

## 2019-07-29 NOTE — PROGRESS NOTES
(24hrs), Av , Min:85 , FEF:113   Diastolic (03FJA), SNN:00, Min:45, Max:93   Pulse Pulse  Av.9  Min: 96  Max: 153 Resp Resp  Av  Min: 14  Max: 20 Pulse ox SpO2  Av.1 %  Min: 95 %  Max: 100 %  CONSTITUTIONAL: Awake and alert, nonverbal  HEENT: Head is normocephalic, atraumatic. EOMI  NECK: Soft, trachea midline and straight  LUNGS: clear to auscultation bilaterally, no accessory muscle used  CARDIOVASCULAR: Tachycardic rate, regular rhythm  ABDOMEN: soft, mildly distended, no nonverbal signs of pain on palpation, G tube to suction, colostomy in LLQ with small amount of smear present in appliance  NEUROLOGIC: Quadriplegic  EXTREMITIES: RLE AKA, deformity to LLE    I/O last 3 completed shifts:  In: -   Out: 500 [Urine:250; Emesis/NG output:250]    Drain/tube output: In: -   Out: 500 [Urine:250]    LAB:  CBC:   Recent Labs     19  0559   WBC 16.4* 10.4   HGB 15.1 11.7*   HCT 48.9* 38.0   MCV 84.7 86.4    271     BMP:   Recent Labs     19  0559   * 138   K 4.7 3.8   CL 97* 106   CO2 19* 20   BUN 19 13   CREATININE 0.36* 0.29*   GLUCOSE 100* 107*     COAGS:   Recent Labs     19  0559   APTT 28.5  --    PROT 9.6* 7.3   INR 1.1  --        RADIOLOGY:  CT abd/pelvis   Ct Abdomen Pelvis W Iv Contrast Additional Contrast? None     Result Date: 2019  EXAMINATION: CT OF THE ABDOMEN AND PELVIS WITH CONTRAST 2019 6:42 pm TECHNIQUE: CT of the abdomen and pelvis was performed with the administration of intravenous contrast. Multiplanar reformatted images are provided for review. Dose modulation, iterative reconstruction, and/or weight based adjustment of the mA/kV was utilized to reduce the radiation dose to as low as reasonably achievable.  COMPARISON: CT from 2019 HISTORY: ORDERING SYSTEM PROVIDED HISTORY: NEOPLASM - OTHER ABDOMINAL PRIMARY TECHNOLOGIST PROVIDED HISTORY: Reason for Exam: Neoplasm - other abdominal

## 2019-07-29 NOTE — PROGRESS NOTES
Nutrition Assessment    Type and Reason for Visit: Initial, Positive Nutrition Screen(Wound; Home TF)    Nutrition Recommendations:   - Continue NPO. Monitor for plan of care. - As able/appropriate, restart Tube Feedings of Osmolite 1.2 (std without fiber) goal rate 55 mL/hr.  - Monitor for bowel function. Nutrition Assessment: Pt nutritionally compromised as evidenced by NPO status, concern for bowel obstruction. and wound to coccyx. Admitted with c/o episodes of vomiting and constipation. Pt has a G-tube and recieves tube feedings which are currently held. Pt is quadriplegic and has a right AKA. Will provide nutrition support recommendations. Malnutrition Assessment:  · Malnutrition Status: At risk for malnutrition  · Context: (Acute on Chronic)  · Findings of the 6 clinical characteristics of malnutrition (Minimum of 2 out of 6 clinical characteristics is required to make the diagnosis of moderate or severe Protein Calorie Malnutrition based on AND/ASPEN Guidelines):  1. Energy Intake-Less than or equal to 75% of estimated energy requirement, (currently since admission - has been meeting greater than 75% of estimated needs with TF running at goal rate)    2. Weight Loss-7.5% loss or greater, in 3 months  3. Fat Loss-No significant subcutaneous fat loss  4. Muscle Loss-No significant muscle mass loss  5. Fluid Accumulation-No significant fluid accumulation    Nutrition Risk Level: High    Nutrient Needs:  · Estimated Daily Total Kcal: 7160-5782 kcal/day  · Estimated Daily Protein (g): 70-90 gm pro/day    Nutrition Diagnosis:   · Problem: Inadequate oral intake  · Etiology: related to Alteration in GI function; Possible Bowel Obstruction     Signs and symptoms:  as evidenced by NPO status due to medical condition; TF on hold    Objective Information:  · Nutrition-Focused Physical Findings: Colostomy. G-tube.   · Wound Type: Stage IV, Pressure Ulcer(to coccyx)  · Current Nutrition Therapies:  · Oral

## 2019-07-29 NOTE — CONSULTS
of pain on palpation, G tube to suction, colostomy in LLQ with smear present in appliance  NEUROLOGIC: Quadriplegic  EXTREMITIES: RLE AKA, deformity to LLE    LABS:     Recent Labs     07/28/19  1750 07/28/19  2102   WBC 16.4*  --    HGB 15.1  --    HCT 48.9*  --      --    *  --    K 4.7  --    CL 97*  --    CO2 19*  --    BUN 19  --    CREATININE 0.36*  --    MG 2.4  --    PHOS 2.9  --    CALCIUM 10.1  --    INR 1.1  --    AST 37*  --    ALT 15  --    BILITOT 0.48  --    NITRU  --  NEGATIVE   COLORU  --  YELLOW   BACTERIA  --  MANY*     Recent Labs     07/28/19 1750   ALKPHOS 98   ALT 15   AST 37*   BILITOT 0.48   LABALBU 3.0*   LIPASE 11*     CBC with Differential:    Lab Results   Component Value Date    WBC 16.4 07/28/2019    RBC 5.77 07/28/2019    RBC 4.56 03/11/2012    HGB 15.1 07/28/2019    HCT 48.9 07/28/2019     07/28/2019     03/11/2012    MCV 84.7 07/28/2019    MCH 26.2 07/28/2019    MCHC 30.9 07/28/2019    RDW 17.5 07/28/2019    METASPCT 3 04/04/2013    LYMPHOPCT 22 06/27/2019    MONOPCT 11 06/27/2019    BASOPCT 1 06/27/2019    MONOSABS 1.00 06/27/2019    LYMPHSABS 1.94 06/27/2019    EOSABS 0.30 06/27/2019    BASOSABS 0.04 06/27/2019    DIFFTYPE NOT REPORTED 06/27/2019     BMP:    Lab Results   Component Value Date     07/28/2019    K 4.7 07/28/2019    CL 97 07/28/2019    CO2 19 07/28/2019    BUN 19 07/28/2019    LABALBU 3.0 07/28/2019    CREATININE 0.36 07/28/2019    CALCIUM 10.1 07/28/2019    GFRAA >60 07/28/2019    LABGLOM >60 07/28/2019    GLUCOSE 100 07/28/2019    GLUCOSE 69 03/11/2012     Lactate - 1.9    Urinalysis with microscopic [[id number]] (Abnormal) Collected: 07/28/19 2102   Updated: 07/28/19 2134    Specimen Source: Urine, indwelling catheter     Color, UA YELLOW    Turbidity UA TURBIDAbnormal     Comment: CORRECTED ON 07/28 AT 2134: PREVIOUSLY REPORTED AS 3+       Glucose, Ur NEGATIVE    Bilirubin Urine NEGATIVE  Verified by [email or web address]     Ketones, Urine NEGATIVE    Specific Gravity, UA 1.038High     Urine Hgb MODERATEAbnormal     pH, UA 8.5High     Protein, UA 4+Abnormal     Comment: CORRECTED ON 07/28 AT 2134: PREVIOUSLY REPORTED AS 3+       Urobilinogen, Urine Normal    Nitrite, Urine NEGATIVE    Leukocyte Esterase, Urine LARGEAbnormal     -         WBC, UA 50  /HPF    RBC, UA 20 TO 50 /HPF    Casts UA HYALINE /LPF    Casts UA 10 TO 20 /LPF    Crystals UA NOT REPORTED /HPF    Epithelial Cells UA 5 TO 10 /HPF    Renal Epithelial, Urine NOT REPORTED /HPF    Bacteria, UA MANYAbnormal     Mucus, UA 1+Abnormal     Trichomonas, UA NOT REPORTED    Amorphous, UA NOT REPORTED    Other Observations UA NOT REPORTED    Yeast, UA NOT REPORTED       RADIOLOGY:   Ct Abdomen Pelvis W Iv Contrast Additional Contrast? None    Result Date: 7/28/2019  EXAMINATION: CT OF THE ABDOMEN AND PELVIS WITH CONTRAST 7/28/2019 6:42 pm TECHNIQUE: CT of the abdomen and pelvis was performed with the administration of intravenous contrast. Multiplanar reformatted images are provided for review. Dose modulation, iterative reconstruction, and/or weight based adjustment of the mA/kV was utilized to reduce the radiation dose to as low as reasonably achievable. COMPARISON: CT from 05/17/2019 HISTORY: ORDERING SYSTEM PROVIDED HISTORY: NEOPLASM - OTHER ABDOMINAL PRIMARY TECHNOLOGIST PROVIDED HISTORY: Reason for Exam: Neoplasm - other abdominal primary; Decreased ostomy output Acuity: Unknown Type of Exam: Unknown FINDINGS: Lower Chest:  Visualized portion of the lower chest demonstrates no acute abnormality. Organs: There is no acute abnormality of the liver, pancreas, spleen, adrenals, or kidneys. GI/Bowel: There has been a sigmoid colectomy. There is a Zoraida's pouch. There is a left lower quadrant end colostomy. The remaining portions of the colon have slightly unusual appearance.   There are multiple areas of substantial narrowing but without dilation of segments proximal to the areas

## 2019-07-29 NOTE — DISCHARGE INSTR - COC
Continuity of Care Form    Patient Name: Oneil Bain   :  1980  MRN:  [id number]    [facility] date:  2019  Discharge date:  ***    Code Status Order: Full Code   Advance Directives:   Advance Care Flowsheet Documentation     Date/Time Healthcare Directive Type of Healthcare Directive Copy in [address] Agent's Name Healthcare Agent's Phone Number    [phone]  Unknown, patient unable to respond due to medical condition -- -- -- -- --          Admitting Physician:  Veronica Matias MD  PCP: Suraj Vences MD    Discharging Nurse: [facility] Unit/Room#: 0315/0315-01  Discharging Unit Phone Number: ***    Emergency Contact:   Extended Emergency Contact Information  Primary Emergency Contact: [facility] Phone: [phone]  Mobile Phone: [phone]  Relation: Parent   needed? No  Secondary Emergency Contact: frances esparza  Mobile Phone: [phone]  Relation: Brother/Sister   needed?  No    Past Surgical History:  Past Surgical History:   Procedure Laterality Date    AMPUTATION Right     COLOSTOMY      GASTROSTOMY TUBE PLACEMENT      LEG AMPUTATION THROUGH FEMUR         Immunization History:   Immunization History   Administered Date(s) Administered    Influenza Vaccine, unspecified formulation 10/09/2016    Pneumococcal Conjugate 13-valent ([place]) 10/09/2016       Active Problems:  Patient Active Problem List   Diagnosis Code    Aphasia R47.01    Multiple sclerosis ([address] G35    Nonverbal R47.01    Gastrostomy tube dependent ([address] Z93.1    Dislodged gastrostomy tube ([address] Z43.1    Neurogenic bladder N31.9    Hemorrhagic cystitis N30.91    Hypokalemia E87.6    Urinary tract infection associated with indwelling urethral catheter ([address] T83.511A, N39.0    Tachycardia R00.0    Gross hematuria R31.0    Lactic acidosis E87.2    Chronic indwelling Davies catheter Z96.0    Peripheral vascular disease ([address] (if transferring to Rehab): Poor    Recommended Labs or Other Treatments After Discharge: Flush g-tube with 250 mL free water at 2a, 6a, 10a, 2p and 200 mL free water at 6p and 03U    Physician Certification: I certify the above information and transfer of Sumeet Oneal  is necessary for the continuing treatment of the diagnosis listed and that she requires Intermediate Nursing Care for greater 30 days.      Update Admission H&P: No change in H&P    PHYSICIAN SIGNATURE:  Electronically signed by Regis Tanner DO on 8/1/19 at 1:43 PM

## 2019-07-29 NOTE — PLAN OF CARE
Problem: Risk for Impaired Skin Integrity  Goal: Tissue integrity - skin and mucous membranes  Description  Structural intactness and normal physiological function of skin and  mucous membranes.   7/29/2019 1953 by Brandon Barber RN  Outcome: Ongoing  7/29/2019 0617 by Shiela Helms RN  Outcome: Ongoing     Problem: Falls - Risk of:  Goal: Will remain free from falls  Description  Will remain free from falls  7/29/2019 1953 by Brandon Barber RN  Outcome: Ongoing  7/29/2019 0617 by Shiela Helms RN  Outcome: Ongoing  Goal: Absence of physical injury  Description  Absence of physical injury  7/29/2019 1953 by Brandon Barber RN  Outcome: Ongoing  7/29/2019 0617 by Shiela Helms RN  Outcome: Ongoing     Problem: Nutrition  Goal: Optimal nutrition therapy  7/29/2019 1953 by Brandon Barber RN  Outcome: Ongoing  7/29/2019 1510 by Chapis Zapien RD, LD  Outcome: Ongoing

## 2019-07-30 ENCOUNTER — APPOINTMENT (OUTPATIENT)
Dept: GENERAL RADIOLOGY | Age: 39
DRG: 698 | End: 2019-07-30
Payer: MEDICARE

## 2019-07-30 PROBLEM — L89.154 DECUBITUS ULCER OF COCCYGEAL REGION, STAGE 4 (HCC): Status: ACTIVE | Noted: 2019-07-30

## 2019-07-30 PROCEDURE — 6370000000 HC RX 637 (ALT 250 FOR IP): Performed by: NURSE PRACTITIONER

## 2019-07-30 PROCEDURE — 6360000002 HC RX W HCPCS: Performed by: INTERNAL MEDICINE

## 2019-07-30 PROCEDURE — 2580000003 HC RX 258: Performed by: NURSE PRACTITIONER

## 2019-07-30 PROCEDURE — 94761 N-INVAS EAR/PLS OXIMETRY MLT: CPT

## 2019-07-30 PROCEDURE — 2700000000 HC OXYGEN THERAPY PER DAY

## 2019-07-30 PROCEDURE — 2500000003 HC RX 250 WO HCPCS: Performed by: NURSE PRACTITIONER

## 2019-07-30 PROCEDURE — 2580000003 HC RX 258: Performed by: INTERNAL MEDICINE

## 2019-07-30 PROCEDURE — 1200000000 HC SEMI PRIVATE

## 2019-07-30 PROCEDURE — 99232 SBSQ HOSP IP/OBS MODERATE 35: CPT | Performed by: INTERNAL MEDICINE

## 2019-07-30 PROCEDURE — 74250 X-RAY XM SM INT 1CNTRST STD: CPT

## 2019-07-30 PROCEDURE — 2500000003 HC RX 250 WO HCPCS: Performed by: STUDENT IN AN ORGANIZED HEALTH CARE EDUCATION/TRAINING PROGRAM

## 2019-07-30 RX ADMIN — Medication 10 ML: at 12:47

## 2019-07-30 RX ADMIN — GLYCOPYRROLATE 1 MG: 1 TABLET ORAL at 12:47

## 2019-07-30 RX ADMIN — FAMOTIDINE 20 MG: 10 INJECTION, SOLUTION INTRAVENOUS at 12:47

## 2019-07-30 RX ADMIN — FAMOTIDINE 20 MG: 10 INJECTION, SOLUTION INTRAVENOUS at 21:47

## 2019-07-30 RX ADMIN — CEFTRIAXONE SODIUM 1 G: 1 INJECTION, POWDER, FOR SOLUTION INTRAMUSCULAR; INTRAVENOUS at 14:25

## 2019-07-30 RX ADMIN — RIVAROXABAN 20 MG: 20 TABLET, FILM COATED ORAL at 12:46

## 2019-07-30 RX ADMIN — SENNOSIDES 17.2 MG: 8.6 TABLET, FILM COATED ORAL at 12:46

## 2019-07-30 RX ADMIN — BARIUM SULFATE 480 ML: 960 POWDER, FOR SUSPENSION ORAL at 10:50

## 2019-07-30 RX ADMIN — SENNOSIDES 17.2 MG: 8.6 TABLET, FILM COATED ORAL at 21:46

## 2019-07-30 RX ADMIN — POTASSIUM CHLORIDE, DEXTROSE MONOHYDRATE AND SODIUM CHLORIDE: 150; 5; 450 INJECTION, SOLUTION INTRAVENOUS at 08:39

## 2019-07-30 RX ADMIN — DOCUSATE SODIUM 100 MG: 50 LIQUID ORAL at 12:47

## 2019-07-30 RX ADMIN — SERTRALINE 25 MG: 25 TABLET, FILM COATED ORAL at 12:49

## 2019-07-30 RX ADMIN — MIDODRINE HYDROCHLORIDE 10 MG: 5 TABLET ORAL at 12:47

## 2019-07-30 RX ADMIN — GLYCOPYRROLATE 1 MG: 1 TABLET ORAL at 21:46

## 2019-07-30 NOTE — PROGRESS NOTES
[facility]), Neurogenic bladder, Peripheral vascular disease ([facility] Polyneuropathy, Pressure ulcer, Pulmonary embolism ([facility] Pulmonary infarction ([facility] Quadriplegia ([facility] Tachycardia, and UTI (lower urinary tract infection). Social History:   reports that she has never smoked. She has never used smokeless tobacco. She reports that she does not drink alcohol or use drugs. Family History:   Family History   Problem Relation Age of Onset    No Known Problems Mother     No Known Problems Father        Vitals:  /81   Pulse 88   Temp 98.6 °F (37 °C)   Resp 15   Ht 5' 1\" (1.549 m)   Wt 182 lb 8 oz (82.8 kg)   SpO2 100%   BMI 34.48 kg/m²   Temp (24hrs), Av.7 °F (37.1 °C), Min:98.4 °F (36.9 °C), Max:99.1 °F (37.3 °C)    No results for input(s): POCGLU in the last 72 hours. I/O (24Hr):     Intake/Output Summary (Last 24 hours) at 2019 1624  Last data filed at 2019 0606  Gross per 24 hour   Intake 7254 ml   Output 1300 ml   Net 5954 ml       Labs:  Hematology:  Recent Labs     19  0559   WBC 16.4* 10.4   RBC 5.77* 4.40   HGB 15.1 11.7*   HCT 48.9* 38.0   MCV 84.7 86.4   MCH 26.2 26.6   MCHC 30.9 30.8   RDW 17.5* 16.9*    271   MPV 11.8 11.1   INR 1.1  --      Chemistry:  Recent Labs     19  0559   *  --  138   K 4.7  --  3.8   CL 97*  --  106   CO2 19*  --  20   GLUCOSE 100*  --  107*   BUN 19  --  13   CREATININE 0.36*  --  0.29*   MG 2.4  --  1.7   ANIONGAP 18*  --  12   LABGLOM >60  --  >60   GFRAA >60  --  >60   CALCIUM 10.1  --  8.2*   PHOS 2.9  --  2.7   TROPHS <6  --   --    LACTACIDWB  --  1.9 1.2     Recent Labs     19  1750 19  0559   PROT 9.6* 7.3   LABALBU 3.0* 2.6*   AST 37* 15   ALT 15 9   ALKPHOS 98 67   BILITOT 0.48 0.34   AMMONIA 59*  --    AMYLASE  --  39   LIPASE 11* 14     ABG:  Lab Results   Component Value Date    POCPH 7.40 2017    POCPCO2 15 2017    POCPO2 70 2017 regular rate and rhythm, no murmur  Abdomen:  soft, nontender, nondistended, normal bowel sounds, no masses, hepatomegaly, splenomegaly; obese  Extremities:  no edema, redness, tenderness in the calves  Skin:  Coccyx wound    Assessment:        Primary Problem  Urinary tract infection associated with indwelling urethral catheter [facility])    Active Hospital Problems    Diagnosis Date Noted    Decubitus ulcer of coccygeal region, stage 4 ([address] [L89.154] 07/30/2019    Ileus ([address] [K56.7] 07/29/2019    Abdominal pain [R10.9] 07/28/2019    Urinary tract infection associated with indwelling urethral catheter ([address] [T83.511A, N39.0] 07/28/2019    Chronic indwelling Davies catheter [Z96.0] 05/17/2019    Neurogenic bladder [N31.9]     Gastrostomy tube dependent ([address] [Z93.1] 08/26/2015    Multiple sclerosis ([address] Traci Maddox 08/26/2015       Plan:        1. Cont iv antibiotics  2. Awaiting final urine culture  3. D/w rn-hoping surgery can start tube feeds since no sbo on sbft  4. D/w mother  5.  Dc planning 24h back to f    Moisés Tanner DO  7/30/2019  4:24 PM

## 2019-07-30 NOTE — PROGRESS NOTES
PROGRESS NOTE      PATIENT NAME: Sangeeta Oshea Sandy RECORD NO. [id number]  DATE: 7/30/2019  PRIMARY CARE PHYSICIAN: Gely Briggs MD    HD: # 2    ASSESSMENT    Patient Active Problem List   Diagnosis    Aphasia    Multiple sclerosis ([address]    Nonverbal    Gastrostomy tube dependent ([address]    Dislodged gastrostomy tube ([address]    Neurogenic bladder    Hemorrhagic cystitis    Hypokalemia    Urinary tract infection associated with indwelling urethral catheter (HCC)    Tachycardia    Gross hematuria    Lactic acidosis    Chronic indwelling Davies catheter    Peripheral vascular disease ([address]    Recurrent UTI (urinary tract infection)    History of pulmonary embolism    Hypertension    Altered mental status    Toxic metabolic encephalopathy    ESBL E. coli carrier    Septic shock (HCC)    Status post colostomy ([address]    Lower paraplegia ([address]    Sepsis ([address]    Pneumonia due to organism    Extended spectrum beta lactamase (ESBL) resistance    Abdominal pain    Ileus (HCC)       MEDICAL DECISION MAKING AND PLAN    Nausea/vomiting, no stool output without definite ileus or obstruction on imaging  -previous sigmoid resection with colostomy and G tube placement   -CT with a small amount of ascites, mildly dilated small bowel loops without transition point-similar to previous imaging studies   -Abdominal exam without signs of tenderness, guarding, no peritoneal signs   -Antibiotics for +UA, urine culture per primary        -NPO   -Continue G tube to suction - 700cc of nonbilious fluid in canister this AM   -Monitor for stool output-bowel sweat in ostomy bag this AM  -Serial abdominal exams-abdominal exam unchanged from yesterday   -Will obtain SBFT   -No acute surgical intervention at this time       EMIL Leigh is seen and examined. Nonverbal. Shakes head no when asked if she has abdominal pain. Abdominal exam unchanged-no expression of pain with palpation.  Ostomy with

## 2019-07-30 NOTE — PLAN OF CARE
Problem: Risk for Impaired Skin Integrity  Goal: Tissue integrity - skin and mucous membranes  Description  Structural intactness and normal physiological function of skin and  mucous membranes.   7/30/2019 1920 by Marcia Bennett RN  Outcome: Ongoing  7/30/2019 1712 by Ruy Moreno RN  Outcome: Ongoing     Problem: Falls - Risk of:  Goal: Will remain free from falls  Description  Will remain free from falls  7/30/2019 1920 by Marcia Bennett RN  Outcome: Ongoing  7/30/2019 1712 by Ruy Moreno RN  Outcome: Ongoing  Goal: Absence of physical injury  Description  Absence of physical injury  7/30/2019 1920 by Marcia Bennett RN  Outcome: Ongoing  7/30/2019 1712 by Ruy Moreno RN  Outcome: Ongoing     Problem: Nutrition  Goal: Optimal nutrition therapy  7/30/2019 1920 by Marcia Bennett RN  Outcome: Ongoing  7/30/2019 1712 by Ruy Moreno RN  Outcome: Ongoing

## 2019-07-31 LAB
ABSOLUTE EOS #: 0.23 K/UL (ref 0–0.44)
ABSOLUTE IMMATURE GRANULOCYTE: <0.03 K/UL (ref 0–0.3)
ABSOLUTE LYMPH #: 1.34 K/UL (ref 1.1–3.7)
ABSOLUTE MONO #: 0.92 K/UL (ref 0.1–1.2)
ANION GAP SERPL CALCULATED.3IONS-SCNC: 13 MMOL/L (ref 9–17)
BASOPHILS # BLD: 0 % (ref 0–2)
BASOPHILS ABSOLUTE: 0.03 K/UL (ref 0–0.2)
BUN BLDV-MCNC: 2 MG/DL (ref 6–20)
BUN/CREAT BLD: ABNORMAL (ref 9–20)
CALCIUM SERPL-MCNC: 8.7 MG/DL (ref 8.6–10.4)
CHLORIDE BLD-SCNC: 106 MMOL/L (ref 98–107)
CO2: 21 MMOL/L (ref 20–31)
CREAT SERPL-MCNC: 0.32 MG/DL (ref 0.5–0.9)
CULTURE: ABNORMAL
CULTURE: ABNORMAL
DIFFERENTIAL TYPE: ABNORMAL
EOSINOPHILS RELATIVE PERCENT: 3 % (ref 1–4)
GFR AFRICAN AMERICAN: >60 ML/MIN
GFR NON-AFRICAN AMERICAN: >60 ML/MIN
GFR SERPL CREATININE-BSD FRML MDRD: ABNORMAL ML/MIN/{1.73_M2}
GFR SERPL CREATININE-BSD FRML MDRD: ABNORMAL ML/MIN/{1.73_M2}
GLUCOSE BLD-MCNC: 79 MG/DL (ref 70–99)
HCT VFR BLD CALC: 37.1 % (ref 36.3–47.1)
HEMOGLOBIN: 11.2 G/DL (ref 11.9–15.1)
IMMATURE GRANULOCYTES: 0 %
LYMPHOCYTES # BLD: 19 % (ref 24–43)
Lab: ABNORMAL
MAGNESIUM: 1.8 MG/DL (ref 1.6–2.6)
MCH RBC QN AUTO: 26.7 PG (ref 25.2–33.5)
MCHC RBC AUTO-ENTMCNC: 30.2 G/DL (ref 28.4–34.8)
MCV RBC AUTO: 88.3 FL (ref 82.6–102.9)
MONOCYTES # BLD: 13 % (ref 3–12)
NRBC AUTOMATED: 0 PER 100 WBC
PDW BLD-RTO: 16.7 % (ref 11.8–14.4)
PLATELET # BLD: 318 K/UL (ref 138–453)
PLATELET ESTIMATE: ABNORMAL
PMV BLD AUTO: 11.6 FL (ref 8.1–13.5)
POTASSIUM SERPL-SCNC: 3.4 MMOL/L (ref 3.7–5.3)
RBC # BLD: 4.2 M/UL (ref 3.95–5.11)
RBC # BLD: ABNORMAL 10*6/UL
SEG NEUTROPHILS: 65 % (ref 36–65)
SEGMENTED NEUTROPHILS ABSOLUTE COUNT: 4.69 K/UL (ref 1.5–8.1)
SODIUM BLD-SCNC: 140 MMOL/L (ref 135–144)
SPECIMEN DESCRIPTION: ABNORMAL
WBC # BLD: 7.2 K/UL (ref 3.5–11.3)
WBC # BLD: ABNORMAL 10*3/UL

## 2019-07-31 PROCEDURE — 76937 US GUIDE VASCULAR ACCESS: CPT

## 2019-07-31 PROCEDURE — 94760 N-INVAS EAR/PLS OXIMETRY 1: CPT

## 2019-07-31 PROCEDURE — 1200000000 HC SEMI PRIVATE

## 2019-07-31 PROCEDURE — 99232 SBSQ HOSP IP/OBS MODERATE 35: CPT | Performed by: INTERNAL MEDICINE

## 2019-07-31 PROCEDURE — 36415 COLL VENOUS BLD VENIPUNCTURE: CPT

## 2019-07-31 PROCEDURE — 83735 ASSAY OF MAGNESIUM: CPT

## 2019-07-31 PROCEDURE — 6370000000 HC RX 637 (ALT 250 FOR IP): Performed by: NURSE PRACTITIONER

## 2019-07-31 PROCEDURE — 85025 COMPLETE CBC W/AUTO DIFF WBC: CPT

## 2019-07-31 PROCEDURE — 2500000003 HC RX 250 WO HCPCS: Performed by: NURSE PRACTITIONER

## 2019-07-31 PROCEDURE — 80048 BASIC METABOLIC PNL TOTAL CA: CPT

## 2019-07-31 PROCEDURE — 2580000003 HC RX 258: Performed by: INTERNAL MEDICINE

## 2019-07-31 PROCEDURE — 2700000000 HC OXYGEN THERAPY PER DAY

## 2019-07-31 PROCEDURE — 6360000002 HC RX W HCPCS: Performed by: INTERNAL MEDICINE

## 2019-07-31 RX ADMIN — GLYCOPYRROLATE 1 MG: 1 TABLET ORAL at 11:08

## 2019-07-31 RX ADMIN — CEFTRIAXONE SODIUM 1 G: 1 INJECTION, POWDER, FOR SOLUTION INTRAMUSCULAR; INTRAVENOUS at 12:49

## 2019-07-31 RX ADMIN — GLYCOPYRROLATE 1 MG: 1 TABLET ORAL at 21:12

## 2019-07-31 RX ADMIN — SENNOSIDES 17.2 MG: 8.6 TABLET, FILM COATED ORAL at 21:12

## 2019-07-31 RX ADMIN — FAMOTIDINE 20 MG: 10 INJECTION, SOLUTION INTRAVENOUS at 11:09

## 2019-07-31 RX ADMIN — SERTRALINE 25 MG: 25 TABLET, FILM COATED ORAL at 11:09

## 2019-07-31 RX ADMIN — RIVAROXABAN 20 MG: 20 TABLET, FILM COATED ORAL at 11:09

## 2019-07-31 RX ADMIN — MIDODRINE HYDROCHLORIDE 10 MG: 5 TABLET ORAL at 11:12

## 2019-07-31 RX ADMIN — SENNOSIDES 17.2 MG: 8.6 TABLET, FILM COATED ORAL at 11:08

## 2019-07-31 RX ADMIN — FAMOTIDINE 20 MG: 10 INJECTION, SOLUTION INTRAVENOUS at 21:07

## 2019-07-31 RX ADMIN — DOCUSATE SODIUM 100 MG: 50 LIQUID ORAL at 11:08

## 2019-07-31 NOTE — PROGRESS NOTES
PROGRESS NOTE      PATIENT NAME: Sangeeta Oshea Tok RECORD NO. [id number]  DATE: 7/31/2019  PRIMARY CARE PHYSICIAN: Jennifer Munguia MD    HD: # 3    ASSESSMENT    Patient Active Problem List   Diagnosis    Aphasia    Multiple sclerosis ([address]    Nonverbal    Gastrostomy tube dependent ([address]    Dislodged gastrostomy tube ([address]    Neurogenic bladder    Hemorrhagic cystitis    Hypokalemia    Urinary tract infection associated with indwelling urethral catheter (HCC)    Tachycardia    Gross hematuria    Lactic acidosis    Chronic indwelling Davies catheter    Peripheral vascular disease ([address]    Recurrent UTI (urinary tract infection)    History of pulmonary embolism    Hypertension    Altered mental status    Toxic metabolic encephalopathy    ESBL E. coli carrier    Septic shock (HCC)    Status post colostomy ([address]    Lower paraplegia ([address]    Sepsis ([address]    Pneumonia due to organism    Extended spectrum beta lactamase (ESBL) resistance    Abdominal pain    Ileus (HCC)    Decubitus ulcer of coccygeal region, stage 4 (HCC)       MEDICAL DECISION MAKING AND PLAN    Nausea/vomiting, no stool output without definite ileus or obstruction on imaging  -previous sigmoid resection with colostomy and G tube placement   -CT with a small amount of ascites, mildly dilated small bowel loops without transition point-similar to previous imaging studies   -Abdominal exam without signs of tenderness, guarding, no peritoneal signs   -Antibiotics for +UA, urine culture per primary         -NPO   -Continue G tube to suction - 700cc of nonbilious fluid in canister this AM   -Monitor for stool output-bowel sweat in ostomy bag this AM  -Serial abdominal exams-abdominal exam unchanged from yesterday   -SBFT (7/31) : contrast made it through colon, no signs of obstruction     -Attempted to give meds through tube 7/30-nurse noted patient was not digesting meds administered 3 hours prior and low continuous

## 2019-07-31 NOTE — PROGRESS NOTES
During hourly rounding observed patient with excess secretions and soiled linen and gown. Upon further inspection suction was not properly working. After troubleshooting the suction and resolving the issue observed patient was not digesting medications administered 3 hours prior. General Surgery aware, patient to stay on low continuous suction. Clear diet discontinued by General Surgery. Complete linen change and gown changed performed, patient resting comfortably.

## 2019-07-31 NOTE — PROGRESS NOTES
[facility] Han [facility]    Progress Note    7/31/2019    11:03 AM    Name:   Jonathan Chavez  MRN:     [id number]     [place]:      [de-identified]   Room:   [address] [place] [address] Day:  3  Admit Date:  7/28/2019  4:53 PM    PCP:   Lexie Velásquez MD  Code Status:  Full Code    Subjective:     C/C:   Chief Complaint   Patient presents with    Constipation     x2days     Interval History Status: not changed. Overnight had undigested pills come back up with emesis-back to npo per surgery    Brief History:     The patient is a 40 y. o.   female who presents with Constipation (x2days)   and she is admitted to the hospital for the management of  N/v.  She lives at an [facility] and started having n/v and per her mother looked \"ill-like something was bothering her\"  Patient is nonverbal and cannot provide any history.  Mother attributes symptoms to uti, which she gets recurrently. Review of Systems:     unobtainable      Medications:      Allergies:  No Known Allergies    Current Meds:   Scheduled Meds:    cefTRIAXone (ROCEPHIN) IV  1 g Intravenous Q24H    ondansetron  4 mg Intravenous Once    docusate  100 mg Oral Daily    glycopyrrolate  1 mg Oral TID    midodrine  10 mg Oral TID WC    rivaroxaban  20 mg Oral Daily    sertraline  25 mg Per G Tube Daily    scopolamine  1 patch Transdermal Q72H    sodium chloride flush  10 mL Intravenous 2 times per day    famotidine (PEPCID) injection  20 mg Intravenous BID    senna  2 tablet Oral BID     Continuous Infusions:    dextrose 5% and 0.45% NaCl with KCl 20 mEq 125 mL/hr at 07/30/19 0839     PRN Meds: sodium chloride flush, potassium chloride **OR** potassium alternative oral replacement **OR** potassium chloride, magnesium sulfate, ondansetron, nicotine, acetaminophen    Data:     Past Medical History:   has a past medical history of Aphasia, Aphasia, Contracture of joint, lower leg, HTN (hypertension), Hx MRSA infection, Hydronephrosis, Multiple sclerosis ([facility] Neurogenic bladder, Peripheral vascular disease ([facility] Polyneuropathy, Pressure ulcer, Pulmonary embolism ([address] Pulmonary infarction ([address] Quadriplegia ([facility] Tachycardia, and UTI (lower urinary tract infection). Social History:   reports that she has never smoked. She has never used smokeless tobacco. She reports that she does not drink alcohol or use drugs. Family History:   Family History   Problem Relation Age of Onset    No Known Problems Mother     No Known Problems Father        Vitals:  /73   Pulse 72   Temp 98.3 °F (36.8 °C)   Resp 17   Ht 5' 1\" (1.549 m)   Wt 180 lb 9.6 oz (81.9 kg)   SpO2 98%   BMI 34.12 kg/m²   Temp (24hrs), Av.8 °F (37.1 °C), Min:98.3 °F (36.8 °C), Max:99.1 °F (37.3 °C)    No results for input(s): POCGLU in the last 72 hours. I/O (24Hr):     Intake/Output Summary (Last 24 hours) at 2019 1103  Last data filed at 2019 0548  Gross per 24 hour   Intake 3010 ml   Output 3400 ml   Net -390 ml       Labs:  Hematology:  Recent Labs     19  0623   WBC 16.4* 10.4 7.2   RBC 5.77* 4.40 4.20   HGB 15.1 11.7* 11.2*   HCT 48.9* 38.0 37.1   MCV 84.7 86.4 88.3   MCH 26.2 26.6 26.7   MCHC 30.9 30.8 30.2   RDW 17.5* 16.9* 16.7*    271 318   MPV 11.8 11.1 11.6   INR 1.1  --   --      Chemistry:  Recent Labs     19  17519  0559 19  0623   *  --  138 140   K 4.7  --  3.8 3.4*   CL 97*  --  106 106   CO2 19*  --  20 21   GLUCOSE 100*  --  107* 79   BUN 19  --  13 2*   CREATININE 0.36*  --  0.29* 0.32*   MG 2.4  --  1.7 1.8   ANIONGAP 18*  --  12 13   LABGLOM >60  --  >60 >60   GFRAA >60  --  >60 >60   CALCIUM 10.1  --  8.2* 8.7   PHOS 2.9  --  2.7  --    TROPHS <6  --   --   --    LACTACIDWB  --  1.9 1.2  --      Recent Labs     19  1750 19  0559   PROT 9.6* 7.3   LABALBU 3.0* 2.6*   AST 37* 15   ALT 15 9   ALKPHOS 98 67 Physical Examination:        General appearance:  More awake today, no distress  Mental Status:  Cannot assess    Lungs:  clear to auscultation bilaterally, normal effort  Heart:  regular rate and rhythm, no murmur  Abdomen:  soft, nontender, nondistended, normal bowel sounds, no masses, hepatomegaly, splenomegaly; obese  Extremities:  no edema, redness, tenderness in the calves; contracted  Skin:  no gross lesions, rashes, induration    Assessment:        Primary Problem  Urinary tract infection associated with indwelling urethral catheter [facility])    Active Hospital Problems    Diagnosis Date Noted    Decubitus ulcer of coccygeal region, stage 4 ([address] [L89.154] 07/30/2019    Ileus ([address] [K56.7] 07/29/2019    Abdominal pain [R10.9] 07/28/2019    Urinary tract infection associated with indwelling urethral catheter ([address] [T83.511A, N39.0] 07/28/2019    Chronic indwelling Davies catheter [Z96.0] 05/17/2019    Neurogenic bladder [N31.9]     Gastrostomy tube dependent ([address] [Z93.1] 08/26/2015    Multiple sclerosis ([address] Kerrie Kapoor 08/26/2015       Plan:        1. Cont antibiotics, awaiting final culture  2.  Back to npo status due to dysmotility    Moisés Tanner DO  7/31/2019  11:03 AM

## 2019-08-01 LAB
ANION GAP SERPL CALCULATED.3IONS-SCNC: 14 MMOL/L (ref 9–17)
BUN BLDV-MCNC: 3 MG/DL (ref 6–20)
BUN/CREAT BLD: ABNORMAL (ref 9–20)
CALCIUM SERPL-MCNC: 9.6 MG/DL (ref 8.6–10.4)
CHLORIDE BLD-SCNC: 103 MMOL/L (ref 98–107)
CO2: 20 MMOL/L (ref 20–31)
CREAT SERPL-MCNC: 0.31 MG/DL (ref 0.5–0.9)
EKG ATRIAL RATE: 62 BPM
EKG P AXIS: 29 DEGREES
EKG P-R INTERVAL: 130 MS
EKG Q-T INTERVAL: 404 MS
EKG QRS DURATION: 78 MS
EKG QTC CALCULATION (BAZETT): 410 MS
EKG R AXIS: -12 DEGREES
EKG T AXIS: -10 DEGREES
EKG VENTRICULAR RATE: 62 BPM
GFR AFRICAN AMERICAN: >60 ML/MIN
GFR NON-AFRICAN AMERICAN: >60 ML/MIN
GFR SERPL CREATININE-BSD FRML MDRD: ABNORMAL ML/MIN/{1.73_M2}
GFR SERPL CREATININE-BSD FRML MDRD: ABNORMAL ML/MIN/{1.73_M2}
GLUCOSE BLD-MCNC: 87 MG/DL (ref 70–99)
POTASSIUM SERPL-SCNC: 3.9 MMOL/L (ref 3.7–5.3)
SODIUM BLD-SCNC: 137 MMOL/L (ref 135–144)

## 2019-08-01 PROCEDURE — 2580000003 HC RX 258: Performed by: INTERNAL MEDICINE

## 2019-08-01 PROCEDURE — 36415 COLL VENOUS BLD VENIPUNCTURE: CPT

## 2019-08-01 PROCEDURE — 1200000000 HC SEMI PRIVATE

## 2019-08-01 PROCEDURE — 80048 BASIC METABOLIC PNL TOTAL CA: CPT

## 2019-08-01 PROCEDURE — 6370000000 HC RX 637 (ALT 250 FOR IP): Performed by: NURSE PRACTITIONER

## 2019-08-01 PROCEDURE — 2580000003 HC RX 258: Performed by: NURSE PRACTITIONER

## 2019-08-01 PROCEDURE — 2500000003 HC RX 250 WO HCPCS: Performed by: NURSE PRACTITIONER

## 2019-08-01 PROCEDURE — 99232 SBSQ HOSP IP/OBS MODERATE 35: CPT | Performed by: INTERNAL MEDICINE

## 2019-08-01 PROCEDURE — 6360000002 HC RX W HCPCS: Performed by: INTERNAL MEDICINE

## 2019-08-01 RX ADMIN — MIDODRINE HYDROCHLORIDE 10 MG: 5 TABLET ORAL at 18:55

## 2019-08-01 RX ADMIN — DOCUSATE SODIUM 100 MG: 50 LIQUID ORAL at 08:33

## 2019-08-01 RX ADMIN — Medication 10 ML: at 08:34

## 2019-08-01 RX ADMIN — FAMOTIDINE 20 MG: 10 INJECTION, SOLUTION INTRAVENOUS at 08:34

## 2019-08-01 RX ADMIN — RIVAROXABAN 20 MG: 20 TABLET, FILM COATED ORAL at 08:35

## 2019-08-01 RX ADMIN — MIDODRINE HYDROCHLORIDE 10 MG: 5 TABLET ORAL at 14:10

## 2019-08-01 RX ADMIN — SERTRALINE 25 MG: 25 TABLET, FILM COATED ORAL at 08:35

## 2019-08-01 RX ADMIN — CEFTRIAXONE SODIUM 1 G: 1 INJECTION, POWDER, FOR SOLUTION INTRAMUSCULAR; INTRAVENOUS at 14:10

## 2019-08-01 RX ADMIN — FAMOTIDINE 20 MG: 10 INJECTION, SOLUTION INTRAVENOUS at 23:36

## 2019-08-01 RX ADMIN — GLYCOPYRROLATE 1 MG: 1 TABLET ORAL at 08:33

## 2019-08-01 RX ADMIN — POTASSIUM CHLORIDE, DEXTROSE MONOHYDRATE AND SODIUM CHLORIDE: 150; 5; 450 INJECTION, SOLUTION INTRAVENOUS at 14:10

## 2019-08-01 RX ADMIN — SENNOSIDES 17.2 MG: 8.6 TABLET, FILM COATED ORAL at 08:33

## 2019-08-01 RX ADMIN — GLYCOPYRROLATE 1 MG: 1 TABLET ORAL at 14:12

## 2019-08-01 RX ADMIN — MIDODRINE HYDROCHLORIDE 10 MG: 5 TABLET ORAL at 08:33

## 2019-08-01 NOTE — PROGRESS NOTES
44 y/o female presents with nausea/emesis and decreased ostomy output. Surgery was consulted due to concern for SBO. SBFT was ordered and showed contrast through the colon and no signs of obstruction. Patient with increased ostomy output today and patient does not express pain on abdominal exam. G tube feeds were restarted today. Dietary recommended a less concentrated formula (Vital AF 1.2 / semi elemental) at 40 mL/hr x 24 hours with additional 1000mL free water flush ([id number]). Patient was tolerating feeds this afternoon. No surgery intervention indicated or planned at this time. We will sign off at this time. Please reconsult/call if additional questions, concerns, or issues develop requiring further surgery input. Thank you for this consult.     Electronically signed by Vincent Dobson DO on 7/31/2019 at 8:08 PM

## 2019-08-01 NOTE — PROGRESS NOTES
[facility] Han [facility]    Progress Note    8/1/2019    1:41 PM    Name:   Gigi Ash  MRN:     [id number]     [place]:      [de-identified]   Room:   [address] [place] [address] Day:  4  Admit Date:  7/28/2019  4:53 PM    PCP:   Edgardo Glover MD  Code Status:  Full Code    Subjective:     C/C:   Chief Complaint   Patient presents with    Constipation     x2days     Interval History Status: not changed. Nonverbal  No new issues  Tf restarted and is tolerating them  Now has ostomy output    Brief History:     The patient is a 40 y. o.   female who presents with Constipation (x2days)   and she is admitted to the hospital for the management of  N/v.  She lives at an [facility] and started having n/v and per her mother looked \"ill-like something was bothering her\"  Patient is nonverbal and cannot provide any history.  Mother attributes symptoms to uti, which she gets recurrently. Review of Systems:     unobtainable      Medications:      Allergies:  No Known Allergies    Current Meds:   Scheduled Meds:    cefTRIAXone (ROCEPHIN) IV  1 g Intravenous Q24H    ondansetron  4 mg Intravenous Once    docusate  100 mg Oral Daily    glycopyrrolate  1 mg Oral TID    midodrine  10 mg Oral TID WC    rivaroxaban  20 mg Oral Daily    sertraline  25 mg Per G Tube Daily    scopolamine  1 patch Transdermal Q72H    sodium chloride flush  10 mL Intravenous 2 times per day    famotidine (PEPCID) injection  20 mg Intravenous BID    senna  2 tablet Oral BID     Continuous Infusions:    dextrose 5% and 0.45% NaCl with KCl 20 mEq 125 mL/hr at 07/30/19 0839     PRN Meds: sodium chloride flush, potassium chloride **OR** potassium alternative oral replacement **OR** potassium chloride, magnesium sulfate, ondansetron, nicotine, acetaminophen    Data:     Past Medical History:   has a past medical history of Aphasia, Aphasia, Contracture of joint, lower leg, HTN (hypertension), Hx

## 2019-08-02 VITALS
TEMPERATURE: 99.3 F | BODY MASS INDEX: 33.99 KG/M2 | SYSTOLIC BLOOD PRESSURE: 137 MMHG | HEART RATE: 111 BPM | DIASTOLIC BLOOD PRESSURE: 78 MMHG | WEIGHT: 180 LBS | RESPIRATION RATE: 18 BRPM | OXYGEN SATURATION: 97 % | HEIGHT: 61 IN

## 2019-08-02 PROBLEM — A49.8 PROTEUS MIRABILIS INFECTION: Status: ACTIVE | Noted: 2019-08-02

## 2019-08-02 LAB
ANION GAP SERPL CALCULATED.3IONS-SCNC: 12 MMOL/L (ref 9–17)
BUN BLDV-MCNC: 3 MG/DL (ref 6–20)
BUN/CREAT BLD: ABNORMAL (ref 9–20)
CALCIUM SERPL-MCNC: 9 MG/DL (ref 8.6–10.4)
CHLORIDE BLD-SCNC: 104 MMOL/L (ref 98–107)
CO2: 18 MMOL/L (ref 20–31)
CREAT SERPL-MCNC: 0.29 MG/DL (ref 0.5–0.9)
CULTURE: ABNORMAL
CULTURE: ABNORMAL
GFR AFRICAN AMERICAN: >60 ML/MIN
GFR NON-AFRICAN AMERICAN: >60 ML/MIN
GFR SERPL CREATININE-BSD FRML MDRD: ABNORMAL ML/MIN/{1.73_M2}
GFR SERPL CREATININE-BSD FRML MDRD: ABNORMAL ML/MIN/{1.73_M2}
GLUCOSE BLD-MCNC: 79 MG/DL (ref 70–99)
Lab: ABNORMAL
POTASSIUM SERPL-SCNC: 4.4 MMOL/L (ref 3.7–5.3)
SODIUM BLD-SCNC: 134 MMOL/L (ref 135–144)
SPECIMEN DESCRIPTION: ABNORMAL

## 2019-08-02 PROCEDURE — 6370000000 HC RX 637 (ALT 250 FOR IP): Performed by: NURSE PRACTITIONER

## 2019-08-02 PROCEDURE — 2500000003 HC RX 250 WO HCPCS: Performed by: NURSE PRACTITIONER

## 2019-08-02 PROCEDURE — 36415 COLL VENOUS BLD VENIPUNCTURE: CPT

## 2019-08-02 PROCEDURE — 80048 BASIC METABOLIC PNL TOTAL CA: CPT

## 2019-08-02 PROCEDURE — 6370000000 HC RX 637 (ALT 250 FOR IP): Performed by: INTERNAL MEDICINE

## 2019-08-02 PROCEDURE — 99239 HOSP IP/OBS DSCHRG MGMT >30: CPT | Performed by: INTERNAL MEDICINE

## 2019-08-02 PROCEDURE — 2580000003 HC RX 258: Performed by: NURSE PRACTITIONER

## 2019-08-02 PROCEDURE — 2700000000 HC OXYGEN THERAPY PER DAY

## 2019-08-02 RX ORDER — CEFDINIR 250 MG/5ML
250 POWDER, FOR SUSPENSION ORAL EVERY 12 HOURS SCHEDULED
Status: DISCONTINUED | OUTPATIENT
Start: 2019-08-02 | End: 2019-08-03 | Stop reason: HOSPADM

## 2019-08-02 RX ORDER — METOCLOPRAMIDE HYDROCHLORIDE 5 MG/5ML
5 SOLUTION ORAL 3 TIMES DAILY
Qty: 750 ML | Refills: 3 | DISCHARGE
Start: 2019-08-02

## 2019-08-02 RX ORDER — METOCLOPRAMIDE HYDROCHLORIDE 5 MG/5ML
5 SOLUTION ORAL 3 TIMES DAILY
Status: DISCONTINUED | OUTPATIENT
Start: 2019-08-02 | End: 2019-08-03 | Stop reason: HOSPADM

## 2019-08-02 RX ORDER — CEFDINIR 250 MG/5ML
250 POWDER, FOR SUSPENSION ORAL EVERY 12 HOURS SCHEDULED
Qty: 40 ML | Refills: 0 | DISCHARGE
Start: 2019-08-02 | End: 2019-08-06

## 2019-08-02 RX ADMIN — DOCUSATE SODIUM 100 MG: 50 LIQUID ORAL at 10:43

## 2019-08-02 RX ADMIN — FAMOTIDINE 20 MG: 10 INJECTION, SOLUTION INTRAVENOUS at 10:42

## 2019-08-02 RX ADMIN — GLYCOPYRROLATE 1 MG: 1 TABLET ORAL at 10:43

## 2019-08-02 RX ADMIN — SENNOSIDES 17.2 MG: 8.6 TABLET, FILM COATED ORAL at 10:43

## 2019-08-02 RX ADMIN — SERTRALINE 25 MG: 25 TABLET, FILM COATED ORAL at 10:44

## 2019-08-02 RX ADMIN — MIDODRINE HYDROCHLORIDE 10 MG: 5 TABLET ORAL at 16:48

## 2019-08-02 RX ADMIN — Medication 10 ML: at 10:45

## 2019-08-02 RX ADMIN — METOCLOPRAMIDE HYDROCHLORIDE 5 MG: 5 SOLUTION ORAL at 16:48

## 2019-08-02 RX ADMIN — MIDODRINE HYDROCHLORIDE 10 MG: 5 TABLET ORAL at 10:44

## 2019-08-02 RX ADMIN — GLYCOPYRROLATE 1 MG: 1 TABLET ORAL at 16:48

## 2019-08-02 RX ADMIN — RIVAROXABAN 20 MG: 20 TABLET, FILM COATED ORAL at 10:44

## 2019-08-02 RX ADMIN — CEFDINIR 250 MG: 250 POWDER, FOR SUSPENSION ORAL at 10:43

## 2019-08-02 RX ADMIN — METOCLOPRAMIDE HYDROCHLORIDE 5 MG: 5 SOLUTION ORAL at 10:42

## 2019-08-02 NOTE — PROGRESS NOTES
Nutrition Assessment (Enteral Nutrition)    Type and Reason for Visit: Reassess    Nutrition Recommendations: Recommend trial of home TF formula prior to d/c. Suggest initiating TF of TwoCal HN at 10 mL/hr and slowly advancing to goal of 35 mL/hr (x 16 hours). Flush g-tube with 230 mL free water, 6x daily. Nutrition Assessment: Per RN, TF held x 4 hours overnight after emesis. Semi-elemental TF restarted this morning without issue. RN reports plan to d/c today (per MD). Nutrition Risk Level: High    Nutrition Needs:  · Estimated Daily Total Kcal: 4322-7378 kcal/day  · Estimated Daily Protein (g): 70-90 gm pro/day    Nutrition Diagnosis:   · Problem: Inadequate energy intake  · Etiology: related to Alteration in GI function     Signs and symptoms:  as evidenced by (Restart restart of TF)    Objective Information:  · Nutrition-Focused Physical Findings: Colostomy. G-tube. · Wound Type: Stage IV, Pressure Ulcer(to coccyx)  · Current Nutrition Therapies:  · Oral Diet Orders: NPO   · Tube Feeding (TF) Orders:   · Feeding Route: Gastrostomy  · Formula: Semi-elemental  · Rate (ml/hr):40 mL/hr (off x 4 hours overnight d/t emesis)   · Volume (ml/day): 960 mL/day  · Duration: Continuous  · Water Flushes: 250 mL, 4x daily  · Current TF & Flush Orders Provides: Vital AF at 40 mL/hr = 1152 kcals, 72 gm protein per day (when run x 24 hours)  · Anthropometric Measures:  · Ht: 5' 1\" (154.9 cm)   · Current Body Wt: 180 lb 8.9 oz (81.9 kg)  · Admission Body Wt: 177 lb (80.3 kg)(bed scale)  · Ideal Body Wt: 105 lb (47.6 kg), % Ideal Body 9.2% x 3 months per EHR review - noted possible wt gain x past month noted. · Adjusted Body Wt:  Body weight adjusted for AKA, Quadriplegia  · BMI Classification: BMI 30.0 - 34.9 Obese Class I    Nutrition Interventions:   Modify current Tube Feeding  Continued Inpatient Monitoring, Education Not Indicated    Nutrition Evaluation:   · Evaluation: Goal achieved   · Goals: Meet % of estimated nutrition needs once nutrition started.    · Monitoring: TF Intake, TF Tolerance, Nausea or Vomiting, Monitor Bowel Function, Weight, Pertinent Labs, Skin Integrity, Wound Healing      Electronically signed by Giovany Moore MS, RD, LD on 8/2/19 at 10:36 AM    Contact Number: [phone]

## 2019-08-02 NOTE — PROGRESS NOTES
[facility] Han [facility]    Progress Note    8/2/2019    8:13 AM    Name:   Corby Banks  MRN:     [id number]     Acct:      [de-identified]   Room:   [address] [place] [address] Day:  5  Admit Date:  7/28/2019  4:53 PM    PCP:   Leela Xiong MD  Code Status:  Full Code    Subjective:     C/C:   Chief Complaint   Patient presents with    Constipation     x2days     Interval History Status: not changed. Had some emesis overnight x 1  No new issues  Now shell tf    Brief History:     The patient is a 40 y. o.   female who presents with Constipation (x2days)   and she is admitted to the hospital for the management of  N/v.  She lives at an [facility] and started having n/v and per her mother looked \"ill-like something was bothering her\"  Patient is nonverbal and cannot provide any history.  Mother attributes symptoms to uti, which she gets recurrently. Review of Systems:     unobtainable      Medications:      Allergies:  No Known Allergies    Current Meds:   Scheduled Meds:    cefTRIAXone (ROCEPHIN) IV  1 g Intravenous Q24H    ondansetron  4 mg Intravenous Once    docusate  100 mg Oral Daily    glycopyrrolate  1 mg Oral TID    midodrine  10 mg Oral TID WC    rivaroxaban  20 mg Oral Daily    sertraline  25 mg Per G Tube Daily    scopolamine  1 patch Transdermal Q72H    sodium chloride flush  10 mL Intravenous 2 times per day    famotidine (PEPCID) injection  20 mg Intravenous BID    senna  2 tablet Oral BID     Continuous Infusions:    dextrose 5% and 0.45% NaCl with KCl 20 mEq 125 mL/hr at 08/01/19 1410     PRN Meds: sodium chloride flush, potassium chloride **OR** potassium alternative oral replacement **OR** potassium chloride, magnesium sulfate, ondansetron, nicotine, acetaminophen    Data:     Past Medical History:   has a past medical history of Aphasia, Aphasia, Contracture of joint, lower leg, HTN (hypertension), Hx MRSA infection, PBEA NOT REPORTED 01/08/2017    XEU1IZR 10 01/08/2017    LWMT0MGE 95 01/08/2017    FIO2 INFORMATION NOT PROVIDED 05/18/2019     Lab Results   Component Value Date/Time    SPECIAL NOT REPORTED 07/29/2019 02:50 AM    SPECIAL NOT REPORTED 07/29/2019 02:50 AM     Lab Results   Component Value Date/Time    CULTURE PROTEUS MIRABILIS >[id number] CFU/ML (A) 07/28/2019 09:02 PM       Radiology:    Ct Abdomen Pelvis W Iv Contrast Additional Contrast? None    Result Date: 7/28/2019  Small amount of ascites and peritoneal enhancement which could indicate infection/peritonitis. Mildly dilated small bowel loops without transition point suggests ileus. Xr Chest Portable    Result Date: 7/28/2019  Right parahilar airspace opacification could represent scarring, atelectasis or pneumonia Bibasilar hypoaeration right worse than left     Ct Chest Pulmonary Embolism W Contrast    Result Date: 7/28/2019  1. No evidence of acute pulmonary embolism or acute aortic disease. 2. Redemonstration of atelectatic changes in the right lower lobe and elevation of the right hemidiaphragm. 3. Redemonstration of cholelithiasis. Fl Small Bowel Follow Through Only    Result Date: 7/30/2019  1. Appearance segmental stricture segment measuring 5.9 cm of a distal small bowel loop in the central lower abdomen. 2. No overt small bowel obstruction evident. 3. Slight delay in small bowel transit time at 2 hours and 15 minutes. 4. Gastrostomy tube, left lower quadrant ostomy and inferior approach right-sided central venous catheter noted. 5. Multiple right-sided renal calculi. The findings were sent to the Radiology Results [address] at 2:03 pm on 7/30/2019to be communicated to a licensed caregiver.        Physical Examination:        General appearance:  alert, cooperative and no distress  Mental Status: nonverbal  Lungs:  clear to auscultation bilaterally, normal effort  Heart:  regular rate and rhythm, no murmur  Abdomen:  soft, nontender, nondistended, normal bowel sounds, no masses, hepatomegaly, splenomegaly; obese  Extremities:  no edema, redness, tenderness in the calves  Skin:  no gross lesions, rashes, induration    Assessment:        Primary Problem  Urinary tract infection associated with indwelling urethral catheter ([address]  -due to proteus and [address] Problems    Diagnosis Date Noted    Proteus mirabilis infection [A49.8] 08/02/2019    Decubitus ulcer of coccygeal region, stage 4 ([address] [L89.154] 07/30/2019    Ileus ([address] [K56.7] 07/29/2019    Abdominal pain [R10.9] 07/28/2019    Urinary tract infection associated with indwelling urethral catheter ([address] [T83.511A, N39.0] 07/28/2019    Chronic indwelling Davies catheter [Z96.0] 05/17/2019    Neurogenic bladder [N31.9]     Gastrostomy tube dependent ([address] [Z93.1] 08/26/2015    Multiple sclerosis ([address] Saúl Jarvis 08/26/2015       Plan:        1. Dc back to ecf on omnicef  2.  Resume reglan and increase dose to better tolerate tf    Elizabeth Tanner DO  8/2/2019  8:13 AM

## 2019-08-03 LAB
CULTURE: NORMAL
Lab: NORMAL
SPECIMEN DESCRIPTION: NORMAL

## 2019-08-14 ENCOUNTER — HOSPITAL ENCOUNTER (OUTPATIENT)
Age: 39
Setting detail: SPECIMEN
Discharge: HOME | End: 2019-08-14
Payer: MEDICARE

## 2019-08-14 LAB
ANION GAP SERPL CALCULATED.3IONS-SCNC: 14 MMOL/L (ref 9–17)
BUN BLDV-MCNC: 19 MG/DL (ref 6–20)
BUN/CREAT BLD: ABNORMAL (ref 9–20)
CALCIUM SERPL-MCNC: 9.7 MG/DL (ref 8.6–10.4)
CHLORIDE BLD-SCNC: 103 MMOL/L (ref 98–107)
CO2: 25 MMOL/L (ref 20–31)
CREAT SERPL-MCNC: 0.38 MG/DL (ref 0.5–0.9)
GFR AFRICAN AMERICAN: >60 ML/MIN
GFR NON-AFRICAN AMERICAN: >60 ML/MIN
GFR SERPL CREATININE-BSD FRML MDRD: ABNORMAL ML/MIN/{1.73_M2}
GFR SERPL CREATININE-BSD FRML MDRD: ABNORMAL ML/MIN/{1.73_M2}
GLUCOSE BLD-MCNC: 72 MG/DL (ref 70–99)
HCT VFR BLD CALC: 43.5 % (ref 36.3–47.1)
HEMOGLOBIN: 12.9 G/DL (ref 11.9–15.1)
MCH RBC QN AUTO: 26 PG (ref 25.2–33.5)
MCHC RBC AUTO-ENTMCNC: 29.7 G/DL (ref 28.4–34.8)
MCV RBC AUTO: 87.5 FL (ref 82.6–102.9)
NRBC AUTOMATED: 0 PER 100 WBC
PDW BLD-RTO: 16.9 % (ref 11.8–14.4)
PLATELET # BLD: 416 K/UL (ref 138–453)
PMV BLD AUTO: 11 FL (ref 8.1–13.5)
POTASSIUM SERPL-SCNC: 3.9 MMOL/L (ref 3.7–5.3)
RBC # BLD: 4.97 M/UL (ref 3.95–5.11)
SODIUM BLD-SCNC: 142 MMOL/L (ref 135–144)
WBC # BLD: 5.2 K/UL (ref 3.5–11.3)

## 2019-08-14 PROCEDURE — P9603 ONE-WAY ALLOW PRORATED MILES: HCPCS

## 2019-08-14 PROCEDURE — 80048 BASIC METABOLIC PNL TOTAL CA: CPT

## 2019-08-14 PROCEDURE — 36415 COLL VENOUS BLD VENIPUNCTURE: CPT

## 2019-08-14 PROCEDURE — 85027 COMPLETE CBC AUTOMATED: CPT

## 2019-08-15 ENCOUNTER — HOSPITAL ENCOUNTER (OUTPATIENT)
Age: 39
Setting detail: SPECIMEN
Discharge: HOME | End: 2019-08-15
Payer: MEDICARE

## 2019-08-15 LAB
ANION GAP SERPL CALCULATED.3IONS-SCNC: 11 MMOL/L (ref 9–17)
BUN BLDV-MCNC: 15 MG/DL (ref 6–20)
BUN/CREAT BLD: ABNORMAL (ref 9–20)
CALCIUM SERPL-MCNC: 9.9 MG/DL (ref 8.6–10.4)
CHLORIDE BLD-SCNC: 103 MMOL/L (ref 98–107)
CO2: 22 MMOL/L (ref 20–31)
CREAT SERPL-MCNC: 0.4 MG/DL (ref 0.5–0.9)
GFR AFRICAN AMERICAN: >60 ML/MIN
GFR NON-AFRICAN AMERICAN: >60 ML/MIN
GFR SERPL CREATININE-BSD FRML MDRD: ABNORMAL ML/MIN/{1.73_M2}
GFR SERPL CREATININE-BSD FRML MDRD: ABNORMAL ML/MIN/{1.73_M2}
GLUCOSE BLD-MCNC: 69 MG/DL (ref 70–99)
HCT VFR BLD CALC: 41.3 % (ref 36.3–47.1)
HEMOGLOBIN: 12.4 G/DL (ref 11.9–15.1)
MCH RBC QN AUTO: 26.3 PG (ref 25.2–33.5)
MCHC RBC AUTO-ENTMCNC: 30 G/DL (ref 28.4–34.8)
MCV RBC AUTO: 87.5 FL (ref 82.6–102.9)
NRBC AUTOMATED: 0 PER 100 WBC
PDW BLD-RTO: 16.8 % (ref 11.8–14.4)
PLATELET # BLD: 379 K/UL (ref 138–453)
PMV BLD AUTO: 11 FL (ref 8.1–13.5)
POTASSIUM SERPL-SCNC: 3.8 MMOL/L (ref 3.7–5.3)
RBC # BLD: 4.72 M/UL (ref 3.95–5.11)
SODIUM BLD-SCNC: 136 MMOL/L (ref 135–144)
WBC # BLD: 6.1 K/UL (ref 3.5–11.3)

## 2019-08-15 PROCEDURE — 85027 COMPLETE CBC AUTOMATED: CPT

## 2019-08-15 PROCEDURE — P9603 ONE-WAY ALLOW PRORATED MILES: HCPCS

## 2019-08-15 PROCEDURE — 36415 COLL VENOUS BLD VENIPUNCTURE: CPT

## 2019-08-15 PROCEDURE — 80048 BASIC METABOLIC PNL TOTAL CA: CPT

## 2019-09-05 ENCOUNTER — HOSPITAL ENCOUNTER (OUTPATIENT)
Age: 39
Setting detail: SPECIMEN
Discharge: HOME | End: 2019-09-05
Payer: MEDICARE

## 2019-09-05 LAB
ANION GAP SERPL CALCULATED.3IONS-SCNC: 19 MMOL/L (ref 9–17)
BUN BLDV-MCNC: 24 MG/DL (ref 6–20)
BUN/CREAT BLD: ABNORMAL (ref 9–20)
CALCIUM SERPL-MCNC: 9.9 MG/DL (ref 8.6–10.4)
CHLORIDE BLD-SCNC: 104 MMOL/L (ref 98–107)
CO2: 21 MMOL/L (ref 20–31)
CREAT SERPL-MCNC: 0.37 MG/DL (ref 0.5–0.9)
GFR AFRICAN AMERICAN: >60 ML/MIN
GFR NON-AFRICAN AMERICAN: >60 ML/MIN
GFR SERPL CREATININE-BSD FRML MDRD: ABNORMAL ML/MIN/{1.73_M2}
GFR SERPL CREATININE-BSD FRML MDRD: ABNORMAL ML/MIN/{1.73_M2}
GLUCOSE BLD-MCNC: 59 MG/DL (ref 70–99)
HCT VFR BLD CALC: 42.7 % (ref 36.3–47.1)
HEMOGLOBIN: 12.8 G/DL (ref 11.9–15.1)
MCH RBC QN AUTO: 25.9 PG (ref 25.2–33.5)
MCHC RBC AUTO-ENTMCNC: 30 G/DL (ref 28.4–34.8)
MCV RBC AUTO: 86.3 FL (ref 82.6–102.9)
NRBC AUTOMATED: 0 PER 100 WBC
PDW BLD-RTO: 16.8 % (ref 11.8–14.4)
PLATELET # BLD: 368 K/UL (ref 138–453)
PMV BLD AUTO: 11.2 FL (ref 8.1–13.5)
POTASSIUM SERPL-SCNC: 3.7 MMOL/L (ref 3.7–5.3)
RBC # BLD: 4.95 M/UL (ref 3.95–5.11)
SODIUM BLD-SCNC: 144 MMOL/L (ref 135–144)
WBC # BLD: 8.6 K/UL (ref 3.5–11.3)

## 2019-09-05 PROCEDURE — 80048 BASIC METABOLIC PNL TOTAL CA: CPT

## 2019-09-05 PROCEDURE — 36415 COLL VENOUS BLD VENIPUNCTURE: CPT

## 2019-09-05 PROCEDURE — 85027 COMPLETE CBC AUTOMATED: CPT

## 2019-09-05 PROCEDURE — P9603 ONE-WAY ALLOW PRORATED MILES: HCPCS

## 2019-10-09 ENCOUNTER — HOSPITAL ENCOUNTER (OUTPATIENT)
Age: 39
Setting detail: SPECIMEN
Discharge: HOME | End: 2019-10-09
Payer: MEDICARE

## 2019-10-09 LAB
ANION GAP SERPL CALCULATED.3IONS-SCNC: 13 MMOL/L (ref 9–17)
BUN BLDV-MCNC: 14 MG/DL (ref 6–20)
BUN/CREAT BLD: ABNORMAL (ref 9–20)
CALCIUM SERPL-MCNC: 9.2 MG/DL (ref 8.6–10.4)
CHLORIDE BLD-SCNC: 104 MMOL/L (ref 98–107)
CO2: 22 MMOL/L (ref 20–31)
CREAT SERPL-MCNC: 0.4 MG/DL (ref 0.5–0.9)
GFR AFRICAN AMERICAN: >60 ML/MIN
GFR NON-AFRICAN AMERICAN: >60 ML/MIN
GFR SERPL CREATININE-BSD FRML MDRD: ABNORMAL ML/MIN/{1.73_M2}
GFR SERPL CREATININE-BSD FRML MDRD: ABNORMAL ML/MIN/{1.73_M2}
GLUCOSE BLD-MCNC: 63 MG/DL (ref 70–99)
HCT VFR BLD CALC: 42.6 % (ref 36.3–47.1)
HEMOGLOBIN: 13 G/DL (ref 11.9–15.1)
MCH RBC QN AUTO: 26.4 PG (ref 25.2–33.5)
MCHC RBC AUTO-ENTMCNC: 30.5 G/DL (ref 28.4–34.8)
MCV RBC AUTO: 86.6 FL (ref 82.6–102.9)
NRBC AUTOMATED: 0 PER 100 WBC
PDW BLD-RTO: 16.3 % (ref 11.8–14.4)
PLATELET # BLD: 365 K/UL (ref 138–453)
PMV BLD AUTO: 11.3 FL (ref 8.1–13.5)
POTASSIUM SERPL-SCNC: 4 MMOL/L (ref 3.7–5.3)
RBC # BLD: 4.92 M/UL (ref 3.95–5.11)
SODIUM BLD-SCNC: 139 MMOL/L (ref 135–144)
WBC # BLD: 7.7 K/UL (ref 3.5–11.3)

## 2019-10-09 PROCEDURE — 36415 COLL VENOUS BLD VENIPUNCTURE: CPT

## 2019-10-09 PROCEDURE — P9603 ONE-WAY ALLOW PRORATED MILES: HCPCS

## 2019-10-09 PROCEDURE — 85027 COMPLETE CBC AUTOMATED: CPT

## 2019-10-09 PROCEDURE — 80048 BASIC METABOLIC PNL TOTAL CA: CPT

## 2019-10-11 ENCOUNTER — OFFICE VISIT (OUTPATIENT)
Dept: NEUROLOGY | Age: 39
End: 2019-10-11
Payer: MEDICARE

## 2019-10-11 VITALS
BODY MASS INDEX: 35.14 KG/M2 | WEIGHT: 179 LBS | SYSTOLIC BLOOD PRESSURE: 101 MMHG | OXYGEN SATURATION: 97 % | DIASTOLIC BLOOD PRESSURE: 58 MMHG | HEIGHT: 60 IN | HEART RATE: 83 BPM

## 2019-10-11 DIAGNOSIS — Z86.711 HISTORY OF PULMONARY EMBOLISM: ICD-10-CM

## 2019-10-11 DIAGNOSIS — Z16.12 EXTENDED SPECTRUM BETA LACTAMASE (ESBL) RESISTANCE: ICD-10-CM

## 2019-10-11 DIAGNOSIS — R47.01 NONVERBAL: ICD-10-CM

## 2019-10-11 DIAGNOSIS — G92.8 TOXIC METABOLIC ENCEPHALOPATHY: ICD-10-CM

## 2019-10-11 DIAGNOSIS — N31.9 NEUROGENIC BLADDER: ICD-10-CM

## 2019-10-11 DIAGNOSIS — Z93.1 GASTROSTOMY TUBE DEPENDENT (HCC): ICD-10-CM

## 2019-10-11 DIAGNOSIS — G82.20 LOWER PARAPLEGIA (HCC): ICD-10-CM

## 2019-10-11 DIAGNOSIS — Z97.8 CHRONIC INDWELLING FOLEY CATHETER: ICD-10-CM

## 2019-10-11 DIAGNOSIS — G35 MULTIPLE SCLEROSIS (HCC): Primary | ICD-10-CM

## 2019-10-11 DIAGNOSIS — N39.0 RECURRENT UTI (URINARY TRACT INFECTION): ICD-10-CM

## 2019-10-11 DIAGNOSIS — N30.91 HEMORRHAGIC CYSTITIS: ICD-10-CM

## 2019-10-11 PROCEDURE — G8427 DOCREV CUR MEDS BY ELIG CLIN: HCPCS | Performed by: STUDENT IN AN ORGANIZED HEALTH CARE EDUCATION/TRAINING PROGRAM

## 2019-10-11 PROCEDURE — G8484 FLU IMMUNIZE NO ADMIN: HCPCS | Performed by: STUDENT IN AN ORGANIZED HEALTH CARE EDUCATION/TRAINING PROGRAM

## 2019-10-11 PROCEDURE — 99214 OFFICE O/P EST MOD 30 MIN: CPT | Performed by: STUDENT IN AN ORGANIZED HEALTH CARE EDUCATION/TRAINING PROGRAM

## 2019-10-11 PROCEDURE — G8417 CALC BMI ABV UP PARAM F/U: HCPCS | Performed by: STUDENT IN AN ORGANIZED HEALTH CARE EDUCATION/TRAINING PROGRAM

## 2019-10-11 PROCEDURE — 1036F TOBACCO NON-USER: CPT | Performed by: STUDENT IN AN ORGANIZED HEALTH CARE EDUCATION/TRAINING PROGRAM

## 2019-10-11 RX ORDER — ATROPINE SULFATE 10 MG/ML
2-4 SOLUTION/ DROPS OPHTHALMIC EVERY 4 HOURS PRN
COMMUNITY

## 2019-11-05 ENCOUNTER — HOSPITAL ENCOUNTER (OUTPATIENT)
Age: 39
Setting detail: SPECIMEN
Discharge: HOME | End: 2019-11-05
Payer: MEDICARE

## 2019-11-05 LAB
ANION GAP SERPL CALCULATED.3IONS-SCNC: 9 MMOL/L (ref 9–17)
BUN BLDV-MCNC: 18 MG/DL (ref 6–20)
BUN/CREAT BLD: 43 (ref 9–20)
CALCIUM SERPL-MCNC: 9.3 MG/DL (ref 8.6–10.4)
CHLORIDE BLD-SCNC: 103 MMOL/L (ref 98–107)
CO2: 27 MMOL/L (ref 20–31)
CREAT SERPL-MCNC: 0.42 MG/DL (ref 0.5–0.9)
GFR AFRICAN AMERICAN: >60 ML/MIN
GFR NON-AFRICAN AMERICAN: >60 ML/MIN
GFR SERPL CREATININE-BSD FRML MDRD: ABNORMAL ML/MIN/{1.73_M2}
GFR SERPL CREATININE-BSD FRML MDRD: ABNORMAL ML/MIN/{1.73_M2}
GLUCOSE BLD-MCNC: 81 MG/DL (ref 70–99)
HCT VFR BLD CALC: 41 % (ref 36.3–47.1)
HEMOGLOBIN: 12.6 G/DL (ref 11.9–15.1)
MCH RBC QN AUTO: 26.4 PG (ref 25.2–33.5)
MCHC RBC AUTO-ENTMCNC: 30.7 G/DL (ref 28.4–34.8)
MCV RBC AUTO: 86 FL (ref 82.6–102.9)
NRBC AUTOMATED: 0 PER 100 WBC
PDW BLD-RTO: 17 % (ref 11.8–14.4)
PLATELET # BLD: 421 K/UL (ref 138–453)
PMV BLD AUTO: 10.7 FL (ref 8.1–13.5)
POTASSIUM SERPL-SCNC: 4 MMOL/L (ref 3.7–5.3)
RBC # BLD: 4.77 M/UL (ref 3.95–5.11)
SODIUM BLD-SCNC: 139 MMOL/L (ref 135–144)
WBC # BLD: 6.9 K/UL (ref 3.5–11.3)

## 2019-11-05 PROCEDURE — 80048 BASIC METABOLIC PNL TOTAL CA: CPT

## 2019-11-05 PROCEDURE — 85027 COMPLETE CBC AUTOMATED: CPT

## 2019-11-05 PROCEDURE — P9603 ONE-WAY ALLOW PRORATED MILES: HCPCS

## 2019-11-05 PROCEDURE — 36415 COLL VENOUS BLD VENIPUNCTURE: CPT

## 2019-12-10 ENCOUNTER — HOSPITAL ENCOUNTER (OUTPATIENT)
Age: 39
Setting detail: SPECIMEN
Discharge: HOME | End: 2019-12-10
Payer: MEDICARE

## 2019-12-10 LAB
ANION GAP SERPL CALCULATED.3IONS-SCNC: 11 MMOL/L (ref 9–17)
BUN BLDV-MCNC: 17 MG/DL (ref 6–20)
BUN/CREAT BLD: 40 (ref 9–20)
CALCIUM SERPL-MCNC: 9.7 MG/DL (ref 8.6–10.4)
CHLORIDE BLD-SCNC: 102 MMOL/L (ref 98–107)
CO2: 26 MMOL/L (ref 20–31)
CREAT SERPL-MCNC: 0.43 MG/DL (ref 0.5–0.9)
GFR AFRICAN AMERICAN: >60 ML/MIN
GFR NON-AFRICAN AMERICAN: >60 ML/MIN
GFR SERPL CREATININE-BSD FRML MDRD: ABNORMAL ML/MIN/{1.73_M2}
GFR SERPL CREATININE-BSD FRML MDRD: ABNORMAL ML/MIN/{1.73_M2}
GLUCOSE BLD-MCNC: 82 MG/DL (ref 70–99)
HCT VFR BLD CALC: 45.5 % (ref 36.3–47.1)
HEMOGLOBIN: 13.7 G/DL (ref 11.9–15.1)
MCH RBC QN AUTO: 26.5 PG (ref 25.2–33.5)
MCHC RBC AUTO-ENTMCNC: 30.1 G/DL (ref 28.4–34.8)
MCV RBC AUTO: 88 FL (ref 82.6–102.9)
NRBC AUTOMATED: 0 PER 100 WBC
PDW BLD-RTO: 16.3 % (ref 11.8–14.4)
PLATELET # BLD: 294 K/UL (ref 138–453)
PMV BLD AUTO: 11.7 FL (ref 8.1–13.5)
POTASSIUM SERPL-SCNC: 3.8 MMOL/L (ref 3.7–5.3)
RBC # BLD: 5.17 M/UL (ref 3.95–5.11)
SODIUM BLD-SCNC: 139 MMOL/L (ref 135–144)
WBC # BLD: 6.7 K/UL (ref 3.5–11.3)

## 2019-12-10 PROCEDURE — P9603 ONE-WAY ALLOW PRORATED MILES: HCPCS

## 2019-12-10 PROCEDURE — 80048 BASIC METABOLIC PNL TOTAL CA: CPT

## 2019-12-10 PROCEDURE — 85027 COMPLETE CBC AUTOMATED: CPT

## 2019-12-10 PROCEDURE — 36415 COLL VENOUS BLD VENIPUNCTURE: CPT

## 2019-12-11 ENCOUNTER — OFFICE VISIT (OUTPATIENT)
Dept: NEUROLOGY | Age: 39
End: 2019-12-11
Payer: MEDICARE

## 2019-12-11 VITALS
WEIGHT: 189 LBS | BODY MASS INDEX: 37.11 KG/M2 | HEIGHT: 60 IN | HEART RATE: 76 BPM | SYSTOLIC BLOOD PRESSURE: 126 MMHG | DIASTOLIC BLOOD PRESSURE: 83 MMHG

## 2019-12-11 DIAGNOSIS — E55.9 VITAMIN D DEFICIENCY: ICD-10-CM

## 2019-12-11 DIAGNOSIS — G35 CHRONIC PROGRESSIVE MULTIPLE SCLEROSIS (HCC): Primary | ICD-10-CM

## 2019-12-11 DIAGNOSIS — G82.20 LOWER PARAPLEGIA (HCC): ICD-10-CM

## 2019-12-11 DIAGNOSIS — Z86.711 HISTORY OF PULMONARY EMBOLISM: ICD-10-CM

## 2019-12-11 DIAGNOSIS — Z16.12 EXTENDED SPECTRUM BETA LACTAMASE (ESBL) RESISTANCE: ICD-10-CM

## 2019-12-11 DIAGNOSIS — G92.8 TOXIC METABOLIC ENCEPHALOPATHY: ICD-10-CM

## 2019-12-11 DIAGNOSIS — Z11.59 NEED FOR HEPATITIS B SCREENING TEST: ICD-10-CM

## 2019-12-11 DIAGNOSIS — N31.9 NEUROGENIC BLADDER: ICD-10-CM

## 2019-12-11 DIAGNOSIS — N39.0 RECURRENT UTI (URINARY TRACT INFECTION): ICD-10-CM

## 2019-12-11 DIAGNOSIS — Z97.8 CHRONIC INDWELLING FOLEY CATHETER: ICD-10-CM

## 2019-12-11 PROCEDURE — G8417 CALC BMI ABV UP PARAM F/U: HCPCS | Performed by: PSYCHIATRY & NEUROLOGY

## 2019-12-11 PROCEDURE — G8427 DOCREV CUR MEDS BY ELIG CLIN: HCPCS | Performed by: PSYCHIATRY & NEUROLOGY

## 2019-12-11 PROCEDURE — 99215 OFFICE O/P EST HI 40 MIN: CPT | Performed by: PSYCHIATRY & NEUROLOGY

## 2019-12-11 PROCEDURE — G8484 FLU IMMUNIZE NO ADMIN: HCPCS | Performed by: PSYCHIATRY & NEUROLOGY

## 2019-12-11 PROCEDURE — 1036F TOBACCO NON-USER: CPT | Performed by: PSYCHIATRY & NEUROLOGY

## 2019-12-11 RX ORDER — ACETAMINOPHEN AND CODEINE PHOSPHATE 300; 30 MG/1; MG/1
1 TABLET ORAL EVERY 8 HOURS PRN
COMMUNITY

## 2019-12-19 ENCOUNTER — TELEPHONE (OUTPATIENT)
Dept: ONCOLOGY | Age: 39
End: 2019-12-19

## 2019-12-19 NOTE — TELEPHONE ENCOUNTER
#1 ATTEMPTED TO CONTACT PATIENT TO SCHEDULE CONSULTATION, PATIENT STAYS AT THE [facility]. LEFT A VOICEMAIL, AWAITING CALL BACK.

## 2020-01-07 ENCOUNTER — HOSPITAL ENCOUNTER (OUTPATIENT)
Age: 40
Setting detail: SPECIMEN
Discharge: HOME | End: 2020-01-07
Payer: MEDICARE

## 2020-01-07 LAB
ANION GAP SERPL CALCULATED.3IONS-SCNC: 15 MMOL/L (ref 9–17)
BUN BLDV-MCNC: 11 MG/DL (ref 6–20)
BUN/CREAT BLD: 26 (ref 9–20)
CALCIUM SERPL-MCNC: 10.2 MG/DL (ref 8.6–10.4)
CHLORIDE BLD-SCNC: 102 MMOL/L (ref 98–107)
CO2: 21 MMOL/L (ref 20–31)
CREAT SERPL-MCNC: 0.43 MG/DL (ref 0.5–0.9)
GFR AFRICAN AMERICAN: >60 ML/MIN
GFR NON-AFRICAN AMERICAN: >60 ML/MIN
GFR SERPL CREATININE-BSD FRML MDRD: ABNORMAL ML/MIN/{1.73_M2}
GFR SERPL CREATININE-BSD FRML MDRD: ABNORMAL ML/MIN/{1.73_M2}
GLUCOSE BLD-MCNC: 54 MG/DL (ref 70–99)
HCT VFR BLD CALC: 48.3 % (ref 36.3–47.1)
HEMOGLOBIN: 14.3 G/DL (ref 11.9–15.1)
MCH RBC QN AUTO: 26.2 PG (ref 25.2–33.5)
MCHC RBC AUTO-ENTMCNC: 29.6 G/DL (ref 28.4–34.8)
MCV RBC AUTO: 88.5 FL (ref 82.6–102.9)
NRBC AUTOMATED: 0 PER 100 WBC
PDW BLD-RTO: 15.5 % (ref 11.8–14.4)
PLATELET # BLD: 345 K/UL (ref 138–453)
PMV BLD AUTO: 11.7 FL (ref 8.1–13.5)
POTASSIUM SERPL-SCNC: 3.5 MMOL/L (ref 3.7–5.3)
RBC # BLD: 5.46 M/UL (ref 3.95–5.11)
SODIUM BLD-SCNC: 138 MMOL/L (ref 135–144)
WBC # BLD: 9.2 K/UL (ref 3.5–11.3)

## 2020-01-07 PROCEDURE — 80048 BASIC METABOLIC PNL TOTAL CA: CPT

## 2020-01-07 PROCEDURE — 85027 COMPLETE CBC AUTOMATED: CPT

## 2020-01-07 PROCEDURE — 36415 COLL VENOUS BLD VENIPUNCTURE: CPT

## 2020-01-07 PROCEDURE — P9603 ONE-WAY ALLOW PRORATED MILES: HCPCS

## 2020-01-08 ENCOUNTER — TELEPHONE (OUTPATIENT)
Dept: ONCOLOGY | Age: 40
End: 2020-01-08

## 2020-01-09 ENCOUNTER — HOSPITAL ENCOUNTER (OUTPATIENT)
Age: 40
Setting detail: SPECIMEN
Discharge: HOME | End: 2020-01-09
Payer: MEDICARE

## 2020-01-09 LAB
ANION GAP SERPL CALCULATED.3IONS-SCNC: 14 MMOL/L (ref 9–17)
BUN BLDV-MCNC: 12 MG/DL (ref 6–20)
BUN/CREAT BLD: ABNORMAL (ref 9–20)
CALCIUM SERPL-MCNC: 9.8 MG/DL (ref 8.6–10.4)
CHLORIDE BLD-SCNC: 101 MMOL/L (ref 98–107)
CO2: 24 MMOL/L (ref 20–31)
CREAT SERPL-MCNC: <0.4 MG/DL (ref 0.5–0.9)
GFR AFRICAN AMERICAN: ABNORMAL ML/MIN
GFR NON-AFRICAN AMERICAN: ABNORMAL ML/MIN
GFR SERPL CREATININE-BSD FRML MDRD: ABNORMAL ML/MIN/{1.73_M2}
GFR SERPL CREATININE-BSD FRML MDRD: ABNORMAL ML/MIN/{1.73_M2}
GLUCOSE BLD-MCNC: 66 MG/DL (ref 70–99)
HCT VFR BLD CALC: 42.8 % (ref 36.3–47.1)
HEMOGLOBIN: 13.1 G/DL (ref 11.9–15.1)
MCH RBC QN AUTO: 26.4 PG (ref 25.2–33.5)
MCHC RBC AUTO-ENTMCNC: 30.6 G/DL (ref 28.4–34.8)
MCV RBC AUTO: 86.1 FL (ref 82.6–102.9)
NRBC AUTOMATED: 0 PER 100 WBC
PDW BLD-RTO: 15 % (ref 11.8–14.4)
PLATELET # BLD: 343 K/UL (ref 138–453)
PMV BLD AUTO: 11.4 FL (ref 8.1–13.5)
POTASSIUM SERPL-SCNC: 4.1 MMOL/L (ref 3.7–5.3)
RBC # BLD: 4.97 M/UL (ref 3.95–5.11)
SODIUM BLD-SCNC: 139 MMOL/L (ref 135–144)
WBC # BLD: 6.6 K/UL (ref 3.5–11.3)

## 2020-01-09 PROCEDURE — 80048 BASIC METABOLIC PNL TOTAL CA: CPT

## 2020-01-09 PROCEDURE — 36415 COLL VENOUS BLD VENIPUNCTURE: CPT

## 2020-01-09 PROCEDURE — 85027 COMPLETE CBC AUTOMATED: CPT

## 2020-01-09 PROCEDURE — P9603 ONE-WAY ALLOW PRORATED MILES: HCPCS

## 2020-01-22 ENCOUNTER — APPOINTMENT (OUTPATIENT)
Dept: GENERAL RADIOLOGY | Age: 40
DRG: 698 | End: 2020-01-22
Payer: MEDICARE

## 2020-01-22 ENCOUNTER — HOSPITAL ENCOUNTER (INPATIENT)
Age: 40
LOS: 3 days | Discharge: NURSING, REHAB OR LONG-TERM CARE | DRG: 698 | End: 2020-01-25
Attending: EMERGENCY MEDICINE | Admitting: INTERNAL MEDICINE
Payer: MEDICARE

## 2020-01-22 PROBLEM — R79.89 LACTATE BLOOD INCREASE: Status: ACTIVE | Noted: 2020-01-22

## 2020-01-22 LAB
-: ABNORMAL
-: ABNORMAL
ABSOLUTE EOS #: 0.13 K/UL (ref 0–0.4)
ABSOLUTE IMMATURE GRANULOCYTE: 0.13 K/UL (ref 0–0.3)
ABSOLUTE LYMPH #: 0.38 K/UL (ref 1–4.8)
ABSOLUTE MONO #: 0.51 K/UL (ref 0.1–0.8)
ALBUMIN SERPL-MCNC: 3.3 G/DL (ref 3.5–5.2)
ALBUMIN/GLOBULIN RATIO: 0.6 (ref 1–2.5)
ALLEN TEST: ABNORMAL
ALP BLD-CCNC: 82 U/L (ref 35–104)
ALT SERPL-CCNC: 39 U/L (ref 5–33)
AMORPHOUS: ABNORMAL
AMORPHOUS: ABNORMAL
ANION GAP SERPL CALCULATED.3IONS-SCNC: 14 MMOL/L (ref 9–17)
ANION GAP: 12 MMOL/L (ref 7–16)
AST SERPL-CCNC: 54 U/L
BACTERIA: ABNORMAL
BACTERIA: ABNORMAL
BASOPHILS # BLD: 0 % (ref 0–2)
BASOPHILS ABSOLUTE: 0 K/UL (ref 0–0.2)
BILIRUB SERPL-MCNC: 0.43 MG/DL (ref 0.3–1.2)
BILIRUBIN DIRECT: <0.08 MG/DL
BILIRUBIN URINE: NEGATIVE
BILIRUBIN URINE: NEGATIVE
BILIRUBIN, INDIRECT: ABNORMAL MG/DL (ref 0–1)
BUN BLDV-MCNC: 14 MG/DL (ref 6–20)
BUN/CREAT BLD: ABNORMAL (ref 9–20)
CALCIUM SERPL-MCNC: 9.6 MG/DL (ref 8.6–10.4)
CASTS UA: ABNORMAL /LPF (ref 0–2)
CASTS UA: ABNORMAL /LPF (ref 0–2)
CHLORIDE BLD-SCNC: 99 MMOL/L (ref 98–107)
CO2: 22 MMOL/L (ref 20–31)
COLOR: YELLOW
COLOR: YELLOW
COMMENT UA: ABNORMAL
CREAT SERPL-MCNC: 0.37 MG/DL (ref 0.5–0.9)
CRYSTALS, UA: ABNORMAL /HPF
CRYSTALS, UA: ABNORMAL /HPF
DIFFERENTIAL TYPE: ABNORMAL
EOSINOPHILS RELATIVE PERCENT: 1 % (ref 1–4)
EPITHELIAL CELLS UA: ABNORMAL /HPF (ref 0–5)
EPITHELIAL CELLS UA: ABNORMAL /HPF (ref 0–5)
FIO2: ABNORMAL
GFR AFRICAN AMERICAN: >60 ML/MIN
GFR NON-AFRICAN AMERICAN: >60 ML/MIN
GFR NON-AFRICAN AMERICAN: >60 ML/MIN
GFR SERPL CREATININE-BSD FRML MDRD: >60 ML/MIN
GFR SERPL CREATININE-BSD FRML MDRD: ABNORMAL ML/MIN/{1.73_M2}
GFR SERPL CREATININE-BSD FRML MDRD: ABNORMAL ML/MIN/{1.73_M2}
GFR SERPL CREATININE-BSD FRML MDRD: NORMAL ML/MIN/{1.73_M2}
GLOBULIN: ABNORMAL G/DL (ref 1.5–3.8)
GLUCOSE BLD-MCNC: 115 MG/DL (ref 70–99)
GLUCOSE BLD-MCNC: 121 MG/DL (ref 74–100)
GLUCOSE URINE: NEGATIVE
GLUCOSE URINE: NEGATIVE
HCO3 VENOUS: 24.4 MMOL/L (ref 22–29)
HCT VFR BLD CALC: 42.5 % (ref 36.3–47.1)
HEMOGLOBIN: 13.3 G/DL (ref 11.9–15.1)
IMMATURE GRANULOCYTES: 1 %
KETONES, URINE: NEGATIVE
KETONES, URINE: NEGATIVE
LACTIC ACID, SEPSIS WHOLE BLOOD: 2.6 MMOL/L (ref 0.5–1.9)
LACTIC ACID, SEPSIS: ABNORMAL MMOL/L (ref 0.5–1.9)
LEUKOCYTE ESTERASE, URINE: ABNORMAL
LEUKOCYTE ESTERASE, URINE: ABNORMAL
LIPASE: 12 U/L (ref 13–60)
LYMPHOCYTES # BLD: 3 % (ref 24–44)
MCH RBC QN AUTO: 26.3 PG (ref 25.2–33.5)
MCHC RBC AUTO-ENTMCNC: 31.3 G/DL (ref 28.4–34.8)
MCV RBC AUTO: 84 FL (ref 82.6–102.9)
MODE: ABNORMAL
MONOCYTES # BLD: 4 % (ref 1–7)
MORPHOLOGY: ABNORMAL
MRSA, DNA, NASAL: ABNORMAL
MUCUS: ABNORMAL
MUCUS: ABNORMAL
NEGATIVE BASE EXCESS, VEN: ABNORMAL (ref 0–2)
NITRITE, URINE: NEGATIVE
NITRITE, URINE: POSITIVE
NRBC AUTOMATED: 0 PER 100 WBC
O2 DEVICE/FLOW/%: ABNORMAL
O2 SAT, VEN: 79 % (ref 60–85)
OTHER OBSERVATIONS UA: ABNORMAL
OTHER OBSERVATIONS UA: ABNORMAL
PARTIAL THROMBOPLASTIN TIME: 36.3 SEC (ref 20.5–30.5)
PATIENT TEMP: ABNORMAL
PCO2, VEN: 34.5 MM HG (ref 41–51)
PDW BLD-RTO: 15.2 % (ref 11.8–14.4)
PH UA: 8 (ref 5–8)
PH UA: 8.5 (ref 5–8)
PH VENOUS: 7.46 (ref 7.32–7.43)
PLATELET # BLD: 170 K/UL (ref 138–453)
PLATELET ESTIMATE: ABNORMAL
PMV BLD AUTO: 12.1 FL (ref 8.1–13.5)
PO2, VEN: 40.5 MM HG (ref 30–50)
POC CHLORIDE: 101 MMOL/L (ref 98–107)
POC CREATININE: 0.6 MG/DL (ref 0.51–1.19)
POC HEMATOCRIT: 47 % (ref 36–46)
POC HEMOGLOBIN: 16 G/DL (ref 12–16)
POC IONIZED CALCIUM: 1.18 MMOL/L (ref 1.15–1.33)
POC LACTIC ACID: 2.9 MMOL/L (ref 0.56–1.39)
POC PCO2 TEMP: ABNORMAL MM HG
POC PH TEMP: ABNORMAL
POC PO2 TEMP: ABNORMAL MM HG
POC POTASSIUM: 4.5 MMOL/L (ref 3.5–4.5)
POC SODIUM: 137 MMOL/L (ref 138–146)
POSITIVE BASE EXCESS, VEN: 1 (ref 0–3)
POTASSIUM SERPL-SCNC: 4.3 MMOL/L (ref 3.7–5.3)
PROTEIN UA: ABNORMAL
PROTEIN UA: NEGATIVE
RBC # BLD: 5.06 M/UL (ref 3.95–5.11)
RBC # BLD: ABNORMAL 10*6/UL
RBC UA: ABNORMAL /HPF (ref 0–2)
RBC UA: ABNORMAL /HPF (ref 0–2)
RENAL EPITHELIAL, UA: ABNORMAL /HPF
RENAL EPITHELIAL, UA: ABNORMAL /HPF
SAMPLE SITE: ABNORMAL
SEG NEUTROPHILS: 91 % (ref 36–66)
SEGMENTED NEUTROPHILS ABSOLUTE COUNT: 11.65 K/UL (ref 1.8–7.7)
SODIUM BLD-SCNC: 135 MMOL/L (ref 135–144)
SPECIFIC GRAVITY UA: 1.01 (ref 1–1.03)
SPECIFIC GRAVITY UA: 1.02 (ref 1–1.03)
SPECIMEN DESCRIPTION: ABNORMAL
TOTAL CO2, VENOUS: 26 MMOL/L (ref 23–30)
TOTAL PROTEIN: 8.9 G/DL (ref 6.4–8.3)
TRICHOMONAS: ABNORMAL
TRICHOMONAS: ABNORMAL
TURBIDITY: ABNORMAL
TURBIDITY: ABNORMAL
URINE HGB: ABNORMAL
URINE HGB: ABNORMAL
UROBILINOGEN, URINE: NORMAL
UROBILINOGEN, URINE: NORMAL
WBC # BLD: 12.8 K/UL (ref 3.5–11.3)
WBC # BLD: ABNORMAL 10*3/UL
WBC UA: ABNORMAL /HPF (ref 0–5)
WBC UA: ABNORMAL /HPF (ref 0–5)
YEAST: ABNORMAL
YEAST: ABNORMAL

## 2020-01-22 PROCEDURE — 85730 THROMBOPLASTIN TIME PARTIAL: CPT

## 2020-01-22 PROCEDURE — 83690 ASSAY OF LIPASE: CPT

## 2020-01-22 PROCEDURE — 80076 HEPATIC FUNCTION PANEL: CPT

## 2020-01-22 PROCEDURE — 2000000000 HC ICU R&B

## 2020-01-22 PROCEDURE — 6360000002 HC RX W HCPCS: Performed by: STUDENT IN AN ORGANIZED HEALTH CARE EDUCATION/TRAINING PROGRAM

## 2020-01-22 PROCEDURE — 87205 SMEAR GRAM STAIN: CPT

## 2020-01-22 PROCEDURE — 82330 ASSAY OF CALCIUM: CPT

## 2020-01-22 PROCEDURE — 71045 X-RAY EXAM CHEST 1 VIEW: CPT

## 2020-01-22 PROCEDURE — 87641 MR-STAPH DNA AMP PROBE: CPT

## 2020-01-22 PROCEDURE — 87077 CULTURE AEROBIC IDENTIFY: CPT

## 2020-01-22 PROCEDURE — 82947 ASSAY GLUCOSE BLOOD QUANT: CPT

## 2020-01-22 PROCEDURE — 82803 BLOOD GASES ANY COMBINATION: CPT

## 2020-01-22 PROCEDURE — 51702 INSERT TEMP BLADDER CATH: CPT

## 2020-01-22 PROCEDURE — 81001 URINALYSIS AUTO W/SCOPE: CPT

## 2020-01-22 PROCEDURE — 84295 ASSAY OF SERUM SODIUM: CPT

## 2020-01-22 PROCEDURE — 83605 ASSAY OF LACTIC ACID: CPT

## 2020-01-22 PROCEDURE — 85025 COMPLETE CBC W/AUTO DIFF WBC: CPT

## 2020-01-22 PROCEDURE — 99285 EMERGENCY DEPT VISIT HI MDM: CPT

## 2020-01-22 PROCEDURE — 82565 ASSAY OF CREATININE: CPT

## 2020-01-22 PROCEDURE — 6370000000 HC RX 637 (ALT 250 FOR IP): Performed by: STUDENT IN AN ORGANIZED HEALTH CARE EDUCATION/TRAINING PROGRAM

## 2020-01-22 PROCEDURE — 99291 CRITICAL CARE FIRST HOUR: CPT | Performed by: INTERNAL MEDICINE

## 2020-01-22 PROCEDURE — 87086 URINE CULTURE/COLONY COUNT: CPT

## 2020-01-22 PROCEDURE — 87186 SC STD MICRODIL/AGAR DIL: CPT

## 2020-01-22 PROCEDURE — 2580000003 HC RX 258: Performed by: STUDENT IN AN ORGANIZED HEALTH CARE EDUCATION/TRAINING PROGRAM

## 2020-01-22 PROCEDURE — 87150 DNA/RNA AMPLIFIED PROBE: CPT

## 2020-01-22 PROCEDURE — 82435 ASSAY OF BLOOD CHLORIDE: CPT

## 2020-01-22 PROCEDURE — 87040 BLOOD CULTURE FOR BACTERIA: CPT

## 2020-01-22 PROCEDURE — 93005 ELECTROCARDIOGRAM TRACING: CPT | Performed by: STUDENT IN AN ORGANIZED HEALTH CARE EDUCATION/TRAINING PROGRAM

## 2020-01-22 PROCEDURE — 99213 OFFICE O/P EST LOW 20 MIN: CPT

## 2020-01-22 PROCEDURE — 80048 BASIC METABOLIC PNL TOTAL CA: CPT

## 2020-01-22 PROCEDURE — 36415 COLL VENOUS BLD VENIPUNCTURE: CPT

## 2020-01-22 PROCEDURE — 85014 HEMATOCRIT: CPT

## 2020-01-22 PROCEDURE — 84132 ASSAY OF SERUM POTASSIUM: CPT

## 2020-01-22 RX ORDER — SODIUM CHLORIDE 9 MG/ML
INJECTION, SOLUTION INTRAVENOUS CONTINUOUS
Status: DISCONTINUED | OUTPATIENT
Start: 2020-01-22 | End: 2020-01-24

## 2020-01-22 RX ORDER — ONDANSETRON 2 MG/ML
4 INJECTION INTRAMUSCULAR; INTRAVENOUS EVERY 6 HOURS PRN
Status: DISCONTINUED | OUTPATIENT
Start: 2020-01-22 | End: 2020-01-25 | Stop reason: HOSPADM

## 2020-01-22 RX ORDER — ACETAMINOPHEN 650 MG/1
650 SUPPOSITORY RECTAL ONCE
Status: COMPLETED | OUTPATIENT
Start: 2020-01-22 | End: 2020-01-22

## 2020-01-22 RX ORDER — SODIUM CHLORIDE 0.9 % (FLUSH) 0.9 %
10 SYRINGE (ML) INJECTION PRN
Status: DISCONTINUED | OUTPATIENT
Start: 2020-01-22 | End: 2020-01-25 | Stop reason: HOSPADM

## 2020-01-22 RX ORDER — SODIUM CHLORIDE 0.9 % (FLUSH) 0.9 %
10 SYRINGE (ML) INJECTION EVERY 12 HOURS SCHEDULED
Status: DISCONTINUED | OUTPATIENT
Start: 2020-01-22 | End: 2020-01-25 | Stop reason: HOSPADM

## 2020-01-22 RX ORDER — 0.9 % SODIUM CHLORIDE 0.9 %
30 INTRAVENOUS SOLUTION INTRAVENOUS ONCE
Status: COMPLETED | OUTPATIENT
Start: 2020-01-22 | End: 2020-01-22

## 2020-01-22 RX ADMIN — SODIUM CHLORIDE, PRESERVATIVE FREE 10 ML: 5 INJECTION INTRAVENOUS at 21:35

## 2020-01-22 RX ADMIN — MEROPENEM 1 G: 1 INJECTION, POWDER, FOR SOLUTION INTRAVENOUS at 08:25

## 2020-01-22 RX ADMIN — SODIUM CHLORIDE 2571 ML: 9 INJECTION, SOLUTION INTRAVENOUS at 07:24

## 2020-01-22 RX ADMIN — SODIUM CHLORIDE: 9 INJECTION, SOLUTION INTRAVENOUS at 11:00

## 2020-01-22 RX ADMIN — ENOXAPARIN SODIUM 40 MG: 40 INJECTION SUBCUTANEOUS at 15:17

## 2020-01-22 RX ADMIN — VANCOMYCIN HYDROCHLORIDE 1500 MG: 10 INJECTION, POWDER, LYOPHILIZED, FOR SOLUTION INTRAVENOUS at 21:35

## 2020-01-22 RX ADMIN — VANCOMYCIN HYDROCHLORIDE 2000 MG: 1 INJECTION, POWDER, LYOPHILIZED, FOR SOLUTION INTRAVENOUS at 09:59

## 2020-01-22 RX ADMIN — MEROPENEM 1 G: 1 INJECTION, POWDER, FOR SOLUTION INTRAVENOUS at 16:39

## 2020-01-22 RX ADMIN — ACETAMINOPHEN 650 MG: 650 SUPPOSITORY RECTAL at 07:24

## 2020-01-22 ASSESSMENT — PAIN SCALES - GENERAL: PAINLEVEL_OUTOF10: 0

## 2020-01-22 ASSESSMENT — PAIN SCALES - WONG BAKER: WONGBAKER_NUMERICALRESPONSE: 6

## 2020-01-22 NOTE — PROGRESS NOTES
kg/m². Estimated Creatinine Clearance: 199 mL/min (A) (based on SCr of 0.37 mg/dL (L)). Goal Trough Level: 15-20 mcg/mL    Assessment/Plan:  Will initiate vancomycin 1500 mg IV every 12 hours. Timing of trough level will be determined based on culture results, renal function, and clinical response. Thank you for the consult. Will continue to follow.     Jaimee Ruano, PharmD  PGY1 Pharmacy Resident   1/22/2020 2:33 PM

## 2020-01-22 NOTE — PLAN OF CARE
Problem: Falls - Risk of:  Goal: Will remain free from falls  Description  Will remain free from falls  Outcome: Ongoing  Goal: Absence of physical injury  Description  Absence of physical injury  Outcome: Ongoing     Problem: Risk for Impaired Skin Integrity  Goal: Tissue integrity - skin and mucous membranes  Description  Structural intactness and normal physiological function of skin and  mucous membranes.   Outcome: Ongoing     Problem: Fluid Volume - Imbalance:  Goal: Absence of imbalanced fluid volume signs and symptoms  Description  Absence of imbalanced fluid volume signs and symptoms  Outcome: Ongoing     Problem: Mental Status - Impaired:  Goal: Mental status will be restored to baseline  Description  Mental status will be restored to baseline  Outcome: Ongoing     Problem: Nutrition Deficit:  Goal: Ability to achieve adequate nutritional intake will improve  Description  Ability to achieve adequate nutritional intake will improve  Outcome: Ongoing     Problem: Pain:  Goal: Pain level will decrease  Description  Pain level will decrease  Outcome: Ongoing  Goal: Recognizes and communicates pain  Description  Recognizes and communicates pain  Outcome: Ongoing  Goal: Control of acute pain  Description  Control of acute pain  Outcome: Ongoing  Goal: Control of chronic pain  Description  Control of chronic pain  Outcome: Ongoing     Problem: Skin Integrity - Impaired:  Goal: Will show no infection signs and symptoms  Description  Will show no infection signs and symptoms  Outcome: Ongoing  Goal: Absence of new skin breakdown  Description  Absence of new skin breakdown  Outcome: Ongoing     Problem: COMMUNICATION IMPAIRMENT  Goal: Ability to express needs and understand communication  Outcome: Ongoing

## 2020-01-22 NOTE — ED PROVIDER NOTES
[facility] ED  Emergency Department EncounterMedicine Resident     Pt Name: Megan Pitt  MRN: [id number]  [place] 1980  Date ofevaluation: 1/22/20  PCP:  Searcy Lennox, MD    CHIEF COMPLAINT       Chief Complaint   Patient presents with    Altered Mental Status     HISTORY OF PRESENT ILLNESS  (Location/Symptom, Timing/Onset, Context/Setting, Quality, Duration, Modifying Factors, Severity.)      Megan Pitt is a 44 y.o. female with history of MS, nonverbal, quadriplegic, among other comorbidities who presents by EMS from nursing facility for altered mental status. Patient typically responds to some commands. Patient was not responding to commands therefore EMS was called. She was found to be febrile at 103, hypotensive in the 80s, tachycardic in the 140s. Patient does have a history of frequent UTIs. History limited secondary to patient nonverbal status. REVIEW OF SYSTEMS    (2-9 systems for level 4, 10 or more for level 5)      Review of Systems   Unable to perform ROS: Patient nonverbal       PAST MEDICAL / SURGICAL /SOCIAL / FAMILY HISTORY      has a past medical history of Aphasia, Aphasia, Contracture of joint, lower leg, Depressed, HTN (hypertension), Hx MRSA infection, Hydronephrosis, Movement disorder, Multiple sclerosis ([address] Neurogenic bladder, Non-verbal learning disorder, Peripheral vascular disease ([address] Polyneuropathy, Pressure ulcer, Pulmonary embolism ([address] Pulmonary infarction ([address] Quadriplegia ([address] Tachycardia, and UTI (lower urinary tract infection). has a past surgical history that includes Gastrostomy tube placement; colostomy; amputation (Right); and Leg amputation through femur.       Social History     Socioeconomic History    Marital status: Single     Spouse name: Not on file    Number of children: Not on file    Years of education: Not on file    Highest education level: Not on file   Occupational History    Not on file   Social Needs    Financial resource strain: Not on file    Food insecurity:     Worry: Not on file     Inability: Not on file    Transportation needs:     Medical: Not on file     Non-medical: Not on file   Tobacco Use    Smoking status: Never Smoker    Smokeless tobacco: Never Used   Substance and Sexual Activity    Alcohol use: No    Drug use: No    Sexual activity: Never   Lifestyle    Physical activity:     Days per week: Not on file     Minutes per session: Not on file    Stress: Not on file   Relationships    Social connections:     Talks on phone: Not on file     Gets together: Not on file     Attends Restorationist service: Not on file     Active member of club or organization: Not on file     Attends meetings of clubs or organizations: Not on file     Relationship status: Not on file    Intimate partner violence:     Fear of current or ex partner: Not on file     Emotionally abused: Not on file     Physically abused: Not on file     Forced sexual activity: Not on file   Other Topics Concern    Not on file   Social History Narrative    Not on file         Family History   Problem Relation Age of Onset    No Known Problems Mother     No Known Problems Father        Portions of the past medical history, past surgical history, social history, and family history were discussed and reviewed with thepatient/family and is included in HPI if pertinent. ALLERGIES / IMMUNIZATIONS / HOME MEDICATIONS     Allergies: Patient has no known allergies. IMMUNIZATIONS    Immunization History   Administered Date(s) Administered    Influenza Vaccine, unspecified formulation 10/09/2016    Pneumococcal Conjugate 13-valent (Sybil Lester) 10/09/2016         Home Medications:  Prior to Admission medications    Medication Sig Start Date End Date Taking? Authorizing Provider   acetaminophen-codeine (TYLENOL/CODEINE #3) 300-30 MG per tablet Take 1 tablet by mouth every 8 hours as needed for Pain.     Historical Provider, MD D-Mannose 500 MG CAPS Take 3 capsules by mouth three times daily    Historical Provider, MD   atropine 1 % ophthalmic solution 2-4 drops every 4 hours as needed    Historical Provider, MD   metoclopramide (REGLAN) 5 MG/5ML solution Take 5 mLs by mouth 3 times daily 8/2/19   Deb Peak P Blood, DO   rivaroxaban (XARELTO) 20 MG TABS tablet Take 1 tablet by mouth daily 5/24/19   Padmini De La Fuente MD   sertraline (ZOLOFT) 25 MG tablet 1 tablet by Per G Tube route daily 5/24/19   Padmini De La Fuente MD   docusate (COLACE) 50 MG/5ML liquid Take 10 mLs by mouth daily 5/24/19   Padmini De La Fuente MD   Multiple Vitamins-Minerals (CENTRUM/CERTA-DERICK WITH MINERALS ORAL) solution 15 mLs by Per G Tube route daily 5/24/19   Padmini De La Fuente MD   midodrine (PROAMATINE) 10 MG tablet Take 1 tablet by mouth 3 times daily (with meals) 5/24/19   Padmini De La Fuente MD   lansoprazole (PREVACID SOLUTAB) 30 MG disintegrating tablet 1 tablet by Per G Tube route every morning (before breakfast) 5/24/19   Padmini De La Fuente MD   Cranberry 250 MG CAPS Take 250 mg by mouth 2 times daily    Historical Provider, MD   ranitidine (ZANTAC) 75 MG/5ML syrup 75 mg by Per G Tube route 2 times daily    Historical Provider, MD   Scopolamine Base (SCOPOLAMINE TD) Place 1 mg onto the skin every 72 hours     Historical Provider, MD   baclofen (LIORESAL) 10 MG tablet 10 mg by Per G Tube route 3 times daily Crush into G tube     Historical Provider, MD   gabapentin (NEURONTIN) 300 MG capsule 300 mg by Per G Tube route 3 times daily    Historical Provider, MD   medroxyPROGESTERone (DEPO-PROVERA) 150 MG/ML injection Inject 150 mg into the muscle every 3 months On the 11th     Historical Provider, MD   acetaminophen (TYLENOL) 80 MG/0.8ML suspension 650 mg by Per G Tube route every 6 hours as needed for Fever or Pain     Historical Provider, MD   glycopyrrolate (ROBINUL) 1 MG tablet Take 1 mg by mouth 3 times daily     Historical Provider, MD       PHYSICAL EXAM   (up to 7 for level 4, 8 or more for level 5)      INITIAL VITALS:    height is 5' (1.524 m) and weight is 189 lb (85.7 kg). Her axillary temperature is 99.9 °F (37.7 °C). Her blood pressure is 93/61 and her pulse is 105. Her respiration is 15 and oxygen saturation is 97%. Physical Exam  Vitals signs reviewed. Constitutional:       Appearance: She is well-developed. Comments: Minimally responsive, does withdraw to pain   HENT:      Head: Normocephalic and atraumatic. Mouth/Throat:      Comments: Dry mucous membranes  Eyes:      Pupils: Pupils are equal, round, and reactive to light. Neck:      Musculoskeletal: Neck supple. Cardiovascular:      Pulses: Normal pulses. Heart sounds: Normal heart sounds. Comments: Tachycardic  Pulmonary:      Effort: Pulmonary effort is normal.      Breath sounds: Normal breath sounds. Abdominal:      Palpations: Abdomen is soft. Tenderness: There is no tenderness. Comments: PEG tube in place with no surrounding erythema or drainage, colostomy in place with greenish stool in bag, no surrounding erythema or drainage, stoma pink and well appearing   Musculoskeletal:      Comments: Right AKA   Skin:     General: Skin is warm and dry.       Comments: + sacral decubitus ulcer   Neurological:      Comments: Minimally responsive, nonverbal at baseline       Vitals:    Vitals:    01/22/20 0700 01/22/20 0740 01/22/20 0852 01/22/20 0921   BP: 103/71 104/69  93/61   Pulse: 138 129  105   Resp: 21 21  15   Temp:   99.9 °F (37.7 °C)    TempSrc:   Axillary    SpO2: 92% 98%  97%   Weight:       Height:           DIFFERENTIAL  DIAGNOSIS     PLAN (LABS / IMAGING / EKG):  Orders Placed This Encounter   Procedures    Urine Culture    Culture blood #1    Culture blood #2    XR CHEST PORTABLE    CBC auto differential    Urinalysis    Lactate, Sepsis    APTT    Lipase    Basic Metabolic Panel w/ Reflex to MG    HEPATIC FUNCTION PANEL    Hemoglobin and hematocrit, blood  SODIUM (POC)    POTASSIUM (POC)    CHLORIDE (POC)    CALCIUM, IONIC (POC)    Microscopic Urinalysis    Height and weight    Vital signs    Telemetry Monitoring    Inpatient consult to Critical Care    Venous Blood Gas, POC    Creatinine W/GFR Point of Care    Lactic Acid, POC    POCT Glucose    Anion Gap (Calc) POC    EKG 12 lead    PATIENT STATUS (FROM ED OR OR/PROCEDURAL) Inpatient     Plan of care is reviewed and discussed with the family/ patient when able. Family/ Patient consents to treatment and plan if able to do so. MEDICATIONS ORDERED:  Orders Placed This Encounter   Medications    0.9 % sodium chloride IV bolus 2,571 mL    vancomycin (VANCOCIN) 2,000 mg in dextrose 5 % 500 mL IVPB    meropenem (MERREM) 1 g in sodium chloride 0.9 % 100 mL IVPB (mini-bag)    acetaminophen (TYLENOL) suppository 650 mg     DIAGNOSTIC RESULTS     LABS:  Results for orders placed or performed during the hospital encounter of 01/22/20   Culture blood #1   Result Value Ref Range    Specimen Description . BLOOD     Special Requests NOT REPORTED     Culture NO GROWTH 1 HOUR    CBC auto differential   Result Value Ref Range    WBC 12.8 (H) 3.5 - 11.3 k/uL    RBC 5.06 3.95 - 5.11 m/uL    Hemoglobin 13.3 11.9 - 15.1 g/dL    Hematocrit 42.5 36.3 - 47.1 %    MCV 84.0 82.6 - 102.9 fL    MCH 26.3 25.2 - 33.5 pg    MCHC 31.3 28.4 - 34.8 g/dL    RDW 15.2 (H) 11.8 - 14.4 %    Platelets 316 925 - 842 k/uL    MPV 12.1 8.1 - 13.5 fL    NRBC Automated 0.0 0.0 per 100 WBC    Differential Type NOT REPORTED     WBC Morphology NOT REPORTED     RBC Morphology NOT REPORTED     Platelet Estimate NOT REPORTED     Immature Granulocytes 1 (H) 0 %    Seg Neutrophils 91 (H) 36 - 66 %    Lymphocytes 3 (L) 24 - 44 %    Monocytes 4 1 - 7 %    Eosinophils % 1 1 - 4 %    Basophils 0 0 - 2 %    Absolute Immature Granulocyte 0.13 0.00 - 0.30 k/uL    Segs Absolute 11.65 (H) 1.8 - 7.7 k/uL    Absolute Lymph # 0.38 (L) 1.0 - 4.8 k/uL Absolute Mono # 0.51 0.1 - 0.8 k/uL    Absolute Eos # 0.13 0.0 - 0.4 k/uL    Basophils Absolute 0.00 0.0 - 0.2 k/uL    Morphology ANISOCYTOSIS PRESENT    Urinalysis   Result Value Ref Range    Color, UA YELLOW YELLOW    Turbidity UA TURBID (A) CLEAR    Glucose, Ur NEGATIVE NEGATIVE    Bilirubin Urine NEGATIVE NEGATIVE    Ketones, Urine NEGATIVE NEGATIVE    Specific Gravity, UA 1.018 1.005 - 1.030    Urine Hgb LARGE (A) NEGATIVE    pH, UA 8.5 (H) 5.0 - 8.0    Protein, UA 2+ (A) NEGATIVE    Urobilinogen, Urine Normal Normal    Nitrite, Urine POSITIVE (A) NEGATIVE    Leukocyte Esterase, Urine LARGE (A) NEGATIVE    Urinalysis Comments NOT REPORTED    Lactate, Sepsis   Result Value Ref Range    Lactic Acid, Sepsis NOT REPORTED 0.5 - 1.9 mmol/L    Lactic Acid, Sepsis, Whole Blood 2.6 (H) 0.5 - 1.9 mmol/L   APTT   Result Value Ref Range    PTT 36.3 (H) 20.5 - 30.5 sec   Lipase   Result Value Ref Range    Lipase 12 (L) 13 - 60 U/L   Basic Metabolic Panel w/ Reflex to MG   Result Value Ref Range    Glucose 115 (H) 70 - 99 mg/dL    BUN 14 6 - 20 mg/dL    CREATININE 0.37 (L) 0.50 - 0.90 mg/dL    Bun/Cre Ratio NOT REPORTED 9 - 20    Calcium 9.6 8.6 - 10.4 mg/dL    Sodium 135 135 - 144 mmol/L    Potassium 4.3 3.7 - 5.3 mmol/L    Chloride 99 98 - 107 mmol/L    CO2 22 20 - 31 mmol/L    Anion Gap 14 9 - 17 mmol/L    GFR Non-African American >60 >60 mL/min    GFR African American >60 >60 mL/min    GFR Comment          GFR Staging NOT REPORTED    HEPATIC FUNCTION PANEL   Result Value Ref Range    Alb 3.3 (L) 3.5 - 5.2 g/dL    Alkaline Phosphatase 82 35 - 104 U/L    ALT 39 (H) 5 - 33 U/L    AST 54 (H) <32 U/L    Total Bilirubin 0.43 0.3 - 1.2 mg/dL    Bilirubin, Direct <0.08 <0.31 mg/dL    Bilirubin, Indirect CANNOT BE CALCULATED 0.00 - 1.00 mg/dL    Total Protein 8.9 (H) 6.4 - 8.3 g/dL    Globulin NOT REPORTED 1.5 - 3.8 g/dL    Albumin/Globulin Ratio 0.6 (L) 1.0 - 2.5   Hemoglobin and hematocrit, blood   Result Value Ref Range    POC Anion Gap 12 7 - 16 mmol/L     Labs Reviewed   CBC WITH AUTO DIFFERENTIAL - Abnormal; Notable for the following components:       Result Value    WBC 12.8 (*)     RDW 15.2 (*)     Immature Granulocytes 1 (*)     Seg Neutrophils 91 (*)     Lymphocytes 3 (*)     Segs Absolute 11.65 (*)     Absolute Lymph # 0.38 (*)     All other components within normal limits   URINALYSIS - Abnormal; Notable for the following components:    Turbidity UA TURBID (*)     Urine Hgb LARGE (*)     pH, UA 8.5 (*)     Protein, UA 2+ (*)     Nitrite, Urine POSITIVE (*)     Leukocyte Esterase, Urine LARGE (*)     All other components within normal limits   LACTATE, SEPSIS - Abnormal; Notable for the following components:    Lactic Acid, Sepsis, Whole Blood 2.6 (*)     All other components within normal limits   APTT - Abnormal; Notable for the following components:    PTT 36.3 (*)     All other components within normal limits   LIPASE - Abnormal; Notable for the following components:    Lipase 12 (*)     All other components within normal limits   BASIC METABOLIC PANEL W/ REFLEX TO MG FOR LOW K - Abnormal; Notable for the following components:    Glucose 115 (*)     CREATININE 0.37 (*)     All other components within normal limits   HEPATIC FUNCTION PANEL - Abnormal; Notable for the following components:    Alb 3.3 (*)     ALT 39 (*)     AST 54 (*)     Total Protein 8.9 (*)     Albumin/Globulin Ratio 0.6 (*)     All other components within normal limits   HGB/HCT - Abnormal; Notable for the following components:    POC Hematocrit 47 (*)     All other components within normal limits   SODIUM (POC) - Abnormal; Notable for the following components:    POC Sodium 137 (*)     All other components within normal limits   MICROSCOPIC URINALYSIS - Abnormal; Notable for the following components:    Bacteria, UA MANY (*)     Mucus, UA 2+ (*)     All other components within normal limits   VENOUS BLOOD GAS, POINT OF CARE - Abnormal; Notable for the following components:    pH, Niraj 7.458 (*)     pCO2, Niraj 34.5 (*)     All other components within normal limits   LACTIC ACID,POINT OF CARE - Abnormal; Notable for the following components:    POC Lactic Acid 2.90 (*)     All other components within normal limits   POCT GLUCOSE - Abnormal; Notable for the following components:    POC Glucose 121 (*)     All other components within normal limits   CULTURE BLOOD #1   URINE CULTURE   CULTURE BLOOD #2   POTASSIUM (POC)   CHLORIDE (POC)   CALCIUM, IONIC (POC)   LACTATE, SEPSIS   CREATININE W/GFR POINT OF CARE   ANION GAP (CALC) POC     RADIOLOGY:  Xr Chest Portable    Result Date: 1/22/2020  EXAMINATION: ONE XRAY VIEW OF THE CHEST 1/22/2020 6:59 am COMPARISON: July 28, 2019 HISTORY: ORDERING SYSTEM PROVIDED HISTORY: AMS TECHNOLOGIST PROVIDED HISTORY: AMS Reason for Exam: upright portable, AMS FINDINGS: Elevation of the right hemidiaphragm. Low lung volumes. No focal consolidation. No definite cardiomegaly or pulmonary edema when allowing for low lung volumes. Low lung volumes without acute findings. EKG  EKG Interpretation    Interpreted by emergency department physician    Rhythm: normal sinus   Rate: tachycardia  Axis: normal  Ectopy: none  Conduction: normal  ST Segments: nonspecific changes  T Waves: non specific changes  Q Waves: nonspecific    Clinical Impression: sinus tachycardia, nonspecific EKG    Joel Kong    All EKG's are interpreted by the Emergency Department Physician who either signs or Co-signs this chart in the absence of a cardiologist.    ED BEDSIDE ULTRASOUND:   Not indicated    [address] [place] [address] / [facility]   40-year-old female nonverbal at baseline brought in by EMS from nursing facility for altered mental status, was noted by nursing home staff to not be following commands. Patient is febrile, hypotensive, tachycardic, concerns for severe sepsis.   On evaluation mucous membranes are dry, she is minimally responsive, tachycardic, bilateral breath sounds are present, abdomen is soft and nontender. Will obtain sepsis work-up, treat with 30 cc/kg bolus, treat with broad-spectrum antibiotics. Patient has a hx of ESBL, will treat w/ vanc and meropenem. Will give rectal tylenol. Plan for admission. Patient care signed out to Dr. Mylene Zaidi. Work up pending. Sepsis Times and Checklist  Vital Signs: BP: 93/61  Pulse: 105  Resp: 15  Temp: 99.9 °F (37.7 °C) SpO2: 97 %  SIRS (>2)   Temp > 38.3C or < 36C   HR > 90   RR > 20   WBC > 12 or < 4 or >10% bands  SIRS (>2) and confirmed or suspected source of infection = Sepsis  Is Sepsis due to likely bacterial infection?: Yes    Severe Sepsis Identified:  Date: 1/22   Time: 6:30AM  Sepsis Orders:  ·  CBC: Yes  ·  CMP: Yes  ·  PT/PTT: Yes  ·  Blood Cultures x2: Yes  ·  Urinalysis and Urine Culture: Yes  ·  Lactate: Yes  ·  Broad Spectrum Antibiotics Given (within 3 hours of sepsis identification,  after blood cultures):  Yes    (If unable to obtain IV access for IV antibiotics within 3 hours of  identification of sepsis, IM antibiotics is acceptable.)  ·             If lactate >2.0 MUST repeat within 6 hours    If elevated, is elevated lactate from a likely infectious source?: Yes. IV Fluid Bolus:  Is lactate > 4.0:  No  If lactate >  4.0 OR hypotension (MAP<65 mmHg) (with 2 BP readings) 30ml/kg crystalloid MUST be ordered. Fluids must be initiated within 3 hours of sepsis identification. These fluids must have a rate > 125 ml/hr. · IV Fluids given prior to arrival can be used in this calculation but the following information must be documented:  Start time: 6:30am, Type of fluid NS, Volume of fluid 2571mL, and Rate (Duration or End time) over 30 minutes    Septic Shock Identified (Initial lactate > 4.0 or 2 Hypotensive Blood Pressure readings within the first 6 hours): For septic shock sepsis focus physical exam must be completed AND documented (within 6 hours).    No hypotensive blood pressure readings, lactate <4    PROCEDURES:  Procedures    CONSULTS:  IP CONSULT TO CRITICAL CARE    CRITICAL CARE:  See attending note    IMPRESSION      1. Altered mental status, unspecified altered mental status type    2. Sepsis, due to unspecified organism, unspecified whether acute organ dysfunction present ([facility] [address]        DISPOSITION / PLAN     DISPOSITION      If the patient was admitted, some of the above orders, medications, labs, and consults may have been placed by the admitting team(s) and were auto populated above when I refreshed my note prior to signing it. If there is any question, please check for the responsible provider for individual orders in the EMR system. If discharged, the patient was instructed to return to the emergency department with any worsening symptoms, if new symptoms arise, or if they have any other concerns. Patient was instructed not to drive home if discharged today and received pain medications or other mind-altering medications while here.     PATIENT REFERRED TO:  Nimo Santos MD  [address] [place] [id number] [phone]            DISCHARGE MEDICATIONS:  New Prescriptions    No medications on file       Jesus Brock DO  Emergency Medicine Resident    (Please note that portions of this note were completed with a voice recognition program.  Efforts were made to edit the dictations but occasionally words are mis-transcribed.)      Jesus Brock DO  01/22/20 3536

## 2020-01-22 NOTE — FLOWSHEET NOTE
was rounding in ED. Pt was open to spiritual care but sleepy at time of visit.  will follow up at later time.

## 2020-01-22 NOTE — H&P
Critical Care - History and Physical Examination    Patient's name:  Mc Christianson  Medical Record Number: [id number]  Patient's account/billing number: [de-identified]  Patient's YOB: 1980  Age: 44 y.o. Date of Admission: 1/22/2020  6:24 AM  Date of History and Physical Examination: 1/22/2020      Primary Care Physician: Raul Davis MD  Attending Physician: Dr Danae Raygoza Status: Full Code    Chief complaint:   Chief Complaint   Patient presents with    Altered Mental Status         HISTORY OF PRESENT ILLNESS:      History was obtained from chart review and unobtainable from patient due to non verbal.      Mc Christianson is a 44 y.o. with PMH of   - Multiple sclerosis with aphasia  - Quadriplegia  -Right AKA  - Neurogenic bladder with chronic indwelling omer  - history of recurrent UTIs with MDRO and ESBL organisms    Presented to ER form a long term care facility where she was found to be altered. Patient is aphasic/non verbal at baseline. She was not responding to commands. Also noted to be febrile. Per chart, EMS called and found patient to be febrile (103 F), tachycardic in 140s and hypotensive in 80s. In the ER patient received 30 ml/kg bolus of crystalloid. She also received 1 dose of vancomycin and meropenem IV. Labs showed elevated WBC 12.8, lactic acid 2.9, mildly elevated transaminases. UA from indwelling Omer showing evidence of UTI with large leukocyte esterase and positive nitrites.       PAST MEDICAL HISTORY:         Diagnosis Date    Aphasia     Aphasia     Contracture of joint, lower leg     Depressed     HTN (hypertension)     Hx MRSA infection resolved 1/2017    2 negative nasal screens 1/2017 (hx in heel in 2013)    Hydronephrosis     Movement disorder     tremor    Multiple sclerosis ([address]     Neurogenic bladder     Non-verbal learning disorder     Peripheral vascular disease ([address]     Polyneuropathy     Pressure ulcer     Coccyx, Heel    Pulmonary embolism (HCC)     [facility]     Quadriplegia [facility]     Tachycardia     UTI (lower urinary tract infection)          PAST SURGICAL HISTORY:         Procedure Laterality Date    AMPUTATION Right     COLOSTOMY      GASTROSTOMY TUBE PLACEMENT      LEG AMPUTATION THROUGH FEMUR         ALLERGIES:      No Known Allergies      HOME MEDS: :      Prior to Admission medications    Medication Sig Start Date End Date Taking? Authorizing Provider   acetaminophen-codeine (TYLENOL/CODEINE #3) 300-30 MG per tablet Take 1 tablet by mouth every 8 hours as needed for Pain.     Historical Provider, MD   D-Mannose 500 MG CAPS Take 3 capsules by mouth three times daily    Historical Provider, MD   atropine 1 % ophthalmic solution 2-4 drops every 4 hours as needed    Historical Provider, MD   metoclopramide (REGLAN) 5 MG/5ML solution Take 5 mLs by mouth 3 times daily 8/2/19   Caren LUIS Blood, DO   rivaroxaban (XARELTO) 20 MG TABS tablet Take 1 tablet by mouth daily 5/24/19   Ana Paula Estrada MD   sertraline (ZOLOFT) 25 MG tablet 1 tablet by Per G Tube route daily 5/24/19   Ana Paula Estrada MD   docusate (COLACE) 50 MG/5ML liquid Take 10 mLs by mouth daily 5/24/19   Ana Paula Estrada MD   Multiple Vitamins-Minerals (CENTRUM/CERTA-DERICK WITH MINERALS ORAL) solution 15 mLs by Per G Tube route daily 5/24/19   Ana Paula Estrada MD   midodrine (PROAMATINE) 10 MG tablet Take 1 tablet by mouth 3 times daily (with meals) 5/24/19   Ana Paula Estrada MD   lansoprazole (PREVACID SOLUTAB) 30 MG disintegrating tablet 1 tablet by Per G Tube route every morning (before breakfast) 5/24/19   Ana Paula Estrada MD   Cranberry 250 MG CAPS Take 250 mg by mouth 2 times daily    Historical Provider, MD   ranitidine (ZANTAC) 75 MG/5ML syrup 75 mg by Per G Tube route 2 times daily    Historical Provider, MD   Scopolamine Base (SCOPOLAMINE TD) Place 1 mg onto the skin every 72 hours     Historical Provider, MD   baclofen (LIORESAL) 10 MG tablet 10 mg by Per G Tube route 3 times daily Crush into G tube     Historical Provider, MD   gabapentin (NEURONTIN) 300 MG capsule 300 mg by Per G Tube route 3 times daily    Historical Provider, MD   medroxyPROGESTERone (DEPO-PROVERA) 150 MG/ML injection Inject 150 mg into the muscle every 3 months On the      Historical Provider, MD   acetaminophen (TYLENOL) 80 MG/0.8ML suspension 650 mg by Per G Tube route every 6 hours as needed for Fever or Pain     Historical Provider, MD   glycopyrrolate (ROBINUL) 1 MG tablet Take 1 mg by mouth 3 times daily     Historical Provider, MD       SOCIAL HISTORY:       TOBACCO:   reports that she has never smoked. She has never used smokeless tobacco.  ETOH:   reports no history of alcohol use. DRUGS:  reports no history of drug use.   OCCUPATION:      FAMILY HISTORY:          Problem Relation Age of Onset    No Known Problems Mother     No Known Problems Father        REVIEW OF SYSTEMS (ROS):     Unable to complete due to patient being nonverbal. She does deny SOB, chest pain, abdominal pain (shakes or nods head)    OBJECTIVE:     VITAL SIGNS:  /66   Pulse 100   Temp 97.6 °F (36.4 °C) (Axillary)   Resp 17   Ht 5' (1.524 m)   Wt 189 lb (85.7 kg)   SpO2 99%   BMI 36.91 kg/m²   Tmax over 24 hours:  Temp (24hrs), Av.1 °F (37.3 °C), Min:97.2 °F (36.2 °C), Max:102 °F (38.9 °C)      Patient Vitals for the past 8 hrs:   BP Temp Temp src Pulse Resp SpO2 Height Weight   20 1300 110/66 -- -- 100 17 99 % -- --   20 1200 107/70 97.6 °F (36.4 °C) Axillary 104 17 99 % -- --   20 1145 111/65 -- -- 101 17 98 % -- --   20 1130 108/68 -- -- 102 17 99 % -- --   20 1115 112/72 -- -- 105 25 99 % -- --   20 1100 106/64 97.2 °F (36.2 °C) Axillary 102 30 97 % -- --   20 1045 -- -- -- 104 20 98 % -- --   20 1029 104/60 99 °F (37.2 °C) Axillary 105 20 100 % -- --   20 1028 -- -- -- 105 20 100 % -- --   20 1021 104/60 -- -- -- -- -- -- Meds:   sodium chloride flush, 10 mL, PRN  magnesium hydroxide, 30 mL, Daily PRN  ondansetron, 4 mg, Q6H PRN          ABGs:   Lab Results   Component Value Date    OLZ8OXO 10 01/08/2017    FIO2 NOT REPORTED 01/22/2020       DATA:  Complete Blood Count:   Recent Labs     01/22/20  0656   WBC 12.8*   RBC 5.06   HGB 13.3   HCT 42.5   MCV 84.0   MCH 26.3   MCHC 31.3   RDW 15.2*      MPV 12.1        Last 3 Blood Glucose:   Recent Labs     01/22/20  0656   GLUCOSE 115*        PT/INR:    Lab Results   Component Value Date    PROTIME 11.7 07/28/2019    INR 1.1 07/28/2019     PTT:    Lab Results   Component Value Date    APTT 36.3 01/22/2020       Comprehensive Metabolic Profile:   Recent Labs     01/22/20  0647 01/22/20  0656   NA  --  135   K  --  4.3   CL  --  99   CO2  --  22   BUN  --  14   CREATININE 0.60 0.37*   GLUCOSE  --  115*   CALCIUM  --  9.6   PROT  --  8.9*   LABALBU  --  3.3*   BILITOT  --  0.43   ALKPHOS  --  82   AST  --  54*   ALT  --  39*      Magnesium:   Lab Results   Component Value Date    MG 1.8 07/31/2019    MG 1.7 07/29/2019    MG 2.4 07/28/2019     Phosphorus:   Lab Results   Component Value Date    PHOS 2.7 07/29/2019    PHOS 2.9 07/28/2019    PHOS 3.4 09/05/2017     Ionized Calcium:   Lab Results   Component Value Date    CAION 4.66 04/08/2013    CAION 4.66 04/07/2013    CAION 4.76 04/06/2013        Urinalysis:   Lab Results   Component Value Date    NITRU NEGATIVE 01/22/2020    COLORU YELLOW 01/22/2020    PHUR 8.0 01/22/2020    WBCUA 10 TO 20 01/22/2020    RBCUA 2 TO 5 01/22/2020    MUCUS NOT REPORTED 01/22/2020    TRICHOMONAS NOT REPORTED 01/22/2020    YEAST NOT REPORTED 01/22/2020    BACTERIA FEW 01/22/2020    SPECGRAV 1.006 01/22/2020    LEUKOCYTESUR LARGE 01/22/2020    UROBILINOGEN Normal 01/22/2020    BILIRUBINUR NEGATIVE 01/22/2020    BILIRUBINUR NEGATIVE 03/11/2012    GLUCOSEU NEGATIVE 01/22/2020    GLUCOSEU NEGATIVE 03/11/2012    KETUA NEGATIVE 01/22/2020    AMORPHOUS NOT REPORTED 01/22/2020       HgBA1c:  No results found for: LABA1C  TSH:    Lab Results   Component Value Date    TSH 0.84 05/19/2019       Lactic Acid:   Lab Results   Component Value Date    LACTA 2.0 01/08/2017      Troponin: No results for input(s): TROPONINI in the last 72 hours. Electrocardiogram:         Radiological imaging  Xr Chest Portable    Result Date: 1/22/2020    Low lung volumes without acute findings. ASSESSMENT:     Active Problems:    Multiple sclerosis (HCC)    Nonverbal    Neurogenic bladder    Urinary tract infection associated with indwelling urethral catheter (HCC)    Sepsis (HCC)    Lactate blood increase  Resolved Problems:    * No resolved hospital problems. *      PLAN:     ICU PROPHYLAXIS:  Stress ulcer:  [] PPI Agent  [] J2Edvtd [] Sucralfate  [] Other:  VTE:   [x] Enoxaparin  [] Unfract. Heparin Subcut  [] EPC Cuffs    NUTRITION:  [] NPO  [x] Tube Feeding (Specify: ) [] TPN  [] PO    CONSULTATION NEEDED:  [x] No   [] Yes     HOME MEDICATIONS RECONCILED: [x] No  [] Yes    FAMILY UPDATED:    [x] No   [] Yes     ADDITIONAL PLAN:  -change folely and repeat UA with microscopy and culture.  -Continue IV meropenem Q 8 hr. continue vancomycin IV. -Complete 30 ml/kg of crystalloid and continue at rate of 125 ml/hr. Monitor I's and Os. Daily weights. - Follow cultures, repeat lactate. - start tube feeds via G-tube. -PT/OT  - Sc lovenox for dvt prophylaxis  -Wound ostomy consult for chronic sacral ulcer.  -If blood pressure remains acceptable, will transfer to stepdown unit. Orvel Meigs, MD  ICU resident   [facility]  1/22/2020 2:04 PM    The critical care team assigned to the patient will be following up the patient in the intensive care unit. I have discussed the current plan with the critical care attending. The above mentioned assessment and plan will be reviewed again in detail by the critical care attending at bedside, and can be further changed or modified accordingly by the attending physician.

## 2020-01-22 NOTE — PROGRESS NOTES
[facility] Wound Ostomy Continence Nurse  Consult Note       NAME:  Marv Ayala  MEDICAL RECORD NUMBER:  [id number]  AGE: 44 y.o. GENDER: female  : 1980  TODAY'S DATE:  2020    Subjective:     Reason for WOCN Evaluation and Assessment: chronic ST 4 coccyx pressure injury, ostomy care.       Marv Ayala is a 44 y.o. female referred by:   [x] Physician  [] Nursing  [] Other:     Wound Identification:  Wound Type: pressure  Contributing Factors: chronic pressure, decreased mobility, shear force, obesity and decreased tissue oxygenation        PAST MEDICAL HISTORY        Diagnosis Date    Aphasia     Aphasia     Contracture of joint, lower leg     Depressed     HTN (hypertension)     Hx MRSA infection resolved 2017    2 negative nasal screens 2017 (hx in heel in )    Hydronephrosis     Movement disorder     tremor    Multiple sclerosis (HCC)     Neurogenic bladder     Non-verbal learning disorder     Peripheral vascular disease (HCC)     Polyneuropathy     Pressure ulcer     Coccyx, Heel    Pulmonary embolism (HCC)     Pulmonary infarction (HCC)     Quadriplegia (HCC)     Tachycardia     UTI (lower urinary tract infection)        PAST SURGICAL HISTORY    Past Surgical History:   Procedure Laterality Date    AMPUTATION Right     COLOSTOMY      GASTROSTOMY TUBE PLACEMENT      LEG AMPUTATION THROUGH FEMUR         FAMILY HISTORY    Family History   Problem Relation Age of Onset    No Known Problems Mother     No Known Problems Father        SOCIAL HISTORY    Social History     Tobacco Use    Smoking status: Never Smoker    Smokeless tobacco: Never Used   Substance Use Topics    Alcohol use: No    Drug use: No       ALLERGIES    No Known Allergies        Objective:      BP (!) 135/98   Pulse 98   Temp 97.6 °F (36.4 °C) (Axillary)   Resp 20   Ht 5' (1.524 m)   Wt 189 lb (85.7 kg)   SpO2 99%   BMI 36.91 kg/m²   Lonnie Risk Score: Lonnie Scale Score:

## 2020-01-22 NOTE — ED NOTES
PT presents to ED via [facility] w/ altered mental status. Pt is nonverbal at baseline, is now not following commands. Pt lives in nursing home, is treated for MS. Per report from [facility], pt has pressure wound and hx of UTIs. Pt is febrile, tachycardic, was hypotensive en route. Pt has omer cath, PEG tube, and colostomy. Upon arrival, EKG, pt placed on monitor, IV and labs.       Paolo Rodriguez RN  01/22/20 1465

## 2020-01-22 NOTE — PROGRESS NOTES
(2a-6p). 2 pkts of arginaid and 2 oz prostat daily. 250 mL H2O every 4 hrs, 250 mL at start and stop of TF. Total=1330 kcal and 69 g pro/day  · Anthropometric Measures:  · Ht: 5' (152.4 cm)   · Current Body Wt: 188 lb 15 oz (85.7 kg)  · Usual Body Wt: 180 lb on 7/28/19 per EHR  · Ideal Body Wt: 84 lb (38.1 kg), % Ideal Body 220%  · Ideal body weight adjusted for Unilateral, AKA, Quadriplegia  · BMI Classification: BMI 35.0 - 39.9 Obese Class II    Nutrition Interventions:   Start Tube Feeding-suggest TwoCal HN (in-house equivalent to home product) at 25 mL/hr x 24 hrs,1 pkt of Mark BID,  250 mL every 4 hrs.   Continued Inpatient Monitoring, Education Not Indicated    Nutrition Evaluation:   · Evaluation: Goals set   · Goals: meet greater than 75% of estimated nutrition needs with enteral nutrition    · Monitoring: TF Intake, TF Tolerance, Weight, Pertinent Labs, Wound Healing, Skin Integrity, I&O      Electronically signed by Naheed Gee RD, LD on 1/22/20 at 11:38 AM    Contact Number: [phone]

## 2020-01-23 PROBLEM — B96.20 E-COLI UTI: Status: ACTIVE | Noted: 2020-01-23

## 2020-01-23 PROBLEM — N39.0 E-COLI UTI: Status: ACTIVE | Noted: 2020-01-23

## 2020-01-23 PROBLEM — A41.51 SEPSIS DUE TO ESCHERICHIA COLI (HCC): Status: ACTIVE | Noted: 2020-01-23

## 2020-01-23 LAB
ABSOLUTE EOS #: <0.03 K/UL (ref 0–0.44)
ABSOLUTE IMMATURE GRANULOCYTE: 0.03 K/UL (ref 0–0.3)
ABSOLUTE LYMPH #: 0.94 K/UL (ref 1.1–3.7)
ABSOLUTE MONO #: 1.08 K/UL (ref 0.1–1.2)
ANION GAP SERPL CALCULATED.3IONS-SCNC: 12 MMOL/L (ref 9–17)
BASOPHILS # BLD: 0 % (ref 0–2)
BASOPHILS ABSOLUTE: <0.03 K/UL (ref 0–0.2)
BUN BLDV-MCNC: 5 MG/DL (ref 6–20)
BUN/CREAT BLD: ABNORMAL (ref 9–20)
CALCIUM SERPL-MCNC: 9.1 MG/DL (ref 8.6–10.4)
CHLORIDE BLD-SCNC: 105 MMOL/L (ref 98–107)
CO2: 20 MMOL/L (ref 20–31)
CREAT SERPL-MCNC: 0.3 MG/DL (ref 0.5–0.9)
DIFFERENTIAL TYPE: ABNORMAL
EKG ATRIAL RATE: 139 BPM
EKG P AXIS: 45 DEGREES
EKG P-R INTERVAL: 120 MS
EKG Q-T INTERVAL: 280 MS
EKG QRS DURATION: 60 MS
EKG QTC CALCULATION (BAZETT): 426 MS
EKG R AXIS: -15 DEGREES
EKG T AXIS: -35 DEGREES
EKG VENTRICULAR RATE: 139 BPM
EOSINOPHILS RELATIVE PERCENT: 0 % (ref 1–4)
GFR AFRICAN AMERICAN: >60 ML/MIN
GFR NON-AFRICAN AMERICAN: >60 ML/MIN
GFR SERPL CREATININE-BSD FRML MDRD: ABNORMAL ML/MIN/{1.73_M2}
GFR SERPL CREATININE-BSD FRML MDRD: ABNORMAL ML/MIN/{1.73_M2}
GLUCOSE BLD-MCNC: 79 MG/DL (ref 70–99)
GLUCOSE BLD-MCNC: 94 MG/DL (ref 65–105)
HCT VFR BLD CALC: 38.5 % (ref 36.3–47.1)
HEMOGLOBIN: 11.9 G/DL (ref 11.9–15.1)
IMMATURE GRANULOCYTES: 0 %
LACTIC ACID, WHOLE BLOOD: 1 MMOL/L (ref 0.7–2.1)
LYMPHOCYTES # BLD: 12 % (ref 24–43)
MAGNESIUM: 1.6 MG/DL (ref 1.6–2.6)
MCH RBC QN AUTO: 26 PG (ref 25.2–33.5)
MCHC RBC AUTO-ENTMCNC: 30.9 G/DL (ref 28.4–34.8)
MCV RBC AUTO: 84.2 FL (ref 82.6–102.9)
MONOCYTES # BLD: 14 % (ref 3–12)
NRBC AUTOMATED: 0 PER 100 WBC
PDW BLD-RTO: 15 % (ref 11.8–14.4)
PLATELET # BLD: 227 K/UL (ref 138–453)
PLATELET ESTIMATE: ABNORMAL
PMV BLD AUTO: 12 FL (ref 8.1–13.5)
POTASSIUM SERPL-SCNC: 3.2 MMOL/L (ref 3.7–5.3)
RBC # BLD: 4.57 M/UL (ref 3.95–5.11)
RBC # BLD: ABNORMAL 10*6/UL
SEG NEUTROPHILS: 73 % (ref 36–65)
SEGMENTED NEUTROPHILS ABSOLUTE COUNT: 5.62 K/UL (ref 1.5–8.1)
SODIUM BLD-SCNC: 137 MMOL/L (ref 135–144)
WBC # BLD: 7.7 K/UL (ref 3.5–11.3)
WBC # BLD: ABNORMAL 10*3/UL

## 2020-01-23 PROCEDURE — 36415 COLL VENOUS BLD VENIPUNCTURE: CPT

## 2020-01-23 PROCEDURE — 93010 ELECTROCARDIOGRAM REPORT: CPT | Performed by: INTERNAL MEDICINE

## 2020-01-23 PROCEDURE — 99291 CRITICAL CARE FIRST HOUR: CPT | Performed by: INTERNAL MEDICINE

## 2020-01-23 PROCEDURE — 83605 ASSAY OF LACTIC ACID: CPT

## 2020-01-23 PROCEDURE — 2580000003 HC RX 258: Performed by: STUDENT IN AN ORGANIZED HEALTH CARE EDUCATION/TRAINING PROGRAM

## 2020-01-23 PROCEDURE — 6360000002 HC RX W HCPCS: Performed by: STUDENT IN AN ORGANIZED HEALTH CARE EDUCATION/TRAINING PROGRAM

## 2020-01-23 PROCEDURE — 85025 COMPLETE CBC W/AUTO DIFF WBC: CPT

## 2020-01-23 PROCEDURE — 80048 BASIC METABOLIC PNL TOTAL CA: CPT

## 2020-01-23 PROCEDURE — 6370000000 HC RX 637 (ALT 250 FOR IP): Performed by: STUDENT IN AN ORGANIZED HEALTH CARE EDUCATION/TRAINING PROGRAM

## 2020-01-23 PROCEDURE — 82947 ASSAY GLUCOSE BLOOD QUANT: CPT

## 2020-01-23 PROCEDURE — 2000000000 HC ICU R&B

## 2020-01-23 PROCEDURE — 2500000003 HC RX 250 WO HCPCS: Performed by: STUDENT IN AN ORGANIZED HEALTH CARE EDUCATION/TRAINING PROGRAM

## 2020-01-23 PROCEDURE — 83735 ASSAY OF MAGNESIUM: CPT

## 2020-01-23 RX ORDER — LANSOPRAZOLE
30 KIT
Status: DISCONTINUED | OUTPATIENT
Start: 2020-01-24 | End: 2020-01-25 | Stop reason: HOSPADM

## 2020-01-23 RX ORDER — SCOLOPAMINE TRANSDERMAL SYSTEM 1 MG/1
1 PATCH, EXTENDED RELEASE TRANSDERMAL
Status: DISCONTINUED | OUTPATIENT
Start: 2020-01-23 | End: 2020-01-25 | Stop reason: HOSPADM

## 2020-01-23 RX ORDER — MULTIVIT-MIN/FERROUS GLUCONATE 9 MG/15 ML
15 LIQUID (ML) ORAL DAILY
Status: DISCONTINUED | OUTPATIENT
Start: 2020-01-23 | End: 2020-01-25 | Stop reason: HOSPADM

## 2020-01-23 RX ORDER — GLYCOPYRROLATE 1 MG/1
1 TABLET ORAL 3 TIMES DAILY
Status: DISCONTINUED | OUTPATIENT
Start: 2020-01-23 | End: 2020-01-25 | Stop reason: HOSPADM

## 2020-01-23 RX ORDER — BACLOFEN 10 MG/1
10 TABLET ORAL 3 TIMES DAILY
Status: DISCONTINUED | OUTPATIENT
Start: 2020-01-23 | End: 2020-01-25 | Stop reason: HOSPADM

## 2020-01-23 RX ORDER — METOCLOPRAMIDE HYDROCHLORIDE 5 MG/5ML
5 SOLUTION ORAL 3 TIMES DAILY
Status: DISCONTINUED | OUTPATIENT
Start: 2020-01-23 | End: 2020-01-25 | Stop reason: HOSPADM

## 2020-01-23 RX ORDER — POTASSIUM CHLORIDE 20MEQ/15ML
40 LIQUID (ML) ORAL ONCE
Status: COMPLETED | OUTPATIENT
Start: 2020-01-23 | End: 2020-01-23

## 2020-01-23 RX ORDER — GABAPENTIN 300 MG/1
300 CAPSULE ORAL 3 TIMES DAILY
Status: DISCONTINUED | OUTPATIENT
Start: 2020-01-23 | End: 2020-01-25 | Stop reason: HOSPADM

## 2020-01-23 RX ORDER — SERTRALINE HYDROCHLORIDE 25 MG/1
25 TABLET, FILM COATED ORAL DAILY
Status: DISCONTINUED | OUTPATIENT
Start: 2020-01-23 | End: 2020-01-25 | Stop reason: HOSPADM

## 2020-01-23 RX ORDER — ATROPINE SULFATE 10 MG/ML
2 SOLUTION/ DROPS OPHTHALMIC EVERY 4 HOURS PRN
Status: DISCONTINUED | OUTPATIENT
Start: 2020-01-23 | End: 2020-01-25 | Stop reason: HOSPADM

## 2020-01-23 RX ADMIN — MEROPENEM 1 G: 1 INJECTION, POWDER, FOR SOLUTION INTRAVENOUS at 16:10

## 2020-01-23 RX ADMIN — SODIUM CHLORIDE: 9 INJECTION, SOLUTION INTRAVENOUS at 07:43

## 2020-01-23 RX ADMIN — Medication 15 ML: at 19:30

## 2020-01-23 RX ADMIN — BACLOFEN 10 MG: 10 TABLET ORAL at 20:53

## 2020-01-23 RX ADMIN — SERTRALINE 25 MG: 25 TABLET, FILM COATED ORAL at 19:34

## 2020-01-23 RX ADMIN — SODIUM CHLORIDE, PRESERVATIVE FREE 10 ML: 5 INJECTION INTRAVENOUS at 09:07

## 2020-01-23 RX ADMIN — GABAPENTIN 300 MG: 300 CAPSULE ORAL at 20:53

## 2020-01-23 RX ADMIN — ENOXAPARIN SODIUM 40 MG: 40 INJECTION SUBCUTANEOUS at 10:07

## 2020-01-23 RX ADMIN — MEROPENEM 1 G: 1 INJECTION, POWDER, FOR SOLUTION INTRAVENOUS at 07:42

## 2020-01-23 RX ADMIN — SODIUM CHLORIDE: 9 INJECTION, SOLUTION INTRAVENOUS at 10:05

## 2020-01-23 RX ADMIN — ANTI-FUNGAL POWDER MICONAZOLE NITRATE TALC FREE: 1.42 POWDER TOPICAL at 20:53

## 2020-01-23 RX ADMIN — MEROPENEM 1 G: 1 INJECTION, POWDER, FOR SOLUTION INTRAVENOUS at 00:18

## 2020-01-23 RX ADMIN — RIVAROXABAN 20 MG: 20 TABLET, FILM COATED ORAL at 19:32

## 2020-01-23 RX ADMIN — GLYCOPYRROLATE 1 MG: 1 TABLET ORAL at 20:53

## 2020-01-23 RX ADMIN — POTASSIUM CHLORIDE 40 MEQ: 40 SOLUTION ORAL at 11:33

## 2020-01-23 NOTE — PROGRESS NOTES
Smoking Cessation - topics covered   []  Health Risks  []  Benefits of Quitting   []  Smoking Cessation  [x]  Patient has no history of tobacco use per note in significant history. []  Patient is former smoker per note in significant history. Patient quit in   [x]  No need for tobacco cessation education. []  Booklet given  []  Patient verbalizes understanding. []  Patient denies need for tobacco cessation education. []  Unable to meet with patient today. Will follow up as able.   Marbin Avendaño [address]  7:58 AM

## 2020-01-23 NOTE — PROGRESS NOTES
Patient is growing gram-negative rods in 2 x 2 blood cultures. Probably E. coli detected by PCR. Will discontinue vancomycin. \    Anselmo Watts MD,  Internal Medicine Resident, PGY-2,   [address]  1/22/2020,10:59 PM

## 2020-01-23 NOTE — PLAN OF CARE
Problem: Falls - Risk of:  Goal: Will remain free from falls  Description  Will remain free from falls  1/23/2020 0142 by Fide Gray RN  Outcome: Ongoing  1/22/2020 1756 by Jose Armando Pal RN  Outcome: Ongoing  Goal: Absence of physical injury  Description  Absence of physical injury  1/23/2020 0142 by Fide Gray RN  Outcome: Ongoing  1/22/2020 1756 by Jose Armando Pal RN  Outcome: Ongoing     Problem: Risk for Impaired Skin Integrity  Goal: Tissue integrity - skin and mucous membranes  Description  Structural intactness and normal physiological function of skin and  mucous membranes.   1/23/2020 0142 by Fide Gray RN  Outcome: Ongoing  1/22/2020 1756 by Jose Armando Pal RN  Outcome: Ongoing     Problem: Fluid Volume - Imbalance:  Goal: Absence of imbalanced fluid volume signs and symptoms  Description  Absence of imbalanced fluid volume signs and symptoms  1/23/2020 0142 by Fide Gray RN  Outcome: Ongoing  1/22/2020 1756 by Jose Armando Pal RN  Outcome: Ongoing     Problem: Mental Status - Impaired:  Goal: Mental status will be restored to baseline  Description  Mental status will be restored to baseline  1/23/2020 0142 by Fide Gray RN  Outcome: Ongoing  1/22/2020 1756 by Jose Armando Pal RN  Outcome: Ongoing     Problem: Nutrition Deficit:  Goal: Ability to achieve adequate nutritional intake will improve  Description  Ability to achieve adequate nutritional intake will improve  1/23/2020 0142 by Fide Gray RN  Outcome: Ongoing  1/22/2020 1756 by Jose Armando Pal RN  Outcome: Ongoing     Problem: Pain:  Goal: Pain level will decrease  Description  Pain level will decrease  1/23/2020 0142 by Fide Gray RN  Outcome: Ongoing  1/22/2020 1756 by Jose Armando Pal RN  Outcome: Ongoing  Goal: Recognizes and communicates pain  Description  Recognizes and communicates pain  1/23/2020 0142 by Fide Gray RN  Outcome: Ongoing  1/22/2020 1756 by Jose Armando Pal RN  Outcome:

## 2020-01-23 NOTE — PROGRESS NOTES
Physical Therapy  DATE: 2020    NAME: Julio Orozco  MRN: [id number]   : 1980    Patient not seen this date for Physical Therapy due to:  [] Blood transfusion in progress  [] Hemodialysis  []  Patient Declined  [] Spine Precautions   [] Strict Bedrest  [] Surgery/ Procedure  [] Testing      [] Other        [x] PT being discontinued at this time. Pt quadraplegic with MS, contractures to B UE and LE, non-verbal at baseline. Pt dependent for all care and resides in nursing facility, and will return upon discharge. Defer PT evaluation at this time. [] PT being discontinued at this time as the patient has been transferred to palliative care. No further needs.     Jj Lazcano, PT

## 2020-01-23 NOTE — PLAN OF CARE
Ongoing  Goal: Control of acute pain  Description  Control of acute pain  1/23/2020 1350 by Grecia Moreno RN  Outcome: Ongoing  1/23/2020 0142 by Brook Land RN  Outcome: Ongoing  Goal: Control of chronic pain  Description  Control of chronic pain  1/23/2020 1350 by Grecia Moreno RN  Outcome: Ongoing  1/23/2020 0142 by Brook Land RN  Outcome: Ongoing     Problem: Skin Integrity - Impaired:  Goal: Will show no infection signs and symptoms  Description  Will show no infection signs and symptoms  1/23/2020 1350 by Grecia Moreno RN  Outcome: Ongoing  1/23/2020 0142 by Brook Land RN  Outcome: Ongoing  Goal: Absence of new skin breakdown  Description  Absence of new skin breakdown  1/23/2020 1350 by Grecia Moreno RN  Outcome: Ongoing  1/23/2020 0142 by Brook Land RN  Outcome: Ongoing     Problem: COMMUNICATION IMPAIRMENT  Goal: Ability to express needs and understand communication  1/23/2020 1350 by Grecia Moreno RN  Outcome: Ongoing  1/23/2020 0142 by Brook Land RN  Outcome: Ongoing

## 2020-01-23 NOTE — CARE COORDINATION
Case Management Initial Discharge Plan  Linda Moreira,             Met with:patient's mother to discuss discharge plans. Information verified: address, contacts, phone number, , insurance Yes  PCP: Parisa Crawford MD  Date of last visit: seen at [facility]    Insurance Provider: Medicare/Pablo Khan    Discharge Planning    Living Arrangements:      Support Systems:       Home has is an ECF and patient is dependent for all ADLs/iADLa    Prior SNF/Rehab Placement and Facility: lives at [address] [place] [address] to SNF/Rehab: Yes  [place] of choice provided: n/a   Evaluation: no    Expected Discharge date:     Patient expects to be discharged to: Follow Up Appointment: Best Day/ Time:      Transportation provider: faciltiy  Transportation arrangements needed for discharge: Yes    Readmission Risk              Risk of Unplanned Readmission:        14             Does patient have a readmission risk score greater than 14?: No  If yes, follow-up appointment must be made within 7 days of discharge. Goals of Care: treat sepsis      Discharge Plan: return to [facility]  Confirmed that patient is a bed hold and can return when ready          Electronically signed by Tigre Bailey RN on 20 at 11:53 AM

## 2020-01-23 NOTE — FLOWSHEET NOTE
VS stable, good UO, no fever, Tube feeds started, free water q6 and Mark q12.  Large amt watrey stool

## 2020-01-24 LAB
ABSOLUTE EOS #: 0.15 K/UL (ref 0–0.44)
ABSOLUTE IMMATURE GRANULOCYTE: <0.03 K/UL (ref 0–0.3)
ABSOLUTE LYMPH #: 0.88 K/UL (ref 1.1–3.7)
ABSOLUTE MONO #: 1.16 K/UL (ref 0.1–1.2)
ANION GAP SERPL CALCULATED.3IONS-SCNC: 16 MMOL/L (ref 9–17)
BASOPHILS # BLD: 0 % (ref 0–2)
BASOPHILS ABSOLUTE: <0.03 K/UL (ref 0–0.2)
BUN BLDV-MCNC: 11 MG/DL (ref 6–20)
BUN/CREAT BLD: ABNORMAL (ref 9–20)
CALCIUM SERPL-MCNC: 9.4 MG/DL (ref 8.6–10.4)
CHLORIDE BLD-SCNC: 109 MMOL/L (ref 98–107)
CO2: 16 MMOL/L (ref 20–31)
CREAT SERPL-MCNC: 0.25 MG/DL (ref 0.5–0.9)
CULTURE: ABNORMAL
CULTURE: NORMAL
DIFFERENTIAL TYPE: ABNORMAL
EOSINOPHILS RELATIVE PERCENT: 3 % (ref 1–4)
GFR AFRICAN AMERICAN: >60 ML/MIN
GFR NON-AFRICAN AMERICAN: >60 ML/MIN
GFR SERPL CREATININE-BSD FRML MDRD: ABNORMAL ML/MIN/{1.73_M2}
GFR SERPL CREATININE-BSD FRML MDRD: ABNORMAL ML/MIN/{1.73_M2}
GLUCOSE BLD-MCNC: 85 MG/DL (ref 70–99)
HCT VFR BLD CALC: 40.8 % (ref 36.3–47.1)
HEMOGLOBIN: 12.3 G/DL (ref 11.9–15.1)
IMMATURE GRANULOCYTES: 0 %
LYMPHOCYTES # BLD: 15 % (ref 24–43)
Lab: ABNORMAL
Lab: ABNORMAL
Lab: NORMAL
MAGNESIUM: 1.8 MG/DL (ref 1.6–2.6)
MCH RBC QN AUTO: 26 PG (ref 25.2–33.5)
MCHC RBC AUTO-ENTMCNC: 30.1 G/DL (ref 28.4–34.8)
MCV RBC AUTO: 86.3 FL (ref 82.6–102.9)
MONOCYTES # BLD: 20 % (ref 3–12)
NRBC AUTOMATED: 0 PER 100 WBC
PDW BLD-RTO: 15 % (ref 11.8–14.4)
PLATELET # BLD: 240 K/UL (ref 138–453)
PLATELET ESTIMATE: ABNORMAL
PMV BLD AUTO: 12.5 FL (ref 8.1–13.5)
POTASSIUM SERPL-SCNC: 3.3 MMOL/L (ref 3.7–5.3)
RBC # BLD: 4.73 M/UL (ref 3.95–5.11)
RBC # BLD: ABNORMAL 10*6/UL
SEG NEUTROPHILS: 62 % (ref 36–65)
SEGMENTED NEUTROPHILS ABSOLUTE COUNT: 3.48 K/UL (ref 1.5–8.1)
SODIUM BLD-SCNC: 141 MMOL/L (ref 135–144)
SPECIMEN DESCRIPTION: ABNORMAL
SPECIMEN DESCRIPTION: ABNORMAL
SPECIMEN DESCRIPTION: NORMAL
WBC # BLD: 5.7 K/UL (ref 3.5–11.3)
WBC # BLD: ABNORMAL 10*3/UL

## 2020-01-24 PROCEDURE — 99291 CRITICAL CARE FIRST HOUR: CPT | Performed by: INTERNAL MEDICINE

## 2020-01-24 PROCEDURE — 6370000000 HC RX 637 (ALT 250 FOR IP): Performed by: EMERGENCY MEDICINE

## 2020-01-24 PROCEDURE — 80048 BASIC METABOLIC PNL TOTAL CA: CPT

## 2020-01-24 PROCEDURE — 94761 N-INVAS EAR/PLS OXIMETRY MLT: CPT

## 2020-01-24 PROCEDURE — 87040 BLOOD CULTURE FOR BACTERIA: CPT

## 2020-01-24 PROCEDURE — 83735 ASSAY OF MAGNESIUM: CPT

## 2020-01-24 PROCEDURE — 85025 COMPLETE CBC W/AUTO DIFF WBC: CPT

## 2020-01-24 PROCEDURE — 36415 COLL VENOUS BLD VENIPUNCTURE: CPT

## 2020-01-24 PROCEDURE — 2580000003 HC RX 258: Performed by: STUDENT IN AN ORGANIZED HEALTH CARE EDUCATION/TRAINING PROGRAM

## 2020-01-24 PROCEDURE — 1200000000 HC SEMI PRIVATE

## 2020-01-24 PROCEDURE — 6360000002 HC RX W HCPCS: Performed by: STUDENT IN AN ORGANIZED HEALTH CARE EDUCATION/TRAINING PROGRAM

## 2020-01-24 PROCEDURE — 6370000000 HC RX 637 (ALT 250 FOR IP): Performed by: STUDENT IN AN ORGANIZED HEALTH CARE EDUCATION/TRAINING PROGRAM

## 2020-01-24 PROCEDURE — 99222 1ST HOSP IP/OBS MODERATE 55: CPT | Performed by: INTERNAL MEDICINE

## 2020-01-24 RX ORDER — LIDOCAINE HYDROCHLORIDE 10 MG/ML
5 INJECTION, SOLUTION EPIDURAL; INFILTRATION; INTRACAUDAL; PERINEURAL ONCE
Status: DISCONTINUED | OUTPATIENT
Start: 2020-01-24 | End: 2020-01-24

## 2020-01-24 RX ORDER — SODIUM CHLORIDE 0.9 % (FLUSH) 0.9 %
10 SYRINGE (ML) INJECTION EVERY 12 HOURS SCHEDULED
Status: DISCONTINUED | OUTPATIENT
Start: 2020-01-24 | End: 2020-01-24

## 2020-01-24 RX ORDER — POTASSIUM CHLORIDE 20 MEQ/1
40 TABLET, EXTENDED RELEASE ORAL EVERY 4 HOURS
Status: DISCONTINUED | OUTPATIENT
Start: 2020-01-24 | End: 2020-01-24

## 2020-01-24 RX ORDER — POTASSIUM CHLORIDE 20 MEQ/1
40 TABLET, EXTENDED RELEASE ORAL PRN
Status: DISCONTINUED | OUTPATIENT
Start: 2020-01-24 | End: 2020-01-25 | Stop reason: HOSPADM

## 2020-01-24 RX ORDER — SODIUM CHLORIDE 0.9 % (FLUSH) 0.9 %
10 SYRINGE (ML) INJECTION PRN
Status: DISCONTINUED | OUTPATIENT
Start: 2020-01-24 | End: 2020-01-24

## 2020-01-24 RX ORDER — POTASSIUM CHLORIDE 7.45 MG/ML
10 INJECTION INTRAVENOUS PRN
Status: DISCONTINUED | OUTPATIENT
Start: 2020-01-24 | End: 2020-01-25 | Stop reason: HOSPADM

## 2020-01-24 RX ADMIN — MEROPENEM 1 G: 1 INJECTION, POWDER, FOR SOLUTION INTRAVENOUS at 16:13

## 2020-01-24 RX ADMIN — Medication 15 ML: at 08:46

## 2020-01-24 RX ADMIN — RIVAROXABAN 20 MG: 20 TABLET, FILM COATED ORAL at 08:45

## 2020-01-24 RX ADMIN — GLYCOPYRROLATE 1 MG: 1 TABLET ORAL at 15:10

## 2020-01-24 RX ADMIN — MEROPENEM 1 G: 1 INJECTION, POWDER, FOR SOLUTION INTRAVENOUS at 01:01

## 2020-01-24 RX ADMIN — SERTRALINE 25 MG: 25 TABLET, FILM COATED ORAL at 08:45

## 2020-01-24 RX ADMIN — BACLOFEN 10 MG: 10 TABLET ORAL at 08:45

## 2020-01-24 RX ADMIN — ANTI-FUNGAL POWDER MICONAZOLE NITRATE TALC FREE: 1.42 POWDER TOPICAL at 08:44

## 2020-01-24 RX ADMIN — SODIUM CHLORIDE, PRESERVATIVE FREE 10 ML: 5 INJECTION INTRAVENOUS at 08:47

## 2020-01-24 RX ADMIN — GABAPENTIN 300 MG: 300 CAPSULE ORAL at 08:45

## 2020-01-24 RX ADMIN — BACLOFEN 10 MG: 10 TABLET ORAL at 15:10

## 2020-01-24 RX ADMIN — POTASSIUM BICARBONATE 40 MEQ: 782 TABLET, EFFERVESCENT ORAL at 08:51

## 2020-01-24 RX ADMIN — MEROPENEM 1 G: 1 INJECTION, POWDER, FOR SOLUTION INTRAVENOUS at 08:40

## 2020-01-24 RX ADMIN — LANSOPRAZOLE 30 MG: KIT at 08:46

## 2020-01-24 RX ADMIN — GABAPENTIN 300 MG: 300 CAPSULE ORAL at 15:10

## 2020-01-24 RX ADMIN — GLYCOPYRROLATE 1 MG: 1 TABLET ORAL at 08:45

## 2020-01-24 ASSESSMENT — PAIN SCALES - GENERAL: PAINLEVEL_OUTOF10: 0

## 2020-01-24 ASSESSMENT — PAIN SCALES - WONG BAKER
WONGBAKER_NUMERICALRESPONSE: 0

## 2020-01-24 NOTE — PROGRESS NOTES
Critical care team - Resident sign-out to medicine service      Date and time: 1/24/2020 3:25 PM  Patient's name:  Dory Koch Record Number: [id number]  Patient's account/billing number: [de-identified]  Patient's YOB: 1980  Age: 44 y.o. Date of Admission: 1/22/2020  6:24 AM  Length of stay during current admission: 2    Primary Care Physician: Gertrudis Vargas MD    Code Status: Full Code    Mode of physician to physician communication:        [] Via telephone   [] In person     Date and time of sign-out: 1/24/2020 3:25 PM    Accepting Internal Medicine resident: Dr. Ken Torres    Accepting Medicine team: IM Team 3    Accepting team's attending: Dr. Simone Granado    Patient's current ICU Bed:  130     Patient's assigned bed on floor:  331        [x] Med-Surg  [] Step-down       [] Psychiatry ICU       [] Psych floor     Reason for ICU admission:     uti    ICU course summary:   Patient has multiple sclerosis, quadriplegia, neurogenic bladder, aphasia, hypertension, chronic indwelling Davies catheter, depression, DVTs in the past and contractures.     She has had multiple hospitalizations, and has a history of recurrent ESBL E coli UTI's.      She presented to the ED after developing mental status changes and a fever of 103 at her SNF. In the ED she was hypotensive and tachycardic with SBP 80's and 's. She was given a fluid bolus, cultures were taken and she was started on Meropenem and Vancomycin.      2/2 Blood cultures are now showing ESBL E coli.     On Meropenem      Procedures during patient's ICU stay:         Current Vitals:     /69   Pulse 75   Temp 98.6 °F (37 °C) (Oral)   Resp 15   Ht 5' (1.524 m)   Wt 168 lb 3.4 oz (76.3 kg)   SpO2 97%   BMI 32.85 kg/m²       Cultures:     Blood cultures:                 [] None drawn      [] Negative             []  Positive (Details:  )  Urine Culture:                   [] None drawn      [] Negative             []  Positive (Details: )  Sputum Culture:               [] None drawn       [] Negative             []  Positive (Details:  )   Endotracheal aspirate:     [] None drawn       [] Negative             []  Positive (Details:  )       Consults:     1. ID    Assessment:     Patient Active Problem List    Diagnosis Date Noted    E-coli UTI 01/23/2020    Sepsis due to Escherichia coli ([address] 01/23/2020    Lactate blood increase 01/22/2020    Proteus mirabilis infection 08/02/2019    Decubitus ulcer of coccygeal region, stage 4 ([address] 07/30/2019    Ileus ([address] 07/29/2019    Urinary tract infection associated with indwelling urethral catheter ([address] 07/28/2019    Abdominal pain 07/28/2019    Pneumonia due to organism     Extended spectrum beta lactamase (ESBL) resistance     Sepsis ([address] 06/25/2019    Lower paraplegia ([address]     Toxic metabolic encephalopathy 10/31/9204    Status post colostomy ([address] 05/19/2019    ESBL E. coli carrier     Septic shock (HCC)     Altered mental status     Lactic acidosis 05/17/2019    Chronic indwelling Davies catheter 05/17/2019    Peripheral vascular disease ([address] 05/17/2019    Recurrent UTI (urinary tract infection) 05/17/2019    History of pulmonary embolism 05/17/2019    Hypertension 05/17/2019    Gross hematuria 01/11/2017    Tachycardia     Hemorrhagic cystitis 01/09/2017    Hypokalemia 01/09/2017    Neurogenic bladder     Aphasia 08/26/2015    Multiple sclerosis ([address] 08/26/2015    Nonverbal 08/26/2015    Gastrostomy tube dependent ([address] 08/26/2015    Dislodged gastrostomy tube ([address] 08/26/2015       Recommended Follow-up:     1. Continue IV meropenem for ESBL bacteremia and UTI. 2. Started tube feeds. Reduce IVF. Total fluids 100 ml/hr. 3. Resume home medications.   4. Monitor intake and output    Jefferson Layton MD      Department of Internal Medicine  [facility]         1/24/2020, 3:30 PM      Above mentioned assessment and plan was discussed by me with the admitting medicine resident. The medicine team assigned to the patient by medicine admitting resident will be following up the patient from now onwards on the floor.

## 2020-01-24 NOTE — PROGRESS NOTES
Critical Care Team - Daily Progress Note      Date and time: 1/24/2020 9:49 AM  Patient's name:  Rosa Elena Collier  Medical Record Number: [id number]  Patient's account/billing number: [de-identified]  Patient's YOB: 1980  Age: 44 y.o. Date of Admission: 1/22/2020  6:24 AM  Length of stay during current admission: 2      Primary Care Physician: Betsey Babinski, MD  ICU Attending Physician: Dr. Юлия Joshua    Code Status: Full Code    Reason for ICU admission: Acute metabolic encephalopathy due to sepsis with hypotension      SUBJECTIVE:     OVERNIGHT EVENTS:    No acute issues overnight  Afebrile  Vitals stable        Intake/Output Summary (Last 24 hours) at 1/24/2020 0950  Last data filed at 1/24/2020 0900  Gross per 24 hour   Intake 2791 ml   Output 3300 ml   Net -509 ml            Fluids: normal saline  Feeding:tolerating tube feeds  Analgesics:acetaminophen   Sedation:not indicated  DVT Prophylaxis: xarelto (for PE)  Mobilization: by PT  Head Up: yes  GI Prophylaxis:  lansoprazole  Glycemic Control:good  Spontaneous breathing trial:not indicated  Bowel management: colace liquid   Indwelling catheter: yes.  omer        AWAKE & FOLLOWING COMMANDS:  [] No   [x] Yes    CURRENT VENTILATION STATUS:     [] Ventilator  [] BIPAP  [] Nasal Cannula [x] Room Air        SECRETIONS Amount:  [] Small [x] Moderate  [] Large  [] None  Color:     [x] White [] Colored  [] Bloody    SEDATION:  RAAS Score:  [] Propofol gtt  [] Versed gtt  [] Ativan gtt   [x] No Sedation    PARALYZED:  [x] No    [] Yes    DIARRHEA:                [x] No                [] Yes  (C. Difficile status: [] positive                                                                                                                       [] negative                                                                                                                     [] pending)    VASOPRESSORS:  [x] No    [] Yes    If yes -   [] Levophed       [] Dopamine     [] organomegaly. Chronic appearing sacral ulcer present. Extremities:  S/p right AKA, flexed and contracted left lower extremity, no lower extremity edema, no clubbing or cyanosis        MEDICATIONS:    Scheduled Meds:   potassium chloride  40 mEq Oral Q4H    miconazole   Topical BID    docusate  100 mg Oral Daily    baclofen  10 mg Per G Tube TID    gabapentin  300 mg Per G Tube TID    glycopyrrolate  1 mg Oral TID    lansoprazole  30 mg Per G Tube QAM AC    metoclopramide  5 mg Oral TID    CENTRUM/CERTA-DERICK with minerals oral  15 mL Per G Tube Daily    rivaroxaban  20 mg Oral Daily    scopolamine  1 patch Transdermal Q72H    sertraline  25 mg Per G Tube Daily    sodium chloride flush  10 mL Intravenous 2 times per day    meropenem  1 g Intravenous Q8H     Continuous Infusions:   sodium chloride 50 mL/hr at 01/23/20 1232     PRN Meds:   potassium chloride, 40 mEq, PRN    Or  potassium alternative oral replacement, 40 mEq, PRN    Or  potassium chloride, 10 mEq, PRN  atropine, 2 drop, Q4H PRN  sodium chloride flush, 10 mL, PRN  magnesium hydroxide, 30 mL, Daily PRN  ondansetron, 4 mg, Q6H PRN          VENT SETTINGS (Comprehensive) (if applicable):      Additional Respiratory  Assessments  Pulse: 80  Resp: 18  SpO2: 97 %  Oral Care: Lip moisturizer applied, Mouth moisturizer, Mouth suctioned    ABGs:     Laboratory findings:    Complete Blood Count:   Recent Labs     01/22/20  0656 01/23/20  0521 01/24/20  0546   WBC 12.8* 7.7 5.7   HGB 13.3 11.9 12.3   HCT 42.5 38.5 40.8    227 240        Last 3 Blood Glucose:   Recent Labs     01/22/20  0656 01/23/20  0801 01/24/20  0546   GLUCOSE 115* 79 85        PT/INR:    Lab Results   Component Value Date    PROTIME 11.7 07/28/2019    INR 1.1 07/28/2019     PTT:    Lab Results   Component Value Date    APTT 36.3 01/22/2020       Comprehensive Metabolic Profile:   Recent Labs     01/22/20  0656 01/23/20  0801 01/24/20  0546    137 141   K 4.3 3.2* 3.3* CL 99 105 109*   CO2 22 20 16*   BUN 14 5* 11   CREATININE 0.37* 0.30* 0.25*   GLUCOSE 115* 79 85   CALCIUM 9.6 9.1 9.4   PROT 8.9*  --   --    LABALBU 3.3*  --   --    BILITOT 0.43  --   --    ALKPHOS 82  --   --    AST 54*  --   --    ALT 39*  --   --       Magnesium:   Lab Results   Component Value Date    MG 1.8 01/24/2020     Phosphorus:   Lab Results   Component Value Date    PHOS 2.7 07/29/2019     Ionized Calcium:   Lab Results   Component Value Date    CAION 4.66 04/08/2013        Urinalysis:     Troponin: No results for input(s): TROPONINI in the last 72 hours. Microbiology:    Cultures during this admission:     Blood cultures:                 [] None drawn      [] Negative             [x]  Positive x 2 (Details: E.coli )  Urine Culture:                   [] None drawn      [] Negative             [x]  Positive x 2 (Details: E.coli  )  Sputum Culture:               [] None drawn       [] Negative             []  Positive (Details:  )   Endotracheal aspirate:     [] None drawn       [] Negative             []  Positive (Details:  )     Other pertinent Labs:       Radiology/Imaging:     Chest Xray (1/24/2020):    ASSESSMENT:     Principal Problem:    Sepsis due to Escherichia coli [facility])  Active Problems:    Multiple sclerosis ([facility] [address]    Nonverbal    Neurogenic bladder    Urinary tract infection associated with indwelling urethral catheter ([facility] [address]    History of pulmonary embolism    Toxic metabolic encephalopathy    Septic shock ([facility] [address]    Status post colostomy ([facility] [address]    Sepsis ([facility] [address]    Decubitus ulcer of coccygeal region, stage 4 ([facility] [address]    Lactate blood increase    E-coli UTI  Resolved Problems:    * No resolved hospital problems. *    ·         PLAN:     WEAN PER PROTOCOL:  [] No   [] Yes  [x] N/A    DISCONTINUE ANY LABS:   [] No   [] Yes    ICU PROPHYLAXIS:  Stress ulcer:  [x] PPI Agent  [] P3Bhavs [] Sucralfate  [] Other:  VTE:   [] Enoxaparin  [] Unfract.  Heparin Subcut  [] EPC Cuffs    NUTRITION:  [] NPO [x] Tube Feeding (Specify: ) [] TPN  [] PO (Diet: DIET TUBE FEED CONTINUOUS/CYCLIC NPO; Fluid Restricted; Gastrostomy; Continuous; 15; 25; 24)    HOME MEDICATIONS RECONCILED: [] No  [x] Yes    INSULIN DRIP:   [x] No   [] Yes    CONSULTATION NEEDED:  [] No   [x] Yes    FAMILY UPDATED:    [x] No   [] Yes    TRANSFER OUT OF ICU:   [] No   [x] Yes    ADDITIONAL PLAN:    1. Continue IV meropenem for ESBL bacteremia and UTI. 2. Started tube feeds. Reduce IVF. Total fluids 100 ml/hr. 3. Resume home medications. 4. Monitor intake and output. 5. Transfer to [facility]        Doe Cabrera M.D.              Critical care resident,  Department of Internal Medicine/ Critical care  [facility])             1/24/2020, 9:49 AM

## 2020-01-24 NOTE — CONSULTS
[facility] - Initial Consult Note  Today's Date and Time: 1/24/2020, 2:01 PM    Impression :   · ESBL E coli sepsis  · Chronic indwelling omer catheter  · Recurrent ESBL Ecoli UTI's  · MS  · Quadriplegia  · Stage IV sacral wound  · Contractures of extremities    Recommendations:   · 2/2 blood cultures showing ESBL E coli  · Continue Meropenem 1gm IV q 8 hours, Stop Date 2-4-20 14 days therapy  · IF pt is to be discharged prior to completion of therapy, OK to transition to Ertapenem 1gm IV daily for ease of home infusion  · Continue wound care  · Monitor clinical progress    Medical Decision Making/Summary/Discussion:1/24/2020     · Pt with MS, quadriplegia, neurogenic bladder  · Brought to ER with mentation changes, fever 103, tachycardia and hypotension  · Pt with history of recurrent UTI's, with a chronic omer catheter in place  · Cultures obtained and started on Meropenem and Vancomycin  · 2/2 blood cultures showing ESBL E coli. Pt has improved with antibiotic treatment  · Pt has a stage IV sacral ulcer, it is clean. Infection Control Recommendations   · [place] Precautions  · Contact Isolation     Antimicrobial Stewardship Recommendations     · Targeted therapy    Coordination of Outpatient Care:   · Estimated Length of IV antimicrobials: 14 days, stop date 2-4-20  · Patient will need Midline Catheter Insertion: Yes  · Patient will need PICC line Insertion:No  · Patient will need: Home IV , [place],  SNF,  LTAC: Yes  · Patient will need outpatient wound care: Yes    Chief complaint/reason for consultation:   · ESBL E coli sepsis      History of Present Illness:   Payam Sawyer is a 44y.o.-year-old  female who was initially admitted on 1/22/2020. Patient seen at the request of Dr. Cecilia Thorne is well known to me from previous hospitalizations.     Patient has multiple sclerosis, quadriplegia, neurogenic bladder, aphasia, Pulmonary infarction ([facility] [address]     Quadriplegia ([facility] [address]     Tachycardia     UTI (lower urinary tract infection)        Past Surgical  History:     Past Surgical History:   Procedure Laterality Date    AMPUTATION Right     COLOSTOMY      GASTROSTOMY TUBE PLACEMENT      LEG AMPUTATION THROUGH FEMUR         Medications:      potassium chloride  40 mEq Oral Q4H    miconazole   Topical BID    docusate  100 mg Oral Daily    baclofen  10 mg Per G Tube TID    gabapentin  300 mg Per G Tube TID    glycopyrrolate  1 mg Oral TID    lansoprazole  30 mg Per G Tube QAM AC    metoclopramide  5 mg Oral TID    CENTRUM/CERTA-DERICK with minerals oral  15 mL Per G Tube Daily    rivaroxaban  20 mg Oral Daily    scopolamine  1 patch Transdermal Q72H    sertraline  25 mg Per G Tube Daily    sodium chloride flush  10 mL Intravenous 2 times per day    meropenem  1 g Intravenous Q8H       Social History:     Social History     Socioeconomic History    Marital status: Single     Spouse name: Not on file    Number of children: Not on file    Years of education: Not on file    Highest education level: Not on file   Occupational History    Not on file   Social Needs    Financial resource strain: Not on file    Food insecurity:     Worry: Not on file     Inability: Not on file    Transportation needs:     Medical: Not on file     Non-medical: Not on file   Tobacco Use    Smoking status: Never Smoker    Smokeless tobacco: Never Used   Substance and Sexual Activity    Alcohol use: No    Drug use: No    Sexual activity: Never   Lifestyle    Physical activity:     Days per week: Not on file     Minutes per session: Not on file    Stress: Not on file   Relationships    Social connections:     Talks on phone: Not on file     Gets together: Not on file     Attends Mosque service: Not on file     Active member of club or organization: Not on file     Attends meetings of clubs or organizations: Not on file     Relationship Oropharynx clear, without erythema, exudate, or thrush. No tenderness of sinuses. Mouth/throat: mucosa pink and moist. No lesions. Dentition in good repair. Neck:Supple, without lymphadenopathy. Thyroid normal, No bruits. Pulmonary/Chest: Clear to auscultation, without wheezes, rales, or rhonchi. No dullness to percussion. Cardiovascular: Regular rate and rhythm without murmurs, rubs, or gallops. Abdomen: Soft, non tender. Bowel sounds normal. No organomegaly. Gtube site clean, ostomy with liquid stool  : Davies with clear urine  All four Extremities: No cyanosis, clubbing, edema, or effusions. Contracted  Neurologic: Non verbal, contracted, minimal interaction  Skin: Warm and dry with good turgor. No signs of peripheral arterial or venous insufficiency. Stage IV sacral wound clean    Medical Decision Making -Laboratory:   I have independently reviewed/ordered the following labs:    CBC with Differential:   Recent Labs     01/23/20  0521 01/24/20  0546   WBC 7.7 5.7   HGB 11.9 12.3   HCT 38.5 40.8    240   LYMPHOPCT 12* 15*   MONOPCT 14* 20*     BMP:   Recent Labs     01/23/20  0801 01/24/20  0546    141   K 3.2* 3.3*    109*   CO2 20 16*   BUN 5* 11   CREATININE 0.30* 0.25*   MG 1.6 1.8     Hepatic Function Panel:   Recent Labs     01/22/20  0656   PROT 8.9*   LABALBU 3.3*   BILIDIR <0.08   IBILI CANNOT BE CALCULATED   BILITOT 0.43   ALKPHOS 82   ALT 39*   AST 54*     No results for input(s): RPR in the last 72 hours. No results for input(s): HIV in the last 72 hours. No results for input(s): BC in the last 72 hours.   Lab Results   Component Value Date    MUCUS NOT REPORTED 01/22/2020    RBC 4.73 01/24/2020    RBC 4.56 03/11/2012    TRICHOMONAS NOT REPORTED 01/22/2020    WBC 5.7 01/24/2020    YEAST NOT REPORTED 01/22/2020    TURBIDITY CLOUDY 01/22/2020     Lab Results   Component Value Date    CREATININE 0.25 01/24/2020    GLUCOSE 85 01/24/2020    GLUCOSE 69 03/11/2012       Medical >=32  RESISTANT   ampicillin-sulbactam   Final     NOT REPORTED   aztreonam Resistant  Final     >=64  RESISTANT   ceFAZolin Resistant  Final     >=64  RESISTANT   cefepime Resistant  Final     >=64  RESISTANT   cefTRIAXone Resistant  Final     >=64  RESISTANT   ciprofloxacin Sensitive  Final     1  SUSCEPTIBLE   ertapenem   Final     NOT REPORTED   Confirmatory Extended Spectrum Beta-Lactamase Positive POSITIVE Final    gentamicin Sensitive  Final     <=1  SUSCEPTIBLE   meropenem Sensitive  Final     <=0.25  SUSCEPTIBLE   nitrofurantoin   Final     NOT REPORTED   tigecycline   Final     NOT REPORTED   tobramycin Sensitive  Final     <=1  SUSCEPTIBLE   trimethoprim-sulfamethoxazole Sensitive  Final     <=20  SUSCEPTIBLE   piperacillin-tazobactam Resistant  Final     <=4  RESISTANT         Medical Decision Making-Other:     Note:  · Labs, medications, radiologic studies were reviewed with personal review of films  · Moderate Large amounts of data were reviewed  · Discussed with nursing Staff, Discharge planner  · Infection Control and Prevention measures reviewed  · All prior entries were reviewed  · Administer medications as ordered  · Prognosis: Guarded  · Discharge planning reviewed  · Follow up as outpatient. Thank you for allowing us to participate in the care of this patient. Please call with questions.     Madelyn Ross MD  Pager: [phone] - Office: [phone]

## 2020-01-25 VITALS
BODY MASS INDEX: 33.02 KG/M2 | TEMPERATURE: 98.4 F | SYSTOLIC BLOOD PRESSURE: 104 MMHG | RESPIRATION RATE: 16 BRPM | WEIGHT: 168.21 LBS | OXYGEN SATURATION: 100 % | HEART RATE: 127 BPM | DIASTOLIC BLOOD PRESSURE: 62 MMHG | HEIGHT: 60 IN

## 2020-01-25 LAB
ABSOLUTE EOS #: 0.2 K/UL (ref 0–0.44)
ABSOLUTE IMMATURE GRANULOCYTE: <0.03 K/UL (ref 0–0.3)
ABSOLUTE LYMPH #: 2 K/UL (ref 1.1–3.7)
ABSOLUTE MONO #: 1.02 K/UL (ref 0.1–1.2)
ANION GAP SERPL CALCULATED.3IONS-SCNC: 10 MMOL/L (ref 9–17)
BASOPHILS # BLD: 0 % (ref 0–2)
BASOPHILS ABSOLUTE: <0.03 K/UL (ref 0–0.2)
BUN BLDV-MCNC: 15 MG/DL (ref 6–20)
BUN/CREAT BLD: ABNORMAL (ref 9–20)
CALCIUM SERPL-MCNC: 9.8 MG/DL (ref 8.6–10.4)
CHLORIDE BLD-SCNC: 105 MMOL/L (ref 98–107)
CO2: 22 MMOL/L (ref 20–31)
CREAT SERPL-MCNC: 0.27 MG/DL (ref 0.5–0.9)
CULTURE: ABNORMAL
CULTURE: ABNORMAL
DIFFERENTIAL TYPE: ABNORMAL
EOSINOPHILS RELATIVE PERCENT: 4 % (ref 1–4)
GFR AFRICAN AMERICAN: >60 ML/MIN
GFR NON-AFRICAN AMERICAN: >60 ML/MIN
GFR SERPL CREATININE-BSD FRML MDRD: ABNORMAL ML/MIN/{1.73_M2}
GFR SERPL CREATININE-BSD FRML MDRD: ABNORMAL ML/MIN/{1.73_M2}
GLUCOSE BLD-MCNC: 100 MG/DL (ref 70–99)
HCT VFR BLD CALC: 43.6 % (ref 36.3–47.1)
HEMOGLOBIN: 13.3 G/DL (ref 11.9–15.1)
IMMATURE GRANULOCYTES: 0 %
LYMPHOCYTES # BLD: 37 % (ref 24–43)
Lab: ABNORMAL
MCH RBC QN AUTO: 25.6 PG (ref 25.2–33.5)
MCHC RBC AUTO-ENTMCNC: 30.5 G/DL (ref 28.4–34.8)
MCV RBC AUTO: 84 FL (ref 82.6–102.9)
MONOCYTES # BLD: 19 % (ref 3–12)
NRBC AUTOMATED: 0 PER 100 WBC
PDW BLD-RTO: 14.8 % (ref 11.8–14.4)
PLATELET # BLD: 312 K/UL (ref 138–453)
PLATELET ESTIMATE: ABNORMAL
PMV BLD AUTO: 11.9 FL (ref 8.1–13.5)
POTASSIUM SERPL-SCNC: 4 MMOL/L (ref 3.7–5.3)
RBC # BLD: 5.19 M/UL (ref 3.95–5.11)
RBC # BLD: ABNORMAL 10*6/UL
SEG NEUTROPHILS: 40 % (ref 36–65)
SEGMENTED NEUTROPHILS ABSOLUTE COUNT: 2.17 K/UL (ref 1.5–8.1)
SODIUM BLD-SCNC: 137 MMOL/L (ref 135–144)
SPECIMEN DESCRIPTION: ABNORMAL
WBC # BLD: 5.4 K/UL (ref 3.5–11.3)
WBC # BLD: ABNORMAL 10*3/UL

## 2020-01-25 PROCEDURE — 6370000000 HC RX 637 (ALT 250 FOR IP): Performed by: STUDENT IN AN ORGANIZED HEALTH CARE EDUCATION/TRAINING PROGRAM

## 2020-01-25 PROCEDURE — 85025 COMPLETE CBC W/AUTO DIFF WBC: CPT

## 2020-01-25 PROCEDURE — 99232 SBSQ HOSP IP/OBS MODERATE 35: CPT | Performed by: INTERNAL MEDICINE

## 2020-01-25 PROCEDURE — 80048 BASIC METABOLIC PNL TOTAL CA: CPT

## 2020-01-25 PROCEDURE — 2580000003 HC RX 258: Performed by: STUDENT IN AN ORGANIZED HEALTH CARE EDUCATION/TRAINING PROGRAM

## 2020-01-25 PROCEDURE — 36415 COLL VENOUS BLD VENIPUNCTURE: CPT

## 2020-01-25 PROCEDURE — 6360000002 HC RX W HCPCS: Performed by: STUDENT IN AN ORGANIZED HEALTH CARE EDUCATION/TRAINING PROGRAM

## 2020-01-25 PROCEDURE — 99223 1ST HOSP IP/OBS HIGH 75: CPT | Performed by: INTERNAL MEDICINE

## 2020-01-25 RX ORDER — CIPROFLOXACIN 500 MG/1
750 TABLET, FILM COATED ORAL 2 TIMES DAILY
Qty: 12 TABLET | Refills: 0 | Status: SHIPPED | OUTPATIENT
Start: 2020-01-25 | End: 2020-01-29

## 2020-01-25 RX ORDER — MIDODRINE HYDROCHLORIDE 5 MG/1
5 TABLET ORAL 2 TIMES DAILY WITH MEALS
Qty: 30 TABLET | Refills: 1 | Status: SHIPPED | OUTPATIENT
Start: 2020-01-25

## 2020-01-25 RX ADMIN — BACLOFEN 10 MG: 10 TABLET ORAL at 08:40

## 2020-01-25 RX ADMIN — METOCLOPRAMIDE HYDROCHLORIDE 5 MG: 5 SOLUTION ORAL at 08:40

## 2020-01-25 RX ADMIN — BACLOFEN 10 MG: 10 TABLET ORAL at 14:08

## 2020-01-25 RX ADMIN — METOCLOPRAMIDE HYDROCHLORIDE 5 MG: 5 SOLUTION ORAL at 14:08

## 2020-01-25 RX ADMIN — POTASSIUM BICARBONATE 40 MEQ: 782 TABLET, EFFERVESCENT ORAL at 01:04

## 2020-01-25 RX ADMIN — GLYCOPYRROLATE 1 MG: 1 TABLET ORAL at 01:05

## 2020-01-25 RX ADMIN — METOCLOPRAMIDE HYDROCHLORIDE 5 MG: 5 SOLUTION ORAL at 01:04

## 2020-01-25 RX ADMIN — DOCUSATE SODIUM 100 MG: 50 LIQUID ORAL at 08:40

## 2020-01-25 RX ADMIN — SODIUM CHLORIDE, PRESERVATIVE FREE 10 ML: 5 INJECTION INTRAVENOUS at 01:06

## 2020-01-25 RX ADMIN — MEROPENEM 1 G: 1 INJECTION, POWDER, FOR SOLUTION INTRAVENOUS at 08:59

## 2020-01-25 RX ADMIN — GLYCOPYRROLATE 1 MG: 1 TABLET ORAL at 08:58

## 2020-01-25 RX ADMIN — LANSOPRAZOLE 30 MG: KIT at 08:40

## 2020-01-25 RX ADMIN — RIVAROXABAN 20 MG: 20 TABLET, FILM COATED ORAL at 08:40

## 2020-01-25 RX ADMIN — SERTRALINE 25 MG: 25 TABLET, FILM COATED ORAL at 08:40

## 2020-01-25 RX ADMIN — GLYCOPYRROLATE 1 MG: 1 TABLET ORAL at 14:58

## 2020-01-25 RX ADMIN — ANTI-FUNGAL POWDER MICONAZOLE NITRATE TALC FREE: 1.42 POWDER TOPICAL at 01:06

## 2020-01-25 RX ADMIN — ANTI-FUNGAL POWDER MICONAZOLE NITRATE TALC FREE: 1.42 POWDER TOPICAL at 08:40

## 2020-01-25 RX ADMIN — SODIUM CHLORIDE, PRESERVATIVE FREE 10 ML: 5 INJECTION INTRAVENOUS at 01:11

## 2020-01-25 RX ADMIN — BACLOFEN 10 MG: 10 TABLET ORAL at 01:05

## 2020-01-25 RX ADMIN — GABAPENTIN 300 MG: 300 CAPSULE ORAL at 14:08

## 2020-01-25 RX ADMIN — Medication 15 ML: at 08:40

## 2020-01-25 RX ADMIN — GABAPENTIN 300 MG: 300 CAPSULE ORAL at 01:05

## 2020-01-25 RX ADMIN — GABAPENTIN 300 MG: 300 CAPSULE ORAL at 08:40

## 2020-01-25 RX ADMIN — MEROPENEM 1 G: 1 INJECTION, POWDER, FOR SOLUTION INTRAVENOUS at 01:29

## 2020-01-25 ASSESSMENT — PAIN SCALES - PAIN ASSESSMENT IN ADVANCED DEMENTIA (PAINAD)
BODYLANGUAGE: 0
CONSOLABILITY: 0
NEGVOCALIZATION: 0
FACIALEXPRESSION: 0
BREATHING: 0
TOTALSCORE: 0

## 2020-01-25 ASSESSMENT — PAIN SCALES - WONG BAKER
WONGBAKER_NUMERICALRESPONSE: 0

## 2020-01-25 ASSESSMENT — ENCOUNTER SYMPTOMS
COUGH: 0
SHORTNESS OF BREATH: 0
PHOTOPHOBIA: 0

## 2020-01-25 NOTE — PROGRESS NOTES
[facility]  Internal Medicine Teaching Residency Program  Inpatient Daily Progress Note  ______________________________________________________________________________    Patient: Shea Chen  YOB: 1980   rByce Ybarra: [de-identified]     Room: 0330331-01  Admit date: 1/22/2020  Today's date: 01/25/20  Number of days in the hospital: 3    SUBJECTIVE   Admitting Diagnosis: Sepsis due to Escherichia coli [facility])    Chief Complaint: Altered Mental Status    Pt was seen and examined at bedside. Resting comfortably in the bed. Vitals stable. Labs reviewed. Labs pending this am.  No acute events overnight. Patient stable on antibiotics. Plan to discharge back to [id number] [facility] today. Review Of Systems:  Unable to obtain as patient is not verbal.  BRIEF HISTORY     Presented to ER form a long [facility] facility where she was found to be altered. Patient is aphasic/non verbal at baseline. She was not responding to commands. Also noted to be febrile.     Per chart, EMS called and found patient to be febrile (103 F), tachycardic in 140s and hypotensive in 80s.    In the ER patient received 30 ml/kg bolus of crystalloid.  She also received 1 dose of vancomycin and meropenem IV. Labs showed elevated WBC 12.8, lactic acid 2.9, mildly elevated transaminases.     UA from indwelling Davies showing evidence of UTI with large leukocyte esterase and positive nitrites. Meropenem and vancomycin was started. Patient also has a chronic sacral ulcer on presentation for which wound ostomy was consulted. Blood cultures were then found to be growing E coli with ESBL Vancomycin was discontinued. Blood pressure and mentation improved and patient was transferred out of ICU. Patient remains on Meropenem.     OBJECTIVE     Vital Signs:  /74   Pulse 100   Temp 98.5 °F (36.9 °C) (Axillary)   Resp 16   Ht 5' (1.524 m)   Wt 168 lb 3.4 oz (76.3 kg)   SpO2 99%   BMI

## 2020-01-25 NOTE — PLAN OF CARE
Problem: Falls - Risk of:  Goal: Will remain free from falls  Description  Will remain free from falls  1/25/2020 1545 by Jacob Braga RN  Outcome: Met This Shift  1/25/2020 0352 by Safia Johnson RN  Outcome: Ongoing  Goal: Absence of physical injury  Description  Absence of physical injury  1/25/2020 1545 by Jacob Braga RN  Outcome: Met This Shift  1/25/2020 0352 by Safia Johnson RN  Outcome: Ongoing     Problem: Fluid Volume - Imbalance:  Goal: Absence of imbalanced fluid volume signs and symptoms  Description  Absence of imbalanced fluid volume signs and symptoms  1/25/2020 1545 by Jacob Braga RN  Outcome: Met This Shift  1/25/2020 0352 by Safia Johnson RN  Outcome: Ongoing     Problem: Mental Status - Impaired:  Goal: Mental status will be restored to baseline  Description  Mental status will be restored to baseline  1/25/2020 1545 by Jacob Braga RN  Outcome: Met This Shift  1/25/2020 0352 by Safia Johnson RN  Outcome: Ongoing     Problem: Nutrition Deficit:  Goal: Ability to achieve adequate nutritional intake will improve  Description  Ability to achieve adequate nutritional intake will improve  1/25/2020 1545 by Jacob Braga RN  Outcome: Met This Shift  1/25/2020 0352 by Safia Johnson RN  Outcome: Ongoing     Problem: Skin Integrity - Impaired:  Goal: Absence of new skin breakdown  Description  Absence of new skin breakdown  1/25/2020 1545 by Jacob Braga RN  Outcome: Met This Shift  1/25/2020 0352 by Safia Johnson RN  Outcome: Ongoing     Problem: Risk for Impaired Skin Integrity  Goal: Tissue integrity - skin and mucous membranes  Description  Structural intactness and normal physiological function of skin and  mucous membranes. 1/25/2020 1545 by Jacob Braga RN  Outcome: Ongoing  1/25/2020 0352 by Safia Johnson RN  Outcome: Ongoing     Problem: Pain:  Description  Pain management should include both nonpharmacologic and pharmacologic interventions.   Goal: Pain level will communication  1/25/2020 1545 by Rachel Ghosh, RN  Outcome: Not Met This Shift  1/25/2020 0352 by Tiny Naranjo RN  Outcome: Ongoing

## 2020-01-25 NOTE — PROGRESS NOTES
ondansetron    Objective:    CBC:   Recent Labs     01/22/20  0656 01/23/20  0521 01/24/20  0546   WBC 12.8* 7.7 5.7   HGB 13.3 11.9 12.3    227 240     BMP:    Recent Labs     01/22/20  0656 01/23/20  0801 01/24/20  0546    137 141   K 4.3 3.2* 3.3*   CL 99 105 109*   CO2 22 20 16*   BUN 14 5* 11   CREATININE 0.37* 0.30* 0.25*   GLUCOSE 115* 79 85     Calcium:  Recent Labs     01/24/20  0546   CALCIUM 9.4     Ionized Calcium:No results for input(s): IONCA in the last 72 hours. Magnesium:  Recent Labs     01/24/20  0546   MG 1.8     Phosphorus:No results for input(s): PHOS in the last 72 hours. BNP:No results for input(s): BNP in the last 72 hours. Glucose:  Recent Labs     01/22/20  0647 01/23/20  2128   POCGLU 121* 94     HgbA1C: No results for input(s): LABA1C in the last 72 hours. INR: No results for input(s): INR in the last 72 hours. Hepatic:   Recent Labs     01/22/20  0656   ALKPHOS 82   ALT 39*   AST 54*   PROT 8.9*   BILITOT 0.43   BILIDIR <0.08   LABALBU 3.3*     Amylase and Lipase:No results for input(s): LACTA, AMYLASE in the last 72 hours. Lactic Acid: No results for input(s): LACTA in the last 72 hours. CARDIAC ENZYMES:No results for input(s): CKTOTAL, CKMB, CKMBINDEX, TROPONINI in the last 72 hours. BNP: No results for input(s): BNP in the last 72 hours. Lipids: No results for input(s): CHOL, TRIG, HDL, LDLCALC in the last 72 hours.     Invalid input(s): LDL  ABGs: No results found for: PH, PCO2, PO2, HCO3, O2SAT  Thyroid:   Lab Results   Component Value Date    TSH 0.84 05/19/2019        Urinalysis:   Recent Labs     01/22/20  1112   BACTERIA FEW*   COLORU YELLOW   PHUR 8.0   PROTEINU NEGATIVE   RBCUA 2 TO 5   SPECGRAV 1.006   BILIRUBINUR NEGATIVE   NITRU NEGATIVE   WBCUA 10 TO 20   LEUKOCYTESUR LARGE*   GLUCOSEU NEGATIVE           Assessment:  Principal Problem:    Sepsis due to Escherichia coli [facility]  Active Problems:    Multiple sclerosis (HCC)    Nonverbal    Neurogenic

## 2020-01-25 NOTE — PLAN OF CARE
Problem: Falls - Risk of:  Goal: Will remain free from falls  Description  Will remain free from falls  Outcome: Ongoing  Goal: Absence of physical injury  Description  Absence of physical injury  Outcome: Ongoing     Problem: Risk for Impaired Skin Integrity  Goal: Tissue integrity - skin and mucous membranes  Description  Structural intactness and normal physiological function of skin and  mucous membranes.   Outcome: Ongoing     Problem: Fluid Volume - Imbalance:  Goal: Absence of imbalanced fluid volume signs and symptoms  Description  Absence of imbalanced fluid volume signs and symptoms  Outcome: Ongoing     Problem: Mental Status - Impaired:  Goal: Mental status will be restored to baseline  Description  Mental status will be restored to baseline  Outcome: Ongoing     Problem: Nutrition Deficit:  Goal: Ability to achieve adequate nutritional intake will improve  Description  Ability to achieve adequate nutritional intake will improve  Outcome: Ongoing     Problem: Pain:  Goal: Pain level will decrease  Description  Pain level will decrease  Outcome: Ongoing  Goal: Recognizes and communicates pain  Description  Recognizes and communicates pain  Outcome: Ongoing  Goal: Control of acute pain  Description  Control of acute pain  Outcome: Ongoing  Goal: Control of chronic pain  Description  Control of chronic pain  Outcome: Ongoing     Problem: Skin Integrity - Impaired:  Goal: Will show no infection signs and symptoms  Description  Will show no infection signs and symptoms  Outcome: Ongoing  Goal: Absence of new skin breakdown  Description  Absence of new skin breakdown  Outcome: Ongoing     Problem: COMMUNICATION IMPAIRMENT  Goal: Ability to express needs and understand communication  Outcome: Ongoing     Problem: Urinary Elimination:  Goal: Signs and symptoms of infection will decrease  Description  Signs and symptoms of infection will decrease  Outcome: Ongoing  Goal: Ability to reestablish a normal urinary

## 2020-01-25 NOTE — PROGRESS NOTES
[facility] - Progress Note  Today's Date and Time: 1/25/2020, 11:02 AM    Impression :   · ESBL E coli sepsis  · 2/2 blood cultures showing ESBL E. coli on 1/22/2020  · Repeat blood cultures on 1/24/2020- negative  · Urine culture grew providencia stuartii more than 100,000 CFU per mL which is sensitive to amikacin, aztreonam, ceftriaxone, Piperacillin tazobactam, and E. coli more than 100,000 CFU per mL, ESBL which is sensitive to ciprofloxacin, gentamicin, meropenem, nitrofurantoin, tobramycin, trimethoprim sulfamethoxazole. · Chronic indwelling omer catheter  · Recurrent ESBL Ecoli UTI's  · MS  · Quadriplegia  · Stage IV sacral wound  · Contractures of extremities    Recommendations:   · 2/2 blood cultures showing ESBL+ E coli  · Continue Meropenem 1gm IV q 8 hours, Stop Date 1-28-20 -7 days therapy  · If pt is to be discharged prior to completion of therapy, OK to transition to Cipro 750 mg po BID to complete treatment  · Continue wound care  · Monitor clinical progress    Medical Decision Making/Summary/Discussion:1/25/2020     · Pt with MS, quadriplegia, neurogenic bladder  · Brought to ER with mentation changes, fever 103, tachycardia and hypotension  · Pt with history of recurrent UTI's, with a chronic omer catheter in place  · Cultures obtained and started on Meropenem and Vancomycin  · 2/2 blood cultures showing ESBL E coli. Pt has improved with antibiotic treatment  · Pt has a stage IV sacral ulcer, it is clean.    Infection Control Recommendations   · [place] Precautions  · Contact Isolation     Antimicrobial Stewardship Recommendations     · Targeted therapy    Coordination of Outpatient Care:   · Estimated Length of IV antimicrobials: 14 days, stop date 1- 28-20  · Patient will need Midline Catheter Insertion: no  · Patient will need PICC line Insertion:No  · Patient will need: Home IV , [place],  SNF,  LTAC: Yes  · Patient will need outpatient wound treatment    Labs, X rays reviewed: 1/25/2020    BUN: 11> 15  Cr: 0.25> 0.27   K 3.3> 4.0    WBC: 5.7> 5.4  Hb: 12.3 > 18.3  Plat:  240 > 312    Cultures:  Urine:  · 1-22 @ 0800 >[id number] E. coli ESBL positive, PROVIDENCIA STCONSTANCERTII  · 1-22 @ 1230 No significant growth  Blood:  · 1-22 x 2 ESBL E coli  · Repeat blood cultures 1/24/2020- negative  Sputum :  ·   Wound:      MRSA nasal swab - negative    Sacral wound:          Discussed with patient, RN, family. I have personally reviewed the past medical history, past surgical history, medications, social history, and family history, and I have updated the database accordingly.   Past Medical History:     Past Medical History:   Diagnosis Date    Aphasia     Aphasia     Contracture of joint, lower leg     Depressed     HTN (hypertension)     Hx MRSA infection resolved 1/2017    2 negative nasal screens 1/2017 (hx in heel in 2013)    Hydronephrosis     Movement disorder     tremor    Multiple sclerosis ([facility]     Neurogenic bladder     Non-verbal learning disorder     Peripheral vascular disease (HCC)     Polyneuropathy     Pressure ulcer     Coccyx, Heel    Pulmonary embolism (HCC)     Pulmonary infarction (HCC)     Quadriplegia (HCC)     Tachycardia     UTI (lower urinary tract infection)        Past Surgical  History:     Past Surgical History:   Procedure Laterality Date    AMPUTATION Right     COLOSTOMY      GASTROSTOMY TUBE PLACEMENT      LEG AMPUTATION THROUGH FEMUR         Medications:      miconazole   Topical BID    docusate  100 mg Oral Daily    baclofen  10 mg Per G Tube TID    gabapentin  300 mg Per G Tube TID    glycopyrrolate  1 mg Oral TID    lansoprazole  30 mg Per G Tube QAM AC    metoclopramide  5 mg Oral TID    CENTRUM/CERTA-DERICK with minerals oral  15 mL Per G Tube Daily    rivaroxaban  20 mg Oral Daily    scopolamine  1 patch Transdermal Q72H    sertraline  25 mg Per G Tube Daily    sodium chloride flush  10 mL Intravenous 2 times per day    meropenem  1 g Intravenous Q8H       Social History:     Social History     Socioeconomic History    Marital status: Single     Spouse name: Not on file    Number of children: Not on file    Years of education: Not on file    Highest education level: Not on file   Occupational History    Not on file   Social Needs    Financial resource strain: Not on file    Food insecurity:     Worry: Not on file     Inability: Not on file    Transportation needs:     Medical: Not on file     Non-medical: Not on file   Tobacco Use    Smoking status: Never Smoker    Smokeless tobacco: Never Used   Substance and Sexual Activity    Alcohol use: No    Drug use: No    Sexual activity: Never   Lifestyle    Physical activity:     Days per week: Not on file     Minutes per session: Not on file    Stress: Not on file   Relationships    Social connections:     Talks on phone: Not on file     Gets together: Not on file     Attends Gnosticism service: Not on file     Active member of club or organization: Not on file     Attends meetings of clubs or organizations: Not on file     Relationship status: Not on file    Intimate partner violence:     Fear of current or ex partner: Not on file     Emotionally abused: Not on file     Physically abused: Not on file     Forced sexual activity: Not on file   Other Topics Concern    Not on file   Social History Narrative    Not on file       Family History:     Family History   Problem Relation Age of Onset    No Known Problems Mother     No Known Problems Father         Allergies:   Patient has no known allergies. Review of Systems:     Review of Systems   Unable to perform ROS: Patient nonverbal   Constitutional: Negative for chills and fever. Eyes: Negative for photophobia and visual disturbance. Respiratory: Negative for cough and shortness of breath. Cardiovascular: Negative for chest pain and palpitations.    Neurological: Negative for numbness and headaches. Physical Examination :     Patient Vitals for the past 8 hrs:   BP Temp Temp src Pulse Resp SpO2   01/25/20 0800 (!) 98/56 98.8 °F (37.1 °C) Axillary 84 16 98 %     Physical Exam  Constitutional:       General: She is not in acute distress. Appearance: Normal appearance. HENT:      Head: Normocephalic and atraumatic. Nose: Nose normal.      Mouth/Throat:      Mouth: Mucous membranes are moist.      Pharynx: Oropharynx is clear. Eyes:      General: No scleral icterus. Extraocular Movements: Extraocular movements intact. Conjunctiva/sclera: Conjunctivae normal.      Pupils: Pupils are equal, round, and reactive to light. Neck:      Musculoskeletal: Normal range of motion and neck supple. Cardiovascular:      Rate and Rhythm: Normal rate and regular rhythm. Pulses: Normal pulses. Heart sounds: Normal heart sounds. No murmur. No gallop. Pulmonary:      Effort: Pulmonary effort is normal.      Breath sounds: Normal breath sounds. No wheezing, rhonchi or rales. Abdominal:      General: Bowel sounds are normal.      Palpations: Abdomen is soft. Tenderness: There is no abdominal tenderness. There is no guarding. Comments: G-tube site clean  Ostomy site clean- normal output   Genitourinary:     Comments: Davies with clear urine. Musculoskeletal: Normal range of motion. General: No swelling or deformity. Comments: Contractures   Skin:     General: Skin is warm. Comments: Stage IV decubitus ulcer on sacrum   Neurological:      Mental Status: She is alert.       Comments: Nonverbal, minimal interaction         Medical Decision Making -Laboratory:   I have independently reviewed/ordered the following labs:    CBC with Differential:   Recent Labs     01/24/20  0546 01/25/20  0721   WBC 5.7 5.4   HGB 12.3 13.3   HCT 40.8 43.6    312   LYMPHOPCT 15* 37   MONOPCT 20* 19*     BMP:   Recent Labs     01/23/20  0801 20  0546 20  0721    141 137   K 3.2* 3.3* 4.0    109* 105   CO2 20 16* 22   BUN 5* 11 15   CREATININE 0.30* 0.25* 0.27*   MG 1.6 1.8  --      Hepatic Function Panel:   No results for input(s): PROT, LABALBU, BILIDIR, IBILI, BILITOT, ALKPHOS, ALT, AST in the last 72 hours. No results for input(s): RPR in the last 72 hours. No results for input(s): HIV in the last 72 hours. No results for input(s): BC in the last 72 hours. Lab Results   Component Value Date    MUCUS NOT REPORTED 2020    RBC 5.19 2020    RBC 4.56 2012    TRICHOMONAS NOT REPORTED 2020    WBC 5.4 2020    YEAST NOT REPORTED 2020    TURBIDITY CLOUDY 2020     Lab Results   Component Value Date    CREATININE 0.27 2020    GLUCOSE 100 2020    GLUCOSE 69 2012       Medical Decision Making-Imagin-22 CXR    EXAMINATION:   ONE XRAY VIEW OF THE CHEST       2020 6:59 am       COMPARISON:   2019       HISTORY:   ORDERING SYSTEM PROVIDED HISTORY: AMS   TECHNOLOGIST PROVIDED HISTORY:   [facility]   Reason for Exam: upright portable, AMS       FINDINGS:   Elevation of the right hemidiaphragm.  Low lung volumes.       No focal consolidation.       No definite cardiomegaly or pulmonary edema when allowing for low lung   volumes.           Impression   Low lung volumes without acute findings. Medical Decision Whktgs-Rmtrppnx-Jungl:   2020  7:31 AM - LewNathalie Incoming Lab Results From [employer] - is now [employer]     Specimen Information: Blood        Component Collected Lab   Specimen Description 2020  7:32  [address]   . BLOOD    Special Requests 2020  7:32  [address]   NOT REPORTED    Culture Abnormal  2020  7:32 AM [address] Blood Culture Results called to and read back by: [facility] RN AT 9874 ON 20    Culture 2020  7:32 AM [facility] FROM BOTTLE: GRAM NEGATIVE RODS    Culture Abnormal  01/22/2020  7:32  [address]   Escherichia coli detected by PCR    Culture Abnormal  01/22/2020  7:32 AM [address] THIS ORGANISM IS AN EXTENDED-SPECTRUM BETA-LACTAMASE  AND RESISTANCE TO THERAPY WITH PENICILLINS, CEPHALOSPORINS AND AZTREONAM IS EXPECTED.  THESE ORGANISMS GENERALLY REMAIN SUSCEPTIBLE TO CARBAPENEMS.  CONSIDER ID CONSULTATION.    Testing Performed By     [address] Name Director Address Valid Date Range   [address] [facility] [place] [address] [facility] [address] 08/30/17 0801-Present   Susceptibility     Escherichia coli (4)     Antibiotic Interpretation JOAQUIN Status    amikacin   Final     NOT REPORTED   ampicillin Resistant  Final     >=32  RESISTANT   ampicillin-sulbactam   Final     NOT REPORTED   aztreonam Resistant  Final     >=64  RESISTANT   ceFAZolin Resistant  Final     >=64  RESISTANT   cefepime Resistant  Final     >=64  RESISTANT   cefTRIAXone Resistant  Final     >=64  RESISTANT   ciprofloxacin Sensitive  Final     1  SUSCEPTIBLE   ertapenem   Final     NOT REPORTED   Confirmatory Extended Spectrum Beta-Lactamase Positive POSITIVE Final    gentamicin Sensitive  Final     <=1  SUSCEPTIBLE   meropenem Sensitive  Final     <=0.25  SUSCEPTIBLE   nitrofurantoin   Final     NOT REPORTED   tigecycline   Final     NOT REPORTED   tobramycin Sensitive  Final     <=1  SUSCEPTIBLE   trimethoprim-sulfamethoxazole Sensitive  Final     <=20  SUSCEPTIBLE   piperacillin-tazobactam Resistant  Final     <=4  RESISTANT         Medical Decision Making-Other:     Note:  · Labs, medications, radiologic studies were reviewed with personal review of films  · Moderate Large amounts of data were reviewed  · Discussed with nursing Staff, Discharge planner  · Infection Control and Prevention measures reviewed  · All prior entries were reviewed  · Administer medications as ordered  · Prognosis: Guarded  · Discharge planning reviewed  · Follow up as outpatient. Thank you for allowing us to participate in the care of this patient. Please call with questions. Lynette Kiran MD     ATTESTATION:    I have discussed the case, including pertinent history and exam findings with the residents and students. I have seen and examined the patient and the key elements of the encounter have been performed by me. I was present when the student obtained his information or examined the patient. I have reviewed the laboratory data, other diagnostic studies and discussed them with the residents. I have updated the medical record where necessary. I agree with the assessment, plan and orders as documented by the resident/ student.     June Lees MD.    Pager: [phone] - Office: [phone]

## 2020-01-25 NOTE — DISCHARGE INSTR - COC
Continuity of Care Form    Patient Name: Hans Garrison   :  1980  MRN:  [id number]    [facility] date:  2020  Discharge date:  2020    Code Status Order: Full Code   Advance Directives:   Advance Care Flowsheet Documentation     Date/Time Healthcare Directive Type of Healthcare Directive Copy in [address] Agent's Name Healthcare Agent's Phone Number    20 1300  No, patient does not have an advance directive for healthcare treatment -- -- -- -- --          Admitting Physician:  No admitting provider for patient encounter. PCP: Homer Torres MD    Discharging Nurse: [facility] Unit/Room#: 0331/0331-01  Discharging Unit Phone Number: [phone]    Emergency Contact:   Extended Emergency Contact Information  Primary Emergency Contact: Jeff Michael, [address] [place] [address] Phone: [phone]  Work Phone: [phone]  Mobile Phone: [phone]  Relation: Parent   needed? No  Secondary Emergency Contact: frances esparza  Mobile Phone: [phone]  Relation: Brother/Sister   needed?  No    Past Surgical History:  Past Surgical History:   Procedure Laterality Date    AMPUTATION Right     COLOSTOMY      GASTROSTOMY TUBE PLACEMENT      LEG AMPUTATION THROUGH FEMUR         Immunization History:   Immunization History   Administered Date(s) Administered    Influenza Vaccine, unspecified formulation 10/09/2016    Pneumococcal Conjugate 13-valent (Fadi Butler) 10/09/2016       Active Problems:  Patient Active Problem List   Diagnosis Code    Aphasia R47.01    Multiple sclerosis ([address] G35    Nonverbal R47.01    Gastrostomy tube dependent ([address] Z93.1    Dislodged gastrostomy tube ([address] Z43.1    Neurogenic bladder N31.9    Hemorrhagic cystitis N30.91    Hypokalemia E87.6    Urinary tract infection associated with indwelling urethral catheter ([address] T83.511A, N39.0    Tachycardia R00.0    Gross hematuria R31.0    Lactic acidosis E87.2  Chronic indwelling Davies catheter Z96.0    Peripheral vascular disease ([facility] [address] I73.9    Recurrent UTI (urinary tract infection) N39.0    History of pulmonary embolism Z86.711    Hypertension I10    Altered mental status R41.82    Toxic metabolic encephalopathy P66    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    Septic shock ([facility]) A41.9, R65.21    Status post colostomy ([facility] [address] Z93.3    Lower paraplegia ([facility]) G82.20    Sepsis ([facility] [address] A41.9    Pneumonia due to organism J18.9    Extended spectrum beta lactamase (ESBL) resistance Z16.12    Abdominal pain R10.9    Ileus ([facility]) K56.7    Decubitus ulcer of coccygeal region, stage 4 ([facility]) L89.154    Proteus mirabilis infection A49.8    Lactate blood increase R79.89    E-coli UTI N39.0, B96.20    Sepsis due to Escherichia coli ([facility]) A41.51    E coli bacteremia R78.81       Isolation/Infection:   Isolation          Contact        Patient Infection Status     Infection Onset Added Last Indicated Last Indicated By Review Planned Expiration Resolved Resolved By    MRSA 01/22/20 01/22/20 01/22/20 MRSA DNA Probe, Nasal        MDRO (multi-drug resistant organism)  01/04/19 01/04/19 Phani Jenkins RN        Proteus - Urine 8/2019      ESBL (Extended Spectrum Beta Lactamase)  06/29/18 01/22/20 Culture blood #2        E.  Coli - urine 6/2018      Resolved    MRSA  01/09/17 01/09/17 Robinson Garrison RN   01/11/17 Robinson Garrison RN    Resolved - 2 negative nasal screens 1/2017  Heel - 2/2013          Nurse Assessment:  Last Vital Signs: /62   Pulse 127   Temp 98.4 °F (36.9 °C) (Axillary)   Resp 16   Ht 5' (1.524 m)   Wt 168 lb 3.4 oz (76.3 kg)   SpO2 100%   BMI 32.85 kg/m²     Last documented pain score (0-10 scale): Pain Level: 0  Last Weight:   Wt Readings from Last 1 Encounters:   01/24/20 168 lb 3.4 oz (76.3 kg)     Mental Status:  unable to assess    IV Access:  - None    Nursing Mobility/ADLs:  Walking   Dependent  Transfer  Dependent  Bathing  Dependent  Dressing

## 2020-01-28 ENCOUNTER — APPOINTMENT (OUTPATIENT)
Dept: GENERAL RADIOLOGY | Age: 40
End: 2020-01-28
Payer: MEDICARE

## 2020-01-28 ENCOUNTER — HOSPITAL ENCOUNTER (EMERGENCY)
Age: 40
Discharge: NURSING, REHAB OR LONG-TERM CARE | End: 2020-01-28
Attending: EMERGENCY MEDICINE
Payer: MEDICARE

## 2020-01-28 VITALS
DIASTOLIC BLOOD PRESSURE: 68 MMHG | SYSTOLIC BLOOD PRESSURE: 102 MMHG | OXYGEN SATURATION: 96 % | TEMPERATURE: 98.8 F | RESPIRATION RATE: 19 BRPM | HEART RATE: 70 BPM

## 2020-01-28 PROCEDURE — 99284 EMERGENCY DEPT VISIT MOD MDM: CPT

## 2020-01-28 PROCEDURE — 6360000004 HC RX CONTRAST MEDICATION: Performed by: STUDENT IN AN ORGANIZED HEALTH CARE EDUCATION/TRAINING PROGRAM

## 2020-01-28 PROCEDURE — 74018 RADEX ABDOMEN 1 VIEW: CPT

## 2020-01-28 RX ADMIN — DIATRIZOATE MEGLUMINE AND DIATRIZOATE SODIUM: 660; 100 LIQUID ORAL; RECTAL at 21:44

## 2020-01-29 NOTE — ED NOTES
Dr. Marian Hernandez at bedside      Amy [facility]  01/28/20 2122

## 2020-01-29 NOTE — ED NOTES
Bed: 26  Expected date: 1/28/20  Expected time: 8:44 PM  Means of arrival:   Comments:  First care     Adeline Duarte RN  01/28/20 7856

## 2020-01-29 NOTE — ED NOTES
Paperwork faxed to [employer] for transport home.        Solo Mcdowell, MSW, LSW     Aldair Aguilar  01/28/20 8036

## 2020-01-29 NOTE — ED PROVIDER NOTES
[facility] ED  eMERGENCY dEPARTMENT eNCOUnter   Attending Attestation     Pt Name: Nelson Adair  MRN: [id number]  [place] 1980  Date of evaluation: 1/28/20       Nelson Adair is a 44 y.o. female who presents with G Tube Complications (pt sent from [address] [place] [address] for G tube replacement )      History: Concern for malfunctioning g tube. Tube was manipulated by resident and working. Will get gastrgraffin study to ensure placement and discharge back to facility. I performed a history and physical examination of the patient and discussed management with the resident. I reviewed the residents note and agree with the documented findings and plan of care. Any areas of disagreement are noted on the chart. I was personally present for the key portions of any procedures. I have documented in the chart those procedures where I was not present during the key portions. I have personally reviewed all images and agree with the resident's interpretation. I have reviewed the emergency nurses triage note. I agree with the chief complaint, past medical history, past surgical history, allergies, medications, social and family history as documented unless otherwise noted below. Documentation of the HPI, Physical Exam and Medical Decision Making performed by medical students or scribes is based on my personal performance of the HPI, PE and MDM. For Phys Assistant/ Nurse Practitioner cases/documentation I have had a face to face evaluation of this patient and have completed at least one if not all key elements of the E/M (history, physical exam, and MDM). Additional findings are as noted. For APC cases I have personally evaluated and examined the patient in conjunction with the APC and agree with the treatment plan and disposition of the patient as recorded by the APC.     Jase Tavares MD  Attending Emergency  Physician        Angi Graves MD  01/28/20 0139

## 2020-01-29 NOTE — ED PROVIDER NOTES
[address]  ED  Emergency Department Encounter  EmergencyMedicineResident     Pt Name: Tanner Macias  MRN: [id number]  [place] 1980  Date of evaluation: 1/28/20  PCP: Antwan Chavez MD    [address] [place] [address]       Chief Complaint   Patient presents with    G Tube Complications     pt sent from [address] [place] [address] for G tube replacement        HISTORY OF PRESENT ILLNESS  (Location/Symptom, Timing/Onset, Context/Setting, Quality, Duration, Modifying Factors, Severity.)      Tanner Macias is a 44 y.o. female who presents with G tube problem. Patient was sent from [facility] for evaluation of this problem. Patient is nonverbal and cannot give me any history. According to the nurse, patient's nurse over at the [address] [place] [address] was unable to use the G-tube. They are not sure exactly when her last feeding was. No other issues noted at this time. PAST MEDICAL / SURGICAL /SOCIAL / FAMILY HISTORY      has a past medical history of Aphasia, Aphasia, Contracture of joint, lower leg, Depressed, HTN (hypertension), Hx MRSA infection, Hydronephrosis, Movement disorder, Multiple sclerosis ([address] Neurogenic bladder, Non-verbal learning disorder, Peripheral vascular disease ([address] Polyneuropathy, Pressure ulcer, Pulmonary embolism ([address] Pulmonary infarction ([address] Quadriplegia ([address] Tachycardia, and UTI (lower urinary tract infection). has a past surgical history that includes Gastrostomy tube placement; colostomy; amputation (Right); and Leg amputation through femur.       Social History     Socioeconomic History    Marital status: Single     Spouse name: Not on file    Number of children: Not on file    Years of education: Not on file    Highest education level: Not on file   Occupational History    Not on file   Social Needs    Financial resource strain: Not on file    Food insecurity:     Worry: Not on file     Inability: Not on file    Transportation needs:     Medical: Not on file Non-medical: Not on file   Tobacco Use    Smoking status: Never Smoker    Smokeless tobacco: Never Used   Substance and Sexual Activity    Alcohol use: No    Drug use: No    Sexual activity: Never   Lifestyle    Physical activity:     Days per week: Not on file     Minutes per session: Not on file    Stress: Not on file   Relationships    Social connections:     Talks on phone: Not on file     Gets together: Not on file     Attends [employer] service: Not on file     Active member of club or organization: Not on file     Attends meetings of clubs or organizations: Not on file     Relationship status: Not on file    Intimate partner violence:     Fear of current or ex partner: Not on file     Emotionally abused: Not on file     Physically abused: Not on file     Forced sexual activity: Not on file   Other Topics Concern    Not on file   Social History Narrative    Not on file       Family History   Problem Relation Age of Onset    No Known Problems Mother     No Known Problems Father        Allergies:  Patient has no known allergies. Home Medications:  Prior to Admission medications    Medication Sig Start Date End Date Taking? Authorizing Provider   miconazole (MICOTIN) 2 % powder Apply topically 2 times daily. 1/25/20   Mahad Powers MD   midodrine (PROAMATINE) 5 MG tablet Take 1 tablet by mouth 2 times daily (with meals) 1/25/20   Mahad Powers MD   ciprofloxacin (CIPRO) 500 MG tablet Take 1.5 tablets by mouth 2 times daily for 4 days 1/25/20 1/29/20  Stephen Chirinos MD   acetaminophen-codeine (TYLENOL/CODEINE #3) 300-30 MG per tablet Take 1 tablet by mouth every 8 hours as needed for Pain.     Historical Provider, MD   D-Mannose 500 MG CAPS Take 3 capsules by mouth three times daily    Historical Provider, MD   atropine 1 % ophthalmic solution 2-4 drops every 4 hours as needed    Historical Provider, MD   metoclopramide (REGLAN) 5 MG/5ML solution Take 5 mLs by mouth 3 times daily toxic-appearing or diaphoretic. Comments: Patient is nonverbal, however she does nod and shake her head appropriately for answers to questions. Appears comfortable, not ill or toxic. HENT:      Head: Normocephalic and atraumatic. Right Ear: External ear normal.      Left Ear: External ear normal.      Nose: Nose normal.      Mouth/Throat:      Mouth: Mucous membranes are moist.      Pharynx: No posterior oropharyngeal erythema. Eyes:      General: No scleral icterus. Right eye: No discharge. Left eye: No discharge. Extraocular Movements: Extraocular movements intact. Pupils: Pupils are equal, round, and reactive to light. Neck:      Musculoskeletal: Normal range of motion. Trachea: No tracheal deviation. Cardiovascular:      Rate and Rhythm: Normal rate and regular rhythm. Heart sounds: Normal heart sounds. No murmur. No friction rub. No gallop. Pulmonary:      Effort: Pulmonary effort is normal. No respiratory distress. Breath sounds: Normal breath sounds. No wheezing, rhonchi or rales. Abdominal:      General: Bowel sounds are normal. There is no distension. Palpations: Abdomen is soft. There is no mass. Tenderness: There is no abdominal tenderness. There is no guarding. Comments: G-tube in place, not leaking any fluid around the ostomy site and from the G-tube. No erythema around the insertion site. Musculoskeletal: Normal range of motion. Skin:     General: Skin is warm and dry. Capillary Refill: Capillary refill takes less than 2 seconds. Findings: No rash. Neurological:      Mental Status: She is alert. Motor: No abnormal muscle tone.       Coordination: Coordination normal.           DIFFERENTIAL  DIAGNOSIS     PLAN (LABS / IMAGING / EKG):  Orders Placed This Encounter   Procedures    XR ABDOMEN (KUB) (SINGLE AP VIEW)       MEDICATIONS ORDERED:  ED Medication Orders (From admission, onward)    Start Ordered

## 2020-01-30 LAB
CULTURE: NORMAL
CULTURE: NORMAL
Lab: NORMAL
Lab: NORMAL
SPECIMEN DESCRIPTION: NORMAL
SPECIMEN DESCRIPTION: NORMAL

## 2020-02-05 ENCOUNTER — HOSPITAL ENCOUNTER (OUTPATIENT)
Age: 40
Setting detail: SPECIMEN
Discharge: HOME | End: 2020-02-05
Payer: MEDICARE

## 2020-02-05 LAB
ANION GAP SERPL CALCULATED.3IONS-SCNC: 13 MMOL/L (ref 9–17)
BUN BLDV-MCNC: 13 MG/DL (ref 6–20)
BUN/CREAT BLD: ABNORMAL (ref 9–20)
CALCIUM SERPL-MCNC: 9.8 MG/DL (ref 8.6–10.4)
CHLORIDE BLD-SCNC: 99 MMOL/L (ref 98–107)
CO2: 25 MMOL/L (ref 20–31)
CREAT SERPL-MCNC: 0.35 MG/DL (ref 0.5–0.9)
GFR AFRICAN AMERICAN: >60 ML/MIN
GFR NON-AFRICAN AMERICAN: >60 ML/MIN
GFR SERPL CREATININE-BSD FRML MDRD: ABNORMAL ML/MIN/{1.73_M2}
GFR SERPL CREATININE-BSD FRML MDRD: ABNORMAL ML/MIN/{1.73_M2}
GLUCOSE BLD-MCNC: 87 MG/DL (ref 70–99)
HCT VFR BLD CALC: 43 % (ref 36.3–47.1)
HEMOGLOBIN: 13 G/DL (ref 11.9–15.1)
MCH RBC QN AUTO: 25.7 PG (ref 25.2–33.5)
MCHC RBC AUTO-ENTMCNC: 30.2 G/DL (ref 28.4–34.8)
MCV RBC AUTO: 85.1 FL (ref 82.6–102.9)
NRBC AUTOMATED: 0 PER 100 WBC
PDW BLD-RTO: 15.7 % (ref 11.8–14.4)
PLATELET # BLD: 453 K/UL (ref 138–453)
PMV BLD AUTO: 11.4 FL (ref 8.1–13.5)
POTASSIUM SERPL-SCNC: 3.7 MMOL/L (ref 3.7–5.3)
RBC # BLD: 5.05 M/UL (ref 3.95–5.11)
SODIUM BLD-SCNC: 137 MMOL/L (ref 135–144)
WBC # BLD: 6.8 K/UL (ref 3.5–11.3)

## 2020-02-05 PROCEDURE — 36415 COLL VENOUS BLD VENIPUNCTURE: CPT

## 2020-02-05 PROCEDURE — P9603 ONE-WAY ALLOW PRORATED MILES: HCPCS

## 2020-02-05 PROCEDURE — 80048 BASIC METABOLIC PNL TOTAL CA: CPT

## 2020-02-05 PROCEDURE — 85027 COMPLETE CBC AUTOMATED: CPT

## 2020-02-14 NOTE — TELEPHONE ENCOUNTER
#3 TRIED CALLING PATIENT TO SCHEDULE CONSULTATION, PHONE IS DISCONNECTED, SENDING REFERRAL BACK PLEASE ADVISE.

## 2020-03-03 ENCOUNTER — HOSPITAL ENCOUNTER (OUTPATIENT)
Age: 40
Setting detail: SPECIMEN
Discharge: HOME | End: 2020-03-03
Payer: MEDICARE

## 2020-03-03 LAB
ANION GAP SERPL CALCULATED.3IONS-SCNC: 15 MMOL/L (ref 9–17)
BUN BLDV-MCNC: 14 MG/DL (ref 6–20)
BUN/CREAT BLD: ABNORMAL (ref 9–20)
CALCIUM SERPL-MCNC: 10 MG/DL (ref 8.6–10.4)
CHLORIDE BLD-SCNC: 105 MMOL/L (ref 98–107)
CO2: 20 MMOL/L (ref 20–31)
CREAT SERPL-MCNC: 0.35 MG/DL (ref 0.5–0.9)
GFR AFRICAN AMERICAN: >60 ML/MIN
GFR NON-AFRICAN AMERICAN: >60 ML/MIN
GFR SERPL CREATININE-BSD FRML MDRD: ABNORMAL ML/MIN/{1.73_M2}
GFR SERPL CREATININE-BSD FRML MDRD: ABNORMAL ML/MIN/{1.73_M2}
GLUCOSE BLD-MCNC: 85 MG/DL (ref 70–99)
HCT VFR BLD CALC: 42.2 % (ref 36.3–47.1)
HEMOGLOBIN: 13.1 G/DL (ref 11.9–15.1)
MCH RBC QN AUTO: 26.7 PG (ref 25.2–33.5)
MCHC RBC AUTO-ENTMCNC: 31 G/DL (ref 28.4–34.8)
MCV RBC AUTO: 86.1 FL (ref 82.6–102.9)
NRBC AUTOMATED: 0 PER 100 WBC
PDW BLD-RTO: 15.9 % (ref 11.8–14.4)
PLATELET # BLD: 399 K/UL (ref 138–453)
PMV BLD AUTO: 11.4 FL (ref 8.1–13.5)
POTASSIUM SERPL-SCNC: 3.6 MMOL/L (ref 3.7–5.3)
RBC # BLD: 4.9 M/UL (ref 3.95–5.11)
SODIUM BLD-SCNC: 140 MMOL/L (ref 135–144)
WBC # BLD: 7 K/UL (ref 3.5–11.3)

## 2020-03-03 PROCEDURE — 36415 COLL VENOUS BLD VENIPUNCTURE: CPT

## 2020-03-03 PROCEDURE — 80048 BASIC METABOLIC PNL TOTAL CA: CPT

## 2020-03-03 PROCEDURE — P9603 ONE-WAY ALLOW PRORATED MILES: HCPCS

## 2020-03-03 PROCEDURE — 85027 COMPLETE CBC AUTOMATED: CPT

## 2020-04-21 ENCOUNTER — HOSPITAL ENCOUNTER (OUTPATIENT)
Age: 40
Setting detail: SPECIMEN
Discharge: HOME | End: 2020-04-21
Payer: MEDICARE

## 2020-04-21 LAB
ANION GAP SERPL CALCULATED.3IONS-SCNC: 15 MMOL/L (ref 9–17)
BUN BLDV-MCNC: 10 MG/DL (ref 6–20)
BUN/CREAT BLD: ABNORMAL (ref 9–20)
CALCIUM SERPL-MCNC: 9.5 MG/DL (ref 8.6–10.4)
CHLORIDE BLD-SCNC: 101 MMOL/L (ref 98–107)
CO2: 22 MMOL/L (ref 20–31)
CREAT SERPL-MCNC: 0.35 MG/DL (ref 0.5–0.9)
GFR AFRICAN AMERICAN: >60 ML/MIN
GFR NON-AFRICAN AMERICAN: >60 ML/MIN
GFR SERPL CREATININE-BSD FRML MDRD: ABNORMAL ML/MIN/{1.73_M2}
GFR SERPL CREATININE-BSD FRML MDRD: ABNORMAL ML/MIN/{1.73_M2}
GLUCOSE BLD-MCNC: 49 MG/DL (ref 70–99)
HCT VFR BLD CALC: 40.3 % (ref 36.3–47.1)
HEMOGLOBIN: 12.1 G/DL (ref 11.9–15.1)
MCH RBC QN AUTO: 25.3 PG (ref 25.2–33.5)
MCHC RBC AUTO-ENTMCNC: 30 G/DL (ref 28.4–34.8)
MCV RBC AUTO: 84.1 FL (ref 82.6–102.9)
NRBC AUTOMATED: 0 PER 100 WBC
PDW BLD-RTO: 15.8 % (ref 11.8–14.4)
PLATELET # BLD: 448 K/UL (ref 138–453)
PMV BLD AUTO: 11.3 FL (ref 8.1–13.5)
POTASSIUM SERPL-SCNC: 3.9 MMOL/L (ref 3.7–5.3)
RBC # BLD: 4.79 M/UL (ref 3.95–5.11)
SODIUM BLD-SCNC: 138 MMOL/L (ref 135–144)
WBC # BLD: 6.9 K/UL (ref 3.5–11.3)

## 2020-04-21 PROCEDURE — 80048 BASIC METABOLIC PNL TOTAL CA: CPT

## 2020-04-21 PROCEDURE — 85027 COMPLETE CBC AUTOMATED: CPT

## 2020-04-21 PROCEDURE — P9603 ONE-WAY ALLOW PRORATED MILES: HCPCS

## 2020-04-21 PROCEDURE — 36415 COLL VENOUS BLD VENIPUNCTURE: CPT

## 2020-05-19 ENCOUNTER — HOSPITAL ENCOUNTER (OUTPATIENT)
Age: 40
Setting detail: SPECIMEN
Discharge: HOME | End: 2020-05-19
Payer: MEDICARE

## 2020-05-19 LAB
ANION GAP SERPL CALCULATED.3IONS-SCNC: 11 MMOL/L (ref 9–17)
BUN BLDV-MCNC: 10 MG/DL (ref 6–20)
BUN/CREAT BLD: ABNORMAL (ref 9–20)
CALCIUM SERPL-MCNC: 9.5 MG/DL (ref 8.6–10.4)
CHLORIDE BLD-SCNC: 104 MMOL/L (ref 98–107)
CO2: 23 MMOL/L (ref 20–31)
CREAT SERPL-MCNC: 0.35 MG/DL (ref 0.5–0.9)
GFR AFRICAN AMERICAN: >60 ML/MIN
GFR NON-AFRICAN AMERICAN: >60 ML/MIN
GFR SERPL CREATININE-BSD FRML MDRD: ABNORMAL ML/MIN/{1.73_M2}
GFR SERPL CREATININE-BSD FRML MDRD: ABNORMAL ML/MIN/{1.73_M2}
GLUCOSE BLD-MCNC: 82 MG/DL (ref 70–99)
HCT VFR BLD CALC: 41.1 % (ref 36.3–47.1)
HEMOGLOBIN: 12.3 G/DL (ref 11.9–15.1)
MCH RBC QN AUTO: 24.9 PG (ref 25.2–33.5)
MCHC RBC AUTO-ENTMCNC: 29.9 G/DL (ref 28.4–34.8)
MCV RBC AUTO: 83.4 FL (ref 82.6–102.9)
NRBC AUTOMATED: 0 PER 100 WBC
PDW BLD-RTO: 16.1 % (ref 11.8–14.4)
PLATELET # BLD: 379 K/UL (ref 138–453)
PMV BLD AUTO: 10.9 FL (ref 8.1–13.5)
POTASSIUM SERPL-SCNC: 3.9 MMOL/L (ref 3.7–5.3)
RBC # BLD: 4.93 M/UL (ref 3.95–5.11)
SODIUM BLD-SCNC: 138 MMOL/L (ref 135–144)
WBC # BLD: 7.8 K/UL (ref 3.5–11.3)

## 2020-05-19 PROCEDURE — 36415 COLL VENOUS BLD VENIPUNCTURE: CPT

## 2020-05-19 PROCEDURE — 85027 COMPLETE CBC AUTOMATED: CPT

## 2020-05-19 PROCEDURE — P9603 ONE-WAY ALLOW PRORATED MILES: HCPCS

## 2020-05-19 PROCEDURE — 80048 BASIC METABOLIC PNL TOTAL CA: CPT

## 2020-06-03 ENCOUNTER — HOSPITAL ENCOUNTER (OUTPATIENT)
Age: 40
Setting detail: SPECIMEN
Discharge: HOME | End: 2020-06-03
Payer: MEDICARE

## 2020-06-03 LAB
ANION GAP SERPL CALCULATED.3IONS-SCNC: 15 MMOL/L (ref 9–17)
BUN BLDV-MCNC: 12 MG/DL (ref 6–20)
BUN/CREAT BLD: ABNORMAL (ref 9–20)
CALCIUM SERPL-MCNC: 9.4 MG/DL (ref 8.6–10.4)
CHLORIDE BLD-SCNC: 97 MMOL/L (ref 98–107)
CO2: 23 MMOL/L (ref 20–31)
CREAT SERPL-MCNC: 0.35 MG/DL (ref 0.5–0.9)
GFR AFRICAN AMERICAN: >60 ML/MIN
GFR NON-AFRICAN AMERICAN: >60 ML/MIN
GFR SERPL CREATININE-BSD FRML MDRD: ABNORMAL ML/MIN/{1.73_M2}
GFR SERPL CREATININE-BSD FRML MDRD: ABNORMAL ML/MIN/{1.73_M2}
GLUCOSE BLD-MCNC: 98 MG/DL (ref 70–99)
HCT VFR BLD CALC: 39.2 % (ref 36.3–47.1)
HEMOGLOBIN: 11.8 G/DL (ref 11.9–15.1)
MCH RBC QN AUTO: 24.1 PG (ref 25.2–33.5)
MCHC RBC AUTO-ENTMCNC: 30.1 G/DL (ref 28.4–34.8)
MCV RBC AUTO: 80.2 FL (ref 82.6–102.9)
NRBC AUTOMATED: 0 PER 100 WBC
PDW BLD-RTO: 16.4 % (ref 11.8–14.4)
PLATELET # BLD: 511 K/UL (ref 138–453)
PMV BLD AUTO: 10.6 FL (ref 8.1–13.5)
POTASSIUM SERPL-SCNC: 3.9 MMOL/L (ref 3.7–5.3)
RBC # BLD: 4.89 M/UL (ref 3.95–5.11)
SODIUM BLD-SCNC: 135 MMOL/L (ref 135–144)
WBC # BLD: 10.5 K/UL (ref 3.5–11.3)

## 2020-06-03 PROCEDURE — P9603 ONE-WAY ALLOW PRORATED MILES: HCPCS

## 2020-06-03 PROCEDURE — 80048 BASIC METABOLIC PNL TOTAL CA: CPT

## 2020-06-03 PROCEDURE — 36415 COLL VENOUS BLD VENIPUNCTURE: CPT

## 2020-06-03 PROCEDURE — 85027 COMPLETE CBC AUTOMATED: CPT

## 2020-06-20 ENCOUNTER — APPOINTMENT (OUTPATIENT)
Dept: GENERAL RADIOLOGY | Age: 40
End: 2020-06-20
Payer: MEDICARE

## 2020-06-20 ENCOUNTER — HOSPITAL ENCOUNTER (EMERGENCY)
Age: 40
Discharge: HOME | End: 2020-06-20
Attending: EMERGENCY MEDICINE
Payer: MEDICARE

## 2020-06-20 VITALS
OXYGEN SATURATION: 96 % | HEART RATE: 105 BPM | DIASTOLIC BLOOD PRESSURE: 71 MMHG | WEIGHT: 161 LBS | RESPIRATION RATE: 18 BRPM | SYSTOLIC BLOOD PRESSURE: 113 MMHG | TEMPERATURE: 98.4 F | BODY MASS INDEX: 31.44 KG/M2

## 2020-06-20 PROCEDURE — 6360000004 HC RX CONTRAST MEDICATION: Performed by: EMERGENCY MEDICINE

## 2020-06-20 PROCEDURE — 74018 RADEX ABDOMEN 1 VIEW: CPT

## 2020-06-20 PROCEDURE — 99283 EMERGENCY DEPT VISIT LOW MDM: CPT

## 2020-06-20 RX ORDER — MORPHINE SULFATE 4 MG/ML
4 INJECTION, SOLUTION INTRAMUSCULAR; INTRAVENOUS ONCE
Status: DISCONTINUED | OUTPATIENT
Start: 2020-06-20 | End: 2020-06-20

## 2020-06-20 RX ORDER — ASPIRIN 81 MG/1
324 TABLET, CHEWABLE ORAL ONCE
Status: DISCONTINUED | OUTPATIENT
Start: 2020-06-20 | End: 2020-06-20

## 2020-06-20 RX ADMIN — DIATRIZOATE MEGLUMINE AND DIATRIZOATE SODIUM 30 ML: 660; 100 LIQUID ORAL; RECTAL at 11:11

## 2020-06-20 NOTE — ED PROVIDER NOTES
by Per G Tube route daily, Disp-1 Bottle, R-3Print      lansoprazole (PREVACID SOLUTAB) 30 MG disintegrating tablet 1 tablet by Per G Tube route every morning (before breakfast), Disp-30 tablet, R-3Normal      Cranberry 250 MG CAPS Take 250 mg by mouth 2 times dailyHistorical Med      Scopolamine Base (SCOPOLAMINE TD) Place 1 mg onto the skin every 72 hours Historical Med      baclofen (LIORESAL) 10 MG tablet 10 mg by Per G Tube route 3 times daily Crush into G tube Historical Med      gabapentin (NEURONTIN) 300 MG capsule 300 mg by Per G Tube route 3 times daily      medroxyPROGESTERone (DEPO-PROVERA) 150 MG/ML injection Inject 150 mg into the muscle every 3 months On the 11th Historical Med      acetaminophen (TYLENOL) 80 MG/0.8ML suspension 650 mg by Per G Tube route every 6 hours as needed for Fever or Pain       glycopyrrolate (ROBINUL) 1 MG tablet Take 1 mg by mouth 3 times daily Historical Med             PAST MEDICAL HISTORY         Diagnosis Date    Aphasia     Aphasia     Contracture of joint, lower leg     Depressed     HTN (hypertension)     Hx MRSA infection resolved 1/2017    2 negative nasal screens 1/2017 (hx in heel in 2013)    Hydronephrosis     Movement disorder     tremor    Multiple sclerosis (HCC)     Neurogenic bladder     Non-verbal learning disorder     Peripheral vascular disease (HCC)     Polyneuropathy     Pressure ulcer     Coccyx, Heel    Pulmonary embolism (HCC)     Pulmonary infarction (HCC)     Quadriplegia (HCC)     Tachycardia     UTI (lower urinary tract infection)        SURGICAL HISTORY           Procedure Laterality Date    AMPUTATION Right     COLOSTOMY      GASTROSTOMY TUBE PLACEMENT      LEG AMPUTATION THROUGH FEMUR           FAMILY HISTORY           Problem Relation Age of Onset    No Known Problems Mother     No Known Problems Father      Family Status   Relation Name Status    Mother  (Not Specified)    Father  (Not Specified)        SOCIAL HISTORY      reports that she has never smoked. She has never used smokeless tobacco. She reports that she does not drink alcohol or use drugs. REVIEW OF SYSTEMS    (2-9 systems for level 4, 10 or more for level 5)     Review of Systems   Unable to perform ROS: Patient nonverbal        Except as noted above the remainder of the review of systems was reviewed and negative. PHYSICAL EXAM    (up to 7 for level 4, 8 or more for level 5)     ED Triage Vitals   BP Temp Temp src Pulse Resp SpO2 Height Weight   -- -- -- -- -- -- -- --       Physical Exam  Vitals signs and nursing note reviewed. Cardiovascular:      Rate and Rhythm: Normal rate and regular rhythm. Pulmonary:      Effort: Pulmonary effort is normal.      Breath sounds: Normal breath sounds. Abdominal:      General: Abdomen is flat. Bowel sounds are normal. There is no distension. Palpations: Abdomen is soft. Tenderness: There is no abdominal tenderness. There is no guarding or rebound. Comments: Colostomy is clean stoma is normal and brown liquid stool in the bag.  G-tube site is clean without drainage             DIAGNOSTIC RESULTS     EKG: All EKG's are interpreted by the Emergency Department Physician who either signs or Co-signs this chart in the absence of a cardiologist.    None indicated    RADIOLOGY:   Non-plain film images such as CT, Ultrasound and MRI are read by the radiologist. Plain radiographic images are visualized and preliminarily interpreted by the emergency physician with the below findings:    Gastrografin KUB after G-tube was replaced. Normal study no extravasation of contrast    Interpretation per the Radiologist below, if available at the time of this note:        ED BEDSIDE ULTRASOUND:   Performed by ED Physician - none    LABS:  No results found for this visit on 06/20/20. All other labs were within normal range or not returned as of this dictation.     EMERGENCY DEPARTMENT COURSE and DIFFERENTIAL

## 2020-06-20 NOTE — ED PROVIDER NOTES
EMERGENCY DEPARTMENT ENCOUNTER   ATTENDING ATTESTATION     Pt Name: Kateryna Yang  MRN: [id number]  [place] 1980  Date of evaluation: 6/20/20   Kateryna Yang is a 44 y.o. female with CC: G Tube Complications    MDM:   Patient is a 59-year-old female with a history of MRDD who presented to the emergency department secondary to G-tube dislodgment. G-tube replaced, KUB obtained, no extravasation. Discharged home with outpatient follow-up and given parameters to return to the emergency department. CRITICAL CARE:       EKG: All EKG's are interpreted by the Emergency Department Physician who either signs or Co-signs this chart in the absence of a cardiologist.      RADIOLOGY:All plain film, CT, MRI, and formal ultrasound images (except ED bedside ultrasound) are read by the radiologist, see reports below, unless otherwise noted in MDM or here. No orders to display     LABS: All lab results were reviewed by myself, and all abnormals are listed below. Labs Reviewed - No data to display  CONSULTS:  None  FINAL IMPRESSION    No diagnosis found.         PASTMEDICAL HISTORY     Past Medical History:   Diagnosis Date    Aphasia     Aphasia     Contracture of joint, lower leg     Depressed     HTN (hypertension)     Hx MRSA infection resolved 1/2017    2 negative nasal screens 1/2017 (hx in heel in 2013)    Hydronephrosis     Movement disorder     tremor    Multiple sclerosis ([address]     Neurogenic bladder     Non-verbal learning disorder     Peripheral vascular disease (HCC)     Polyneuropathy     Pressure ulcer     Coccyx, Heel    Pulmonary embolism (HCC)     Pulmonary infarction (HCC)     Quadriplegia (HCC)     Tachycardia     UTI (lower urinary tract infection)      SURGICAL HISTORY       Past Surgical History:   Procedure Laterality Date    AMPUTATION Right     COLOSTOMY      GASTROSTOMY TUBE PLACEMENT      LEG AMPUTATION THROUGH FEMUR       CURRENT MEDICATIONS       Previous Topics    Alcohol use: No    Drug use: No       I personally evaluated and examined the patient in conjunction with the APC and agree with the assessment, treatment plan, and disposition of the patient as recorded by the APC.    Nurys Romero MD  Attending Emergency Physician          Nurys Romero MD  77/53/53 [id number]

## 2020-06-21 ENCOUNTER — HOSPITAL ENCOUNTER (EMERGENCY)
Age: 40
Discharge: HOME | End: 2020-06-21
Attending: EMERGENCY MEDICINE
Payer: MEDICARE

## 2020-06-21 ENCOUNTER — APPOINTMENT (OUTPATIENT)
Dept: GENERAL RADIOLOGY | Age: 40
End: 2020-06-21
Payer: MEDICARE

## 2020-06-21 VITALS
DIASTOLIC BLOOD PRESSURE: 91 MMHG | BODY MASS INDEX: 31.61 KG/M2 | TEMPERATURE: 99.6 F | WEIGHT: 161 LBS | HEART RATE: 99 BPM | OXYGEN SATURATION: 97 % | RESPIRATION RATE: 16 BRPM | HEIGHT: 60 IN | SYSTOLIC BLOOD PRESSURE: 118 MMHG

## 2020-06-21 LAB
CHP ED QC CHECK: NORMAL
PREGNANCY TEST URINE, POC: NORMAL

## 2020-06-21 PROCEDURE — 6360000004 HC RX CONTRAST MEDICATION: Performed by: EMERGENCY MEDICINE

## 2020-06-21 PROCEDURE — 74018 RADEX ABDOMEN 1 VIEW: CPT

## 2020-06-21 PROCEDURE — 99283 EMERGENCY DEPT VISIT LOW MDM: CPT

## 2020-06-21 RX ADMIN — DIATRIZOATE MEGLUMINE AND DIATRIZOATE SODIUM 30 ML: 660; 100 LIQUID ORAL; RECTAL at 06:45

## 2020-06-21 NOTE — ED NOTES
Bed: 25  Expected date: 6/21/20  Expected time: 4:17 AM  Means of arrival:   Comments:  [address], RN  06/21/20 1200

## 2020-06-21 NOTE — ED PROVIDER NOTES
There is no abdominal tenderness. There is no guarding or rebound. Comments: Colostomy is clean stoma is normal and brown liquid stool in the bag.  G-tube site is clean without drainage     DIFFERENTIAL  DIAGNOSIS     PLAN (LABS / IMAGING / EKG):  Orders Placed This Encounter   Procedures    XR ABDOMEN (KUB) (SINGLE AP VIEW)    POCT urine pregnancy       MEDICATIONS ORDERED:  Orders Placed This Encounter   Medications    DISCONTD: diatrizoate meglumine-sodium (GASTROGRAFIN) 66-10 % solution 30 mL       DIAGNOSTIC RESULTS / EMERGENCY DEPARTMENT COURSE / MDM   :  Results for orders placed or performed during the hospital encounter of 06/21/20   POCT urine pregnancy   Result Value Ref Range    Preg Test, Ur NEG     QC OK? ok        IMPRESSION: G-tube dislodgment    RADIOLOGY:   KUB with Gastrografin confirmed placement in stomach. EKG  None    All EKG's are interpreted by the Emergency Department Physician who either signs or Co-signs this chart in the absence of a cardiologist.    EMERGENCY DEPARTMENT COURSE:  66-year-old nonverbal female presenting with G-tube dislodgment for approximately 18 to 24 hours, patient had G-tube replaced yesterday at outside hospital with [facility], attempted to place [address] which was too large, [address] was placed without difficulty, air was immediately returned. Placement was confirmed with Gastrografin KUB, point-of-care urinary pregnancy test was negative. Patient discharged back to care facility    Signed out to oncoming resident awaiting transfer to care facility. PROCEDURES:  None    CONSULTS:  None    CRITICAL CARE:  None    FINAL IMPRESSION      1.  Attention to G-tube [facility])          DISPOSITION / PLAN     DISPOSITION        PATIENT REFERRED TO:  Ulises Barakat MD  [address] [place] [id number] [phone]      As needed, If symptoms worsen    [facility]  [address] [place] [address]  [phone]    As needed, If symptoms worsen      DISCHARGE MEDICATIONS:  Discharge Medication List as of 6/21/2020  6:52 AM          Clement Kim DO  Emergency Medicine Resident    (Please note that portions of thisnote were completed with a voice recognition program.  Efforts were made to edit the dictations but occasionally words are mis-transcribed.)     Clement Kim DO  Resident  06/21/20 8127

## 2020-07-02 ENCOUNTER — HOSPITAL ENCOUNTER (OUTPATIENT)
Age: 40
Setting detail: SPECIMEN
Discharge: HOME | End: 2020-07-02
Payer: MEDICARE

## 2020-07-02 LAB
ANION GAP SERPL CALCULATED.3IONS-SCNC: 16 MMOL/L (ref 9–17)
BUN BLDV-MCNC: 12 MG/DL (ref 6–20)
BUN/CREAT BLD: ABNORMAL (ref 9–20)
CALCIUM SERPL-MCNC: 9.3 MG/DL (ref 8.6–10.4)
CHLORIDE BLD-SCNC: 104 MMOL/L (ref 98–107)
CO2: 19 MMOL/L (ref 20–31)
CREAT SERPL-MCNC: 0.31 MG/DL (ref 0.5–0.9)
GFR AFRICAN AMERICAN: >60 ML/MIN
GFR NON-AFRICAN AMERICAN: >60 ML/MIN
GFR SERPL CREATININE-BSD FRML MDRD: ABNORMAL ML/MIN/{1.73_M2}
GFR SERPL CREATININE-BSD FRML MDRD: ABNORMAL ML/MIN/{1.73_M2}
GLUCOSE BLD-MCNC: 74 MG/DL (ref 70–99)
HCT VFR BLD CALC: 40.8 % (ref 36.3–47.1)
HEMOGLOBIN: 12.5 G/DL (ref 11.9–15.1)
MCH RBC QN AUTO: 24 PG (ref 25.2–33.5)
MCHC RBC AUTO-ENTMCNC: 30.6 G/DL (ref 28.4–34.8)
MCV RBC AUTO: 78.3 FL (ref 82.6–102.9)
NRBC AUTOMATED: 0 PER 100 WBC
PDW BLD-RTO: 18.2 % (ref 11.8–14.4)
PLATELET # BLD: 374 K/UL (ref 138–453)
PMV BLD AUTO: 11.4 FL (ref 8.1–13.5)
POTASSIUM SERPL-SCNC: 4.2 MMOL/L (ref 3.7–5.3)
RBC # BLD: 5.21 M/UL (ref 3.95–5.11)
SODIUM BLD-SCNC: 139 MMOL/L (ref 135–144)
WBC # BLD: 6.5 K/UL (ref 3.5–11.3)

## 2020-07-02 PROCEDURE — 36415 COLL VENOUS BLD VENIPUNCTURE: CPT

## 2020-07-02 PROCEDURE — 80048 BASIC METABOLIC PNL TOTAL CA: CPT

## 2020-07-02 PROCEDURE — P9603 ONE-WAY ALLOW PRORATED MILES: HCPCS

## 2020-07-02 PROCEDURE — 85027 COMPLETE CBC AUTOMATED: CPT

## 2020-07-06 ENCOUNTER — HOSPITAL ENCOUNTER (OUTPATIENT)
Age: 40
Setting detail: SPECIMEN
Discharge: HOME | End: 2020-07-06
Payer: MEDICARE

## 2020-07-06 LAB
ANION GAP SERPL CALCULATED.3IONS-SCNC: 14 MMOL/L (ref 9–17)
BUN BLDV-MCNC: 23 MG/DL (ref 6–20)
CHLORIDE BLD-SCNC: 99 MMOL/L (ref 98–107)
CO2: 23 MMOL/L (ref 20–31)
CREAT SERPL-MCNC: 0.6 MG/DL (ref 0.5–0.9)
FOLATE: 15.1 NG/ML
GFR AFRICAN AMERICAN: >60 ML/MIN
GFR NON-AFRICAN AMERICAN: >60 ML/MIN
GFR SERPL CREATININE-BSD FRML MDRD: NORMAL ML/MIN/{1.73_M2}
GFR SERPL CREATININE-BSD FRML MDRD: NORMAL ML/MIN/{1.73_M2}
HCT VFR BLD CALC: 38.4 % (ref 36.3–47.1)
HEMOGLOBIN: 11.5 G/DL (ref 11.9–15.1)
HEPATITIS B SURFACE ANTIGEN: NONREACTIVE
INR BLD: 1.4
MCH RBC QN AUTO: 24 PG (ref 25.2–33.5)
MCHC RBC AUTO-ENTMCNC: 29.9 G/DL (ref 28.4–34.8)
MCV RBC AUTO: 80.2 FL (ref 82.6–102.9)
NRBC AUTOMATED: 0 PER 100 WBC
PARTIAL THROMBOPLASTIN TIME: 40.1 SEC (ref 23.9–33.8)
PDW BLD-RTO: 18.5 % (ref 11.8–14.4)
PLATELET # BLD: 350 K/UL (ref 138–453)
PMV BLD AUTO: 11.5 FL (ref 8.1–13.5)
POTASSIUM SERPL-SCNC: 3.7 MMOL/L (ref 3.7–5.3)
PROTHROMBIN TIME: 17.1 SEC (ref 11.5–14.2)
RBC # BLD: 4.79 M/UL (ref 3.95–5.11)
SODIUM BLD-SCNC: 136 MMOL/L (ref 135–144)
VITAMIN B-12: 1347 PG/ML (ref 232–1245)
VITAMIN D 25-HYDROXY: 30.8 NG/ML (ref 30–100)
WBC # BLD: 18.8 K/UL (ref 3.5–11.3)

## 2020-07-06 PROCEDURE — 86787 VARICELLA-ZOSTER ANTIBODY: CPT

## 2020-07-06 PROCEDURE — 82306 VITAMIN D 25 HYDROXY: CPT

## 2020-07-06 PROCEDURE — 36415 COLL VENOUS BLD VENIPUNCTURE: CPT

## 2020-07-06 PROCEDURE — 85610 PROTHROMBIN TIME: CPT

## 2020-07-06 PROCEDURE — 84520 ASSAY OF UREA NITROGEN: CPT

## 2020-07-06 PROCEDURE — 80051 ELECTROLYTE PANEL: CPT

## 2020-07-06 PROCEDURE — 86038 ANTINUCLEAR ANTIBODIES: CPT

## 2020-07-06 PROCEDURE — 82607 VITAMIN B-12: CPT

## 2020-07-06 PROCEDURE — 82746 ASSAY OF FOLIC ACID SERUM: CPT

## 2020-07-06 PROCEDURE — 85027 COMPLETE CBC AUTOMATED: CPT

## 2020-07-06 PROCEDURE — 86711 JOHN CUNNINGHAM ANTIBODY: CPT

## 2020-07-06 PROCEDURE — 85730 THROMBOPLASTIN TIME PARTIAL: CPT

## 2020-07-06 PROCEDURE — 86707 HEPATITIS BE ANTIBODY: CPT

## 2020-07-06 PROCEDURE — 82565 ASSAY OF CREATININE: CPT

## 2020-07-06 PROCEDURE — P9603 ONE-WAY ALLOW PRORATED MILES: HCPCS

## 2020-07-06 PROCEDURE — 87340 HEPATITIS B SURFACE AG IA: CPT

## 2020-07-07 LAB — ANTI-NUCLEAR ANTIBODY (ANA): NEGATIVE

## 2020-07-08 LAB
HEPATITIS BE ANTIBODY: NEGATIVE
VZV IGG SER QL IA: 2.47

## 2020-07-15 LAB
MISCELLANEOUS LAB TEST RESULT: NORMAL
TEST NAME: NORMAL

## 2020-08-04 ENCOUNTER — HOSPITAL ENCOUNTER (OUTPATIENT)
Age: 40
Setting detail: SPECIMEN
Discharge: HOME | End: 2020-08-04
Payer: MEDICARE

## 2020-08-04 LAB
ANION GAP SERPL CALCULATED.3IONS-SCNC: 13 MMOL/L (ref 9–17)
BUN BLDV-MCNC: 15 MG/DL (ref 6–20)
BUN/CREAT BLD: ABNORMAL (ref 9–20)
CALCIUM SERPL-MCNC: 9.3 MG/DL (ref 8.6–10.4)
CHLORIDE BLD-SCNC: 100 MMOL/L (ref 98–107)
CO2: 23 MMOL/L (ref 20–31)
CREAT SERPL-MCNC: 0.39 MG/DL (ref 0.5–0.9)
GFR AFRICAN AMERICAN: >60 ML/MIN
GFR NON-AFRICAN AMERICAN: >60 ML/MIN
GFR SERPL CREATININE-BSD FRML MDRD: ABNORMAL ML/MIN/{1.73_M2}
GFR SERPL CREATININE-BSD FRML MDRD: ABNORMAL ML/MIN/{1.73_M2}
GLUCOSE BLD-MCNC: 78 MG/DL (ref 70–99)
HCT VFR BLD CALC: 34 % (ref 36.3–47.1)
HEMOGLOBIN: 9.9 G/DL (ref 11.9–15.1)
MCH RBC QN AUTO: 22.7 PG (ref 25.2–33.5)
MCHC RBC AUTO-ENTMCNC: 29.1 G/DL (ref 28.4–34.8)
MCV RBC AUTO: 78 FL (ref 82.6–102.9)
NRBC AUTOMATED: 0 PER 100 WBC
PDW BLD-RTO: 18.5 % (ref 11.8–14.4)
PLATELET # BLD: 430 K/UL (ref 138–453)
PMV BLD AUTO: 11.5 FL (ref 8.1–13.5)
POTASSIUM SERPL-SCNC: 3.9 MMOL/L (ref 3.7–5.3)
RBC # BLD: 4.36 M/UL (ref 3.95–5.11)
SODIUM BLD-SCNC: 136 MMOL/L (ref 135–144)
WBC # BLD: 9.1 K/UL (ref 3.5–11.3)

## 2020-08-04 PROCEDURE — P9603 ONE-WAY ALLOW PRORATED MILES: HCPCS

## 2020-08-04 PROCEDURE — 80048 BASIC METABOLIC PNL TOTAL CA: CPT

## 2020-08-04 PROCEDURE — 85027 COMPLETE CBC AUTOMATED: CPT

## 2020-08-04 PROCEDURE — 36415 COLL VENOUS BLD VENIPUNCTURE: CPT

## 2020-08-13 ENCOUNTER — HOSPITAL ENCOUNTER (OUTPATIENT)
Age: 40
Setting detail: SPECIMEN
Discharge: HOME | End: 2020-08-13
Payer: MEDICARE

## 2020-08-13 LAB
ANION GAP SERPL CALCULATED.3IONS-SCNC: 10 MMOL/L (ref 9–17)
BUN BLDV-MCNC: 18 MG/DL (ref 6–20)
BUN/CREAT BLD: ABNORMAL (ref 9–20)
CALCIUM SERPL-MCNC: 9.3 MG/DL (ref 8.6–10.4)
CHLORIDE BLD-SCNC: 100 MMOL/L (ref 98–107)
CO2: 27 MMOL/L (ref 20–31)
CREAT SERPL-MCNC: 0.36 MG/DL (ref 0.5–0.9)
GFR AFRICAN AMERICAN: >60 ML/MIN
GFR NON-AFRICAN AMERICAN: >60 ML/MIN
GFR SERPL CREATININE-BSD FRML MDRD: ABNORMAL ML/MIN/{1.73_M2}
GFR SERPL CREATININE-BSD FRML MDRD: ABNORMAL ML/MIN/{1.73_M2}
GLUCOSE BLD-MCNC: 68 MG/DL (ref 70–99)
HCT VFR BLD CALC: 30.4 % (ref 36.3–47.1)
HEMOGLOBIN: 8.8 G/DL (ref 11.9–15.1)
INR BLD: 1.2
MCH RBC QN AUTO: 22.4 PG (ref 25.2–33.5)
MCHC RBC AUTO-ENTMCNC: 28.9 G/DL (ref 28.4–34.8)
MCV RBC AUTO: 77.4 FL (ref 82.6–102.9)
NRBC AUTOMATED: 0 PER 100 WBC
PDW BLD-RTO: 18.3 % (ref 11.8–14.4)
PLATELET # BLD: 560 K/UL (ref 138–453)
PMV BLD AUTO: 10.8 FL (ref 8.1–13.5)
POTASSIUM SERPL-SCNC: 4.1 MMOL/L (ref 3.7–5.3)
PROTHROMBIN TIME: 14.9 SEC (ref 11.5–14.2)
RBC # BLD: 3.93 M/UL (ref 3.95–5.11)
SODIUM BLD-SCNC: 137 MMOL/L (ref 135–144)
WBC # BLD: 10.2 K/UL (ref 3.5–11.3)

## 2020-08-13 PROCEDURE — 85027 COMPLETE CBC AUTOMATED: CPT

## 2020-08-13 PROCEDURE — P9603 ONE-WAY ALLOW PRORATED MILES: HCPCS

## 2020-08-13 PROCEDURE — 85610 PROTHROMBIN TIME: CPT

## 2020-08-13 PROCEDURE — 36415 COLL VENOUS BLD VENIPUNCTURE: CPT

## 2020-08-13 PROCEDURE — 80048 BASIC METABOLIC PNL TOTAL CA: CPT

## 2020-08-14 ENCOUNTER — HOSPITAL ENCOUNTER (OUTPATIENT)
Age: 40
Setting detail: SPECIMEN
Discharge: HOME | End: 2020-08-14
Payer: MEDICARE

## 2020-08-14 LAB
HCT VFR BLD CALC: 31.6 % (ref 36.3–47.1)
HEMOGLOBIN: 8.9 G/DL (ref 11.9–15.1)

## 2020-08-14 PROCEDURE — P9603 ONE-WAY ALLOW PRORATED MILES: HCPCS

## 2020-08-14 PROCEDURE — 85018 HEMOGLOBIN: CPT

## 2020-08-14 PROCEDURE — 36415 COLL VENOUS BLD VENIPUNCTURE: CPT

## 2020-08-14 PROCEDURE — 85014 HEMATOCRIT: CPT

## 2020-08-16 ENCOUNTER — HOSPITAL ENCOUNTER (OUTPATIENT)
Age: 40
Setting detail: SPECIMEN
Discharge: HOME | End: 2020-08-16
Payer: MEDICARE

## 2020-08-16 PROCEDURE — U0003 INFECTIOUS AGENT DETECTION BY NUCLEIC ACID (DNA OR RNA); SEVERE ACUTE RESPIRATORY SYNDROME CORONAVIRUS 2 (SARS-COV-2) (CORONAVIRUS DISEASE [COVID-19]), AMPLIFIED PROBE TECHNIQUE, MAKING USE OF HIGH THROUGHPUT TECHNOLOGIES AS DESCRIBED BY CMS-2020-01-R: HCPCS

## 2020-08-18 LAB — SARS-COV-2, NAA: NOT DETECTED

## 2020-08-20 ENCOUNTER — HOSPITAL ENCOUNTER (OUTPATIENT)
Age: 40
Setting detail: SPECIMEN
Discharge: HOME | End: 2020-08-20
Payer: MEDICARE

## 2020-08-20 LAB
HCT VFR BLD CALC: 31.7 % (ref 36.3–47.1)
HEMOGLOBIN: 9 G/DL (ref 11.9–15.1)

## 2020-08-20 PROCEDURE — 85014 HEMATOCRIT: CPT

## 2020-08-20 PROCEDURE — 36415 COLL VENOUS BLD VENIPUNCTURE: CPT

## 2020-08-20 PROCEDURE — P9603 ONE-WAY ALLOW PRORATED MILES: HCPCS

## 2020-08-20 PROCEDURE — 85018 HEMOGLOBIN: CPT

## 2020-08-31 ENCOUNTER — HOSPITAL ENCOUNTER (OUTPATIENT)
Age: 40
Setting detail: SPECIMEN
Discharge: HOME | End: 2020-08-31
Payer: MEDICARE

## 2020-08-31 LAB
ANION GAP SERPL CALCULATED.3IONS-SCNC: 15 MMOL/L (ref 9–17)
BUN BLDV-MCNC: 11 MG/DL (ref 6–20)
BUN/CREAT BLD: ABNORMAL (ref 9–20)
CALCIUM SERPL-MCNC: 9.9 MG/DL (ref 8.6–10.4)
CHLORIDE BLD-SCNC: 105 MMOL/L (ref 98–107)
CO2: 20 MMOL/L (ref 20–31)
CREAT SERPL-MCNC: 0.37 MG/DL (ref 0.5–0.9)
GFR AFRICAN AMERICAN: >60 ML/MIN
GFR NON-AFRICAN AMERICAN: >60 ML/MIN
GFR SERPL CREATININE-BSD FRML MDRD: ABNORMAL ML/MIN/{1.73_M2}
GFR SERPL CREATININE-BSD FRML MDRD: ABNORMAL ML/MIN/{1.73_M2}
GLUCOSE BLD-MCNC: 76 MG/DL (ref 70–99)
HCT VFR BLD CALC: 32.4 % (ref 36.3–47.1)
HEMOGLOBIN: 9.1 G/DL (ref 11.9–15.1)
MCH RBC QN AUTO: 21.1 PG (ref 25.2–33.5)
MCHC RBC AUTO-ENTMCNC: 28.1 G/DL (ref 28.4–34.8)
MCV RBC AUTO: 75.2 FL (ref 82.6–102.9)
NRBC AUTOMATED: 0 PER 100 WBC
PDW BLD-RTO: 19.4 % (ref 11.8–14.4)
PLATELET # BLD: 620 K/UL (ref 138–453)
PMV BLD AUTO: 10.7 FL (ref 8.1–13.5)
POTASSIUM SERPL-SCNC: 4 MMOL/L (ref 3.7–5.3)
RBC # BLD: 4.31 M/UL (ref 3.95–5.11)
SODIUM BLD-SCNC: 140 MMOL/L (ref 135–144)
WBC # BLD: 7.7 K/UL (ref 3.5–11.3)

## 2020-08-31 PROCEDURE — 85027 COMPLETE CBC AUTOMATED: CPT

## 2020-08-31 PROCEDURE — P9603 ONE-WAY ALLOW PRORATED MILES: HCPCS

## 2020-08-31 PROCEDURE — 80048 BASIC METABOLIC PNL TOTAL CA: CPT

## 2020-08-31 PROCEDURE — 36415 COLL VENOUS BLD VENIPUNCTURE: CPT

## 2020-09-04 ENCOUNTER — HOSPITAL ENCOUNTER (OUTPATIENT)
Age: 40
Setting detail: SPECIMEN
Discharge: HOME | End: 2020-09-04
Payer: MEDICARE

## 2020-09-04 LAB
ANION GAP SERPL CALCULATED.3IONS-SCNC: 14 MMOL/L (ref 9–17)
BUN BLDV-MCNC: 11 MG/DL (ref 6–20)
BUN/CREAT BLD: ABNORMAL (ref 9–20)
CALCIUM SERPL-MCNC: 9.5 MG/DL (ref 8.6–10.4)
CHLORIDE BLD-SCNC: 101 MMOL/L (ref 98–107)
CO2: 23 MMOL/L (ref 20–31)
CREAT SERPL-MCNC: 0.29 MG/DL (ref 0.5–0.9)
GFR AFRICAN AMERICAN: >60 ML/MIN
GFR NON-AFRICAN AMERICAN: >60 ML/MIN
GFR SERPL CREATININE-BSD FRML MDRD: ABNORMAL ML/MIN/{1.73_M2}
GFR SERPL CREATININE-BSD FRML MDRD: ABNORMAL ML/MIN/{1.73_M2}
GLUCOSE BLD-MCNC: 87 MG/DL (ref 70–99)
HCT VFR BLD CALC: 31.7 % (ref 36.3–47.1)
HEMOGLOBIN: 9.2 G/DL (ref 11.9–15.1)
MCH RBC QN AUTO: 21.3 PG (ref 25.2–33.5)
MCHC RBC AUTO-ENTMCNC: 29 G/DL (ref 28.4–34.8)
MCV RBC AUTO: 73.5 FL (ref 82.6–102.9)
NRBC AUTOMATED: 0 PER 100 WBC
PDW BLD-RTO: 19.4 % (ref 11.8–14.4)
PLATELET # BLD: 613 K/UL (ref 138–453)
PMV BLD AUTO: 11.1 FL (ref 8.1–13.5)
POTASSIUM SERPL-SCNC: 4.2 MMOL/L (ref 3.7–5.3)
RBC # BLD: 4.31 M/UL (ref 3.95–5.11)
SODIUM BLD-SCNC: 138 MMOL/L (ref 135–144)
WBC # BLD: 8.2 K/UL (ref 3.5–11.3)

## 2020-09-04 PROCEDURE — 80048 BASIC METABOLIC PNL TOTAL CA: CPT

## 2020-09-04 PROCEDURE — P9603 ONE-WAY ALLOW PRORATED MILES: HCPCS

## 2020-09-04 PROCEDURE — 36415 COLL VENOUS BLD VENIPUNCTURE: CPT

## 2020-09-04 PROCEDURE — 85027 COMPLETE CBC AUTOMATED: CPT

## 2020-09-08 ENCOUNTER — HOSPITAL ENCOUNTER (OUTPATIENT)
Age: 40
Setting detail: SPECIMEN
Discharge: HOME | End: 2020-09-08
Payer: MEDICARE

## 2020-09-08 LAB
ALBUMIN SERPL-MCNC: 3.5 G/DL (ref 3.5–5.2)
ALBUMIN/GLOBULIN RATIO: 0.7 (ref 1–2.5)
ALP BLD-CCNC: 76 U/L (ref 35–104)
ALT SERPL-CCNC: 44 U/L (ref 5–33)
ANION GAP SERPL CALCULATED.3IONS-SCNC: 13 MMOL/L (ref 9–17)
AST SERPL-CCNC: 30 U/L
BILIRUB SERPL-MCNC: 0.18 MG/DL (ref 0.3–1.2)
BUN BLDV-MCNC: 12 MG/DL (ref 6–20)
BUN/CREAT BLD: ABNORMAL (ref 9–20)
CALCIUM SERPL-MCNC: 9.6 MG/DL (ref 8.6–10.4)
CHLORIDE BLD-SCNC: 103 MMOL/L (ref 98–107)
CO2: 23 MMOL/L (ref 20–31)
CREAT SERPL-MCNC: 0.36 MG/DL (ref 0.5–0.9)
GFR AFRICAN AMERICAN: >60 ML/MIN
GFR NON-AFRICAN AMERICAN: >60 ML/MIN
GFR SERPL CREATININE-BSD FRML MDRD: ABNORMAL ML/MIN/{1.73_M2}
GFR SERPL CREATININE-BSD FRML MDRD: ABNORMAL ML/MIN/{1.73_M2}
GLUCOSE BLD-MCNC: 78 MG/DL (ref 70–99)
HCT VFR BLD CALC: 31.5 % (ref 36.3–47.1)
HEMOGLOBIN: 8.8 G/DL (ref 11.9–15.1)
MCH RBC QN AUTO: 20.8 PG (ref 25.2–33.5)
MCHC RBC AUTO-ENTMCNC: 27.9 G/DL (ref 28.4–34.8)
MCV RBC AUTO: 74.3 FL (ref 82.6–102.9)
NRBC AUTOMATED: 0 PER 100 WBC
PDW BLD-RTO: 19.5 % (ref 11.8–14.4)
PLATELET # BLD: 614 K/UL (ref 138–453)
PMV BLD AUTO: 10.7 FL (ref 8.1–13.5)
POTASSIUM SERPL-SCNC: 4.1 MMOL/L (ref 3.7–5.3)
RBC # BLD: 4.24 M/UL (ref 3.95–5.11)
SODIUM BLD-SCNC: 139 MMOL/L (ref 135–144)
TOTAL PROTEIN: 8.7 G/DL (ref 6.4–8.3)
WBC # BLD: 7.8 K/UL (ref 3.5–11.3)

## 2020-09-08 PROCEDURE — 36415 COLL VENOUS BLD VENIPUNCTURE: CPT

## 2020-09-08 PROCEDURE — 80053 COMPREHEN METABOLIC PANEL: CPT

## 2020-09-08 PROCEDURE — P9603 ONE-WAY ALLOW PRORATED MILES: HCPCS

## 2020-09-08 PROCEDURE — 85027 COMPLETE CBC AUTOMATED: CPT

## 2020-09-21 ENCOUNTER — HOSPITAL ENCOUNTER (OUTPATIENT)
Age: 40
Setting detail: SPECIMEN
Discharge: HOME | End: 2020-09-21
Payer: MEDICARE

## 2020-09-21 LAB
ANION GAP SERPL CALCULATED.3IONS-SCNC: 13 MMOL/L (ref 9–17)
BUN BLDV-MCNC: 16 MG/DL (ref 6–20)
BUN/CREAT BLD: ABNORMAL (ref 9–20)
CALCIUM SERPL-MCNC: 9.2 MG/DL (ref 8.6–10.4)
CHLORIDE BLD-SCNC: 104 MMOL/L (ref 98–107)
CO2: 21 MMOL/L (ref 20–31)
CREAT SERPL-MCNC: 0.31 MG/DL (ref 0.5–0.9)
GFR AFRICAN AMERICAN: >60 ML/MIN
GFR NON-AFRICAN AMERICAN: >60 ML/MIN
GFR SERPL CREATININE-BSD FRML MDRD: ABNORMAL ML/MIN/{1.73_M2}
GFR SERPL CREATININE-BSD FRML MDRD: ABNORMAL ML/MIN/{1.73_M2}
GLUCOSE BLD-MCNC: 85 MG/DL (ref 70–99)
HCT VFR BLD CALC: 32.5 % (ref 36.3–47.1)
HEMOGLOBIN: 9.2 G/DL (ref 11.9–15.1)
INR BLD: 1.1
MCH RBC QN AUTO: 21.1 PG (ref 25.2–33.5)
MCHC RBC AUTO-ENTMCNC: 28.3 G/DL (ref 28.4–34.8)
MCV RBC AUTO: 74.4 FL (ref 82.6–102.9)
NRBC AUTOMATED: 0 PER 100 WBC
PDW BLD-RTO: 19.9 % (ref 11.8–14.4)
PLATELET # BLD: 456 K/UL (ref 138–453)
PMV BLD AUTO: 11.2 FL (ref 8.1–13.5)
POTASSIUM SERPL-SCNC: 3.8 MMOL/L (ref 3.7–5.3)
PROTHROMBIN TIME: 13.9 SEC (ref 11.5–14.2)
RBC # BLD: 4.37 M/UL (ref 3.95–5.11)
SODIUM BLD-SCNC: 138 MMOL/L (ref 135–144)
WBC # BLD: 9 K/UL (ref 3.5–11.3)

## 2020-09-21 PROCEDURE — 85610 PROTHROMBIN TIME: CPT

## 2020-09-21 PROCEDURE — 36415 COLL VENOUS BLD VENIPUNCTURE: CPT

## 2020-09-21 PROCEDURE — 85027 COMPLETE CBC AUTOMATED: CPT

## 2020-09-21 PROCEDURE — P9603 ONE-WAY ALLOW PRORATED MILES: HCPCS

## 2020-09-21 PROCEDURE — 80048 BASIC METABOLIC PNL TOTAL CA: CPT

## 2020-09-25 ENCOUNTER — HOSPITAL ENCOUNTER (OUTPATIENT)
Age: 40
Setting detail: SPECIMEN
Discharge: HOME | End: 2020-09-25
Payer: MEDICARE

## 2020-09-25 LAB
ANION GAP SERPL CALCULATED.3IONS-SCNC: 12 MMOL/L (ref 9–17)
BUN BLDV-MCNC: 17 MG/DL (ref 6–20)
BUN/CREAT BLD: ABNORMAL (ref 9–20)
CALCIUM SERPL-MCNC: 9.4 MG/DL (ref 8.6–10.4)
CHLORIDE BLD-SCNC: 102 MMOL/L (ref 98–107)
CO2: 22 MMOL/L (ref 20–31)
CREAT SERPL-MCNC: 0.39 MG/DL (ref 0.5–0.9)
GFR AFRICAN AMERICAN: >60 ML/MIN
GFR NON-AFRICAN AMERICAN: >60 ML/MIN
GFR SERPL CREATININE-BSD FRML MDRD: ABNORMAL ML/MIN/{1.73_M2}
GFR SERPL CREATININE-BSD FRML MDRD: ABNORMAL ML/MIN/{1.73_M2}
GLUCOSE BLD-MCNC: 73 MG/DL (ref 70–99)
HCT VFR BLD CALC: 34 % (ref 36.3–47.1)
HEMOGLOBIN: 9.3 G/DL (ref 11.9–15.1)
MCH RBC QN AUTO: 20.7 PG (ref 25.2–33.5)
MCHC RBC AUTO-ENTMCNC: 27.4 G/DL (ref 28.4–34.8)
MCV RBC AUTO: 75.7 FL (ref 82.6–102.9)
NRBC AUTOMATED: 0 PER 100 WBC
PDW BLD-RTO: 20.1 % (ref 11.8–14.4)
PLATELET # BLD: 472 K/UL (ref 138–453)
PMV BLD AUTO: 11.3 FL (ref 8.1–13.5)
POTASSIUM SERPL-SCNC: 3.7 MMOL/L (ref 3.7–5.3)
RBC # BLD: 4.49 M/UL (ref 3.95–5.11)
SODIUM BLD-SCNC: 136 MMOL/L (ref 135–144)
WBC # BLD: 7.4 K/UL (ref 3.5–11.3)

## 2020-09-25 PROCEDURE — P9603 ONE-WAY ALLOW PRORATED MILES: HCPCS

## 2020-09-25 PROCEDURE — 36415 COLL VENOUS BLD VENIPUNCTURE: CPT

## 2020-09-25 PROCEDURE — 85027 COMPLETE CBC AUTOMATED: CPT

## 2020-09-25 PROCEDURE — 80048 BASIC METABOLIC PNL TOTAL CA: CPT

## 2020-09-26 ENCOUNTER — HOSPITAL ENCOUNTER (INPATIENT)
Age: 40
LOS: 1 days | Discharge: NURSING, REHAB OR LONG-TERM CARE | DRG: 698 | End: 2020-09-27
Attending: EMERGENCY MEDICINE | Admitting: EMERGENCY MEDICINE
Payer: MEDICARE

## 2020-09-26 PROBLEM — T83.022A NEPHROSTOMY TUBE DISPLACED (HCC): Status: ACTIVE | Noted: 2020-09-26

## 2020-09-26 LAB
ABSOLUTE EOS #: 0.37 K/UL (ref 0–0.44)
ABSOLUTE IMMATURE GRANULOCYTE: <0.03 K/UL (ref 0–0.3)
ABSOLUTE LYMPH #: 2.21 K/UL (ref 1.1–3.7)
ABSOLUTE MONO #: 0.88 K/UL (ref 0.1–1.2)
ANION GAP SERPL CALCULATED.3IONS-SCNC: 10 MMOL/L (ref 9–17)
BASOPHILS # BLD: 0 % (ref 0–2)
BASOPHILS ABSOLUTE: 0.03 K/UL (ref 0–0.2)
BUN BLDV-MCNC: 15 MG/DL (ref 6–20)
BUN/CREAT BLD: ABNORMAL (ref 9–20)
CALCIUM SERPL-MCNC: 9.5 MG/DL (ref 8.6–10.4)
CHLORIDE BLD-SCNC: 105 MMOL/L (ref 98–107)
CO2: 22 MMOL/L (ref 20–31)
CREAT SERPL-MCNC: 0.34 MG/DL (ref 0.5–0.9)
DIFFERENTIAL TYPE: ABNORMAL
EOSINOPHILS RELATIVE PERCENT: 4 % (ref 1–4)
GFR AFRICAN AMERICAN: >60 ML/MIN
GFR NON-AFRICAN AMERICAN: >60 ML/MIN
GFR SERPL CREATININE-BSD FRML MDRD: ABNORMAL ML/MIN/{1.73_M2}
GFR SERPL CREATININE-BSD FRML MDRD: ABNORMAL ML/MIN/{1.73_M2}
GLUCOSE BLD-MCNC: 96 MG/DL (ref 70–99)
HCT VFR BLD CALC: 36.2 % (ref 36.3–47.1)
HEMOGLOBIN: 10.4 G/DL (ref 11.9–15.1)
IMMATURE GRANULOCYTES: 0 %
LYMPHOCYTES # BLD: 23 % (ref 24–43)
MCH RBC QN AUTO: 21 PG (ref 25.2–33.5)
MCHC RBC AUTO-ENTMCNC: 28.7 G/DL (ref 28.4–34.8)
MCV RBC AUTO: 73 FL (ref 82.6–102.9)
MONOCYTES # BLD: 9 % (ref 3–12)
NRBC AUTOMATED: 0 PER 100 WBC
PDW BLD-RTO: 19.9 % (ref 11.8–14.4)
PLATELET # BLD: 510 K/UL (ref 138–453)
PLATELET ESTIMATE: ABNORMAL
PMV BLD AUTO: 10.3 FL (ref 8.1–13.5)
POTASSIUM SERPL-SCNC: 4.1 MMOL/L (ref 3.7–5.3)
RBC # BLD: 4.96 M/UL (ref 3.95–5.11)
RBC # BLD: ABNORMAL 10*6/UL
SARS-COV-2, RAPID: NOT DETECTED
SARS-COV-2: NORMAL
SARS-COV-2: NORMAL
SEG NEUTROPHILS: 64 % (ref 36–65)
SEGMENTED NEUTROPHILS ABSOLUTE COUNT: 6.07 K/UL (ref 1.5–8.1)
SODIUM BLD-SCNC: 137 MMOL/L (ref 135–144)
SOURCE: NORMAL
WBC # BLD: 9.6 K/UL (ref 3.5–11.3)
WBC # BLD: ABNORMAL 10*3/UL

## 2020-09-26 PROCEDURE — 1200000000 HC SEMI PRIVATE

## 2020-09-26 PROCEDURE — 85025 COMPLETE CBC W/AUTO DIFF WBC: CPT

## 2020-09-26 PROCEDURE — U0002 COVID-19 LAB TEST NON-CDC: HCPCS

## 2020-09-26 PROCEDURE — 80048 BASIC METABOLIC PNL TOTAL CA: CPT

## 2020-09-26 PROCEDURE — 99285 EMERGENCY DEPT VISIT HI MDM: CPT

## 2020-09-26 NOTE — ED PROVIDER NOTES
[address] [facility]     Emergency Department     Faculty Attestation        I performed a history and physical examination of the patient and discussed management with the resident. I reviewed the residents note and agree with the documented findings and plan of care. Any areas of disagreement are noted on the chart. I was personally present for the key portions of any procedures. I have documented in the chart those procedures where I was not present during the key portions. I have reviewed the emergency nurses triage note. I agree with the chief complaint, past medical history, past surgical history, allergies, medications, social and family history as documented unless otherwise noted below. For mid-level providers such as nurse practitioners as well as physicians assistants:    I have personally seen and evaluated the patient. I find the patient's history and physical exam are consistent with NP/PA documentation. I agree with the care provided, treatment rendered, disposition, & follow-up plan. Additional findings are as noted. Vital Signs: /85   Pulse 82   Temp 99.7 °F (37.6 °C) (Axillary)   Resp 18   SpO2 98%   PCP:  Salbador Momin MD    Pertinent Comments:     Patient brought in by EMS from nursing home after accidentally pulling out right nephrostomy tube.       Critical Care  None          Leta Carrillo MD  Attending Emergency Medicine Physician              Jillian Paula MD  09/26/20 . Sarah Jones MD  09/26/20 1991

## 2020-09-26 NOTE — ED NOTES
Bed: 28  Expected date:   Expected time:   Means of arrival:   Comments:  First care     Ai Bean RN  09/26/20 0967

## 2020-09-27 ENCOUNTER — APPOINTMENT (OUTPATIENT)
Dept: INTERVENTIONAL RADIOLOGY/VASCULAR | Age: 40
DRG: 698 | End: 2020-09-27
Payer: MEDICARE

## 2020-09-27 VITALS
HEART RATE: 90 BPM | DIASTOLIC BLOOD PRESSURE: 73 MMHG | HEIGHT: 60 IN | TEMPERATURE: 98.6 F | SYSTOLIC BLOOD PRESSURE: 99 MMHG | RESPIRATION RATE: 18 BRPM | WEIGHT: 154.32 LBS | OXYGEN SATURATION: 100 % | BODY MASS INDEX: 30.3 KG/M2

## 2020-09-27 PROCEDURE — C1729 CATH, DRAINAGE: HCPCS

## 2020-09-27 PROCEDURE — C1769 GUIDE WIRE: HCPCS

## 2020-09-27 PROCEDURE — 0T9030Z DRAINAGE OF RIGHT KIDNEY WITH DRAINAGE DEVICE, PERCUTANEOUS APPROACH: ICD-10-PCS | Performed by: RADIOLOGY

## 2020-09-27 PROCEDURE — BT1D1ZZ FLUOROSCOPY OF RIGHT KIDNEY, URETER AND BLADDER USING LOW OSMOLAR CONTRAST: ICD-10-PCS | Performed by: RADIOLOGY

## 2020-09-27 PROCEDURE — 6360000004 HC RX CONTRAST MEDICATION: Performed by: EMERGENCY MEDICINE

## 2020-09-27 PROCEDURE — 2500000003 HC RX 250 WO HCPCS: Performed by: EMERGENCY MEDICINE

## 2020-09-27 PROCEDURE — 6370000000 HC RX 637 (ALT 250 FOR IP): Performed by: EMERGENCY MEDICINE

## 2020-09-27 PROCEDURE — 2580000003 HC RX 258: Performed by: EMERGENCY MEDICINE

## 2020-09-27 PROCEDURE — 2709999900 HC NON-CHARGEABLE SUPPLY

## 2020-09-27 PROCEDURE — C1887 CATHETER, GUIDING: HCPCS

## 2020-09-27 PROCEDURE — 50432 PLMT NEPHROSTOMY CATHETER: CPT

## 2020-09-27 RX ORDER — MEDROXYPROGESTERONE ACETATE 150 MG/ML
150 INJECTION, SUSPENSION INTRAMUSCULAR
Status: DISCONTINUED | OUTPATIENT
Start: 2020-09-27 | End: 2020-09-27

## 2020-09-27 RX ORDER — SODIUM CHLORIDE 0.9 % (FLUSH) 0.9 %
10 SYRINGE (ML) INJECTION PRN
Status: DISCONTINUED | OUTPATIENT
Start: 2020-09-27 | End: 2020-09-27 | Stop reason: HOSPADM

## 2020-09-27 RX ORDER — HYDROCODONE BITARTRATE AND ACETAMINOPHEN 5; 325 MG/1; MG/1
1 TABLET ORAL EVERY 4 HOURS PRN
Status: DISCONTINUED | OUTPATIENT
Start: 2020-09-27 | End: 2020-09-27 | Stop reason: HOSPADM

## 2020-09-27 RX ORDER — METOCLOPRAMIDE HYDROCHLORIDE 5 MG/5ML
5 SOLUTION ORAL 3 TIMES DAILY
Status: DISCONTINUED | OUTPATIENT
Start: 2020-09-27 | End: 2020-09-27 | Stop reason: HOSPADM

## 2020-09-27 RX ORDER — MIDODRINE HYDROCHLORIDE 5 MG/1
5 TABLET ORAL 2 TIMES DAILY WITH MEALS
Status: DISCONTINUED | OUTPATIENT
Start: 2020-09-27 | End: 2020-09-27 | Stop reason: HOSPADM

## 2020-09-27 RX ORDER — SCOLOPAMINE TRANSDERMAL SYSTEM 1 MG/1
1 PATCH, EXTENDED RELEASE TRANSDERMAL
Status: DISCONTINUED | OUTPATIENT
Start: 2020-09-27 | End: 2020-09-27 | Stop reason: HOSPADM

## 2020-09-27 RX ORDER — SERTRALINE HYDROCHLORIDE 25 MG/1
25 TABLET, FILM COATED ORAL DAILY
Status: DISCONTINUED | OUTPATIENT
Start: 2020-09-27 | End: 2020-09-27 | Stop reason: HOSPADM

## 2020-09-27 RX ORDER — ATROPINE SULFATE 10 MG/ML
1 SOLUTION/ DROPS OPHTHALMIC EVERY 4 HOURS PRN
Status: DISCONTINUED | OUTPATIENT
Start: 2020-09-27 | End: 2020-09-27 | Stop reason: HOSPADM

## 2020-09-27 RX ORDER — GLYCOPYRROLATE 1 MG/1
1 TABLET ORAL 3 TIMES DAILY
Status: DISCONTINUED | OUTPATIENT
Start: 2020-09-27 | End: 2020-09-27 | Stop reason: HOSPADM

## 2020-09-27 RX ORDER — ATROPINE SULFATE 10 MG/ML
3 SOLUTION/ DROPS OPHTHALMIC EVERY 4 HOURS PRN
COMMUNITY

## 2020-09-27 RX ORDER — MULTIVIT-MIN/FERROUS GLUCONATE 9 MG/15 ML
15 LIQUID (ML) ORAL DAILY
Status: DISCONTINUED | OUTPATIENT
Start: 2020-09-27 | End: 2020-09-27 | Stop reason: HOSPADM

## 2020-09-27 RX ORDER — ACETAMINOPHEN 160 MG/5ML
650 SOLUTION ORAL EVERY 6 HOURS PRN
Status: DISCONTINUED | OUTPATIENT
Start: 2020-09-27 | End: 2020-09-27 | Stop reason: HOSPADM

## 2020-09-27 RX ORDER — CRANBERRY FRUIT EXTRACT 250 MG
250 CAPSULE ORAL 2 TIMES DAILY
Status: DISCONTINUED | OUTPATIENT
Start: 2020-09-27 | End: 2020-09-27

## 2020-09-27 RX ORDER — GABAPENTIN 300 MG/1
300 CAPSULE ORAL 3 TIMES DAILY
Status: DISCONTINUED | OUTPATIENT
Start: 2020-09-27 | End: 2020-09-27 | Stop reason: HOSPADM

## 2020-09-27 RX ORDER — BACLOFEN 10 MG/1
10 TABLET ORAL 3 TIMES DAILY
Status: DISCONTINUED | OUTPATIENT
Start: 2020-09-27 | End: 2020-09-27 | Stop reason: HOSPADM

## 2020-09-27 RX ORDER — SODIUM CHLORIDE 0.9 % (FLUSH) 0.9 %
10 SYRINGE (ML) INJECTION EVERY 12 HOURS SCHEDULED
Status: DISCONTINUED | OUTPATIENT
Start: 2020-09-27 | End: 2020-09-27 | Stop reason: HOSPADM

## 2020-09-27 RX ORDER — ACETAMINOPHEN 325 MG/1
650 TABLET ORAL EVERY 4 HOURS PRN
Status: DISCONTINUED | OUTPATIENT
Start: 2020-09-27 | End: 2020-09-27

## 2020-09-27 RX ADMIN — ANTI-FUNGAL POWDER MICONAZOLE NITRATE TALC FREE: 1.42 POWDER TOPICAL at 09:32

## 2020-09-27 RX ADMIN — GABAPENTIN 300 MG: 300 CAPSULE ORAL at 16:03

## 2020-09-27 RX ADMIN — IOVERSOL 15 ML: 741 INJECTION INTRA-ARTERIAL; INTRAVENOUS at 12:01

## 2020-09-27 RX ADMIN — BACLOFEN 10 MG: 10 TABLET ORAL at 16:03

## 2020-09-27 RX ADMIN — Medication 10 ML: at 09:35

## 2020-09-27 RX ADMIN — GLYCOPYRROLATE 1 MG: 1 TABLET ORAL at 16:03

## 2020-09-27 RX ADMIN — Medication 15 ML: at 09:30

## 2020-09-27 RX ADMIN — ATROPINE SULFATE 1 DROP: 10 SOLUTION/ DROPS OPHTHALMIC at 09:30

## 2020-09-27 RX ADMIN — SERTRALINE 25 MG: 25 TABLET, FILM COATED ORAL at 09:29

## 2020-09-27 RX ADMIN — LANSOPRAZOLE 30 MG: 15 TABLET, ORALLY DISINTEGRATING, DELAYED RELEASE ORAL at 05:43

## 2020-09-27 RX ADMIN — METOCLOPRAMIDE HYDROCHLORIDE 5 MG: 5 SOLUTION ORAL at 09:30

## 2020-09-27 RX ADMIN — METOCLOPRAMIDE HYDROCHLORIDE 5 MG: 5 SOLUTION ORAL at 16:04

## 2020-09-27 RX ADMIN — GABAPENTIN 300 MG: 300 CAPSULE ORAL at 09:28

## 2020-09-27 RX ADMIN — BACLOFEN 10 MG: 10 TABLET ORAL at 09:28

## 2020-09-27 RX ADMIN — MIDODRINE HYDROCHLORIDE 5 MG: 5 TABLET ORAL at 09:29

## 2020-09-27 RX ADMIN — DOCUSATE SODIUM 100 MG: 50 LIQUID ORAL at 09:30

## 2020-09-27 RX ADMIN — GLYCOPYRROLATE 1 MG: 1 TABLET ORAL at 09:29

## 2020-09-27 RX ADMIN — ANTI-FUNGAL POWDER MICONAZOLE NITRATE TALC FREE: 1.42 POWDER TOPICAL at 02:47

## 2020-09-27 NOTE — CARE COORDINATION
Discharge [facility] Case Management Department  Written by: Toby Henderson RN    Patient Name: Geronimo Sun  Attending Provider: Kristi Kidd MD  Admit Date: 2020  6:04 PM  MRN: [id number]  Account: [de-identified]                     : 1980  Discharge Date: 2020      Disposition: SNF Return to [facility], RN

## 2020-09-27 NOTE — H&P
[facility]  CDU / OBSERVATION eNCOUnter  Resident Note     Pt Name: Susy León  MRN: [id number]  [place] 1980  Date of evaluation: 9/27/20  Patient's PCP is :  Brandon Dumont MD    CHIEF COMPLAINT       Chief Complaint   Patient presents with    Other     accidental Left  Nephrostomy tube pulled at nursing home. pt non verbal/baseline per report         HISTORY OF PRESENT ILLNESS    Susy León is a 44 y.o. female with a past medical history of hypertension, quadriplegia, nonverbal, contractures, recurrent UTI and kidney stones, gastrostomy, colostomy, chronic Davies who presents from her nursing home after an accidental dislodgment of her right nephrostomy tube previously placed for recurrent kidney stones. Staff at her nursing home was rolling the patient when the tube was accidentally completely pulled out. There are no other complaints or concerns per nursing home. Deny fevers, vomiting, changes in urination or stooling. Patient is nonverbal and cannot contribute to the history. Location/Symptom: Right nephrostomy tube  Timing/Onset: Prior to arrival  Provocation: None  Quality: None  Radiation: None  Severity: Unknown  Timing/Duration: [place]  Modifying Factors: None    REVIEW OF SYSTEMS     Review of systems is unable to be obtained due to patient's nonverbal status at baseline    (PQRS) Advance directives on face sheet per hospital policy. No change unless specifically mentioned in chart    [address]    has a past medical history of Aphasia, Aphasia, Contracture of joint, lower leg, Depressed, HTN (hypertension), Hx MRSA infection, Hydronephrosis, Movement disorder, Multiple sclerosis ([address] Neurogenic bladder, Non-verbal learning disorder, Peripheral vascular disease ([address] Polyneuropathy, Pressure ulcer, Pulmonary embolism ([address] Pulmonary infarction ([address] Quadriplegia ([address] Tachycardia, and UTI (lower urinary tract infection).     I have reviewed the past medical history with the patient and it is pertinent to this complaint. SURGICAL HISTORY      has a past surgical history that includes Gastrostomy tube placement; colostomy; amputation (Right); and Leg amputation through femur. I have reviewed and agree with Surgical History entered and it is pertinent to this complaint. CURRENT MEDICATIONS     baclofen (LIORESAL) tablet 10 mg, TID  gabapentin (NEURONTIN) capsule 300 mg, TID  glycopyrrolate (ROBINUL) tablet 1 mg, TID  scopolamine (TRANSDERM-SCOP) transdermal patch 1 patch, Q72H  sertraline (ZOLOFT) tablet 25 mg, Daily  docusate (COLACE) 50 MG/5ML liquid 100 mg, Daily  CENTRUM/CERTA-DREICK with minerals oral solution 15 mL, Daily  lansoprazole (PREVACID SOLUTAB) disintegrating tablet 30 mg, QAM AC  metoclopramide (REGLAN) 5 MG/5ML solution 5 mg, TID  atropine 1 % ophthalmic solution 1 drop, Q4H PRN  HYDROcodone-acetaminophen (NORCO) 5-325 MG per tablet 1 tablet, Q4H PRN  miconazole (MICOTIN) 2 % powder, BID  midodrine (PROAMATINE) tablet 5 mg, BID WC  acetaminophen (TYLENOL) 160 MG/5ML solution 650 mg, Q6H PRN  sodium chloride flush 0.9 % injection 10 mL, 2 times per day  sodium chloride flush 0.9 % injection 10 mL, PRN        All medication charted and reviewed. ALLERGIES     has No Known Allergies. FAMILY HISTORY     She indicated that the status of her mother is unknown. She indicated that the status of her father is unknown.     family history includes No Known Problems in her father and mother. I have reviewed the Family History and it is not significant to the case    SOCIAL HISTORY      reports that she has never smoked. She has never used smokeless tobacco. She reports that she does not drink alcohol or use drugs. I have reviewed and agree with all Social.  There are no concerns for substance abuse/use. PHYSICAL EXAM     INITIAL VITALS:  height is 5' (1.524 m) and weight is 154 lb 5.2 oz (70 kg).  Her temperature is 98.6 °F (37 [place] who presents from her nursing home after an accidental dislodgment of her right nephrostomy tube previously placed for recurrent kidney stones. The emergency department contacted [facility] urology who recommended nonemergent replacement of the tube. Interventional radiology was contacted and plans for replacement today. Was admitted to the observation unit in preparation of this placement    · Plan for nephrostomy tube replacement by interventional radiology  · BMP, CBC and COVID swab ordered per IR and urology's request  · Consult to dietitian for tube feeds  · Continue home medications and pain control  · Monitor vitals, labs, and imaging  · DISPO: pending consults and clinical improvement    CONSULTS:    IP CONSULT TO INTERVENTIONAL RADIOLOGY  IP CONSULT TO UROLOGY  IP CONSULT TO DIETITIAN    PROCEDURES:  Not indicated       PATIENT REFERRED TO:    No follow-up provider specified. --  Saba Ojeda DO   Emergency Medicine Resident     This dictation was generated by voice recognition computer software. Although all attempts are made to edit the dictation for accuracy, there may be errors in the transcription that are not intended.

## 2020-09-27 NOTE — ED PROVIDER NOTES
[facility] ED  Emergency Department Encounter  EmergencyMedicine Resident     Pt Name:Emily Ravi  MRN: [id number]  [place] 1980  Date of evaluation: 9/26/20  PCP:  Wes Howard MD    CHIEF COMPLAINT       Chief Complaint   Patient presents with    Other     accidental Left  Nephrostomy tube pulled at nursing home. pt non verbal/baseline per report       HISTORY OF PRESENT ILLNESS  (Location/Symptom, Timing/Onset, Context/Setting, Quality, Duration, Modifying Factors, Severity.)      Rufina Monteiro is a 44 y.o. female who presents with accidental right nephrostomy tube dislodgment. Patient resides at a nursing home she is nonverbal, history of MS, quadriplegia, right AKA, chronic Davies, G-tube placement, colostomy placement. Patient has a right nephrostomy tube placed due to recurrent nephrolithiasis. While rolling patient today her nephrostomy tube was accidentally completely pulled out. No other acute complaints or concerns per nursing staff. The area is not leaking of any urine. She does not appear septic. PAST MEDICAL / SURGICAL / SOCIAL / FAMILY HISTORY      has a past medical history of Aphasia, Aphasia, Contracture of joint, lower leg, Depressed, HTN (hypertension), Hx MRSA infection, Hydronephrosis, Movement disorder, Multiple sclerosis (HCC), Neurogenic bladder, Non-verbal learning disorder, Peripheral vascular disease ([address] Polyneuropathy, Pressure ulcer, Pulmonary embolism ([address] Pulmonary infarction ([address] Quadriplegia ([address] Tachycardia, and UTI (lower urinary tract infection). has a past surgical history that includes Gastrostomy tube placement; colostomy; amputation (Right); and Leg amputation through femur.     Social History     Socioeconomic History    Marital status: Single     Spouse name: Not on file    Number of children: Not on file    Years of education: Not on file    Highest education level: Not on file   Occupational History    Not on file   Social Needs    Financial resource strain: Not on file    Food insecurity     Worry: Not on file     Inability: Not on file    Transportation needs     Medical: Not on file     Non-medical: Not on file   Tobacco Use    Smoking status: Never Smoker    Smokeless tobacco: Never Used   Substance and Sexual Activity    Alcohol use: No    Drug use: No    Sexual activity: Never   Lifestyle    Physical activity     Days per week: Not on file     Minutes per session: Not on file    Stress: Not on file   Relationships    Social connections     Talks on phone: Not on file     Gets together: Not on file     Attends Cheondoism service: Not on file     Active member of club or organization: Not on file     Attends meetings of clubs or organizations: Not on file     Relationship status: Not on file    Intimate partner violence     Fear of current or ex partner: Not on file     Emotionally abused: Not on file     Physically abused: Not on file     Forced sexual activity: Not on file   Other Topics Concern    Not on file   Social History Narrative    Not on file       Family History   Problem Relation Age of Onset    No Known Problems Mother     No Known Problems Father        Allergies:  Patient has no known allergies. Home Medications:  Prior to Admission medications    Medication Sig Start Date End Date Taking? Authorizing Provider   miconazole (MICOTIN) 2 % powder Apply topically 2 times daily. 1/25/20  Yes Shaggy Pham MD   midodrine (PROAMATINE) 5 MG tablet Take 1 tablet by mouth 2 times daily (with meals) 1/25/20  Yes Shaggy Pham MD   acetaminophen-codeine (TYLENOL/CODEINE #3) 300-30 MG per tablet Take 1 tablet by mouth every 8 hours as needed for Pain.    Yes Historical Provider, MD   D-Mannose 500 MG CAPS Take 3 capsules by mouth three times daily   Yes Historical Provider, MD   atropine 1 % ophthalmic solution 2-4 drops every 4 hours as needed   Yes Historical Provider, MD   metoclopramide (REGLAN) 5 MG/5ML solution Take 5 mLs by mouth 3 times daily 8/2/19  Yes Freedom LUIS Blood, DO   rivaroxaban (XARELTO) 20 MG TABS tablet Take 1 tablet by mouth daily 5/24/19  Yes Lisa Galindo MD   sertraline (ZOLOFT) 25 MG tablet 1 tablet by Per G Tube route daily 5/24/19  Yes Lisa Galindo MD   docusate (COLACE) 50 MG/5ML liquid Take 10 mLs by mouth daily 5/24/19  Yes Lisa Galindo MD   Multiple Vitamins-Minerals (CENTRUM/CERTA-DERICK WITH MINERALS ORAL) solution 15 mLs by Per G Tube route daily 5/24/19  Yes Lisa Galindo MD   lansoprazole (PREVACID SOLUTAB) 30 MG disintegrating tablet 1 tablet by Per G Tube route every morning (before breakfast) 5/24/19  Yes Lisa Galindo MD   Cranberry 250 MG CAPS Take 250 mg by mouth 2 times daily   Yes Historical Provider, MD   Scopolamine Base (SCOPOLAMINE TD) Place 1 mg onto the skin every 72 hours    Yes Historical Provider, MD   baclofen (LIORESAL) 10 MG tablet 10 mg by Per G Tube route 3 times daily Crush into G tube    Yes Historical Provider, MD   gabapentin (NEURONTIN) 300 MG capsule 300 mg by Per G Tube route 3 times daily   Yes Historical Provider, MD   medroxyPROGESTERone (DEPO-PROVERA) 150 MG/ML injection Inject 150 mg into the muscle every 3 months On the 11th    Yes Historical Provider, MD   acetaminophen (TYLENOL) 80 MG/0.8ML suspension 650 mg by Per G Tube route every 6 hours as needed for Fever or Pain    Yes Historical Provider, MD   glycopyrrolate (ROBINUL) 1 MG tablet Take 1 mg by mouth 3 times daily    Yes Historical Provider, MD       REVIEW OF SYSTEMS    (2-9 systems for level 4, 10 or more for level 5)      Review of Systems   Unable to perform ROS: Patient nonverbal      PHYSICAL EXAM   (up to 7 for level 4, 8 or more for level 5)      INITIAL VITALS:   /85   Pulse 82   Temp 99.7 °F (37.6 °C) (Axillary)   Resp 18   SpO2 98%      Vitals:    09/26/20 1807   BP: 110/85   Pulse: 82   Resp: 18   Temp: 99.7 °F (37.6 °C)   TempSrc: Axillary SpO2: 98%        Physical Exam  Vitals signs reviewed. Constitutional:       Appearance: She is obese. Comments: Patient is nonverbal, does wince to pain. HENT:      Head: Normocephalic and atraumatic. Mouth/Throat:      Mouth: Mucous membranes are moist.   Cardiovascular:      Rate and Rhythm: Normal rate and regular rhythm. Pulmonary:      Effort: Pulmonary effort is normal. No respiratory distress. Abdominal:      General: There is no distension. Palpations: Abdomen is soft. Tenderness: There is no abdominal tenderness. Musculoskeletal:      Comments: Right above-knee amputation. Skin:     General: Skin is warm and dry. Capillary Refill: Capillary refill takes less than 2 seconds. Comments: Sacral pressure ulcer, healthy granulation tissue. Right-sided skin breakdown where nephrostomy tube was. No fluid draining from that site. Neurological:      Mental Status: She is alert. Motor: Weakness present. Comments: Left lower extremity flexion contracture. Poor muscle tone, quadriplegic. DIFFERENTIAL  DIAGNOSIS     PLAN (LABS / IMAGING / EKG):  Orders Placed This Encounter   Procedures    IR GUIDED NEPHROSTOMY CATH PLACEMENT RIGHT    Basic Metabolic Panel w/ Reflex to MG    CBC Auto Differential    COVID-19    Inpatient consult to IR    Inpatient consult to Urology    PATIENT STATUS (FROM ED OR OR/PROCEDURAL) Inpatient       MEDICATIONS ORDERED:  No orders of the defined types were placed in this encounter.       DIAGNOSTIC RESULTS / EMERGENCY DEPARTMENT COURSE / MDM   LAB RESULTS:  Results for orders placed or performed during the hospital encounter of 33/64/67   Basic Metabolic Panel w/ Reflex to MG   Result Value Ref Range    Glucose 96 70 - 99 mg/dL    BUN 15 6 - 20 mg/dL    CREATININE 0.34 (L) 0.50 - 0.90 mg/dL    Bun/Cre Ratio NOT REPORTED 9 - 20    Calcium 9.5 8.6 - 10.4 mg/dL    Sodium 137 135 - 144 mmol/L    Potassium 4.1 3.7 - 5.3 mmol/L Chloride 105 98 - 107 mmol/L    CO2 22 20 - 31 mmol/L    Anion Gap 10 9 - 17 mmol/L    GFR Non-African American >60 >60 mL/min    GFR African American >60 >60 mL/min    GFR Comment          GFR Staging NOT REPORTED    CBC Auto Differential   Result Value Ref Range    WBC 9.6 3.5 - 11.3 k/uL    RBC 4.96 3.95 - 5.11 m/uL    Hemoglobin 10.4 (L) 11.9 - 15.1 g/dL    Hematocrit 36.2 (L) 36.3 - 47.1 %    MCV 73.0 (L) 82.6 - 102.9 fL    MCH 21.0 (L) 25.2 - 33.5 pg    MCHC 28.7 28.4 - 34.8 g/dL    RDW 19.9 (H) 11.8 - 14.4 %    Platelets 447 (H) 288 - 453 k/uL    MPV 10.3 8.1 - 13.5 fL    NRBC Automated 0.0 0.0 per 100 WBC    Differential Type NOT REPORTED     Seg Neutrophils 64 36 - 65 %    Lymphocytes 23 (L) 24 - 43 %    Monocytes 9 3 - 12 %    Eosinophils % 4 1 - 4 %    Basophils 0 0 - 2 %    Immature Granulocytes 0 0 %    Segs Absolute 6.07 1.50 - 8.10 k/uL    Absolute Lymph # 2.21 1.10 - 3.70 k/uL    Absolute Mono # 0.88 0.10 - 1.20 k/uL    Absolute Eos # 0.37 0.00 - 0.44 k/uL    Basophils Absolute 0.03 0.00 - 0.20 k/uL    Absolute Immature Granulocyte <0.03 0.00 - 0.30 k/uL    WBC Morphology NOT REPORTED     RBC Morphology ANISOCYTOSIS PRESENT     Platelet Estimate NOT REPORTED        IMPRESSION: Nephrostomy tube dislodgement    RADIOLOGY:  IR GUIDED NEPHROSTOMY CATH PLACEMENT RIGHT    (Results Pending)        EKG    All EKG's are interpreted by the Emergency Department Physician who either signs or Co-signs this chart in the absence of a cardiologist.    [facility]:    Patient arrives for evaluation of right nephrostomy tube dislodgment. [address] urology who recommended nonemergent replacement of nephrostomy tube. Contacted interventional radiology, plan for nephrostomy tube replacement tomorrow. Patient is admitted to the observation unit for nonemergent interventional radiology evaluation and nephrostomy tube placement.     ED Course as of Sep 26 2138 Sat Sep 26, 2020   1940 Phone call with Dr. Mcfarlane Distance urologist at [facility]. State that nephrostomy tube is in place due to kidney stones. Recommend replacement of nephrostomy tube not emergently. Interventional radiology contacted and notified of admission for nephrostomy tube placement. Will hold Xarelto prior to surgery. [CS]   2109 Presurgical COVID swab ordered. CBC and BMP ordered for baseline labs per IR, Urology's request    [CS]      ED Course User Index  [CS] Ryan Mccormick DO         PROCEDURES:    CONSULTS:  IP CONSULT TO INTERVENTIONAL RADIOLOGY  IP CONSULT TO UROLOGY    CRITICAL CARE:  Please see attending note    FINAL IMPRESSION      1. Nephrostomy tube displaced ([facility] [address]        DISPOSITION / [address] Admitted 09/26/2020 08:55:58 PM      PATIENT REFERRED TO:  No follow-up provider specified.     DISCHARGE MEDICATIONS:  New Prescriptions    No medications on file       Ryan Mccormick DO  Emergency Medicine Resident    (Please note that portions of thisnote were completed with a voice recognition program.  Efforts were made to edit the dictations but occasionally words are mis-transcribed.)        Ryan Mccormick DO  Resident  09/26/20 2116       Ryan Mccormick DO  Resident  09/26/20 2138

## 2020-09-27 NOTE — PROGRESS NOTES
Comprehensive Nutrition Assessment    Type and Reason for Visit:  Initial, Positive Nutrition Screen, Consult(Wound, home TF; TF ordering/management)    Nutrition Recommendations/Plan:    - TF Recommendations: Use in-house equivalent formula (TwoCal HN), 50 mL/hr x 16 hours + 250 mL free water q4 (2300 mL total fluid / 2060 mL free water, 1600 kcals, 67 gm protein)   - Monitor weight. Nutrition Assessment:  Pt admitted following accidental removal of nephrostomy tube. From [facility]. Receives cyclic feeds of Nutren 2.0 at 50 mL/hr x 16 hours (6379-8417). Also receives 250 mL of free water every 4 hours. Stage II pressure wound on coccyx noted. Per EHR, pt has lost 11.2 kg (13.8%) x 1 year. Plan to restart home TF today - consult received. Malnutrition Assessment:  Malnutrition Status: At risk for malnutrition (Comment)    Context:  Chronic Illness     Findings of the 6 clinical characteristics of malnutrition:  Energy Intake:  No significant decrease in energy intake(with TF)  Weight Loss:  No significant weight loss(13.8% loss x 1 year per EHR)     Body Fat Loss:  No significant body fat loss Orbital   Muscle Mass Loss:  No significant muscle mass loss Temples (temporalis)  Fluid Accumulation:  No significant fluid accumulation     Strength:  Not Performed    Estimated Daily Nutrient Needs:  Energy (kcal):  20-22 kcal/kg = 0401-4926 kcals/day; Weight Used for Energy Requirements:  Current     Protein (g):  1.5 gm/kg = 55 gm protein per day; Weight Used for Protein Requirements:  Ideal          Nutrition Related Findings:  PMH: MS, quadriplegia, G-tube placement, colostomy placement, R AKA;  Meds/labs reviewed      Wounds:  Pressure Injury, Stage II(Coccyx)       Current Nutrition Therapies:    NPO    Home feeds using Nutren 2.0 at 50 mL/hr x 16 hours + free water flushes of 250 mL q4 = 2300 mL total fluid (~2054 mL free water), 1600 kcals, 67 gm protein    Anthropometric Measures:  · Height: 5' (152.4 cm)  · Current Body Weight: 154 lb 5.2 oz (70 kg)   · Ideal Body Weight: 81 lbs (36.8 kg - adjusted for quadriplegia, R AKA); % Ideal Body Weight 190%  · BMI: 30.1  · Adjusted Body Weight: Amputation, Quadraplegia   · Adjusted BMI: 33.1    · BMI Categories: Obese Class 1 (BMI 30.0-34. 9)       Nutrition Diagnosis:   · Inadequate oral intake related to cognitive or neurological impairment, swallowing difficulty(feeding difficulty) as evidenced by NPO or clear liquid status due to medical condition(need for G-tube feeds to meet estimated nutrient needs)      Nutrition Interventions:   Food and/or Nutrient Delivery:  Start Tube Feeding(Use in-house equivalent formula (TwoCal HN), 50 mL/hr x 16 hours + 250 mL free water q4)  Nutrition Education/Counseling:  Education not indicated   Coordination of Nutrition Care:  Continued Inpatient Monitoring    Goals:  TF to meet % of estimated nutrient needs       Nutrition Monitoring and Evaluation:   Behavioral-Environmental Outcomes:  (N/A)   Food/Nutrient Intake Outcomes:  Enteral Nutrition Intake/Tolerance  Physical Signs/Symptoms Outcomes:  Weight, Skin, Biochemical Data, Nutrition Focused Physical Findings     Discharge Planning:    Enteral Nutrition     Electronically signed by Maritza Peña, MS, RD, LD on 9/27/20 at 9:44 AM EDT    Contact: [phone]

## 2020-09-27 NOTE — PROGRESS NOTES
Pt arrives to IR for Rt neph XC  Phone consent obtained  BD RT and SM RT to beside  Site prepped and draped  Dr Lisbeth Olivo to room  Access obtained and 8F neph placed  Tolerated well  Sutured ans stayfix applied  Return to floor

## 2020-09-27 NOTE — ED NOTES
Writer preformed COVID- 19 STAT swab on patient,  Patient tolerated well.   Swab walked over to LAB by Khanh Campos RN  09/26/20 7164

## 2020-09-27 NOTE — DISCHARGE SUMMARY
CDU Discharge Summary        Patient:  Marisol Gill  YOB: 1980  Stop formal  MRN: [id number]   Acct: [de-identified]    Primary Care Physician: Justin Hope MD    Admit date:  9/26/2020  6:04 PM  Discharge date:  9/27/2020    Discharge Diagnoses:     Acute nephrostomy tube dislodgment due to being rolled by staff at her nursing home  Improved with replacement by interventional radiology    Follow-up:  Call today/tomorrow for a follow up appointment with Justin Hope MD , or return to the Emergency Room with worsening symptoms    Stressed to patient the importance of following up with primary care doctor for further workup/management of symptoms. Pt verbalizes understanding and agrees with plan. Discharge Medications: None        Markel Alicea   [place] Medication Instructions [id number]    Printed on:09/27/20 1438   Medication Information                      acetaminophen (TYLENOL) 80 MG/0.8ML suspension  650 mg by Per G Tube route every 6 hours as needed for Fever or Pain              acetaminophen-codeine (TYLENOL/CODEINE #3) 300-30 MG per tablet  Take 1 tablet by mouth every 8 hours as needed for Pain.              atropine 1 % ophthalmic solution  2-4 drops every 4 hours as needed             atropine 1 % ophthalmic solution  3 drops every 4 hours as needed Give 3 drops sublingually for secretion             baclofen (LIORESAL) 10 MG tablet  10 mg by Per G Tube route 3 times daily Crush into G tube              Cranberry 250 MG CAPS  Take 250 mg by mouth 2 times daily             D-Mannose 500 MG CAPS  Take 3 capsules by mouth three times daily             docusate (COLACE) 50 MG/5ML liquid  Take 10 mLs by mouth daily             gabapentin (NEURONTIN) 300 MG capsule  300 mg by Per G Tube route 3 times daily             glycopyrrolate (ROBINUL) 1 MG tablet  Take 1 mg by mouth 3 times daily              lansoprazole (PREVACID SOLUTAB) 30 MG disintegrating tablet  1 tablet by Per G Tube route every morning (before breakfast)             medroxyPROGESTERone (DEPO-PROVERA) 150 MG/ML injection  Inject 150 mg into the muscle every 3 months On the 11th              metoclopramide (REGLAN) 5 MG/5ML solution  Take 5 mLs by mouth 3 times daily             metoprolol tartrate (LOPRESSOR) 25 MG tablet  Take 25 mg by mouth three times daily             miconazole (MICOTIN) 2 % powder  Apply topically 2 times daily. midodrine (PROAMATINE) 5 MG tablet  Take 1 tablet by mouth 2 times daily (with meals)             Multiple Vitamins-Minerals (CENTRUM/CERTA-DERICK WITH MINERALS ORAL) solution  15 mLs by Per G Tube route daily             rivaroxaban (XARELTO) 20 MG TABS tablet  Take 1 tablet by mouth daily             Scopolamine Base (SCOPOLAMINE TD)  Place 1 mg onto the skin every 72 hours              sertraline (ZOLOFT) 25 MG tablet  1 tablet by Per G Tube route daily                 Diet:  DIET TUBE FEED CONTINUOUS/CYCLIC NPO; Fluid Restricted; Gastrostomy;  Cyclic; 25; 50; 16 (4372-2607) , Advance as tolerated     Activity:  As tolerated    Consultants: IP CONSULT TO INTERVENTIONAL RADIOLOGY  IP CONSULT TO UROLOGY  IP CONSULT TO DIETITIAN    Procedures:  Not indicated     Diagnostic Test:   Results for orders placed or performed during the hospital encounter of 23/61/57   Basic Metabolic Panel w/ Reflex to MG   Result Value Ref Range    Glucose 96 70 - 99 mg/dL    BUN 15 6 - 20 mg/dL    CREATININE 0.34 (L) 0.50 - 0.90 mg/dL    Bun/Cre Ratio NOT REPORTED 9 - 20    Calcium 9.5 8.6 - 10.4 mg/dL    Sodium 137 135 - 144 mmol/L    Potassium 4.1 3.7 - 5.3 mmol/L    Chloride 105 98 - 107 mmol/L    CO2 22 20 - 31 mmol/L    Anion Gap 10 9 - 17 mmol/L    GFR Non-African American >60 >60 mL/min    GFR African American >60 >60 mL/min    GFR Comment          GFR Staging NOT REPORTED    CBC Auto Differential   Result Value Ref Range    WBC 9.6 3.5 - 11.3 k/uL    RBC 4.96 3.95 - 5.11 m/uL    Hemoglobin 10.4 discharged. Disposition: Home    Patient stated that they will not drive themselves home from the hospital if they have gotten pain killers/ narcotics earlier that day and that they will arrange for transportation on their own or work with the  for a ride. Patient counseled NOT to drive while under the influence of narcotics/ pain killers. Condition: Good    Patient stable and ready for discharge home. I have discussed plan of care with patient and they are in understanding. They were instructed to read discharge paperwork. All of their questions and concerns were addressed. Time Spent: 1 day      --  Loc Barba DO  Emergency Medicine Resident Physician    This dictation was generated by voice recognition computer software. Although all attempts are made to edit the dictation for accuracy, there may be errors in the transcription that are not intended.

## 2020-09-27 NOTE — PROGRESS NOTES
[facility]  CDU / OBSERVATION eNCOUnter  Attending NOte       I performed a history and physical examination of the patient and discussed management with the resident. I reviewed the residents note and agree with the documented findings and plan of care. Any areas of disagreement are noted on the chart. I was personally present for the key portions of any procedures. I have documented in the chart those procedures where I was not present during the key portions. I have reviewed the nurses notes. I agree with the chief complaint, past medical history, past surgical history, allergies, medications, social and family history as documented unless otherwise noted below. The Family history, social history, and ROS are effectively unchanged since admission unless noted elsewhere in the chart. Patient lives at [facility]. History of MS, quadriplegia, chronic Davies G-tube and colostomy placement. Patient also has nephrostomy tubes in. Patient had nephrostomy secondary to recurrent nephrolithiasis. While rolling patient yesterday nephrostomy tube became dislodged. Patient admitted overnight to observation service for nephrostomy tube placement by interventional radiology later today.     OK for discharge after tube placement    Kelin Laird MD  Attending Emergency  Physician

## 2020-09-27 NOTE — PROGRESS NOTES
Per patients order summary report from [facility], patient has feeding tube flushed every 4 hours with 250 mls of water and patient has Nutren 2.0 tube feed that runs at 50 ml/hr, that is started at 0200 and stopped at 1800 daily.

## 2020-09-27 NOTE — DISCHARGE INSTR - COC
Continuity of Care Form    Patient Name: Marisol Gill   :  1980  MRN:  [id number]    [facility] date:  2020  Discharge date:  20    Code Status Order: Full Code   Advance Directives:   Advance Care Flowsheet Documentation       Date/Time Healthcare Directive Type of Healthcare Directive Copy in [address] Agent's Name Healthcare Agent's Phone Number    20 0022  No, patient does not have an advance directive for healthcare treatment -- -- -- -- --            Admitting Physician:  Helen Prieto MD  PCP: Justin Hope MD    Discharging Nurse: [facility] Unit/Room#: 0318/0318-01  Discharging Unit Phone Number: [phone]    Emergency Contact:   Extended Emergency Contact Information  Primary Emergency Contact: Thomas Mary Grace, [address] [place] [address] Phone: [phone]  Work Phone: [phone]  Mobile Phone: [phone]  Relation: Parent   needed? No  Secondary Emergency Contact: frances thomas  Mobile Phone: [phone]  Relation: Brother/Sister   needed?  No    Past Surgical History:  Past Surgical History:   Procedure Laterality Date    AMPUTATION Right     COLOSTOMY      GASTROSTOMY TUBE PLACEMENT      LEG AMPUTATION THROUGH FEMUR         Immunization History:   Immunization History   Administered Date(s) Administered    Influenza Vaccine, unspecified formulation 10/09/2016    Pneumococcal Conjugate 13-valent (Lodema Jessica) 10/09/2016       Active Problems:  Patient Active Problem List   Diagnosis Code    Aphasia R47.01    Multiple sclerosis ([address] G35    Nonverbal R47.01    Gastrostomy tube dependent ([address] Z93.1    Dislodged gastrostomy tube ([address] Z43.1    Neurogenic bladder N31.9    Hemorrhagic cystitis N30.91    Hypokalemia E87.6    Urinary tract infection associated with indwelling urethral catheter ([address] T83.511A, N39.0    Tachycardia R00.0    Gross hematuria R31.0    Lactic acidosis E87.2    Chronic indwelling Davies catheter Z96.0    Peripheral vascular disease ([facility] I73.9    Recurrent UTI (urinary tract infection) N39.0    History of pulmonary embolism Z86.711    Hypertension I10    Altered mental status R41.82    Toxic metabolic encephalopathy A88    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    Septic shock ([facility]) A41.9, R65.21    Status post colostomy ([facility] Z93.3    Lower paraplegia ([facility]) G82.20    Sepsis ([facility] A41.9    Pneumonia due to organism J18.9    Extended spectrum beta lactamase (ESBL) resistance Z16.12    Abdominal pain R10.9    Ileus ([facility]) K56.7    Decubitus ulcer of coccygeal region, stage 4 ([facility]) L89.154    Proteus mirabilis infection A49.8    Lactate blood increase R79.89    E-coli UTI N39.0, B96.20    Sepsis due to Escherichia coli ([facility]) A41.51    E coli bacteremia R78.81    Nephrostomy tube displaced ([facility] T83.022A       Isolation/Infection:   Isolation            Contact          Patient Infection Status       Infection Onset Added Last Indicated Last Indicated By Review Planned Expiration Resolved Resolved By    MRSA 01/22/20 01/22/20 01/22/20 MRSA DNA Probe, Nasal        MDRO (multi-drug resistant organism)  01/04/19 01/04/19 Rigoberto Anne RN        Proteus - Urine 8/2019  Providencia - Urine 1/2020    ESBL (Extended Spectrum Beta Lactamase)  06/29/18 01/22/20 Culture blood #2        E.  Coli - urine 1/2020      Resolved    COVID-19 Rule Out 09/26/20 09/26/20 09/26/20 COVID-19 (Ordered)   09/26/20 Rule-Out Test Resulted    COVID-19 Rule Out 08/16/20 08/16/20 08/16/20 Covid-19 Ambulatory (Ordered)   08/18/20 Rule-Out Test Resulted    MRSA  01/09/17 01/09/17 Rico Szymanski RN   01/11/17 Rico Szymanski RN    Resolved - 2 negative nasal screens 1/2017  Heel - 2/2013            Nurse Assessment:  Last Vital Signs: BP 99/73   Pulse 90   Temp 98.6 °F (37 °C)   Resp 18   Ht 5' (1.524 m)   Wt 154 lb 5.2 oz (70 kg)   SpO2 100%   BMI 30.14 kg/m²     Last documented pain score (0-10 scale):    Last Weight:   Wt Readings from Last 1 Encounters:   09/27/20 154 lb 5.2 oz (70 kg)     Mental Status:  jonathan    IV Access:  - None    Nursing Mobility/ADLs:  Walking   Dependent  Transfer  Dependent  Bathing  Dependent  Dressing  Dependent  Toileting  Dependent  Feeding  Dependent  Med Admin  Dependent  Med Delivery   crushed    Wound Care Documentation and Therapy:  Wound 05/20/19 Coccyx (Active)   Dressing Status Clean;Dry; Intact 09/27/20 0800   Dressing Changed Changed/New 09/27/20 0004   Dressing/Treatment Dry dressing;Moist to moist 09/27/20 0800   Wound Cleansed Rinsed/Irrigated with saline 09/27/20 0004   Number of days: 496       Wound 09/27/20 Other (Comment) Lateral;Lower;Right (Active)   Wound Pressure Stage  2 09/27/20 0100   Dressing Status Clean;Dry; Intact 09/27/20 0800   Drainage Amount None 09/27/20 0800   Number of days: 0        Elimination:  Continence:   · Bowel: No  · Bladder: No  Urinary Catheter: yes. pre-existing prior to admission to hospital   Colostomy/Ileostomy/Ileal Conduit: Yes  Colostomy LLQ-Stomal Appliance: 2 piece  Colostomy LLQ-Stoma  Assessment: Moist, Pink  Colostomy LLQ-Stool Appearance: Watery  Colostomy LLQ-Stool Color: Green    Date of Last BM: ***    Intake/Output Summary (Last 24 hours) at 9/27/2020 1342  Last data filed at 9/27/2020 0949  Gross per 24 hour   Intake 250 ml   Output --   Net 250 ml     No intake/output data recorded. Safety Concerns: At Risk for Falls    Impairments/Disabilities:      Speech and Contractures - LLE    Nutrition Therapy:  Current Nutrition Therapy:   - Tube Feedings:  { LEO Tube Feedings:441159684}    Routes of Feeding: {P DME Other Feedings:407780038}  Liquids: {Slp liquid thickness:97215}  Daily Fluid Restriction: no  Last Modified Barium Swallow with Video (Video Swallowing Test): not done    Treatments at the Time of Hospital Discharge:   Respiratory Treatments: ***  Oxygen Therapy:  is not on home oxygen therapy.   Ventilator:    - No

## 2020-09-27 NOTE — PROGRESS NOTES
PHARMACY NOTE:    Damon Campbell was ordered Cranberry and D-mannose. As per the [address] [facility], herbals and certain dietary supplements will be discontinued. The herbal or dietary supplement may be continued after discharge from the hospital.    Thank you,  SUNG Gagnon, PharmD  9/27/2020 12:50 AM

## 2020-09-27 NOTE — CONSULTS
Geraldine Mcwilliams, W[facility], [place]   Urology Consultation      Patient:  Remy Shoemaker  MRN: [id number]  YOB: 1980    CHIEF COMPLAINT:  Dislodged nephrostomy tube     HISTORY OF PRESENT ILLNESS:   The patient is a 44 y.o. female who presents from facility with dislodged right nephrostomy tube. Patient is non-verbal, history of MS. Chronic right nephrostomy tube [facility] at [facility] for previous episode of urosepsis, obstructing ureteral stones. Currently managed with chronic nephrostomy tube; exchanged every 6 weeks. Bladder managed with chronic Davies catheter. Patient's old records, notes and chart reviewed and summarized above.     Past Medical History:    Past Medical History:   Diagnosis Date    Aphasia     Aphasia     Contracture of joint, lower leg     Depressed     HTN (hypertension)     Hx MRSA infection resolved 1/2017    2 negative nasal screens 1/2017 (hx in heel in 2013)    Hydronephrosis     Movement disorder     tremor    Multiple sclerosis (HCC)     Neurogenic bladder     Non-verbal learning disorder     Peripheral vascular disease (HCC)     Polyneuropathy     Pressure ulcer     Coccyx, Heel    Pulmonary embolism (HCC)     Pulmonary infarction (HCC)     Quadriplegia (HCC)     Tachycardia     UTI (lower urinary tract infection)        Past Surgical History:    Past Surgical History:   Procedure Laterality Date    AMPUTATION Right     COLOSTOMY      GASTROSTOMY TUBE PLACEMENT      LEG AMPUTATION THROUGH FEMUR         Medications:      Current Facility-Administered Medications:     baclofen (LIORESAL) tablet 10 mg, 10 mg, Per G Tube, TID, Gigi Mcgarry MD, 10 mg at 09/27/20 1722    gabapentin (NEURONTIN) capsule 300 mg, 300 mg, Per G Tube, TID, Gigi Mcgarry MD, 300 mg at 09/27/20 1521    glycopyrrolate (ROBINUL) tablet 1 mg, 1 mg, Oral, TID, Gigi Mcgarry MD, 1 mg at 09/27/20 0929    scopolamine (TRANSDERM-SCOP) transdermal patch 1 patch, 1 patch, Transdermal, Q72H, Heber Bennett MD    sertraline (ZOLOFT) tablet 25 mg, 25 mg, Per Bernetta Pavel, Daily, Heber Bennett MD, 25 mg at 09/27/20 4944    docusate (COLACE) 50 MG/5ML liquid 100 mg, 100 mg, Oral, Daily, Heber Bennett MD, 100 mg at 09/27/20 2843    CENTRUM/CERTA-DERICK with minerals oral solution 15 mL, 15 mL, Per G Tube, Daily, Heber Bennett MD, 15 mL at 09/27/20 0930    lansoprazole (PREVACID SOLUTAB) disintegrating tablet 30 mg, 30 mg, Per G Tube, QAM AC, Heber Bennett MD, 30 mg at 09/27/20 0543    metoclopramide (REGLAN) 5 MG/5ML solution 5 mg, 5 mg, Oral, TID, Heber Bennett MD, 5 mg at 09/27/20 0930    atropine 1 % ophthalmic solution 1 drop, 1 drop, Sublingual, Q4H PRN, Heber Bennett MD, 1 drop at 09/27/20 0930    HYDROcodone-acetaminophen (NORCO) 5-325 MG per tablet 1 tablet, 1 tablet, Oral, Q4H PRN, Heber Bennett MD    miconazole (MICOTIN) 2 % powder, , Topical, BID, Heber Bennett MD    midodrine (PROAMATINE) tablet 5 mg, 5 mg, Oral, BID WC, Heber Bennett MD, 5 mg at 09/27/20 3918    acetaminophen (TYLENOL) 160 MG/5ML solution 650 mg, 650 mg, Per G Tube, Q6H PRN, Heber Bennett MD    sodium chloride flush 0.9 % injection 10 mL, 10 mL, Intravenous, 2 times per day, Heber Bennett MD    sodium chloride flush 0.9 % injection 10 mL, 10 mL, Intravenous, PRN, Heber Bennett MD    Allergies:  No Known Allergies    Social History:   Social History     Socioeconomic History    Marital status: Single     Spouse name: Not on file    Number of children: Not on file    Years of education: Not on file    Highest education level: Not on file   Occupational History    Not on file   Social Needs    Financial resource strain: Not on file    Food insecurity     Worry: Not on file     Inability: Not on file    Transportation needs     Medical: Not on file     Non-medical: Not on file   Tobacco Use    Smoking status: Never Smoker    Smokeless tobacco: Never Used   Substance and Sexual Activity    Alcohol use: No    Drug use: No    Sexual activity: Never   Lifestyle    Physical activity     Days per week: Not on file     Minutes per session: Not on file    Stress: Not on file   Relationships    Social connections     Talks on phone: Not on file     Gets together: Not on file     Attends Confucianism service: Not on file     Active member of club or organization: Not on file     Attends meetings of clubs or organizations: Not on file     Relationship status: Not on file    Intimate partner violence     Fear of current or ex partner: Not on file     Emotionally abused: Not on file     Physically abused: Not on file     Forced sexual activity: Not on file   Other Topics Concern    Not on file   Social History Narrative    Not on file       Family History:    Family History   Problem Relation Age of Onset    No Known Problems Mother     No Known Problems Father        REVIEW OF SYSTEMS:  A comprehensive 14 point review of systems was unable to be obtained due to altered mental status. Physical Exam:      This a 44 y.o. female   Vitals:    09/27/20 0810   BP: 99/73   Pulse: 85   Resp: 20   Temp: 98.6 °F (37 °C)   SpO2: 97%     Constitutional: Patient in no acute distress. Neuro: does not follow commands  Head: Atraumatic. Neck: Trachea midline. Ext: warm  Skin: No rashes or bruising present. Lungs: Respiratory effort normal.  Cardiovascular:  Normal rate  Abdomen: Soft, colostomy in place   Bladder non-tender and not distended. Lymphatics: no palpable lymphadenopathy  Pelvic exam: deferred. Rectal exam not indicated.     Labs:  Recent Labs     09/25/20  0938 09/26/20 2057   WBC 7.4 9.6   HGB 9.3* 10.4*   HCT 34.0* 36.2*   MCV 75.7* 73.0*   * 510*     Recent Labs     09/25/20 0938 09/26/20 2057    137   K 3.7 4.1    105   CO2 22 22   BUN 17 15   CREATININE 0.39* 0.34*       No results for input(s): Da Yao LABCAST, WBCUA, RBCUA, MUCUS, TRICHOMONAS, YEAST, BACTERIA, CLARITYU, SPECGRAV, LEUKOCYTESUR, UROBILINOGEN, Jessika Beecham in the last 72 hours. Invalid input(s): NITRATE, GLUCOSEUKETONESUAMORPHOUS        -----------------------------------------------------------------  Imaging Results:  No results found. Assessment and Plan   Impression:    44 y.o. female with  MS  Quadraplegia   Right nephrolithasis with obstruction  Right nephrostomy tube, chronic  Chronic Davies catheter       Plan:   Right PCNT replacement by IR  Monthly Davies catheter exchanges; unclear when last changed.  Would exchange prior to discharge  OK for DC from urology standpoint after PCNT replacement  Follow up with Dr. Venecia Li at [facility] as scheduled for discussion of suprapubic catheter and stone treatment      Jody Homans, MD  PGY-5, Urology  9/27/2020 10:34 AM

## 2020-10-06 ENCOUNTER — HOSPITAL ENCOUNTER (OUTPATIENT)
Age: 40
Setting detail: SPECIMEN
Discharge: HOME | End: 2020-10-06
Payer: MEDICARE

## 2020-10-06 LAB
ANION GAP SERPL CALCULATED.3IONS-SCNC: 12 MMOL/L (ref 9–17)
BUN BLDV-MCNC: 16 MG/DL (ref 6–20)
BUN/CREAT BLD: ABNORMAL (ref 9–20)
CALCIUM SERPL-MCNC: 9.2 MG/DL (ref 8.6–10.4)
CHLORIDE BLD-SCNC: 107 MMOL/L (ref 98–107)
CO2: 20 MMOL/L (ref 20–31)
CREAT SERPL-MCNC: 0.37 MG/DL (ref 0.5–0.9)
GFR AFRICAN AMERICAN: >60 ML/MIN
GFR NON-AFRICAN AMERICAN: >60 ML/MIN
GFR SERPL CREATININE-BSD FRML MDRD: ABNORMAL ML/MIN/{1.73_M2}
GFR SERPL CREATININE-BSD FRML MDRD: ABNORMAL ML/MIN/{1.73_M2}
GLUCOSE BLD-MCNC: 93 MG/DL (ref 70–99)
HCT VFR BLD CALC: 34.2 % (ref 36.3–47.1)
HEMOGLOBIN: 9.6 G/DL (ref 11.9–15.1)
MCH RBC QN AUTO: 20.7 PG (ref 25.2–33.5)
MCHC RBC AUTO-ENTMCNC: 28.1 G/DL (ref 28.4–34.8)
MCV RBC AUTO: 73.9 FL (ref 82.6–102.9)
NRBC AUTOMATED: 0 PER 100 WBC
PDW BLD-RTO: 20 % (ref 11.8–14.4)
PLATELET # BLD: 506 K/UL (ref 138–453)
PMV BLD AUTO: 11.2 FL (ref 8.1–13.5)
POTASSIUM SERPL-SCNC: 3.9 MMOL/L (ref 3.7–5.3)
RBC # BLD: 4.63 M/UL (ref 3.95–5.11)
SODIUM BLD-SCNC: 139 MMOL/L (ref 135–144)
WBC # BLD: 10 K/UL (ref 3.5–11.3)

## 2020-10-06 PROCEDURE — 80048 BASIC METABOLIC PNL TOTAL CA: CPT

## 2020-10-06 PROCEDURE — 85027 COMPLETE CBC AUTOMATED: CPT

## 2020-10-06 PROCEDURE — 36415 COLL VENOUS BLD VENIPUNCTURE: CPT

## 2020-10-06 PROCEDURE — P9603 ONE-WAY ALLOW PRORATED MILES: HCPCS

## 2020-10-12 ENCOUNTER — HOSPITAL ENCOUNTER (OUTPATIENT)
Age: 40
Setting detail: SPECIMEN
Discharge: HOME | End: 2020-10-12
Payer: MEDICARE

## 2020-10-12 LAB
ANION GAP SERPL CALCULATED.3IONS-SCNC: 11 MMOL/L (ref 9–17)
BUN BLDV-MCNC: 21 MG/DL (ref 6–20)
BUN/CREAT BLD: ABNORMAL (ref 9–20)
CALCIUM SERPL-MCNC: 9.3 MG/DL (ref 8.6–10.4)
CHLORIDE BLD-SCNC: 107 MMOL/L (ref 98–107)
CO2: 21 MMOL/L (ref 20–31)
CREAT SERPL-MCNC: 0.37 MG/DL (ref 0.5–0.9)
GFR AFRICAN AMERICAN: >60 ML/MIN
GFR NON-AFRICAN AMERICAN: >60 ML/MIN
GFR SERPL CREATININE-BSD FRML MDRD: ABNORMAL ML/MIN/{1.73_M2}
GFR SERPL CREATININE-BSD FRML MDRD: ABNORMAL ML/MIN/{1.73_M2}
GLUCOSE BLD-MCNC: 87 MG/DL (ref 70–99)
HCT VFR BLD CALC: 36.1 % (ref 36.3–47.1)
HEMOGLOBIN: 10.2 G/DL (ref 11.9–15.1)
MCH RBC QN AUTO: 20.9 PG (ref 25.2–33.5)
MCHC RBC AUTO-ENTMCNC: 28.3 G/DL (ref 28.4–34.8)
MCV RBC AUTO: 74 FL (ref 82.6–102.9)
NRBC AUTOMATED: 0 PER 100 WBC
PDW BLD-RTO: 20 % (ref 11.8–14.4)
PLATELET # BLD: 480 K/UL (ref 138–453)
PMV BLD AUTO: 10.9 FL (ref 8.1–13.5)
POTASSIUM SERPL-SCNC: 4.3 MMOL/L (ref 3.7–5.3)
RBC # BLD: 4.88 M/UL (ref 3.95–5.11)
SODIUM BLD-SCNC: 139 MMOL/L (ref 135–144)
WBC # BLD: 9.4 K/UL (ref 3.5–11.3)

## 2020-10-12 PROCEDURE — 85027 COMPLETE CBC AUTOMATED: CPT

## 2020-10-12 PROCEDURE — P9603 ONE-WAY ALLOW PRORATED MILES: HCPCS

## 2020-10-12 PROCEDURE — 80048 BASIC METABOLIC PNL TOTAL CA: CPT

## 2020-10-12 PROCEDURE — 36415 COLL VENOUS BLD VENIPUNCTURE: CPT

## 2020-10-19 ENCOUNTER — HOSPITAL ENCOUNTER (OUTPATIENT)
Age: 40
Setting detail: SPECIMEN
Discharge: HOME | End: 2020-10-19
Payer: MEDICARE

## 2020-10-19 LAB
ANION GAP SERPL CALCULATED.3IONS-SCNC: 14 MMOL/L (ref 9–17)
BUN BLDV-MCNC: 18 MG/DL (ref 6–20)
BUN/CREAT BLD: ABNORMAL (ref 9–20)
C-REACTIVE PROTEIN: 12.2 MG/L (ref 0–5)
CALCIUM SERPL-MCNC: 9.7 MG/DL (ref 8.6–10.4)
CHLORIDE BLD-SCNC: 104 MMOL/L (ref 98–107)
CO2: 22 MMOL/L (ref 20–31)
CREAT SERPL-MCNC: 0.3 MG/DL (ref 0.5–0.9)
GFR AFRICAN AMERICAN: >60 ML/MIN
GFR NON-AFRICAN AMERICAN: >60 ML/MIN
GFR SERPL CREATININE-BSD FRML MDRD: ABNORMAL ML/MIN/{1.73_M2}
GFR SERPL CREATININE-BSD FRML MDRD: ABNORMAL ML/MIN/{1.73_M2}
GLUCOSE BLD-MCNC: 76 MG/DL (ref 70–99)
HCT VFR BLD CALC: 42.3 % (ref 36.3–47.1)
HEMOGLOBIN: 11.9 G/DL (ref 11.9–15.1)
MCH RBC QN AUTO: 20.9 PG (ref 25.2–33.5)
MCHC RBC AUTO-ENTMCNC: 28.1 G/DL (ref 28.4–34.8)
MCV RBC AUTO: 74.3 FL (ref 82.6–102.9)
NRBC AUTOMATED: 0 PER 100 WBC
PDW BLD-RTO: 21.9 % (ref 11.8–14.4)
PLATELET # BLD: ABNORMAL K/UL (ref 138–453)
PLATELET, FLUORESCENCE: 327 K/UL (ref 138–453)
PLATELET, IMMATURE FRACTION: 6.3 % (ref 1.1–10.3)
PMV BLD AUTO: ABNORMAL FL (ref 8.1–13.5)
POTASSIUM SERPL-SCNC: 3.8 MMOL/L (ref 3.7–5.3)
RBC # BLD: 5.69 M/UL (ref 3.95–5.11)
SEDIMENTATION RATE, ERYTHROCYTE: 123 MM (ref 0–20)
SODIUM BLD-SCNC: 140 MMOL/L (ref 135–144)
WBC # BLD: 7.7 K/UL (ref 3.5–11.3)

## 2020-10-19 PROCEDURE — 85055 RETICULATED PLATELET ASSAY: CPT

## 2020-10-19 PROCEDURE — 85652 RBC SED RATE AUTOMATED: CPT

## 2020-10-19 PROCEDURE — 36415 COLL VENOUS BLD VENIPUNCTURE: CPT

## 2020-10-19 PROCEDURE — 85027 COMPLETE CBC AUTOMATED: CPT

## 2020-10-19 PROCEDURE — P9603 ONE-WAY ALLOW PRORATED MILES: HCPCS

## 2020-10-19 PROCEDURE — 86140 C-REACTIVE PROTEIN: CPT

## 2020-10-19 PROCEDURE — 80048 BASIC METABOLIC PNL TOTAL CA: CPT

## 2020-10-26 ENCOUNTER — HOSPITAL ENCOUNTER (OUTPATIENT)
Age: 40
Setting detail: SPECIMEN
Discharge: HOME | End: 2020-10-26
Payer: MEDICARE

## 2020-10-26 LAB
ANION GAP SERPL CALCULATED.3IONS-SCNC: 9 MMOL/L (ref 9–17)
BUN BLDV-MCNC: 12 MG/DL (ref 6–20)
BUN/CREAT BLD: ABNORMAL (ref 9–20)
C-REACTIVE PROTEIN: 8.5 MG/L (ref 0–5)
CALCIUM SERPL-MCNC: 9.3 MG/DL (ref 8.6–10.4)
CHLORIDE BLD-SCNC: 107 MMOL/L (ref 98–107)
CO2: 24 MMOL/L (ref 20–31)
CREAT SERPL-MCNC: 0.32 MG/DL (ref 0.5–0.9)
GFR AFRICAN AMERICAN: >60 ML/MIN
GFR NON-AFRICAN AMERICAN: >60 ML/MIN
GFR SERPL CREATININE-BSD FRML MDRD: ABNORMAL ML/MIN/{1.73_M2}
GFR SERPL CREATININE-BSD FRML MDRD: ABNORMAL ML/MIN/{1.73_M2}
GLUCOSE BLD-MCNC: 72 MG/DL (ref 70–99)
HCT VFR BLD CALC: 39 % (ref 36.3–47.1)
HEMOGLOBIN: 10.9 G/DL (ref 11.9–15.1)
MCH RBC QN AUTO: 20.8 PG (ref 25.2–33.5)
MCHC RBC AUTO-ENTMCNC: 27.9 G/DL (ref 28.4–34.8)
MCV RBC AUTO: 74.4 FL (ref 82.6–102.9)
NRBC AUTOMATED: 0 PER 100 WBC
PDW BLD-RTO: 22.2 % (ref 11.8–14.4)
PLATELET # BLD: 397 K/UL (ref 138–453)
PMV BLD AUTO: 10.7 FL (ref 8.1–13.5)
POTASSIUM SERPL-SCNC: 4.4 MMOL/L (ref 3.7–5.3)
RBC # BLD: 5.24 M/UL (ref 3.95–5.11)
SEDIMENTATION RATE, ERYTHROCYTE: 130 MM (ref 0–20)
SODIUM BLD-SCNC: 140 MMOL/L (ref 135–144)
WBC # BLD: 6.1 K/UL (ref 3.5–11.3)

## 2020-10-26 PROCEDURE — 36415 COLL VENOUS BLD VENIPUNCTURE: CPT

## 2020-10-26 PROCEDURE — 86140 C-REACTIVE PROTEIN: CPT

## 2020-10-26 PROCEDURE — 80048 BASIC METABOLIC PNL TOTAL CA: CPT

## 2020-10-26 PROCEDURE — P9603 ONE-WAY ALLOW PRORATED MILES: HCPCS

## 2020-10-26 PROCEDURE — 85027 COMPLETE CBC AUTOMATED: CPT

## 2020-10-26 PROCEDURE — 85652 RBC SED RATE AUTOMATED: CPT

## 2020-11-02 ENCOUNTER — HOSPITAL ENCOUNTER (OUTPATIENT)
Age: 40
Setting detail: SPECIMEN
Discharge: HOME | End: 2020-11-02
Payer: MEDICARE

## 2020-11-02 LAB
ANION GAP SERPL CALCULATED.3IONS-SCNC: 8 MMOL/L (ref 9–17)
BUN BLDV-MCNC: 13 MG/DL (ref 6–20)
BUN/CREAT BLD: ABNORMAL (ref 9–20)
C-REACTIVE PROTEIN: 20.1 MG/L (ref 0–5)
CALCIUM SERPL-MCNC: 9.4 MG/DL (ref 8.6–10.4)
CHLORIDE BLD-SCNC: 106 MMOL/L (ref 98–107)
CO2: 22 MMOL/L (ref 20–31)
CREAT SERPL-MCNC: 0.28 MG/DL (ref 0.5–0.9)
GFR AFRICAN AMERICAN: >60 ML/MIN
GFR NON-AFRICAN AMERICAN: >60 ML/MIN
GFR SERPL CREATININE-BSD FRML MDRD: ABNORMAL ML/MIN/{1.73_M2}
GFR SERPL CREATININE-BSD FRML MDRD: ABNORMAL ML/MIN/{1.73_M2}
GLUCOSE BLD-MCNC: 73 MG/DL (ref 70–99)
HCT VFR BLD CALC: 38.4 % (ref 36.3–47.1)
HEMOGLOBIN: 11 G/DL (ref 11.9–15.1)
MCH RBC QN AUTO: 21.7 PG (ref 25.2–33.5)
MCHC RBC AUTO-ENTMCNC: 28.6 G/DL (ref 28.4–34.8)
MCV RBC AUTO: 75.9 FL (ref 82.6–102.9)
NRBC AUTOMATED: 0 PER 100 WBC
PDW BLD-RTO: 22.1 % (ref 11.8–14.4)
PLATELET # BLD: 413 K/UL (ref 138–453)
PMV BLD AUTO: 10.8 FL (ref 8.1–13.5)
POTASSIUM SERPL-SCNC: 4.1 MMOL/L (ref 3.7–5.3)
RBC # BLD: 5.06 M/UL (ref 3.95–5.11)
SEDIMENTATION RATE, ERYTHROCYTE: 130 MM (ref 0–20)
SODIUM BLD-SCNC: 136 MMOL/L (ref 135–144)
WBC # BLD: 8.7 K/UL (ref 3.5–11.3)

## 2020-11-02 PROCEDURE — 85652 RBC SED RATE AUTOMATED: CPT

## 2020-11-02 PROCEDURE — 86140 C-REACTIVE PROTEIN: CPT

## 2020-11-02 PROCEDURE — 36415 COLL VENOUS BLD VENIPUNCTURE: CPT

## 2020-11-02 PROCEDURE — 85027 COMPLETE CBC AUTOMATED: CPT

## 2020-11-02 PROCEDURE — 80048 BASIC METABOLIC PNL TOTAL CA: CPT

## 2020-11-02 PROCEDURE — P9603 ONE-WAY ALLOW PRORATED MILES: HCPCS

## 2020-11-09 ENCOUNTER — HOSPITAL ENCOUNTER (OUTPATIENT)
Age: 40
Setting detail: SPECIMEN
Discharge: HOME | End: 2020-11-09
Payer: MEDICARE

## 2020-11-09 LAB
ANION GAP SERPL CALCULATED.3IONS-SCNC: 13 MMOL/L (ref 9–17)
BUN BLDV-MCNC: 17 MG/DL (ref 6–20)
BUN/CREAT BLD: ABNORMAL (ref 9–20)
C-REACTIVE PROTEIN: 68.4 MG/L (ref 0–5)
CALCIUM SERPL-MCNC: 9.2 MG/DL (ref 8.6–10.4)
CHLORIDE BLD-SCNC: 104 MMOL/L (ref 98–107)
CO2: 21 MMOL/L (ref 20–31)
CREAT SERPL-MCNC: 0.32 MG/DL (ref 0.5–0.9)
GFR AFRICAN AMERICAN: >60 ML/MIN
GFR NON-AFRICAN AMERICAN: >60 ML/MIN
GFR SERPL CREATININE-BSD FRML MDRD: ABNORMAL ML/MIN/{1.73_M2}
GFR SERPL CREATININE-BSD FRML MDRD: ABNORMAL ML/MIN/{1.73_M2}
GLUCOSE BLD-MCNC: 69 MG/DL (ref 70–99)
HCT VFR BLD CALC: 37.1 % (ref 36.3–47.1)
HEMOGLOBIN: 10.7 G/DL (ref 11.9–15.1)
MCH RBC QN AUTO: 21.5 PG (ref 25.2–33.5)
MCHC RBC AUTO-ENTMCNC: 28.8 G/DL (ref 28.4–34.8)
MCV RBC AUTO: 74.6 FL (ref 82.6–102.9)
NRBC AUTOMATED: 0 PER 100 WBC
PDW BLD-RTO: 21.2 % (ref 11.8–14.4)
PLATELET # BLD: 426 K/UL (ref 138–453)
PMV BLD AUTO: 10.4 FL (ref 8.1–13.5)
POTASSIUM SERPL-SCNC: 3.6 MMOL/L (ref 3.7–5.3)
RBC # BLD: 4.97 M/UL (ref 3.95–5.11)
SEDIMENTATION RATE, ERYTHROCYTE: 128 MM (ref 0–20)
SODIUM BLD-SCNC: 138 MMOL/L (ref 135–144)
WBC # BLD: 8.2 K/UL (ref 3.5–11.3)

## 2020-11-09 PROCEDURE — 86140 C-REACTIVE PROTEIN: CPT

## 2020-11-09 PROCEDURE — 85652 RBC SED RATE AUTOMATED: CPT

## 2020-11-09 PROCEDURE — 80048 BASIC METABOLIC PNL TOTAL CA: CPT

## 2020-11-09 PROCEDURE — P9603 ONE-WAY ALLOW PRORATED MILES: HCPCS

## 2020-11-09 PROCEDURE — 85027 COMPLETE CBC AUTOMATED: CPT

## 2020-11-09 PROCEDURE — U0003 INFECTIOUS AGENT DETECTION BY NUCLEIC ACID (DNA OR RNA); SEVERE ACUTE RESPIRATORY SYNDROME CORONAVIRUS 2 (SARS-COV-2) (CORONAVIRUS DISEASE [COVID-19]), AMPLIFIED PROBE TECHNIQUE, MAKING USE OF HIGH THROUGHPUT TECHNOLOGIES AS DESCRIBED BY CMS-2020-01-R: HCPCS

## 2020-11-09 PROCEDURE — 36415 COLL VENOUS BLD VENIPUNCTURE: CPT

## 2020-11-10 LAB — SARS-COV-2, NAA: NOT DETECTED

## 2020-11-18 ENCOUNTER — HOSPITAL ENCOUNTER (OUTPATIENT)
Age: 40
Setting detail: SPECIMEN
Discharge: HOME | End: 2020-11-18
Payer: MEDICARE

## 2020-11-18 LAB
ANION GAP SERPL CALCULATED.3IONS-SCNC: 17 MMOL/L (ref 9–17)
BUN BLDV-MCNC: 18 MG/DL (ref 6–20)
BUN/CREAT BLD: ABNORMAL (ref 9–20)
C-REACTIVE PROTEIN: 40.4 MG/L (ref 0–5)
CALCIUM SERPL-MCNC: 9.4 MG/DL (ref 8.6–10.4)
CHLORIDE BLD-SCNC: 104 MMOL/L (ref 98–107)
CO2: 18 MMOL/L (ref 20–31)
CREAT SERPL-MCNC: 0.3 MG/DL (ref 0.5–0.9)
GFR AFRICAN AMERICAN: >60 ML/MIN
GFR NON-AFRICAN AMERICAN: >60 ML/MIN
GFR SERPL CREATININE-BSD FRML MDRD: ABNORMAL ML/MIN/{1.73_M2}
GFR SERPL CREATININE-BSD FRML MDRD: ABNORMAL ML/MIN/{1.73_M2}
GLUCOSE BLD-MCNC: 86 MG/DL (ref 70–99)
HCT VFR BLD CALC: 36.1 % (ref 36.3–47.1)
HEMOGLOBIN: 10.4 G/DL (ref 11.9–15.1)
MCH RBC QN AUTO: 21.4 PG (ref 25.2–33.5)
MCHC RBC AUTO-ENTMCNC: 28.8 G/DL (ref 28.4–34.8)
MCV RBC AUTO: 74.3 FL (ref 82.6–102.9)
NRBC AUTOMATED: 0 PER 100 WBC
PDW BLD-RTO: 21.3 % (ref 11.8–14.4)
PLATELET # BLD: 491 K/UL (ref 138–453)
PMV BLD AUTO: 10.4 FL (ref 8.1–13.5)
POTASSIUM SERPL-SCNC: 4.2 MMOL/L (ref 3.7–5.3)
RBC # BLD: 4.86 M/UL (ref 3.95–5.11)
SEDIMENTATION RATE, ERYTHROCYTE: 130 MM (ref 0–20)
SODIUM BLD-SCNC: 139 MMOL/L (ref 135–144)
WBC # BLD: 6.8 K/UL (ref 3.5–11.3)

## 2020-11-18 PROCEDURE — 80048 BASIC METABOLIC PNL TOTAL CA: CPT

## 2020-11-18 PROCEDURE — 85027 COMPLETE CBC AUTOMATED: CPT

## 2020-11-18 PROCEDURE — 86140 C-REACTIVE PROTEIN: CPT

## 2020-11-18 PROCEDURE — 85652 RBC SED RATE AUTOMATED: CPT

## 2020-11-18 PROCEDURE — P9603 ONE-WAY ALLOW PRORATED MILES: HCPCS

## 2020-11-18 PROCEDURE — 36415 COLL VENOUS BLD VENIPUNCTURE: CPT

## 2020-11-23 ENCOUNTER — HOSPITAL ENCOUNTER (OUTPATIENT)
Age: 40
Setting detail: SPECIMEN
Discharge: HOME | End: 2020-11-23
Payer: MEDICARE

## 2020-11-23 LAB
ANION GAP SERPL CALCULATED.3IONS-SCNC: 15 MMOL/L (ref 9–17)
BUN BLDV-MCNC: 18 MG/DL (ref 6–20)
BUN/CREAT BLD: ABNORMAL (ref 9–20)
C-REACTIVE PROTEIN: 37 MG/L (ref 0–5)
CALCIUM SERPL-MCNC: 9.8 MG/DL (ref 8.6–10.4)
CHLORIDE BLD-SCNC: 108 MMOL/L (ref 98–107)
CO2: 19 MMOL/L (ref 20–31)
CREAT SERPL-MCNC: 0.34 MG/DL (ref 0.5–0.9)
GFR AFRICAN AMERICAN: >60 ML/MIN
GFR NON-AFRICAN AMERICAN: >60 ML/MIN
GFR SERPL CREATININE-BSD FRML MDRD: ABNORMAL ML/MIN/{1.73_M2}
GFR SERPL CREATININE-BSD FRML MDRD: ABNORMAL ML/MIN/{1.73_M2}
GLUCOSE BLD-MCNC: 103 MG/DL (ref 70–99)
HCT VFR BLD CALC: 38.8 % (ref 36.3–47.1)
HEMOGLOBIN: 11.3 G/DL (ref 11.9–15.1)
MCH RBC QN AUTO: 21.6 PG (ref 25.2–33.5)
MCHC RBC AUTO-ENTMCNC: 29.1 G/DL (ref 28.4–34.8)
MCV RBC AUTO: 74 FL (ref 82.6–102.9)
NRBC AUTOMATED: 0 PER 100 WBC
PDW BLD-RTO: 21.2 % (ref 11.8–14.4)
PLATELET # BLD: 627 K/UL (ref 138–453)
PMV BLD AUTO: 10.4 FL (ref 8.1–13.5)
POTASSIUM SERPL-SCNC: 3.9 MMOL/L (ref 3.7–5.3)
RBC # BLD: 5.24 M/UL (ref 3.95–5.11)
SEDIMENTATION RATE, ERYTHROCYTE: 130 MM (ref 0–20)
SODIUM BLD-SCNC: 142 MMOL/L (ref 135–144)
WBC # BLD: 9.1 K/UL (ref 3.5–11.3)

## 2020-11-23 PROCEDURE — 85027 COMPLETE CBC AUTOMATED: CPT

## 2020-11-23 PROCEDURE — 80048 BASIC METABOLIC PNL TOTAL CA: CPT

## 2020-11-23 PROCEDURE — 36415 COLL VENOUS BLD VENIPUNCTURE: CPT

## 2020-11-23 PROCEDURE — 86140 C-REACTIVE PROTEIN: CPT

## 2020-11-23 PROCEDURE — 85652 RBC SED RATE AUTOMATED: CPT

## 2020-11-23 PROCEDURE — P9603 ONE-WAY ALLOW PRORATED MILES: HCPCS

## 2020-11-30 ENCOUNTER — HOSPITAL ENCOUNTER (OUTPATIENT)
Age: 40
Setting detail: SPECIMEN
Discharge: HOME | End: 2020-11-30
Payer: MEDICARE

## 2020-11-30 LAB
ANION GAP SERPL CALCULATED.3IONS-SCNC: 14 MMOL/L (ref 9–17)
BUN BLDV-MCNC: 17 MG/DL (ref 6–20)
BUN/CREAT BLD: ABNORMAL (ref 9–20)
C-REACTIVE PROTEIN: 62.1 MG/L (ref 0–5)
CALCIUM SERPL-MCNC: 9.6 MG/DL (ref 8.6–10.4)
CHLORIDE BLD-SCNC: 105 MMOL/L (ref 98–107)
CO2: 20 MMOL/L (ref 20–31)
CREAT SERPL-MCNC: 0.32 MG/DL (ref 0.5–0.9)
GFR AFRICAN AMERICAN: >60 ML/MIN
GFR NON-AFRICAN AMERICAN: >60 ML/MIN
GFR SERPL CREATININE-BSD FRML MDRD: ABNORMAL ML/MIN/{1.73_M2}
GFR SERPL CREATININE-BSD FRML MDRD: ABNORMAL ML/MIN/{1.73_M2}
GLUCOSE BLD-MCNC: 83 MG/DL (ref 70–99)
HCT VFR BLD CALC: 36.7 % (ref 36.3–47.1)
HEMOGLOBIN: 10.6 G/DL (ref 11.9–15.1)
MCH RBC QN AUTO: 21.5 PG (ref 25.2–33.5)
MCHC RBC AUTO-ENTMCNC: 28.9 G/DL (ref 28.4–34.8)
MCV RBC AUTO: 74.4 FL (ref 82.6–102.9)
NRBC AUTOMATED: 0 PER 100 WBC
PDW BLD-RTO: 20.3 % (ref 11.8–14.4)
PLATELET # BLD: 494 K/UL (ref 138–453)
PMV BLD AUTO: 10.1 FL (ref 8.1–13.5)
POTASSIUM SERPL-SCNC: 3.9 MMOL/L (ref 3.7–5.3)
RBC # BLD: 4.93 M/UL (ref 3.95–5.11)
SEDIMENTATION RATE, ERYTHROCYTE: 130 MM (ref 0–20)
SODIUM BLD-SCNC: 139 MMOL/L (ref 135–144)
WBC # BLD: 8.1 K/UL (ref 3.5–11.3)

## 2020-11-30 PROCEDURE — P9603 ONE-WAY ALLOW PRORATED MILES: HCPCS

## 2020-11-30 PROCEDURE — 85652 RBC SED RATE AUTOMATED: CPT

## 2020-11-30 PROCEDURE — 85027 COMPLETE CBC AUTOMATED: CPT

## 2020-11-30 PROCEDURE — 86140 C-REACTIVE PROTEIN: CPT

## 2020-11-30 PROCEDURE — 80048 BASIC METABOLIC PNL TOTAL CA: CPT

## 2020-11-30 PROCEDURE — 36415 COLL VENOUS BLD VENIPUNCTURE: CPT

## 2020-12-07 ENCOUNTER — HOSPITAL ENCOUNTER (OUTPATIENT)
Age: 40
Setting detail: SPECIMEN
Discharge: HOME | End: 2020-12-07
Payer: MEDICARE

## 2020-12-07 LAB
ANION GAP SERPL CALCULATED.3IONS-SCNC: 13 MMOL/L (ref 9–17)
BUN BLDV-MCNC: 18 MG/DL (ref 6–20)
BUN/CREAT BLD: ABNORMAL (ref 9–20)
C-REACTIVE PROTEIN: 28.6 MG/L (ref 0–5)
CALCIUM SERPL-MCNC: 9.5 MG/DL (ref 8.6–10.4)
CHLORIDE BLD-SCNC: 106 MMOL/L (ref 98–107)
CO2: 21 MMOL/L (ref 20–31)
CREAT SERPL-MCNC: 0.37 MG/DL (ref 0.5–0.9)
GFR AFRICAN AMERICAN: >60 ML/MIN
GFR NON-AFRICAN AMERICAN: >60 ML/MIN
GFR SERPL CREATININE-BSD FRML MDRD: ABNORMAL ML/MIN/{1.73_M2}
GFR SERPL CREATININE-BSD FRML MDRD: ABNORMAL ML/MIN/{1.73_M2}
GLUCOSE BLD-MCNC: 83 MG/DL (ref 70–99)
HCT VFR BLD CALC: 38.4 % (ref 36.3–47.1)
HEMOGLOBIN: 11.1 G/DL (ref 11.9–15.1)
MCH RBC QN AUTO: 21.7 PG (ref 25.2–33.5)
MCHC RBC AUTO-ENTMCNC: 28.9 G/DL (ref 28.4–34.8)
MCV RBC AUTO: 75 FL (ref 82.6–102.9)
NRBC AUTOMATED: 0 PER 100 WBC
PDW BLD-RTO: 20 % (ref 11.8–14.4)
PLATELET # BLD: 484 K/UL (ref 138–453)
PMV BLD AUTO: 10.4 FL (ref 8.1–13.5)
POTASSIUM SERPL-SCNC: 4.4 MMOL/L (ref 3.7–5.3)
RBC # BLD: 5.12 M/UL (ref 3.95–5.11)
SEDIMENTATION RATE, ERYTHROCYTE: 130 MM (ref 0–20)
SODIUM BLD-SCNC: 140 MMOL/L (ref 135–144)
WBC # BLD: 7.1 K/UL (ref 3.5–11.3)

## 2020-12-07 PROCEDURE — 86140 C-REACTIVE PROTEIN: CPT

## 2020-12-07 PROCEDURE — 85027 COMPLETE CBC AUTOMATED: CPT

## 2020-12-07 PROCEDURE — P9603 ONE-WAY ALLOW PRORATED MILES: HCPCS

## 2020-12-07 PROCEDURE — 80048 BASIC METABOLIC PNL TOTAL CA: CPT

## 2020-12-07 PROCEDURE — 36415 COLL VENOUS BLD VENIPUNCTURE: CPT

## 2020-12-07 PROCEDURE — 85652 RBC SED RATE AUTOMATED: CPT

## 2020-12-14 ENCOUNTER — HOSPITAL ENCOUNTER (OUTPATIENT)
Age: 40
Setting detail: SPECIMEN
Discharge: HOME | End: 2020-12-14
Payer: MEDICARE

## 2020-12-14 LAB
ANION GAP SERPL CALCULATED.3IONS-SCNC: 12 MMOL/L (ref 9–17)
BUN BLDV-MCNC: 14 MG/DL (ref 6–20)
BUN/CREAT BLD: ABNORMAL (ref 9–20)
C-REACTIVE PROTEIN: 31.5 MG/L (ref 0–5)
CALCIUM SERPL-MCNC: 9 MG/DL (ref 8.6–10.4)
CHLORIDE BLD-SCNC: 103 MMOL/L (ref 98–107)
CO2: 21 MMOL/L (ref 20–31)
CREAT SERPL-MCNC: 0.34 MG/DL (ref 0.5–0.9)
GFR AFRICAN AMERICAN: >60 ML/MIN
GFR NON-AFRICAN AMERICAN: >60 ML/MIN
GFR SERPL CREATININE-BSD FRML MDRD: ABNORMAL ML/MIN/{1.73_M2}
GFR SERPL CREATININE-BSD FRML MDRD: ABNORMAL ML/MIN/{1.73_M2}
GLUCOSE BLD-MCNC: 82 MG/DL (ref 70–99)
HCT VFR BLD CALC: 36.3 % (ref 36.3–47.1)
HEMOGLOBIN: 10.6 G/DL (ref 11.9–15.1)
MCH RBC QN AUTO: 22.1 PG (ref 25.2–33.5)
MCHC RBC AUTO-ENTMCNC: 29.2 G/DL (ref 28.4–34.8)
MCV RBC AUTO: 75.6 FL (ref 82.6–102.9)
NRBC AUTOMATED: 0 PER 100 WBC
PDW BLD-RTO: 19.9 % (ref 11.8–14.4)
PLATELET # BLD: 450 K/UL (ref 138–453)
PMV BLD AUTO: 10.4 FL (ref 8.1–13.5)
POTASSIUM SERPL-SCNC: 3.7 MMOL/L (ref 3.7–5.3)
RBC # BLD: 4.8 M/UL (ref 3.95–5.11)
SEDIMENTATION RATE, ERYTHROCYTE: 130 MM (ref 0–20)
SODIUM BLD-SCNC: 136 MMOL/L (ref 135–144)
WBC # BLD: 7.6 K/UL (ref 3.5–11.3)

## 2020-12-14 PROCEDURE — P9603 ONE-WAY ALLOW PRORATED MILES: HCPCS

## 2020-12-14 PROCEDURE — 85027 COMPLETE CBC AUTOMATED: CPT

## 2020-12-14 PROCEDURE — 85652 RBC SED RATE AUTOMATED: CPT

## 2020-12-14 PROCEDURE — 36415 COLL VENOUS BLD VENIPUNCTURE: CPT

## 2020-12-14 PROCEDURE — 86140 C-REACTIVE PROTEIN: CPT

## 2020-12-14 PROCEDURE — 80048 BASIC METABOLIC PNL TOTAL CA: CPT

## 2020-12-21 ENCOUNTER — HOSPITAL ENCOUNTER (OUTPATIENT)
Age: 40
Setting detail: SPECIMEN
Discharge: HOME | End: 2020-12-21
Payer: MEDICARE

## 2020-12-21 LAB
ANION GAP SERPL CALCULATED.3IONS-SCNC: 10 MMOL/L (ref 9–17)
BUN BLDV-MCNC: 17 MG/DL (ref 6–20)
BUN/CREAT BLD: ABNORMAL (ref 9–20)
C-REACTIVE PROTEIN: 37.5 MG/L (ref 0–5)
CALCIUM SERPL-MCNC: 9.2 MG/DL (ref 8.6–10.4)
CHLORIDE BLD-SCNC: 104 MMOL/L (ref 98–107)
CO2: 22 MMOL/L (ref 20–31)
CREAT SERPL-MCNC: 0.33 MG/DL (ref 0.5–0.9)
GFR AFRICAN AMERICAN: >60 ML/MIN
GFR NON-AFRICAN AMERICAN: >60 ML/MIN
GFR SERPL CREATININE-BSD FRML MDRD: ABNORMAL ML/MIN/{1.73_M2}
GFR SERPL CREATININE-BSD FRML MDRD: ABNORMAL ML/MIN/{1.73_M2}
GLUCOSE BLD-MCNC: 81 MG/DL (ref 70–99)
HCT VFR BLD CALC: 37.6 % (ref 36.3–47.1)
HEMOGLOBIN: 11.1 G/DL (ref 11.9–15.1)
MCH RBC QN AUTO: 22.1 PG (ref 25.2–33.5)
MCHC RBC AUTO-ENTMCNC: 29.5 G/DL (ref 28.4–34.8)
MCV RBC AUTO: 74.8 FL (ref 82.6–102.9)
NRBC AUTOMATED: 0 PER 100 WBC
PDW BLD-RTO: 20.3 % (ref 11.8–14.4)
PLATELET # BLD: 489 K/UL (ref 138–453)
PMV BLD AUTO: 10.4 FL (ref 8.1–13.5)
POTASSIUM SERPL-SCNC: 4 MMOL/L (ref 3.7–5.3)
RBC # BLD: 5.03 M/UL (ref 3.95–5.11)
SEDIMENTATION RATE, ERYTHROCYTE: 130 MM (ref 0–20)
SODIUM BLD-SCNC: 136 MMOL/L (ref 135–144)
WBC # BLD: 6.9 K/UL (ref 3.5–11.3)

## 2020-12-21 PROCEDURE — 80048 BASIC METABOLIC PNL TOTAL CA: CPT

## 2020-12-21 PROCEDURE — 85652 RBC SED RATE AUTOMATED: CPT

## 2020-12-21 PROCEDURE — 85027 COMPLETE CBC AUTOMATED: CPT

## 2020-12-21 PROCEDURE — 36415 COLL VENOUS BLD VENIPUNCTURE: CPT

## 2020-12-21 PROCEDURE — 86140 C-REACTIVE PROTEIN: CPT

## 2020-12-21 PROCEDURE — P9603 ONE-WAY ALLOW PRORATED MILES: HCPCS

## 2020-12-28 ENCOUNTER — HOSPITAL ENCOUNTER (OUTPATIENT)
Age: 40
Setting detail: SPECIMEN
Discharge: HOME | End: 2020-12-28
Payer: MEDICARE

## 2020-12-28 LAB
ANION GAP SERPL CALCULATED.3IONS-SCNC: 14 MMOL/L (ref 9–17)
BUN BLDV-MCNC: 14 MG/DL (ref 6–20)
BUN/CREAT BLD: ABNORMAL (ref 9–20)
C-REACTIVE PROTEIN: 21.2 MG/L (ref 0–5)
CALCIUM SERPL-MCNC: 9.4 MG/DL (ref 8.6–10.4)
CHLORIDE BLD-SCNC: 103 MMOL/L (ref 98–107)
CO2: 21 MMOL/L (ref 20–31)
CREAT SERPL-MCNC: 0.31 MG/DL (ref 0.5–0.9)
GFR AFRICAN AMERICAN: >60 ML/MIN
GFR NON-AFRICAN AMERICAN: >60 ML/MIN
GFR SERPL CREATININE-BSD FRML MDRD: ABNORMAL ML/MIN/{1.73_M2}
GFR SERPL CREATININE-BSD FRML MDRD: ABNORMAL ML/MIN/{1.73_M2}
GLUCOSE BLD-MCNC: 93 MG/DL (ref 70–99)
HCT VFR BLD CALC: 37.1 % (ref 36.3–47.1)
HEMOGLOBIN: 10.9 G/DL (ref 11.9–15.1)
MCH RBC QN AUTO: 22 PG (ref 25.2–33.5)
MCHC RBC AUTO-ENTMCNC: 29.4 G/DL (ref 28.4–34.8)
MCV RBC AUTO: 74.8 FL (ref 82.6–102.9)
NRBC AUTOMATED: 0 PER 100 WBC
PDW BLD-RTO: 20.2 % (ref 11.8–14.4)
PLATELET # BLD: 507 K/UL (ref 138–453)
PMV BLD AUTO: 10.5 FL (ref 8.1–13.5)
POTASSIUM SERPL-SCNC: 3.9 MMOL/L (ref 3.7–5.3)
RBC # BLD: 4.96 M/UL (ref 3.95–5.11)
SEDIMENTATION RATE, ERYTHROCYTE: 121 MM (ref 0–20)
SODIUM BLD-SCNC: 138 MMOL/L (ref 135–144)
WBC # BLD: 6.7 K/UL (ref 3.5–11.3)

## 2020-12-28 PROCEDURE — 85027 COMPLETE CBC AUTOMATED: CPT

## 2020-12-28 PROCEDURE — 85652 RBC SED RATE AUTOMATED: CPT

## 2020-12-28 PROCEDURE — P9603 ONE-WAY ALLOW PRORATED MILES: HCPCS

## 2020-12-28 PROCEDURE — 80048 BASIC METABOLIC PNL TOTAL CA: CPT

## 2020-12-28 PROCEDURE — 36415 COLL VENOUS BLD VENIPUNCTURE: CPT

## 2020-12-28 PROCEDURE — 86140 C-REACTIVE PROTEIN: CPT

## 2021-01-04 ENCOUNTER — HOSPITAL ENCOUNTER (OUTPATIENT)
Age: 41
Setting detail: SPECIMEN
Discharge: HOME | End: 2021-01-04
Payer: MEDICARE

## 2021-01-04 LAB
ANION GAP SERPL CALCULATED.3IONS-SCNC: 13 MMOL/L (ref 9–17)
BUN BLDV-MCNC: 19 MG/DL (ref 6–20)
BUN/CREAT BLD: ABNORMAL (ref 9–20)
C-REACTIVE PROTEIN: 13.3 MG/L (ref 0–5)
CALCIUM SERPL-MCNC: 9.3 MG/DL (ref 8.6–10.4)
CHLORIDE BLD-SCNC: 105 MMOL/L (ref 98–107)
CO2: 20 MMOL/L (ref 20–31)
CREAT SERPL-MCNC: 0.33 MG/DL (ref 0.5–0.9)
GFR AFRICAN AMERICAN: >60 ML/MIN
GFR NON-AFRICAN AMERICAN: >60 ML/MIN
GFR SERPL CREATININE-BSD FRML MDRD: ABNORMAL ML/MIN/{1.73_M2}
GFR SERPL CREATININE-BSD FRML MDRD: ABNORMAL ML/MIN/{1.73_M2}
GLUCOSE BLD-MCNC: 74 MG/DL (ref 70–99)
HCT VFR BLD CALC: 38.6 % (ref 36.3–47.1)
HEMOGLOBIN: 11.2 G/DL (ref 11.9–15.1)
MCH RBC QN AUTO: 22.1 PG (ref 25.2–33.5)
MCHC RBC AUTO-ENTMCNC: 29 G/DL (ref 28.4–34.8)
MCV RBC AUTO: 76.3 FL (ref 82.6–102.9)
NRBC AUTOMATED: 0 PER 100 WBC
PDW BLD-RTO: 20.7 % (ref 11.8–14.4)
PLATELET # BLD: 449 K/UL (ref 138–453)
PMV BLD AUTO: 10.1 FL (ref 8.1–13.5)
POTASSIUM SERPL-SCNC: 3.8 MMOL/L (ref 3.7–5.3)
RBC # BLD: 5.06 M/UL (ref 3.95–5.11)
SEDIMENTATION RATE, ERYTHROCYTE: 130 MM (ref 0–20)
SODIUM BLD-SCNC: 138 MMOL/L (ref 135–144)
WBC # BLD: 7.5 K/UL (ref 3.5–11.3)

## 2021-01-04 PROCEDURE — 86140 C-REACTIVE PROTEIN: CPT

## 2021-01-04 PROCEDURE — 87070 CULTURE OTHR SPECIMN AEROBIC: CPT

## 2021-01-04 PROCEDURE — P9603 ONE-WAY ALLOW PRORATED MILES: HCPCS

## 2021-01-04 PROCEDURE — 36415 COLL VENOUS BLD VENIPUNCTURE: CPT

## 2021-01-04 PROCEDURE — 80048 BASIC METABOLIC PNL TOTAL CA: CPT

## 2021-01-04 PROCEDURE — 85027 COMPLETE CBC AUTOMATED: CPT

## 2021-01-04 PROCEDURE — 85652 RBC SED RATE AUTOMATED: CPT

## 2021-01-05 ENCOUNTER — HOSPITAL ENCOUNTER (OUTPATIENT)
Age: 41
Setting detail: SPECIMEN
Discharge: HOME | End: 2021-01-05
Payer: MEDICARE

## 2021-01-05 PROCEDURE — 87070 CULTURE OTHR SPECIMN AEROBIC: CPT

## 2021-01-08 LAB
CULTURE: NORMAL
Lab: NORMAL
SPECIMEN DESCRIPTION: NORMAL

## 2021-01-11 ENCOUNTER — HOSPITAL ENCOUNTER (OUTPATIENT)
Age: 41
Setting detail: SPECIMEN
Discharge: HOME | End: 2021-01-11
Payer: MEDICARE

## 2021-01-11 LAB
ANION GAP SERPL CALCULATED.3IONS-SCNC: 14 MMOL/L (ref 9–17)
BUN BLDV-MCNC: 14 MG/DL (ref 6–20)
BUN/CREAT BLD: ABNORMAL (ref 9–20)
C-REACTIVE PROTEIN: 30.2 MG/L (ref 0–5)
CALCIUM SERPL-MCNC: 9.2 MG/DL (ref 8.6–10.4)
CHLORIDE BLD-SCNC: 103 MMOL/L (ref 98–107)
CO2: 21 MMOL/L (ref 20–31)
CREAT SERPL-MCNC: 0.28 MG/DL (ref 0.5–0.9)
GFR AFRICAN AMERICAN: >60 ML/MIN
GFR NON-AFRICAN AMERICAN: >60 ML/MIN
GFR SERPL CREATININE-BSD FRML MDRD: ABNORMAL ML/MIN/{1.73_M2}
GFR SERPL CREATININE-BSD FRML MDRD: ABNORMAL ML/MIN/{1.73_M2}
GLUCOSE BLD-MCNC: 95 MG/DL (ref 70–99)
HCT VFR BLD CALC: 36.9 % (ref 36.3–47.1)
HEMOGLOBIN: 11.1 G/DL (ref 11.9–15.1)
MCH RBC QN AUTO: 22.9 PG (ref 25.2–33.5)
MCHC RBC AUTO-ENTMCNC: 30.1 G/DL (ref 28.4–34.8)
MCV RBC AUTO: 76.1 FL (ref 82.6–102.9)
NRBC AUTOMATED: 0 PER 100 WBC
PDW BLD-RTO: 20.5 % (ref 11.8–14.4)
PLATELET # BLD: 422 K/UL (ref 138–453)
PMV BLD AUTO: 10.8 FL (ref 8.1–13.5)
POTASSIUM SERPL-SCNC: 4.3 MMOL/L (ref 3.7–5.3)
RBC # BLD: 4.85 M/UL (ref 3.95–5.11)
SEDIMENTATION RATE, ERYTHROCYTE: 117 MM (ref 0–20)
SODIUM BLD-SCNC: 138 MMOL/L (ref 135–144)
WBC # BLD: 7.7 K/UL (ref 3.5–11.3)

## 2021-01-11 PROCEDURE — 85652 RBC SED RATE AUTOMATED: CPT

## 2021-01-11 PROCEDURE — 80048 BASIC METABOLIC PNL TOTAL CA: CPT

## 2021-01-11 PROCEDURE — 85027 COMPLETE CBC AUTOMATED: CPT

## 2021-01-11 PROCEDURE — P9603 ONE-WAY ALLOW PRORATED MILES: HCPCS

## 2021-01-11 PROCEDURE — 36415 COLL VENOUS BLD VENIPUNCTURE: CPT

## 2021-01-11 PROCEDURE — 86140 C-REACTIVE PROTEIN: CPT

## 2021-01-18 ENCOUNTER — HOSPITAL ENCOUNTER (OUTPATIENT)
Age: 41
Setting detail: SPECIMEN
Discharge: HOME | End: 2021-01-18
Payer: MEDICARE

## 2021-01-18 LAB
ANION GAP SERPL CALCULATED.3IONS-SCNC: 13 MMOL/L (ref 9–17)
BUN BLDV-MCNC: 17 MG/DL (ref 6–20)
BUN/CREAT BLD: ABNORMAL (ref 9–20)
C-REACTIVE PROTEIN: 16.9 MG/L (ref 0–5)
CALCIUM SERPL-MCNC: 9.7 MG/DL (ref 8.6–10.4)
CHLORIDE BLD-SCNC: 108 MMOL/L (ref 98–107)
CO2: 19 MMOL/L (ref 20–31)
CREAT SERPL-MCNC: 0.39 MG/DL (ref 0.5–0.9)
GFR AFRICAN AMERICAN: >60 ML/MIN
GFR NON-AFRICAN AMERICAN: >60 ML/MIN
GFR SERPL CREATININE-BSD FRML MDRD: ABNORMAL ML/MIN/{1.73_M2}
GFR SERPL CREATININE-BSD FRML MDRD: ABNORMAL ML/MIN/{1.73_M2}
GLUCOSE BLD-MCNC: 79 MG/DL (ref 70–99)
HCT VFR BLD CALC: 40 % (ref 36.3–47.1)
HEMOGLOBIN: 11.8 G/DL (ref 11.9–15.1)
MCH RBC QN AUTO: 22.9 PG (ref 25.2–33.5)
MCHC RBC AUTO-ENTMCNC: 29.5 G/DL (ref 28.4–34.8)
MCV RBC AUTO: 77.7 FL (ref 82.6–102.9)
NRBC AUTOMATED: 0 PER 100 WBC
PDW BLD-RTO: 20.5 % (ref 11.8–14.4)
PLATELET # BLD: 511 K/UL (ref 138–453)
PMV BLD AUTO: 10.6 FL (ref 8.1–13.5)
POTASSIUM SERPL-SCNC: 3.3 MMOL/L (ref 3.7–5.3)
RBC # BLD: 5.15 M/UL (ref 3.95–5.11)
SEDIMENTATION RATE, ERYTHROCYTE: 130 MM (ref 0–20)
SODIUM BLD-SCNC: 140 MMOL/L (ref 135–144)
WBC # BLD: 6.8 K/UL (ref 3.5–11.3)

## 2021-01-18 PROCEDURE — 85027 COMPLETE CBC AUTOMATED: CPT

## 2021-01-18 PROCEDURE — 80048 BASIC METABOLIC PNL TOTAL CA: CPT

## 2021-01-18 PROCEDURE — 85652 RBC SED RATE AUTOMATED: CPT

## 2021-01-18 PROCEDURE — P9603 ONE-WAY ALLOW PRORATED MILES: HCPCS

## 2021-01-18 PROCEDURE — 36415 COLL VENOUS BLD VENIPUNCTURE: CPT

## 2021-01-18 PROCEDURE — 86140 C-REACTIVE PROTEIN: CPT

## 2021-01-25 ENCOUNTER — HOSPITAL ENCOUNTER (OUTPATIENT)
Age: 41
Setting detail: SPECIMEN
Discharge: HOME | End: 2021-01-25
Payer: MEDICARE

## 2021-01-25 LAB
ANION GAP SERPL CALCULATED.3IONS-SCNC: 11 MMOL/L (ref 9–17)
BUN BLDV-MCNC: 15 MG/DL (ref 6–20)
BUN/CREAT BLD: ABNORMAL (ref 9–20)
C-REACTIVE PROTEIN: 28.6 MG/L (ref 0–5)
CALCIUM SERPL-MCNC: 9.5 MG/DL (ref 8.6–10.4)
CHLORIDE BLD-SCNC: 104 MMOL/L (ref 98–107)
CO2: 22 MMOL/L (ref 20–31)
CREAT SERPL-MCNC: 0.3 MG/DL (ref 0.5–0.9)
GFR AFRICAN AMERICAN: >60 ML/MIN
GFR NON-AFRICAN AMERICAN: >60 ML/MIN
GFR SERPL CREATININE-BSD FRML MDRD: ABNORMAL ML/MIN/{1.73_M2}
GFR SERPL CREATININE-BSD FRML MDRD: ABNORMAL ML/MIN/{1.73_M2}
GLUCOSE BLD-MCNC: 71 MG/DL (ref 70–99)
HCT VFR BLD CALC: 38.6 % (ref 36.3–47.1)
HEMOGLOBIN: 11.3 G/DL (ref 11.9–15.1)
MCH RBC QN AUTO: 23.1 PG (ref 25.2–33.5)
MCHC RBC AUTO-ENTMCNC: 29.3 G/DL (ref 28.4–34.8)
MCV RBC AUTO: 78.8 FL (ref 82.6–102.9)
NRBC AUTOMATED: 0 PER 100 WBC
PDW BLD-RTO: 20.2 % (ref 11.8–14.4)
PLATELET # BLD: 446 K/UL (ref 138–453)
PMV BLD AUTO: 10.7 FL (ref 8.1–13.5)
POTASSIUM SERPL-SCNC: 3.9 MMOL/L (ref 3.7–5.3)
RBC # BLD: 4.9 M/UL (ref 3.95–5.11)
SEDIMENTATION RATE, ERYTHROCYTE: 116 MM (ref 0–20)
SODIUM BLD-SCNC: 137 MMOL/L (ref 135–144)
WBC # BLD: 6.5 K/UL (ref 3.5–11.3)

## 2021-01-25 PROCEDURE — P9603 ONE-WAY ALLOW PRORATED MILES: HCPCS

## 2021-01-25 PROCEDURE — 36415 COLL VENOUS BLD VENIPUNCTURE: CPT

## 2021-01-25 PROCEDURE — 86140 C-REACTIVE PROTEIN: CPT

## 2021-01-25 PROCEDURE — 85027 COMPLETE CBC AUTOMATED: CPT

## 2021-01-25 PROCEDURE — 80048 BASIC METABOLIC PNL TOTAL CA: CPT

## 2021-01-25 PROCEDURE — 85652 RBC SED RATE AUTOMATED: CPT

## 2021-02-01 ENCOUNTER — HOSPITAL ENCOUNTER (OUTPATIENT)
Age: 41
Setting detail: SPECIMEN
Discharge: HOME | End: 2021-02-01
Payer: MEDICARE

## 2021-02-01 LAB
ANION GAP SERPL CALCULATED.3IONS-SCNC: 14 MMOL/L (ref 9–17)
BUN BLDV-MCNC: 18 MG/DL (ref 6–20)
BUN/CREAT BLD: ABNORMAL (ref 9–20)
C-REACTIVE PROTEIN: 12.4 MG/L (ref 0–5)
CALCIUM SERPL-MCNC: 9.8 MG/DL (ref 8.6–10.4)
CHLORIDE BLD-SCNC: 110 MMOL/L (ref 98–107)
CO2: 18 MMOL/L (ref 20–31)
CREAT SERPL-MCNC: 0.25 MG/DL (ref 0.5–0.9)
GFR AFRICAN AMERICAN: >60 ML/MIN
GFR NON-AFRICAN AMERICAN: >60 ML/MIN
GFR SERPL CREATININE-BSD FRML MDRD: ABNORMAL ML/MIN/{1.73_M2}
GFR SERPL CREATININE-BSD FRML MDRD: ABNORMAL ML/MIN/{1.73_M2}
GLUCOSE BLD-MCNC: 95 MG/DL (ref 70–99)
HCT VFR BLD CALC: 38.6 % (ref 36.3–47.1)
HEMOGLOBIN: 11.3 G/DL (ref 11.9–15.1)
MCH RBC QN AUTO: 22.9 PG (ref 25.2–33.5)
MCHC RBC AUTO-ENTMCNC: 29.3 G/DL (ref 28.4–34.8)
MCV RBC AUTO: 78.3 FL (ref 82.6–102.9)
NRBC AUTOMATED: 0 PER 100 WBC
PDW BLD-RTO: 20.4 % (ref 11.8–14.4)
PLATELET # BLD: 453 K/UL (ref 138–453)
PMV BLD AUTO: 10.2 FL (ref 8.1–13.5)
POTASSIUM SERPL-SCNC: 4 MMOL/L (ref 3.7–5.3)
RBC # BLD: 4.93 M/UL (ref 3.95–5.11)
SEDIMENTATION RATE, ERYTHROCYTE: 112 MM (ref 0–20)
SODIUM BLD-SCNC: 142 MMOL/L (ref 135–144)
WBC # BLD: 8.8 K/UL (ref 3.5–11.3)

## 2021-02-01 PROCEDURE — 86140 C-REACTIVE PROTEIN: CPT

## 2021-02-01 PROCEDURE — 85652 RBC SED RATE AUTOMATED: CPT

## 2021-02-01 PROCEDURE — 36415 COLL VENOUS BLD VENIPUNCTURE: CPT

## 2021-02-01 PROCEDURE — 85027 COMPLETE CBC AUTOMATED: CPT

## 2021-02-01 PROCEDURE — P9603 ONE-WAY ALLOW PRORATED MILES: HCPCS

## 2021-02-01 PROCEDURE — 80048 BASIC METABOLIC PNL TOTAL CA: CPT

## 2021-02-08 ENCOUNTER — HOSPITAL ENCOUNTER (OUTPATIENT)
Age: 41
Setting detail: SPECIMEN
Discharge: HOME | End: 2021-02-08
Payer: MEDICARE

## 2021-02-08 LAB
ANION GAP SERPL CALCULATED.3IONS-SCNC: 13 MMOL/L (ref 9–17)
BUN BLDV-MCNC: 15 MG/DL (ref 6–20)
BUN/CREAT BLD: ABNORMAL (ref 9–20)
C-REACTIVE PROTEIN: 11.9 MG/L (ref 0–5)
CALCIUM SERPL-MCNC: 9.2 MG/DL (ref 8.6–10.4)
CHLORIDE BLD-SCNC: 109 MMOL/L (ref 98–107)
CO2: 19 MMOL/L (ref 20–31)
CREAT SERPL-MCNC: 0.27 MG/DL (ref 0.5–0.9)
GFR AFRICAN AMERICAN: >60 ML/MIN
GFR NON-AFRICAN AMERICAN: >60 ML/MIN
GFR SERPL CREATININE-BSD FRML MDRD: ABNORMAL ML/MIN/{1.73_M2}
GFR SERPL CREATININE-BSD FRML MDRD: ABNORMAL ML/MIN/{1.73_M2}
GLUCOSE BLD-MCNC: 90 MG/DL (ref 70–99)
HCT VFR BLD CALC: 38.4 % (ref 36.3–47.1)
HEMOGLOBIN: 11.3 G/DL (ref 11.9–15.1)
MCH RBC QN AUTO: 23.5 PG (ref 25.2–33.5)
MCHC RBC AUTO-ENTMCNC: 29.4 G/DL (ref 28.4–34.8)
MCV RBC AUTO: 79.8 FL (ref 82.6–102.9)
NRBC AUTOMATED: 0 PER 100 WBC
PDW BLD-RTO: 20.9 % (ref 11.8–14.4)
PLATELET # BLD: 404 K/UL (ref 138–453)
PMV BLD AUTO: 10.6 FL (ref 8.1–13.5)
POTASSIUM SERPL-SCNC: 3.8 MMOL/L (ref 3.7–5.3)
RBC # BLD: 4.81 M/UL (ref 3.95–5.11)
SEDIMENTATION RATE, ERYTHROCYTE: 121 MM (ref 0–20)
SODIUM BLD-SCNC: 141 MMOL/L (ref 135–144)
WBC # BLD: 8.3 K/UL (ref 3.5–11.3)

## 2021-02-08 PROCEDURE — 86140 C-REACTIVE PROTEIN: CPT

## 2021-02-08 PROCEDURE — 80048 BASIC METABOLIC PNL TOTAL CA: CPT

## 2021-02-08 PROCEDURE — P9603 ONE-WAY ALLOW PRORATED MILES: HCPCS

## 2021-02-08 PROCEDURE — 85652 RBC SED RATE AUTOMATED: CPT

## 2021-02-08 PROCEDURE — 36415 COLL VENOUS BLD VENIPUNCTURE: CPT

## 2021-02-08 PROCEDURE — 85027 COMPLETE CBC AUTOMATED: CPT

## 2021-02-09 ENCOUNTER — HOSPITAL ENCOUNTER (OUTPATIENT)
Age: 41
Setting detail: SPECIMEN
Discharge: HOME | End: 2021-02-09
Payer: MEDICARE

## 2021-02-12 ENCOUNTER — HOSPITAL ENCOUNTER (OUTPATIENT)
Age: 41
Setting detail: SPECIMEN
Discharge: HOME | End: 2021-02-12
Payer: MEDICARE

## 2021-02-12 LAB
ANION GAP SERPL CALCULATED.3IONS-SCNC: 9 MMOL/L (ref 9–17)
BUN BLDV-MCNC: 14 MG/DL (ref 6–20)
BUN/CREAT BLD: ABNORMAL (ref 9–20)
CALCIUM SERPL-MCNC: 9.3 MG/DL (ref 8.6–10.4)
CHLORIDE BLD-SCNC: 106 MMOL/L (ref 98–107)
CO2: 23 MMOL/L (ref 20–31)
CREAT SERPL-MCNC: 0.27 MG/DL (ref 0.5–0.9)
GFR AFRICAN AMERICAN: >60 ML/MIN
GFR NON-AFRICAN AMERICAN: >60 ML/MIN
GFR SERPL CREATININE-BSD FRML MDRD: ABNORMAL ML/MIN/{1.73_M2}
GFR SERPL CREATININE-BSD FRML MDRD: ABNORMAL ML/MIN/{1.73_M2}
GLUCOSE BLD-MCNC: 86 MG/DL (ref 70–99)
HCT VFR BLD CALC: 39 % (ref 36.3–47.1)
HEMOGLOBIN: 11.8 G/DL (ref 11.9–15.1)
MCH RBC QN AUTO: 24.2 PG (ref 25.2–33.5)
MCHC RBC AUTO-ENTMCNC: 30.3 G/DL (ref 28.4–34.8)
MCV RBC AUTO: 80.1 FL (ref 82.6–102.9)
NRBC AUTOMATED: 0 PER 100 WBC
PDW BLD-RTO: 21.8 % (ref 11.8–14.4)
PLATELET # BLD: 412 K/UL (ref 138–453)
PMV BLD AUTO: 10.5 FL (ref 8.1–13.5)
POTASSIUM SERPL-SCNC: 3.6 MMOL/L (ref 3.7–5.3)
RBC # BLD: 4.87 M/UL (ref 3.95–5.11)
SODIUM BLD-SCNC: 138 MMOL/L (ref 135–144)
WBC # BLD: 7.7 K/UL (ref 3.5–11.3)

## 2021-02-12 PROCEDURE — 36415 COLL VENOUS BLD VENIPUNCTURE: CPT

## 2021-02-12 PROCEDURE — 85027 COMPLETE CBC AUTOMATED: CPT

## 2021-02-12 PROCEDURE — P9603 ONE-WAY ALLOW PRORATED MILES: HCPCS

## 2021-02-12 PROCEDURE — 80048 BASIC METABOLIC PNL TOTAL CA: CPT

## 2021-02-15 ENCOUNTER — HOSPITAL ENCOUNTER (OUTPATIENT)
Age: 41
Setting detail: SPECIMEN
Discharge: HOME | End: 2021-02-15
Payer: MEDICARE

## 2021-02-15 LAB
ANION GAP SERPL CALCULATED.3IONS-SCNC: 10 MMOL/L (ref 9–17)
BUN BLDV-MCNC: 16 MG/DL (ref 6–20)
BUN/CREAT BLD: ABNORMAL (ref 9–20)
C-REACTIVE PROTEIN: 11 MG/L (ref 0–5)
CALCIUM SERPL-MCNC: 9.2 MG/DL (ref 8.6–10.4)
CHLORIDE BLD-SCNC: 103 MMOL/L (ref 98–107)
CO2: 24 MMOL/L (ref 20–31)
CREAT SERPL-MCNC: 0.25 MG/DL (ref 0.5–0.9)
GFR AFRICAN AMERICAN: >60 ML/MIN
GFR NON-AFRICAN AMERICAN: >60 ML/MIN
GFR SERPL CREATININE-BSD FRML MDRD: ABNORMAL ML/MIN/{1.73_M2}
GFR SERPL CREATININE-BSD FRML MDRD: ABNORMAL ML/MIN/{1.73_M2}
GLUCOSE BLD-MCNC: 62 MG/DL (ref 70–99)
HCT VFR BLD CALC: 38.3 % (ref 36.3–47.1)
HEMOGLOBIN: 11.5 G/DL (ref 11.9–15.1)
MCH RBC QN AUTO: 24.2 PG (ref 25.2–33.5)
MCHC RBC AUTO-ENTMCNC: 30 G/DL (ref 28.4–34.8)
MCV RBC AUTO: 80.6 FL (ref 82.6–102.9)
NRBC AUTOMATED: 0 PER 100 WBC
PDW BLD-RTO: 22.1 % (ref 11.8–14.4)
PLATELET # BLD: 385 K/UL (ref 138–453)
PMV BLD AUTO: 10.6 FL (ref 8.1–13.5)
POTASSIUM SERPL-SCNC: 4.1 MMOL/L (ref 3.7–5.3)
RBC # BLD: 4.75 M/UL (ref 3.95–5.11)
SEDIMENTATION RATE, ERYTHROCYTE: 89 MM (ref 0–20)
SODIUM BLD-SCNC: 137 MMOL/L (ref 135–144)
WBC # BLD: 7.2 K/UL (ref 3.5–11.3)

## 2021-02-15 PROCEDURE — 86140 C-REACTIVE PROTEIN: CPT

## 2021-02-15 PROCEDURE — 80048 BASIC METABOLIC PNL TOTAL CA: CPT

## 2021-02-15 PROCEDURE — 85652 RBC SED RATE AUTOMATED: CPT

## 2021-02-15 PROCEDURE — 85027 COMPLETE CBC AUTOMATED: CPT

## 2021-02-15 PROCEDURE — P9603 ONE-WAY ALLOW PRORATED MILES: HCPCS

## 2021-02-15 PROCEDURE — 36415 COLL VENOUS BLD VENIPUNCTURE: CPT

## 2021-02-22 ENCOUNTER — HOSPITAL ENCOUNTER (OUTPATIENT)
Age: 41
Setting detail: SPECIMEN
Discharge: HOME | End: 2021-02-22
Payer: MEDICARE

## 2021-02-22 LAB
ANION GAP SERPL CALCULATED.3IONS-SCNC: 11 MMOL/L (ref 9–17)
BUN BLDV-MCNC: 15 MG/DL (ref 6–20)
BUN/CREAT BLD: ABNORMAL (ref 9–20)
C-REACTIVE PROTEIN: 7.8 MG/L (ref 0–5)
CALCIUM SERPL-MCNC: 9.4 MG/DL (ref 8.6–10.4)
CHLORIDE BLD-SCNC: 103 MMOL/L (ref 98–107)
CO2: 24 MMOL/L (ref 20–31)
CREAT SERPL-MCNC: 0.34 MG/DL (ref 0.5–0.9)
GFR AFRICAN AMERICAN: >60 ML/MIN
GFR NON-AFRICAN AMERICAN: >60 ML/MIN
GFR SERPL CREATININE-BSD FRML MDRD: ABNORMAL ML/MIN/{1.73_M2}
GFR SERPL CREATININE-BSD FRML MDRD: ABNORMAL ML/MIN/{1.73_M2}
GLUCOSE BLD-MCNC: 81 MG/DL (ref 70–99)
HCT VFR BLD CALC: 41.5 % (ref 36.3–47.1)
HEMOGLOBIN: 12.5 G/DL (ref 11.9–15.1)
MCH RBC QN AUTO: 24.7 PG (ref 25.2–33.5)
MCHC RBC AUTO-ENTMCNC: 30.1 G/DL (ref 28.4–34.8)
MCV RBC AUTO: 81.9 FL (ref 82.6–102.9)
NRBC AUTOMATED: 0 PER 100 WBC
PDW BLD-RTO: 22.5 % (ref 11.8–14.4)
PLATELET # BLD: 395 K/UL (ref 138–453)
PMV BLD AUTO: 10.3 FL (ref 8.1–13.5)
POTASSIUM SERPL-SCNC: 3.8 MMOL/L (ref 3.7–5.3)
RBC # BLD: 5.07 M/UL (ref 3.95–5.11)
SEDIMENTATION RATE, ERYTHROCYTE: 55 MM (ref 0–20)
SODIUM BLD-SCNC: 138 MMOL/L (ref 135–144)
WBC # BLD: 5.6 K/UL (ref 3.5–11.3)

## 2021-02-22 PROCEDURE — 85027 COMPLETE CBC AUTOMATED: CPT

## 2021-02-22 PROCEDURE — 80048 BASIC METABOLIC PNL TOTAL CA: CPT

## 2021-02-22 PROCEDURE — 86140 C-REACTIVE PROTEIN: CPT

## 2021-02-22 PROCEDURE — 85652 RBC SED RATE AUTOMATED: CPT

## 2021-02-22 PROCEDURE — 36415 COLL VENOUS BLD VENIPUNCTURE: CPT

## 2021-02-22 PROCEDURE — P9603 ONE-WAY ALLOW PRORATED MILES: HCPCS

## 2021-03-01 ENCOUNTER — HOSPITAL ENCOUNTER (OUTPATIENT)
Age: 41
Setting detail: SPECIMEN
Discharge: HOME | End: 2021-03-01
Payer: MEDICARE

## 2021-03-01 LAB
ALBUMIN SERPL-MCNC: 4 G/DL (ref 3.5–5.2)
ALBUMIN/GLOBULIN RATIO: 0.8 (ref 1–2.5)
ALP BLD-CCNC: 68 U/L (ref 35–104)
ALT SERPL-CCNC: 19 U/L (ref 5–33)
ANION GAP SERPL CALCULATED.3IONS-SCNC: 10 MMOL/L (ref 9–17)
AST SERPL-CCNC: 20 U/L
BILIRUB SERPL-MCNC: 0.17 MG/DL (ref 0.3–1.2)
BUN BLDV-MCNC: 18 MG/DL (ref 6–20)
BUN/CREAT BLD: ABNORMAL (ref 9–20)
CALCIUM SERPL-MCNC: 9.6 MG/DL (ref 8.6–10.4)
CHLORIDE BLD-SCNC: 106 MMOL/L (ref 98–107)
CO2: 23 MMOL/L (ref 20–31)
CREAT SERPL-MCNC: 0.24 MG/DL (ref 0.5–0.9)
GFR AFRICAN AMERICAN: >60 ML/MIN
GFR NON-AFRICAN AMERICAN: >60 ML/MIN
GFR SERPL CREATININE-BSD FRML MDRD: ABNORMAL ML/MIN/{1.73_M2}
GFR SERPL CREATININE-BSD FRML MDRD: ABNORMAL ML/MIN/{1.73_M2}
GLUCOSE BLD-MCNC: 81 MG/DL (ref 70–99)
HCT VFR BLD CALC: 44.3 % (ref 36.3–47.1)
HEMOGLOBIN: 13.4 G/DL (ref 11.9–15.1)
MCH RBC QN AUTO: 24.8 PG (ref 25.2–33.5)
MCHC RBC AUTO-ENTMCNC: 30.2 G/DL (ref 28.4–34.8)
MCV RBC AUTO: 81.9 FL (ref 82.6–102.9)
NRBC AUTOMATED: 0 PER 100 WBC
PDW BLD-RTO: 22.6 % (ref 11.8–14.4)
PLATELET # BLD: 406 K/UL (ref 138–453)
PMV BLD AUTO: 10.2 FL (ref 8.1–13.5)
POTASSIUM SERPL-SCNC: 4 MMOL/L (ref 3.7–5.3)
RBC # BLD: 5.41 M/UL (ref 3.95–5.11)
SEDIMENTATION RATE, ERYTHROCYTE: 99 MM (ref 0–20)
SODIUM BLD-SCNC: 139 MMOL/L (ref 135–144)
TOTAL PROTEIN: 8.8 G/DL (ref 6.4–8.3)
WBC # BLD: 5.5 K/UL (ref 3.5–11.3)

## 2021-03-01 PROCEDURE — P9603 ONE-WAY ALLOW PRORATED MILES: HCPCS

## 2021-03-01 PROCEDURE — 36415 COLL VENOUS BLD VENIPUNCTURE: CPT

## 2021-03-01 PROCEDURE — 80053 COMPREHEN METABOLIC PANEL: CPT

## 2021-03-01 PROCEDURE — 85027 COMPLETE CBC AUTOMATED: CPT

## 2021-03-01 PROCEDURE — 85652 RBC SED RATE AUTOMATED: CPT

## 2021-03-03 ENCOUNTER — HOSPITAL ENCOUNTER (OUTPATIENT)
Age: 41
Setting detail: SPECIMEN
Discharge: HOME | End: 2021-03-03
Payer: MEDICARE

## 2021-03-03 LAB
-: ABNORMAL
AMORPHOUS: ABNORMAL
BACTERIA: ABNORMAL
BILIRUBIN URINE: NEGATIVE
CASTS UA: ABNORMAL /LPF (ref 0–2)
COLOR: YELLOW
COMMENT UA: ABNORMAL
CRYSTALS, UA: ABNORMAL /HPF
EPITHELIAL CELLS UA: ABNORMAL /HPF (ref 0–5)
GLUCOSE URINE: NEGATIVE
KETONES, URINE: ABNORMAL
LEUKOCYTE ESTERASE, URINE: ABNORMAL
MUCUS: ABNORMAL
NITRITE, URINE: NEGATIVE
OTHER OBSERVATIONS UA: ABNORMAL
PH UA: 6 (ref 5–8)
PROTEIN UA: ABNORMAL
RBC UA: ABNORMAL /HPF (ref 0–2)
RENAL EPITHELIAL, UA: ABNORMAL /HPF
SPECIFIC GRAVITY UA: 1.03 (ref 1–1.03)
TRICHOMONAS: ABNORMAL
TURBIDITY: ABNORMAL
URINE HGB: ABNORMAL
UROBILINOGEN, URINE: NORMAL
WBC UA: ABNORMAL /HPF (ref 0–5)
YEAST: ABNORMAL

## 2021-03-03 PROCEDURE — 87086 URINE CULTURE/COLONY COUNT: CPT

## 2021-03-03 PROCEDURE — 87186 SC STD MICRODIL/AGAR DIL: CPT

## 2021-03-03 PROCEDURE — 87077 CULTURE AEROBIC IDENTIFY: CPT

## 2021-03-03 PROCEDURE — 81001 URINALYSIS AUTO W/SCOPE: CPT

## 2021-03-04 LAB
CULTURE: ABNORMAL
CULTURE: ABNORMAL
Lab: ABNORMAL
SPECIMEN DESCRIPTION: ABNORMAL

## 2021-03-08 ENCOUNTER — HOSPITAL ENCOUNTER (OUTPATIENT)
Age: 41
Setting detail: SPECIMEN
Discharge: HOME | End: 2021-03-08
Payer: MEDICARE

## 2021-03-08 LAB
ANION GAP SERPL CALCULATED.3IONS-SCNC: 10 MMOL/L (ref 9–17)
BUN BLDV-MCNC: 16 MG/DL (ref 6–20)
BUN/CREAT BLD: ABNORMAL (ref 9–20)
C-REACTIVE PROTEIN: 14.7 MG/L (ref 0–5)
CALCIUM SERPL-MCNC: 9.3 MG/DL (ref 8.6–10.4)
CHLORIDE BLD-SCNC: 103 MMOL/L (ref 98–107)
CO2: 24 MMOL/L (ref 20–31)
CREAT SERPL-MCNC: 0.28 MG/DL (ref 0.5–0.9)
GFR AFRICAN AMERICAN: >60 ML/MIN
GFR NON-AFRICAN AMERICAN: >60 ML/MIN
GFR SERPL CREATININE-BSD FRML MDRD: ABNORMAL ML/MIN/{1.73_M2}
GFR SERPL CREATININE-BSD FRML MDRD: ABNORMAL ML/MIN/{1.73_M2}
GLUCOSE BLD-MCNC: 96 MG/DL (ref 70–99)
HCT VFR BLD CALC: 43.1 % (ref 36.3–47.1)
HEMOGLOBIN: 13.1 G/DL (ref 11.9–15.1)
MCH RBC QN AUTO: 25 PG (ref 25.2–33.5)
MCHC RBC AUTO-ENTMCNC: 30.4 G/DL (ref 28.4–34.8)
MCV RBC AUTO: 82.3 FL (ref 82.6–102.9)
NRBC AUTOMATED: 0 PER 100 WBC
PDW BLD-RTO: 21.1 % (ref 11.8–14.4)
PLATELET # BLD: 350 K/UL (ref 138–453)
PMV BLD AUTO: 10 FL (ref 8.1–13.5)
POTASSIUM SERPL-SCNC: 4.1 MMOL/L (ref 3.7–5.3)
RBC # BLD: 5.24 M/UL (ref 3.95–5.11)
SEDIMENTATION RATE, ERYTHROCYTE: 95 MM (ref 0–20)
SODIUM BLD-SCNC: 137 MMOL/L (ref 135–144)
WBC # BLD: 5.6 K/UL (ref 3.5–11.3)

## 2021-03-08 PROCEDURE — P9603 ONE-WAY ALLOW PRORATED MILES: HCPCS

## 2021-03-08 PROCEDURE — 36415 COLL VENOUS BLD VENIPUNCTURE: CPT

## 2021-03-08 PROCEDURE — 80048 BASIC METABOLIC PNL TOTAL CA: CPT

## 2021-03-08 PROCEDURE — 85027 COMPLETE CBC AUTOMATED: CPT

## 2021-03-08 PROCEDURE — 85652 RBC SED RATE AUTOMATED: CPT

## 2021-03-08 PROCEDURE — 86140 C-REACTIVE PROTEIN: CPT

## 2021-03-09 ENCOUNTER — HOSPITAL ENCOUNTER (OUTPATIENT)
Age: 41
Setting detail: SPECIMEN
Discharge: HOME | End: 2021-03-09
Payer: MEDICARE

## 2021-03-09 LAB
TOBRAMYCIN TROUGH DATE LAST DOSE: NORMAL
TOBRAMYCIN TROUGH DOSE AMOUNT: NORMAL
TOBRAMYCIN TROUGH TIME LAST DOSE: NORMAL
TOBRAMYCIN TROUGH: <0.4 UG/ML (ref 0–2)

## 2021-03-09 PROCEDURE — P9603 ONE-WAY ALLOW PRORATED MILES: HCPCS

## 2021-03-09 PROCEDURE — 80200 ASSAY OF TOBRAMYCIN: CPT

## 2021-03-09 PROCEDURE — 36415 COLL VENOUS BLD VENIPUNCTURE: CPT

## 2021-03-15 ENCOUNTER — HOSPITAL ENCOUNTER (OUTPATIENT)
Age: 41
Setting detail: SPECIMEN
Discharge: HOME | End: 2021-03-15
Payer: MEDICARE

## 2021-03-15 LAB
ANION GAP SERPL CALCULATED.3IONS-SCNC: 14 MMOL/L (ref 9–17)
BUN BLDV-MCNC: 15 MG/DL (ref 6–20)
BUN/CREAT BLD: ABNORMAL (ref 9–20)
C-REACTIVE PROTEIN: 13 MG/L (ref 0–5)
CALCIUM SERPL-MCNC: 9.8 MG/DL (ref 8.6–10.4)
CHLORIDE BLD-SCNC: 104 MMOL/L (ref 98–107)
CO2: 23 MMOL/L (ref 20–31)
CREAT SERPL-MCNC: 0.31 MG/DL (ref 0.5–0.9)
GFR AFRICAN AMERICAN: >60 ML/MIN
GFR NON-AFRICAN AMERICAN: >60 ML/MIN
GFR SERPL CREATININE-BSD FRML MDRD: ABNORMAL ML/MIN/{1.73_M2}
GFR SERPL CREATININE-BSD FRML MDRD: ABNORMAL ML/MIN/{1.73_M2}
GLUCOSE BLD-MCNC: 93 MG/DL (ref 70–99)
HCT VFR BLD CALC: 42.6 % (ref 36.3–47.1)
HEMOGLOBIN: 13.1 G/DL (ref 11.9–15.1)
MCH RBC QN AUTO: 25.9 PG (ref 25.2–33.5)
MCHC RBC AUTO-ENTMCNC: 30.8 G/DL (ref 28.4–34.8)
MCV RBC AUTO: 84.2 FL (ref 82.6–102.9)
NRBC AUTOMATED: 0 PER 100 WBC
PDW BLD-RTO: 20.7 % (ref 11.8–14.4)
PLATELET # BLD: 386 K/UL (ref 138–453)
PMV BLD AUTO: 10.1 FL (ref 8.1–13.5)
POTASSIUM SERPL-SCNC: 3.8 MMOL/L (ref 3.7–5.3)
RBC # BLD: 5.06 M/UL (ref 3.95–5.11)
SEDIMENTATION RATE, ERYTHROCYTE: 69 MM (ref 0–20)
SODIUM BLD-SCNC: 141 MMOL/L (ref 135–144)
WBC # BLD: 5.7 K/UL (ref 3.5–11.3)

## 2021-03-15 PROCEDURE — 85652 RBC SED RATE AUTOMATED: CPT

## 2021-03-15 PROCEDURE — 80048 BASIC METABOLIC PNL TOTAL CA: CPT

## 2021-03-15 PROCEDURE — 36415 COLL VENOUS BLD VENIPUNCTURE: CPT

## 2021-03-15 PROCEDURE — P9603 ONE-WAY ALLOW PRORATED MILES: HCPCS

## 2021-03-15 PROCEDURE — 85027 COMPLETE CBC AUTOMATED: CPT

## 2021-03-15 PROCEDURE — 86140 C-REACTIVE PROTEIN: CPT

## 2021-03-22 ENCOUNTER — HOSPITAL ENCOUNTER (OUTPATIENT)
Age: 41
Setting detail: SPECIMEN
Discharge: HOME | End: 2021-03-22
Payer: MEDICARE

## 2021-03-22 LAB
ANION GAP SERPL CALCULATED.3IONS-SCNC: 7 MMOL/L (ref 9–17)
BUN BLDV-MCNC: 15 MG/DL (ref 6–20)
BUN/CREAT BLD: ABNORMAL (ref 9–20)
C-REACTIVE PROTEIN: 11.6 MG/L (ref 0–5)
CALCIUM SERPL-MCNC: 9.3 MG/DL (ref 8.6–10.4)
CHLORIDE BLD-SCNC: 103 MMOL/L (ref 98–107)
CO2: 23 MMOL/L (ref 20–31)
CREAT SERPL-MCNC: 0.31 MG/DL (ref 0.5–0.9)
GFR AFRICAN AMERICAN: >60 ML/MIN
GFR NON-AFRICAN AMERICAN: >60 ML/MIN
GFR SERPL CREATININE-BSD FRML MDRD: ABNORMAL ML/MIN/{1.73_M2}
GFR SERPL CREATININE-BSD FRML MDRD: ABNORMAL ML/MIN/{1.73_M2}
GLUCOSE BLD-MCNC: 78 MG/DL (ref 70–99)
HCT VFR BLD CALC: 42.1 % (ref 36.3–47.1)
HEMOGLOBIN: 12.9 G/DL (ref 11.9–15.1)
MCH RBC QN AUTO: 25.6 PG (ref 25.2–33.5)
MCHC RBC AUTO-ENTMCNC: 30.6 G/DL (ref 28.4–34.8)
MCV RBC AUTO: 83.5 FL (ref 82.6–102.9)
NRBC AUTOMATED: 0 PER 100 WBC
PDW BLD-RTO: 19.2 % (ref 11.8–14.4)
PLATELET # BLD: 388 K/UL (ref 138–453)
PMV BLD AUTO: 10 FL (ref 8.1–13.5)
POTASSIUM SERPL-SCNC: 4.3 MMOL/L (ref 3.7–5.3)
RBC # BLD: 5.04 M/UL (ref 3.95–5.11)
SEDIMENTATION RATE, ERYTHROCYTE: 87 MM (ref 0–20)
SODIUM BLD-SCNC: 133 MMOL/L (ref 135–144)
WBC # BLD: 5.6 K/UL (ref 3.5–11.3)

## 2021-03-22 PROCEDURE — 36415 COLL VENOUS BLD VENIPUNCTURE: CPT

## 2021-03-22 PROCEDURE — 85027 COMPLETE CBC AUTOMATED: CPT

## 2021-03-22 PROCEDURE — P9603 ONE-WAY ALLOW PRORATED MILES: HCPCS

## 2021-03-22 PROCEDURE — 85652 RBC SED RATE AUTOMATED: CPT

## 2021-03-22 PROCEDURE — 86140 C-REACTIVE PROTEIN: CPT

## 2021-03-22 PROCEDURE — 80048 BASIC METABOLIC PNL TOTAL CA: CPT

## 2021-03-29 ENCOUNTER — HOSPITAL ENCOUNTER (OUTPATIENT)
Age: 41
Setting detail: SPECIMEN
Discharge: HOME | End: 2021-03-29
Payer: MEDICARE

## 2021-03-29 LAB
ANION GAP SERPL CALCULATED.3IONS-SCNC: 13 MMOL/L (ref 9–17)
BUN BLDV-MCNC: 14 MG/DL (ref 6–20)
BUN/CREAT BLD: ABNORMAL (ref 9–20)
C-REACTIVE PROTEIN: 23.3 MG/L (ref 0–5)
CALCIUM SERPL-MCNC: 9.6 MG/DL (ref 8.6–10.4)
CHLORIDE BLD-SCNC: 103 MMOL/L (ref 98–107)
CO2: 20 MMOL/L (ref 20–31)
CREAT SERPL-MCNC: 0.27 MG/DL (ref 0.5–0.9)
GFR AFRICAN AMERICAN: >60 ML/MIN
GFR NON-AFRICAN AMERICAN: >60 ML/MIN
GFR SERPL CREATININE-BSD FRML MDRD: ABNORMAL ML/MIN/{1.73_M2}
GFR SERPL CREATININE-BSD FRML MDRD: ABNORMAL ML/MIN/{1.73_M2}
GLUCOSE BLD-MCNC: 75 MG/DL (ref 70–99)
HCT VFR BLD CALC: 43.5 % (ref 36.3–47.1)
HEMOGLOBIN: 13.2 G/DL (ref 11.9–15.1)
MCH RBC QN AUTO: 25.6 PG (ref 25.2–33.5)
MCHC RBC AUTO-ENTMCNC: 30.3 G/DL (ref 28.4–34.8)
MCV RBC AUTO: 84.3 FL (ref 82.6–102.9)
NRBC AUTOMATED: 0 PER 100 WBC
PDW BLD-RTO: 18.6 % (ref 11.8–14.4)
PLATELET # BLD: 339 K/UL (ref 138–453)
PMV BLD AUTO: 9.8 FL (ref 8.1–13.5)
POTASSIUM SERPL-SCNC: 4.1 MMOL/L (ref 3.7–5.3)
RBC # BLD: 5.16 M/UL (ref 3.95–5.11)
SEDIMENTATION RATE, ERYTHROCYTE: 87 MM (ref 0–20)
SODIUM BLD-SCNC: 136 MMOL/L (ref 135–144)
WBC # BLD: 5.3 K/UL (ref 3.5–11.3)

## 2021-03-29 PROCEDURE — 85652 RBC SED RATE AUTOMATED: CPT

## 2021-03-29 PROCEDURE — P9603 ONE-WAY ALLOW PRORATED MILES: HCPCS

## 2021-03-29 PROCEDURE — 86140 C-REACTIVE PROTEIN: CPT

## 2021-03-29 PROCEDURE — 85027 COMPLETE CBC AUTOMATED: CPT

## 2021-03-29 PROCEDURE — 36415 COLL VENOUS BLD VENIPUNCTURE: CPT

## 2021-03-29 PROCEDURE — 80048 BASIC METABOLIC PNL TOTAL CA: CPT

## 2021-04-05 ENCOUNTER — HOSPITAL ENCOUNTER (OUTPATIENT)
Age: 41
Setting detail: SPECIMEN
Discharge: HOME | End: 2021-04-05
Payer: MEDICARE

## 2021-04-05 LAB
ANION GAP SERPL CALCULATED.3IONS-SCNC: 11 MMOL/L (ref 9–17)
BUN BLDV-MCNC: 14 MG/DL (ref 6–20)
BUN/CREAT BLD: ABNORMAL (ref 9–20)
C-REACTIVE PROTEIN: 14.4 MG/L (ref 0–5)
CALCIUM SERPL-MCNC: 9.6 MG/DL (ref 8.6–10.4)
CHLORIDE BLD-SCNC: 101 MMOL/L (ref 98–107)
CO2: 25 MMOL/L (ref 20–31)
CREAT SERPL-MCNC: 0.33 MG/DL (ref 0.5–0.9)
GFR AFRICAN AMERICAN: >60 ML/MIN
GFR NON-AFRICAN AMERICAN: >60 ML/MIN
GFR SERPL CREATININE-BSD FRML MDRD: ABNORMAL ML/MIN/{1.73_M2}
GFR SERPL CREATININE-BSD FRML MDRD: ABNORMAL ML/MIN/{1.73_M2}
GLUCOSE BLD-MCNC: 69 MG/DL (ref 70–99)
HCT VFR BLD CALC: 42.9 % (ref 36.3–47.1)
HEMOGLOBIN: 13.1 G/DL (ref 11.9–15.1)
MCH RBC QN AUTO: 26.3 PG (ref 25.2–33.5)
MCHC RBC AUTO-ENTMCNC: 30.5 G/DL (ref 28.4–34.8)
MCV RBC AUTO: 86.1 FL (ref 82.6–102.9)
NRBC AUTOMATED: 0 PER 100 WBC
PDW BLD-RTO: 18 % (ref 11.8–14.4)
PLATELET # BLD: 334 K/UL (ref 138–453)
PMV BLD AUTO: 10.1 FL (ref 8.1–13.5)
POTASSIUM SERPL-SCNC: 4.2 MMOL/L (ref 3.7–5.3)
RBC # BLD: 4.98 M/UL (ref 3.95–5.11)
SEDIMENTATION RATE, ERYTHROCYTE: 72 MM (ref 0–20)
SODIUM BLD-SCNC: 137 MMOL/L (ref 135–144)
WBC # BLD: 6.9 K/UL (ref 3.5–11.3)

## 2021-04-05 PROCEDURE — 86140 C-REACTIVE PROTEIN: CPT

## 2021-04-05 PROCEDURE — P9603 ONE-WAY ALLOW PRORATED MILES: HCPCS

## 2021-04-05 PROCEDURE — 85652 RBC SED RATE AUTOMATED: CPT

## 2021-04-05 PROCEDURE — 36415 COLL VENOUS BLD VENIPUNCTURE: CPT

## 2021-04-05 PROCEDURE — 85027 COMPLETE CBC AUTOMATED: CPT

## 2021-04-05 PROCEDURE — 80048 BASIC METABOLIC PNL TOTAL CA: CPT

## 2021-04-12 ENCOUNTER — HOSPITAL ENCOUNTER (OUTPATIENT)
Age: 41
Setting detail: SPECIMEN
Discharge: HOME | End: 2021-04-12
Payer: MEDICARE

## 2021-04-12 LAB
ANION GAP SERPL CALCULATED.3IONS-SCNC: 8 MMOL/L (ref 9–17)
BUN BLDV-MCNC: 12 MG/DL (ref 6–20)
BUN/CREAT BLD: ABNORMAL (ref 9–20)
C-REACTIVE PROTEIN: 25.2 MG/L (ref 0–5)
CALCIUM SERPL-MCNC: 9.4 MG/DL (ref 8.6–10.4)
CHLORIDE BLD-SCNC: 106 MMOL/L (ref 98–107)
CO2: 25 MMOL/L (ref 20–31)
CREAT SERPL-MCNC: 0.25 MG/DL (ref 0.5–0.9)
GFR AFRICAN AMERICAN: >60 ML/MIN
GFR NON-AFRICAN AMERICAN: >60 ML/MIN
GFR SERPL CREATININE-BSD FRML MDRD: ABNORMAL ML/MIN/{1.73_M2}
GFR SERPL CREATININE-BSD FRML MDRD: ABNORMAL ML/MIN/{1.73_M2}
GLUCOSE BLD-MCNC: 71 MG/DL (ref 70–99)
HCT VFR BLD CALC: 44.7 % (ref 36.3–47.1)
HEMOGLOBIN: 13.7 G/DL (ref 11.9–15.1)
MCH RBC QN AUTO: 26.2 PG (ref 25.2–33.5)
MCHC RBC AUTO-ENTMCNC: 30.6 G/DL (ref 28.4–34.8)
MCV RBC AUTO: 85.5 FL (ref 82.6–102.9)
NRBC AUTOMATED: 0 PER 100 WBC
PDW BLD-RTO: 17.9 % (ref 11.8–14.4)
PLATELET # BLD: 422 K/UL (ref 138–453)
PMV BLD AUTO: 10.3 FL (ref 8.1–13.5)
POTASSIUM SERPL-SCNC: 4.3 MMOL/L (ref 3.7–5.3)
RBC # BLD: 5.23 M/UL (ref 3.95–5.11)
SEDIMENTATION RATE, ERYTHROCYTE: 80 MM (ref 0–20)
SODIUM BLD-SCNC: 139 MMOL/L (ref 135–144)
WBC # BLD: 6.9 K/UL (ref 3.5–11.3)

## 2021-04-12 PROCEDURE — 85027 COMPLETE CBC AUTOMATED: CPT

## 2021-04-12 PROCEDURE — 85652 RBC SED RATE AUTOMATED: CPT

## 2021-04-12 PROCEDURE — 80048 BASIC METABOLIC PNL TOTAL CA: CPT

## 2021-04-12 PROCEDURE — 86140 C-REACTIVE PROTEIN: CPT

## 2021-04-12 PROCEDURE — P9603 ONE-WAY ALLOW PRORATED MILES: HCPCS

## 2021-04-12 PROCEDURE — 36415 COLL VENOUS BLD VENIPUNCTURE: CPT

## 2021-04-19 ENCOUNTER — HOSPITAL ENCOUNTER (OUTPATIENT)
Age: 41
Setting detail: SPECIMEN
Discharge: HOME | End: 2021-04-19
Payer: MEDICARE

## 2021-04-19 LAB
ANION GAP SERPL CALCULATED.3IONS-SCNC: 12 MMOL/L (ref 9–17)
BUN BLDV-MCNC: 15 MG/DL (ref 6–20)
BUN/CREAT BLD: ABNORMAL (ref 9–20)
C-REACTIVE PROTEIN: 12.7 MG/L (ref 0–5)
CALCIUM SERPL-MCNC: 9.7 MG/DL (ref 8.6–10.4)
CHLORIDE BLD-SCNC: 102 MMOL/L (ref 98–107)
CO2: 22 MMOL/L (ref 20–31)
CREAT SERPL-MCNC: 0.29 MG/DL (ref 0.5–0.9)
GFR AFRICAN AMERICAN: >60 ML/MIN
GFR NON-AFRICAN AMERICAN: >60 ML/MIN
GFR SERPL CREATININE-BSD FRML MDRD: ABNORMAL ML/MIN/{1.73_M2}
GFR SERPL CREATININE-BSD FRML MDRD: ABNORMAL ML/MIN/{1.73_M2}
GLUCOSE BLD-MCNC: 78 MG/DL (ref 70–99)
HCT VFR BLD CALC: 47.7 % (ref 36.3–47.1)
HEMOGLOBIN: 14.3 G/DL (ref 11.9–15.1)
MCH RBC QN AUTO: 26 PG (ref 25.2–33.5)
MCHC RBC AUTO-ENTMCNC: 30 G/DL (ref 28.4–34.8)
MCV RBC AUTO: 86.6 FL (ref 82.6–102.9)
NRBC AUTOMATED: 0 PER 100 WBC
PDW BLD-RTO: 17.4 % (ref 11.8–14.4)
PLATELET # BLD: ABNORMAL K/UL (ref 138–453)
PLATELET, FLUORESCENCE: NORMAL K/UL (ref 138–453)
PLATELET, IMMATURE FRACTION: NORMAL % (ref 1.1–10.3)
PMV BLD AUTO: ABNORMAL FL (ref 8.1–13.5)
POTASSIUM SERPL-SCNC: 5.7 MMOL/L (ref 3.7–5.3)
RBC # BLD: 5.51 M/UL (ref 3.95–5.11)
SEDIMENTATION RATE, ERYTHROCYTE: 49 MM (ref 0–20)
SODIUM BLD-SCNC: 136 MMOL/L (ref 135–144)
WBC # BLD: 4.9 K/UL (ref 3.5–11.3)

## 2021-04-19 PROCEDURE — P9603 ONE-WAY ALLOW PRORATED MILES: HCPCS

## 2021-04-19 PROCEDURE — 85027 COMPLETE CBC AUTOMATED: CPT

## 2021-04-19 PROCEDURE — 80048 BASIC METABOLIC PNL TOTAL CA: CPT

## 2021-04-19 PROCEDURE — 85652 RBC SED RATE AUTOMATED: CPT

## 2021-04-19 PROCEDURE — 85055 RETICULATED PLATELET ASSAY: CPT

## 2021-04-19 PROCEDURE — 36415 COLL VENOUS BLD VENIPUNCTURE: CPT

## 2021-04-19 PROCEDURE — 86140 C-REACTIVE PROTEIN: CPT

## 2021-04-20 ENCOUNTER — HOSPITAL ENCOUNTER (OUTPATIENT)
Age: 41
Setting detail: SPECIMEN
Discharge: HOME | End: 2021-04-20
Payer: MEDICARE

## 2021-04-20 LAB — POTASSIUM SERPL-SCNC: 4 MMOL/L (ref 3.7–5.3)

## 2021-04-20 PROCEDURE — 36415 COLL VENOUS BLD VENIPUNCTURE: CPT

## 2021-04-20 PROCEDURE — 84132 ASSAY OF SERUM POTASSIUM: CPT

## 2021-04-20 PROCEDURE — P9603 ONE-WAY ALLOW PRORATED MILES: HCPCS

## 2021-04-26 ENCOUNTER — HOSPITAL ENCOUNTER (OUTPATIENT)
Age: 41
Setting detail: SPECIMEN
Discharge: HOME | End: 2021-04-26
Payer: MEDICARE

## 2021-04-26 LAB
ANION GAP SERPL CALCULATED.3IONS-SCNC: 11 MMOL/L (ref 9–17)
BUN BLDV-MCNC: 12 MG/DL (ref 6–20)
BUN/CREAT BLD: ABNORMAL (ref 9–20)
C-REACTIVE PROTEIN: 11.7 MG/L (ref 0–5)
CALCIUM SERPL-MCNC: 9.6 MG/DL (ref 8.6–10.4)
CHLORIDE BLD-SCNC: 103 MMOL/L (ref 98–107)
CO2: 22 MMOL/L (ref 20–31)
CREAT SERPL-MCNC: 0.31 MG/DL (ref 0.5–0.9)
GFR AFRICAN AMERICAN: >60 ML/MIN
GFR NON-AFRICAN AMERICAN: >60 ML/MIN
GFR SERPL CREATININE-BSD FRML MDRD: ABNORMAL ML/MIN/{1.73_M2}
GFR SERPL CREATININE-BSD FRML MDRD: ABNORMAL ML/MIN/{1.73_M2}
GLUCOSE BLD-MCNC: 65 MG/DL (ref 70–99)
HCT VFR BLD CALC: 44.2 % (ref 36.3–47.1)
HEMOGLOBIN: 13.4 G/DL (ref 11.9–15.1)
MCH RBC QN AUTO: 26.1 PG (ref 25.2–33.5)
MCHC RBC AUTO-ENTMCNC: 30.3 G/DL (ref 28.4–34.8)
MCV RBC AUTO: 86.2 FL (ref 82.6–102.9)
NRBC AUTOMATED: 0 PER 100 WBC
PDW BLD-RTO: 16.6 % (ref 11.8–14.4)
PLATELET # BLD: 395 K/UL (ref 138–453)
PMV BLD AUTO: 9.8 FL (ref 8.1–13.5)
POTASSIUM SERPL-SCNC: 4.3 MMOL/L (ref 3.7–5.3)
RBC # BLD: 5.13 M/UL (ref 3.95–5.11)
SEDIMENTATION RATE, ERYTHROCYTE: 81 MM (ref 0–20)
SODIUM BLD-SCNC: 136 MMOL/L (ref 135–144)
WBC # BLD: 6 K/UL (ref 3.5–11.3)

## 2021-04-26 PROCEDURE — 36415 COLL VENOUS BLD VENIPUNCTURE: CPT

## 2021-04-26 PROCEDURE — 86140 C-REACTIVE PROTEIN: CPT

## 2021-04-26 PROCEDURE — 80048 BASIC METABOLIC PNL TOTAL CA: CPT

## 2021-04-26 PROCEDURE — P9603 ONE-WAY ALLOW PRORATED MILES: HCPCS

## 2021-04-26 PROCEDURE — 85027 COMPLETE CBC AUTOMATED: CPT

## 2021-04-26 PROCEDURE — 85652 RBC SED RATE AUTOMATED: CPT

## 2021-05-03 ENCOUNTER — HOSPITAL ENCOUNTER (OUTPATIENT)
Age: 41
Setting detail: SPECIMEN
Discharge: HOME | End: 2021-05-03
Payer: MEDICARE

## 2021-05-03 LAB
ANION GAP SERPL CALCULATED.3IONS-SCNC: 12 MMOL/L (ref 9–17)
BUN BLDV-MCNC: 13 MG/DL (ref 6–20)
BUN/CREAT BLD: ABNORMAL (ref 9–20)
C-REACTIVE PROTEIN: 17.1 MG/L (ref 0–5)
CALCIUM SERPL-MCNC: 9.3 MG/DL (ref 8.6–10.4)
CHLORIDE BLD-SCNC: 103 MMOL/L (ref 98–107)
CO2: 23 MMOL/L (ref 20–31)
CREAT SERPL-MCNC: 0.27 MG/DL (ref 0.5–0.9)
GFR AFRICAN AMERICAN: >60 ML/MIN
GFR NON-AFRICAN AMERICAN: >60 ML/MIN
GFR SERPL CREATININE-BSD FRML MDRD: ABNORMAL ML/MIN/{1.73_M2}
GFR SERPL CREATININE-BSD FRML MDRD: ABNORMAL ML/MIN/{1.73_M2}
GLUCOSE BLD-MCNC: 78 MG/DL (ref 70–99)
HCT VFR BLD CALC: 42.3 % (ref 36.3–47.1)
HEMOGLOBIN: 12.9 G/DL (ref 11.9–15.1)
MCH RBC QN AUTO: 26.4 PG (ref 25.2–33.5)
MCHC RBC AUTO-ENTMCNC: 30.5 G/DL (ref 28.4–34.8)
MCV RBC AUTO: 86.5 FL (ref 82.6–102.9)
NRBC AUTOMATED: 0 PER 100 WBC
PDW BLD-RTO: 16.3 % (ref 11.8–14.4)
PLATELET # BLD: 331 K/UL (ref 138–453)
PMV BLD AUTO: 11 FL (ref 8.1–13.5)
POTASSIUM SERPL-SCNC: 3.8 MMOL/L (ref 3.7–5.3)
RBC # BLD: 4.89 M/UL (ref 3.95–5.11)
SEDIMENTATION RATE, ERYTHROCYTE: 82 MM (ref 0–20)
SODIUM BLD-SCNC: 138 MMOL/L (ref 135–144)
WBC # BLD: 6.2 K/UL (ref 3.5–11.3)

## 2021-05-03 PROCEDURE — 86140 C-REACTIVE PROTEIN: CPT

## 2021-05-03 PROCEDURE — 80048 BASIC METABOLIC PNL TOTAL CA: CPT

## 2021-05-03 PROCEDURE — 85652 RBC SED RATE AUTOMATED: CPT

## 2021-05-03 PROCEDURE — 85027 COMPLETE CBC AUTOMATED: CPT

## 2021-05-03 PROCEDURE — P9603 ONE-WAY ALLOW PRORATED MILES: HCPCS

## 2021-05-03 PROCEDURE — 36415 COLL VENOUS BLD VENIPUNCTURE: CPT

## 2021-05-10 ENCOUNTER — HOSPITAL ENCOUNTER (OUTPATIENT)
Age: 41
Setting detail: SPECIMEN
Discharge: HOME | End: 2021-05-10
Payer: MEDICARE

## 2021-05-10 LAB
ANION GAP SERPL CALCULATED.3IONS-SCNC: 14 MMOL/L (ref 9–17)
BUN BLDV-MCNC: 15 MG/DL (ref 6–20)
BUN/CREAT BLD: ABNORMAL (ref 9–20)
C-REACTIVE PROTEIN: 11.4 MG/L (ref 0–5)
CALCIUM SERPL-MCNC: 9.7 MG/DL (ref 8.6–10.4)
CHLORIDE BLD-SCNC: 104 MMOL/L (ref 98–107)
CO2: 22 MMOL/L (ref 20–31)
CREAT SERPL-MCNC: 0.3 MG/DL (ref 0.5–0.9)
GFR AFRICAN AMERICAN: >60 ML/MIN
GFR NON-AFRICAN AMERICAN: >60 ML/MIN
GFR SERPL CREATININE-BSD FRML MDRD: ABNORMAL ML/MIN/{1.73_M2}
GFR SERPL CREATININE-BSD FRML MDRD: ABNORMAL ML/MIN/{1.73_M2}
GLUCOSE BLD-MCNC: 76 MG/DL (ref 70–99)
HCT VFR BLD CALC: 44.1 % (ref 36.3–47.1)
HEMOGLOBIN: 13.5 G/DL (ref 11.9–15.1)
MCH RBC QN AUTO: 26.3 PG (ref 25.2–33.5)
MCHC RBC AUTO-ENTMCNC: 30.6 G/DL (ref 28.4–34.8)
MCV RBC AUTO: 85.8 FL (ref 82.6–102.9)
NRBC AUTOMATED: 0 PER 100 WBC
PDW BLD-RTO: 16.2 % (ref 11.8–14.4)
PLATELET # BLD: 428 K/UL (ref 138–453)
PMV BLD AUTO: 9.8 FL (ref 8.1–13.5)
POTASSIUM SERPL-SCNC: 4.1 MMOL/L (ref 3.7–5.3)
RBC # BLD: 5.14 M/UL (ref 3.95–5.11)
SEDIMENTATION RATE, ERYTHROCYTE: 97 MM (ref 0–20)
SODIUM BLD-SCNC: 140 MMOL/L (ref 135–144)
WBC # BLD: 6.4 K/UL (ref 3.5–11.3)

## 2021-05-10 PROCEDURE — 80048 BASIC METABOLIC PNL TOTAL CA: CPT

## 2021-05-10 PROCEDURE — 36415 COLL VENOUS BLD VENIPUNCTURE: CPT

## 2021-05-10 PROCEDURE — 85027 COMPLETE CBC AUTOMATED: CPT

## 2021-05-10 PROCEDURE — P9603 ONE-WAY ALLOW PRORATED MILES: HCPCS

## 2021-05-10 PROCEDURE — 86140 C-REACTIVE PROTEIN: CPT

## 2021-05-10 PROCEDURE — 85652 RBC SED RATE AUTOMATED: CPT

## 2021-05-17 ENCOUNTER — HOSPITAL ENCOUNTER (OUTPATIENT)
Age: 41
Setting detail: SPECIMEN
Discharge: HOME | End: 2021-05-17
Payer: MEDICARE

## 2021-05-17 LAB
ANION GAP SERPL CALCULATED.3IONS-SCNC: 11 MMOL/L (ref 9–17)
BUN BLDV-MCNC: 12 MG/DL (ref 6–20)
BUN/CREAT BLD: ABNORMAL (ref 9–20)
C-REACTIVE PROTEIN: 12 MG/L (ref 0–5)
CALCIUM SERPL-MCNC: 9.1 MG/DL (ref 8.6–10.4)
CHLORIDE BLD-SCNC: 105 MMOL/L (ref 98–107)
CO2: 23 MMOL/L (ref 20–31)
CREAT SERPL-MCNC: 0.29 MG/DL (ref 0.5–0.9)
GFR AFRICAN AMERICAN: >60 ML/MIN
GFR NON-AFRICAN AMERICAN: >60 ML/MIN
GFR SERPL CREATININE-BSD FRML MDRD: ABNORMAL ML/MIN/{1.73_M2}
GFR SERPL CREATININE-BSD FRML MDRD: ABNORMAL ML/MIN/{1.73_M2}
GLUCOSE BLD-MCNC: 86 MG/DL (ref 70–99)
HCT VFR BLD CALC: 43 % (ref 36.3–47.1)
HEMOGLOBIN: 13.3 G/DL (ref 11.9–15.1)
MCH RBC QN AUTO: 26.6 PG (ref 25.2–33.5)
MCHC RBC AUTO-ENTMCNC: 30.9 G/DL (ref 28.4–34.8)
MCV RBC AUTO: 86 FL (ref 82.6–102.9)
NRBC AUTOMATED: 0 PER 100 WBC
PDW BLD-RTO: 15.9 % (ref 11.8–14.4)
PLATELET # BLD: 408 K/UL (ref 138–453)
PMV BLD AUTO: 10 FL (ref 8.1–13.5)
POTASSIUM SERPL-SCNC: 3.9 MMOL/L (ref 3.7–5.3)
RBC # BLD: 5 M/UL (ref 3.95–5.11)
SEDIMENTATION RATE, ERYTHROCYTE: 83 MM (ref 0–20)
SODIUM BLD-SCNC: 139 MMOL/L (ref 135–144)
WBC # BLD: 5.8 K/UL (ref 3.5–11.3)

## 2021-05-17 PROCEDURE — 36415 COLL VENOUS BLD VENIPUNCTURE: CPT

## 2021-05-17 PROCEDURE — 86140 C-REACTIVE PROTEIN: CPT

## 2021-05-17 PROCEDURE — 85027 COMPLETE CBC AUTOMATED: CPT

## 2021-05-17 PROCEDURE — 80048 BASIC METABOLIC PNL TOTAL CA: CPT

## 2021-05-17 PROCEDURE — 85652 RBC SED RATE AUTOMATED: CPT

## 2021-05-17 PROCEDURE — P9603 ONE-WAY ALLOW PRORATED MILES: HCPCS

## 2021-05-18 ENCOUNTER — HOSPITAL ENCOUNTER (OUTPATIENT)
Age: 41
Setting detail: SPECIMEN
Discharge: HOME | End: 2021-05-18
Payer: MEDICARE

## 2021-05-18 LAB
-: ABNORMAL
AMORPHOUS: ABNORMAL
BACTERIA: ABNORMAL
BILIRUBIN URINE: NEGATIVE
CASTS UA: ABNORMAL /LPF (ref 0–2)
COLOR: YELLOW
COMMENT UA: ABNORMAL
CRYSTALS, UA: ABNORMAL /HPF
CRYSTALS, UA: ABNORMAL /HPF
EPITHELIAL CELLS UA: ABNORMAL /HPF (ref 0–5)
GLUCOSE URINE: NEGATIVE
KETONES, URINE: NEGATIVE
LEUKOCYTE ESTERASE, URINE: ABNORMAL
MUCUS: ABNORMAL
NITRITE, URINE: NEGATIVE
OTHER OBSERVATIONS UA: ABNORMAL
PH UA: >9 (ref 5–8)
PROTEIN UA: ABNORMAL
RBC UA: ABNORMAL /HPF (ref 0–2)
RENAL EPITHELIAL, UA: ABNORMAL /HPF
SPECIFIC GRAVITY UA: 1.02 (ref 1–1.03)
TRICHOMONAS: ABNORMAL
TURBIDITY: ABNORMAL
URINE HGB: ABNORMAL
UROBILINOGEN, URINE: NORMAL
WBC UA: ABNORMAL /HPF (ref 0–5)
YEAST: ABNORMAL

## 2021-05-18 PROCEDURE — 81001 URINALYSIS AUTO W/SCOPE: CPT

## 2021-05-18 PROCEDURE — 87077 CULTURE AEROBIC IDENTIFY: CPT

## 2021-05-18 PROCEDURE — 87086 URINE CULTURE/COLONY COUNT: CPT

## 2021-05-18 PROCEDURE — 87186 SC STD MICRODIL/AGAR DIL: CPT

## 2021-05-20 ENCOUNTER — HOSPITAL ENCOUNTER (OUTPATIENT)
Age: 41
Setting detail: SPECIMEN
Discharge: HOME | End: 2021-05-20
Payer: MEDICARE

## 2021-05-20 LAB
CULTURE: ABNORMAL
Lab: ABNORMAL
SEDIMENTATION RATE, ERYTHROCYTE: 90 MM (ref 0–20)
SPECIMEN DESCRIPTION: ABNORMAL

## 2021-05-20 PROCEDURE — 36415 COLL VENOUS BLD VENIPUNCTURE: CPT

## 2021-05-20 PROCEDURE — 85652 RBC SED RATE AUTOMATED: CPT

## 2021-05-20 PROCEDURE — P9603 ONE-WAY ALLOW PRORATED MILES: HCPCS

## 2021-05-24 ENCOUNTER — HOSPITAL ENCOUNTER (OUTPATIENT)
Age: 41
Setting detail: SPECIMEN
Discharge: HOME | End: 2021-05-24
Payer: MEDICARE

## 2021-05-24 LAB
ANION GAP SERPL CALCULATED.3IONS-SCNC: 11 MMOL/L (ref 9–17)
BUN BLDV-MCNC: 14 MG/DL (ref 6–20)
BUN/CREAT BLD: ABNORMAL (ref 9–20)
C-REACTIVE PROTEIN: 11.3 MG/L (ref 0–5)
CALCIUM SERPL-MCNC: 9.8 MG/DL (ref 8.6–10.4)
CHLORIDE BLD-SCNC: 106 MMOL/L (ref 98–107)
CO2: 21 MMOL/L (ref 20–31)
CREAT SERPL-MCNC: 0.29 MG/DL (ref 0.5–0.9)
GFR AFRICAN AMERICAN: >60 ML/MIN
GFR NON-AFRICAN AMERICAN: >60 ML/MIN
GFR SERPL CREATININE-BSD FRML MDRD: ABNORMAL ML/MIN/{1.73_M2}
GFR SERPL CREATININE-BSD FRML MDRD: ABNORMAL ML/MIN/{1.73_M2}
GLUCOSE BLD-MCNC: 46 MG/DL (ref 70–99)
HCT VFR BLD CALC: 46.6 % (ref 36.3–47.1)
HEMOGLOBIN: 13.9 G/DL (ref 11.9–15.1)
MCH RBC QN AUTO: 26.5 PG (ref 25.2–33.5)
MCHC RBC AUTO-ENTMCNC: 29.8 G/DL (ref 28.4–34.8)
MCV RBC AUTO: 88.9 FL (ref 82.6–102.9)
NRBC AUTOMATED: 0 PER 100 WBC
PDW BLD-RTO: 15.9 % (ref 11.8–14.4)
PLATELET # BLD: 332 K/UL (ref 138–453)
PMV BLD AUTO: 10.5 FL (ref 8.1–13.5)
POTASSIUM SERPL-SCNC: 5.1 MMOL/L (ref 3.7–5.3)
RBC # BLD: 5.24 M/UL (ref 3.95–5.11)
SEDIMENTATION RATE, ERYTHROCYTE: 80 MM (ref 0–20)
SODIUM BLD-SCNC: 138 MMOL/L (ref 135–144)
WBC # BLD: 6.2 K/UL (ref 3.5–11.3)

## 2021-05-24 PROCEDURE — 85652 RBC SED RATE AUTOMATED: CPT

## 2021-05-24 PROCEDURE — 36415 COLL VENOUS BLD VENIPUNCTURE: CPT

## 2021-05-24 PROCEDURE — 85027 COMPLETE CBC AUTOMATED: CPT

## 2021-05-24 PROCEDURE — 86140 C-REACTIVE PROTEIN: CPT

## 2021-05-24 PROCEDURE — 80048 BASIC METABOLIC PNL TOTAL CA: CPT

## 2021-05-24 PROCEDURE — P9603 ONE-WAY ALLOW PRORATED MILES: HCPCS

## 2021-06-01 ENCOUNTER — HOSPITAL ENCOUNTER (OUTPATIENT)
Age: 41
Setting detail: SPECIMEN
Discharge: HOME | End: 2021-06-01
Payer: MEDICARE

## 2021-06-01 LAB
ANION GAP SERPL CALCULATED.3IONS-SCNC: 11 MMOL/L (ref 9–17)
BUN BLDV-MCNC: 12 MG/DL (ref 6–20)
BUN/CREAT BLD: ABNORMAL (ref 9–20)
C-REACTIVE PROTEIN: 11.6 MG/L (ref 0–5)
CALCIUM SERPL-MCNC: 9.5 MG/DL (ref 8.6–10.4)
CHLORIDE BLD-SCNC: 102 MMOL/L (ref 98–107)
CO2: 24 MMOL/L (ref 20–31)
CREAT SERPL-MCNC: 0.26 MG/DL (ref 0.5–0.9)
GFR AFRICAN AMERICAN: >60 ML/MIN
GFR NON-AFRICAN AMERICAN: >60 ML/MIN
GFR SERPL CREATININE-BSD FRML MDRD: ABNORMAL ML/MIN/{1.73_M2}
GFR SERPL CREATININE-BSD FRML MDRD: ABNORMAL ML/MIN/{1.73_M2}
GLUCOSE BLD-MCNC: 91 MG/DL (ref 70–99)
HCT VFR BLD CALC: 43.8 % (ref 36.3–47.1)
HEMOGLOBIN: 13.4 G/DL (ref 11.9–15.1)
MCH RBC QN AUTO: 26.8 PG (ref 25.2–33.5)
MCHC RBC AUTO-ENTMCNC: 30.6 G/DL (ref 28.4–34.8)
MCV RBC AUTO: 87.6 FL (ref 82.6–102.9)
NRBC AUTOMATED: 0 PER 100 WBC
PDW BLD-RTO: 15.7 % (ref 11.8–14.4)
PLATELET # BLD: 379 K/UL (ref 138–453)
PMV BLD AUTO: 9.8 FL (ref 8.1–13.5)
POTASSIUM SERPL-SCNC: 4.1 MMOL/L (ref 3.7–5.3)
RBC # BLD: 5 M/UL (ref 3.95–5.11)
SEDIMENTATION RATE, ERYTHROCYTE: 78 MM (ref 0–20)
SODIUM BLD-SCNC: 137 MMOL/L (ref 135–144)
WBC # BLD: 7.4 K/UL (ref 3.5–11.3)

## 2021-06-01 PROCEDURE — P9603 ONE-WAY ALLOW PRORATED MILES: HCPCS

## 2021-06-01 PROCEDURE — 36415 COLL VENOUS BLD VENIPUNCTURE: CPT

## 2021-06-01 PROCEDURE — 85027 COMPLETE CBC AUTOMATED: CPT

## 2021-06-01 PROCEDURE — 80048 BASIC METABOLIC PNL TOTAL CA: CPT

## 2021-06-01 PROCEDURE — 86140 C-REACTIVE PROTEIN: CPT

## 2021-06-01 PROCEDURE — 85652 RBC SED RATE AUTOMATED: CPT

## 2021-06-07 ENCOUNTER — HOSPITAL ENCOUNTER (OUTPATIENT)
Age: 41
Setting detail: SPECIMEN
Discharge: HOME | End: 2021-06-07
Payer: MEDICARE

## 2021-06-07 LAB
ANION GAP SERPL CALCULATED.3IONS-SCNC: 13 MMOL/L (ref 9–17)
BUN BLDV-MCNC: 16 MG/DL (ref 6–20)
BUN/CREAT BLD: ABNORMAL (ref 9–20)
C-REACTIVE PROTEIN: 12.3 MG/L (ref 0–5)
CALCIUM SERPL-MCNC: 9.4 MG/DL (ref 8.6–10.4)
CHLORIDE BLD-SCNC: 107 MMOL/L (ref 98–107)
CO2: 23 MMOL/L (ref 20–31)
CREAT SERPL-MCNC: 0.26 MG/DL (ref 0.5–0.9)
GFR AFRICAN AMERICAN: >60 ML/MIN
GFR NON-AFRICAN AMERICAN: >60 ML/MIN
GFR SERPL CREATININE-BSD FRML MDRD: ABNORMAL ML/MIN/{1.73_M2}
GFR SERPL CREATININE-BSD FRML MDRD: ABNORMAL ML/MIN/{1.73_M2}
GLUCOSE BLD-MCNC: 84 MG/DL (ref 70–99)
HCT VFR BLD CALC: 44.1 % (ref 36.3–47.1)
HEMOGLOBIN: 13.2 G/DL (ref 11.9–15.1)
MCH RBC QN AUTO: 26.5 PG (ref 25.2–33.5)
MCHC RBC AUTO-ENTMCNC: 29.9 G/DL (ref 28.4–34.8)
MCV RBC AUTO: 88.6 FL (ref 82.6–102.9)
NRBC AUTOMATED: 0 PER 100 WBC
PDW BLD-RTO: 15.9 % (ref 11.8–14.4)
PLATELET # BLD: 384 K/UL (ref 138–453)
PMV BLD AUTO: 10.1 FL (ref 8.1–13.5)
POTASSIUM SERPL-SCNC: 4 MMOL/L (ref 3.7–5.3)
RBC # BLD: 4.98 M/UL (ref 3.95–5.11)
SEDIMENTATION RATE, ERYTHROCYTE: 75 MM (ref 0–20)
SODIUM BLD-SCNC: 143 MMOL/L (ref 135–144)
WBC # BLD: 8.2 K/UL (ref 3.5–11.3)

## 2021-06-07 PROCEDURE — 86140 C-REACTIVE PROTEIN: CPT

## 2021-06-07 PROCEDURE — 80048 BASIC METABOLIC PNL TOTAL CA: CPT

## 2021-06-07 PROCEDURE — 85027 COMPLETE CBC AUTOMATED: CPT

## 2021-06-07 PROCEDURE — 85652 RBC SED RATE AUTOMATED: CPT

## 2021-06-07 PROCEDURE — 36415 COLL VENOUS BLD VENIPUNCTURE: CPT

## 2021-06-07 PROCEDURE — P9603 ONE-WAY ALLOW PRORATED MILES: HCPCS

## 2021-06-14 ENCOUNTER — HOSPITAL ENCOUNTER (OUTPATIENT)
Age: 41
Setting detail: SPECIMEN
Discharge: HOME | End: 2021-06-14
Payer: MEDICARE

## 2021-06-14 LAB
ANION GAP SERPL CALCULATED.3IONS-SCNC: 11 MMOL/L (ref 9–17)
BUN BLDV-MCNC: 14 MG/DL (ref 6–20)
BUN/CREAT BLD: ABNORMAL (ref 9–20)
C-REACTIVE PROTEIN: 8.1 MG/L (ref 0–5)
CALCIUM SERPL-MCNC: 9.3 MG/DL (ref 8.6–10.4)
CHLORIDE BLD-SCNC: 101 MMOL/L (ref 98–107)
CO2: 24 MMOL/L (ref 20–31)
CREAT SERPL-MCNC: 0.27 MG/DL (ref 0.5–0.9)
GFR AFRICAN AMERICAN: >60 ML/MIN
GFR NON-AFRICAN AMERICAN: >60 ML/MIN
GFR SERPL CREATININE-BSD FRML MDRD: ABNORMAL ML/MIN/{1.73_M2}
GFR SERPL CREATININE-BSD FRML MDRD: ABNORMAL ML/MIN/{1.73_M2}
GLUCOSE BLD-MCNC: 69 MG/DL (ref 70–99)
HCT VFR BLD CALC: 43.4 % (ref 36.3–47.1)
HEMOGLOBIN: 13.5 G/DL (ref 11.9–15.1)
MCH RBC QN AUTO: 27.3 PG (ref 25.2–33.5)
MCHC RBC AUTO-ENTMCNC: 31.1 G/DL (ref 28.4–34.8)
MCV RBC AUTO: 87.9 FL (ref 82.6–102.9)
NRBC AUTOMATED: 0 PER 100 WBC
PDW BLD-RTO: 15.7 % (ref 11.8–14.4)
PLATELET # BLD: 361 K/UL (ref 138–453)
PMV BLD AUTO: 10.3 FL (ref 8.1–13.5)
POTASSIUM SERPL-SCNC: 4.1 MMOL/L (ref 3.7–5.3)
RBC # BLD: 4.94 M/UL (ref 3.95–5.11)
SEDIMENTATION RATE, ERYTHROCYTE: 58 MM (ref 0–20)
SODIUM BLD-SCNC: 136 MMOL/L (ref 135–144)
WBC # BLD: 6.1 K/UL (ref 3.5–11.3)

## 2021-06-14 PROCEDURE — 80048 BASIC METABOLIC PNL TOTAL CA: CPT

## 2021-06-14 PROCEDURE — 85027 COMPLETE CBC AUTOMATED: CPT

## 2021-06-14 PROCEDURE — 36415 COLL VENOUS BLD VENIPUNCTURE: CPT

## 2021-06-14 PROCEDURE — P9603 ONE-WAY ALLOW PRORATED MILES: HCPCS

## 2021-06-14 PROCEDURE — 85652 RBC SED RATE AUTOMATED: CPT

## 2021-06-14 PROCEDURE — 86140 C-REACTIVE PROTEIN: CPT

## 2021-06-21 ENCOUNTER — HOSPITAL ENCOUNTER (OUTPATIENT)
Age: 41
Setting detail: SPECIMEN
Discharge: HOME | End: 2021-06-21
Payer: MEDICARE

## 2021-06-21 LAB
-: NORMAL
ANION GAP SERPL CALCULATED.3IONS-SCNC: 12 MMOL/L (ref 9–17)
BUN BLDV-MCNC: 13 MG/DL (ref 6–20)
BUN/CREAT BLD: ABNORMAL (ref 9–20)
C-REACTIVE PROTEIN: 14.4 MG/L (ref 0–5)
CALCIUM SERPL-MCNC: 9.2 MG/DL (ref 8.6–10.4)
CHLORIDE BLD-SCNC: 102 MMOL/L (ref 98–107)
CO2: 23 MMOL/L (ref 20–31)
CREAT SERPL-MCNC: <0.4 MG/DL (ref 0.5–0.9)
GFR AFRICAN AMERICAN: ABNORMAL ML/MIN
GFR NON-AFRICAN AMERICAN: ABNORMAL ML/MIN
GFR SERPL CREATININE-BSD FRML MDRD: ABNORMAL ML/MIN/{1.73_M2}
GFR SERPL CREATININE-BSD FRML MDRD: ABNORMAL ML/MIN/{1.73_M2}
GLUCOSE BLD-MCNC: 70 MG/DL (ref 70–99)
HCT VFR BLD CALC: 45.9 % (ref 36.3–47.1)
HEMOGLOBIN: 14.2 G/DL (ref 11.9–15.1)
MCH RBC QN AUTO: 27.4 PG (ref 25.2–33.5)
MCHC RBC AUTO-ENTMCNC: 30.9 G/DL (ref 28.4–34.8)
MCV RBC AUTO: 88.6 FL (ref 82.6–102.9)
NRBC AUTOMATED: 0 PER 100 WBC
PDW BLD-RTO: 15.9 % (ref 11.8–14.4)
PLATELET # BLD: 332 K/UL (ref 138–453)
PMV BLD AUTO: 10.4 FL (ref 8.1–13.5)
POTASSIUM SERPL-SCNC: 4 MMOL/L (ref 3.7–5.3)
RBC # BLD: 5.18 M/UL (ref 3.95–5.11)
REASON FOR REJECTION: NORMAL
SEDIMENTATION RATE, ERYTHROCYTE: 44 MM (ref 0–20)
SODIUM BLD-SCNC: 137 MMOL/L (ref 135–144)
WBC # BLD: 6.3 K/UL (ref 3.5–11.3)
ZZ NTE CLEAN UP: ORDERED TEST: NORMAL
ZZ NTE WITH NAME CLEAN UP: SPECIMEN SOURCE: NORMAL

## 2021-06-21 PROCEDURE — 85652 RBC SED RATE AUTOMATED: CPT

## 2021-06-21 PROCEDURE — 36415 COLL VENOUS BLD VENIPUNCTURE: CPT

## 2021-06-21 PROCEDURE — 86140 C-REACTIVE PROTEIN: CPT

## 2021-06-21 PROCEDURE — 80048 BASIC METABOLIC PNL TOTAL CA: CPT

## 2021-06-21 PROCEDURE — 85027 COMPLETE CBC AUTOMATED: CPT

## 2021-06-21 PROCEDURE — P9603 ONE-WAY ALLOW PRORATED MILES: HCPCS

## 2021-06-28 ENCOUNTER — HOSPITAL ENCOUNTER (OUTPATIENT)
Age: 41
Setting detail: SPECIMEN
Discharge: HOME | End: 2021-06-28
Payer: MEDICARE

## 2021-06-28 LAB
ANION GAP SERPL CALCULATED.3IONS-SCNC: 11 MMOL/L (ref 9–17)
BUN BLDV-MCNC: 14 MG/DL (ref 6–20)
BUN/CREAT BLD: ABNORMAL (ref 9–20)
C-REACTIVE PROTEIN: 10.9 MG/L (ref 0–5)
CALCIUM SERPL-MCNC: 9.6 MG/DL (ref 8.6–10.4)
CHLORIDE BLD-SCNC: 104 MMOL/L (ref 98–107)
CO2: 23 MMOL/L (ref 20–31)
CREAT SERPL-MCNC: 0.29 MG/DL (ref 0.5–0.9)
GFR AFRICAN AMERICAN: >60 ML/MIN
GFR NON-AFRICAN AMERICAN: >60 ML/MIN
GFR SERPL CREATININE-BSD FRML MDRD: ABNORMAL ML/MIN/{1.73_M2}
GFR SERPL CREATININE-BSD FRML MDRD: ABNORMAL ML/MIN/{1.73_M2}
GLUCOSE BLD-MCNC: 58 MG/DL (ref 70–99)
HCT VFR BLD CALC: 44.9 % (ref 36.3–47.1)
HEMOGLOBIN: 13.8 G/DL (ref 11.9–15.1)
MCH RBC QN AUTO: 27.2 PG (ref 25.2–33.5)
MCHC RBC AUTO-ENTMCNC: 30.7 G/DL (ref 28.4–34.8)
MCV RBC AUTO: 88.6 FL (ref 82.6–102.9)
NRBC AUTOMATED: 0 PER 100 WBC
PDW BLD-RTO: 15.8 % (ref 11.8–14.4)
PLATELET # BLD: 359 K/UL (ref 138–453)
PMV BLD AUTO: 10.9 FL (ref 8.1–13.5)
POTASSIUM SERPL-SCNC: 4.3 MMOL/L (ref 3.7–5.3)
RBC # BLD: 5.07 M/UL (ref 3.95–5.11)
SEDIMENTATION RATE, ERYTHROCYTE: 65 MM (ref 0–20)
SODIUM BLD-SCNC: 138 MMOL/L (ref 135–144)
WBC # BLD: 6.5 K/UL (ref 3.5–11.3)

## 2021-06-28 PROCEDURE — 85027 COMPLETE CBC AUTOMATED: CPT

## 2021-06-28 PROCEDURE — 80048 BASIC METABOLIC PNL TOTAL CA: CPT

## 2021-06-28 PROCEDURE — P9603 ONE-WAY ALLOW PRORATED MILES: HCPCS

## 2021-06-28 PROCEDURE — 86140 C-REACTIVE PROTEIN: CPT

## 2021-06-28 PROCEDURE — 36415 COLL VENOUS BLD VENIPUNCTURE: CPT

## 2021-06-28 PROCEDURE — 85652 RBC SED RATE AUTOMATED: CPT

## 2021-07-07 ENCOUNTER — HOSPITAL ENCOUNTER (OUTPATIENT)
Age: 41
Setting detail: SPECIMEN
Discharge: HOME | End: 2021-07-07
Payer: MEDICARE

## 2021-07-07 LAB
ANION GAP SERPL CALCULATED.3IONS-SCNC: 10 MMOL/L (ref 9–17)
BUN BLDV-MCNC: 12 MG/DL (ref 6–20)
BUN/CREAT BLD: ABNORMAL (ref 9–20)
C-REACTIVE PROTEIN: 12.8 MG/L (ref 0–5)
CALCIUM SERPL-MCNC: 9.4 MG/DL (ref 8.6–10.4)
CHLORIDE BLD-SCNC: 101 MMOL/L (ref 98–107)
CO2: 25 MMOL/L (ref 20–31)
CREAT SERPL-MCNC: 0.27 MG/DL (ref 0.5–0.9)
GFR AFRICAN AMERICAN: >60 ML/MIN
GFR NON-AFRICAN AMERICAN: >60 ML/MIN
GFR SERPL CREATININE-BSD FRML MDRD: ABNORMAL ML/MIN/{1.73_M2}
GFR SERPL CREATININE-BSD FRML MDRD: ABNORMAL ML/MIN/{1.73_M2}
GLUCOSE BLD-MCNC: 99 MG/DL (ref 70–99)
HCT VFR BLD CALC: 46.4 % (ref 36.3–47.1)
HEMOGLOBIN: 14.5 G/DL (ref 11.9–15.1)
MCH RBC QN AUTO: 27.3 PG (ref 25.2–33.5)
MCHC RBC AUTO-ENTMCNC: 31.3 G/DL (ref 28.4–34.8)
MCV RBC AUTO: 87.2 FL (ref 82.6–102.9)
NRBC AUTOMATED: 0 PER 100 WBC
PDW BLD-RTO: 15.6 % (ref 11.8–14.4)
PLATELET # BLD: 275 K/UL (ref 138–453)
PMV BLD AUTO: 11.1 FL (ref 8.1–13.5)
POTASSIUM SERPL-SCNC: 4.9 MMOL/L (ref 3.7–5.3)
RBC # BLD: 5.32 M/UL (ref 3.95–5.11)
SEDIMENTATION RATE, ERYTHROCYTE: 77 MM (ref 0–20)
SODIUM BLD-SCNC: 136 MMOL/L (ref 135–144)
WBC # BLD: 6 K/UL (ref 3.5–11.3)

## 2021-07-07 PROCEDURE — 85652 RBC SED RATE AUTOMATED: CPT

## 2021-07-07 PROCEDURE — 86140 C-REACTIVE PROTEIN: CPT

## 2021-07-07 PROCEDURE — 85027 COMPLETE CBC AUTOMATED: CPT

## 2021-07-07 PROCEDURE — 36415 COLL VENOUS BLD VENIPUNCTURE: CPT

## 2021-07-07 PROCEDURE — 80048 BASIC METABOLIC PNL TOTAL CA: CPT

## 2021-07-07 PROCEDURE — P9603 ONE-WAY ALLOW PRORATED MILES: HCPCS

## 2021-07-12 ENCOUNTER — HOSPITAL ENCOUNTER (OUTPATIENT)
Age: 41
Setting detail: SPECIMEN
Discharge: HOME | End: 2021-07-12
Payer: MEDICARE

## 2021-07-12 LAB
ANION GAP SERPL CALCULATED.3IONS-SCNC: 11 MMOL/L (ref 9–17)
BUN BLDV-MCNC: 12 MG/DL (ref 6–20)
BUN/CREAT BLD: ABNORMAL (ref 9–20)
C-REACTIVE PROTEIN: 14.4 MG/L (ref 0–5)
CALCIUM SERPL-MCNC: 9.3 MG/DL (ref 8.6–10.4)
CHLORIDE BLD-SCNC: 103 MMOL/L (ref 98–107)
CO2: 24 MMOL/L (ref 20–31)
CREAT SERPL-MCNC: 0.29 MG/DL (ref 0.5–0.9)
GFR AFRICAN AMERICAN: >60 ML/MIN
GFR NON-AFRICAN AMERICAN: >60 ML/MIN
GFR SERPL CREATININE-BSD FRML MDRD: ABNORMAL ML/MIN/{1.73_M2}
GFR SERPL CREATININE-BSD FRML MDRD: ABNORMAL ML/MIN/{1.73_M2}
GLUCOSE BLD-MCNC: 71 MG/DL (ref 70–99)
HCT VFR BLD CALC: 48 % (ref 36.3–47.1)
HEMOGLOBIN: 14.7 G/DL (ref 11.9–15.1)
MCH RBC QN AUTO: 27.2 PG (ref 25.2–33.5)
MCHC RBC AUTO-ENTMCNC: 30.6 G/DL (ref 28.4–34.8)
MCV RBC AUTO: 88.7 FL (ref 82.6–102.9)
NRBC AUTOMATED: 0 PER 100 WBC
PDW BLD-RTO: 15.4 % (ref 11.8–14.4)
PLATELET # BLD: 414 K/UL (ref 138–453)
PMV BLD AUTO: 10.1 FL (ref 8.1–13.5)
POTASSIUM SERPL-SCNC: 4.4 MMOL/L (ref 3.7–5.3)
RBC # BLD: 5.41 M/UL (ref 3.95–5.11)
SEDIMENTATION RATE, ERYTHROCYTE: 83 MM (ref 0–20)
SODIUM BLD-SCNC: 138 MMOL/L (ref 135–144)
WBC # BLD: 7 K/UL (ref 3.5–11.3)

## 2021-07-12 PROCEDURE — P9603 ONE-WAY ALLOW PRORATED MILES: HCPCS

## 2021-07-12 PROCEDURE — 85652 RBC SED RATE AUTOMATED: CPT

## 2021-07-12 PROCEDURE — 80048 BASIC METABOLIC PNL TOTAL CA: CPT

## 2021-07-12 PROCEDURE — 85027 COMPLETE CBC AUTOMATED: CPT

## 2021-07-12 PROCEDURE — 36415 COLL VENOUS BLD VENIPUNCTURE: CPT

## 2021-07-12 PROCEDURE — 86140 C-REACTIVE PROTEIN: CPT

## 2021-07-19 ENCOUNTER — HOSPITAL ENCOUNTER (OUTPATIENT)
Age: 41
Setting detail: SPECIMEN
Discharge: HOME | End: 2021-07-19
Payer: MEDICARE

## 2021-07-19 LAB
ANION GAP SERPL CALCULATED.3IONS-SCNC: 9 MMOL/L (ref 9–17)
BUN BLDV-MCNC: 13 MG/DL (ref 6–20)
BUN/CREAT BLD: ABNORMAL (ref 9–20)
C-REACTIVE PROTEIN: 14.1 MG/L (ref 0–5)
CALCIUM SERPL-MCNC: 9.7 MG/DL (ref 8.6–10.4)
CHLORIDE BLD-SCNC: 104 MMOL/L (ref 98–107)
CO2: 25 MMOL/L (ref 20–31)
CREAT SERPL-MCNC: 0.25 MG/DL (ref 0.5–0.9)
GFR AFRICAN AMERICAN: >60 ML/MIN
GFR NON-AFRICAN AMERICAN: >60 ML/MIN
GFR SERPL CREATININE-BSD FRML MDRD: ABNORMAL ML/MIN/{1.73_M2}
GFR SERPL CREATININE-BSD FRML MDRD: ABNORMAL ML/MIN/{1.73_M2}
GLUCOSE BLD-MCNC: 80 MG/DL (ref 70–99)
HCT VFR BLD CALC: 45.2 % (ref 36.3–47.1)
HEMOGLOBIN: 14.1 G/DL (ref 11.9–15.1)
MCH RBC QN AUTO: 27.4 PG (ref 25.2–33.5)
MCHC RBC AUTO-ENTMCNC: 31.2 G/DL (ref 28.4–34.8)
MCV RBC AUTO: 87.9 FL (ref 82.6–102.9)
NRBC AUTOMATED: 0 PER 100 WBC
PDW BLD-RTO: 14.8 % (ref 11.8–14.4)
PLATELET # BLD: 379 K/UL (ref 138–453)
PMV BLD AUTO: 10.5 FL (ref 8.1–13.5)
POTASSIUM SERPL-SCNC: 4 MMOL/L (ref 3.7–5.3)
RBC # BLD: 5.14 M/UL (ref 3.95–5.11)
SEDIMENTATION RATE, ERYTHROCYTE: 84 MM (ref 0–20)
SODIUM BLD-SCNC: 138 MMOL/L (ref 135–144)
WBC # BLD: 6.9 K/UL (ref 3.5–11.3)

## 2021-07-19 PROCEDURE — 85652 RBC SED RATE AUTOMATED: CPT

## 2021-07-19 PROCEDURE — 86140 C-REACTIVE PROTEIN: CPT

## 2021-07-19 PROCEDURE — 36415 COLL VENOUS BLD VENIPUNCTURE: CPT

## 2021-07-19 PROCEDURE — P9603 ONE-WAY ALLOW PRORATED MILES: HCPCS

## 2021-07-19 PROCEDURE — 85027 COMPLETE CBC AUTOMATED: CPT

## 2021-07-19 PROCEDURE — 80048 BASIC METABOLIC PNL TOTAL CA: CPT

## 2021-07-26 ENCOUNTER — HOSPITAL ENCOUNTER (OUTPATIENT)
Age: 41
Setting detail: SPECIMEN
Discharge: HOME | End: 2021-07-26
Payer: MEDICARE

## 2021-07-26 LAB
ANION GAP SERPL CALCULATED.3IONS-SCNC: 16 MMOL/L (ref 9–17)
BUN BLDV-MCNC: 13 MG/DL (ref 6–20)
BUN/CREAT BLD: ABNORMAL (ref 9–20)
C-REACTIVE PROTEIN: 7.3 MG/L (ref 0–5)
CALCIUM SERPL-MCNC: 9.9 MG/DL (ref 8.6–10.4)
CHLORIDE BLD-SCNC: 106 MMOL/L (ref 98–107)
CO2: 19 MMOL/L (ref 20–31)
CREAT SERPL-MCNC: 0.26 MG/DL (ref 0.5–0.9)
GFR AFRICAN AMERICAN: >60 ML/MIN
GFR NON-AFRICAN AMERICAN: >60 ML/MIN
GFR SERPL CREATININE-BSD FRML MDRD: ABNORMAL ML/MIN/{1.73_M2}
GFR SERPL CREATININE-BSD FRML MDRD: ABNORMAL ML/MIN/{1.73_M2}
GLUCOSE BLD-MCNC: 79 MG/DL (ref 70–99)
HCT VFR BLD CALC: 45.7 % (ref 36.3–47.1)
HEMOGLOBIN: 14.3 G/DL (ref 11.9–15.1)
MCH RBC QN AUTO: 27.2 PG (ref 25.2–33.5)
MCHC RBC AUTO-ENTMCNC: 31.3 G/DL (ref 28.4–34.8)
MCV RBC AUTO: 86.9 FL (ref 82.6–102.9)
NRBC AUTOMATED: 0 PER 100 WBC
PDW BLD-RTO: 14.9 % (ref 11.8–14.4)
PLATELET # BLD: 341 K/UL (ref 138–453)
PMV BLD AUTO: 10.5 FL (ref 8.1–13.5)
POTASSIUM SERPL-SCNC: 4.2 MMOL/L (ref 3.7–5.3)
RBC # BLD: 5.26 M/UL (ref 3.95–5.11)
SEDIMENTATION RATE, ERYTHROCYTE: 84 MM (ref 0–20)
SODIUM BLD-SCNC: 141 MMOL/L (ref 135–144)
WBC # BLD: 5.8 K/UL (ref 3.5–11.3)

## 2021-07-26 PROCEDURE — 86140 C-REACTIVE PROTEIN: CPT

## 2021-07-26 PROCEDURE — 85027 COMPLETE CBC AUTOMATED: CPT

## 2021-07-26 PROCEDURE — 80048 BASIC METABOLIC PNL TOTAL CA: CPT

## 2021-07-26 PROCEDURE — P9603 ONE-WAY ALLOW PRORATED MILES: HCPCS

## 2021-07-26 PROCEDURE — 85652 RBC SED RATE AUTOMATED: CPT

## 2021-07-26 PROCEDURE — 36415 COLL VENOUS BLD VENIPUNCTURE: CPT

## 2021-08-02 ENCOUNTER — HOSPITAL ENCOUNTER (OUTPATIENT)
Age: 41
Setting detail: SPECIMEN
Discharge: HOME | End: 2021-08-02
Payer: MEDICARE

## 2021-08-02 LAB
ANION GAP SERPL CALCULATED.3IONS-SCNC: 12 MMOL/L (ref 9–17)
BUN BLDV-MCNC: 16 MG/DL (ref 6–20)
BUN/CREAT BLD: ABNORMAL (ref 9–20)
C-REACTIVE PROTEIN: 13 MG/L (ref 0–5)
CALCIUM SERPL-MCNC: 9.5 MG/DL (ref 8.6–10.4)
CHLORIDE BLD-SCNC: 103 MMOL/L (ref 98–107)
CO2: 24 MMOL/L (ref 20–31)
CREAT SERPL-MCNC: 0.27 MG/DL (ref 0.5–0.9)
GFR AFRICAN AMERICAN: >60 ML/MIN
GFR NON-AFRICAN AMERICAN: >60 ML/MIN
GFR SERPL CREATININE-BSD FRML MDRD: ABNORMAL ML/MIN/{1.73_M2}
GFR SERPL CREATININE-BSD FRML MDRD: ABNORMAL ML/MIN/{1.73_M2}
GLUCOSE BLD-MCNC: 81 MG/DL (ref 70–99)
HCT VFR BLD CALC: 46.4 % (ref 36.3–47.1)
HEMOGLOBIN: 14.2 G/DL (ref 11.9–15.1)
MCH RBC QN AUTO: 27.1 PG (ref 25.2–33.5)
MCHC RBC AUTO-ENTMCNC: 30.6 G/DL (ref 28.4–34.8)
MCV RBC AUTO: 88.5 FL (ref 82.6–102.9)
NRBC AUTOMATED: 0 PER 100 WBC
PDW BLD-RTO: 14.6 % (ref 11.8–14.4)
PLATELET # BLD: 365 K/UL (ref 138–453)
PMV BLD AUTO: 10.2 FL (ref 8.1–13.5)
POTASSIUM SERPL-SCNC: 3.9 MMOL/L (ref 3.7–5.3)
RBC # BLD: 5.24 M/UL (ref 3.95–5.11)
SEDIMENTATION RATE, ERYTHROCYTE: 68 MM (ref 0–20)
SODIUM BLD-SCNC: 139 MMOL/L (ref 135–144)
WBC # BLD: 6.7 K/UL (ref 3.5–11.3)

## 2021-08-02 PROCEDURE — 36415 COLL VENOUS BLD VENIPUNCTURE: CPT

## 2021-08-02 PROCEDURE — 85652 RBC SED RATE AUTOMATED: CPT

## 2021-08-02 PROCEDURE — 80048 BASIC METABOLIC PNL TOTAL CA: CPT

## 2021-08-02 PROCEDURE — 85027 COMPLETE CBC AUTOMATED: CPT

## 2021-08-02 PROCEDURE — P9603 ONE-WAY ALLOW PRORATED MILES: HCPCS

## 2021-08-02 PROCEDURE — 86140 C-REACTIVE PROTEIN: CPT

## 2021-08-09 ENCOUNTER — HOSPITAL ENCOUNTER (OUTPATIENT)
Age: 41
Setting detail: SPECIMEN
Discharge: HOME | End: 2021-08-09
Payer: MEDICARE

## 2021-08-09 LAB
ANION GAP SERPL CALCULATED.3IONS-SCNC: 15 MMOL/L (ref 9–17)
BUN BLDV-MCNC: 13 MG/DL (ref 6–20)
BUN/CREAT BLD: ABNORMAL (ref 9–20)
C-REACTIVE PROTEIN: 16 MG/L (ref 0–5)
CALCIUM SERPL-MCNC: 9.4 MG/DL (ref 8.6–10.4)
CHLORIDE BLD-SCNC: 100 MMOL/L (ref 98–107)
CO2: 22 MMOL/L (ref 20–31)
CREAT SERPL-MCNC: 0.32 MG/DL (ref 0.5–0.9)
GFR AFRICAN AMERICAN: >60 ML/MIN
GFR NON-AFRICAN AMERICAN: >60 ML/MIN
GFR SERPL CREATININE-BSD FRML MDRD: ABNORMAL ML/MIN/{1.73_M2}
GFR SERPL CREATININE-BSD FRML MDRD: ABNORMAL ML/MIN/{1.73_M2}
GLUCOSE BLD-MCNC: 61 MG/DL (ref 70–99)
HCT VFR BLD CALC: 45.9 % (ref 36.3–47.1)
HEMOGLOBIN: 13.8 G/DL (ref 11.9–15.1)
MCH RBC QN AUTO: 27 PG (ref 25.2–33.5)
MCHC RBC AUTO-ENTMCNC: 30.1 G/DL (ref 28.4–34.8)
MCV RBC AUTO: 89.8 FL (ref 82.6–102.9)
NRBC AUTOMATED: 0 PER 100 WBC
PDW BLD-RTO: 14.6 % (ref 11.8–14.4)
PLATELET # BLD: 366 K/UL (ref 138–453)
PMV BLD AUTO: 10.5 FL (ref 8.1–13.5)
POTASSIUM SERPL-SCNC: 4.1 MMOL/L (ref 3.7–5.3)
RBC # BLD: 5.11 M/UL (ref 3.95–5.11)
SEDIMENTATION RATE, ERYTHROCYTE: 75 MM (ref 0–20)
SODIUM BLD-SCNC: 137 MMOL/L (ref 135–144)
WBC # BLD: 6.4 K/UL (ref 3.5–11.3)

## 2021-08-09 PROCEDURE — P9603 ONE-WAY ALLOW PRORATED MILES: HCPCS

## 2021-08-09 PROCEDURE — 85652 RBC SED RATE AUTOMATED: CPT

## 2021-08-09 PROCEDURE — 36415 COLL VENOUS BLD VENIPUNCTURE: CPT

## 2021-08-09 PROCEDURE — 85027 COMPLETE CBC AUTOMATED: CPT

## 2021-08-09 PROCEDURE — 86140 C-REACTIVE PROTEIN: CPT

## 2021-08-09 PROCEDURE — 80048 BASIC METABOLIC PNL TOTAL CA: CPT

## 2021-08-11 ENCOUNTER — HOSPITAL ENCOUNTER (OUTPATIENT)
Age: 41
Setting detail: SPECIMEN
Discharge: HOME | End: 2021-08-11
Payer: MEDICARE

## 2021-08-11 LAB
ANION GAP SERPL CALCULATED.3IONS-SCNC: 12 MMOL/L (ref 9–17)
BUN BLDV-MCNC: 14 MG/DL (ref 6–20)
BUN/CREAT BLD: ABNORMAL (ref 9–20)
CALCIUM SERPL-MCNC: 9.6 MG/DL (ref 8.6–10.4)
CHLORIDE BLD-SCNC: 103 MMOL/L (ref 98–107)
CO2: 23 MMOL/L (ref 20–31)
CREAT SERPL-MCNC: <0.4 MG/DL (ref 0.5–0.9)
GFR AFRICAN AMERICAN: ABNORMAL ML/MIN
GFR NON-AFRICAN AMERICAN: ABNORMAL ML/MIN
GFR SERPL CREATININE-BSD FRML MDRD: ABNORMAL ML/MIN/{1.73_M2}
GFR SERPL CREATININE-BSD FRML MDRD: ABNORMAL ML/MIN/{1.73_M2}
GLUCOSE BLD-MCNC: 84 MG/DL (ref 70–99)
HCT VFR BLD CALC: 45.6 % (ref 36.3–47.1)
HEMOGLOBIN: 14.2 G/DL (ref 11.9–15.1)
MCH RBC QN AUTO: 27.5 PG (ref 25.2–33.5)
MCHC RBC AUTO-ENTMCNC: 31.1 G/DL (ref 28.4–34.8)
MCV RBC AUTO: 88.2 FL (ref 82.6–102.9)
NRBC AUTOMATED: 0 PER 100 WBC
PDW BLD-RTO: 14.4 % (ref 11.8–14.4)
PLATELET # BLD: 373 K/UL (ref 138–453)
PMV BLD AUTO: 10.1 FL (ref 8.1–13.5)
POTASSIUM SERPL-SCNC: 4.1 MMOL/L (ref 3.7–5.3)
RBC # BLD: 5.17 M/UL (ref 3.95–5.11)
SODIUM BLD-SCNC: 138 MMOL/L (ref 135–144)
WBC # BLD: 6.7 K/UL (ref 3.5–11.3)

## 2021-08-11 PROCEDURE — 85027 COMPLETE CBC AUTOMATED: CPT

## 2021-08-11 PROCEDURE — 80048 BASIC METABOLIC PNL TOTAL CA: CPT

## 2021-08-11 PROCEDURE — 36415 COLL VENOUS BLD VENIPUNCTURE: CPT

## 2021-08-16 ENCOUNTER — HOSPITAL ENCOUNTER (OUTPATIENT)
Age: 41
Setting detail: SPECIMEN
Discharge: HOME | End: 2021-08-16
Payer: MEDICARE

## 2021-08-16 LAB
ANION GAP SERPL CALCULATED.3IONS-SCNC: 11 MMOL/L (ref 9–17)
BUN BLDV-MCNC: 15 MG/DL (ref 6–20)
BUN/CREAT BLD: ABNORMAL (ref 9–20)
C-REACTIVE PROTEIN: 13.7 MG/L (ref 0–5)
CALCIUM SERPL-MCNC: 9.4 MG/DL (ref 8.6–10.4)
CHLORIDE BLD-SCNC: 104 MMOL/L (ref 98–107)
CO2: 22 MMOL/L (ref 20–31)
CREAT SERPL-MCNC: 0.29 MG/DL (ref 0.5–0.9)
GFR AFRICAN AMERICAN: >60 ML/MIN
GFR NON-AFRICAN AMERICAN: >60 ML/MIN
GFR SERPL CREATININE-BSD FRML MDRD: ABNORMAL ML/MIN/{1.73_M2}
GFR SERPL CREATININE-BSD FRML MDRD: ABNORMAL ML/MIN/{1.73_M2}
GLUCOSE BLD-MCNC: 65 MG/DL (ref 70–99)
HCT VFR BLD CALC: 47.1 % (ref 36.3–47.1)
HEMOGLOBIN: 14.2 G/DL (ref 11.9–15.1)
MCH RBC QN AUTO: 27.3 PG (ref 25.2–33.5)
MCHC RBC AUTO-ENTMCNC: 30.1 G/DL (ref 28.4–34.8)
MCV RBC AUTO: 90.4 FL (ref 82.6–102.9)
NRBC AUTOMATED: 0 PER 100 WBC
PDW BLD-RTO: 14.7 % (ref 11.8–14.4)
PLATELET # BLD: 288 K/UL (ref 138–453)
PMV BLD AUTO: 11.3 FL (ref 8.1–13.5)
POTASSIUM SERPL-SCNC: 4.9 MMOL/L (ref 3.7–5.3)
RBC # BLD: 5.21 M/UL (ref 3.95–5.11)
SEDIMENTATION RATE, ERYTHROCYTE: 61 MM (ref 0–20)
SODIUM BLD-SCNC: 137 MMOL/L (ref 135–144)
WBC # BLD: 7.2 K/UL (ref 3.5–11.3)

## 2021-08-16 PROCEDURE — 86140 C-REACTIVE PROTEIN: CPT

## 2021-08-16 PROCEDURE — 80048 BASIC METABOLIC PNL TOTAL CA: CPT

## 2021-08-16 PROCEDURE — 85652 RBC SED RATE AUTOMATED: CPT

## 2021-08-16 PROCEDURE — 85027 COMPLETE CBC AUTOMATED: CPT

## 2021-08-16 PROCEDURE — 36415 COLL VENOUS BLD VENIPUNCTURE: CPT

## 2021-08-16 PROCEDURE — P9603 ONE-WAY ALLOW PRORATED MILES: HCPCS

## 2021-08-23 ENCOUNTER — HOSPITAL ENCOUNTER (OUTPATIENT)
Age: 41
Setting detail: SPECIMEN
Discharge: HOME | End: 2021-08-23
Payer: MEDICARE

## 2021-08-23 LAB
ANION GAP SERPL CALCULATED.3IONS-SCNC: 12 MMOL/L (ref 9–17)
BUN BLDV-MCNC: 14 MG/DL (ref 6–20)
BUN/CREAT BLD: ABNORMAL (ref 9–20)
C-REACTIVE PROTEIN: 12.6 MG/L (ref 0–5)
CALCIUM SERPL-MCNC: 9.9 MG/DL (ref 8.6–10.4)
CHLORIDE BLD-SCNC: 102 MMOL/L (ref 98–107)
CO2: 25 MMOL/L (ref 20–31)
CREAT SERPL-MCNC: 0.28 MG/DL (ref 0.5–0.9)
GFR AFRICAN AMERICAN: >60 ML/MIN
GFR NON-AFRICAN AMERICAN: >60 ML/MIN
GFR SERPL CREATININE-BSD FRML MDRD: ABNORMAL ML/MIN/{1.73_M2}
GFR SERPL CREATININE-BSD FRML MDRD: ABNORMAL ML/MIN/{1.73_M2}
GLUCOSE BLD-MCNC: 64 MG/DL (ref 70–99)
HCT VFR BLD CALC: 47 % (ref 36.3–47.1)
HEMOGLOBIN: 14.4 G/DL (ref 11.9–15.1)
MCH RBC QN AUTO: 27.7 PG (ref 25.2–33.5)
MCHC RBC AUTO-ENTMCNC: 30.6 G/DL (ref 28.4–34.8)
MCV RBC AUTO: 90.6 FL (ref 82.6–102.9)
NRBC AUTOMATED: 0 PER 100 WBC
PDW BLD-RTO: 14.4 % (ref 11.8–14.4)
PLATELET # BLD: 391 K/UL (ref 138–453)
PMV BLD AUTO: 10.9 FL (ref 8.1–13.5)
POTASSIUM SERPL-SCNC: 4.1 MMOL/L (ref 3.7–5.3)
RBC # BLD: 5.19 M/UL (ref 3.95–5.11)
SEDIMENTATION RATE, ERYTHROCYTE: 85 MM (ref 0–20)
SODIUM BLD-SCNC: 139 MMOL/L (ref 135–144)
WBC # BLD: 7.3 K/UL (ref 3.5–11.3)

## 2021-08-23 PROCEDURE — 85027 COMPLETE CBC AUTOMATED: CPT

## 2021-08-23 PROCEDURE — P9603 ONE-WAY ALLOW PRORATED MILES: HCPCS

## 2021-08-23 PROCEDURE — 80048 BASIC METABOLIC PNL TOTAL CA: CPT

## 2021-08-23 PROCEDURE — 86140 C-REACTIVE PROTEIN: CPT

## 2021-08-23 PROCEDURE — 36415 COLL VENOUS BLD VENIPUNCTURE: CPT

## 2021-08-23 PROCEDURE — 85652 RBC SED RATE AUTOMATED: CPT

## 2021-08-30 ENCOUNTER — HOSPITAL ENCOUNTER (OUTPATIENT)
Age: 41
Setting detail: SPECIMEN
Discharge: HOME | End: 2021-08-30
Payer: MEDICARE

## 2021-08-30 LAB
ANION GAP SERPL CALCULATED.3IONS-SCNC: 10 MMOL/L (ref 9–17)
BUN BLDV-MCNC: 15 MG/DL (ref 6–20)
BUN/CREAT BLD: ABNORMAL (ref 9–20)
C-REACTIVE PROTEIN: 9.7 MG/L (ref 0–5)
CALCIUM SERPL-MCNC: 9.5 MG/DL (ref 8.6–10.4)
CHLORIDE BLD-SCNC: 102 MMOL/L (ref 98–107)
CO2: 25 MMOL/L (ref 20–31)
CREAT SERPL-MCNC: 0.35 MG/DL (ref 0.5–0.9)
GFR AFRICAN AMERICAN: >60 ML/MIN
GFR NON-AFRICAN AMERICAN: >60 ML/MIN
GFR SERPL CREATININE-BSD FRML MDRD: ABNORMAL ML/MIN/{1.73_M2}
GFR SERPL CREATININE-BSD FRML MDRD: ABNORMAL ML/MIN/{1.73_M2}
GLUCOSE BLD-MCNC: 68 MG/DL (ref 70–99)
HCT VFR BLD CALC: 45.6 % (ref 36.3–47.1)
HEMOGLOBIN: 14.1 G/DL (ref 11.9–15.1)
MCH RBC QN AUTO: 27.3 PG (ref 25.2–33.5)
MCHC RBC AUTO-ENTMCNC: 30.9 G/DL (ref 28.4–34.8)
MCV RBC AUTO: 88.2 FL (ref 82.6–102.9)
NRBC AUTOMATED: 0 PER 100 WBC
PDW BLD-RTO: 14.6 % (ref 11.8–14.4)
PLATELET # BLD: 377 K/UL (ref 138–453)
PMV BLD AUTO: 10 FL (ref 8.1–13.5)
POTASSIUM SERPL-SCNC: 3.7 MMOL/L (ref 3.7–5.3)
RBC # BLD: 5.17 M/UL (ref 3.95–5.11)
SEDIMENTATION RATE, ERYTHROCYTE: 73 MM (ref 0–20)
SODIUM BLD-SCNC: 137 MMOL/L (ref 135–144)
WBC # BLD: 6.5 K/UL (ref 3.5–11.3)

## 2021-08-30 PROCEDURE — P9603 ONE-WAY ALLOW PRORATED MILES: HCPCS

## 2021-08-30 PROCEDURE — 86140 C-REACTIVE PROTEIN: CPT

## 2021-08-30 PROCEDURE — 80048 BASIC METABOLIC PNL TOTAL CA: CPT

## 2021-08-30 PROCEDURE — 85652 RBC SED RATE AUTOMATED: CPT

## 2021-08-30 PROCEDURE — 85027 COMPLETE CBC AUTOMATED: CPT

## 2021-08-30 PROCEDURE — 36415 COLL VENOUS BLD VENIPUNCTURE: CPT

## 2021-09-07 ENCOUNTER — HOSPITAL ENCOUNTER (OUTPATIENT)
Age: 41
Setting detail: SPECIMEN
Discharge: HOME | End: 2021-09-07
Payer: MEDICARE

## 2021-09-07 LAB
ANION GAP SERPL CALCULATED.3IONS-SCNC: 12 MMOL/L (ref 9–17)
BUN BLDV-MCNC: 13 MG/DL (ref 6–20)
BUN/CREAT BLD: ABNORMAL (ref 9–20)
C-REACTIVE PROTEIN: 9.4 MG/L (ref 0–5)
CALCIUM SERPL-MCNC: 9.2 MG/DL (ref 8.6–10.4)
CHLORIDE BLD-SCNC: 100 MMOL/L (ref 98–107)
CO2: 23 MMOL/L (ref 20–31)
CREAT SERPL-MCNC: 0.31 MG/DL (ref 0.5–0.9)
GFR AFRICAN AMERICAN: >60 ML/MIN
GFR NON-AFRICAN AMERICAN: >60 ML/MIN
GFR SERPL CREATININE-BSD FRML MDRD: ABNORMAL ML/MIN/{1.73_M2}
GFR SERPL CREATININE-BSD FRML MDRD: ABNORMAL ML/MIN/{1.73_M2}
GLUCOSE BLD-MCNC: 70 MG/DL (ref 70–99)
HCT VFR BLD CALC: 45.1 % (ref 36.3–47.1)
HEMOGLOBIN: 13.9 G/DL (ref 11.9–15.1)
MCH RBC QN AUTO: 27.3 PG (ref 25.2–33.5)
MCHC RBC AUTO-ENTMCNC: 30.8 G/DL (ref 28.4–34.8)
MCV RBC AUTO: 88.4 FL (ref 82.6–102.9)
NRBC AUTOMATED: 0 PER 100 WBC
PDW BLD-RTO: 14.3 % (ref 11.8–14.4)
PLATELET # BLD: 311 K/UL (ref 138–453)
PMV BLD AUTO: 11 FL (ref 8.1–13.5)
POTASSIUM SERPL-SCNC: 4.2 MMOL/L (ref 3.7–5.3)
RBC # BLD: 5.1 M/UL (ref 3.95–5.11)
SEDIMENTATION RATE, ERYTHROCYTE: 71 MM (ref 0–20)
SODIUM BLD-SCNC: 135 MMOL/L (ref 135–144)
WBC # BLD: 6.1 K/UL (ref 3.5–11.3)

## 2021-09-07 PROCEDURE — 85652 RBC SED RATE AUTOMATED: CPT

## 2021-09-07 PROCEDURE — P9603 ONE-WAY ALLOW PRORATED MILES: HCPCS

## 2021-09-07 PROCEDURE — 36415 COLL VENOUS BLD VENIPUNCTURE: CPT

## 2021-09-07 PROCEDURE — 85027 COMPLETE CBC AUTOMATED: CPT

## 2021-09-07 PROCEDURE — 86140 C-REACTIVE PROTEIN: CPT

## 2021-09-07 PROCEDURE — 80048 BASIC METABOLIC PNL TOTAL CA: CPT

## 2021-09-13 ENCOUNTER — HOSPITAL ENCOUNTER (OUTPATIENT)
Age: 41
Setting detail: SPECIMEN
Discharge: HOME | End: 2021-09-13
Payer: MEDICARE

## 2021-09-13 LAB
ANION GAP SERPL CALCULATED.3IONS-SCNC: 14 MMOL/L (ref 9–17)
BUN BLDV-MCNC: 15 MG/DL (ref 6–20)
BUN/CREAT BLD: ABNORMAL (ref 9–20)
C-REACTIVE PROTEIN: 8.3 MG/L (ref 0–5)
CALCIUM SERPL-MCNC: 9.3 MG/DL (ref 8.6–10.4)
CHLORIDE BLD-SCNC: 104 MMOL/L (ref 98–107)
CO2: 21 MMOL/L (ref 20–31)
CREAT SERPL-MCNC: 0.27 MG/DL (ref 0.5–0.9)
GFR AFRICAN AMERICAN: >60 ML/MIN
GFR NON-AFRICAN AMERICAN: >60 ML/MIN
GFR SERPL CREATININE-BSD FRML MDRD: ABNORMAL ML/MIN/{1.73_M2}
GFR SERPL CREATININE-BSD FRML MDRD: ABNORMAL ML/MIN/{1.73_M2}
GLUCOSE BLD-MCNC: 81 MG/DL (ref 70–99)
HCT VFR BLD CALC: 44.8 % (ref 36.3–47.1)
HEMOGLOBIN: 13.8 G/DL (ref 11.9–15.1)
MCH RBC QN AUTO: 27.4 PG (ref 25.2–33.5)
MCHC RBC AUTO-ENTMCNC: 30.8 G/DL (ref 28.4–34.8)
MCV RBC AUTO: 88.9 FL (ref 82.6–102.9)
NRBC AUTOMATED: 0 PER 100 WBC
PDW BLD-RTO: 14.6 % (ref 11.8–14.4)
PLATELET # BLD: 347 K/UL (ref 138–453)
PMV BLD AUTO: 10.3 FL (ref 8.1–13.5)
POTASSIUM SERPL-SCNC: 4.2 MMOL/L (ref 3.7–5.3)
RBC # BLD: 5.04 M/UL (ref 3.95–5.11)
SEDIMENTATION RATE, ERYTHROCYTE: 66 MM (ref 0–20)
SODIUM BLD-SCNC: 139 MMOL/L (ref 135–144)
WBC # BLD: 6.3 K/UL (ref 3.5–11.3)

## 2021-09-13 PROCEDURE — P9603 ONE-WAY ALLOW PRORATED MILES: HCPCS

## 2021-09-13 PROCEDURE — 85652 RBC SED RATE AUTOMATED: CPT

## 2021-09-13 PROCEDURE — 80048 BASIC METABOLIC PNL TOTAL CA: CPT

## 2021-09-13 PROCEDURE — 85027 COMPLETE CBC AUTOMATED: CPT

## 2021-09-13 PROCEDURE — 36415 COLL VENOUS BLD VENIPUNCTURE: CPT

## 2021-09-13 PROCEDURE — 86140 C-REACTIVE PROTEIN: CPT

## 2021-09-20 ENCOUNTER — HOSPITAL ENCOUNTER (OUTPATIENT)
Age: 41
Setting detail: SPECIMEN
Discharge: HOME | End: 2021-09-20
Payer: MEDICARE

## 2021-09-20 LAB
ANION GAP SERPL CALCULATED.3IONS-SCNC: 10 MMOL/L (ref 9–17)
BUN BLDV-MCNC: 14 MG/DL (ref 6–20)
BUN/CREAT BLD: ABNORMAL (ref 9–20)
C-REACTIVE PROTEIN: 8.9 MG/L (ref 0–5)
CALCIUM SERPL-MCNC: 9.2 MG/DL (ref 8.6–10.4)
CHLORIDE BLD-SCNC: 104 MMOL/L (ref 98–107)
CO2: 22 MMOL/L (ref 20–31)
CREAT SERPL-MCNC: 0.25 MG/DL (ref 0.5–0.9)
GFR AFRICAN AMERICAN: >60 ML/MIN
GFR NON-AFRICAN AMERICAN: >60 ML/MIN
GFR SERPL CREATININE-BSD FRML MDRD: ABNORMAL ML/MIN/{1.73_M2}
GFR SERPL CREATININE-BSD FRML MDRD: ABNORMAL ML/MIN/{1.73_M2}
GLUCOSE BLD-MCNC: 73 MG/DL (ref 70–99)
HCT VFR BLD CALC: 45.5 % (ref 36.3–47.1)
HEMOGLOBIN: 13.9 G/DL (ref 11.9–15.1)
MCH RBC QN AUTO: 27.3 PG (ref 25.2–33.5)
MCHC RBC AUTO-ENTMCNC: 30.5 G/DL (ref 28.4–34.8)
MCV RBC AUTO: 89.4 FL (ref 82.6–102.9)
NRBC AUTOMATED: 0 PER 100 WBC
PDW BLD-RTO: 14.6 % (ref 11.8–14.4)
PLATELET # BLD: 383 K/UL (ref 138–453)
PMV BLD AUTO: 10.2 FL (ref 8.1–13.5)
POTASSIUM SERPL-SCNC: 4 MMOL/L (ref 3.7–5.3)
RBC # BLD: 5.09 M/UL (ref 3.95–5.11)
SEDIMENTATION RATE, ERYTHROCYTE: 59 MM (ref 0–20)
SODIUM BLD-SCNC: 136 MMOL/L (ref 135–144)
WBC # BLD: 5.9 K/UL (ref 3.5–11.3)

## 2021-09-20 PROCEDURE — 86140 C-REACTIVE PROTEIN: CPT

## 2021-09-20 PROCEDURE — 80048 BASIC METABOLIC PNL TOTAL CA: CPT

## 2021-09-20 PROCEDURE — 85652 RBC SED RATE AUTOMATED: CPT

## 2021-09-20 PROCEDURE — 85027 COMPLETE CBC AUTOMATED: CPT

## 2021-09-20 PROCEDURE — 36415 COLL VENOUS BLD VENIPUNCTURE: CPT

## 2021-09-20 PROCEDURE — P9603 ONE-WAY ALLOW PRORATED MILES: HCPCS

## 2021-09-27 ENCOUNTER — HOSPITAL ENCOUNTER (OUTPATIENT)
Age: 41
Setting detail: SPECIMEN
Discharge: HOME | End: 2021-09-27
Payer: MEDICARE

## 2021-09-27 LAB
ANION GAP SERPL CALCULATED.3IONS-SCNC: 11 MMOL/L (ref 9–17)
BUN BLDV-MCNC: 15 MG/DL (ref 6–20)
BUN/CREAT BLD: ABNORMAL (ref 9–20)
C-REACTIVE PROTEIN: 6.3 MG/L (ref 0–5)
CALCIUM SERPL-MCNC: 9.3 MG/DL (ref 8.6–10.4)
CHLORIDE BLD-SCNC: 102 MMOL/L (ref 98–107)
CO2: 25 MMOL/L (ref 20–31)
CREAT SERPL-MCNC: 0.3 MG/DL (ref 0.5–0.9)
GFR AFRICAN AMERICAN: >60 ML/MIN
GFR NON-AFRICAN AMERICAN: >60 ML/MIN
GFR SERPL CREATININE-BSD FRML MDRD: ABNORMAL ML/MIN/{1.73_M2}
GFR SERPL CREATININE-BSD FRML MDRD: ABNORMAL ML/MIN/{1.73_M2}
GLUCOSE BLD-MCNC: 76 MG/DL (ref 70–99)
HCT VFR BLD CALC: 45.8 % (ref 36.3–47.1)
HEMOGLOBIN: 14.1 G/DL (ref 11.9–15.1)
MCH RBC QN AUTO: 27.1 PG (ref 25.2–33.5)
MCHC RBC AUTO-ENTMCNC: 30.8 G/DL (ref 28.4–34.8)
MCV RBC AUTO: 87.9 FL (ref 82.6–102.9)
NRBC AUTOMATED: 0 PER 100 WBC
PDW BLD-RTO: 14.5 % (ref 11.8–14.4)
PLATELET # BLD: 376 K/UL (ref 138–453)
PMV BLD AUTO: 9.7 FL (ref 8.1–13.5)
POTASSIUM SERPL-SCNC: 4.1 MMOL/L (ref 3.7–5.3)
RBC # BLD: 5.21 M/UL (ref 3.95–5.11)
SEDIMENTATION RATE, ERYTHROCYTE: 46 MM (ref 0–20)
SODIUM BLD-SCNC: 138 MMOL/L (ref 135–144)
WBC # BLD: 6.5 K/UL (ref 3.5–11.3)

## 2021-09-27 PROCEDURE — P9603 ONE-WAY ALLOW PRORATED MILES: HCPCS

## 2021-09-27 PROCEDURE — 80048 BASIC METABOLIC PNL TOTAL CA: CPT

## 2021-09-27 PROCEDURE — 85652 RBC SED RATE AUTOMATED: CPT

## 2021-09-27 PROCEDURE — 36415 COLL VENOUS BLD VENIPUNCTURE: CPT

## 2021-09-27 PROCEDURE — 85027 COMPLETE CBC AUTOMATED: CPT

## 2021-09-27 PROCEDURE — 86140 C-REACTIVE PROTEIN: CPT

## 2021-10-04 ENCOUNTER — HOSPITAL ENCOUNTER (OUTPATIENT)
Age: 41
Setting detail: SPECIMEN
Discharge: HOME | End: 2021-10-04
Payer: MEDICARE

## 2021-10-04 LAB
ANION GAP SERPL CALCULATED.3IONS-SCNC: 12 MMOL/L (ref 9–17)
BUN BLDV-MCNC: 15 MG/DL (ref 6–20)
BUN/CREAT BLD: ABNORMAL (ref 9–20)
C-REACTIVE PROTEIN: 11.4 MG/L (ref 0–5)
CALCIUM SERPL-MCNC: 9.1 MG/DL (ref 8.6–10.4)
CHLORIDE BLD-SCNC: 103 MMOL/L (ref 98–107)
CO2: 23 MMOL/L (ref 20–31)
CREAT SERPL-MCNC: 0.3 MG/DL (ref 0.5–0.9)
GFR AFRICAN AMERICAN: >60 ML/MIN
GFR NON-AFRICAN AMERICAN: >60 ML/MIN
GFR SERPL CREATININE-BSD FRML MDRD: ABNORMAL ML/MIN/{1.73_M2}
GFR SERPL CREATININE-BSD FRML MDRD: ABNORMAL ML/MIN/{1.73_M2}
GLUCOSE BLD-MCNC: 72 MG/DL (ref 70–99)
HCT VFR BLD CALC: 45.9 % (ref 36.3–47.1)
HEMOGLOBIN: 14.2 G/DL (ref 11.9–15.1)
MCH RBC QN AUTO: 27.4 PG (ref 25.2–33.5)
MCHC RBC AUTO-ENTMCNC: 30.9 G/DL (ref 28.4–34.8)
MCV RBC AUTO: 88.6 FL (ref 82.6–102.9)
NRBC AUTOMATED: 0 PER 100 WBC
PDW BLD-RTO: 14.8 % (ref 11.8–14.4)
PLATELET # BLD: 405 K/UL (ref 138–453)
PMV BLD AUTO: 10 FL (ref 8.1–13.5)
POTASSIUM SERPL-SCNC: 4 MMOL/L (ref 3.7–5.3)
RBC # BLD: 5.18 M/UL (ref 3.95–5.11)
SEDIMENTATION RATE, ERYTHROCYTE: 66 MM (ref 0–20)
SODIUM BLD-SCNC: 138 MMOL/L (ref 135–144)
WBC # BLD: 6.5 K/UL (ref 3.5–11.3)

## 2021-10-04 PROCEDURE — 36415 COLL VENOUS BLD VENIPUNCTURE: CPT

## 2021-10-04 PROCEDURE — 86140 C-REACTIVE PROTEIN: CPT

## 2021-10-04 PROCEDURE — 80048 BASIC METABOLIC PNL TOTAL CA: CPT

## 2021-10-04 PROCEDURE — 85027 COMPLETE CBC AUTOMATED: CPT

## 2021-10-04 PROCEDURE — P9603 ONE-WAY ALLOW PRORATED MILES: HCPCS

## 2021-10-04 PROCEDURE — 85652 RBC SED RATE AUTOMATED: CPT

## 2021-10-11 ENCOUNTER — HOSPITAL ENCOUNTER (OUTPATIENT)
Age: 41
Setting detail: SPECIMEN
Discharge: HOME | End: 2021-10-11
Payer: MEDICARE

## 2021-10-11 LAB
ANION GAP SERPL CALCULATED.3IONS-SCNC: 13 MMOL/L (ref 9–17)
BUN BLDV-MCNC: 15 MG/DL (ref 6–20)
BUN/CREAT BLD: ABNORMAL (ref 9–20)
C-REACTIVE PROTEIN: 10.9 MG/L (ref 0–5)
CALCIUM SERPL-MCNC: 9.1 MG/DL (ref 8.6–10.4)
CHLORIDE BLD-SCNC: 102 MMOL/L (ref 98–107)
CO2: 18 MMOL/L (ref 20–31)
CREAT SERPL-MCNC: 0.26 MG/DL (ref 0.5–0.9)
GFR AFRICAN AMERICAN: >60 ML/MIN
GFR NON-AFRICAN AMERICAN: >60 ML/MIN
GFR SERPL CREATININE-BSD FRML MDRD: ABNORMAL ML/MIN/{1.73_M2}
GFR SERPL CREATININE-BSD FRML MDRD: ABNORMAL ML/MIN/{1.73_M2}
GLUCOSE BLD-MCNC: 73 MG/DL (ref 70–99)
HCT VFR BLD CALC: 48.4 % (ref 36.3–47.1)
HEMOGLOBIN: 14.6 G/DL (ref 11.9–15.1)
MCH RBC QN AUTO: 27.5 PG (ref 25.2–33.5)
MCHC RBC AUTO-ENTMCNC: 30.2 G/DL (ref 28.4–34.8)
MCV RBC AUTO: 91.3 FL (ref 82.6–102.9)
NRBC AUTOMATED: 0 PER 100 WBC
PDW BLD-RTO: 15.2 % (ref 11.8–14.4)
PLATELET # BLD: 277 K/UL (ref 138–453)
PMV BLD AUTO: 11.1 FL (ref 8.1–13.5)
POTASSIUM SERPL-SCNC: 4.4 MMOL/L (ref 3.7–5.3)
RBC # BLD: 5.3 M/UL (ref 3.95–5.11)
SEDIMENTATION RATE, ERYTHROCYTE: 28 MM (ref 0–20)
SODIUM BLD-SCNC: 133 MMOL/L (ref 135–144)
WBC # BLD: 6.4 K/UL (ref 3.5–11.3)

## 2021-10-11 PROCEDURE — 36415 COLL VENOUS BLD VENIPUNCTURE: CPT

## 2021-10-11 PROCEDURE — 86140 C-REACTIVE PROTEIN: CPT

## 2021-10-11 PROCEDURE — 80048 BASIC METABOLIC PNL TOTAL CA: CPT

## 2021-10-11 PROCEDURE — P9603 ONE-WAY ALLOW PRORATED MILES: HCPCS

## 2021-10-11 PROCEDURE — 85652 RBC SED RATE AUTOMATED: CPT

## 2021-10-11 PROCEDURE — 85027 COMPLETE CBC AUTOMATED: CPT

## 2021-10-18 ENCOUNTER — HOSPITAL ENCOUNTER (OUTPATIENT)
Age: 41
Setting detail: SPECIMEN
Discharge: HOME | End: 2021-10-18
Payer: MEDICARE

## 2021-10-18 LAB
ANION GAP SERPL CALCULATED.3IONS-SCNC: 15 MMOL/L (ref 9–17)
BUN BLDV-MCNC: 14 MG/DL (ref 6–20)
BUN/CREAT BLD: ABNORMAL (ref 9–20)
C-REACTIVE PROTEIN: 11.9 MG/L (ref 0–5)
CALCIUM SERPL-MCNC: 9.5 MG/DL (ref 8.6–10.4)
CHLORIDE BLD-SCNC: 101 MMOL/L (ref 98–107)
CO2: 21 MMOL/L (ref 20–31)
CREAT SERPL-MCNC: 0.24 MG/DL (ref 0.5–0.9)
GFR AFRICAN AMERICAN: >60 ML/MIN
GFR NON-AFRICAN AMERICAN: >60 ML/MIN
GFR SERPL CREATININE-BSD FRML MDRD: ABNORMAL ML/MIN/{1.73_M2}
GFR SERPL CREATININE-BSD FRML MDRD: ABNORMAL ML/MIN/{1.73_M2}
GLUCOSE BLD-MCNC: 91 MG/DL (ref 70–99)
HCT VFR BLD CALC: 46.7 % (ref 36.3–47.1)
HEMOGLOBIN: 14.3 G/DL (ref 11.9–15.1)
MCH RBC QN AUTO: 27.4 PG (ref 25.2–33.5)
MCHC RBC AUTO-ENTMCNC: 30.6 G/DL (ref 28.4–34.8)
MCV RBC AUTO: 89.6 FL (ref 82.6–102.9)
NRBC AUTOMATED: 0 PER 100 WBC
PDW BLD-RTO: 14.6 % (ref 11.8–14.4)
PLATELET # BLD: 370 K/UL (ref 138–453)
PMV BLD AUTO: 10.2 FL (ref 8.1–13.5)
POTASSIUM SERPL-SCNC: 4.5 MMOL/L (ref 3.7–5.3)
RBC # BLD: 5.21 M/UL (ref 3.95–5.11)
SEDIMENTATION RATE, ERYTHROCYTE: 68 MM (ref 0–20)
SODIUM BLD-SCNC: 137 MMOL/L (ref 135–144)
WBC # BLD: 6.1 K/UL (ref 3.5–11.3)

## 2021-10-18 PROCEDURE — 80048 BASIC METABOLIC PNL TOTAL CA: CPT

## 2021-10-18 PROCEDURE — P9603 ONE-WAY ALLOW PRORATED MILES: HCPCS

## 2021-10-18 PROCEDURE — 36415 COLL VENOUS BLD VENIPUNCTURE: CPT

## 2021-10-18 PROCEDURE — 86140 C-REACTIVE PROTEIN: CPT

## 2021-10-18 PROCEDURE — 85027 COMPLETE CBC AUTOMATED: CPT

## 2021-10-18 PROCEDURE — 85652 RBC SED RATE AUTOMATED: CPT

## 2021-11-30 ENCOUNTER — HOSPITAL ENCOUNTER (OUTPATIENT)
Age: 41
Setting detail: SPECIMEN
Discharge: HOME | End: 2021-11-30
Payer: MEDICARE

## 2021-11-30 PROCEDURE — 87186 SC STD MICRODIL/AGAR DIL: CPT

## 2021-11-30 PROCEDURE — 87086 URINE CULTURE/COLONY COUNT: CPT

## 2021-11-30 PROCEDURE — 87077 CULTURE AEROBIC IDENTIFY: CPT

## 2021-11-30 PROCEDURE — 86403 PARTICLE AGGLUT ANTBDY SCRN: CPT

## 2021-11-30 PROCEDURE — 81001 URINALYSIS AUTO W/SCOPE: CPT

## 2021-12-04 LAB
CULTURE: ABNORMAL
CULTURE: ABNORMAL
Lab: ABNORMAL
SPECIMEN DESCRIPTION: ABNORMAL

## 2022-04-25 ENCOUNTER — HOSPITAL ENCOUNTER (OUTPATIENT)
Age: 42
Setting detail: SPECIMEN
Discharge: HOME | End: 2022-04-25
Payer: MEDICARE

## 2022-04-25 LAB
ANION GAP SERPL CALCULATED.3IONS-SCNC: 8 MMOL/L (ref 9–17)
BUN BLDV-MCNC: 12 MG/DL (ref 6–20)
CALCIUM SERPL-MCNC: 9.3 MG/DL (ref 8.6–10.4)
CHLORIDE BLD-SCNC: 102 MMOL/L (ref 98–107)
CO2: 27 MMOL/L (ref 20–31)
CREAT SERPL-MCNC: 0.28 MG/DL (ref 0.5–0.9)
GFR AFRICAN AMERICAN: >60 ML/MIN
GFR NON-AFRICAN AMERICAN: >60 ML/MIN
GFR SERPL CREATININE-BSD FRML MDRD: ABNORMAL ML/MIN/{1.73_M2}
GLUCOSE BLD-MCNC: 81 MG/DL (ref 70–99)
POTASSIUM SERPL-SCNC: 3.9 MMOL/L (ref 3.7–5.3)
SODIUM BLD-SCNC: 137 MMOL/L (ref 135–144)

## 2022-04-25 PROCEDURE — 36415 COLL VENOUS BLD VENIPUNCTURE: CPT

## 2022-04-25 PROCEDURE — P9603 ONE-WAY ALLOW PRORATED MILES: HCPCS

## 2022-04-25 PROCEDURE — 80048 BASIC METABOLIC PNL TOTAL CA: CPT

## 2022-05-02 ENCOUNTER — HOSPITAL ENCOUNTER (OUTPATIENT)
Age: 42
Setting detail: SPECIMEN
Discharge: HOME | End: 2022-05-02

## 2022-05-02 LAB
ANION GAP SERPL CALCULATED.3IONS-SCNC: NORMAL MMOL/L (ref 9–17)
BUN BLDV-MCNC: NORMAL MG/DL (ref 6–20)
CALCIUM SERPL-MCNC: NORMAL MG/DL (ref 8.6–10.4)
CHLORIDE BLD-SCNC: NORMAL MMOL/L (ref 98–107)
CO2: NORMAL MMOL/L (ref 20–31)
CREAT SERPL-MCNC: NORMAL MG/DL (ref 0.5–0.9)
GFR AFRICAN AMERICAN: NORMAL ML/MIN
GFR NON-AFRICAN AMERICAN: NORMAL ML/MIN
GFR SERPL CREATININE-BSD FRML MDRD: NORMAL ML/MIN/{1.73_M2}
GLUCOSE BLD-MCNC: NORMAL MG/DL (ref 70–99)
POTASSIUM SERPL-SCNC: NORMAL MMOL/L (ref 3.7–5.3)
SODIUM BLD-SCNC: NORMAL MMOL/L (ref 135–144)

## 2022-05-03 LAB
ANION GAP SERPL CALCULATED.3IONS-SCNC: 11 MMOL/L (ref 9–17)
BUN BLDV-MCNC: 11 MG/DL (ref 6–20)
BUN/CREAT BLD: ABNORMAL (ref 9–20)
CALCIUM SERPL-MCNC: 9.3 MG/DL (ref 8.6–10.4)
CHLORIDE BLD-SCNC: 102 MMOL/L (ref 98–107)
CO2: 24 MMOL/L (ref 20–31)
CREAT SERPL-MCNC: <0.4 MG/DL (ref 0.5–0.9)
GFR AFRICAN AMERICAN: ABNORMAL ML/MIN
GFR NON-AFRICAN AMERICAN: ABNORMAL ML/MIN
GFR SERPL CREATININE-BSD FRML MDRD: ABNORMAL ML/MIN/{1.73_M2}
GLUCOSE BLD-MCNC: 86 MG/DL (ref 70–99)
POTASSIUM SERPL-SCNC: 3.8 MMOL/L (ref 3.7–5.3)
SODIUM BLD-SCNC: 137 MMOL/L (ref 135–144)

## 2022-07-17 ENCOUNTER — HOSPITAL ENCOUNTER (EMERGENCY)
Age: 42
Discharge: HOME | End: 2022-07-17
Attending: EMERGENCY MEDICINE
Payer: MEDICARE

## 2022-07-17 VITALS
DIASTOLIC BLOOD PRESSURE: 131 MMHG | OXYGEN SATURATION: 95 % | RESPIRATION RATE: 20 BRPM | TEMPERATURE: 97.3 F | SYSTOLIC BLOOD PRESSURE: 152 MMHG | HEART RATE: 87 BPM

## 2022-07-17 DIAGNOSIS — Z43.1 ATTENTION TO G-TUBE (HCC): Primary | ICD-10-CM

## 2022-07-17 PROCEDURE — 99282 EMERGENCY DEPT VISIT SF MDM: CPT

## 2022-07-17 PROCEDURE — 43762 RPLC GTUBE NO REVJ TRC: CPT

## 2022-07-17 NOTE — DISCHARGE INSTRUCTIONS
Taurus Workman!!! On behalf of the Emergency Department staff at [facility]. [facility] Emergency Department, I would like to thank you for giving [facility] the opportunity to address your health care needs and concerns. We hope that during your visit, our service was delivered in a professional and caring manner. Please keep [facility] in mind as we walk with you down the path to your own personal wellness. Please expect an automated phone call from [phone] so we can ask a few questions about your health and progress. Based on your answers, a clinician may call you back to offer help and instructions. If you notice any concerning symptoms please return to the ER immediately. These can include but are not limited to: fevers, chills, shortness of breath, vomiting, weakness of the extremities, changes in your mental status, numbness, pale extremities, or chest pain. SUMMARY OF YOUR VISIT    G-tube is in place with an 25 [employer] G-tube. This G-tube is able to be utilized immediately for feeds as well as medications. I recommend he follow-up with your primary care provider. You can return the emergency department as your symptoms worsen, including vomiting, fevers, chills, worsening pain, if the G-tube is removed accidentally again, or any other concerns.

## 2022-07-17 NOTE — ED NOTES
1843 Pernell Perez called x2 to obtain g-tube information, no answer.       Barney Marie RN  07/17/22 7050

## 2022-07-17 NOTE — ED PROVIDER NOTES
[address]  ED  Emergency Department Encounter  Emergency Medicine Resident     Pt Name: Major League  MRN: [id number]  Catalin[place]karin 1980  Date of evaluation: 7/17/22  PCP:  Avi Alcaraz MD    [facility]       Chief Complaint   Patient presents with    G Tube Complications     Removed by pt, noticed at [phone] this morning by staff, unknown size        HISTORY OFPRESENT ILLNESS  (Location/Symptom, Timing/Onset, Context/Setting, Quality, Duration, Modifying Factors,Severity.)      Ronny Rosa is a 39 y.o. female with past medical history significant for MRDD, nonverbal at baseline, has a G-tube at baseline which was accidentally removed or pulled out per facility. Patient is presents via EMS, they were unable to find the G-tube at the facility. Patient was sent here for replacement. They have no other complaints. She is nonverbal and therefore HPI is limited. PAST MEDICAL / SURGICAL / SOCIAL / FAMILY HISTORY      has a past medical history of Aphasia, Aphasia, Contracture of joint, lower leg, Depressed, HTN (hypertension), Hx MRSA infection, Hydronephrosis, Movement disorder, Multiple sclerosis (HCC), Neurogenic bladder, Non-verbal learning disorder, Peripheral vascular disease ([address] Polyneuropathy, Pressure ulcer, Pulmonary embolism ([address] Pulmonary infarction ([address] Quadriplegia ([address] Tachycardia, and UTI (lower urinary tract infection). has a past surgical history that includes Gastrostomy tube placement; colostomy; amputation (Right); Leg amputation through femur; and IR GUIDED NEPHROSTOMY CATH PLACEMENT RIGHT (9/27/2020). Social:  reports that she has never smoked. She has never used smokeless tobacco. She reports that she does not drink alcohol and does not use drugs. Family Hx:   Family History   Problem Relation Age of Onset    No Known Problems Mother     No Known Problems Father         Allergies:  Patient has no known allergies.     Home Medications:  Prior to Admission medications    Medication Sig Start Date End Date Taking? Authorizing Provider   atropine 1 % ophthalmic solution 3 drops every 4 hours as needed Give 3 drops sublingually for secretion    Historical Provider, MD   metoprolol tartrate (LOPRESSOR) 25 MG tablet Take 25 mg by mouth three times daily    Historical Provider, MD   miconazole (MICOTIN) 2 % powder Apply topically 2 times daily. 1/25/20   Liset Bee MD   midodrine (PROAMATINE) 5 MG tablet Take 1 tablet by mouth 2 times daily (with meals) 1/25/20   Liset Bee MD   acetaminophen-codeine (TYLENOL/CODEINE #3) 300-30 MG per tablet Take 1 tablet by mouth every 8 hours as needed for Pain.     Historical Provider, MD   D-Mannose 500 MG CAPS Take 3 capsules by mouth three times daily    Historical Provider, MD   atropine 1 % ophthalmic solution 2-4 drops every 4 hours as needed    Historical Provider, MD   metoclopramide (REGLAN) 5 MG/5ML solution Take 5 mLs by mouth 3 times daily 8/2/19   Ramiro [place] P Blood, DO   rivaroxaban (XARELTO) 20 MG TABS tablet Take 1 tablet by mouth daily 5/24/19   Hallie Kelley MD   sertraline (ZOLOFT) 25 MG tablet 1 tablet by Per G Tube route daily 5/24/19   Hallie Kelley MD   docusate (COLACE) 50 MG/5ML liquid Take 10 mLs by mouth daily 5/24/19   Hallie Kelley MD   Multiple Vitamins-Minerals (CENTRUM/CERTA-DERICK WITH MINERALS ORAL) solution 15 mLs by Per G Tube route daily 5/24/19   Hallie Kelley MD   lansoprazole (PREVACID SOLUTAB) 30 MG disintegrating tablet 1 tablet by Per G Tube route every morning (before breakfast) 5/24/19   Hallie Kelley MD   Cranberry 250 MG CAPS Take 250 mg by mouth 2 times daily    Historical Provider, MD   Scopolamine Base (SCOPOLAMINE TD) Place 1 mg onto the skin every 72 hours     Historical Provider, MD   baclofen (LIORESAL) 10 MG tablet 10 mg by Per G Tube route 3 times daily Crush into G tube     Historical Provider, MD   gabapentin (NEURONTIN) 300 MG capsule 300 mg by Per G Tube route 3 times daily    Historical Provider, MD   medroxyPROGESTERone (DEPO-PROVERA) 150 MG/ML injection Inject 150 mg into the muscle every 3 months On the 11th     Historical Provider, MD   acetaminophen (TYLENOL) 80 MG/0.8ML suspension 650 mg by Per G Tube route every 6 hours as needed for Fever or Pain     Historical Provider, MD   glycopyrrolate (ROBINUL) 1 MG tablet Take 1 mg by mouth 3 times daily     Historical Provider, MD       REVIEW OFSYSTEMS    (2-9 systems for level 4, 10 or more for level 5)      UnAble to obtain due to patient being nonverbal at baseline. PHYSICAL EXAM   (up to 7 for level 4, 8 or more forlevel 5)      INITIAL VITALS:   Vitals:    07/17/22 1132   BP:    Pulse:    Resp: 20   Temp: 97.3 °F (36.3 °C)   SpO2:         Physical Exam  Constitutional:       General: She is not in acute distress. Appearance: She is not ill-appearing, toxic-appearing or diaphoretic. HENT:      Head: Normocephalic and atraumatic. Nose: Nose normal.      Mouth/Throat:      Mouth: Mucous membranes are moist.      Pharynx: Oropharynx is clear. Eyes:      General:         Right eye: No discharge. Left eye: No discharge. Extraocular Movements: Extraocular movements intact. Pupils: Pupils are equal, round, and reactive to light. Cardiovascular:      Rate and Rhythm: Normal rate and regular rhythm. Pulses: Normal pulses. Heart sounds: Normal heart sounds. Pulmonary:      Effort: Pulmonary effort is normal. No respiratory distress. Breath sounds: Normal breath sounds. Abdominal:      Comments: Soft, nondistended, nonperitoneal, patient notable for protuberant abdomen although colostomy bag is in place, clean dry and intact, stoma from previous G-tube is open, nonbleeding, no trauma noted, does appear to have granulation type tissue. Well-healed. Musculoskeletal:      Cervical back: Normal range of motion. No rigidity.       Right lower

## 2022-08-02 ENCOUNTER — HOSPITAL ENCOUNTER (OUTPATIENT)
Age: 42
Setting detail: SPECIMEN
Discharge: HOME | End: 2022-08-02
Payer: MEDICARE

## 2022-08-02 LAB
-: ABNORMAL
ALBUMIN SERPL-MCNC: 4 G/DL (ref 3.5–5.2)
ALP BLD-CCNC: 79 U/L (ref 35–104)
ALT SERPL-CCNC: 29 U/L (ref 5–33)
AMORPHOUS: ABNORMAL
ANION GAP SERPL CALCULATED.3IONS-SCNC: 10 MMOL/L (ref 9–17)
AST SERPL-CCNC: 21 U/L
BACTERIA: ABNORMAL
BILIRUB SERPL-MCNC: 0.25 MG/DL (ref 0.3–1.2)
BILIRUBIN URINE: NEGATIVE
BUN BLDV-MCNC: 12 MG/DL (ref 6–20)
BUN/CREAT BLD: ABNORMAL (ref 9–20)
CALCIUM SERPL-MCNC: 9.6 MG/DL (ref 8.6–10.4)
CASTS UA: ABNORMAL /LPF (ref 0–2)
CASTS UA: ABNORMAL /LPF (ref 0–2)
CHLORIDE BLD-SCNC: 100 MMOL/L (ref 98–107)
CO2: 28 MMOL/L (ref 20–31)
COLOR: YELLOW
CREAT SERPL-MCNC: <0.4 MG/DL (ref 0.5–0.9)
CRYSTALS, UA: ABNORMAL /HPF
CRYSTALS, UA: ABNORMAL /HPF
EPITHELIAL CELLS UA: ABNORMAL /HPF (ref 0–5)
GFR AFRICAN AMERICAN: ABNORMAL ML/MIN
GFR NON-AFRICAN AMERICAN: ABNORMAL ML/MIN
GFR SERPL CREATININE-BSD FRML MDRD: ABNORMAL ML/MIN/{1.73_M2}
GLUCOSE BLD-MCNC: 65 MG/DL (ref 70–99)
GLUCOSE URINE: NEGATIVE
HCT VFR BLD CALC: 48.5 % (ref 36.3–47.1)
HEMOGLOBIN: 14.5 G/DL (ref 11.9–15.1)
KETONES, URINE: NEGATIVE
LEUKOCYTE ESTERASE, URINE: ABNORMAL
MCH RBC QN AUTO: 26.8 PG (ref 25.2–33.5)
MCHC RBC AUTO-ENTMCNC: 29.9 G/DL (ref 28.4–34.8)
MCV RBC AUTO: 89.6 FL (ref 82.6–102.9)
NITRITE, URINE: NEGATIVE
NRBC AUTOMATED: 0 PER 100 WBC
PDW BLD-RTO: 15.1 % (ref 11.8–14.4)
PH UA: 7.5 (ref 5–8)
PLATELET # BLD: 371 K/UL (ref 138–453)
PMV BLD AUTO: 10.6 FL (ref 8.1–13.5)
POTASSIUM SERPL-SCNC: 4 MMOL/L (ref 3.7–5.3)
PROTEIN UA: ABNORMAL
RBC # BLD: 5.41 M/UL (ref 3.95–5.11)
RBC UA: ABNORMAL /HPF (ref 0–2)
SODIUM BLD-SCNC: 138 MMOL/L (ref 135–144)
SPECIFIC GRAVITY UA: 1.01 (ref 1–1.03)
TOTAL PROTEIN: 8.9 G/DL (ref 6.4–8.3)
TURBIDITY: ABNORMAL
URINE HGB: ABNORMAL
UROBILINOGEN, URINE: NORMAL
WBC # BLD: 9.2 K/UL (ref 3.5–11.3)
WBC UA: ABNORMAL /HPF (ref 0–5)

## 2022-08-02 PROCEDURE — 81001 URINALYSIS AUTO W/SCOPE: CPT

## 2022-08-02 PROCEDURE — 36415 COLL VENOUS BLD VENIPUNCTURE: CPT

## 2022-08-02 PROCEDURE — 87086 URINE CULTURE/COLONY COUNT: CPT

## 2022-08-02 PROCEDURE — 86403 PARTICLE AGGLUT ANTBDY SCRN: CPT

## 2022-08-02 PROCEDURE — 80053 COMPREHEN METABOLIC PANEL: CPT

## 2022-08-02 PROCEDURE — 87077 CULTURE AEROBIC IDENTIFY: CPT

## 2022-08-02 PROCEDURE — P9603 ONE-WAY ALLOW PRORATED MILES: HCPCS

## 2022-08-02 PROCEDURE — 87186 SC STD MICRODIL/AGAR DIL: CPT

## 2022-08-02 PROCEDURE — 85027 COMPLETE CBC AUTOMATED: CPT

## 2022-08-05 LAB
CULTURE: ABNORMAL
Lab: ABNORMAL
SPECIMEN DESCRIPTION: ABNORMAL

## 2022-10-04 ENCOUNTER — HOSPITAL ENCOUNTER (OUTPATIENT)
Age: 42
Setting detail: SPECIMEN
Discharge: HOME | End: 2022-10-04
Payer: MEDICARE

## 2022-10-04 LAB
ALBUMIN SERPL-MCNC: 3.7 G/DL (ref 3.5–5.2)
ALP BLD-CCNC: 70 U/L (ref 35–104)
ALT SERPL-CCNC: 22 U/L (ref 5–33)
ANION GAP SERPL CALCULATED.3IONS-SCNC: 8 MMOL/L (ref 9–17)
AST SERPL-CCNC: 17 U/L
BILIRUB SERPL-MCNC: 0.2 MG/DL (ref 0.3–1.2)
BUN BLDV-MCNC: 11 MG/DL (ref 6–20)
BUN/CREAT BLD: ABNORMAL (ref 9–20)
CALCIUM SERPL-MCNC: 9.5 MG/DL (ref 8.6–10.4)
CHLORIDE BLD-SCNC: 102 MMOL/L (ref 98–107)
CO2: 28 MMOL/L (ref 20–31)
CREAT SERPL-MCNC: <0.4 MG/DL (ref 0.5–0.9)
GFR SERPL CREATININE-BSD FRML MDRD: ABNORMAL ML/MIN/1.73M2
GLUCOSE BLD-MCNC: 80 MG/DL (ref 70–99)
HCT VFR BLD CALC: 45.4 % (ref 36.3–47.1)
HEMOGLOBIN: 14 G/DL (ref 11.9–15.1)
MCH RBC QN AUTO: 26.9 PG (ref 25.2–33.5)
MCHC RBC AUTO-ENTMCNC: 30.8 G/DL (ref 28.4–34.8)
MCV RBC AUTO: 87.3 FL (ref 82.6–102.9)
NRBC AUTOMATED: 0 PER 100 WBC
PDW BLD-RTO: 14.4 % (ref 11.8–14.4)
PLATELET # BLD: 347 K/UL (ref 138–453)
PMV BLD AUTO: 9.6 FL (ref 8.1–13.5)
POTASSIUM SERPL-SCNC: 4.1 MMOL/L (ref 3.7–5.3)
RBC # BLD: 5.2 M/UL (ref 3.95–5.11)
SODIUM BLD-SCNC: 138 MMOL/L (ref 135–144)
TOTAL PROTEIN: 8.6 G/DL (ref 6.4–8.3)
WBC # BLD: 8.1 K/UL (ref 3.5–11.3)

## 2022-10-04 PROCEDURE — P9603 ONE-WAY ALLOW PRORATED MILES: HCPCS

## 2022-10-04 PROCEDURE — 85027 COMPLETE CBC AUTOMATED: CPT

## 2022-10-04 PROCEDURE — 36415 COLL VENOUS BLD VENIPUNCTURE: CPT

## 2022-10-04 PROCEDURE — 80053 COMPREHEN METABOLIC PANEL: CPT

## 2022-11-04 ENCOUNTER — APPOINTMENT (OUTPATIENT)
Dept: GENERAL RADIOLOGY | Age: 42
End: 2022-11-04
Payer: COMMERCIAL

## 2022-11-04 ENCOUNTER — HOSPITAL ENCOUNTER (EMERGENCY)
Age: 42
Discharge: HOME | End: 2022-11-04
Attending: EMERGENCY MEDICINE
Payer: COMMERCIAL

## 2022-11-04 VITALS
WEIGHT: 150 LBS | RESPIRATION RATE: 18 BRPM | DIASTOLIC BLOOD PRESSURE: 74 MMHG | HEART RATE: 96 BPM | BODY MASS INDEX: 29.29 KG/M2 | SYSTOLIC BLOOD PRESSURE: 106 MMHG | TEMPERATURE: 99.1 F | OXYGEN SATURATION: 94 %

## 2022-11-04 DIAGNOSIS — K94.20 COMPLICATION OF GASTROSTOMY TUBE (HCC): Primary | ICD-10-CM

## 2022-11-04 PROCEDURE — 49465 FLUORO EXAM OF G/COLON TUBE: CPT

## 2022-11-04 PROCEDURE — 99283 EMERGENCY DEPT VISIT LOW MDM: CPT

## 2022-11-04 PROCEDURE — 43762 RPLC GTUBE NO REVJ TRC: CPT

## 2022-11-04 PROCEDURE — 6360000004 HC RX CONTRAST MEDICATION: Performed by: STUDENT IN AN ORGANIZED HEALTH CARE EDUCATION/TRAINING PROGRAM

## 2022-11-04 RX ADMIN — DIATRIZOATE MEGLUMINE AND DIATRIZOATE SODIUM 30 ML: 660; 100 LIQUID ORAL; RECTAL at 13:03

## 2022-11-04 NOTE — ED NOTES
Attempted to flush gtube with warm water per Dr. Loura Aschoff. Not successful, resident notified.      Ryan White RN  11/04/22 2207

## 2022-11-04 NOTE — ED PROVIDER NOTES
[address] [facility] [place]     Emergency Department     Faculty Note/ Attestation      Pt Name: Radha Alvarez                                       MRN: [id number]  [place] 1980  Date of evaluation: 11/4/2022    Patients PCP:    Lou May MD    Attestation  I performed a history and physical examination of the patient and discussed management with the resident. I reviewed the residents note and agree with the documented findings and plan of care. Any areas of disagreement are noted on the chart. I was personally present for the key portions of any procedures. I have documented in the chart those procedures where I was not present during the key portions. I have reviewed the emergency nurses triage note. I agree with the chief complaint, past medical history, past surgical history, allergies, medications, social and family history as documented unless otherwise noted below. For Physician Assistant/ Nurse Practitioner cases/documentation I have personally evaluated this patient and have completed at least one if not all key elements of the E/M (history, physical exam, and MDM). Additional findings are as noted. Initial Screens:             Vitals:    Vitals:    11/04/22 1039 11/04/22 1040   Pulse: 96    Temp:  99.1 °F (37.3 °C)   TempSrc:  Oral   SpO2: 97%        CHIEF COMPLAINT       Chief Complaint   Patient presents with    G Tube Complications     Clogged gtube       25-year-old female who takes feedings regularly however since this morning her G-tube has been clogged unable to feed and when to take her feeds.   Attempts have been made with water with coke and the device to extrude however unable to reach the occlusion this will be removed and replaced        EMERGENCY DEPARTMENT COURSE:     -------------------------   , Temp: 99.1 °F (37.3 °C), Heart Rate: 96,    No redness warmth erythema around the G-tube    Comments  Tube replaced and functional    ED Course as of 11/04/22 1119 Fri Nov 04, 2022   1117 Attempted [employer] G-tube flush, not successful. Subsequently attempted Bionix Declogger, which was unfortunately too short. Will replace G-tube [JT]      ED Course User Index  [JT] MD Andriy Mcrae DO,, DO, RDMS.   Attending Emergency Physician         Andriy Rivas DO  11/04/22 3405

## 2022-11-04 NOTE — DISCHARGE INSTRUCTIONS
You were seen in the ER because your G-tube was clogged. We replaced her G-tube with a smaller one. You should follow-up with your primary care doctor or the doctor that placed the G-tube if you have any issues with it. Come back to the ER if it clogs again. Taurus Workman!!!    From [facility] Emergency Department    On behalf of the Emergency Department staff at [facility] Emergency Department, I would like to thank you for giving [facility] the opportunity to address your health care needs and concerns. We hope that during your visit, our service was delivered in a professional and caring manner. Please keep [facility] in mind as we walk with you down the path to your own personal wellness. Please expect an automated phone call from [phone] so we can ask a few questions about your health and progress. Based on your answers, a clinician may call you back to offer help and instructions. If you notice any concerning symptoms please return to the ER immediately. These can include but are not limited to: fevers, chills, shortness of breath, vomiting, weakness of the extremities, changes in your mental status, numbness, pale extremities, or chest pain.

## 2022-11-04 NOTE — ED NOTES
Pt arrives to ED by EMS from [facility] for clogged gtube. It is unknown how long gtube has been clogged, was first realized this morning. Pt has hx of MS. Pt has gtube, omer catheter, and colostomy. Pt L leg amputated, R leg is contractured. Pt alert and nonverbal. Dr. Tala Sandra at bedside.       Ryan White RN  11/04/22 [id number]

## 2022-11-04 NOTE — ED NOTES
Attempted to flush coke through patient's g-tube to resolve clog with no success. Resident aware. Resident attempted to unclog g-tube with device with no success. Resident to replace g-tube.      Damian Wilkins RN  11/04/22 [id number]

## 2022-11-04 NOTE — ED PROVIDER NOTES
[facility] ED  Emergency Department Encounter  Emergency Medicine Resident     Pt Name: Kate Merino  MRN: [id number]  [place] 1980  Date of evaluation: 11/4/22  PCP:  aPpa Joyce MD    [address] [place] [address]       Chief Complaint   Patient presents with    G Tube Complications     Clogged gtube       HISTORY OFPRESENT ILLNESS  (Location/Symptom, Timing/Onset, Context/Setting, Quality, Duration, Modifying Factors,Severity.)      Kate Merino is a 43 y.o. female who presents from [facility] due to clogged G-tube. She is nonverbal at baseline and uses gastrostomy tube for continuous feeds. According to chart review, it appears that she has had G-tube clogs frequently. Unable to reach staff members at [address] [place] [address] for further clarification. She is unable to answer review of system questions due to being nonverbal and is not accompanied by anyone from her living facility. PAST MEDICAL / SURGICAL / SOCIAL / FAMILY HISTORY      has a past medical history of Aphasia, Aphasia, Contracture of joint, lower leg, Depressed, HTN (hypertension), Hx MRSA infection, Hydronephrosis, Movement disorder, Multiple sclerosis (HCC), Neurogenic bladder, Non-verbal learning disorder, Peripheral vascular disease ([address] Polyneuropathy, Pressure ulcer, Pulmonary embolism ([address] Pulmonary infarction ([address] Quadriplegia ([address] Tachycardia, and UTI (lower urinary tract infection). has a past surgical history that includes Gastrostomy tube placement; colostomy; amputation (Right); Leg amputation through femur; and IR GUIDED NEPHROSTOMY CATH PLACEMENT RIGHT (9/27/2020). Social:  reports that she has never smoked. She has never used smokeless tobacco. She reports that she does not drink alcohol and does not use drugs. Family Hx:   Family History   Problem Relation Age of Onset    No Known Problems Mother     No Known Problems Father         Allergies:  Patient has no known allergies.     Home Medications:  Prior to Admission medications    Medication Sig Start Date End Date Taking? Authorizing Provider   atropine 1 % ophthalmic solution 3 drops every 4 hours as needed Give 3 drops sublingually for secretion    Historical Provider, MD   metoprolol tartrate (LOPRESSOR) 25 MG tablet Take 25 mg by mouth three times daily    Historical Provider, MD   miconazole (MICOTIN) 2 % powder Apply topically 2 times daily. 1/25/20   Angie Brown MD   midodrine (PROAMATINE) 5 MG tablet Take 1 tablet by mouth 2 times daily (with meals) 1/25/20   Angie Brown MD   acetaminophen-codeine (TYLENOL/CODEINE #3) 300-30 MG per tablet Take 1 tablet by mouth every 8 hours as needed for Pain.     Historical Provider, MD   D-Mannose 500 MG CAPS Take 3 capsules by mouth three times daily    Historical Provider, MD   atropine 1 % ophthalmic solution 2-4 drops every 4 hours as needed    Historical Provider, MD   metoclopramide (REGLAN) 5 MG/5ML solution Take 5 mLs by mouth 3 times daily 8/2/19   Suella More P Blood, DO   rivaroxaban (XARELTO) 20 MG TABS tablet Take 1 tablet by mouth daily 5/24/19   Mary Jo Pearson MD   sertraline (ZOLOFT) 25 MG tablet 1 tablet by Per G Tube route daily 5/24/19   Mary Jo Pearson MD   docusate (COLACE) 50 MG/5ML liquid Take 10 mLs by mouth daily 5/24/19   Mary Jo Pearson MD   Multiple Vitamins-Minerals (CENTRUM/CERTA-DERICK WITH MINERALS ORAL) solution 15 mLs by Per G Tube route daily 5/24/19   Mary Jo Pearson MD   lansoprazole (PREVACID SOLUTAB) 30 MG disintegrating tablet 1 tablet by Per G Tube route every morning (before breakfast) 5/24/19   Mary Jo Pearson MD   Cranberry 250 MG CAPS Take 250 mg by mouth 2 times daily    Historical Provider, MD   Scopolamine Base (SCOPOLAMINE TD) Place 1 mg onto the skin every 72 hours     Historical Provider, MD   baclofen (LIORESAL) 10 MG tablet 10 mg by Per G Tube route 3 times daily Crush into G tube     Historical Provider, MD   gabapentin (NEURONTIN) 300 MG capsule 300 mg by Per G Tube route 3 times daily    Historical Provider, MD   medroxyPROGESTERone (DEPO-PROVERA) 150 MG/ML injection Inject 150 mg into the muscle every 3 months On the 11th     Historical Provider, MD   acetaminophen (TYLENOL) 80 MG/0.8ML suspension 650 mg by Per G Tube route every 6 hours as needed for Fever or Pain     Historical Provider, MD   glycopyrrolate (ROBINUL) 1 MG tablet Take 1 mg by mouth 3 times daily     Historical Provider, MD       REVIEW OFSYSTEMS    (2-9 systems for level 4, 10 or more for level 5)      Review of Systems   Unable to perform ROS: Patient nonverbal     PHYSICAL EXAM   (up to 7 for level 4, 8 or more forlevel 5)      INITIAL VITALS:   Vitals:    11/04/22 1202   BP: 106/74   Pulse:    Resp:    Temp:    SpO2: 94%    /74   Pulse 96   Temp 99.1 °F (37.3 °C) (Oral)   Resp 18   Wt 150 lb (68 kg)   SpO2 94%   BMI 29.29 kg/m²       Physical Exam  Vitals and nursing note reviewed. Constitutional:       General: She is not in acute distress. Appearance: Normal appearance. She is not ill-appearing or diaphoretic. HENT:      Head: Normocephalic. Nose: Nose normal.      Mouth/Throat:      Mouth: Mucous membranes are moist.      Pharynx: Oropharynx is clear. Eyes:      Extraocular Movements: Extraocular movements intact. Conjunctiva/sclera: Conjunctivae normal.      Pupils: Pupils are equal, round, and reactive to light. Cardiovascular:      Rate and Rhythm: Normal rate and regular rhythm. Pulses: Normal pulses. Heart sounds: Normal heart sounds. Pulmonary:      Effort: Pulmonary effort is normal. No respiratory distress. Breath sounds: Normal breath sounds. No wheezing or rales. Chest:      Chest wall: No tenderness. Abdominal:      General: A surgical scar is present. There is no distension. Palpations: Abdomen is soft. Tenderness: There is no abdominal tenderness. There is no guarding or rebound. Musculoskeletal:         General: Normal range of motion. Cervical back: Normal range of motion and neck supple. Skin:     General: Skin is warm. Capillary Refill: Capillary refill takes less than 2 seconds. Neurological:      General: No focal deficit present. Mental Status: She is alert and oriented to person, place, and time. MEDICAL DECISION MAKING     Initial MDM/Plan: 43 y.o. female who presents with clogged G-tube, patient is nonverbal and uses G-tube for continuous feeds. On chart review, this is happened frequently before. Vital signs reviewed and within normal limits. Physical examination as above, showing very well-appearing female in no acute distress, abdomen soft, nondistended nontender. 17F G-tube is in place but unable to be flushed with plain water. Plan to attempt passing Coca-Cola through the G-tube, if this does not work then we will replace the G-tube. --  16 Western Lily G-tube was replaced with a 12 Western Lily (we did not have a 16 Western Lily available) without difficulty. Water was able to be flushed through the G-tube. Plan for discharge. Joanie Coma Scale  Eye Opening: Spontaneous  Best Verbal Response: Oriented  Best Motor Response: Obeys commands  Joanie Coma Scale Score: 15    DIAGNOSTIC RESULTS / EMERGENCYDEPARTMENT COURSE / MDM     LABS:  Labs Reviewed - No data to display      RADIOLOGY:  XR INJ CONTRAST GASTRIC TUBE PERC   Final Result   Satisfactory position of the gastrostomy tube well within the stomach. EMERGENCY DEPARTMENT COURSE:  ED Course as of 11/08/22 1422   Fri Nov 04, 2022   1117 Attempted Coca-Cola G-tube flush, not successful. Subsequently attempted Bionix Declogger, which was unfortunately too short. Will replace G-tube [JT]      ED Course User Index  [JT] Pilar Harrison MD        PROCEDURES:  None    CONSULTS:  None      FINAL IMPRESSION      1.  Complication of gastrostomy tube ([address]          DISPOSITION / PLAN DISPOSITION Decision To Discharge 11/04/2022 01:46:27 PM      PATIENT REFERRED TO:  Shaan No MD  [address] [place] [id number] [phone]    In 1 week      [facility]  [address] [place] [address]  [phone]  Go to   If symptoms worsen    DISCHARGE MEDICATIONS:  Discharge Medication List as of 11/4/2022  1:47 PM          Tejas Santa MD  Emergency Medicine Resident    (Please note that portions of this note were completed with a voice recognition program.Efforts were made to edit the dictations but occasionally words are mis-transcribed.)        Tejas Santa MD  Resident  11/08/22 [id number]

## 2022-12-03 ENCOUNTER — APPOINTMENT (OUTPATIENT)
Dept: CT IMAGING | Age: 42
DRG: 853 | End: 2022-12-03
Payer: MEDICARE

## 2022-12-03 ENCOUNTER — APPOINTMENT (OUTPATIENT)
Dept: GENERAL RADIOLOGY | Age: 42
DRG: 853 | End: 2022-12-03
Payer: MEDICARE

## 2022-12-03 ENCOUNTER — ANESTHESIA (OUTPATIENT)
Dept: OPERATING ROOM | Age: 42
End: 2022-12-03
Payer: COMMERCIAL

## 2022-12-03 ENCOUNTER — ANESTHESIA EVENT (OUTPATIENT)
Dept: OPERATING ROOM | Age: 42
End: 2022-12-03
Payer: COMMERCIAL

## 2022-12-03 ENCOUNTER — HOSPITAL ENCOUNTER (INPATIENT)
Age: 42
LOS: 8 days | Discharge: NURSING, REHAB OR LONG-TERM CARE | DRG: 853 | End: 2022-12-11
Attending: EMERGENCY MEDICINE | Admitting: FAMILY MEDICINE
Payer: MEDICARE

## 2022-12-03 DIAGNOSIS — R41.82 ALTERED MENTAL STATUS, UNSPECIFIED ALTERED MENTAL STATUS TYPE: ICD-10-CM

## 2022-12-03 DIAGNOSIS — N17.9 AKI (ACUTE KIDNEY INJURY) (HCC): ICD-10-CM

## 2022-12-03 DIAGNOSIS — J96.00 ACUTE RESPIRATORY FAILURE, UNSPECIFIED WHETHER WITH HYPOXIA OR HYPERCAPNIA (HCC): ICD-10-CM

## 2022-12-03 DIAGNOSIS — R65.21 SEPTIC SHOCK (HCC): Primary | ICD-10-CM

## 2022-12-03 DIAGNOSIS — A41.9 SEPTIC SHOCK (HCC): Primary | ICD-10-CM

## 2022-12-03 LAB
ABSOLUTE EOS #: 0 K/UL (ref 0–0.4)
ABSOLUTE IMMATURE GRANULOCYTE: 1.44 K/UL (ref 0–0.3)
ABSOLUTE LYMPH #: 2.89 K/UL (ref 1–4.8)
ABSOLUTE MONO #: 2.53 K/UL (ref 0.1–0.8)
ALBUMIN SERPL-MCNC: 3 G/DL (ref 3.5–5.2)
ALBUMIN/GLOBULIN RATIO: 0.6 (ref 1–2.5)
ALLEN TEST: POSITIVE
ALP BLD-CCNC: 141 U/L (ref 35–104)
ALT SERPL-CCNC: 39 U/L (ref 5–33)
ANION GAP SERPL CALCULATED.3IONS-SCNC: 15 MMOL/L (ref 9–17)
ANION GAP: 11 MMOL/L (ref 7–16)
AST SERPL-CCNC: 58 U/L
BACTERIA: ABNORMAL
BASOPHILS # BLD: 0 % (ref 0–2)
BASOPHILS ABSOLUTE: 0 K/UL (ref 0–0.2)
BILIRUB SERPL-MCNC: 0.4 MG/DL (ref 0.3–1.2)
BILIRUBIN URINE: NEGATIVE
BUN BLDV-MCNC: 48 MG/DL (ref 6–20)
CALCIUM SERPL-MCNC: 8.9 MG/DL (ref 8.6–10.4)
CHLORIDE BLD-SCNC: 96 MMOL/L (ref 98–107)
CO2: 19 MMOL/L (ref 20–31)
COLOR: YELLOW
CREAT SERPL-MCNC: 2.24 MG/DL (ref 0.5–0.9)
EGFR, POC: 19 ML/MIN/1.73M2
EOSINOPHILS RELATIVE PERCENT: 0 % (ref 1–4)
EPITHELIAL CELLS UA: ABNORMAL /HPF (ref 0–5)
FIO2: 60
GFR SERPL CREATININE-BSD FRML MDRD: 27 ML/MIN/1.73M2
GLUCOSE BLD-MCNC: 144 MG/DL (ref 70–99)
GLUCOSE BLD-MCNC: 153 MG/DL (ref 74–100)
GLUCOSE BLD-MCNC: 182 MG/DL (ref 65–105)
GLUCOSE URINE: NEGATIVE
HCO3 VENOUS: 20.4 MMOL/L (ref 22–29)
HCO3, MIXED: 14.1 MMOL/L (ref 23–29)
HCT VFR BLD CALC: 40.5 % (ref 36.3–47.1)
HEMOGLOBIN: 12.3 G/DL (ref 11.9–15.1)
IMMATURE GRANULOCYTES: 4 %
INR BLD: 1.3
KETONES, URINE: NEGATIVE
LACTIC ACID, SEPSIS WHOLE BLOOD: 1.6 MMOL/L (ref 0.5–1.9)
LEUKOCYTE ESTERASE, URINE: ABNORMAL
LYMPHOCYTES # BLD: 8 % (ref 24–44)
MCH RBC QN AUTO: 27.2 PG (ref 25.2–33.5)
MCHC RBC AUTO-ENTMCNC: 30.4 G/DL (ref 28.4–34.8)
MCV RBC AUTO: 89.4 FL (ref 82.6–102.9)
MODE: ABNORMAL
MONOCYTES # BLD: 7 % (ref 1–7)
MORPHOLOGY: ABNORMAL
MORPHOLOGY: ABNORMAL
NEGATIVE BASE EXCESS, MIXED: 13 (ref 0–2)
NEGATIVE BASE EXCESS, VEN: 7 (ref 0–2)
NITRITE, URINE: NEGATIVE
NRBC AUTOMATED: 0 PER 100 WBC
O2 DEVICE/FLOW/%: ABNORMAL
O2 SAT, MIXED: 85 % (ref 60–80)
O2 SAT, VEN: 98 % (ref 60–85)
PCO2 MIXED: 35.5 MM HG (ref 42–52)
PCO2, VEN: 46.7 MM HG (ref 41–51)
PDW BLD-RTO: 15.9 % (ref 11.8–14.4)
PH UA: 8 (ref 5–8)
PH VENOUS: 7.25 (ref 7.32–7.43)
PH, MIXED: 7.21 (ref 7.31–7.41)
PLATELET # BLD: 188 K/UL (ref 138–453)
PMV BLD AUTO: 10.7 FL (ref 8.1–13.5)
PO2 MIXED: 60.7 MM HG (ref 35–45)
PO2, VEN: 130.2 MM HG (ref 30–50)
POC BUN: 54 MG/DL (ref 8–26)
POC CHLORIDE: 103 MMOL/L (ref 98–107)
POC CREATININE: 3.08 MG/DL (ref 0.51–1.19)
POC HEMATOCRIT: 45 % (ref 36–46)
POC HEMOGLOBIN: 15.3 G/DL (ref 12–16)
POC IONIZED CALCIUM: 1.19 MMOL/L (ref 1.15–1.33)
POC LACTIC ACID: 1.36 MMOL/L (ref 0.56–1.39)
POC POTASSIUM: 3.6 MMOL/L (ref 3.5–4.5)
POC SODIUM: 133 MMOL/L (ref 138–146)
POC TCO2: 20 MMOL/L (ref 22–30)
POTASSIUM SERPL-SCNC: 4 MMOL/L (ref 3.7–5.3)
PROCALCITONIN: >100 NG/ML
PROTEIN UA: ABNORMAL
PROTHROMBIN TIME: 13.8 SEC (ref 9.1–12.3)
RBC # BLD: 4.53 M/UL (ref 3.95–5.11)
RBC UA: ABNORMAL /HPF (ref 0–2)
SAMPLE SITE: ABNORMAL
SEG NEUTROPHILS: 81 % (ref 36–66)
SEGMENTED NEUTROPHILS ABSOLUTE COUNT: 29.24 K/UL (ref 1.8–7.7)
SODIUM BLD-SCNC: 130 MMOL/L (ref 135–144)
SPECIFIC GRAVITY UA: 1.01 (ref 1–1.03)
TOTAL PROTEIN: 8.2 G/DL (ref 6.4–8.3)
TURBIDITY: ABNORMAL
URINE HGB: ABNORMAL
UROBILINOGEN, URINE: NORMAL
WBC # BLD: 36.1 K/UL (ref 3.5–11.3)
WBC UA: ABNORMAL /HPF (ref 0–5)

## 2022-12-03 PROCEDURE — 0BH18EZ INSERTION OF ENDOTRACHEAL AIRWAY INTO TRACHEA, VIA NATURAL OR ARTIFICIAL OPENING ENDOSCOPIC: ICD-10-PCS | Performed by: HEALTH CARE PROVIDER

## 2022-12-03 PROCEDURE — 2500000003 HC RX 250 WO HCPCS: Performed by: STUDENT IN AN ORGANIZED HEALTH CARE EDUCATION/TRAINING PROGRAM

## 2022-12-03 PROCEDURE — 2580000003 HC RX 258: Performed by: STUDENT IN AN ORGANIZED HEALTH CARE EDUCATION/TRAINING PROGRAM

## 2022-12-03 PROCEDURE — 96365 THER/PROPH/DIAG IV INF INIT: CPT

## 2022-12-03 PROCEDURE — 51705 CHANGE OF BLADDER TUBE: CPT

## 2022-12-03 PROCEDURE — 87040 BLOOD CULTURE FOR BACTERIA: CPT

## 2022-12-03 PROCEDURE — 96375 TX/PRO/DX INJ NEW DRUG ADDON: CPT

## 2022-12-03 PROCEDURE — 0T768DZ DILATION OF RIGHT URETER WITH INTRALUMINAL DEVICE, VIA NATURAL OR ARTIFICIAL OPENING ENDOSCOPIC: ICD-10-PCS | Performed by: UROLOGY

## 2022-12-03 PROCEDURE — 85610 PROTHROMBIN TIME: CPT

## 2022-12-03 PROCEDURE — 87205 SMEAR GRAM STAIN: CPT

## 2022-12-03 PROCEDURE — 71045 X-RAY EXAM CHEST 1 VIEW: CPT

## 2022-12-03 PROCEDURE — 6360000002 HC RX W HCPCS: Performed by: STUDENT IN AN ORGANIZED HEALTH CARE EDUCATION/TRAINING PROGRAM

## 2022-12-03 PROCEDURE — 82803 BLOOD GASES ANY COMBINATION: CPT

## 2022-12-03 PROCEDURE — 2500000003 HC RX 250 WO HCPCS

## 2022-12-03 PROCEDURE — 87086 URINE CULTURE/COLONY COUNT: CPT

## 2022-12-03 PROCEDURE — 70450 CT HEAD/BRAIN W/O DYE: CPT

## 2022-12-03 PROCEDURE — 6370000000 HC RX 637 (ALT 250 FOR IP): Performed by: STUDENT IN AN ORGANIZED HEALTH CARE EDUCATION/TRAINING PROGRAM

## 2022-12-03 PROCEDURE — 94640 AIRWAY INHALATION TREATMENT: CPT

## 2022-12-03 PROCEDURE — 82330 ASSAY OF CALCIUM: CPT

## 2022-12-03 PROCEDURE — 2500000003 HC RX 250 WO HCPCS: Performed by: NURSE ANESTHETIST, CERTIFIED REGISTERED

## 2022-12-03 PROCEDURE — 94002 VENT MGMT INPAT INIT DAY: CPT

## 2022-12-03 PROCEDURE — C9113 INJ PANTOPRAZOLE SODIUM, VIA: HCPCS | Performed by: STUDENT IN AN ORGANIZED HEALTH CARE EDUCATION/TRAINING PROGRAM

## 2022-12-03 PROCEDURE — C2617 STENT, NON-COR, TEM W/O DEL: HCPCS | Performed by: UROLOGY

## 2022-12-03 PROCEDURE — 6360000002 HC RX W HCPCS: Performed by: HEALTH CARE PROVIDER

## 2022-12-03 PROCEDURE — 81001 URINALYSIS AUTO W/SCOPE: CPT

## 2022-12-03 PROCEDURE — 3600000012 HC SURGERY LEVEL 2 ADDTL 15MIN: Performed by: UROLOGY

## 2022-12-03 PROCEDURE — 2580000003 HC RX 258: Performed by: HEALTH CARE PROVIDER

## 2022-12-03 PROCEDURE — 2000000000 HC ICU R&B

## 2022-12-03 PROCEDURE — 2500000003 HC RX 250 WO HCPCS: Performed by: HEALTH CARE PROVIDER

## 2022-12-03 PROCEDURE — 87186 SC STD MICRODIL/AGAR DIL: CPT

## 2022-12-03 PROCEDURE — 99285 EMERGENCY DEPT VISIT HI MDM: CPT

## 2022-12-03 PROCEDURE — 87077 CULTURE AEROBIC IDENTIFY: CPT

## 2022-12-03 PROCEDURE — 85014 HEMATOCRIT: CPT

## 2022-12-03 PROCEDURE — 87641 MR-STAPH DNA AMP PROBE: CPT

## 2022-12-03 PROCEDURE — 5A1945Z RESPIRATORY VENTILATION, 24-96 CONSECUTIVE HOURS: ICD-10-PCS | Performed by: INTERNAL MEDICINE

## 2022-12-03 PROCEDURE — 2580000003 HC RX 258: Performed by: NURSE ANESTHETIST, CERTIFIED REGISTERED

## 2022-12-03 PROCEDURE — 84520 ASSAY OF UREA NITROGEN: CPT

## 2022-12-03 PROCEDURE — C1758 CATHETER, URETERAL: HCPCS | Performed by: UROLOGY

## 2022-12-03 PROCEDURE — 83605 ASSAY OF LACTIC ACID: CPT

## 2022-12-03 PROCEDURE — C1769 GUIDE WIRE: HCPCS | Performed by: UROLOGY

## 2022-12-03 PROCEDURE — 2709999900 HC NON-CHARGEABLE SUPPLY: Performed by: UROLOGY

## 2022-12-03 PROCEDURE — 3700000000 HC ANESTHESIA ATTENDED CARE: Performed by: UROLOGY

## 2022-12-03 PROCEDURE — 80053 COMPREHEN METABOLIC PANEL: CPT

## 2022-12-03 PROCEDURE — 0T2BX0Z CHANGE DRAINAGE DEVICE IN BLADDER, EXTERNAL APPROACH: ICD-10-PCS | Performed by: UROLOGY

## 2022-12-03 PROCEDURE — 80051 ELECTROLYTE PANEL: CPT

## 2022-12-03 PROCEDURE — 3209999900 FLUORO FOR SURGICAL PROCEDURES

## 2022-12-03 PROCEDURE — 2700000000 HC OXYGEN THERAPY PER DAY

## 2022-12-03 PROCEDURE — 02HV33Z INSERTION OF INFUSION DEVICE INTO SUPERIOR VENA CAVA, PERCUTANEOUS APPROACH: ICD-10-PCS

## 2022-12-03 PROCEDURE — 6360000002 HC RX W HCPCS

## 2022-12-03 PROCEDURE — 74176 CT ABD & PELVIS W/O CONTRAST: CPT

## 2022-12-03 PROCEDURE — 3700000001 HC ADD 15 MINUTES (ANESTHESIA): Performed by: UROLOGY

## 2022-12-03 PROCEDURE — 2580000003 HC RX 258: Performed by: UROLOGY

## 2022-12-03 PROCEDURE — 82565 ASSAY OF CREATININE: CPT

## 2022-12-03 PROCEDURE — 85025 COMPLETE CBC W/AUTO DIFF WBC: CPT

## 2022-12-03 PROCEDURE — 3600000002 HC SURGERY LEVEL 2 BASE: Performed by: UROLOGY

## 2022-12-03 PROCEDURE — 02HV33Z INSERTION OF INFUSION DEVICE INTO SUPERIOR VENA CAVA, PERCUTANEOUS APPROACH: ICD-10-PCS | Performed by: HEALTH CARE PROVIDER

## 2022-12-03 PROCEDURE — 82947 ASSAY GLUCOSE BLOOD QUANT: CPT

## 2022-12-03 PROCEDURE — 74018 RADEX ABDOMEN 1 VIEW: CPT

## 2022-12-03 PROCEDURE — 94761 N-INVAS EAR/PLS OXIMETRY MLT: CPT

## 2022-12-03 PROCEDURE — 84145 PROCALCITONIN (PCT): CPT

## 2022-12-03 PROCEDURE — 99223 1ST HOSP IP/OBS HIGH 75: CPT | Performed by: INTERNAL MEDICINE

## 2022-12-03 PROCEDURE — 87154 CUL TYP ID BLD PTHGN 6+ TRGT: CPT

## 2022-12-03 PROCEDURE — 36600 WITHDRAWAL OF ARTERIAL BLOOD: CPT

## 2022-12-03 DEVICE — URETERAL STENT
Type: IMPLANTABLE DEVICE | Site: URETER | Status: FUNCTIONAL
Brand: PERCUFLEX™ PLUS

## 2022-12-03 RX ORDER — NOREPINEPHRINE BIT/0.9 % NACL 16MG/250ML
1-100 INFUSION BOTTLE (ML) INTRAVENOUS CONTINUOUS
Status: DISCONTINUED | OUTPATIENT
Start: 2022-12-03 | End: 2022-12-07

## 2022-12-03 RX ORDER — ONDANSETRON 2 MG/ML
4 INJECTION INTRAMUSCULAR; INTRAVENOUS EVERY 6 HOURS PRN
Status: DISCONTINUED | OUTPATIENT
Start: 2022-12-03 | End: 2022-12-11 | Stop reason: HOSPADM

## 2022-12-03 RX ORDER — ACETAMINOPHEN 325 MG/1
650 TABLET ORAL EVERY 6 HOURS PRN
Status: DISCONTINUED | OUTPATIENT
Start: 2022-12-03 | End: 2022-12-11 | Stop reason: HOSPADM

## 2022-12-03 RX ORDER — SODIUM CHLORIDE 9 MG/ML
INJECTION, SOLUTION INTRAVENOUS CONTINUOUS PRN
Status: DISCONTINUED | OUTPATIENT
Start: 2022-12-03 | End: 2022-12-03 | Stop reason: SDUPTHER

## 2022-12-03 RX ORDER — POLYETHYLENE GLYCOL 3350 17 G/17G
17 POWDER, FOR SOLUTION ORAL DAILY PRN
Status: DISCONTINUED | OUTPATIENT
Start: 2022-12-03 | End: 2022-12-11 | Stop reason: HOSPADM

## 2022-12-03 RX ORDER — SODIUM CHLORIDE, SODIUM LACTATE, POTASSIUM CHLORIDE, AND CALCIUM CHLORIDE .6; .31; .03; .02 G/100ML; G/100ML; G/100ML; G/100ML
1000 INJECTION, SOLUTION INTRAVENOUS ONCE
Status: COMPLETED | OUTPATIENT
Start: 2022-12-03 | End: 2022-12-04

## 2022-12-03 RX ORDER — METOCLOPRAMIDE HYDROCHLORIDE 5 MG/5ML
5 SOLUTION ORAL 3 TIMES DAILY
Status: DISCONTINUED | OUTPATIENT
Start: 2022-12-03 | End: 2022-12-11 | Stop reason: HOSPADM

## 2022-12-03 RX ORDER — MAGNESIUM HYDROXIDE 1200 MG/15ML
LIQUID ORAL CONTINUOUS PRN
Status: COMPLETED | OUTPATIENT
Start: 2022-12-03 | End: 2022-12-03

## 2022-12-03 RX ORDER — ROCURONIUM BROMIDE 10 MG/ML
INJECTION, SOLUTION INTRAVENOUS PRN
Status: DISCONTINUED | OUTPATIENT
Start: 2022-12-03 | End: 2022-12-03 | Stop reason: SDUPTHER

## 2022-12-03 RX ORDER — ROCURONIUM BROMIDE 10 MG/ML
100 INJECTION, SOLUTION INTRAVENOUS ONCE
Status: COMPLETED | OUTPATIENT
Start: 2022-12-03 | End: 2022-12-03

## 2022-12-03 RX ORDER — 0.9 % SODIUM CHLORIDE 0.9 %
30 INTRAVENOUS SOLUTION INTRAVENOUS ONCE
Status: COMPLETED | OUTPATIENT
Start: 2022-12-03 | End: 2022-12-03

## 2022-12-03 RX ORDER — ACETAMINOPHEN 650 MG/1
650 SUPPOSITORY RECTAL ONCE
Status: COMPLETED | OUTPATIENT
Start: 2022-12-03 | End: 2022-12-03

## 2022-12-03 RX ORDER — PROPOFOL 10 MG/ML
INJECTION, EMULSION INTRAVENOUS
Status: COMPLETED
Start: 2022-12-03 | End: 2022-12-03

## 2022-12-03 RX ORDER — ACETAMINOPHEN 500 MG
1000 TABLET ORAL ONCE
Status: DISCONTINUED | OUTPATIENT
Start: 2022-12-03 | End: 2022-12-03

## 2022-12-03 RX ORDER — SODIUM CHLORIDE 0.9 % (FLUSH) 0.9 %
5-40 SYRINGE (ML) INJECTION PRN
Status: DISCONTINUED | OUTPATIENT
Start: 2022-12-03 | End: 2022-12-11 | Stop reason: HOSPADM

## 2022-12-03 RX ORDER — SODIUM CHLORIDE 0.9 % (FLUSH) 0.9 %
5-40 SYRINGE (ML) INJECTION EVERY 12 HOURS SCHEDULED
Status: DISCONTINUED | OUTPATIENT
Start: 2022-12-03 | End: 2022-12-11 | Stop reason: HOSPADM

## 2022-12-03 RX ORDER — PROPOFOL 10 MG/ML
5-50 INJECTION, EMULSION INTRAVENOUS CONTINUOUS
Status: DISCONTINUED | OUTPATIENT
Start: 2022-12-03 | End: 2022-12-04

## 2022-12-03 RX ORDER — SODIUM CHLORIDE 9 MG/ML
INJECTION, SOLUTION INTRAVENOUS PRN
Status: DISCONTINUED | OUTPATIENT
Start: 2022-12-03 | End: 2022-12-11 | Stop reason: HOSPADM

## 2022-12-03 RX ORDER — MIDODRINE HYDROCHLORIDE 5 MG/1
5 TABLET ORAL 2 TIMES DAILY WITH MEALS
Status: DISCONTINUED | OUTPATIENT
Start: 2022-12-03 | End: 2022-12-05

## 2022-12-03 RX ORDER — GABAPENTIN 300 MG/1
300 CAPSULE ORAL 3 TIMES DAILY
Status: DISCONTINUED | OUTPATIENT
Start: 2022-12-03 | End: 2022-12-11 | Stop reason: HOSPADM

## 2022-12-03 RX ORDER — ETOMIDATE 2 MG/ML
20 INJECTION INTRAVENOUS ONCE
Status: COMPLETED | OUTPATIENT
Start: 2022-12-03 | End: 2022-12-03

## 2022-12-03 RX ORDER — ACETAMINOPHEN 650 MG/1
650 SUPPOSITORY RECTAL EVERY 6 HOURS PRN
Status: DISCONTINUED | OUTPATIENT
Start: 2022-12-03 | End: 2022-12-11 | Stop reason: HOSPADM

## 2022-12-03 RX ADMIN — ROCURONIUM BROMIDE 50 MG: 10 INJECTION, SOLUTION INTRAVENOUS at 16:20

## 2022-12-03 RX ADMIN — SODIUM CHLORIDE, POTASSIUM CHLORIDE, SODIUM LACTATE AND CALCIUM CHLORIDE 1000 ML: 600; 310; 30; 20 INJECTION, SOLUTION INTRAVENOUS at 23:19

## 2022-12-03 RX ADMIN — ALBUTEROL SULFATE 10 MG: 5 SOLUTION RESPIRATORY (INHALATION) at 11:00

## 2022-12-03 RX ADMIN — GABAPENTIN 300 MG: 300 CAPSULE ORAL at 21:18

## 2022-12-03 RX ADMIN — SODIUM CHLORIDE, PRESERVATIVE FREE 40 MG: 5 INJECTION INTRAVENOUS at 18:52

## 2022-12-03 RX ADMIN — IPRATROPIUM BROMIDE 0.5 MG: 0.5 SOLUTION RESPIRATORY (INHALATION) at 10:43

## 2022-12-03 RX ADMIN — ROCURONIUM BROMIDE 100 MG: 10 INJECTION INTRAVENOUS at 10:06

## 2022-12-03 RX ADMIN — Medication 50 MCG/MIN: at 15:24

## 2022-12-03 RX ADMIN — ALBUTEROL SULFATE 10 MG: 5 SOLUTION RESPIRATORY (INHALATION) at 10:43

## 2022-12-03 RX ADMIN — MIDODRINE HYDROCHLORIDE 5 MG: 5 TABLET ORAL at 18:52

## 2022-12-03 RX ADMIN — SODIUM CHLORIDE: 9 INJECTION, SOLUTION INTRAVENOUS at 18:11

## 2022-12-03 RX ADMIN — ETOMIDATE INJECTION 20 MG: 2 SOLUTION INTRAVENOUS at 10:06

## 2022-12-03 RX ADMIN — ACETAMINOPHEN 650 MG: 325 TABLET ORAL at 21:20

## 2022-12-03 RX ADMIN — PROPOFOL 10 MCG/KG/MIN: 10 INJECTION, EMULSION INTRAVENOUS at 10:37

## 2022-12-03 RX ADMIN — PROPOFOL 10 MCG/KG/MIN: 10 INJECTION, EMULSION INTRAVENOUS at 14:01

## 2022-12-03 RX ADMIN — Medication 25 MCG/MIN: at 21:53

## 2022-12-03 RX ADMIN — CEFEPIME HYDROCHLORIDE 2000 MG: 2 INJECTION, POWDER, FOR SOLUTION INTRAVENOUS at 11:04

## 2022-12-03 RX ADMIN — Medication 1500 MG: at 11:04

## 2022-12-03 RX ADMIN — METOCLOPRAMIDE HYDROCHLORIDE 5 MG: 5 SOLUTION ORAL at 21:18

## 2022-12-03 RX ADMIN — Medication 10 MCG/MIN: at 10:13

## 2022-12-03 RX ADMIN — SODIUM CHLORIDE: 9 INJECTION, SOLUTION INTRAVENOUS at 16:16

## 2022-12-03 RX ADMIN — SODIUM CHLORIDE 3000 ML: 9 INJECTION, SOLUTION INTRAVENOUS at 11:27

## 2022-12-03 RX ADMIN — SODIUM CHLORIDE, PRESERVATIVE FREE 10 ML: 5 INJECTION INTRAVENOUS at 19:53

## 2022-12-03 RX ADMIN — ACETAMINOPHEN 650 MG: 650 SUPPOSITORY RECTAL at 14:30

## 2022-12-03 ASSESSMENT — PULMONARY FUNCTION TESTS
PIF_VALUE: 24
PIF_VALUE: 32
PIF_VALUE: 23
PIF_VALUE: 23

## 2022-12-03 NOTE — CONSULTS
[facility] -   Infectious diseases evaluation  admission date 12/3/2022    reason for consultation:   sepsis    Impression :   Current:  Bandemia  Septic shock levophed 36  R obstructive pyelonephritis -   severe R hydro w many ureteral stones  Stent placed 12/3  Multifocal infiltrates, likely atelectasis - less likely pneumonia  MS   Neurogenic bladder w SP omer  R AKA amputation by hx  Past colostomy  Possible ascending colon pseudo pneumatosis on CT    Other:    Discussion / summary of stay / plan of care     Recommendations   Keep vanco dosed per creat -  Meropenem stat and add amikacin  500 once for  GNR septic shock  Await final cx results   ? Possible ascending colon pseudo pneumatosis? CT chest eval, it seems mostly like P congestion and atelectasis     Infection Control Recommendations   Charlo Precautions  Contact Isolation     Antimicrobial Stewardship Recommendations   Simplification of therapy  Targeted therapy  History of Present Illness:   Initial history:  Ana Alfaro is a 43y.o.-year-old female w MS and neurogenic bladder and chronic SP omer, Gtube and hx PE on xeralto  Cojes w altered mentation and intubated in ER - S IRS+ and tachycardia and hypotension started on levophed  UA + for infection  CT chest showed many infiltrates and some might be multifocal pneumonia   R kid w hydronephrosis and many  ureteral stones.   Ascending colon pseudo pneumatosis  Started on cefepime and vanco and we are called  Bandemia  NUZHAT     took her to OR for a R stent  ID called    She is on the vent 40 FIO2 and 5 peep  Pupils minimally reactive  Sp salivary and beige  Abd soft   Urine slightly blood tinged and no pus  No rash  Levophed 40    Interval changes  12/3/2022   Patient Vitals for the past 8 hrs:   BP Temp Temp src Pulse Resp SpO2 Weight   12/03/22 1450 125/68 -- -- (!) 121 20 95 % --   12/03/22 1418 (!) 111/58 -- -- (!) 119 20 99 % --   12/03/22 1414 -- 100.1 °F (37.8 °C) Rectal -- -- -- --   12/03/22 1407 -- -- -- (!) 125 20 99 % --   12/03/22 1337 123/68 -- -- (!) 120 20 97 % --   12/03/22 1330 123/74 -- -- (!) 127 20 98 % --   12/03/22 1206 -- -- -- (!) 133 -- -- --   12/03/22 1200 109/66 -- -- (!) 140 20 97 % --   12/03/22 1153 (!) 101/54 -- -- (!) 142 20 96 % --   12/03/22 1126 121/78 -- -- (!) 152 20 97 % --   12/03/22 1106 -- -- -- (!) 118 20 100 % --   12/03/22 1105 122/81 -- -- (!) 119 20 100 % --   12/03/22 1056 128/87 -- -- (!) 106 20 100 % --   12/03/22 1043 -- -- -- 98 20 96 % --   12/03/22 1017 -- -- -- -- -- -- 220 lb 7.4 oz (100 kg)   12/03/22 1014 -- -- -- -- -- -- 220 lb 7.4 oz (100 kg)   12/03/22 1010 -- 99.7 °F (37.6 °C) Axillary -- 20 100 % --       Summary of relevant labs:  Labs:  W 36  Creat 3 - 2.4  Lactic Acid, Sepsis, Whole Blood1.6   [id number]   ASH21 High U/L AST58 High      Micro:  BC  U cx  Nitrite, UrineNEGATIVE Leukocyte Esterase, UrineLARGE Abnormal    Imaging:  CT AP chest  Impression:  1. ETT now terminates 12 mm above denys. 2. Patchy perihilar and more confluent posterior segment airspace   consolidations likely combination of pneumonia and atelectasis. Trace right   pleural effusion. 3. Innumerable right renal calculi most in the 5-8 mm size range. Moderate   to severe right hydronephrosis and hydroureter. There is a stack of 4   obstructing calculi in the distal right ureter ranging from 5-11 mm   accounting for finding. 11 mm calculus is most distal and positioned about 2   cm from the uterovesical junction. 4. Liquid stool in the colon suggests diarrheal disease. Tiny gas pockets   along the ascending colon wall in the dependent portion appear to be pseudo   pneumatosis. CT brain neg      I have personally reviewed the past medical history, past surgical history, medications, social history, and family history, and I haveupdated the database accordingly.       Allergies:   Patient has no known allergies. Review of Systems:     Review of Systems   Unable to perform ROS: Intubated     Physical Examination :       Physical Exam  Constitutional:       Appearance: She is ill-appearing. She is not toxic-appearing. Comments: On the vent   HENT:      Head: Normocephalic and atraumatic. Mouth/Throat:      Mouth: Mucous membranes are moist.   Eyes:      General: No scleral icterus. Conjunctiva/sclera: Conjunctivae normal.   Cardiovascular:      Rate and Rhythm: Regular rhythm. Tachycardia present. Heart sounds: No murmur heard. No gallop. Pulmonary:      Breath sounds: No stridor. No rhonchi. Abdominal:      Palpations: There is no mass. Hernia: No hernia is present. Musculoskeletal:      Cervical back: Neck supple. No rigidity. Neurological:      Comments: On the vent not responding   Psychiatric:      Comments:  On the vent not agitated        Past Medical History:     Past Medical History:   Diagnosis Date    Aphasia     Aphasia     Contracture of joint, lower leg     Depressed     HTN (hypertension)     Hx MRSA infection resolved 1/2017    2 negative nasal screens 1/2017 (hx in heel in 2013)    Hydronephrosis     Movement disorder     tremor    Multiple sclerosis ([address]     Neurogenic bladder     Non-verbal learning disorder     Peripheral vascular disease ([address]     Polyneuropathy     Pressure ulcer     Coccyx, Heel    Pulmonary embolism (HCC)     Pulmonary infarction (HCC)     Quadriplegia (HCC)     Tachycardia     UTI (lower urinary tract infection)        Past Surgical  History:     Past Surgical History:   Procedure Laterality Date    AMPUTATION Right     COLOSTOMY      GASTROSTOMY TUBE PLACEMENT      IR NEPHROSTOMY PERCUTANEOUS RIGHT  9/27/2020    IR NEPHROSTOMY PERCUTANEOUS RIGHT 9/27/2020 Teresita Davies MD [facility]    LEG AMPUTATION THROUGH FEMUR         Medications:      sodium chloride flush  5-40 mL IntraVENous 2 times per day    cefepime 1,000 mg IntraVENous Q24H    cefepime  1,000 mg IntraVENous Once    vancomycin (VANCOCIN) intermittent dosing (placeholder)   Other RX Placeholder       Social History:     Social History     Socioeconomic History    Marital status: Single     Spouse name: Not on file    Number of children: Not on file    Years of education: Not on file    Highest education level: Not on file   Occupational History    Not on file   Tobacco Use    Smoking status: Never    Smokeless tobacco: Never   Vaping Use    Vaping Use: Never used   Substance and Sexual Activity    Alcohol use: No    Drug use: No    Sexual activity: Never   Other Topics Concern    Not on file   Social History Narrative    Not on file     Social Determinants of Health     Financial Resource Strain: Not on file   Food Insecurity: Not on file   Transportation Needs: Not on file   Physical Activity: Not on file   Stress: Not on file   Social Connections: Not on file   Intimate Partner Violence: Not on file   Housing Stability: Not on file       Family History:     Family History   Problem Relation Age of Onset    No Known Problems Mother     No Known Problems Father       Medical Decision Making:   I have independently reviewed/ordered the following labs:    CBC with Differential:   Recent Labs     12/03/22  1014   WBC 36.1*   HGB 12.3   HCT 40.5      LYMPHOPCT 8*   MONOPCT 7     BMP:  Recent Labs     12/03/22  1007 12/03/22  1014   NA  --  130*   K  --  4.0   CL  --  96*   CO2  --  19*   BUN  --  48*   CREATININE 3.08* 2.24*     Hepatic Function Panel:   Recent Labs     12/03/22  1014   PROT 8.2   LABALBU 3.0*   BILITOT 0.4   ALKPHOS 141*   ALT 39*   AST 58*     No results for input(s): RPR in the last 72 hours. No results for input(s): HIV in the last 72 hours. No results for input(s): BC in the last 72 hours.   Lab Results   Component Value Date/Time    CREATININE 2.24 12/03/2022 10:14 AM    CREATININE 3.08 12/03/2022 10:07 AM    GLUCOSE 144 12/03/2022

## 2022-12-03 NOTE — OP NOTE
this went in easily, the flexible cystoscope was removed and a22 Western Lily cystoscope with a 30 degree lens was inserted through the urethra into the bladder. A thorough and complete cystoscopy was performed with no abnormalities to the bladder mucosa. The ureteral orifices were located in the correct orthotopic location. Urine was collected for culture. The right ureteral orifice was localized. A glidewire was then inserted through the scope into the UO and advanced up the right ureter however encountered resistance and coiling in the mid-right ureter. A 5Fr ureteral catheter was advanced over the glidewire to provide leverage and then the wire bypassed the mideruteral stones and localized in the renal pelvis under fluoroscopy. A  4.8 Fr x 26 cm  ureteral stent was placed over the wire into the ureteral orifice. It was localized in the correct position with good curl in the renal pelvis under fluoroscopy. Patient's suprapubic tube was exchanged with a new 20Fr omer catheter. The patient tolerated the procedure and was sent to the ICU. Dr. Erika Subramanian was scrubbed and present for the entirety of the procedure. DISPOSITION:  The patient was returned to the ICU to be continued on broad spectrum IV antibiotics.         Electronically signed by Steve Allen MD on 12/3/2022 at 5:13 PM 3

## 2022-12-03 NOTE — PROCEDURES
PROCEDURE NOTE - CENTRAL VENOUS LINE PLACEMENT    PATIENT NAME: Sangeeta Oshea [place] RECORD NO. [id number]  DATE: 12/3/2022  ATTENDING PHYSICIAN: Dr. Kole Byrnes DIAGNOSIS:  vascular access, poor peripheral access, and hypovolemia  POSTOPERATIVE DIAGNOSIS:  Same  PROCEDURE PERFORMED:  Right Internal Jugular Vein Central Line Insertion  PERFORMING PHYSICIAN: Elysia Shah MD  ANESTHESIA:  Local utilizing 1% lidocaine  ESTIMATED BLOOD LOSS:  Less than 25 ml  COMPLICATIONS:  None immediately appreciated. DISCUSSION:  Creola Bence is a 43y.o.-year-old female who requires central IV access vascular access, poor peripheral access, and hypovolemia. The history and physical examination were reviewed and confirmed. CONSENT: Unable to be obtained due to the emergent nature of this procedure. PROCEDURE:  A timeout was initiated by the bedside nurse and was confirmed by those present. The patient was placed in a supine position. The skin overlying the Right Internal Jugular Vein was prepped with chlorhexadine and draped in sterile fashion. The skin was infiltrated with local anesthetic. The vessel and surrounding anatomy was visualized using ultrasound. Through the anesthetized region, the introducer needle was inserted into the internal jugular vein returning dark red non pulsatile blood. A guidewire was placed through the center of the needle with no resistance. Ultrasound confirmed presence of wire in the vein. When attempting to insert the wire into the vein we met considerable resistance. Due to this we are unable to fully place the central line into the right internal jugular vein. Attempts were ceased and access was attempted at the left internal jugular vein.     Elysia Shah MD  6:42 PM, 12/3/22

## 2022-12-03 NOTE — ED PROVIDER NOTES
[facility] ED  Emergency Department Encounter  Emergency Medicine Resident     Pt Name: Lady Patel  MRN: [id number]  [place] 1980  Date of evaluation: 12/3/22  PCP:  Dominique Dao MD    CHIEF COMPLAINT       Chief Complaint   Patient presents with    Altered Mental Status       HISTORY OFPRESENT ILLNESS  (Location/Symptom, Timing/Onset, Context/Setting, Quality, Duration, Modifying Factors,Severity.)      Lady Patel is a 43 y.o. female with history of MS, aphasia and contractures who presents altered mental status and obtundation. Patient was found minimally responsive by her nursing home staff, who called 911. On EMS arrival, patient was a GCS of 6-7 and minimally responsive. Attempted nasotracheal intubation, but unsuccessful. NPA was placed and patient was placed on bag-valve-mask. Found to be tachycardic and hypotensive. Per nursing home staff, patient is typically awake, alert and interactive, able to answer yes and no questions. Does have a chronically indwelling Davies, which showed dark and cloudy output. Patient also has gastrostomy and an end colostomy. PAST MEDICAL / SURGICAL / SOCIAL / FAMILY HISTORY      has a past medical history of Aphasia, Aphasia, Contracture of joint, lower leg, Depressed, HTN (hypertension), Hx MRSA infection, Hydronephrosis, Movement disorder, Multiple sclerosis (HCC), Neurogenic bladder, Non-verbal learning disorder, Peripheral vascular disease ([address] Polyneuropathy, Pressure ulcer, Pulmonary embolism ([address] Pulmonary infarction ([address] Quadriplegia ([address] Tachycardia, and UTI (lower urinary tract infection). has a past surgical history that includes Gastrostomy tube placement; colostomy; amputation (Right); Leg amputation through femur; and IR GUIDED NEPHROSTOMY CATH PLACEMENT RIGHT (9/27/2020).      Social History     Socioeconomic History    Marital status: Single     Spouse name: Not on file    Number of children: Not on file    Years of education: Not on file    Highest education level: Not on file   Occupational History    Not on file   Tobacco Use    Smoking status: Never    Smokeless tobacco: Never   Vaping Use    Vaping Use: Never used   Substance and Sexual Activity    Alcohol use: No    Drug use: No    Sexual activity: Never   Other Topics Concern    Not on file   Social History Narrative    Not on file     Social Determinants of Health     Financial Resource Strain: Not on file   Food Insecurity: Not on file   Transportation Needs: Not on file   Physical Activity: Not on file   Stress: Not on file   Social Connections: Not on file   Intimate Partner Violence: Not on file   Housing Stability: Not on file       Family History   Problem Relation Age of Onset    No Known Problems Mother     No Known Problems Father         Allergies:  Patient has no known allergies. Home Medications:  Prior to Admission medications    Medication Sig Start Date End Date Taking? Authorizing Provider   atropine 1 % ophthalmic solution 3 drops every 4 hours as needed Give 3 drops sublingually for secretion    Historical Provider, MD   metoprolol tartrate (LOPRESSOR) 25 MG tablet Take 25 mg by mouth three times daily    Historical Provider, MD   miconazole (MICOTIN) 2 % powder Apply topically 2 times daily. 1/25/20   Jeimy Juarez MD   midodrine (PROAMATINE) 5 MG tablet Take 1 tablet by mouth 2 times daily (with meals) 1/25/20   Jeimy Juarez MD   acetaminophen-codeine (TYLENOL/CODEINE #3) 300-30 MG per tablet Take 1 tablet by mouth every 8 hours as needed for Pain.     Historical Provider, MD   D-Mannose 500 MG CAPS Take 3 capsules by mouth three times daily    Historical Provider, MD   atropine 1 % ophthalmic solution 2-4 drops every 4 hours as needed    Historical Provider, MD   metoclopramide (REGLAN) 5 MG/5ML solution Take 5 mLs by mouth 3 times daily 8/2/19   Berjuana [place] P Blood, DO   rivaroxaban (XARELTO) 20 MG TABS tablet Take 1 tablet by mouth daily 5/24/19   Drew Peck MD   sertraline (ZOLOFT) 25 MG tablet 1 tablet by Per G Tube route daily 5/24/19   Drew Peck MD   docusate (COLACE) 50 MG/5ML liquid Take 10 mLs by mouth daily 5/24/19   Drew Peck MD   Multiple Vitamins-Minerals (CENTRUM/CERTA-DERICK WITH MINERALS ORAL) solution 15 mLs by Per G Tube route daily 5/24/19   Drew Peck MD   lansoprazole (PREVACID SOLUTAB) 30 MG disintegrating tablet 1 tablet by Per G Tube route every morning (before breakfast) 5/24/19   Drew Peck MD   Cranberry 250 MG CAPS Take 250 mg by mouth 2 times daily    Historical Provider, MD   Scopolamine Base (SCOPOLAMINE TD) Place 1 mg onto the skin every 72 hours     Historical Provider, MD   baclofen (LIORESAL) 10 MG tablet 10 mg by Per G Tube route 3 times daily Crush into G tube     Historical Provider, MD   gabapentin (NEURONTIN) 300 MG capsule 300 mg by Per G Tube route 3 times daily    Historical Provider, MD   medroxyPROGESTERone (DEPO-PROVERA) 150 MG/ML injection Inject 150 mg into the muscle every 3 months On the 11th     Historical Provider, MD   acetaminophen (TYLENOL) 80 MG/0.8ML suspension 650 mg by Per G Tube route every 6 hours as needed for Fever or Pain     Historical Provider, MD   glycopyrrolate (ROBINUL) 1 MG tablet Take 1 mg by mouth 3 times daily     Historical Provider, MD       REVIEW OFSYSTEMS    (2-9 systems for level 4, 10 or more for level 5)      Review of Systems   Unable to perform ROS: Patient unresponsive     PHYSICAL EXAM   (up to 7 for level 4, 8 or more forlevel 5)      INITIAL VITALS:   ED Triage Vitals   BP Temp Temp src Heart Rate Resp SpO2 Height Weight   12/03/22 1056 -- -- 12/03/22 1043 12/03/22 1010 12/03/22 1010 -- 12/03/22 1014   128/87   98 20 100 %  220 lb 7.4 oz (100 kg)       Physical Exam  Constitutional:       General: She is in acute distress. Appearance: She is ill-appearing and toxic-appearing. HENT:      Head: Normocephalic and atraumatic. Mouth/Throat:      Mouth: Mucous membranes are moist.      Comments: Thick, brown mucus in oropharynx. Emesis present at corners of mouth. Eyes:      General: No scleral icterus. Pupils: Pupils are equal, round, and reactive to light. Cardiovascular:      Rate and Rhythm: Regular rhythm. Tachycardia present. Heart sounds: Normal heart sounds. Pulmonary:      Breath sounds: Rhonchi present. Comments: Decreased respiratory effort, approximately 6 respirations per minute with minimal chest rise without assistance. Bilateral rhonchi present. Abdominal:      General: There is no distension. Palpations: Abdomen is soft. Comments: G-tube and colostomy in place. Davies catheter also in place with thick, dark brown urine. Musculoskeletal:      Cervical back: No rigidity. Comments: Contracture of right lower extremity. 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 Lymphadenopathy:      Cervical: Cervical adenopathy present. Skin:     General: Skin is warm and dry. Capillary Refill: Capillary refill takes 2 to 3 seconds. Neurological:      GCS: GCS eye subscore is 1. GCS verbal subscore is 1. GCS motor subscore is 1.        DIFFERENTIAL  DIAGNOSIS     PLAN (LABS / IMAGING / EKG):  Orders Placed This Encounter   Procedures    Blood Culture 1    Culture, Blood 2    Culture, Urine    MRSA DNA Probe, Nasal    XR CHEST PORTABLE    XR ABDOMEN FOR NG/OG/NE TUBE PLACEMENT    CT Head W/O Contrast    CT CHEST ABDOMEN PELVIS WO CONTRAST Additional Contrast? None    XR CHEST PORTABLE    FLUORO FOR SURGICAL PROCEDURES    ELECTROLYTES PLUS    Hemoglobin and hematocrit, blood    CALCIUM, IONIC (POC)    CBC with Auto Differential    CMP    Protime-INR    Lactate, Sepsis    Urinalysis with Microscopic    Basic Metabolic Panel w/ Reflex to MG    Calcium, Ionized    Magnesium    Phosphorus    CBC with Auto Differential    BLOOD GAS, ARTERIAL    Vancomycin Level, Random Procalcitonin    Diet NPO    Vital signs per unit routine    Daily weights    Intake and output    Place intermittent pneumatic compression device    Admission/Observation order previously placed    Strict Bedrest    Misc nursing order (specify)    Full Code    Inpatient consult to Dietitian    Inpatient consult to Urology    Inpatient consult to Infectious Diseases    Manual Mechanical Ventilation    End Tidal CO2 Continuous    Pulse oximetry, continuous    ABG draw    Initiate Oxygen Therapy Protocol    Respiratory care evaluation only    Venous Blood Gas, POC    Creatinine W/GFR Point of Care    POCT urea (BUN)    Lactic Acid, POC    POCT Glucose    Mixed Venous Gas, POC    POC Glucose Fingerstick    EKG 12 Lead    ADMIT TO INPATIENT    Transfer Patient       MEDICATIONS ORDERED:  Orders Placed This Encounter   Medications    propofol 1000 MG/100ML injection     Arabella Rodriguezd: cabinet override    etomidate (AMIDATE) injection 20 mg    rocuronium (ZEMURON) injection 100 mg    norepinephrine (LEVOPHED) 16 mg in sodium chloride 0.9 % 250 mL infusion     Order Specific Question:   Titrate Infusion? Answer:   Yes     Order Specific Question:   Initial Infusion Dose: Answer:   5 mcg/min     Order Specific Question:   Goal of Therapy is: Answer:   MAP greater than 65 mmHg     Order Specific Question:   Contact Provider if:     Answer:   Patient is receiving the maximum dose and is not achieving the goal of therapy    cefepime (MAXIPIME) 2000 mg IVPB minibag     Order Specific Question:   Antimicrobial Indications     Answer: Other     Order Specific Question:   Other Abx Indication     Answer:   sepsis unknown origin    vancomycin (VANCOCIN) 1500 mg in sodium chloride 0.9 % 250 mL IVPB     Order Specific Question:   Antimicrobial Indications     Answer:    Other     Order Specific Question:   Other Abx Indication     Answer:   sepsis unknown origin    propofol injection     Order Specific Question: Titrate Infusion? Answer:   Yes     Order Specific Question:   Initial Infusion Dose: Answer:   10 mcg/kg/min     Order Specific Question:   Goal of Therapy:     Answer:   RASS of -4 to -5     Order Specific Question:   Contact Provider if:     Answer:   New onset HR less than 50 bpm     Order Specific Question:   Contact Provider if:     Answer:   New onset SBP less than 90 mmHg     Order Specific Question:   Contact Provider if:     Answer:   Patient is receiving maximum dose and is not achieving the goal of therapy     Order Specific Question:   Contact Provider if:     Answer:   Triglycerides greater than 500 mg/dL    albuterol (PROVENTIL) nebulizer solution 2.5 mg     Order Specific Question:   Initiate RT Bronchodilator Protocol     Answer:   Yes - ED protocol    ipratropium (ATROVENT) 0.02 % nebulizer solution 0.5 mg     Order Specific Question:   Initiate RT Bronchodilator Protocol     Answer:   Yes - ED protocol    0.9 % sodium chloride bolus    DISCONTD: acetaminophen (TYLENOL) tablet 1,000 mg    acetaminophen (TYLENOL) suppository 650 mg    sodium chloride flush 0.9 % injection 5-40 mL    sodium chloride flush 0.9 % injection 5-40 mL    0.9 % sodium chloride infusion    polyethylene glycol (GLYCOLAX) packet 17 g    OR Linked Order Group     acetaminophen (TYLENOL) tablet 650 mg     acetaminophen (TYLENOL) suppository 650 mg    ondansetron (ZOFRAN) injection 4 mg    DISCONTD: cefepime (MAXIPIME) 1000 mg IVPB minibag     Order Specific Question:   Antimicrobial Indications     Answer: Other     Order Specific Question:   Other Abx Indication     Answer:   sepsis    DISCONTD: cefepime (MAXIPIME) 1000 mg IVPB minibag     Order Specific Question:   Antimicrobial Indications     Answer:    Other     Order Specific Question:   Other Abx Indication     Answer:   septic shock    DISCONTD: vancomycin (VANCOCIN) intermittent dosing (placeholder)     Order Specific Question:   Antimicrobial Indications Answer:   Sepsis of Unknown Etiology     Order Specific Question:   Sepsis duration of therapy     Answer: Other     Order Specific Question:   Other Sepsis Duration     Answer:   Not specified    gabapentin (NEURONTIN) capsule 300 mg    pantoprazole (PROTONIX) 40 mg in sodium chloride (PF) 0.9 % 10 mL injection    metoclopramide (REGLAN) 5 MG/5ML solution 5 mg    metoprolol tartrate (LOPRESSOR) tablet 25 mg    midodrine (PROAMATINE) tablet 5 mg    rivaroxaban (XARELTO) tablet 20 mg     Order Specific Question:   Indication of Use     Answer:   A Fib/A Flutter    meropenem (MERREM) 1,000 mg in sodium chloride 0.9 % 100 mL IVPB (mini-bag)     Order Specific Question:   Antimicrobial Indications     Answer:   Sepsis of Unknown Etiology     Order Specific Question:   Sepsis duration of therapy     Answer: Other     Order Specific Question:   Other Sepsis Duration     Answer:   .    sodium chloride 0.9 % irrigation    amikacin (AMIKIN) 225 mg in sodium chloride 0.9 % 100 mL IVPB     Order Specific Question:   Antimicrobial Indications     Answer:   Urinary Tract Infection     Order Specific Question:   UTI duration of therapy     Answer: Other     Order Specific Question:   Other Urinary Tract Infection Duration     Answer:   1    cefepime (MAXIPIME) 1,000 mg in sterile water 10 mL IV syringe     Order Specific Question:   Antimicrobial Indications     Answer:   Urinary Tract Infection     Order Specific Question:   UTI duration of therapy     Answer:   7 days       DDX: Sepsis of unknown origin, urosepsis, pneumonia, aspiration, empyema, intra-abdominal infection    Initial MDM/Plan: 43 y.o. female who presents with decreased responsiveness and hypotension. Likely septic shock. Patient was intubated immediately for airway protection. Due to hypotension, immediately started on Levophed and fluids. Post intubation sedation with propofol.   Evidence of emesis and respiratory failure, potentially due to aspiration pneumonia. Urine output does also appear grossly infected. High suspicion for septic shock. Will obtain full septic work-up. Will start broad-spectrum coverage with vancomycin and Zosyn. Plan for CT of the chest, abdomen and pelvis. Sepsis Times and Checklist  Vital Signs: BP: 108/77  Heart Rate: (!) 104  Resp: 20  Temp: 99.9 °F (37.7 °C) SpO2: 98 %  SIRS (>2) Non Pregnant       Temp > 38.3C or < 36C   HR > 90   RR > 20   WBC > 12 or < 4 or >10% bands  SIRS (>2) Pregnant 20 weeks until 3 days postpartum   Temp > 38C or <36C   HR >110   RR > 24   WBC >15 or < 4 or >10% bands   SIRS (>2) and confirmed or suspected source of infection = Sepsis  Is Sepsis due to likely bacterial infection?: Yes      Sepsis Identified:  Date: 12/3/22   Time: 1030  Sepsis Orders:   CBC(required): Yes   CMP: Yes   PT/PTT: Yes   Blood Cultures x2(required): Yes   Urinalysis and Urine Culture: Yes   Lactate(required): Yes   Antibiotics Given (within 3 hours of sepsis identification, after blood cultures):  Yes    (If unable to obtain IV access for IV antibiotics within 3 hours of identification of sepsis, IM antibiotics is acceptable.)              If lactate >2.0 MUST repeat within 6 hours    If elevated, is elevated lactate from a likely infectious source?: Yes. IV Fluid Bolus:  Is lactate > 4.0:  No  If lactate >  4.0 OR hypotension (MAP<65 mmHg,BP<90 or SBP<85 pregnancy 20 weeks to 3 days  postpartum) (with 2 BP readings) 30 ml/kg crystalloid MUST be ordered. Fluids must be initiated within 3 hours of sepsis identification. These fluids must have a rate > 125 ml/hr. Is the patient Morbidly Obese (BMI > 30):  estimated BW of 100kg  IV Fluids given prior to arrival can be used in this calculation but the following information must be documented:  Start time: 1127, Type of fluid NS, Volume of fluid 3000mL, and Rate (Duration or End time) 2hr.     Does the patient or patient advocate refuse the entire 30 ml/kg IV fluid bolus? No      Septic Shock Identified (Initial lactate > 4.0 or 2 Hypotensive Blood Pressure readings within the first 6 hours): For septic shock sepsis focus physical exam must be completed AND documented (within 6 hours). Date: 12/3/22 Time: 1030      Sepsis focus exam completed.     For persistent hypotension after 30ml/kg fluid bolus vasopressors must be started (within 6 hours)   DIAGNOSTIC RESULTS / EMERGENCYDEPARTMENT COURSE / MDM     LABS:  Labs Reviewed   ELECTROLYTES PLUS - Abnormal; Notable for the following components:       Result Value    POC Sodium 133 (*)     POC TCO2 20 (*)     All other components within normal limits   CBC WITH AUTO DIFFERENTIAL - Abnormal; Notable for the following components:    WBC 36.1 (*)     RDW 15.9 (*)     Immature Granulocytes 4 (*)     Seg Neutrophils 81 (*)     Lymphocytes 8 (*)     Eosinophils % 0 (*)     Absolute Immature Granulocyte 1.44 (*)     Segs Absolute 29.24 (*)     Absolute Mono # 2.53 (*)     All other components within normal limits   COMPREHENSIVE METABOLIC PANEL - Abnormal; Notable for the following components:    Glucose 144 (*)     BUN 48 (*)     Creatinine 2.24 (*)     Est, Glom Filt Rate 27 (*)     Sodium 130 (*)     Chloride 96 (*)     CO2 19 (*)     Alkaline Phosphatase 141 (*)     ALT 39 (*)     AST 58 (*)     Albumin 3.0 (*)     Albumin/Globulin Ratio 0.6 (*)     All other components within normal limits   PROTIME-INR - Abnormal; Notable for the following components:    Protime 13.8 (*)     All other components within normal limits   URINALYSIS WITH MICROSCOPIC - Abnormal; Notable for the following components:    Turbidity UA Turbid (*)     Urine Hgb LARGE (*)     Protein, UA 3+ (*)     Leukocyte Esterase, Urine LARGE (*)     Bacteria, UA MANY (*)     All other components within normal limits   PROCALCITONIN - Abnormal; Notable for the following components:    Procalcitonin >100.00 (*)     All other components within normal limits VENOUS BLOOD GAS, POINT OF CARE - Abnormal; Notable for the following components:    pH, Niraj 7.249 (*)     pO2, Niraj 130.2 (*)     HCO3, Venous 20.4 (*)     Negative Base Excess, Niraj 7 (*)     O2 Sat, Niraj 98 (*)     All other components within normal limits   CREATININE W/GFR POINT OF CARE - Abnormal; Notable for the following components:    POC Creatinine 3.08 (*)     All other components within normal limits   UREA N (POC) - Abnormal; Notable for the following components:    POC BUN 54 (*)     All other components within normal limits   POCT GLUCOSE - Abnormal; Notable for the following components:    POC Glucose 153 (*)     All other components within normal limits   MIXED VENOUS GAS, POINT OF CARE - Abnormal; Notable for the following components:    PH MIXED 7.207 (*)     PCO2, Mixed 35.5 (*)     PO2, Mixed 60.7 (*)     HCO3, Mixed 14.1 (*)     Negative Base Excess, Mixed 13 (*)     O2 Sat, Mixed 85 (*)     All other components within normal limits   POC GLUCOSE FINGERSTICK - Abnormal; Notable for the following components:    POC Glucose 182 (*)     All other components within normal limits   CULTURE, BLOOD 1   CULTURE, BLOOD 2   CULTURE, URINE   MRSA DNA PROBE, NASAL   HGB/HCT   CALCIUM, IONIC (POC)   LACTATE, SEPSIS   LACTATE, SEPSIS   VANCOMYCIN LEVEL, RANDOM   BASIC METABOLIC PANEL W/ REFLEX TO MG FOR LOW K   CALCIUM, IONIZED   MAGNESIUM   PHOSPHORUS   CBC WITH AUTO DIFFERENTIAL   BLOOD GAS, ARTERIAL   LACTIC ACID,POINT OF CARE         RADIOLOGY:  CT Head W/O Contrast    Result Date: 12/3/2022  EXAMINATION: CT OF THE HEAD WITHOUT CONTRAST  12/3/2022 11:11 am TECHNIQUE: CT of the head was performed without the administration of intravenous contrast. Automated exposure control, iterative reconstruction, and/or weight based adjustment of the mA/kV was utilized to reduce the radiation dose to as low as reasonably achievable. COMPARISON: None.  HISTORY: ORDERING SYSTEM PROVIDED HISTORY: AMS TECHNOLOGIST PROVIDED HISTORY: AMS Decision Support Exception - unselect if not a suspected or confirmed emergency medical condition->Emergency Medical Condition (MA) Is the patient pregnant?->No Reason for Exam: ams FINDINGS: BRAIN/VENTRICLES: There is no acute intracranial hemorrhage, mass effect or midline shift. No abnormal extra-axial fluid collection. The gray-white differentiation is maintained without evidence of an acute infarct. There is prominence of the ventricles and sulci due to global parenchymal volume loss. There are nonspecific areas of hypoattenuation within the periventricular and subcortical white matter, which likely represent chronic microvascular ischemic change. ORBITS: The visualized portion of the orbits demonstrate no acute abnormality. SINUSES: The visualized paranasal sinuses and mastoid air cells demonstrate no acute abnormality. SOFT TISSUES/SKULL: No acute abnormality of the visualized skull or soft tissues. No acute intracranial abnormality. XR CHEST PORTABLE    Result Date: 12/3/2022  EXAMINATION: ONE XRAY VIEW OF THE CHEST 12/3/2022 2:11 pm COMPARISON: Same day study HISTORY: ORDERING SYSTEM PROVIDED HISTORY: [facility] central line placement TECHNOLOGIST PROVIDED HISTORY: [facility] central line placement FINDINGS: ET tube is been retracted in the interval with tip now approximately 3 cm above the denys. Left IJ catheter has been placed with tip overlying the cavoatrial junction. NG tube remains in satisfactory position. There is no pneumothorax. Patchy multifocal bilateral airspace disease is similar to prior. Satisfactory line and tube placement.      XR CHEST PORTABLE    Result Date: 12/3/2022  EXAMINATION: ONE SUPINE XRAY VIEW(S) OF THE ABDOMEN; ONE XRAY VIEW OF THE CHEST 12/3/2022 10:28 am COMPARISON: Chest x-ray 01/22/2020, abdomen 11/04/2022 and 06/21/2020 HISTORY: ORDERING SYSTEM PROVIDED HISTORY: Confirmation of course of NG/OG/NE tube and location of tip of tube TECHNOLOGIST PROVIDED HISTORY: Confirmation of course of NG/OG/NE tube and location of tip of tube Portable? ->Yes Reason for Exam: port supine FINDINGS: Chest x-ray: ETT is at the denys just entering right mainstem bronchus by few mm. Repositioning advised. NG tube courses into the abdomen. Marked elevation of right hemidiaphragm showing some progression from 2020. Patchy perihilar opacities. No pleural effusion or pneumothorax. No acute bony abnormality. Abdomen: NG tube terminates in the right upper quadrant. The side port is projecting the midline probably in the distal gastric antrum. Peg tube is in place similar to prior. No small bowel distension. Some gaseous prominence of loop of colon in the right lower abdomen. Degenerative changes in the bones. Osteopenia. Cholelithiasis. Chest x-ray: 1. ETT is just entering the right mainstem bronchus by few mm. Repositioning advised. 2. Some progression of mild elevation right hemidiaphragm. 3. Patchy perihilar opacities probably vascular. Abdomen: 1. NG tube terminates in the left upper quadrant. Side port is positioned in the midline probably in the distal gastric antrum. 2. Cholelithiasis. 3. Peg tube in place. 4. No small bowel distension. Findings were discussed with MORTEZA PAGE at 11:07 a.m. on 12/3/2022. XR ABDOMEN FOR NG/OG/NE TUBE PLACEMENT    Result Date: 12/3/2022  EXAMINATION: ONE SUPINE XRAY VIEW(S) OF THE ABDOMEN; ONE XRAY VIEW OF THE CHEST 12/3/2022 10:28 am COMPARISON: Chest x-ray 01/22/2020, abdomen 11/04/2022 and 06/21/2020 HISTORY: ORDERING SYSTEM PROVIDED HISTORY: Confirmation of course of NG/OG/NE tube and location of tip of tube TECHNOLOGIST PROVIDED HISTORY: Confirmation of course of NG/OG/NE tube and location of tip of tube Portable? ->Yes Reason for Exam: port supine FINDINGS: Chest x-ray: ETT is at the denys just entering right mainstem bronchus by few mm. Repositioning advised. NG tube courses into the abdomen.  Marked elevation of right hemidiaphragm showing some progression from 2020. Patchy perihilar opacities. No pleural effusion or pneumothorax. No acute bony abnormality. Abdomen: NG tube terminates in the right upper quadrant. The side port is projecting the midline probably in the distal gastric antrum. Peg tube is in place similar to prior. No small bowel distension. Some gaseous prominence of loop of colon in the right lower abdomen. Degenerative changes in the bones. Osteopenia. Cholelithiasis. Chest x-ray: 1. ETT is just entering the right mainstem bronchus by few mm. Repositioning advised. 2. Some progression of mild elevation right hemidiaphragm. 3. Patchy perihilar opacities probably vascular. Abdomen: 1. NG tube terminates in the left upper quadrant. Side port is positioned in the midline probably in the distal gastric antrum. 2. Cholelithiasis. 3. Peg tube in place. 4. No small bowel distension. Findings were discussed with MORTEZA PAGE at 11:07 a.m. on 12/3/2022. FLUORO FOR SURGICAL PROCEDURES    Result Date: 12/3/2022  Radiology exam is complete. No Radiologist dictation. Please follow up with ordering provider. CT CHEST ABDOMEN PELVIS WO CONTRAST Additional Contrast? None    Result Date: 12/3/2022  EXAMINATION: CT OF THE CHEST, ABDOMEN, AND PELVIS WITHOUT CONTRAST 12/3/2022 11:11 am TECHNIQUE: CT of the chest, abdomen and pelvis was performed without the administration of intravenous contrast. Multiplanar reformatted images are provided for review. Automated exposure control, iterative reconstruction, and/or weight based adjustment of the mA/kV was utilized to reduce the radiation dose to as low as reasonably achievable.  COMPARISON: Chest x-ray 12/03/2022, CT chest 07/28/2019, CT abdomen pelvis 07/28/2019 HISTORY: ORDERING SYSTEM PROVIDED HISTORY: Sepsis of unknown origin TECHNOLOGIST PROVIDED HISTORY: Sepsis of unknown origin Is the patient pregnant?->No Reason for Exam: sepsis of unknown origin FINDINGS: Chest: Mediastinum: ETT now terminates 12 mm above denys where on the earlier chest x-ray was just entering right mainstem bronchus. Cardiac size normal.  No significant mediastinal lymphadenopathy. Thyroid gland unremarkable. Lungs/pleura: Patchy perihilar consolidations and tracking along posterior medial aspect lower lobes. More confluent consolidation along posterior segment right upper lobe. Findings likely combination of pneumonia and subsegmental atelectasis. Trace right pleural effusion. Soft Tissues/Bones: No acute abnormality of the bones. The superficial soft tissues show no significant abnormalities. Abdomen/Pelvis: Organs: Streak artifacts from patient's arms by the sides limits evaluation. Further limitation due to lack of IV contrast.  Within this limitation liver, spleen, pancreas, adrenal glands show no significant abnormalities. No convincing cholelithiasis. Some distention of the gallbladder noted. Multiple right renal calculi mostly in the range from 5-8 mm scattered throughout the kidney are too numerous to count. There is moderate to severe right hydronephrosis and hydroureter. There is is accounted for by a stack of 4 calculi in the distal right ureter ranging from 5-11 mm in size. The most distal calculus measures 11 mm and is located about 2 cm from the right ureterovesical junction. [facility] GI/Bowel: There is limited evaluation due to absence of oral contrast.  NG tube terminates in the distal gastric antrum close to the gastro duodenal junction. There is also a PEG tube position in the mid gastric antrum. Normal caliber small bowel. Liquid stool in the ascending colon along with gas bubbles seen along the bowel wall only in the dependent portion and appears to be pseudo pneumatosis representing gastroc between bowel contents and the bowel. None evident in the non dependent bowel wall. Liquid stool may reflect diarrheal disease. No appreciable colonic bowel wall thickening.  Pelvis: Suprapubic bladder catheter in place. Uterus present. 2 cm cystic right adnexal mass. No follow-up imaging is recommended. Reference: JACR 2020 Feb;17(2):248-254 Peritoneum/Retroperitoneum: No ascites. No significant lymphadenopathy. Bones/Soft Tissues: Generalized osteopenia with superimposed diffuse degenerative changes. 1. ETT now terminates 12 mm above denys. 2. Patchy perihilar and more confluent posterior segment airspace consolidations likely combination of pneumonia and atelectasis. Trace right pleural effusion. 3. Innumerable right renal calculi most in the 5-8 mm size range. Moderate to severe right hydronephrosis and hydroureter. There is a stack of 4 obstructing calculi in the distal right ureter ranging from 5-11 mm accounting for finding. 11 mm calculus is most distal and positioned about 2 cm from the uterovesical junction. 4. Liquid stool in the colon suggests diarrheal disease. Tiny gas pockets along the ascending colon wall in the dependent portion appear to be pseudo pneumatosis. EMERGENCY DEPARTMENT COURSE:  ED Course as of 12/03/22 2129   Sat Dec 03, 2022   1409 Patient continued to be hemodynamically unstable during infusion of crystalloids, requiring Levophed infusion. Patient did have peripheral access with two 20-gauge IVs in the hands. Given ongoing pressor requirement and potential instability of peripheral access, given location and size of veins, decision was made to place central venous catheter. First, the right IJ was attempted. Vein was accessed, but guidewire could not be advanced beyond approximately 8-10 cm due to presence of distal clot, likely at the junction of the right IJ and right subclavian veins. This attempt was aborted and direct pressure was held for 5 minutes before attempting other side.   Following hemostasis and before second attempt, consult was placed to critical care and results of pending labs and CT were reviewed, which were consistent with impacted right ureteral stones and hydronephrosis. These results were reviewed while speaking with the critical care resident, who then consulted urology. Admission order to medical ICU was placed. Patient continued to require Levophed, so central access was attempted in the left IJ and was successful. Care of patient was then transferred to medical ICU resident. Please see ICU resident H&P for additional details regarding care of patient. [GG]      ED Course User Index  [GG] Maria Esther Vargas MD          PROCEDURES:    PROCEDURE NOTE - EMERGENCY INTUBATION    PATIENT NAME: Flaquito Mcclain  MEDICAL RECORD NO. [id number]  DATE: 12/3/2022  ATTENDING PHYSICIAN: Dr. Jace York DIAGNOSIS:  Acute Respiratory Failure  POSTOPERATIVE DIAGNOSIS:  Same  PROCEDURE PERFORMED:  Emergency endotracheal intubation  PERFORMING PHYSICIAN: Maria Esther Vargas MD    MEDICATIONS: etomidate intravenously and rocuronium intravenously    DISCUSSION:  Flaquito Mcclain is a 43y.o.-year-old female who requires intubation and ventilatory support due to Respiratory failure and airway compromise. The history and physical examination were reviewed and confirmed. CONSENT: Unable to be obtained due to patient's condition. PROCEDURE:  A timeout was initiated by the bedside nurse and was confirmed by those present. The patient was placed in the appropriate position. Cricoid pressure was not required. Intubation was performed by direct laryngoscopy using a laryngoscope and a 7.5 cuffed endotracheal tube. The cuff was then inflated and the tube was secured appropriately at a distance of 25 cm to the dental ridge. Initial confirmation of placement included bilateral breath sounds, an end tidal CO2 detector, absence of sounds over the stomach, tube fogging, adequate chest rise, and adequate pulse oximetry reading.   A chest x-ray to verify correct placement of the tube showed a right mainstem intubation and the tube was repositioned appropriately. The patient tolerated the procedure well. COMPLICATIONS:  None     Cassandra Anaya MD  9:29 PM, 12/3/22     PROCEDURE NOTE - CENTRAL VENOUS LINE PLACEMENT    PATIENT NAME: Sangeeta Oshea [place] RECORD NO. [id number]  DATE: 12/3/2022  ATTENDING PHYSICIAN: Dr. Garret Han DIAGNOSIS:  poor peripheral access, vascular access and centrally administered medications  POSTOPERATIVE DIAGNOSIS:  Same  PROCEDURE PERFORMED:  Left Internal Jugular Vein Central Line Insertion  PERFORMING PHYSICIAN: Cassandra Anaya MD  ANESTHESIA:  Local utilizing 1% lidocaine  ESTIMATED BLOOD LOSS:  Less than 25 ml  COMPLICATIONS:  None immediately appreciated. DISCUSSION:  Solo Vizcaino is a 43y.o.-year-old female who requires central IV access due to poor peripheral access, need for vascular access and centrally administered medications. The history and physical examination were reviewed and confirmed. CONSENT: Unable to be obtained due to patient's condition. PROCEDURE:  A timeout was initiated by the bedside nurse and was confirmed by those present. The patient was placed in a supine position. The skin overlying the Left Internal Jugular Vein was prepped with chlorhexadine and draped in sterile fashion. The skin was infiltrated with local anesthetic. The vessel and surrounding anatomy was visualized using ultrasound. Through the anesthetized region, the introducer needle was inserted into the internal jugular vein returning dark red non pulsatile blood. A guidewire was placed through the center of the needle with no resistance. Ultrasound confirmed presence of wire in the vein. A small incision made in the skin with a #11 scalpel blade. The dilator was inserted into the skin and vein over guidewire using Seldinger technique. The dilator was then removed and the 7F 20cm catheter was placed in the vein over the guidewire using Seldinger technique.  The guidewire was then removed and all ports aspirated and flushed appropriately. The catheter then secured using silk suture and a temporary sterile dressing was applied. No immediate complication was evident. All sponge, instrument and needle counts were correct at the completion of the procedure. Postprocedural chest x-ray showed good position of the catheter with no evidence of hemothorax or pneumothorax. The patient tolerated the procedure well with no immediate complication evident. Cesia Montilla MD  9:29 PM, 12/3/22      CONSULTS:  IP CONSULT TO DIETITIAN  IP CONSULT TO UROLOGY  IP CONSULT TO INFECTIOUS DISEASES    CRITICAL CARE:  120 minutes    FINAL IMPRESSION      1. Septic shock ([address]    2. Altered mental status, unspecified altered mental status type    3. Acute respiratory failure, unspecified whether with hypoxia or hypercapnia ([address]    4. NUZHAT (acute kidney injury) ([address]          DISPOSITION / PLAN     DISPOSITION Admitted 12/03/2022 02:09:02 PM      PATIENT REFERRED TO:  No follow-up provider specified.     DISCHARGE MEDICATIONS:  Current Discharge Medication List          Cesia Montilla MD  Emergency Medicine Resident    (Please note that portions of this note were completed with a voice recognition program.Efforts were made to edit the dictations but occasionally words are mis-transcribed.)        Cesia Montilla MD  Resident  12/03/22 2130       Cesia Montilla MD  Resident  12/03/22 2132

## 2022-12-03 NOTE — PROGRESS NOTES
Charting intubations and reintubations    ETT Size (e.g. 7. 5) #7.5 CUFFED ETT  ETT Placement (e.g. 23 cm at lips) 25CM AT LIP FOR CXR, PULLED BACK TO 22CM AT LIP POST CXR  ETT secured with (commercial device name) MARTHA    Tube placement verified by:  Auscultation (yes/no) YES  Positive color change on end tidal CO2 detector (yes/no) YES  CXR (yes/no) YES    Reason for intubation (jot a quick note/phrase e.g. Impending respiratory failure) RESP FAILURE, AW PROTECTION    If difficult airway, was red tape placed (yes/no) NO  If code situation, was rescue pod used? (yes/no) NO    Intubated by Dr. Tesha Hughes    -- Notify charge therapist regarding all intubations, extubations, reintubations and self extubations    Juan Alberto Brown RCP  10:43 AM

## 2022-12-03 NOTE — ED PROVIDER NOTES
[facility]  Emergency Department  Faculty Attestation     I performed a history and physical examination of the patient and discussed management with the resident. I reviewed the residents note and agree with the documented findings and plan of care. Any areas of disagreement are noted on the chart. I was personally present for the key portions of any procedures. I have documented in the chart those procedures where I was not present during the key portions. I have reviewed the emergency nurses triage note. I agree with the chief complaint, past medical history, past surgical history, allergies, medications, social and family history as documented unless otherwise noted below. For Physician Assistant/ Nurse Practitioner cases/documentation I have personally evaluated this patient and have completed at least one if not all key elements of the E/M (history, physical exam, and MDM). Additional findings are as noted.       Primary Care Physician:  Rickie Holguin MD    Screenings:  [unfilled]    CHIEF COMPLAINT       Chief Complaint   Patient presents with    Altered Mental Status       RECENT VITALS:    ,   ,  ,      LABS:  Labs Reviewed   ELECTROLYTES PLUS - Abnormal; Notable for the following components:       Result Value    POC Sodium 133 (*)     POC TCO2 20 (*)     All other components within normal limits   CBC WITH AUTO DIFFERENTIAL - Abnormal; Notable for the following components:    WBC 36.1 (*)     RDW 15.9 (*)     All other components within normal limits   VENOUS BLOOD GAS, POINT OF CARE - Abnormal; Notable for the following components:    pH, Niraj 7.249 (*)     pO2, Niraj 130.2 (*)     HCO3, Venous 20.4 (*)     Negative Base Excess, Niraj 7 (*)     O2 Sat, Niraj 98 (*)     All other components within normal limits   CREATININE W/GFR POINT OF CARE - Abnormal; Notable for the following components:    POC Creatinine 3.08 (*)     All other components within normal limits UREA N (POC) - Abnormal; Notable for the following components:    POC BUN 54 (*)     All other components within normal limits   POCT GLUCOSE - Abnormal; Notable for the following components:    POC Glucose 153 (*)     All other components within normal limits   CULTURE, BLOOD 1   CULTURE, BLOOD 2   CULTURE, URINE   HGB/HCT   CALCIUM, IONIC (POC)   LACTATE, SEPSIS   COMPREHENSIVE METABOLIC PANEL   PROTIME-INR   LACTATE, SEPSIS   URINALYSIS WITH MICROSCOPIC   LACTIC ACID,POINT OF CARE       Radiology  XR CHEST PORTABLE    (Results Pending)   XR ABDOMEN FOR NG/OG/NE TUBE PLACEMENT    (Results Pending)   CT Head W/O Contrast    (Results Pending)   CT CHEST ABDOMEN PELVIS W CONTRAST Additional Contrast? None    (Results Pending)       CRITICAL CARE: There was a high probability of clinically significant/life threatening deterioration in this patient's condition which required my urgent intervention. Total critical care time was 25 minutes. This excludes any time for separately reportable procedures. EKG:      Attending Physician Additional  Notes    Patient is transferred by EMS from an ECF. 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 ECF states that she has been worsening in her mental status since last night. No obvious fever that we are aware of. No cough or vomiting. EMS found her to be hypotensive with systolic of 75 and tachycardic to 120 and unresponsive. They attempted nasal intubation and had a copious amount of brown sputum secretions returned after failed intubation. She has no IV access on arrival.  Here she is tachycardic and hypotensive. She opens her eyes to verbal stimuli but does not verbalize or nod head yes/no. No withdrawal of the upper extremities. She feels warm centrally. Lungs have rhonchi. Abdomen is without peritoneal findings.   There is purulent drainage from around the suprapubic catheter. There is stool and air in her colostomy site. Oropharynx is dry. Conjunctiva is without drainage or injection. Concern is for septic shock or other form of shock including dehydration. Plan is IV access, fluids, sepsis labs, cultures, lactate, point-of-care chemistries, Levophed, fluid boluses, airway protection. The left knee distal femur IO insertion failed. We were able to obtain access in both hands for labs and fluids. She had transient normal-tension prior to intubation and she was intubated after RSI with 20 mg etomidate and 100 mg rocuronium. Video laryngoscopy shows large amount of brown secretions in the airway concerning for infectious process or even aspiration. Breath sounds are symmetrical, abdomen is silent, tube placement is confirmed by end-tidal CO2 waveform and chest x-ray which was slightly deep. This was pulled back to 22 cm and there is symmetrical breath sounds and lowering of peak pressures. There is possible right side volume loss versus infiltrate. Will exclude urinary tract infection sores or skin sores after rolling her for skin exam.  Plan is cefepime, vancomycin, 30 mL/kilogram fluid bolus, CT head and CT abdomen, admission to ICU. Claudene Caraway.  Colten Gillette MD, [facility]  Attending Emergency  Physician                Deniz Muñoz MD  12/03/22 [id number]

## 2022-12-03 NOTE — ANESTHESIA PRE PROCEDURE
Department of Anesthesiology  Preprocedure Note       Name:  Martha Carter   Age:  43 y.o.  :  1980                                          MRN:  [id number]         Date:  12/3/2022      Surgeon: Alan Braswell):  Galdino Tejeda MD    Procedure: Procedure(s):  CYSTOSCOPY URETERAL STENT INSERTION    Medications prior to admission:   Prior to Admission medications    Medication Sig Start Date End Date Taking? Authorizing Provider   atropine 1 % ophthalmic solution 3 drops every 4 hours as needed Give 3 drops sublingually for secretion    Historical Provider, MD   metoprolol tartrate (LOPRESSOR) 25 MG tablet Take 25 mg by mouth three times daily    Historical Provider, MD   miconazole (MICOTIN) 2 % powder Apply topically 2 times daily. 20   Solomon Diaz MD   midodrine (PROAMATINE) 5 MG tablet Take 1 tablet by mouth 2 times daily (with meals) 20   Solomon Diaz MD   acetaminophen-codeine (TYLENOL/CODEINE #3) 300-30 MG per tablet Take 1 tablet by mouth every 8 hours as needed for Pain.     Historical Provider, MD   D-Mannose 500 MG CAPS Take 3 capsules by mouth three times daily    Historical Provider, MD   atropine 1 % ophthalmic solution 2-4 drops every 4 hours as needed    Historical Provider, MD   metoclopramide (REGLAN) 5 MG/5ML solution Take 5 mLs by mouth 3 times daily 19   Mirna Dense P Blood, DO   rivaroxaban (XARELTO) 20 MG TABS tablet Take 1 tablet by mouth daily 19   Pattie Borden MD   sertraline (ZOLOFT) 25 MG tablet 1 tablet by Per G Tube route daily 19   Pattie Borden MD   docusate (COLACE) 50 MG/5ML liquid Take 10 mLs by mouth daily 19   Pattie Borden MD   Multiple Vitamins-Minerals (CENTRUM/CERTA-DERICK WITH MINERALS ORAL) solution 15 mLs by Per G Tube route daily 19   Pattie Borden MD   lansoprazole (PREVACID SOLUTAB) 30 MG disintegrating tablet 1 tablet by Per G Tube route every morning (before breakfast) 19   Pattie Borden MD   Cranberry 250 MG CAPS Take 250 mg by mouth 2 times daily    Historical Provider, MD   Scopolamine Base (SCOPOLAMINE TD) Place 1 mg onto the skin every 72 hours     Historical Provider, MD   baclofen (LIORESAL) 10 MG tablet 10 mg by Per G Tube route 3 times daily Crush into G tube     Historical Provider, MD   gabapentin (NEURONTIN) 300 MG capsule 300 mg by Per G Tube route 3 times daily    Historical Provider, MD   medroxyPROGESTERone (DEPO-PROVERA) 150 MG/ML injection Inject 150 mg into the muscle every 3 months On the 11th     Historical Provider, MD   acetaminophen (TYLENOL) 80 MG/0.8ML suspension 650 mg by Per G Tube route every 6 hours as needed for Fever or Pain     Historical Provider, MD   glycopyrrolate (ROBINUL) 1 MG tablet Take 1 mg by mouth 3 times daily     Historical Provider, MD       Current medications:    Current Facility-Administered Medications   Medication Dose Route Frequency Provider Last Rate Last Admin    norepinephrine (LEVOPHED) 16 mg in sodium chloride 0.9 % 250 mL infusion  1-100 mcg/min IntraVENous Continuous Matthew Calderon MD 46.9 mL/hr at 12/03/22 1108 50 mcg/min at 12/03/22 1108    propofol injection  5-50 mcg/kg/min IntraVENous Continuous Matthew Calderon MD 6 mL/hr at 12/03/22 1037 10 mcg/kg/min at 12/03/22 1037    albuterol (PROVENTIL) nebulizer solution 2.5 mg  2.5 mg Nebulization PRN Matthew Calderon MD   10 mg at 12/03/22 1100    ipratropium (ATROVENT) 0.02 % nebulizer solution 0.5 mg  0.5 mg Nebulization Q6H PRN Matthew Calderon MD   0.5 mg at 12/03/22 1043    sodium chloride flush 0.9 % injection 5-40 mL  5-40 mL IntraVENous 2 times per day Sabra Meza MD        sodium chloride flush 0.9 % injection 5-40 mL  5-40 mL IntraVENous PRN Sabra Meza MD        0.9 % sodium chloride infusion   IntraVENous PRN Sabra Meza MD        polyethylene glycol (GLYCOLAX) packet 17 g  17 g Per G Tube Daily PRN Sabra Meza MD        acetaminophen (TYLENOL) tablet 650 mg  650 mg Per G Tube Q6H SUSIE Arriola MD        Or   Angie Reeves acetaminophen (TYLENOL) suppository 650 mg  650 mg Rectal Q6H PRN Corazon Arriola MD        ondansetron [facility]) injection 4 mg  4 mg IntraVENous Q6H PRN Corazon Arriola MD        cefepime (MAXIPIME) 1000 mg IVPB minibag  1,000 mg IntraVENous Q24H Corazon Arriola MD        cefepime (MAXIPIME) 1000 mg IVPB minibag  1,000 mg IntraVENous Once Corazon Arriola MD        [facility]) intermittent dosing (placeholder)   Other RX Tiffanie Davila MD           Allergies:  No Known Allergies    Problem List:    Patient Active Problem List   Diagnosis Code    Aphasia R47.01    Multiple sclerosis ([address] G35    Nonverbal R47.01    Gastrostomy tube dependent ([address] Z93.1    Dislodged gastrostomy tube T85.528A    Neurogenic bladder N31.9    Hemorrhagic cystitis N30.91    Hypokalemia E87.6    Urinary tract infection associated with indwelling urethral catheter ([address] T83.511A, N39.0    Tachycardia R00.0    Gross hematuria R31.0    Lactic acidosis E87.20    Chronic indwelling Davies catheter Z97.8    Peripheral vascular disease ([address] I73.9    Recurrent UTI (urinary tract infection) N39.0    History of pulmonary embolism Z86.711    Hypertension I10    Altered mental status R41.82    Toxic metabolic encephalopathy U16.0    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) A41.51    E coli bacteremia R78.81, B96.20    Nephrostomy tube displaced ([facility] Q48.329O       Past Medical History:        Diagnosis Date    Aphasia     Aphasia     Contracture of joint, lower leg     Depressed     HTN (hypertension)     Hx MRSA infection resolved 1/2017    2 negative nasal screens 1/2017 (hx in heel in 2013)    Hydronephrosis     Movement disorder     tremor    Multiple sclerosis (HCC)     Neurogenic bladder     Non-verbal learning disorder     Peripheral vascular disease (HCC)     Polyneuropathy     Pressure ulcer     Coccyx, Heel    Pulmonary embolism (HCC)     Pulmonary infarction (HCC)     Quadriplegia (HCC)     Tachycardia     UTI (lower urinary tract infection)        Past Surgical History:        Procedure Laterality Date    AMPUTATION Right     COLOSTOMY      GASTROSTOMY TUBE PLACEMENT      IR NEPHROSTOMY PERCUTANEOUS RIGHT  9/27/2020    IR NEPHROSTOMY PERCUTANEOUS RIGHT 9/27/2020 Mary Broderick MD [facility]    LEG AMPUTATION THROUGH FEMUR         Social History:    Social History     Tobacco Use    Smoking status: Never    Smokeless tobacco: Never   Substance Use Topics    Alcohol use: No                                Counseling given: Not Answered      Vital Signs (Current):   Vitals:    12/03/22 1407 12/03/22 1414 12/03/22 1418 12/03/22 1450   BP:   (!) 111/58 125/68   Pulse: (!) 125  (!) 119 (!) 121   Resp: 20 20 20   Temp:  100.1 °F (37.8 °C)     TempSrc:  Rectal     SpO2: 99%  99% 95%   Weight:                                                  BP Readings from Last 3 Encounters:   12/03/22 125/68   11/04/22 106/74   07/17/22 (!) 152/131       NPO Status:                                                                                 BMI:   Wt Readings from Last 3 Encounters:   12/03/22 220 lb 7.4 oz (100 kg)   11/04/22 150 lb (68 kg)   09/27/20 154 lb 5.2 oz (70 kg)     Body mass index is 43.06 kg/m².     CBC:   Lab Results   Component Value Date/Time    WBC 36.1 12/03/2022 10:14 AM    RBC 4.53 12/03/2022 10:14 AM    RBC 4.56 03/11/2012 12:33 PM    HGB 12.3 12/03/2022 10:14 AM    HCT 40.5 12/03/2022 10:14 AM    MCV 89.4 12/03/2022 10:14 AM    RDW 15.9 12/03/2022 10:14 AM     12/03/2022 10:14 AM     03/11/2012 12:33 PM       CMP:   Lab Results   Component Value Date/Time     12/03/2022 10:14 AM    K 4.0 12/03/2022 10:14 AM    CL 96 12/03/2022 10:14 AM    CO2 19 12/03/2022 10:14 AM    BUN 48 12/03/2022 10:14 AM    CREATININE 2.24 12/03/2022 10:14 AM    CREATININE 3.08 12/03/2022 10:07 AM    GFRAA Can not be calculated 08/02/2022 07:10 AM    LABGLOM 27 12/03/2022 10:14 AM    GLUCOSE 144 12/03/2022 10:14 AM    GLUCOSE 69 03/11/2012 12:33 PM    PROT 8.2 12/03/2022 10:14 AM    CALCIUM 8.9 12/03/2022 10:14 AM    BILITOT 0.4 12/03/2022 10:14 AM    ALKPHOS 141 12/03/2022 10:14 AM    AST 58 12/03/2022 10:14 AM    ALT 39 12/03/2022 10:14 AM       POC Tests:   Recent Labs     12/03/22  1007   POCGLU 153*   POCNA 133*   POCK 3.6   POCCL 103   POCBUN 54*   POCHEMO 15.3   POCHCT 45       Coags:   Lab Results   Component Value Date/Time    PROTIME 13.8 12/03/2022 10:14 AM    INR 1.3 12/03/2022 10:14 AM    APTT 40.1 07/06/2020 10:07 AM       HCG (If Applicable):   Lab Results   Component Value Date    PREGTESTUR NEG 06/21/2020        ABGs: No results found for: PHART, PO2ART, ZGZ9GYM, HTM3LXV, BEART, O6FXLVLU     Type & Screen (If Applicable):  No results found for: LABABO, LABRH    Drug/Infectious Status (If Applicable):  Lab Results   Component Value Date/Time    HEPCAB NONREACTIVE 07/06/2017 10:27 AM       COVID-19 Screening (If Applicable):   Lab Results   Component Value Date/Time    COVID19 Not Detected 11/09/2020 09:17 AM       EKG 1/2020  Sinus tachycardia  Low voltage QRS  Inferior infarct , age undetermined  Cannot rule out Anterior infarct , age undetermined  Abnormal ECG  When compared with ECG of 31-JUL-2019 06:33,  Vent.  rate has increased BY  77 BPM  Minimal criteria for Anterior infarct are now Present  ST now depressed in Anterior leads  T wave inversion now evident in Anterior leads        Anesthesia Evaluation    Airway: Mallampati: Unable to assess / NA         Intubated Dental:      Comment: Did not assess, patient is already intubated    Pulmonary:   (+) pneumonia:  decreased breath sounds                            Cardiovascular:    (+) hypertension:,                   Neuro/Psych:   (+) neuromuscular disease:, psychiatric history:depression/anxiety             GI/Hepatic/Renal:   (+) renal disease: CRI, morbid obesity          Endo/Other:                     Abdominal:   (+) obese,           Vascular: negative vascular ROS. Other Findings:           Anesthesia Plan      general     ASA 4 - emergent       Induction: inhalational and intravenous. MIPS: Postoperative ventilation. Anesthetic plan and risks discussed with Unable to obtain due to emergent nature. Plan discussed with CRNA.                     Estefany Almeida MD   12/3/2022

## 2022-12-03 NOTE — PROGRESS NOTES
Direct oral anticoagulant (DOAC) - Initial Pharmacy Review  New start? No, home med  DOAC/dose: xarelto 20  Indication: afib    Recent Labs     12/03/22  1014   HGB 12.3   HCT 40.5        Recent Labs     12/03/22  1014   INR 1.3       Recent Labs     12/03/22  1007 12/03/22  1014   CREATININE 3.08* 2.24*     Estimated Creatinine Clearance: 35 mL/min (A) (based on SCr of 2.24 mg/dL (H)). Significant Drug-Drug Interactions: No interactions/no new drug interactions identified requiring action.     Notes:  Change to 15mg daily when unheld if renal function remains the same .    Mary Vasques PharmD, BCPS 12/3/2022 5:44 PM

## 2022-12-03 NOTE — PLAN OF CARE
Asked by CC attending to sign off - ID no more  needed -will do    Dennis Oswald MD. Infectious Diseases

## 2022-12-03 NOTE — ED NOTES
Dr Sofia Multani at [facility] for intubation.       Fabiana Reeves RN  12/03/22 0733

## 2022-12-03 NOTE — ED NOTES
Sedation meds 20mg etomidate and 100mg rocuronium given by [facility] RN.      Marcus Kilgore RN  12/03/22 5293

## 2022-12-03 NOTE — PROGRESS NOTES
[facility] Pharmacokinetic Monitoring Service - Vancomycin     Jessica Millan is a 43 y.o. female starting on vancomycin therapy for sepsis. Pharmacy consulted by Dr. Stella Myers for monitoring and adjustment. Target Concentration: Dosing based on anticipated concentration <15 mg/L due to renal impairment/insufficiency    Additional Antimicrobials: cefepime    Pertinent Laboratory Values: Wt Readings from Last 1 Encounters:   12/03/22 220 lb 7.4 oz (100 kg)     Temp Readings from Last 1 Encounters:   12/03/22 100.1 °F (37.8 °C) (Rectal)     CrCl cannot be calculated (Unknown ideal weight. ).   Recent Labs     12/03/22  1007 12/03/22  1014   CREATININE 3.08* 2.24*   WBC  --  36.1*       Pertinent Cultures:  Culture Date Source Results   12/3 Blood x No growth, in process   12/3 Urine Sent     Plan:  Concentration-guided dosing due to renal impairment/insufficiency  Baseline SCr ~0.4  Patient received Vancomycin 1500 mg IV x1 today at 1100             - Further doses will be ordered per levels due to NUZHAT   Renal labs as indicated   Pharmacy will continue to monitor patient and adjust therapy as indicated    Thank you for the consult,  Jeniffer Castellano, PharmD, 12/3/2022 2:54 PM

## 2022-12-03 NOTE — H&P
Critical Care - History and Physical Examination    Patient's name:  Matthew Antunez  Medical Record Number: [id number]  Patient's account/billing number: [de-identified]  Patient's YOB: 1980  Age: 43 y.o. Date of Admission: 12/3/2022 10:03 AM  Date of History and Physical Examination: 12/4/2022    Primary Care Physician: Yari Hernandez MD  Attending Physician: Ana Ballard MD     Code Status: Full Code    Chief complaint:   Chief Complaint   Patient presents with    Altered Mental Status       HISTORY OF PRESENT ILLNESS:   Matthew Antunez is a 43 y.o. female presented with altered mental status. Patient has a history of MS, nonverbal at baseline, hypertension, hydronephrosis, neurogenic bladder (chronic indwelling suprapubic catheter), G tube dependent,  PVD, PE. This patient presented to the emergency department with a GCS of 3. She was immediately intubated. She did have tachycardia thereafter up to the 130s, blood pressures 128/87 on Levophed, SPO2 100% on FiO2 of 100 respiratory rate of 20. Blood work demonstrated lactate of 1.36, sodium 130, chloride 96, CO2 19, WBC 36.1, creatinine 3.08. [place] Urinalysis remarkable for hemoglobin, many bacteria, large leukocyte esterase. She was given vancomycin and cefepime. CT head showed no acute intracranial abnormality. CT chest abdomen pelvis demonstrating likely pneumonia, innumerable renal calculi on the right side ranging in 5 to 8 mm in size. There is a stack of obstructing calculi in the right ureter ranging from 5 to 11 mm, causing severe right hydronephrosis and hydroureter. Tiny gas pockets in the ascending colon showing pseudo pneumatosis. A central line was placed in left IJ. She was started on propofol and levophed. Was given Cefepime and Vancomycin.      Past Medical History:        Diagnosis Date    Aphasia     Aphasia     Contracture of joint, lower leg     Depressed     HTN (hypertension)     Hx MRSA infection resolved 1/2017    2 negative nasal screens 1/2017 (hx in heel in 2013)    Hydronephrosis     Movement disorder     tremor    Multiple sclerosis (HCC)     Neurogenic bladder     Non-verbal learning disorder     Peripheral vascular disease ([address]     Polyneuropathy     Pressure ulcer     Coccyx, Heel    Pulmonary embolism (HCC)     Pulmonary infarction (HCC)     Quadriplegia (HCC)     Tachycardia     UTI (lower urinary tract infection)        Past Surgical History:        Procedure Laterality Date    AMPUTATION Right     COLOSTOMY      GASTROSTOMY TUBE PLACEMENT      IR NEPHROSTOMY PERCUTANEOUS RIGHT  9/27/2020    IR NEPHROSTOMY PERCUTANEOUS RIGHT 9/27/2020 Terrance Land MD [facility]    LEG AMPUTATION THROUGH FEMUR         Allergies:    No Known Allergies      Home Meds:   Prior to Admission medications    Medication Sig Start Date End Date Taking? Authorizing Provider   atropine 1 % ophthalmic solution 3 drops every 4 hours as needed Give 3 drops sublingually for secretion    Historical Provider, MD   metoprolol tartrate (LOPRESSOR) 25 MG tablet Take 25 mg by mouth three times daily    Historical Provider, MD   miconazole (MICOTIN) 2 % powder Apply topically 2 times daily. 1/25/20   Lukas Wyatt MD   midodrine (PROAMATINE) 5 MG tablet Take 1 tablet by mouth 2 times daily (with meals) 1/25/20   Lukas Wyatt MD   acetaminophen-codeine (TYLENOL/CODEINE #3) 300-30 MG per tablet Take 1 tablet by mouth every 8 hours as needed for Pain.     Historical Provider, MD   D-Mannose 500 MG CAPS Take 3 capsules by mouth three times daily    Historical Provider, MD   atropine 1 % ophthalmic solution 2-4 drops every 4 hours as needed    Historical Provider, MD   metoclopramide (REGLAN) 5 MG/5ML solution Take 5 mLs by mouth 3 times daily 8/2/19   Vaguhn Conley P Blood, DO   rivaroxaban (XARELTO) 20 MG TABS tablet Take 1 tablet by mouth daily 5/24/19   Pita Bacon MD   sertraline (ZOLOFT) 25 MG tablet 1 tablet by Per G Tube route daily 5/24/19   Alivia Meng MD   docusate (COLACE) 50 MG/5ML liquid Take 10 mLs by mouth daily 5/24/19   Alivia Meng MD   Multiple Vitamins-Minerals (CENTRUM/CERTA-DERICK WITH MINERALS ORAL) solution 15 mLs by Per G Tube route daily 5/24/19   Alivia Meng MD   lansoprazole (PREVACID SOLUTAB) 30 MG disintegrating tablet 1 tablet by Per G Tube route every morning (before breakfast) 5/24/19   Alivia Meng MD   Cranberry 250 MG CAPS Take 250 mg by mouth 2 times daily    Historical Provider, MD   Scopolamine Base (SCOPOLAMINE TD) Place 1 mg onto the skin every 72 hours     Historical Provider, MD   baclofen (LIORESAL) 10 MG tablet 10 mg by Per G Tube route 3 times daily Crush into G tube     Historical Provider, MD   gabapentin (NEURONTIN) 300 MG capsule 300 mg by Per G Tube route 3 times daily    Historical Provider, MD   medroxyPROGESTERone (DEPO-PROVERA) 150 MG/ML injection Inject 150 mg into the muscle every 3 months On the 11th     Historical Provider, MD   acetaminophen (TYLENOL) 80 MG/0.8ML suspension 650 mg by Per G Tube route every 6 hours as needed for Fever or Pain     Historical Provider, MD   glycopyrrolate (ROBINUL) 1 MG tablet Take 1 mg by mouth 3 times daily     Historical Provider, MD       Social History:   TOBACCO:   reports that she has never smoked. She has never used smokeless tobacco.  ETOH:   reports no history of alcohol use. DRUGS:  reports no history of drug use. OCCUPATION:      Family History:       Problem Relation Age of Onset    No Known Problems Mother     No Known Problems Father        REVIEW OF SYSTEMS (ROS):  Could not be evaluated - intubated and sedated on initial assessment.       Physical Exam:    Vitals: /72   Pulse (!) 108   Temp 99.5 °F (37.5 °C) (Esophageal)   Resp 20   Ht 5' (1.524 m)   Wt 195 lb 1.7 oz (88.5 kg)   SpO2 96%   BMI 38.10 kg/m²     Last Body weight:   Wt Readings from Last 3 Encounters:   12/04/22 195 lb 1.7 oz (88.5 kg) 11/04/22 150 lb (68 kg)   09/27/20 154 lb 5.2 oz (70 kg)       Body Mass Index : Body mass index is 38.1 kg/m². PHYSICAL EXAMINATION :  Constitutional: Intubated. Sedated. No acute distress. HEENT: PERRLA, EOMI, sclera clear, anictericm nudkube trachea  Respiratory: intubated; ronchi bilaterally  Cardiovascular: regular rate and rhythm, no murmur or rub  Abdomen: soft, nondistended. A colostomy bag is present. A G tube is present. A suprapubic catheter is present. Surrounding skin appears normal with no erythema, breakdown, drainage. Extremities:  Right AKA. Peripheral pulses normal, no pedal edema, no clubbing or cyanosis; Left leg with increased tone, knee flexed  Skin: No erythema in intertriginous regions. There is sacrococcygeal skin hypopigmentation from previous breakdown. No skin ulcers.      MEDICATIONS:  Scheduled Meds:   sodium phosphate IVPB  20 mmol IntraVENous Once    rivaroxaban  20 mg Oral Daily    potassium chloride  20 mEq IntraVENous Q1H    sodium chloride flush  5-40 mL IntraVENous 2 times per day    gabapentin  300 mg Per G Tube TID    pantoprazole (PROTONIX) 40 mg injection  40 mg IntraVENous Daily    metoclopramide  5 mg Oral TID    [Held by provider] metoprolol tartrate  25 mg Oral TID    midodrine  5 mg Oral BID WC    [Held by provider] meropenem  1,000 mg IntraVENous Q12H    amikacin (AMIKIN) IVPB  5 mg/kg (Ideal) IntraVENous Once    cefepime  1,000 mg IntraVENous Q24H       Continuous Infusions:   lactated ringers 100 mL/hr at 12/04/22 0648    propofol 15 mcg/kg/min (12/04/22 0648)    norepinephrine 10 mcg/min (12/04/22 0648)    sodium chloride 10 mL/hr at 12/04/22 0648       PRN Meds:   albuterol, 2.5 mg, PRN  ipratropium, 0.5 mg, Q6H PRN  sodium chloride flush, 5-40 mL, PRN  sodium chloride, , PRN  polyethylene glycol, 17 g, Daily PRN  acetaminophen, 650 mg, Q6H PRN   Or  acetaminophen, 650 mg, Q6H PRN  ondansetron, 4 mg, Q6H PRN      SUPPORT DEVICES: [x] Ventilator [] BIPAP [] Nasal Cannula [] Room Air    VENT SETTINGS (Comprehensive) (if applicable): PRVC mode, FiO2 40%, PEEP 8, Respiratory Rate 20, Tidal Volume 440  Vent Information  Ventilator ID: [id number]  Equipment Changed: HME, Expiratory Filter  Ventilator Initiate: Yes  Vent Mode: AC/PRVC  Additional Respiratory Assessments  Heart Rate: (!) 108  Resp: 20  SpO2: 96 %  End Tidal CO2: 29 (%)  Position: Semi-[place]'s  Humidification Source: HME  Skin Barrier Applied: Yes  Lab Results   Component Value Date/Time    MODE [facility] 12/04/2022 04:12 AM       LABS:   Arterial Blood Gas result:  pH 7.249; pCO2 46.7; pO2 130.2; HCO3 20.4; %O2 Sat 98. No results found for: PH, PCO2, PO2, HCO3, O2SAT    CBC:   Recent Labs     12/04/22  0512   WBC 28.2*   HGB 10.9*        BMP:    Recent Labs     12/03/22  1007 12/03/22  1014 12/03/22  1014 12/04/22  0512   NA  --  130*   < > 142   K  --  4.0   < > 3.4*   CL  --  96*   < > 111*   CO2  --  19*   < > 21   BUN  --  48*   < > 27*   CREATININE 3.08* 2.24*  --  0.76   GLUCOSE  --  144*   < > 121*    < > = values in this interval not displayed. S. Calcium:  Recent Labs     12/04/22  0512   CALCIUM 8.5*     S. Ionized Calcium:No results for input(s): IONCA in the last 72 hours. S. Magnesium:  Recent Labs     12/04/22  0512   MG 1.9     S. Phosphorus:  Recent Labs     12/04/22  0512   PHOS 1.2*     S. Glucose:  Recent Labs     12/03/22  1007 12/03/22  1750 12/04/22  0412   POCGLU 153* 182* 119*     Glycosylated hemoglobin A1C: No results for input(s): LABA1C in the last 72 hours. INR:   Recent Labs     12/03/22  1014   INR 1.3     Hepatic functions:   Recent Labs     12/03/22  1014   ALKPHOS 141*   ALT 39*   AST 58*   PROT 8.2   BILITOT 0.4   LABALBU 3.0*     Pancreatic functions:No results for input(s): LACTA, AMYLASE in the last 72 hours. S. Lactic Acid: No results for input(s): LACTA in the last 72 hours.   Cardiac enzymes:No results for input(s): CKTOTAL, CKMB, CKMBINDEX, TROPONINI in the last 72 hours. BNP:No results for input(s): BNP in the last 72 hours. Lipid profile: No results for input(s): CHOL, TRIG, HDL, LDL, LDLCALC in the last 72 hours. Blood Gases: No results found for: PH, PCO2, PO2, HCO3, O2SAT  Thyroid functions:   Lab Results   Component Value Date/Time    TSH 0.84 05/19/2019 07:50 AM        Urinalysis:   Recent Labs     12/03/22  1104   COLORU Yellow   PHUR 8.0   WBCUA 50    RBCUA 20 TO 50   BACTERIA MANY*   SPECGRAV 1.011   LEUKOCYTESUR LARGE*   UROBILINOGEN Normal   BILIRUBINUR NEGATIVE       Microbiology:  Blood and urine cultures obtained    RADIOLOGY:  FLUORO FOR SURGICAL PROCEDURES   Final Result      XR CHEST PORTABLE   Final Result   Satisfactory line and tube placement. CT Head W/O Contrast   Final Result   No acute intracranial abnormality. CT CHEST ABDOMEN PELVIS WO CONTRAST Additional Contrast? None   Final Result   1. ETT now terminates 12 mm above denys. 2. Patchy perihilar and more confluent posterior segment airspace   consolidations likely combination of pneumonia and atelectasis. Trace right   pleural effusion. 3. Innumerable right renal calculi most in the 5-8 mm size range. Moderate   to severe right hydronephrosis and hydroureter. There is a stack of 4   obstructing calculi in the distal right ureter ranging from 5-11 mm   accounting for finding. 11 mm calculus is most distal and positioned about 2   cm from the uterovesical junction. 4. Liquid stool in the colon suggests diarrheal disease. Tiny gas pockets   along the ascending colon wall in the dependent portion appear to be pseudo   pneumatosis. XR CHEST PORTABLE   Final Result   Chest x-ray:      1. ETT is just entering the right mainstem bronchus by few mm. Repositioning   advised. 2. Some progression of mild elevation right hemidiaphragm. 3. Patchy perihilar opacities probably vascular. Abdomen:      1. NG tube terminates in the left upper quadrant.   Side port is positioned in   the midline probably in the distal gastric antrum. 2. Cholelithiasis. 3. Peg tube in place. 4. No small bowel distension. Findings were discussed with MORTEZA PAGE at 11:07 a.m. on 12/3/2022. XR ABDOMEN FOR NG/OG/NE TUBE PLACEMENT   Final Result   Chest x-ray:      1. ETT is just entering the right mainstem bronchus by few mm. Repositioning   advised. 2. Some progression of mild elevation right hemidiaphragm. 3. Patchy perihilar opacities probably vascular. Abdomen:      1. NG tube terminates in the left upper quadrant. Side port is positioned in   the midline probably in the distal gastric antrum. 2. Cholelithiasis. 3. Peg tube in place. 4. No small bowel distension. Findings were discussed with MORTEZA PAGE at 11:07 a.m. on 12/3/2022. CARDIOLOGY:  Recent Cardiac Catheterization summary:   No results found for this or any previous visit. Electrocardiogram:   NA    Last Echocardiogram findings:   No results found for this or any previous visit. No results found for this or any previous visit.       Assessment and Plan     Patient Active Problem List   Diagnosis    Aphasia    Multiple sclerosis ([address]    Nonverbal    Gastrostomy tube dependent ([address]    Dislodged gastrostomy tube    Neurogenic bladder    Hemorrhagic cystitis    Hypokalemia    Urinary tract infection associated with indwelling urethral catheter (HCC)    Tachycardia    Gross hematuria    Lactic acidosis    Chronic indwelling Davies catheter    Peripheral vascular disease ([address]    Recurrent UTI (urinary tract infection)    History of pulmonary embolism    Hypertension    Altered mental status    Toxic metabolic encephalopathy    ESBL E. coli carrier    Septic shock ([address]    Status post colostomy ([address]    Lower paraplegia (HCC)    Sepsis ([address]    Pneumonia due to organism    Extended spectrum beta lactamase (ESBL) resistance    Abdominal pain    Ileus (HCC)    Decubitus ulcer of coccygeal region, stage 4 (HCC)    Proteus mirabilis infection    Lactate blood increase    E-coli UTI    Sepsis due to Escherichia coli (HCC)    E coli bacteremia    Nephrostomy tube displaced (HCC)       PLAN/MEDICAL DECISION MAKING:  Neurologic:   Neuro checks per protocol  History of MS  On Propofol    Cardiovascular:  Hemodynamically stable  MAP goal 65  Requiring Levophed at this time    Pulmonary:  Maintain oxygen sats >92%  Pulmonary toilet  Currently ventilated. Repeat ABG  GI/Nutrition  Glycolax 17g  Ulcer Prophylaxis: Pantoprazole 40mg IV QD  Dietary consult  Diet:Diet NPO  Renal/Fluid/Electrolyte  IV Fluids: NS 125ml /hr  I/O: In: 2286.4 [I.V.:1287.1]  Out: 3500 [Urine:3450]  Monitor electrolytes, replace PRN   Consult to Urology placed - likely to OR urgently for stent placement and stone extraction  ID  WBC:   Lab Results   Component Value Date    WBC 28.2 (H) 2022     Tmax: Temp (24hrs), Av.8 °F (37.7 °C), Min:99 °F (37.2 °C), Max:100.4 °F (38 °C)  Antimicrobials: Cefepime, Vancomycin  Follow up urine and blood cultures   Has history of MDRO, ESBL. Consultation to ID has been placed. Hematology:  Recent Labs     22  1014 22  0512   HGB 12.3 10.9*      Endocrine:   glucose controlled - most recent BGL is   Recent Labs     22  1014 22  0512   GLUCOSE 144* 121*   No history of DM;  Will add sliding scale insulin as needed    DVT Prophylaxis  On Xarelto  Discharge Needs:  PT and OT      CODE STATUS: Full Code    DISPOSITION:  [x] To remain ICU:   [] OK for out of ICU from [address] MD Rickie  Emergency Medicine  Critical Care Service

## 2022-12-03 NOTE — CONSULTS
Geraldine Mcdaniel, Brannon Villaseñor, Keo Perdomo., [address], [address]   Urology Consultation      Patient:  Jihna Boyer  MRN: [id number]  YOB: 1980    CHIEF COMPLAINT:  Right hydronephrosis with obstructing stones    HISTORY OF PRESENT ILLNESS:   The patient is a 43 y.o. female who came from long-term care facility due to concern for altered mental status, was GCS 3, septic, she was intubated stone pressors. CT abdomen pelvis shows bilateral renal stones, as well as significant right hydronephrosis secondary to obstructive mid ureteral calculi. Urinalysis large leukocyte esterase, many bacteria. WBCs 36.1, Hgb 12.3, creatinine 2.24. Of note she follows with Dr. Brii Hairston. Patient's old records, notes and chart reviewed and summarized above.     Past Medical History:    Past Medical History:   Diagnosis Date    Aphasia     Aphasia     Contracture of joint, lower leg     Depressed     HTN (hypertension)     Hx MRSA infection resolved 1/2017    2 negative nasal screens 1/2017 (hx in heel in 2013)    Hydronephrosis     Movement disorder     tremor    Multiple sclerosis ([address]     Neurogenic bladder     Non-verbal learning disorder     Peripheral vascular disease ([address]     Polyneuropathy     Pressure ulcer     Coccyx, Heel    Pulmonary embolism (HCC)     Pulmonary infarction (HCC)     Quadriplegia (HCC)     Tachycardia     UTI (lower urinary tract infection)        Past Surgical History:    Past Surgical History:   Procedure Laterality Date    AMPUTATION Right     COLOSTOMY      GASTROSTOMY TUBE PLACEMENT      IR NEPHROSTOMY PERCUTANEOUS RIGHT  9/27/2020    IR NEPHROSTOMY PERCUTANEOUS RIGHT 9/27/2020 Jeniffer Preston MD [facility] SPECIAL PROCEDURES    LEG AMPUTATION THROUGH FEMUR         Medications:      Current Facility-Administered Medications:     norepinephrine (LEVOPHED) 16 mg in sodium chloride 0.9 % 250 mL infusion, 1-100 mcg/min, IntraVENous, Continuous, Emma Bojorquez MD, Last Rate: 46.9 mL/hr at 12/03/22 1108, 50 mcg/min at 12/03/22 1108    propofol injection, 5-50 mcg/kg/min, IntraVENous, Continuous, Katiuska Xiong MD, Last Rate: 6 mL/hr at 12/03/22 1037, 10 mcg/kg/min at 12/03/22 1037    albuterol (PROVENTIL) nebulizer solution 2.5 mg, 2.5 mg, Nebulization, PRN, Katiuska Xiong MD, 10 mg at 12/03/22 1100    ipratropium (ATROVENT) 0.02 % nebulizer solution 0.5 mg, 0.5 mg, Nebulization, Q6H PRN, Katiuska Xiong MD, 0.5 mg at 12/03/22 1043    sodium chloride flush 0.9 % injection 5-40 mL, 5-40 mL, IntraVENous, 2 times per day, Tolu Nelson MD    sodium chloride flush 0.9 % injection 5-40 mL, 5-40 mL, IntraVENous, PRN, Tolu Nelson MD    0.9 % sodium chloride infusion, , IntraVENous, PRN, Tolu Nelson MD    polyethylene glycol (GLYCOLAX) packet 17 g, 17 g, Per G Tube, Daily PRN, Tolu Nelson MD    acetaminophen (TYLENOL) tablet 650 mg, 650 mg, Per G Tube, Q6H PRN **OR** acetaminophen (TYLENOL) suppository 650 mg, 650 mg, Rectal, Q6H PRN, Tolu Nelson MD    ondansetron (ZOFRAN) injection 4 mg, 4 mg, IntraVENous, Q6H PRN, Tolu Nelson MD    cefepime (MAXIPIME) 1000 mg IVPB minibag, 1,000 mg, IntraVENous, Q24H, Tolu Nelson MD    cefepime (MAXIPIME) 1000 mg IVPB minibag, 1,000 mg, IntraVENous, Once, Tolu Nelson MD    Current Outpatient Medications:     atropine 1 % ophthalmic solution, 3 drops every 4 hours as needed Give 3 drops sublingually for secretion, Disp: , Rfl:     metoprolol tartrate (LOPRESSOR) 25 MG tablet, Take 25 mg by mouth three times daily, Disp: , Rfl:     miconazole (MICOTIN) 2 % powder, Apply topically 2 times daily. , Disp: 45 g, Rfl: 1    midodrine (PROAMATINE) 5 MG tablet, Take 1 tablet by mouth 2 times daily (with meals), Disp: 30 tablet, Rfl: 1    acetaminophen-codeine (TYLENOL/CODEINE #3) 300-30 MG per tablet, Take 1 tablet by mouth every 8 hours as needed for Pain., Disp: , Rfl:     D-Mannose 500 MG CAPS, Take 3 capsules by mouth three times daily, Disp: , Rfl:     atropine 1 % ophthalmic solution, 2-4 drops every 4 hours as needed, Disp: , Rfl:     metoclopramide (REGLAN) 5 MG/5ML solution, Take 5 mLs by mouth 3 times daily, Disp: 750 mL, Rfl: 3    rivaroxaban (XARELTO) 20 MG TABS tablet, Take 1 tablet by mouth daily, Disp: 30 tablet, Rfl: 2    sertraline (ZOLOFT) 25 MG tablet, 1 tablet by Per G Tube route daily, Disp: 30 tablet, Rfl: 1    docusate (COLACE) 50 MG/5ML liquid, Take 10 mLs by mouth daily, Disp: 5 mL, Rfl: 2    Multiple Vitamins-Minerals (CENTRUM/CERTA-DERICK WITH MINERALS ORAL) solution, 15 mLs by Per G Tube route daily, Disp: 1 Bottle, Rfl: 3    lansoprazole (PREVACID SOLUTAB) 30 MG disintegrating tablet, 1 tablet by Per G Tube route every morning (before breakfast), Disp: 30 tablet, Rfl: 3    Cranberry 250 MG CAPS, Take 250 mg by mouth 2 times daily, Disp: , Rfl:     Scopolamine Base (SCOPOLAMINE TD), Place 1 mg onto the skin every 72 hours , Disp: , Rfl:     baclofen (LIORESAL) 10 MG tablet, 10 mg by Per G Tube route 3 times daily Crush into G tube , Disp: , Rfl:     gabapentin (NEURONTIN) 300 MG capsule, 300 mg by Per G Tube route 3 times daily, Disp: , Rfl:     medroxyPROGESTERone (DEPO-PROVERA) 150 MG/ML injection, Inject 150 mg into the muscle every 3 months On the 11th , Disp: , Rfl:     acetaminophen (TYLENOL) 80 MG/0.8ML suspension, 650 mg by Per G Tube route every 6 hours as needed for Fever or Pain , Disp: , Rfl:     glycopyrrolate (ROBINUL) 1 MG tablet, Take 1 mg by mouth 3 times daily , Disp: , Rfl:     Allergies:  No Known Allergies    Social History:   Social History     Socioeconomic History    Marital status: Single     Spouse name: Not on file    Number of children: Not on file    Years of education: Not on file    Highest education level: Not on file   Occupational History    Not on file   Tobacco Use    Smoking status: Never    Smokeless tobacco: Never   Vaping Use    Vaping Use: Never used   Substance and Sexual Activity Alcohol use: No    Drug use: No    Sexual activity: Never   Other Topics Concern    Not on file   Social History Narrative    Not on file     Social Determinants of Health     Financial Resource Strain: Not on file   Food Insecurity: Not on file   Transportation Needs: Not on file   Physical Activity: Not on file   Stress: Not on file   Social Connections: Not on file   Intimate Partner Violence: Not on file   Housing Stability: Not on file       Family History:    Family History   Problem Relation Age of Onset    No Known Problems Mother     No Known Problems Father        REVIEW OF SYSTEMS: Patient has altered mental status    Physical Exam:      This a 43 y.o. female   Vitals:    12/03/22 1418   BP: (!) 111/58   Pulse: (!) 119   Resp: 20   Temp:    SpO2: 99%     Constitutional: Patient in no acute distress. Neuro: alert and oriented to person place and time. Head: Atraumatic and normocephalic. Neck: Trachea midline. Ext: 2+ radial pulses bilaterally. Psych: Mood and affect normal.  Skin: No rashes or bruising present. Lungs: Respiratory effort normal.  Cardiovascular:  Regular rhythm. Abdomen: Soft, non-tender, non-distended. Has suprapubic tube. Bladder non-tender and not distended. Lymphatics: no palpable lymphadenopathy  Pelvic exam: deferred. Rectal exam not indicated.     Labs:  Recent Labs     12/03/22  1014   WBC 36.1*   HGB 12.3   HCT 40.5   MCV 89.4        Recent Labs     12/03/22  1007 12/03/22  1014   NA  --  130*   K  --  4.0   CL  --  96*   CO2  --  19*   BUN  --  48*   CREATININE 3.08* 2.24*       Recent Labs     12/03/22  1104   COLORU Yellow   PHUR 8.0   WBCUA 50    RBCUA 20 TO 50   BACTERIA MANY*   SPECGRAV 1.011   LEUKOCYTESUR LARGE*   UROBILINOGEN Normal   BILIRUBINUR NEGATIVE           -----------------------------------------------------------------  Imaging Results:  CT Head W/O Contrast    Result Date: 12/3/2022  EXAMINATION: CT OF THE HEAD WITHOUT CONTRAST 12/3/2022 11:11 am TECHNIQUE: CT of the head was performed without the administration of intravenous contrast. Automated exposure control, iterative reconstruction, and/or weight based adjustment of the mA/kV was utilized to reduce the radiation dose to as low as reasonably achievable. COMPARISON: None. HISTORY: ORDERING SYSTEM PROVIDED HISTORY: AMS TECHNOLOGIST PROVIDED HISTORY: [facility] Decision Support Exception - unselect if not a suspected or confirmed emergency medical condition->Emergency Medical Condition (MA) Is the patient pregnant?->No Reason for Exam: ams FINDINGS: BRAIN/VENTRICLES: There is no acute intracranial hemorrhage, mass effect or midline shift. No abnormal extra-axial fluid collection. The gray-white differentiation is maintained without evidence of an acute infarct. There is prominence of the ventricles and sulci due to global parenchymal volume loss. There are nonspecific areas of hypoattenuation within the periventricular and subcortical white matter, which likely represent chronic microvascular ischemic change. ORBITS: The visualized portion of the orbits demonstrate no acute abnormality. SINUSES: The visualized paranasal sinuses and mastoid air cells demonstrate no acute abnormality. SOFT TISSUES/SKULL: No acute abnormality of the visualized skull or soft tissues. No acute intracranial abnormality. XR CHEST PORTABLE    Result Date: 12/3/2022  EXAMINATION: ONE XRAY VIEW OF THE CHEST 12/3/2022 2:11 pm COMPARISON: Same day study HISTORY: ORDERING SYSTEM PROVIDED HISTORY: [facility] central line placement TECHNOLOGIST PROVIDED HISTORY: [facility] central line placement FINDINGS: ET tube is been retracted in the interval with tip now approximately 3 cm above the denys. Left IJ catheter has been placed with tip overlying the cavoatrial junction. NG tube remains in satisfactory position. There is no pneumothorax. Patchy multifocal bilateral airspace disease is similar to prior.      Satisfactory line and tube placement. XR CHEST PORTABLE    Result Date: 12/3/2022  EXAMINATION: ONE SUPINE XRAY VIEW(S) OF THE ABDOMEN; ONE XRAY VIEW OF THE CHEST 12/3/2022 10:28 am COMPARISON: Chest x-ray 01/22/2020, abdomen 11/04/2022 and 06/21/2020 HISTORY: ORDERING SYSTEM PROVIDED HISTORY: Confirmation of course of NG/OG/NE tube and location of tip of tube TECHNOLOGIST PROVIDED HISTORY: Confirmation of course of NG/OG/NE tube and location of tip of tube Portable? ->Yes Reason for Exam: port supine FINDINGS: Chest x-ray: ETT is at the denys just entering right mainstem bronchus by few mm. Repositioning advised. NG tube courses into the abdomen. Marked elevation of right hemidiaphragm showing some progression from 2020. Patchy perihilar opacities. No pleural effusion or pneumothorax. No acute bony abnormality. Abdomen: NG tube terminates in the right upper quadrant. The side port is projecting the midline probably in the distal gastric antrum. Peg tube is in place similar to prior. No small bowel distension. Some gaseous prominence of loop of colon in the right lower abdomen. Degenerative changes in the bones. Osteopenia. Cholelithiasis. Chest x-ray: 1. ETT is just entering the right mainstem bronchus by few mm. Repositioning advised. 2. Some progression of mild elevation right hemidiaphragm. 3. Patchy perihilar opacities probably vascular. Abdomen: 1. NG tube terminates in the left upper quadrant. Side port is positioned in the midline probably in the distal gastric antrum. 2. Cholelithiasis. 3. Peg tube in place. 4. No small bowel distension. Findings were discussed with MORTEZA PAGE at 11:07 a.m. on 12/3/2022.      XR ABDOMEN FOR NG/OG/NE TUBE PLACEMENT    Result Date: 12/3/2022  EXAMINATION: ONE SUPINE XRAY VIEW(S) OF THE ABDOMEN; ONE XRAY VIEW OF THE CHEST 12/3/2022 10:28 am COMPARISON: Chest x-ray 01/22/2020, abdomen 11/04/2022 and 06/21/2020 HISTORY: ORDERING SYSTEM PROVIDED HISTORY: Confirmation of course of NG/OG/NE tube and location of tip of tube TECHNOLOGIST PROVIDED HISTORY: Confirmation of course of NG/OG/NE tube and location of tip of tube Portable? ->Yes Reason for Exam: port supine FINDINGS: Chest x-ray: ETT is at the denys just entering right mainstem bronchus by few mm. Repositioning advised. NG tube courses into the abdomen. Marked elevation of right hemidiaphragm showing some progression from 2020. Patchy perihilar opacities. No pleural effusion or pneumothorax. No acute bony abnormality. Abdomen: NG tube terminates in the right upper quadrant. The side port is projecting the midline probably in the distal gastric antrum. Peg tube is in place similar to prior. No small bowel distension. Some gaseous prominence of loop of colon in the right lower abdomen. Degenerative changes in the bones. Osteopenia. Cholelithiasis. Chest x-ray: 1. ETT is just entering the right mainstem bronchus by few mm. Repositioning advised. 2. Some progression of mild elevation right hemidiaphragm. 3. Patchy perihilar opacities probably vascular. Abdomen: 1. NG tube terminates in the left upper quadrant. Side port is positioned in the midline probably in the distal gastric antrum. 2. Cholelithiasis. 3. Peg tube in place. 4. No small bowel distension. Findings were discussed with MORTEZA PAGE at 11:07 a.m. on 12/3/2022. CT CHEST ABDOMEN PELVIS WO CONTRAST Additional Contrast? None    Result Date: 12/3/2022  EXAMINATION: CT OF THE CHEST, ABDOMEN, AND PELVIS WITHOUT CONTRAST 12/3/2022 11:11 am TECHNIQUE: CT of the chest, abdomen and pelvis was performed without the administration of intravenous contrast. Multiplanar reformatted images are provided for review. Automated exposure control, iterative reconstruction, and/or weight based adjustment of the mA/kV was utilized to reduce the radiation dose to as low as reasonably achievable.  COMPARISON: Chest x-ray 12/03/2022, CT chest 07/28/2019, CT abdomen pelvis 07/28/2019 HISTORY: ORDERING SYSTEM PROVIDED HISTORY: Sepsis of unknown origin TECHNOLOGIST PROVIDED HISTORY: Sepsis of unknown origin Is the patient pregnant?->No Reason for Exam: sepsis of unknown origin FINDINGS: Chest: Mediastinum: ETT now terminates 12 mm above denys where on the earlier chest x-ray was just entering right mainstem bronchus. Cardiac size normal.  No significant mediastinal lymphadenopathy. Thyroid gland unremarkable. Lungs/pleura: Patchy perihilar consolidations and tracking along posterior medial aspect lower lobes. More confluent consolidation along posterior segment right upper lobe. Findings likely combination of pneumonia and subsegmental atelectasis. Trace right pleural effusion. Soft Tissues/Bones: No acute abnormality of the bones. The superficial soft tissues show no significant abnormalities. Abdomen/Pelvis: Organs: Streak artifacts from patient's arms by the sides limits evaluation. Further limitation due to lack of IV contrast.  Within this limitation liver, spleen, pancreas, adrenal glands show no significant abnormalities. No convincing cholelithiasis. Some distention of the gallbladder noted. Multiple right renal calculi mostly in the range from 5-8 mm scattered throughout the kidney are too numerous to count. There is moderate to severe right hydronephrosis and hydroureter. There is is accounted for by a stack of 4 calculi in the distal right ureter ranging from 5-11 mm in size. The most distal calculus measures 11 mm and is located about 2 cm from the right ureterovesical junction. [facility] GI/Bowel: There is limited evaluation due to absence of oral contrast.  NG tube terminates in the distal gastric antrum close to the gastro duodenal junction. There is also a PEG tube position in the mid gastric antrum. Normal caliber small bowel.  Liquid stool in the ascending colon along with gas bubbles seen along the bowel wall only in the dependent portion and appears to be pseudo pneumatosis representing gastroc between bowel contents and the bowel. None evident in the non dependent bowel wall. Liquid stool may reflect diarrheal disease. No appreciable colonic bowel wall thickening. Pelvis: Suprapubic bladder catheter in place. Uterus present. 2 cm cystic right adnexal mass. No follow-up imaging is recommended. Reference: JACR 2020 Feb;17(2):248-254 Peritoneum/Retroperitoneum: No ascites. No significant lymphadenopathy. Bones/Soft Tissues: Generalized osteopenia with superimposed diffuse degenerative changes. 1. ETT now terminates 12 mm above denys. 2. Patchy perihilar and more confluent posterior segment airspace consolidations likely combination of pneumonia and atelectasis. Trace right pleural effusion. 3. Innumerable right renal calculi most in the 5-8 mm size range. Moderate to severe right hydronephrosis and hydroureter. There is a stack of 4 obstructing calculi in the distal right ureter ranging from 5-11 mm accounting for finding. 11 mm calculus is most distal and positioned about 2 cm from the uterovesical junction. 4. Liquid stool in the colon suggests diarrheal disease. Tiny gas pockets along the ascending colon wall in the dependent portion appear to be pseudo pneumatosis.        Assessment and Plan   Impression: 42 yo female with  Right obstructing ureteral stones with hydroureteronephrosis  Sepsis requiring pressors  Patient Active Problem List   Diagnosis    Aphasia    Multiple sclerosis ([address]    Nonverbal    Gastrostomy tube dependent ([address]    Dislodged gastrostomy tube    Neurogenic bladder    Hemorrhagic cystitis    Hypokalemia    Urinary tract infection associated with indwelling urethral catheter (HCC)    Tachycardia    Gross hematuria    Lactic acidosis    Chronic indwelling Davies catheter    Peripheral vascular disease (HCC)    Recurrent UTI (urinary tract infection)    History of pulmonary embolism    Hypertension    Altered mental status    Toxic metabolic encephalopathy    ESBL E. coli carrier    Septic shock ([address]    Status post colostomy ([address]    Lower paraplegia (HCC)    Sepsis ([address]    Pneumonia due to organism    Extended spectrum beta lactamase (ESBL) resistance    Abdominal pain    Ileus (HCC)    Decubitus ulcer of coccygeal region, stage 4 (HCC)    Proteus mirabilis infection    Lactate blood increase    E-coli UTI    Sepsis due to Escherichia coli ([address]    E coli bacteremia    Nephrostomy tube displaced ([address]       Plan:   OR for class 1 cystoscopy, right ureteral stent insertion. Continue broad-spectrum antibiotics   Continue ICU care  Continue pressor support per ICU    Nick Camarena MD  Urology Resident, PGY-3  2:55 PM 12/3/2022

## 2022-12-03 NOTE — ANESTHESIA POSTPROCEDURE EVALUATION
Department of Anesthesiology  Postprocedure Note    Patient: Maximiliano John  MRN: [id number]  YOB: 1980  Date of evaluation: 12/3/2022      Procedure Summary     Date: 22 Room / Location: [address]    Anesthesia Start: 5126 Anesthesia Stop: 8378    Procedure: CYSTOSCOPY URETERAL STENT INSERTION (Right) Diagnosis: Chel  in kidney    Surgeons: Won Hart MD Responsible Provider: Patience Schirmer, MD    Anesthesia Type: general ASA Status: 4 - Emergent          Anesthesia Type: No value filed.     Yenni Phase I:      Yenni Phase II:      POST-OP ANESTHESIA NOTE       /68   Pulse (!) 109   Temp 100.1 °F (37.8 °C) (Rectal)   Resp 20   Ht 5' (1.524 m)   Wt 195 lb 15.8 oz (88.9 kg)   SpO2 99%   BMI 38.28 kg/m²            Anesthesia Post Evaluation    Patient location during evaluation: ICU  Patient participation: complete - patient cannot participate  Level of consciousness: sedated and ventilated  Pain score: 0  Airway patency: patent  Nausea & Vomiting: no vomiting and no nausea  Complications: no  Cardiovascular status: hemodynamically stable  Respiratory status: ventilator and intubated  Hydration status: stable

## 2022-12-03 NOTE — PROGRESS NOTES
Comprehensive Nutrition Assessment    Type and Reason for Visit:  Initial, Consult (TF ordering and management)    Nutrition Recommendations/Plan:   TF recommendations: suggest Peptide Based High Protein (Vital HP), goal of 35 mL/hr while intubated / with Propofol. Attempt to determine pt's home TF - adjust as appropriate after extubation. Nutrition Assessment:    Pt from [facility]. G-tube dependent. Admitted with AMS and intubated in ED. Per EHR, concern for pneumonia and obstructing calculi in the right ureter. S/p cystoscopy ureteral stent insertion. Attempted to call [facility] for TF order, but was unsuccessful. Last known TF order from 9/2020: Nutren 2.0 at 50 mL/hr x 16 hours + 250 mL free water q 4 hours. Nutrition Related Findings:    meds/labs reviewed; PEG, colostomy in place; R AKA Wound Type:  (KATHYA)       Current Nutrition Intake & Therapies:     NPO     Additional Calorie Sources:  Propofol at 6 mL/hr = 158 kcals/day    Anthropometric Measures:  Height: 5' (152.4 cm) (from 9/2020)  Ideal Body Weight (IBW): 100 lbs (45 kg)       Current Body Weight: 220 lb 7.4 oz (100 kg), 220.5 % IBW.     Current BMI (kg/m2): 43.1        Weight Adjustment For: Amputation  Total Adjusted Percentage (Calculated): 10.1  Adjusted Ideal Body Weight (lbs) (Calculated): 89.9 lbs  Adjusted Ideal Body Weight (kg) (Calculated): 40.86 kg  Adjusted % Ideal Body Weight (Calculated): 245.2  Adjusted BMI (kg/m2) (Calculated): 47.5  BMI Categories: Obese Class 3 (BMI 40.0 or greater)    Estimated Daily Nutrient Needs:  Energy Requirements Based On: Kcal/kg  Weight Used for Energy Requirements: Ideal  Energy (kcal/day): 900-1000 kcals/day  Weight Used for Protein Requirements: Ideal  Protein (g/day): 80 gm/day     Fluid (ml/day): per MD    Nutrition Diagnosis:   Inadequate oral intake related to impaired respiratory function as evidenced by NPO or clear liquid status due to medical condition    Nutrition Interventions: Food and/or Nutrient Delivery: Continue NPO  Nutrition Education/Counseling: No recommendation at this time  Coordination of Nutrition Care: Continue to monitor while inpatient       Goals:  Previous Goal Met:  (goal set)  Goals: Meet at least 75% of estimated needs, prior to discharge       Nutrition Monitoring and Evaluation:   Behavioral-Environmental Outcomes: None Identified  Food/Nutrient Intake Outcomes: Diet Advancement/Tolerance  Physical Signs/Symptoms Outcomes: Weight, Biochemical Data, Nutrition Focused Physical Findings    Discharge Planning:     Too soon to determine     Gaston Clark MS, RD, LD  Contact: [phone]

## 2022-12-04 PROBLEM — R65.10 SIRS (SYSTEMIC INFLAMMATORY RESPONSE SYNDROME) (HCC): Status: ACTIVE | Noted: 2022-12-04

## 2022-12-04 PROBLEM — N12 PYELONEPHRITIS: Status: ACTIVE | Noted: 2022-12-04

## 2022-12-04 PROBLEM — A41.50 GRAM-NEG SEPTICEMIA (HCC): Status: ACTIVE | Noted: 2022-12-04

## 2022-12-04 PROBLEM — D72.825 BANDEMIA: Status: ACTIVE | Noted: 2022-12-04

## 2022-12-04 PROBLEM — K63.89 PNEUMATOSIS COLI: Status: ACTIVE | Noted: 2022-12-04

## 2022-12-04 PROBLEM — N13.9 OBSTRUCTIVE UROPATHY: Status: ACTIVE | Noted: 2022-12-04

## 2022-12-04 LAB
ABSOLUTE EOS #: 0 K/UL (ref 0–0.4)
ABSOLUTE IMMATURE GRANULOCYTE: 0 K/UL (ref 0–0.3)
ABSOLUTE LYMPH #: 1.13 K/UL (ref 1–4.8)
ABSOLUTE MONO #: 1.97 K/UL (ref 0.1–0.8)
ALLEN TEST: POSITIVE
ANION GAP SERPL CALCULATED.3IONS-SCNC: 10 MMOL/L (ref 9–17)
BASOPHILS # BLD: 0 % (ref 0–2)
BASOPHILS ABSOLUTE: 0 K/UL (ref 0–0.2)
BUN BLDV-MCNC: 27 MG/DL (ref 6–20)
CALCIUM IONIZED: 1.18 MMOL/L (ref 1.13–1.33)
CALCIUM SERPL-MCNC: 8.5 MG/DL (ref 8.6–10.4)
CHLORIDE BLD-SCNC: 111 MMOL/L (ref 98–107)
CO2: 21 MMOL/L (ref 20–31)
CREAT SERPL-MCNC: 0.76 MG/DL (ref 0.5–0.9)
EOSINOPHILS RELATIVE PERCENT: 0 % (ref 1–4)
FIO2: 40
GFR SERPL CREATININE-BSD FRML MDRD: >60 ML/MIN/1.73M2
GLUCOSE BLD-MCNC: 119 MG/DL (ref 74–100)
GLUCOSE BLD-MCNC: 121 MG/DL (ref 70–99)
HCT VFR BLD CALC: 35 % (ref 36.3–47.1)
HEMOGLOBIN: 10.9 G/DL (ref 11.9–15.1)
IMMATURE GRANULOCYTES: 0 %
LYMPHOCYTES # BLD: 4 % (ref 24–44)
MAGNESIUM: 1.9 MG/DL (ref 1.6–2.6)
MCH RBC QN AUTO: 27.1 PG (ref 25.2–33.5)
MCHC RBC AUTO-ENTMCNC: 31.1 G/DL (ref 28.4–34.8)
MCV RBC AUTO: 87.1 FL (ref 82.6–102.9)
MODE: NORMAL
MONOCYTES # BLD: 7 % (ref 1–7)
MORPHOLOGY: ABNORMAL
MORPHOLOGY: ABNORMAL
MRSA, DNA, NASAL: NEGATIVE
NEGATIVE BASE EXCESS, ART: 2 (ref 0–2)
NRBC AUTOMATED: 0.1 PER 100 WBC
O2 DEVICE/FLOW/%: NORMAL
PDW BLD-RTO: 15.5 % (ref 11.8–14.4)
PHOSPHORUS: 1.2 MG/DL (ref 2.6–4.5)
PLATELET # BLD: 181 K/UL (ref 138–453)
PMV BLD AUTO: 11 FL (ref 8.1–13.5)
POC HCO3: 22.3 MMOL/L (ref 21–28)
POC O2 SATURATION: 98 % (ref 94–98)
POC PCO2: 35.5 MM HG (ref 35–48)
POC PH: 7.41 (ref 7.35–7.45)
POC PO2: 99.3 MM HG (ref 83–108)
POTASSIUM SERPL-SCNC: 3.4 MMOL/L (ref 3.7–5.3)
RBC # BLD: 4.02 M/UL (ref 3.95–5.11)
SAMPLE SITE: NORMAL
SEG NEUTROPHILS: 89 % (ref 36–66)
SEGMENTED NEUTROPHILS ABSOLUTE COUNT: 25.1 K/UL (ref 1.8–7.7)
SODIUM BLD-SCNC: 142 MMOL/L (ref 135–144)
SPECIMEN DESCRIPTION: NORMAL
VANCOMYCIN RANDOM: 10.3 UG/ML
WBC # BLD: 28.2 K/UL (ref 3.5–11.3)

## 2022-12-04 PROCEDURE — 2580000003 HC RX 258: Performed by: STUDENT IN AN ORGANIZED HEALTH CARE EDUCATION/TRAINING PROGRAM

## 2022-12-04 PROCEDURE — 6370000000 HC RX 637 (ALT 250 FOR IP): Performed by: STUDENT IN AN ORGANIZED HEALTH CARE EDUCATION/TRAINING PROGRAM

## 2022-12-04 PROCEDURE — 80202 ASSAY OF VANCOMYCIN: CPT

## 2022-12-04 PROCEDURE — 6360000002 HC RX W HCPCS: Performed by: STUDENT IN AN ORGANIZED HEALTH CARE EDUCATION/TRAINING PROGRAM

## 2022-12-04 PROCEDURE — 2580000003 HC RX 258: Performed by: HEALTH CARE PROVIDER

## 2022-12-04 PROCEDURE — 6360000002 HC RX W HCPCS

## 2022-12-04 PROCEDURE — 80048 BASIC METABOLIC PNL TOTAL CA: CPT

## 2022-12-04 PROCEDURE — 85025 COMPLETE CBC W/AUTO DIFF WBC: CPT

## 2022-12-04 PROCEDURE — 6360000002 HC RX W HCPCS: Performed by: INTERNAL MEDICINE

## 2022-12-04 PROCEDURE — 2000000000 HC ICU R&B

## 2022-12-04 PROCEDURE — 82330 ASSAY OF CALCIUM: CPT

## 2022-12-04 PROCEDURE — 99233 SBSQ HOSP IP/OBS HIGH 50: CPT | Performed by: INTERNAL MEDICINE

## 2022-12-04 PROCEDURE — 2500000003 HC RX 250 WO HCPCS: Performed by: STUDENT IN AN ORGANIZED HEALTH CARE EDUCATION/TRAINING PROGRAM

## 2022-12-04 PROCEDURE — 84100 ASSAY OF PHOSPHORUS: CPT

## 2022-12-04 PROCEDURE — C9113 INJ PANTOPRAZOLE SODIUM, VIA: HCPCS | Performed by: STUDENT IN AN ORGANIZED HEALTH CARE EDUCATION/TRAINING PROGRAM

## 2022-12-04 PROCEDURE — 6360000002 HC RX W HCPCS: Performed by: HEALTH CARE PROVIDER

## 2022-12-04 PROCEDURE — 99291 CRITICAL CARE FIRST HOUR: CPT | Performed by: INTERNAL MEDICINE

## 2022-12-04 PROCEDURE — 82803 BLOOD GASES ANY COMBINATION: CPT

## 2022-12-04 PROCEDURE — 83735 ASSAY OF MAGNESIUM: CPT

## 2022-12-04 PROCEDURE — 94003 VENT MGMT INPAT SUBQ DAY: CPT

## 2022-12-04 PROCEDURE — 82947 ASSAY GLUCOSE BLOOD QUANT: CPT

## 2022-12-04 PROCEDURE — 2700000000 HC OXYGEN THERAPY PER DAY

## 2022-12-04 PROCEDURE — 36600 WITHDRAWAL OF ARTERIAL BLOOD: CPT

## 2022-12-04 PROCEDURE — 2580000003 HC RX 258: Performed by: INTERNAL MEDICINE

## 2022-12-04 PROCEDURE — 94761 N-INVAS EAR/PLS OXIMETRY MLT: CPT

## 2022-12-04 PROCEDURE — 36415 COLL VENOUS BLD VENIPUNCTURE: CPT

## 2022-12-04 RX ORDER — PROPOFOL 10 MG/ML
5-50 INJECTION, EMULSION INTRAVENOUS CONTINUOUS
Status: DISCONTINUED | OUTPATIENT
Start: 2022-12-04 | End: 2022-12-07

## 2022-12-04 RX ORDER — POTASSIUM CHLORIDE 7.45 MG/ML
10 INJECTION INTRAVENOUS
Status: DISCONTINUED | OUTPATIENT
Start: 2022-12-04 | End: 2022-12-04

## 2022-12-04 RX ORDER — PROPOFOL 10 MG/ML
INJECTION, EMULSION INTRAVENOUS
Status: COMPLETED
Start: 2022-12-04 | End: 2022-12-04

## 2022-12-04 RX ORDER — POTASSIUM CHLORIDE 29.8 MG/ML
20 INJECTION INTRAVENOUS
Status: COMPLETED | OUTPATIENT
Start: 2022-12-04 | End: 2022-12-04

## 2022-12-04 RX ORDER — SODIUM CHLORIDE, SODIUM LACTATE, POTASSIUM CHLORIDE, CALCIUM CHLORIDE 600; 310; 30; 20 MG/100ML; MG/100ML; MG/100ML; MG/100ML
INJECTION, SOLUTION INTRAVENOUS CONTINUOUS
Status: DISCONTINUED | OUTPATIENT
Start: 2022-12-04 | End: 2022-12-08

## 2022-12-04 RX ADMIN — PROPOFOL 15 MCG/KG/MIN: 10 INJECTION, EMULSION INTRAVENOUS at 03:35

## 2022-12-04 RX ADMIN — Medication 6 MCG/MIN: at 16:38

## 2022-12-04 RX ADMIN — GABAPENTIN 300 MG: 300 CAPSULE ORAL at 15:59

## 2022-12-04 RX ADMIN — SODIUM CHLORIDE, PRESERVATIVE FREE 40 MG: 5 INJECTION INTRAVENOUS at 08:05

## 2022-12-04 RX ADMIN — SODIUM CHLORIDE, POTASSIUM CHLORIDE, SODIUM LACTATE AND CALCIUM CHLORIDE: 600; 310; 30; 20 INJECTION, SOLUTION INTRAVENOUS at 23:35

## 2022-12-04 RX ADMIN — SODIUM PHOSPHATE, MONOBASIC, MONOHYDRATE AND SODIUM PHOSPHATE, DIBASIC, ANHYDROUS 20 MMOL: 142; 276 INJECTION, SOLUTION INTRAVENOUS at 08:32

## 2022-12-04 RX ADMIN — MIDODRINE HYDROCHLORIDE 5 MG: 5 TABLET ORAL at 16:00

## 2022-12-04 RX ADMIN — SODIUM CHLORIDE, PRESERVATIVE FREE 10 ML: 5 INJECTION INTRAVENOUS at 20:11

## 2022-12-04 RX ADMIN — PROPOFOL 20 MCG/KG/MIN: 10 INJECTION, EMULSION INTRAVENOUS at 23:39

## 2022-12-04 RX ADMIN — GABAPENTIN 300 MG: 300 CAPSULE ORAL at 20:11

## 2022-12-04 RX ADMIN — MEROPENEM 1000 MG: 1 INJECTION, POWDER, FOR SOLUTION INTRAVENOUS at 21:39

## 2022-12-04 RX ADMIN — SODIUM CHLORIDE, PRESERVATIVE FREE 10 ML: 5 INJECTION INTRAVENOUS at 08:06

## 2022-12-04 RX ADMIN — METOCLOPRAMIDE HYDROCHLORIDE 5 MG: 5 SOLUTION ORAL at 15:59

## 2022-12-04 RX ADMIN — METOCLOPRAMIDE HYDROCHLORIDE 5 MG: 5 SOLUTION ORAL at 08:05

## 2022-12-04 RX ADMIN — CEFEPIME 1000 MG: 1 INJECTION, POWDER, FOR SOLUTION INTRAMUSCULAR; INTRAVENOUS at 05:29

## 2022-12-04 RX ADMIN — MIDODRINE HYDROCHLORIDE 5 MG: 5 TABLET ORAL at 08:06

## 2022-12-04 RX ADMIN — PROPOFOL 15 MCG/KG/MIN: 10 INJECTION, EMULSION INTRAVENOUS at 14:24

## 2022-12-04 RX ADMIN — GABAPENTIN 300 MG: 300 CAPSULE ORAL at 08:05

## 2022-12-04 RX ADMIN — POTASSIUM CHLORIDE 20 MEQ: 29.8 INJECTION, SOLUTION INTRAVENOUS at 11:10

## 2022-12-04 RX ADMIN — POTASSIUM CHLORIDE 20 MEQ: 29.8 INJECTION, SOLUTION INTRAVENOUS at 09:55

## 2022-12-04 RX ADMIN — RIVAROXABAN 20 MG: 20 TABLET, FILM COATED ORAL at 18:16

## 2022-12-04 RX ADMIN — SODIUM CHLORIDE, POTASSIUM CHLORIDE, SODIUM LACTATE AND CALCIUM CHLORIDE: 600; 310; 30; 20 INJECTION, SOLUTION INTRAVENOUS at 14:15

## 2022-12-04 RX ADMIN — SODIUM CHLORIDE, POTASSIUM CHLORIDE, SODIUM LACTATE AND CALCIUM CHLORIDE: 600; 310; 30; 20 INJECTION, SOLUTION INTRAVENOUS at 01:23

## 2022-12-04 RX ADMIN — ACETAMINOPHEN 650 MG: 325 TABLET ORAL at 08:28

## 2022-12-04 RX ADMIN — METOCLOPRAMIDE HYDROCHLORIDE 5 MG: 5 SOLUTION ORAL at 20:11

## 2022-12-04 RX ADMIN — MEROPENEM 1000 MG: 1 INJECTION, POWDER, FOR SOLUTION INTRAVENOUS at 15:58

## 2022-12-04 RX ADMIN — ACETAMINOPHEN 650 MG: 325 TABLET ORAL at 15:55

## 2022-12-04 ASSESSMENT — PULMONARY FUNCTION TESTS
PIF_VALUE: 13
PIF_VALUE: 22
PIF_VALUE: 21
PIF_VALUE: 26
PIF_VALUE: 24
PIF_VALUE: 20
PIF_VALUE: 33

## 2022-12-04 NOTE — PLAN OF CARE
Problem: Discharge Planning  Goal: Discharge to home or other facility with appropriate resources  12/4/2022 0933 by Jimmie Georges RN  Outcome: Progressing     Problem: Nutrition Deficit:  Goal: Optimize nutritional status  12/4/2022 0933 by Jimmie Georges RN  Outcome: Progressing     Problem: Confusion  Goal: Confusion, delirium, dementia, or psychosis is improved or at baseline  Description: INTERVENTIONS:  1. Assess for possible contributors to thought disturbance, including medications, impaired vision or hearing, underlying metabolic abnormalities, dehydration, psychiatric diagnoses, and notify attending LIP  2. [place] high risk fall precautions, as indicated  3. Provide frequent short contacts to provide reality reorientation, refocusing and direction  4. Decrease environmental stimuli, including noise as appropriate  5. Monitor and intervene to maintain adequate nutrition, hydration, elimination, sleep and activity  6. If unable to ensure safety without constant attention obtain sitter and review sitter guidelines with assigned personnel  7.  Initiate Psychosocial CNS and Spiritual Care consult, as indicated  12/4/2022 0933 by Jimmie Georges RN  Outcome: Progressing     Problem: Neurosensory - Adult  Goal: Achieves stable or improved neurological status  12/4/2022 0933 by Jimmie Georges RN  Outcome: Progressing     Problem: Neurosensory - Adult  Goal: Absence of seizures  12/4/2022 0933 by Jimmie Georges RN  Outcome: Progressing     Problem: Neurosensory - Adult  Goal: Achieves maximal functionality and self care  12/4/2022 0933 by Jimmie Georges RN  Outcome: Progressing     Problem: Respiratory - Adult  Goal: Achieves optimal ventilation and oxygenation  12/4/2022 0933 by Jimmie Georges RN  Outcome: Progressing     Problem: Metabolic/Fluid and Electrolytes - Adult  Goal: Electrolytes maintained within normal limits  12/4/2022 0933 by Savana Van RN  Outcome: Progressing     Problem: Metabolic/Fluid and Electrolytes - Adult  Goal: Hemodynamic stability and optimal renal function maintained  12/4/2022 0933 by Juan Manuel Toledo RN  Outcome: Progressing     Problem: Skin/Tissue Integrity - Adult  Goal: Skin integrity remains intact  12/4/2022 0933 by Juan Manuel Toledo RN  Outcome: Progressing  Flowsheets (Taken 12/4/2022 1120)  Skin Integrity Remains Intact: Monitor for areas of redness and/or skin breakdown     Problem: Skin/Tissue Integrity - Adult  Goal: Incisions, wounds, or drain sites healing without S/S of infection  12/4/2022 0933 by Juan Manuel Toledo RN  Outcome: Progressing  Flowsheets (Taken 12/4/2022 9826)  Incisions, Wounds, or Drain Sites Healing Without Sign and Symptoms of Infection: ADMISSION and DAILY: Assess and document risk factors for pressure ulcer development

## 2022-12-04 NOTE — CARE COORDINATION
Left Message for [facility] without PHI, requesting a  return call.

## 2022-12-04 NOTE — FLOWSHEET NOTE
12/3 @ 1540H - Received from ED,with NO accurate report about patient is for emergent OR (cystoscopy urethral stent). When we received the patient, the OR called and informed the MICU, thats the time we then sent patient to OR. Arrived to OR at 1615H.

## 2022-12-04 NOTE — PROGRESS NOTES
INTENSIVE CARE UNIT  Resident Physician Progress Note    Patient - Flaquito Mcclain  Date of Admission -  12/3/2022 10:03 AM  Date of Evaluation -  12/4/2022  Room and Bed Number -  7671/4614-67   Hospital Day - 1    SUBJECTIVE:     HISTORY OF PRESENT ILLNESS:    Flaquito Mcclain is a 43 y.o. female presented with altered mental status. Patient has a history of MS, nonverbal at baseline, hypertension, hydronephrosis, neurogenic bladder (chronic indwelling suprapubic catheter), G tube dependent,  PVD, PE. This patient presented to the emergency department with a GCS of 3. She was immediately intubated. She did have tachycardia thereafter up to the 130s, blood pressures 128/87 on Levophed, SPO2 100% on FiO2 of 100 respiratory rate of 20. Blood work demonstrated lactate of 1.36, sodium 130, chloride 96, CO2 19, WBC 36.1, creatinine 3.08. Celestia Hudson Urinalysis remarkable for hemoglobin, many bacteria, large leukocyte esterase. She was given vancomycin and cefepime. CT head showed no acute intracranial abnormality. CT chest abdomen pelvis demonstrating likely pneumonia, innumerable renal calculi on the right side ranging in 5 to 8 mm in size. There is a stack of obstructing calculi in the right ureter ranging from 5 to 11 mm, causing severe right hydronephrosis and hydroureter. Tiny gas pockets in the ascending colon showing pseudo pneumatosis. A central line was placed in left IJ. She was started on propofol and levophed. Was given Cefepime and Vancomycin. Admitted to ICU. Urology and ID consulted. Taken to OR for urgent cystoscopy for R ureteral stent insertion. Suprapubic catheter was exchanged. ID consulted, recommended single time dose of Amikacin, Meropenem. OVERNIGHT EVENTS:      No acute events overnight. Remains intubated. On pressors.     T 99.5, , RR 20, /67, SPO2 98%  Vent settings: PRVC, FiO2 30, PEEP 5, RR 20, Vt 440, PIP 22  ABG 12/3 AM: Ph 7.407, PCO2 35.5, PO2 99.3, HCO3 22.3, BE 2    Labs  Na 142, K 3.4, Cl 111, CO2 21, iCa 1.18,   Cr 0.76 (2.24), BUN 27 (48)  Mg 1.9, Phos 1.2  WBC 28.2 (36.1), Hgb 10.9,     Micro  Blood culture - Enterobacter, bacteroides  Urine cultures pending    Continuous infusions  Levophed 10mcg/min  Propofol 15mcg/kg/min  LR 100ml/hr    Urine output 3500, 1.65cc/kg/hr    Abx  Meropenem    Glucose levels appropriate    Plan  Consult ID again  Resume Meropenem  Follow up electrolytes  Resume Xarelto    Feeding Diet: Diet NPO    Fluids /hr    Family notified     Analgesic Tylenol 650 PRN    Sedation Propfol    Thrombo-prophylaxis Lovenox     Mobility Bed rest     Heads up 30*    Ulcer prophylaxis Pantoprazole 40QD    Glycemic control WNL    Spontaneous breathing trial  No    Bowel regimen/urine output Glycolax 17g QD PRN if decreased colostomy output     Indwelling catheter/lines: L IJ CVC (12/3), PIC 12/3 - L hand, R hand, R hand, OGT (12/3), G tube (7/2022), Suprapubic cath (12/3. Young Garcia Le meghan), colostomy    De-escalation Wean off pressors/ventilator      OBJECTIVE:     VITAL SIGNS:  Patient Vitals for the past 8 hrs:   BP Temp Temp src Pulse Resp SpO2   12/04/22 1205 -- -- -- (!) 104 16 96 %   12/04/22 1000 -- 100.2 °F (37.9 °C) Esophageal -- -- --   12/04/22 0930 125/69 -- -- (!) 116 18 96 %   12/04/22 0915 120/67 -- -- (!) 115 17 96 %   12/04/22 0900 115/70 -- -- (!) 109 19 96 %   12/04/22 0847 -- -- -- (!) 108 20 96 %   12/04/22 0845 115/71 -- -- (!) 108 20 96 %   12/04/22 0830 110/79 -- -- (!) 110 20 96 %   12/04/22 0815 106/71 -- -- (!) 109 20 96 %   12/04/22 0800 108/68 100.4 °F (38 °C) Esophageal (!) 106 20 98 %   12/04/22 0745 100/61 -- -- 100 20 98 %   12/04/22 0730 98/68 -- -- (!) 102 20 98 %   12/04/22 0715 100/67 -- -- (!) 103 20 98 %   12/04/22 0700 102/68 -- -- 95 20 98 %   12/04/22 0645 105/72 -- -- 92 20 99 %   12/04/22 0630 101/60 -- -- 97 20 97 %       Last Body weight:   Wt Readings from Last 3 Encounters:   12/04/22 195 lb 1.7 oz (88.5 kg)   22 150 lb (68 kg)   20 154 lb 5.2 oz (70 kg)       Body Mass Index : Body mass index is 38.1 kg/m². Tmax over 24 hours: Temp (24hrs), Av.9 °F (37.7 °C), Min:99 °F (37.2 °C), Max:100.4 °F (38 °C)      Ins/Outs:   In: 2967 [I.V.:1751.8]  Out: 4650 [Urine:4350]    PHYSICAL EXAM:  Constitutional: Intubated. Sedated. No acute distress. HEENT: PERRLA, EOMI, sclera clear, anictericm nudkube trachea  Respiratory: intubated; ronchi bilaterally  Cardiovascular: regular rate and rhythm, no murmur or rub  Abdomen: soft, nondistended. A colostomy bag is present. A G tube is present. A suprapubic catheter is present. Surrounding skin appears normal with no erythema, breakdown, drainage. Extremities:  Right AKA. Peripheral pulses normal, no pedal edema, no clubbing or cyanosis; Left leg with increased tone, knee flexed  Skin: No erythema in intertriginous regions. There is sacrococcygeal skin hypopigmentation from previous breakdown. No skin ulcers.      MEDICATIONS:  Scheduled Meds:   rivaroxaban  20 mg Oral Daily    meropenem  1,000 mg IntraVENous Q8H    sodium chloride flush  5-40 mL IntraVENous 2 times per day    gabapentin  300 mg Per G Tube TID    pantoprazole (PROTONIX) 40 mg injection  40 mg IntraVENous Daily    metoclopramide  5 mg Oral TID    [Held by provider] metoprolol tartrate  25 mg Oral TID    midodrine  5 mg Oral BID WC    amikacin (AMIKIN) IVPB  5 mg/kg (Ideal) IntraVENous Once       Continuous Infusions:   lactated ringers 100 mL/hr at 22 1415    propofol 15 mcg/kg/min (22 1111)    norepinephrine 6 mcg/min (22 1111)    sodium chloride Stopped (22 0832)       PRN Meds:   albuterol, 2.5 mg, PRN  ipratropium, 0.5 mg, Q6H PRN  sodium chloride flush, 5-40 mL, PRN  sodium chloride, , PRN  polyethylene glycol, 17 g, Daily PRN  acetaminophen, 650 mg, Q6H PRN   Or  acetaminophen, 650 mg, Q6H PRN  ondansetron, 4 mg, Q6H PRN      SUPPORT DEVICES: [x] Ventilator [] BIPAP [] Nasal Cannula [] Room Air    VENT SETTINGS (Comprehensive) (if applicable):See above  Vent Information  Ventilator ID: [email or web address]  Equipment Changed: HME, Expiratory Filter  Ventilator Initiate: Yes  Vent Mode: AC/PRVC  Additional Respiratory Assessments  Heart Rate: (!) 104  Resp: 16  SpO2: 96 %  End Tidal CO2: 32 (%)  Position: Semi-[place]'s  Humidification Source: HME  Skin Barrier Applied: Yes  Lab Results   Component Value Date/Time    MODE [facility] 12/04/2022 04:12 AM       ABGs:   See above  No results found for: PH, PCO2, PO2, HCO3, O2SAT    DATA:  Complete Blood Count:   Recent Labs     12/03/22  1014 12/04/22  0512   WBC 36.1* 28.2*   RBC 4.53 4.02   HGB 12.3 10.9*   HCT 40.5 35.0*   MCV 89.4 87.1   MCH 27.2 27.1   MCHC 30.4 31.1   RDW 15.9* 15.5*    181   MPV 10.7 11.0        Last 3 Blood Glucose:   Recent Labs     12/03/22  1014 12/04/22  0512   GLUCOSE 144* 121*        PT/INR:    Lab Results   Component Value Date/Time    PROTIME 13.8 12/03/2022 10:14 AM    INR 1.3 12/03/2022 10:14 AM     PTT:    Lab Results   Component Value Date/Time    APTT 40.1 07/06/2020 10:07 AM       Basic Metabolic Profile:   Recent Labs     12/03/22  1007 12/03/22  1014 12/04/22  0512   NA  --  130* 142   K  --  4.0 3.4*   CL  --  96* 111*   CO2  --  19* 21   BUN  --  48* 27*   CREATININE 3.08* 2.24* 0.76   GLUCOSE  --  144* 121*   PHOS  --   --  1.2*       Liver Function:  Recent Labs     12/03/22  1014   PROT 8.2   LABALBU 3.0*   ALT 39*   AST 58*   ALKPHOS 141*   BILITOT 0.4       Magnesium:   Lab Results   Component Value Date/Time    MG 1.9 12/04/2022 05:12 AM    MG 1.8 01/24/2020 05:46 AM    MG 1.6 01/23/2020 08:01 AM     Phosphorus:   Lab Results   Component Value Date/Time    PHOS 1.2 12/04/2022 05:12 AM    PHOS 2.7 07/29/2019 05:59 AM    PHOS 2.9 07/28/2019 05:50 PM     Ionized Calcium:   Lab Results   Component Value Date/Time    CAION 1.18 12/04/2022 05:12 AM    CAION 4.66 04/08/2013 07:15 AM    CAION 4.66 04/07/2013 06:58 AM        Urinalysis:   Lab Results   Component Value Date/Time    NITRU NEGATIVE 12/03/2022 11:04 AM    COLORU Yellow 12/03/2022 11:04 AM    PHUR 8.0 12/03/2022 11:04 AM    WBCUA 50  12/03/2022 11:04 AM    RBCUA 20 TO 50 12/03/2022 11:04 AM    MUCUS 2+ 11/30/2021 10:26 AM    TRICHOMONAS NOT REPORTED 11/30/2021 10:26 AM    YEAST NOT REPORTED 11/30/2021 10:26 AM    BACTERIA MANY 12/03/2022 11:04 AM    SPECGRAV 1.011 12/03/2022 11:04 AM    LEUKOCYTESUR LARGE 12/03/2022 11:04 AM    UROBILINOGEN Normal 12/03/2022 11:04 AM    BILIRUBINUR NEGATIVE 12/03/2022 11:04 AM    BILIRUBINUR NEGATIVE 03/11/2012 01:01 PM    GLUCOSEU NEGATIVE 12/03/2022 11:04 AM    GLUCOSEU NEGATIVE 03/11/2012 01:01 PM    KETUA NEGATIVE 12/03/2022 11:04 AM    AMORPHOUS 1+ 08/02/2022 09:00 AM       HgBA1c:  No results found for: LABA1C  TSH:    Lab Results   Component Value Date/Time    TSH 0.84 05/19/2019 07:50 AM     Lactic Acid:   Lab Results   Component Value Date/Time    LACTA 2.0 01/08/2017 12:12 PM      Troponin: No results for input(s): TROPONINI in the last 72 hours. Microbiology:  Blood and urine cultures pending    Other Labs:  Results for orders placed or performed during the hospital encounter of 12/03/22   Blood Culture 1    Specimen: Blood   Result Value Ref Range    Specimen Description . BLOOD     Special Requests LH     Culture POSITIVE Blood Culture (A)     Culture DIRECT GRAM STAIN FROM BOTTLE: GRAM NEGATIVE RODS     Culture       Bacteroides fragilis Detected: Methodology- Polymerase Chain Reaction (PCR)    Culture       Enterobacterales group  (not E. cloacae complex, E. coli, K. oxytoca, K. pneumoniae, S. marcescens, or Proteus species)  Culture Results to Follow  Methodology- Polymerase Chain Reaction (PCR)      Culture       (NOTE) Direct Gram Stain from bottle and Polymerase Chain Reaction (PCR) results called to and read back by:NELIDA HOOPER AT 1000 ON 12/4/22   Culture, Blood 2    Specimen: Blood Result Value Ref Range    Specimen Description . BLOOD     Special Requests R AC 10ML     Culture POSITIVE Blood Culture (A)     Culture DIRECT GRAM STAIN FROM BOTTLE: GRAM NEGATIVE RODS     Culture       (NOTE) DIRECT GRAM STAIN FROM BOTTLE:NELIDA HOOPER AT 9867 AT 12/4/22   Culture, Urine    Specimen: Urine, clean catch   Result Value Ref Range    Specimen Description . CLEAN CATCH URINE     Culture CULTURE IN PROGRESS    MRSA DNA Probe, Nasal    Specimen: Nasal   Result Value Ref Range    Specimen Description . NASAL SWAB     MRSA, DNA, Nasal NEGATIVE NEGATIVE   ELECTROLYTES PLUS   Result Value Ref Range    POC Sodium 133 (L) 138 - 146 mmol/L    POC Potassium 3.6 3.5 - 4.5 mmol/L    POC Chloride 103 98 - 107 mmol/L    POC TCO2 20 (L) 22 - 30 mmol/L    Anion Gap 11 7 - 16 mmol/L   Hemoglobin and hematocrit, blood   Result Value Ref Range    POC Hemoglobin 15.3 12.0 - 16.0 g/dL    POC Hematocrit 45 36 - 46 %   CALCIUM, IONIC (POC)   Result Value Ref Range    POC Ionized Calcium 1.19 1.15 - 1.33 mmol/L   CBC with Auto Differential   Result Value Ref Range    WBC 36.1 (HH) 3.5 - 11.3 k/uL    RBC 4.53 3.95 - 5.11 m/uL    Hemoglobin 12.3 11.9 - 15.1 g/dL    Hematocrit 40.5 36.3 - 47.1 %    MCV 89.4 82.6 - 102.9 fL    MCH 27.2 25.2 - 33.5 pg    MCHC 30.4 28.4 - 34.8 g/dL    RDW 15.9 (H) 11.8 - 14.4 %    Platelets 369 766 - 422 k/uL    MPV 10.7 8.1 - 13.5 fL    NRBC Automated 0.0 0.0 per 100 WBC    Immature Granulocytes 4 (H) 0 %    Seg Neutrophils 81 (H) 36 - 66 %    Lymphocytes 8 (L) 24 - 44 %    Monocytes 7 1 - 7 %    Eosinophils % 0 (L) 1 - 4 %    Basophils 0 0 - 2 %    Absolute Immature Granulocyte 1.44 (H) 0.00 - 0.30 k/uL    Segs Absolute 29.24 (H) 1.8 - 7.7 k/uL    Absolute Lymph # 2.89 1.0 - 4.8 k/uL    Absolute Mono # 2.53 (H) 0.1 - 0.8 k/uL    Absolute Eos # 0.00 0.0 - 0.4 k/uL    Basophils Absolute 0.00 0.0 - 0.2 k/uL    Morphology ANISOCYTOSIS PRESENT     Morphology INCREASED BANDS PRESENT    CMP   Result Value Ref Range    Glucose 144 (H) 70 - 99 mg/dL    BUN 48 (H) 6 - 20 mg/dL    Creatinine 2.24 (H) 0.50 - 0.90 mg/dL    Est, Glom Filt Rate 27 (L) >60 mL/min/1.73m2    Calcium 8.9 8.6 - 10.4 mg/dL    Sodium 130 (L) 135 - 144 mmol/L    Potassium 4.0 3.7 - 5.3 mmol/L    Chloride 96 (L) 98 - 107 mmol/L    CO2 19 (L) 20 - 31 mmol/L    Anion Gap 15 9 - 17 mmol/L    Alkaline Phosphatase 141 (H) 35 - 104 U/L    ALT 39 (H) 5 - 33 U/L    AST 58 (H) <32 U/L    Total Bilirubin 0.4 0.3 - 1.2 mg/dL    Total Protein 8.2 6.4 - 8.3 g/dL    Albumin 3.0 (L) 3.5 - 5.2 g/dL    Albumin/Globulin Ratio 0.6 (L) 1.0 - 2.5   Protime-INR   Result Value Ref Range    Protime 13.8 (H) 9.1 - 12.3 sec    INR 1.3    Lactate, Sepsis   Result Value Ref Range    Lactic Acid, Sepsis, Whole Blood 1.6 0.5 - 1.9 mmol/L   Urinalysis with Microscopic   Result Value Ref Range    Color, UA Yellow Yellow    Turbidity UA Turbid (A) Clear    Glucose, Ur NEGATIVE NEGATIVE    Bilirubin Urine NEGATIVE NEGATIVE    Ketones, Urine NEGATIVE NEGATIVE    Specific Gravity, UA 1.011 1.005 - 1.030    Urine Hgb LARGE (A) NEGATIVE    pH, UA 8.0 5.0 - 8.0    Protein, UA 3+ (A) NEGATIVE    Urobilinogen, Urine Normal Normal    Nitrite, Urine NEGATIVE NEGATIVE    Leukocyte Esterase, Urine LARGE (A) NEGATIVE    WBC, UA 50  0 - 5 /HPF    RBC, UA 20 TO 50 0 - 2 /HPF    Epithelial Cells UA 5 TO 10 0 - 5 /HPF    Bacteria, UA MANY (A) None   Procalcitonin   Result Value Ref Range    Procalcitonin >100.00 (H) <0.09 ng/mL   Basic Metabolic Panel w/ Reflex to MG   Result Value Ref Range    Glucose 121 (H) 70 - 99 mg/dL    BUN 27 (H) 6 - 20 mg/dL    Creatinine 0.76 0.50 - 0.90 mg/dL    Est, Glom Filt Rate >60 >60 mL/min/1.73m2    Calcium 8.5 (L) 8.6 - 10.4 mg/dL    Sodium 142 135 - 144 mmol/L    Potassium 3.4 (L) 3.7 - 5.3 mmol/L    Chloride 111 (H) 98 - 107 mmol/L    CO2 21 20 - 31 mmol/L    Anion Gap 10 9 - 17 mmol/L   Calcium, Ionized   Result Value Ref Range    Calcium, Ionized 1.18 1.13 - 1.33 mmol/L   Magnesium   Result Value Ref Range    Magnesium 1.9 1.6 - 2.6 mg/dL   Phosphorus   Result Value Ref Range    Phosphorus 1.2 (L) 2.6 - 4.5 mg/dL   CBC with Auto Differential   Result Value Ref Range    WBC 28.2 (H) 3.5 - 11.3 k/uL    RBC 4.02 3.95 - 5.11 m/uL    Hemoglobin 10.9 (L) 11.9 - 15.1 g/dL    Hematocrit 35.0 (L) 36.3 - 47.1 %    MCV 87.1 82.6 - 102.9 fL    MCH 27.1 25.2 - 33.5 pg    MCHC 31.1 28.4 - 34.8 g/dL    RDW 15.5 (H) 11.8 - 14.4 %    Platelets 089 971 - 085 k/uL    MPV 11.0 8.1 - 13.5 fL    NRBC Automated 0.1 (H) 0.0 per 100 WBC    Immature Granulocytes 0 0 %    Seg Neutrophils 89 (H) 36 - 66 %    Lymphocytes 4 (L) 24 - 44 %    Monocytes 7 1 - 7 %    Eosinophils % 0 (L) 1 - 4 %    Basophils 0 0 - 2 %    Absolute Immature Granulocyte 0.00 0.00 - 0.30 k/uL    Segs Absolute 25.10 (H) 1.8 - 7.7 k/uL    Absolute Lymph # 1.13 1.0 - 4.8 k/uL    Absolute Mono # 1.97 (H) 0.1 - 0.8 k/uL    Absolute Eos # 0.00 0.0 - 0.4 k/uL    Basophils Absolute 0.00 0.0 - 0.2 k/uL    Morphology ANISOCYTOSIS PRESENT     Morphology INCREASED BANDS PRESENT    Vancomycin Level, Random   Result Value Ref Range    Vancomycin Rm 10.3 ug/mL   Venous Blood Gas, POC   Result Value Ref Range    pH, Niraj 7.249 (L) 7.320 - 7.430    pCO2, Niraj 46.7 41.0 - 51.0 mm Hg    pO2, Niraj 130.2 (H) 30.0 - 50.0 mm Hg    HCO3, Venous 20.4 (L) 22.0 - 29.0 mmol/L    Negative Base Excess, Niraj 7 (H) 0.0 - 2.0    O2 Sat, Niraj 98 (H) 60.0 - 85.0 %   Creatinine W/GFR Point of Care   Result Value Ref Range    POC Creatinine 3.08 (H) 0.51 - 1.19 mg/dL    eGFR, POC 19 mL/min/1.73m2   POCT urea (BUN)   Result Value Ref Range    POC BUN 54 (H) 8 - 26 mg/dL   Lactic Acid, POC   Result Value Ref Range    POC Lactic Acid 1.36 0.56 - 1.39 mmol/L   POCT Glucose   Result Value Ref Range    POC Glucose 153 (H) 74 - 100 mg/dL   Mixed Venous Gas, POC   Result Value Ref Range    PH MIXED 7.207 (L) 7.310 - 7.410    PCO2, Mixed 35.5 (L) 42.0 - 52.0 mm Hg    PO2, Mixed 60.7 (H) 35.0 - 45.0 mm Hg    HCO3, Mixed 14.1 (L) 23.0 - 29.0 mmol/L    Negative Base Excess, Mixed 13 (H) 0.0 - 2.0    O2 Sat, Mixed 85 (H) 60.0 - 80.0 %    O2 Device/Flow/% Adult Ventilator     Hugo Test POSITIVE     Sample Site Right Radial Artery     Mode PRVC     FIO2 60.0    POC Glucose Fingerstick   Result Value Ref Range    POC Glucose 182 (H) 65 - 105 mg/dL   Arterial Blood Gas, POC   Result Value Ref Range    POC pH 7.407 7.350 - 7.450    POC pCO2 35.5 35.0 - 48.0 mm Hg    POC PO2 99.3 83.0 - 108.0 mm Hg    POC HCO3 22.3 21.0 - 28.0 mmol/L    Negative Base Excess, Art 2 0.0 - 2.0    POC O2 SAT 98 94.0 - 98.0 %    O2 Device/Flow/% Adult Ventilator     Hugo Test POSITIVE     Sample Site Right Radial Artery     Mode PRVC     FIO2 40.0    POCT Glucose   Result Value Ref Range    POC Glucose 119 (H) 74 - 100 mg/dL       Radiology/Imaging:  [facility] FOR SURGICAL PROCEDURES   Final Result      XR CHEST PORTABLE   Final Result   Satisfactory line and tube placement. CT Head W/O Contrast   Final Result   No acute intracranial abnormality. CT CHEST ABDOMEN PELVIS WO CONTRAST Additional Contrast? None   Final Result   1. ETT now terminates 12 mm above denys. 2. Patchy perihilar and more confluent posterior segment airspace   consolidations likely combination of pneumonia and atelectasis. Trace right   pleural effusion. 3. Innumerable right renal calculi most in the 5-8 mm size range. Moderate   to severe right hydronephrosis and hydroureter. There is a stack of 4   obstructing calculi in the distal right ureter ranging from 5-11 mm   accounting for finding. 11 mm calculus is most distal and positioned about 2   cm from the uterovesical junction. 4. Liquid stool in the colon suggests diarrheal disease. Tiny gas pockets   along the ascending colon wall in the dependent portion appear to be pseudo   pneumatosis. XR CHEST PORTABLE   Final Result   Chest x-ray:      1.  ETT is just entering the right mainstem bronchus by few mm. Repositioning   advised. 2. Some progression of mild elevation right hemidiaphragm. 3. Patchy perihilar opacities probably vascular. Abdomen:      1. NG tube terminates in the left upper quadrant. Side port is positioned in   the midline probably in the distal gastric antrum. 2. Cholelithiasis. 3. Peg tube in place. 4. No small bowel distension. Findings were discussed with MORTEZA PAGE at 11:07 a.m. on 12/3/2022. XR ABDOMEN FOR NG/OG/NE TUBE PLACEMENT   Final Result   Chest x-ray:      1. ETT is just entering the right mainstem bronchus by few mm. Repositioning   advised. 2. Some progression of mild elevation right hemidiaphragm. 3. Patchy perihilar opacities probably vascular. Abdomen:      1. NG tube terminates in the left upper quadrant. Side port is positioned in   the midline probably in the distal gastric antrum. 2. Cholelithiasis. 3. Peg tube in place. 4. No small bowel distension. Findings were discussed with MORTEZA PAGE at 11:07 a.m. on 12/3/2022.              ASSESSMENT:     Patient Active Problem List    Diagnosis Date Noted    Nephrostomy tube displaced ([address] 09/26/2020    E coli bacteremia     E-coli UTI 01/23/2020    Sepsis due to Escherichia coli ([address] 01/23/2020    Lactate blood increase 01/22/2020    Proteus mirabilis infection 08/02/2019    Decubitus ulcer of coccygeal region, stage 4 ([address] 07/30/2019    Ileus ([address] 07/29/2019    Urinary tract infection associated with indwelling urethral catheter ([address] 07/28/2019    Abdominal pain 07/28/2019    Pneumonia due to organism     Extended spectrum beta lactamase (ESBL) resistance     Sepsis ([address] 06/25/2019    Lower paraplegia ([address]     Toxic metabolic encephalopathy 01/06/2159    Status post colostomy ([address] 05/19/2019    ESBL E. coli carrier     Septic shock (HCC)     Altered mental status     Lactic acidosis 05/17/2019    Chronic indwelling Davies catheter

## 2022-12-04 NOTE — PROGRESS NOTES
Tayla Tompkins, Seema Le, Domenico Schneider  Urology Progress Note     Subjective:   S/p cystoscopy with right ureteral stent insertion on 12/3/2022 for obstructing right ureteral stone with septic shock  Patient continues to be intubated  Pressor requirement coming down, Levophed at 40 mcg/ min from 50  Urine output-3450 cc    Patient Vitals for the past 24 hrs:   BP Temp Temp src Pulse Resp SpO2 Height Weight   12/04/22 0847 -- -- -- (!) 108 20 96 % -- --   12/04/22 0645 105/72 -- -- 92 20 99 % -- --   12/04/22 0630 101/60 -- -- 97 20 97 % -- --   12/04/22 0615 (!) 103/50 -- -- 98 20 95 % -- --   12/04/22 0600 (!) 105/46 99.5 °F (37.5 °C) Esophageal 100 20 95 % -- --   12/04/22 0545 (!) 107/52 -- -- 98 20 95 % -- --   12/04/22 0530 (!) 105/58 -- -- 97 20 95 % -- 195 lb 1.7 oz (88.5 kg)   12/04/22 0515 106/60 -- -- 96 20 96 % -- --   12/04/22 0500 101/67 -- -- 93 20 97 % -- --   12/04/22 0445 106/62 -- -- 93 20 98 % -- --   12/04/22 0430 (!) 107/57 -- -- 95 20 97 % -- --   12/04/22 0415 (!) 106/55 -- -- 98 20 97 % -- --   12/04/22 0400 107/60 99.7 °F (37.6 °C) Esophageal 98 20 97 % -- --   12/04/22 0345 105/69 -- -- 98 20 97 % -- --   12/04/22 0342 -- -- -- 98 20 97 % -- --   12/04/22 0330 113/74 -- -- 99 20 99 % -- --   12/04/22 0315 (!) 99/53 -- -- 94 20 99 % -- --   12/04/22 0300 (!) 100/52 -- -- 96 20 99 % -- --   12/04/22 0245 (!) 101/52 -- -- 96 20 99 % -- --   12/04/22 0230 109/78 -- -- (!) 102 20 99 % -- --   12/04/22 0215 101/63 -- -- 98 20 99 % -- --   12/04/22 0200 106/71 99.9 °F (37.7 °C) Esophageal (!) 101 20 99 % -- --   12/04/22 0145 98/60 -- -- 94 20 100 % -- --   12/04/22 0141 -- -- -- 93 -- 99 % -- --   12/04/22 0130 99/60 -- -- 93 20 100 % -- --   12/04/22 0115 100/61 -- -- 95 20 99 % -- --   12/04/22 0100 99/63 -- -- 96 20 99 % -- --   12/04/22 0045 (!) 100/55 -- -- 100 20 99 % -- --   12/04/22 0030 (!) 99/56 -- -- 100 20 98 % -- --   12/04/22 0015 101/60 -- -- 99 20 100 % -- -- 12/04/22 0014 -- -- -- 99 20 99 % -- --   12/04/22 0000 (!) 99/56 99.9 °F (37.7 °C) Esophageal 100 20 99 % -- --   12/03/22 2345 (!) 95/51 -- -- (!) 110 20 98 % -- --   12/03/22 2330 (!) 104/58 -- -- (!) 121 20 99 % -- --   12/03/22 2315 (!) 79/52 -- -- (!) 135 20 97 % -- --   12/03/22 2300 104/70 -- -- (!) 111 20 98 % -- --   12/03/22 2245 -- -- -- (!) 110 20 99 % -- --   12/03/22 2230 (!) 97/59 -- -- (!) 105 20 98 % -- --   12/03/22 2215 94/60 -- -- (!) 106 20 99 % -- --   12/03/22 2200 93/64 100.4 °F (38 °C) Esophageal (!) 106 20 99 % -- --   12/03/22 2145 101/71 -- -- (!) 107 20 99 % -- --   12/03/22 2130 108/82 -- -- (!) 112 20 99 % -- --   12/03/22 2122 -- -- -- (!) 103 20 99 % -- --   12/03/22 2115 98/73 -- -- (!) 106 20 99 % -- --   12/03/22 2100 98/70 -- -- (!) 106 20 99 % -- --   12/03/22 2045 97/72 -- -- (!) 105 20 100 % -- --   12/03/22 2030 89/65 -- -- (!) 108 20 99 % -- --   12/03/22 2015 97/71 -- -- (!) 107 20 99 % -- --   12/03/22 2000 105/82 99.9 °F (37.7 °C) Esophageal (!) 106 20 100 % -- --   12/03/22 1945 106/81 -- -- (!) 107 23 99 % -- --   12/03/22 1930 105/81 -- -- (!) 102 20 99 % -- --   12/03/22 1900 108/77 -- -- (!) 104 20 98 % -- --   12/03/22 1845 111/78 -- -- (!) 104 20 98 % -- --   12/03/22 1830 112/83 -- -- (!) 105 20 98 % -- --   12/03/22 1815 116/83 -- -- (!) 106 20 99 % -- --   12/03/22 1800 117/81 -- -- (!) 108 20 98 % -- --   12/03/22 1750 -- -- -- (!) 109 -- -- -- --   12/03/22 1745 117/84 -- -- (!) 110 23 99 % 5' (1.524 m) 195 lb 15.8 oz (88.9 kg)   12/03/22 1730 120/84 99 °F (37.2 °C) Esophageal (!) 110 24 98 % -- --   12/03/22 1557 -- -- -- -- -- -- 5' (1.524 m) --   12/03/22 1520 -- -- -- (!) 117 20 99 % 5' (1.524 m) 220 lb (99.8 kg)   12/03/22 1450 125/68 -- -- (!) 121 20 95 % -- --   12/03/22 1418 (!) 111/58 -- -- (!) 119 20 99 % -- --   12/03/22 1414 -- 100.1 °F (37.8 °C) Rectal -- -- -- -- --   12/03/22 1407 -- -- -- (!) 125 20 99 % -- --   12/03/22 1337 123/68 -- -- (!) 120 20 97 % -- --   12/03/22 1330 123/74 -- -- (!) 127 20 98 % -- --   12/03/22 1206 -- -- -- (!) 133 -- -- -- --   12/03/22 1200 109/66 -- -- (!) 140 20 97 % -- --   12/03/22 1153 (!) 101/54 -- -- (!) 142 20 96 % -- --   12/03/22 1126 121/78 -- -- (!) 152 20 97 % -- --   12/03/22 1106 -- -- -- (!) 118 20 100 % -- --   12/03/22 1105 122/81 -- -- (!) 119 20 100 % -- --   12/03/22 1056 128/87 -- -- (!) 106 20 100 % -- --   12/03/22 1043 -- -- -- 98 20 96 % -- --   12/03/22 1017 -- -- -- -- -- -- -- 220 lb 7.4 oz (100 kg)   12/03/22 1014 -- -- -- -- -- -- -- 220 lb 7.4 oz (100 kg)   12/03/22 1010 -- 99.7 °F (37.6 °C) Axillary -- 20 100 % -- --       Intake/Output Summary (Last 24 hours) at 12/4/2022 0856  Last data filed at 12/4/2022 0648  Gross per 24 hour   Intake 2286.39 ml   Output 3500 ml   Net -1213.61 ml       Recent Labs     12/03/22  1014 12/04/22  0512   WBC 36.1* 28.2*   HGB 12.3 10.9*   HCT 40.5 35.0*   MCV 89.4 87.1    181     Recent Labs     12/03/22  1007 12/03/22  1014 12/04/22  0512   NA  --  130* 142   K  --  4.0 3.4*   CL  --  96* 111*   CO2  --  19* 21   PHOS  --   --  1.2*   BUN  --  48* 27*   CREATININE 3.08* 2.24* 0.76       Recent Labs     12/03/22  1104   COLORU Yellow   PHUR 8.0   WBCUA 50    RBCUA 20 TO 50   BACTERIA MANY*   SPECGRAV 1.011   LEUKOCYTESUR LARGE*   UROBILINOGEN Normal   BILIRUBINUR NEGATIVE       Additional Lab/culture results:    Physical Exam:   Intubated, sedated and mechanically ventilated  Neck: Supple   Chest: bilateral symmetrical chest rise/ Non labored breathing   Circulatory: Peripheries warm , well perfused   P/A: soft, nondistended  Davies in situ with clear yellow urine        Interval Imaging Findings:    Impression:    Patient Active Problem List   Diagnosis    Aphasia    Multiple sclerosis ([address]    Nonverbal    Gastrostomy tube dependent ([address]    Dislodged gastrostomy tube    Neurogenic bladder    Hemorrhagic cystitis    Hypokalemia    Urinary tract infection associated with indwelling urethral catheter (HCC)    Tachycardia    Gross hematuria    Lactic acidosis    Chronic indwelling Davies catheter    Peripheral vascular disease (HCC)    Recurrent UTI (urinary tract infection)    History of pulmonary embolism    Hypertension    Altered mental status    Toxic metabolic encephalopathy    ESBL E. coli carrier    Septic shock (HCC)    Status post colostomy (HCC)    Lower paraplegia (HCC)    Sepsis (HCC)    Pneumonia due to organism    Extended spectrum beta lactamase (ESBL) resistance    Abdominal pain    Ileus (HCC)    Decubitus ulcer of coccygeal region, stage 4 (HCC)    Proteus mirabilis infection    Lactate blood increase    E-coli UTI    Sepsis due to Escherichia coli (HCC)    E coli bacteremia    Nephrostomy tube displaced ([facility] [address]   Is post cystoscopy, right ureteral stent insertion for right ureteral obstructing stone with septic shock    Plan:   Supportive care per primary team  WBC 28 from 36, on cefepime per critical care team.  Follow-up urine and blood cultures for tailoring appropriate antibiotics  Creatinine 0.7 (baseline) from 2.2  Maintain Davies catheter until patient is in ICU  Will need to follow-up for definitive stone treatment  We will continue to follow        Clau Saldana MD  8:56 AM 12/4/2022

## 2022-12-04 NOTE — H&P
Attending Physician Statement  I have discussed the case of Chadd Ramsey, including pertinent history and exam findings with the resident/fellow/medical student/NP/PA. I have seen and examined the patient and the key elements of the encounter have been performed by me. I agree with the assessment, plan and orders as documented by the resident/fellow/medical student/NP/PA  With changes made to the note as needed. Pt was seen during rounds. Review of Systems:   In addition to the pertinent positives and negatives as stated within HPI and the review of systems as documented in their notes, all other systems were reviewed when able to and are reported negative. Patient admitted with altered mental status. Patient history of multiple sclerosis and nonverbal at baseline. Having had a Joanie Coma Scale of 3, patient was intubated in the emergency room. Found to be tachycardic and in shock requiring norepinephrine. Urinalysis was suggestive of UTI and patient was started on broad-spectrum antibiotics. Further evaluation revealed right-sided hydronephrosis due to renal calculi and ureteral calculi. Patient has septic shock related to UTI due to right-sided hydronephrosis due to kidney stones and ureteral stones leading to acute toxic metabolic encephalopathy and subsequently acute hypercarbic respiratory failure-continue ventilator support, chest x-ray and ABG as needed, continue the broad-spectrum antibiotics and adjust them based on culture data. Consult urology, suprapubic catheter care.   ID has been consulted    Acute kidney injury secondary to shock and dehydration-rehydrate patient and continue to monitor urine output and kidney function    Pneumonia, increased risk for Pseudomonas and MRSA-add vancomycin and follow levels and dose adjustment accordingly    Essential hypertension, history-continue to monitor blood pressure and cover with medications when appropriate    Neurogenic bladder with chronic indwelling suprapubic catheter-continue catheter care    History of G-tube placement-G-tube care and tube feeding per G-tube    History of pulmonary embolism-patient on anticoagulation, change to heparin as the patient may need surgeries    PAD-continue to monitor    History of hydronephrosis-consult urology    Hyponatremia-continue to monitor    Mild elevation of hepatic transaminases-continue to monitor    Cholelithiasis-observe    Colostomy-stoma care  Total critical care time caring for this patient with life threatening, unstable organ failure, including direct patient contact, management of life support systems, review of data including imaging and labs, discussions with other team members and physicians at least 27  Min so far today, excluding procedures.         Alfie Roque MD  12/4/2022  8:32 AM

## 2022-12-04 NOTE — PLAN OF CARE
Problem: Discharge Planning  Goal: Discharge to home or other facility with appropriate resources  12/3/2022 2034 by Ivette Barrios RN  Outcome: Progressing    Problem: Nutrition Deficit:  Goal: Optimize nutritional status  12/3/2022 2034 by Ivette Barrios RN  Outcome: Progressing     Problem: Confusion  Goal: Confusion, delirium, dementia, or psychosis is improved or at baseline  Description: INTERVENTIONS:  1. Assess for possible contributors to thought disturbance, including medications, impaired vision or hearing, underlying metabolic abnormalities, dehydration, psychiatric diagnoses, and notify attending LIP  2. [place] high risk fall precautions, as indicated  3. Provide frequent short contacts to provide reality reorientation, refocusing and direction  4. Decrease environmental stimuli, including noise as appropriate  5. Monitor and intervene to maintain adequate nutrition, hydration, elimination, sleep and activity  6. If unable to ensure safety without constant attention obtain sitter and review sitter guidelines with assigned personnel  7.  Initiate Psychosocial CNS and Spiritual Care consult, as indicated  12/3/2022 2034 by Ivette Barrios RN  Outcome: Progressing     Problem: Neurosensory - Adult  Goal: Achieves stable or improved neurological status  12/3/2022 2034 by Ivette Barrios RN  Outcome: Progressing     Problem: Respiratory - Adult  Goal: Achieves optimal ventilation and oxygenation  12/3/2022 2034 by Ivette Barrios RN  Outcome: Progressing     Problem: Gastrointestinal - Adult  Goal: Minimal or absence of nausea and vomiting  12/3/2022 2034 by Ivette Barrios RN  Outcome: Progressing     Problem: Genitourinary - Adult  Goal: Urinary catheter remains patent  12/3/2022 2034 by Ivette Barrios RN  Outcome: Progressing     Problem: Metabolic/Fluid and Electrolytes - Adult  Goal: Electrolytes maintained within normal limits  12/3/2022 2034 by Ivette Barrios RN  Outcome: Progressing     Problem: Metabolic/Fluid and Electrolytes - Adult  Goal: Hemodynamic stability and optimal renal function maintained  12/3/2022 2034 by Karri Garrison RN  Outcome: Progressing     Problem: Skin/Tissue Integrity - Adult  Goal: Skin integrity remains intact  12/3/2022 2034 by Karri Garrison RN  Outcome: Progressing     Problem: Pain  Goal: Verbalizes/displays adequate comfort level or baseline comfort level  Outcome: Progressing

## 2022-12-04 NOTE — PLAN OF CARE
Problem: Nutrition Deficit:  Goal: Optimize nutritional status  12/3/2022 1948 by Dava Najjar, RN  Outcome: Progressing     Problem: Confusion  Goal: Confusion, delirium, dementia, or psychosis is improved or at baseline  Description: INTERVENTIONS:  1. Assess for possible contributors to thought disturbance, including medications, impaired vision or hearing, underlying metabolic abnormalities, dehydration, psychiatric diagnoses, and notify attending LIP  2. [place] high risk fall precautions, as indicated  3. Provide frequent short contacts to provide reality reorientation, refocusing and direction  4. Decrease environmental stimuli, including noise as appropriate  5. Monitor and intervene to maintain adequate nutrition, hydration, elimination, sleep and activity  6. If unable to ensure safety without constant attention obtain sitter and review sitter guidelines with assigned personnel  7.  Initiate Psychosocial CNS and Spiritual Care consult, as indicated  12/3/2022 1948 by Dava Najjar, RN  Outcome: Progressing     Problem: Neurosensory - Adult  Goal: Achieves stable or improved neurological status  Outcome: Progressing     Problem: Neurosensory - Adult  Goal: Absence of seizures  Outcome: Progressing     Problem: Respiratory - Adult  Goal: Achieves optimal ventilation and oxygenation  Outcome: Progressing     Problem: Gastrointestinal - Adult  Goal: Maintains or returns to baseline bowel function  Outcome: Progressing     Problem: Gastrointestinal - Adult  Goal: Establish and maintain optimal ostomy function  Outcome: Progressing     Problem: Genitourinary - Adult  Goal: Urinary catheter remains patent  Outcome: Progressing     Problem: Metabolic/Fluid and Electrolytes - Adult  Goal: Electrolytes maintained within normal limits  Outcome: Progressing     Problem: Skin/Tissue Integrity - Adult  Goal: Skin integrity remains intact  Outcome: Progressing

## 2022-12-05 ENCOUNTER — APPOINTMENT (OUTPATIENT)
Dept: GENERAL RADIOLOGY | Age: 42
DRG: 853 | End: 2022-12-05
Payer: MEDICARE

## 2022-12-05 LAB
ABSOLUTE EOS #: 0 K/UL (ref 0–0.4)
ABSOLUTE IMMATURE GRANULOCYTE: 0.24 K/UL (ref 0–0.3)
ABSOLUTE LYMPH #: 1.45 K/UL (ref 1–4.8)
ABSOLUTE MONO #: 2.18 K/UL (ref 0.1–0.8)
ALLEN TEST: POSITIVE
ANION GAP SERPL CALCULATED.3IONS-SCNC: 14 MMOL/L (ref 9–17)
BASOPHILS # BLD: 0 % (ref 0–2)
BASOPHILS ABSOLUTE: 0 K/UL (ref 0–0.2)
BUN BLDV-MCNC: 14 MG/DL (ref 6–20)
CALCIUM IONIZED: 1.2 MMOL/L (ref 1.13–1.33)
CALCIUM SERPL-MCNC: 9.3 MG/DL (ref 8.6–10.4)
CHLORIDE BLD-SCNC: 112 MMOL/L (ref 98–107)
CO2: 22 MMOL/L (ref 20–31)
CREAT SERPL-MCNC: 0.53 MG/DL (ref 0.5–0.9)
CULTURE: NORMAL
EOSINOPHILS RELATIVE PERCENT: 0 % (ref 1–4)
FIO2: 30
GFR SERPL CREATININE-BSD FRML MDRD: >60 ML/MIN/1.73M2
GLUCOSE BLD-MCNC: 81 MG/DL (ref 70–99)
GLUCOSE BLD-MCNC: 88 MG/DL (ref 74–100)
HCT VFR BLD CALC: 39.6 % (ref 36.3–47.1)
HEMOGLOBIN: 12.2 G/DL (ref 11.9–15.1)
IMMATURE GRANULOCYTES: 1 %
LACTIC ACID, WHOLE BLOOD: 0.9 MMOL/L (ref 0.7–2.1)
LYMPHOCYTES # BLD: 6 % (ref 24–44)
MAGNESIUM: 1.5 MG/DL (ref 1.6–2.6)
MCH RBC QN AUTO: 27.2 PG (ref 25.2–33.5)
MCHC RBC AUTO-ENTMCNC: 30.8 G/DL (ref 28.4–34.8)
MCV RBC AUTO: 88.4 FL (ref 82.6–102.9)
MODE: ABNORMAL
MONOCYTES # BLD: 9 % (ref 1–7)
MORPHOLOGY: ABNORMAL
NRBC AUTOMATED: 0 PER 100 WBC
O2 DEVICE/FLOW/%: ABNORMAL
PDW BLD-RTO: 15.5 % (ref 11.8–14.4)
PHOSPHORUS: 1.9 MG/DL (ref 2.6–4.5)
PLATELET # BLD: 163 K/UL (ref 138–453)
PMV BLD AUTO: 10.8 FL (ref 8.1–13.5)
POC HCO3: 22.6 MMOL/L (ref 21–28)
POC O2 SATURATION: 95 % (ref 94–98)
POC PCO2: 29.6 MM HG (ref 35–48)
POC PH: 7.49 (ref 7.35–7.45)
POC PO2: 66.9 MM HG (ref 83–108)
POSITIVE BASE EXCESS, ART: 0 (ref 0–3)
POTASSIUM SERPL-SCNC: 3.4 MMOL/L (ref 3.7–5.3)
PROCALCITONIN: 29.13 NG/ML
RBC # BLD: 4.48 M/UL (ref 3.95–5.11)
SAMPLE SITE: ABNORMAL
SEG NEUTROPHILS: 84 % (ref 36–66)
SEGMENTED NEUTROPHILS ABSOLUTE COUNT: 20.33 K/UL (ref 1.8–7.7)
SODIUM BLD-SCNC: 148 MMOL/L (ref 135–144)
SPECIMEN DESCRIPTION: NORMAL
WBC # BLD: 24.2 K/UL (ref 3.5–11.3)

## 2022-12-05 PROCEDURE — 2580000003 HC RX 258: Performed by: STUDENT IN AN ORGANIZED HEALTH CARE EDUCATION/TRAINING PROGRAM

## 2022-12-05 PROCEDURE — 80048 BASIC METABOLIC PNL TOTAL CA: CPT

## 2022-12-05 PROCEDURE — 94761 N-INVAS EAR/PLS OXIMETRY MLT: CPT

## 2022-12-05 PROCEDURE — 2700000000 HC OXYGEN THERAPY PER DAY

## 2022-12-05 PROCEDURE — 6360000002 HC RX W HCPCS: Performed by: STUDENT IN AN ORGANIZED HEALTH CARE EDUCATION/TRAINING PROGRAM

## 2022-12-05 PROCEDURE — 36600 WITHDRAWAL OF ARTERIAL BLOOD: CPT

## 2022-12-05 PROCEDURE — 6370000000 HC RX 637 (ALT 250 FOR IP): Performed by: STUDENT IN AN ORGANIZED HEALTH CARE EDUCATION/TRAINING PROGRAM

## 2022-12-05 PROCEDURE — 83605 ASSAY OF LACTIC ACID: CPT

## 2022-12-05 PROCEDURE — 94003 VENT MGMT INPAT SUBQ DAY: CPT

## 2022-12-05 PROCEDURE — 87070 CULTURE OTHR SPECIMN AEROBIC: CPT

## 2022-12-05 PROCEDURE — 36415 COLL VENOUS BLD VENIPUNCTURE: CPT

## 2022-12-05 PROCEDURE — 99291 CRITICAL CARE FIRST HOUR: CPT | Performed by: INTERNAL MEDICINE

## 2022-12-05 PROCEDURE — 2000000000 HC ICU R&B

## 2022-12-05 PROCEDURE — 2580000003 HC RX 258: Performed by: HEALTH CARE PROVIDER

## 2022-12-05 PROCEDURE — 82803 BLOOD GASES ANY COMBINATION: CPT

## 2022-12-05 PROCEDURE — 84100 ASSAY OF PHOSPHORUS: CPT

## 2022-12-05 PROCEDURE — 71045 X-RAY EXAM CHEST 1 VIEW: CPT

## 2022-12-05 PROCEDURE — 6370000000 HC RX 637 (ALT 250 FOR IP)

## 2022-12-05 PROCEDURE — 87205 SMEAR GRAM STAIN: CPT

## 2022-12-05 PROCEDURE — 82947 ASSAY GLUCOSE BLOOD QUANT: CPT

## 2022-12-05 PROCEDURE — 2580000003 HC RX 258: Performed by: INTERNAL MEDICINE

## 2022-12-05 PROCEDURE — 99233 SBSQ HOSP IP/OBS HIGH 50: CPT | Performed by: INTERNAL MEDICINE

## 2022-12-05 PROCEDURE — 85025 COMPLETE CBC W/AUTO DIFF WBC: CPT

## 2022-12-05 PROCEDURE — 83735 ASSAY OF MAGNESIUM: CPT

## 2022-12-05 PROCEDURE — 2500000003 HC RX 250 WO HCPCS: Performed by: STUDENT IN AN ORGANIZED HEALTH CARE EDUCATION/TRAINING PROGRAM

## 2022-12-05 PROCEDURE — 6360000002 HC RX W HCPCS: Performed by: HEALTH CARE PROVIDER

## 2022-12-05 PROCEDURE — 82330 ASSAY OF CALCIUM: CPT

## 2022-12-05 PROCEDURE — 6360000002 HC RX W HCPCS: Performed by: INTERNAL MEDICINE

## 2022-12-05 PROCEDURE — C9113 INJ PANTOPRAZOLE SODIUM, VIA: HCPCS | Performed by: STUDENT IN AN ORGANIZED HEALTH CARE EDUCATION/TRAINING PROGRAM

## 2022-12-05 PROCEDURE — 84145 PROCALCITONIN (PCT): CPT

## 2022-12-05 RX ORDER — MIDODRINE HYDROCHLORIDE 5 MG/1
5 TABLET ORAL 3 TIMES DAILY
Status: DISCONTINUED | OUTPATIENT
Start: 2022-12-05 | End: 2022-12-11 | Stop reason: HOSPADM

## 2022-12-05 RX ORDER — MAGNESIUM SULFATE IN WATER 40 MG/ML
2000 INJECTION, SOLUTION INTRAVENOUS ONCE
Status: COMPLETED | OUTPATIENT
Start: 2022-12-05 | End: 2022-12-05

## 2022-12-05 RX ORDER — POTASSIUM CHLORIDE 29.8 MG/ML
20 INJECTION INTRAVENOUS ONCE
Status: COMPLETED | OUTPATIENT
Start: 2022-12-05 | End: 2022-12-05

## 2022-12-05 RX ADMIN — GABAPENTIN 300 MG: 300 CAPSULE ORAL at 15:10

## 2022-12-05 RX ADMIN — SODIUM CHLORIDE, POTASSIUM CHLORIDE, SODIUM LACTATE AND CALCIUM CHLORIDE: 600; 310; 30; 20 INJECTION, SOLUTION INTRAVENOUS at 20:36

## 2022-12-05 RX ADMIN — MIDODRINE HYDROCHLORIDE 5 MG: 5 TABLET ORAL at 15:10

## 2022-12-05 RX ADMIN — SODIUM CHLORIDE, POTASSIUM CHLORIDE, SODIUM LACTATE AND CALCIUM CHLORIDE: 600; 310; 30; 20 INJECTION, SOLUTION INTRAVENOUS at 11:26

## 2022-12-05 RX ADMIN — METOCLOPRAMIDE HYDROCHLORIDE 5 MG: 5 SOLUTION ORAL at 20:36

## 2022-12-05 RX ADMIN — SODIUM CHLORIDE, PRESERVATIVE FREE 10 ML: 5 INJECTION INTRAVENOUS at 20:37

## 2022-12-05 RX ADMIN — RIVAROXABAN 20 MG: 20 TABLET, FILM COATED ORAL at 17:50

## 2022-12-05 RX ADMIN — SODIUM CHLORIDE, PRESERVATIVE FREE 40 MG: 5 INJECTION INTRAVENOUS at 08:20

## 2022-12-05 RX ADMIN — MEROPENEM 1000 MG: 1 INJECTION, POWDER, FOR SOLUTION INTRAVENOUS at 15:21

## 2022-12-05 RX ADMIN — MIDODRINE HYDROCHLORIDE 5 MG: 5 TABLET ORAL at 20:36

## 2022-12-05 RX ADMIN — METOCLOPRAMIDE HYDROCHLORIDE 5 MG: 5 SOLUTION ORAL at 10:23

## 2022-12-05 RX ADMIN — MIDODRINE HYDROCHLORIDE 5 MG: 5 TABLET ORAL at 08:20

## 2022-12-05 RX ADMIN — SODIUM CHLORIDE, PRESERVATIVE FREE 10 ML: 5 INJECTION INTRAVENOUS at 08:17

## 2022-12-05 RX ADMIN — PROPOFOL 15 MCG/KG/MIN: 10 INJECTION, EMULSION INTRAVENOUS at 06:19

## 2022-12-05 RX ADMIN — METOCLOPRAMIDE HYDROCHLORIDE 5 MG: 5 SOLUTION ORAL at 15:11

## 2022-12-05 RX ADMIN — POTASSIUM CHLORIDE 20 MEQ: 29.8 INJECTION, SOLUTION INTRAVENOUS at 06:30

## 2022-12-05 RX ADMIN — POTASSIUM PHOSPHATE, MONOBASIC AND POTASSIUM PHOSPHATE, DIBASIC 20 MMOL: 224; 236 INJECTION, SOLUTION, CONCENTRATE INTRAVENOUS at 08:27

## 2022-12-05 RX ADMIN — Medication 2 MCG/MIN: at 05:07

## 2022-12-05 RX ADMIN — MEROPENEM 1000 MG: 1 INJECTION, POWDER, FOR SOLUTION INTRAVENOUS at 20:43

## 2022-12-05 RX ADMIN — MEROPENEM 1000 MG: 1 INJECTION, POWDER, FOR SOLUTION INTRAVENOUS at 05:54

## 2022-12-05 RX ADMIN — GABAPENTIN 300 MG: 300 CAPSULE ORAL at 08:28

## 2022-12-05 RX ADMIN — MAGNESIUM SULFATE HEPTAHYDRATE 2000 MG: 40 INJECTION, SOLUTION INTRAVENOUS at 06:21

## 2022-12-05 RX ADMIN — GABAPENTIN 300 MG: 300 CAPSULE ORAL at 21:00

## 2022-12-05 ASSESSMENT — PULMONARY FUNCTION TESTS
PIF_VALUE: 17
PIF_VALUE: 13
PIF_VALUE: 17
PIF_VALUE: 14
PIF_VALUE: 15
PIF_VALUE: 14
PIF_VALUE: 32
PIF_VALUE: 17
PIF_VALUE: 16
PIF_VALUE: 16
PIF_VALUE: 15
PIF_VALUE: 16

## 2022-12-05 NOTE — PROGRESS NOTES
[facility]  PROGRESS NOTE    Shift date: 12/5/2022  Shift day: Monday   Shift # 1    Room # 3010/3010-01   Name: Kate Merino                Latter day: Unknown   Place of Faith: Unknown    Referral: Routine Visit    Admit Date & Time: 12/3/2022 10:03 AM    Assessment:  Kate Merino is a 43 y.o. female in the hospital because septic shock. Upon entering the room writer observes Patient is intubated and sedated, laying in bed. Intervention:  Writer introduced self and title as     prays at bedside of patient. Outcome:  Patient will feel presence of God surround them. Plan:  Chaplains will remain available to offer spiritual and emotional support as needed. Electronically signed by Mannie Wright, on 12/5/2022 at 2:19 PM.  Feliz Castillo  [phone]       12/05/22 1415   Encounter Summary   Encounter Overview/Reason  Attempted Encounter   Service Provided For: Patient   [facility]   Support System Unknown   Last Encounter  12/05/22   Complexity of Encounter High   Begin Time 1154   End Time  1155   Total Time Calculated 1 min   Assessment/Intervention/Outcome   Assessment Other (Comment); Unable to assess  (Sleeping)   Intervention Sustaining Presence/Ministry of presence;Prayer (assurance of)/[place]; Other (Comment)  (Silent prayer at bedside)   Outcome Other (comment)  (Presence of God called upon)

## 2022-12-05 NOTE — PLAN OF CARE
Problem: Discharge Planning  Goal: Discharge to home or other facility with appropriate resources  12/5/2022 1122 by Kailey Ramirez RN  Outcome: Progressing  12/5/2022 0607 by Tiffanie Cheng RN  Outcome: Progressing     Problem: Nutrition Deficit:  Goal: Optimize nutritional status  12/5/2022 1122 by Kailey Ramirez RN  Outcome: Progressing  Flowsheets (Taken 12/5/2022 0909 by Antonia Zarate RD)  Nutrient intake appropriate for improving, restoring, or maintaining nutritional needs: Recommend, monitor, and adjust tube feedings and TPN/PPN based on assessed needs  12/5/2022 0607 by Tiffanie Cheng RN  Outcome: Progressing     Problem: Confusion  Goal: Confusion, delirium, dementia, or psychosis is improved or at baseline  Description: INTERVENTIONS:  1. Assess for possible contributors to thought disturbance, including medications, impaired vision or hearing, underlying metabolic abnormalities, dehydration, psychiatric diagnoses, and notify attending LIP  2. [place] high risk fall precautions, as indicated  3. Provide frequent short contacts to provide reality reorientation, refocusing and direction  4. Decrease environmental stimuli, including noise as appropriate  5. Monitor and intervene to maintain adequate nutrition, hydration, elimination, sleep and activity  6. If unable to ensure safety without constant attention obtain sitter and review sitter guidelines with assigned personnel  7.  Initiate Psychosocial CNS and Spiritual Care consult, as indicated  12/5/2022 1122 by Kailey Ramirez RN  Outcome: Progressing  12/5/2022 0607 by Tiffanie Cheng RN  Outcome: Progressing     Problem: Neurosensory - Adult  Goal: Achieves stable or improved neurological status  12/5/2022 1122 by Kailey Ramirez RN  Outcome: Progressing  12/5/2022 0607 by Tiffanie Cheng RN  Outcome: Progressing  Goal: Absence of seizures  12/5/2022 1122 by Kailey Ramirez RN  Outcome: Progressing  12/5/2022 0607 by Tiffanie Cheng RN  Outcome: Progressing  Goal: Remains free of injury related to seizures activity  12/5/2022 1122 by Avani Coley RN  Outcome: Progressing  12/5/2022 0607 by Tricia Mazariegos RN  Outcome: Progressing  Goal: Achieves maximal functionality and self care  12/5/2022 1122 by Avani Coley RN  Outcome: Progressing  12/5/2022 0607 by Tricia Mazariegos RN  Outcome: Progressing     Problem: Respiratory - Adult  Goal: Achieves optimal ventilation and oxygenation  12/5/2022 1122 by Avani Coley RN  Outcome: Progressing  12/5/2022 0607 by Tricia Mazariegos RN  Outcome: Progressing  12/5/2022 0504 by Barbara Morris RCP  Outcome: Progressing     Problem: Gastrointestinal - Adult  Goal: Minimal or absence of nausea and vomiting  12/5/2022 1122 by Avani Coley RN  Outcome: Progressing  12/5/2022 0607 by Tricia Mazariegos RN  Outcome: Progressing  Goal: Maintains or returns to baseline bowel function  12/5/2022 1122 by Avani Coley RN  Outcome: Progressing  12/5/2022 0607 by Tricia Mazariegos RN  Outcome: Progressing  Goal: Maintains adequate nutritional intake  12/5/2022 1122 by Avani Coley RN  Outcome: Progressing  12/5/2022 0607 by Tricia Mazariegos RN  Outcome: Progressing  Goal: Establish and maintain optimal ostomy function  12/5/2022 1122 by Avani Coley RN  Outcome: Progressing  12/5/2022 0607 by Tricia Mazariegos RN  Outcome: Progressing     Problem: Genitourinary - Adult  Goal: Urinary catheter remains patent  12/5/2022 1122 by Avani Coley RN  Outcome: Progressing  12/5/2022 0607 by Tricia Mazariegos RN  Outcome: Progressing     Problem: Metabolic/Fluid and Electrolytes - Adult  Goal: Electrolytes maintained within normal limits  12/5/2022 1122 by Avani Coley RN  Outcome: Progressing  12/5/2022 0607 by Tricia Mazariegos RN  Outcome: Progressing  Goal: Hemodynamic stability and optimal renal function maintained  12/5/2022 1122 by Avani Coley RN  Outcome: Progressing  12/5/2022 0607 by Tricia Mazariegos RN  Outcome: Progressing  Goal: Glucose maintained within prescribed range  12/5/2022 1122 by Christina Mullen RN  Outcome: Progressing  12/5/2022 0607 by Oren Krause RN  Outcome: Progressing     Problem: Skin/Tissue Integrity - Adult  Goal: Skin integrity remains intact  12/5/2022 1122 by Christina Mullen RN  Outcome: Progressing  12/5/2022 0607 by Oren Krause RN  Outcome: Progressing  Goal: Incisions, wounds, or drain sites healing without S/S of infection  12/5/2022 1122 by Christina Mullen RN  Outcome: Progressing  12/5/2022 0607 by Oren Krause RN  Outcome: Progressing  Goal: Oral mucous membranes remain intact  12/5/2022 1122 by Christina Mullen RN  Outcome: Progressing  12/5/2022 0607 by Oren Krause RN  Outcome: Progressing     Problem: Skin/Tissue Integrity  Goal: Absence of new skin breakdown  Description: 1. Monitor for areas of redness and/or skin breakdown  2. Assess vascular access sites hourly  3. Every 4-6 hours minimum:  Change oxygen saturation probe site  4. Every 4-6 hours:  If on nasal continuous positive airway pressure, respiratory therapy assess nares and determine need for appliance change or resting period.   12/5/2022 1122 by Christina Mullen RN  Outcome: Progressing  12/5/2022 0607 by Oren Krause RN  Outcome: Progressing

## 2022-12-05 NOTE — CONSULTS
Comprehensive Nutrition Assessment    Type and Reason for Visit:  Reassess, Consult    Nutrition Recommendations/Plan:   TF recommendations: suggest Peptide Based High Protein (Vital HP), goal of 35 mL/hr while intubated / with Propofol. Attempt to determine pt's home TF - adjust as appropriate after extubation. Malnutrition Assessment:  Malnutrition Status:  Insufficient data (12/05/22 0632)    Context:  Acute Illness     Findings of the 6 clinical characteristics of malnutrition:  Energy Intake:  Mild decrease in energy intake (Comment)  Weight Loss:  No significant weight loss     Body Fat Loss:  Unable to assess     Muscle Mass Loss:  Unable to assess    Fluid Accumulation:  No significant fluid accumulation     Strength:  Not Performed    Nutrition Assessment:    Consult for TF recommendations. Per consult, waiting to start TF until it is determined if patient requires surgery. Attempted to call [facility] for past TF order with no answer. Will continue to attempt. Propofol at 8 ml/hr. Nutrition Related Findings:    meds/labs reviewed; PEG, colostomy in place; R AKA Wound Type:  (KATHYA)       Current Nutrition Intake & Therapies:    Average Meal Intake: NPO  Average Supplements Intake: NPO  Diet NPO    Anthropometric Measures:  Height: 5' (152.4 cm)  Ideal Body Weight (IBW): 100 lbs (45 kg)       Current Body Weight: 220 lb 7.4 oz (100 kg), 220.5 % IBW.     Current BMI (kg/m2): 43.1        Weight Adjustment For: Amputation  Total Adjusted Percentage (Calculated): 10.1  Adjusted Ideal Body Weight (lbs) (Calculated): 89.9 lbs  Adjusted Ideal Body Weight (kg) (Calculated): 40.86 kg  Adjusted % Ideal Body Weight (Calculated): 245.2  Adjusted BMI (kg/m2) (Calculated): 47.5  BMI Categories: Obese Class 3 (BMI 40.0 or greater)    Estimated Daily Nutrient Needs:  Energy Requirements Based On: Kcal/kg  Weight Used for Energy Requirements: Ideal  Energy (kcal/day): 900-1000 kcals/day  Weight Used for Protein Requirements: Ideal  Protein (g/day): 80 gm/day     Fluid (ml/day): per MD    Nutrition Diagnosis:   Inadequate oral intake related to impaired respiratory function as evidenced by NPO or clear liquid status due to medical condition    Nutrition Interventions:   Food and/or Nutrient Delivery: Continue NPO  Nutrition Education/Counseling: No recommendation at this time  Coordination of Nutrition Care: Continue to monitor while inpatient       Goals:  Previous Goal Met: No Progress toward Goal(s)  Goals: Meet at least 75% of estimated needs, prior to discharge       Nutrition Monitoring and Evaluation:   Behavioral-Environmental Outcomes: None Identified  Food/Nutrient Intake Outcomes: Diet Advancement/Tolerance  Physical Signs/Symptoms Outcomes: Weight, Biochemical Data, Nutrition Focused Physical Findings    Discharge Planning:     Too soon to determine     Ple[place]der, [address]  Contact: [phone]

## 2022-12-05 NOTE — CARE COORDINATION
Case Management Assessment  Initial Evaluation    Date/Time of Evaluation: 12/5/2022 6:00 PM  Assessment Completed by: Ousmane Robertson RN    If patient is discharged prior to next notation, then this note serves as note for discharge by case management. Patient Name: Kate Merino                   YOB: 1980  Diagnosis: NUZHAT (acute kidney injury) ([address] [N17.9]  Septic shock ([address] [A41.9, R65.21]  Acute respiratory failure, unspecified whether with hypoxia or hypercapnia ([address] [J96.00]  Altered mental status, unspecified altered mental status type [R41.82]                   Date / Time: 12/3/2022 10:03 AM    Patient Admission Status: Inpatient   Readmission Risk (Low < 19, Mod (19-27), High > 27): Readmission Risk Score: 13.3    Current PCP: Papa Joyce MD  PCP verified by CM? Yes (Pt sees facilty physician)    Chart Reviewed: Yes      History Provided by:    Patient Orientation: Alert and Oriented, Sedated, Unable to Assess    Patient Cognition:      Hospitalization in the last 30 days (Readmission):  No    If yes, Readmission Assessment in  Navigator will be completed.     Advance Directives:      Code Status: Full Code   Patient's Primary Decision Maker is:        Discharge Planning:    Patient lives with:   Type of Home: (P) [place]  Primary Care Giver:  (Pt from [address] [place] [address], long term resident)  Patient Support Systems include: (P) Parent, Other (Comment) (SNF staff)   Current Financial resources: (P) Medicaid, Medicare ([address] [place] [address])  Current community resources:    Current services prior to admission: (PSamantha Valdez            Current DME:              Type of Home Care services:       ADLS  Prior functional level: Assistance with the following:, Bathing  Current functional level: (P) Other (see comment) (Intubated/ Sedated)    PT AM-PAC:   /24  OT AM-PAC:   /24    Family can provide assistance at DC: (P) No  Would you like Case Management to discuss the discharge plan with any other family members/significant others, and if so, who? Plans to Return to Present Housing: (P) Yes (pt is a long term bed hold)  Other Identified Issues/Barriers to RETURNING to current housing: yes  Potential Assistance needed at discharge: (P) Feliz Valdez, Transportation            Potential DME:    Patient expects to discharge to: (P) [facility]  for transportation at discharge: (P)  [facility]    Financial    Payor: Matildaabhi Bell / Plan: MEDICARE PART A AND B / Product Type: *No Product type* /     Does insurance require precert for SNF: No    Potential assistance Purchasing Medications:    Meds-to-Beds request: Yes      [address], [address] [place] [address] [phone] - f [phone] [address] [facility] [address]  Phone: [phone] Fax: [phone]      Notes:    Factors facilitating achievement of predicted outcomes: Caregiver support    Barriers to discharge: Medical complications    Additional Case Management Notes: Intubated/ Sedated FiO2 30%, PEEP of 5, PEG for TF, non-verbal, Return to [facility] when dc'd Long term bed hold. Will need COVID test day of dc. Family asked for SNF list- may request referral be placed elsewhere, informed pt's mother that they can also request SNF change af facility if they are not happy with facility. Raysa Shaffer with [facility] confirmed pt is a long term bed hold, can return when discharged. Will need COVID test day of dc.      The Plan for Transition of Care is related to the following treatment goals of NUZHAT (acute kidney injury) ([address] [N17.9]  Septic shock ([address] [A41.9, R65.21]  Acute respiratory failure, unspecified whether with hypoxia or hypercapnia ([address] [J96.00]  Altered mental status, unspecified altered mental status type [S57.54]    IF APPLICABLE: The Patient and/or patient representative Abdirashid and her family were provided with a choice of provider and agrees with the discharge plan. Freedom of choice list with basic dialogue that supports the patient's individualized plan of care/goals and shares the quality data associated with the providers was provided to: (P) Patient Representative (pt's mother Claudio Copeland)   Patient Representative Name: (P) Lonsuleiman Esters     The Patient and/or Patient Representative Agree with the Discharge Plan? (P) Yes (Return to [facility], bed hold, long term.  pt's mother asked for SNF list, may look at other choices.)    Brendan De Leon RN  Case Management Department  Ph: [phone] Fax: [phone]

## 2022-12-05 NOTE — PROGRESS NOTES
[facility] -   Infectious diseases evaluation  admission date 12/3/2022    reason for consultation:   sepsis    Impression :   Current:  Bandemia  Septic shock levophed 36  R obstructive pyelonephritis -   severe R hydro w many ureteral stones  Stent placed 12/3  GNR septicemia - bacteroides and enterobacteriae  Multifocal infiltrates, likely atelectasis - less likely pneumonia  MS   Neurogenic bladder w SP omer  R AKA amputation by hx  Past colostomy  Possible ascending colon pseudo pneumatosis on CT    Other:    Discussion / summary of stay / plan of care     Recommendations   Stop vanco   12/3 Meropenem stat and add amikacin  500 once for  GNR septic shock  12/4 keep meropenem for now pend further GNR ID  Await final cx results   ? Possible ascending colon pseudo pneumatosis? Might explain the bacteroides septicemia   Might be worth getting GS opinion   CT chest eval, it seems mostly like P congestion and atelectasis     Infection Control Recommendations   Keller Precautions  Contact Isolation     Antimicrobial Stewardship Recommendations   Simplification of therapy  Targeted therapy  History of Present Illness:   Initial history:  Lady Patel is a 43y.o.-year-old female w MS and neurogenic bladder and chronic SP omer, Gtube and hx PE on xeralto  Cojes w altered mentation and intubated in ER - S IRS+ and tachycardia and hypotension started on levophed  UA + for infection  CT chest showed many infiltrates and some might be multifocal pneumonia   R kid w hydronephrosis and many  ureteral stones.   Ascending colon pseudo pneumatosis  Started on cefepime and vanco and we are called  Bandemia  NUZHAT     took her to OR for a R stent  ID called    She is on the vent 40 FIO2 and 5 peep  Pupils minimally reactive  Sp salivary and beige  Abd soft   Urine slightly blood tinged and no pus  No rash  Levophed 40    Interval changes  12/4/2022   Patient Vitals for the past 8 hrs:   BP Temp Temp src Pulse Resp SpO2   12/04/22 2045 109/78 -- -- (!) 111 21 96 %   12/04/22 2030 112/77 -- -- (!) 111 20 96 %   12/04/22 2015 (!) 116/93 -- -- (!) 110 20 96 %   12/04/22 2012 -- -- -- (!) 112 21 96 %   12/04/22 2000 103/77 (!) 100.6 °F (38.1 °C) Esophageal (!) 110 25 95 %   12/04/22 1945 103/74 -- -- (!) 108 17 96 %   12/04/22 1930 106/81 -- -- (!) 108 16 96 %   12/04/22 1830 (!) 112/95 -- -- (!) 110 20 96 %   12/04/22 1815 103/73 -- -- (!) 111 17 95 %   12/04/22 1800 111/84 -- -- (!) 108 22 96 %   12/04/22 1745 124/86 -- -- (!) 108 18 96 %   12/04/22 1730 118/83 -- -- (!) 110 22 96 %   12/04/22 1715 117/84 -- -- (!) 109 16 95 %   12/04/22 1700 113/85 -- -- (!) 110 19 96 %   12/04/22 1645 107/86 -- -- (!) 109 20 96 %   12/04/22 1630 117/85 -- -- (!) 109 20 96 %   12/04/22 1615 (!) 114/95 -- -- (!) 110 20 95 %   12/04/22 1600 120/81 (!) 100.8 °F (38.2 °C) Esophageal (!) 112 22 95 %   12/04/22 1548 -- -- -- (!) 109 17 96 %   12/04/22 1545 (!) 125/91 -- -- (!) 111 20 95 %   12/04/22 1530 113/84 -- -- (!) 111 20 95 %   12/04/22 1515 109/82 -- -- (!) 109 21 96 %   12/04/22 1500 111/80 -- -- (!) 110 20 97 %   12/04/22 1445 101/80 -- -- (!) 106 20 96 %   12/04/22 1430 99/80 -- -- (!) 106 18 96 %     Temp LG  WBc better 28  Creat  0.7 better  BC x 2 GNR, one w bacteroides and enterobacteriaceae pend  U cx in process       Summary of relevant labs:  Labs:  W 36 - 28  Creat 3 - 2.4 - 0.7   Lactic Acid, Sepsis, Whole Blood1.6   [id number]   UUN62 High U/L AST58 High      Micro:  BC x 2 GNR, one w bacteroides and enterobacteriaceae   U cx pend  Nitrite, UrineNEGATIVE Leukocyte Esterase, UrineLARGE Abnormal    Nasal MRSA neg  Imaging:  CT AP chest  Impression:  1. ETT now terminates 12 mm above denys. 2. Patchy perihilar and more confluent posterior segment airspace   consolidations likely combination of pneumonia and atelectasis. Trace right   pleural effusion.    3. Innumerable right renal calculi most in the 5-8 mm size range. Moderate   to severe right hydronephrosis and hydroureter. There is a stack of 4   obstructing calculi in the distal right ureter ranging from 5-11 mm   accounting for finding. 11 mm calculus is most distal and positioned about 2   cm from the uterovesical junction. 4. Liquid stool in the colon suggests diarrheal disease. Tiny gas pockets   along the ascending colon wall in the dependent portion appear to be pseudo   pneumatosis. CT brain neg      I have personally reviewed the past medical history, past surgical history, medications, social history, and family history, and I haveupdated the database accordingly. Allergies:   Patient has no known allergies. Review of Systems:     Review of Systems   Unable to perform ROS: Intubated     Physical Examination :       Physical Exam  Constitutional:       Appearance: She is ill-appearing. She is not toxic-appearing. Comments: On the vent   HENT:      Head: Normocephalic and atraumatic. Mouth/Throat:      Mouth: Mucous membranes are moist.   Eyes:      General: No scleral icterus. Conjunctiva/sclera: Conjunctivae normal.   Cardiovascular:      Rate and Rhythm: Regular rhythm. Tachycardia present. Heart sounds: No murmur heard. No gallop. Pulmonary:      Breath sounds: No stridor. No rhonchi. Abdominal:      Palpations: There is no mass. Hernia: No hernia is present. Genitourinary:     Comments: uRine layne  Musculoskeletal:         General: No tenderness or signs of injury. Cervical back: Neck supple. No rigidity. Skin:     Coloration: Skin is not pale. Findings: No erythema. Neurological:      Comments: On the vent not responding   Psychiatric:      Comments:  On the vent not agitated        Past Medical History:     Past Medical History:   Diagnosis Date    Aphasia     Aphasia     Contracture of joint, lower leg     Depressed     HTN (hypertension)     Hx MRSA infection resolved 1/2017    2 negative nasal screens 1/2017 (hx in heel in 2013)    Hydronephrosis     Movement disorder     tremor    Multiple sclerosis (HCC)     Neurogenic bladder     Non-verbal learning disorder     Peripheral vascular disease ([facility]     Polyneuropathy     Pressure ulcer     Coccyx, Heel    Pulmonary embolism (HCC)     Pulmonary infarction (HCC)     Quadriplegia (HCC)     Tachycardia     UTI (lower urinary tract infection)        Past Surgical  History:     Past Surgical History:   Procedure Laterality Date    AMPUTATION Right     COLOSTOMY      GASTROSTOMY TUBE PLACEMENT      IR NEPHROSTOMY PERCUTANEOUS RIGHT  9/27/2020    IR NEPHROSTOMY PERCUTANEOUS RIGHT 9/27/2020 Terrance Land MD [facility]    LEG AMPUTATION THROUGH FEMUR         Medications:      rivaroxaban  20 mg Oral Daily    meropenem  1,000 mg IntraVENous Q8H    sodium chloride flush  5-40 mL IntraVENous 2 times per day    gabapentin  300 mg Per G Tube TID    pantoprazole (PROTONIX) 40 mg injection  40 mg IntraVENous Daily    metoclopramide  5 mg Oral TID    [Held by provider] metoprolol tartrate  25 mg Oral TID    midodrine  5 mg Oral BID WC    amikacin (AMIKIN) IVPB  5 mg/kg (Ideal) IntraVENous Once       Social History:     Social History     Socioeconomic History    Marital status: Single     Spouse name: Not on file    Number of children: Not on file    Years of education: Not on file    Highest education level: Not on file   Occupational History    Not on file   Tobacco Use    Smoking status: Never    Smokeless tobacco: Never   Vaping Use    Vaping Use: Never used   Substance and Sexual Activity    Alcohol use: No    Drug use: No    Sexual activity: Never   Other Topics Concern    Not on file   Social History Narrative    Not on file     Social Determinants of Health     Financial Resource Strain: Not on file   Food Insecurity: Not on file   Transportation Needs: Not on file   Physical Activity: Not on file   Stress: Not on file   Social Connections: Not on file   Intimate Partner Violence: Not on file   Housing Stability: Not on file       Family History:     Family History   Problem Relation Age of Onset    No Known Problems Mother     No Known Problems Father       Medical Decision Making:   I have independently reviewed/ordered the following labs:    CBC with Differential:   Recent Labs     12/03/22  1014 12/04/22  0512   WBC 36.1* 28.2*   HGB 12.3 10.9*   HCT 40.5 35.0*    181   LYMPHOPCT 8* 4*   MONOPCT 7 7       BMP:  Recent Labs     12/03/22  1014 12/04/22  0512   * 142   K 4.0 3.4*   CL 96* 111*   CO2 19* 21   BUN 48* 27*   CREATININE 2.24* 0.76   MG  --  1.9       Hepatic Function Panel:   Recent Labs     12/03/22  1014   PROT 8.2   LABALBU 3.0*   BILITOT 0.4   ALKPHOS 141*   ALT 39*   AST 58*       No results for input(s): RPR in the last 72 hours. No results for input(s): HIV in the last 72 hours. No results for input(s): BC in the last 72 hours. Lab Results   Component Value Date/Time    CREATININE 0.76 12/04/2022 05:12 AM    GLUCOSE 121 12/04/2022 05:12 AM    GLUCOSE 69 03/11/2012 12:33 PM       Detailed results: Thank you for allowing us to participate in the care of this patient. Please call with questions. This note is created with the assistance of a speech recognition program.  While intending to generate adocument that actually reflects the content of the visit, the document can still have some errors including those of syntax and sound a like substitutions which may escape proof reading. It such instances, actual meaningcan be extrapolated by contextual diversion.     Massiel Oneill MD  Office: [phone]  Perfect serve / office [phone]

## 2022-12-05 NOTE — PROGRESS NOTES
[id number] [facility] Wound Ostomy Continence Nurse  Consult Note       NAME:  Deacon Klein  MEDICAL RECORD NUMBER:  [id number]  AGE: 43 y.o. GENDER: female  : 1980  TODAY'S DATE:  2022    Subjective:     Reason for WOCN Evaluation and Assessment: \"colostomy\"      Deacon Klein is a 43 y.o. female referred by:   [x] Physician  [] Nursing  [] Other:                 Objective:      /76   Pulse (!) 104   Temp 99.3 °F (37.4 °C) (Esophageal)   Resp 23   Ht 5' (1.524 m)   Wt 190 lb 11.2 oz (86.5 kg)   SpO2 95%   BMI 37.24 kg/m²   Lonnie Risk Score: Lonnie Scale Score: 13    LABS    CBC:   Lab Results   Component Value Date/Time    WBC 24.2 2022 04:18 AM    RBC 4.48 2022 04:18 AM    RBC 4.56 2012 12:33 PM    HGB 12.2 2022 04:18 AM     CMP:  Albumin:    Lab Results   Component Value Date/Time    LABALBU 3.0 2022 10:14 AM     PT/INR:    Lab Results   Component Value Date/Time    PROTIME 13.8 2022 10:14 AM    INR 1.3 2022 10:14 AM     HgBA1c:  No results found for: LABA1C  PTT: No components found for: LABPTT      Assessment:       Measurements:     22 0930   Gastrostomy/Enterostomy/Jejunostomy Tube Gastrostomy LUQ 1 18 fr   Placement Date/Time: 22 1216   Present on Admission/Arrival: No  Inserted by: Dr. Gonzalez Paci  Type: Gastrostomy  Location: LUQ  Tube Number: 1  Tube Size (fr): 18 fr   External Catheter Length (cm) 5 cm   Site Description Scabbed   Surrounding Skin Clean, dry & intact   Suprapubic Catheter Latex   Placement Date/Time: 22 1659   Present on Admission/Arrival: No  Inserted by: Dr. Francisca George  Catheter Type: Latex  Catheter Size: 20 FR  Catheter Balloon Size: 10 mL  Insertion Procedure Practices: Skin antiseptic (10% povidone-iodine) or soap and w... Site Assessment Other (Comment)  (erosion at 12 o'clock and 9 o'clock)   Securement Method Tape   Catheter Best Practices  Drainage tube clipped to bed;Bag not on floor; Bag below bladder;Lack of dependent loop in tubing;Drainage bag less than half full   Colostomy LLQ   No Placement Date or Time found. Pre-existing: Yes  Location: LLQ   Stomal Appliance 1 piece; Changed   Stoma  Assessment Moist;Flush;Pink  (1 1/2\" x 1 3/4\")   Peristomal Assessment Clean, dry & intact   Treatment Bag change;Site care; Liquid skin barrier   Stool Appearance Watery   Stool Color Brown;Green   Output (mL) 75 ml            Response to treatment:  Well tolerated by patient. Plan:     Plan of Care:   LLQ ostomy:  Cleanse the peristomal skin with warm water only. No creams, lotions, etc.. around the stoma. The pouches will not adhere. Cut the skin barrier of the pouch to fit the stoma. Apply SurePrep Rapid Dry liquid skin protectant to the peristomal skin. Empty the pouch when 1/3 to 1/2 full  Change the pouch twice weekly and prn soilage/leakage. Suprapubic cystostomy:   secure away from the peristomal erosions. Gastrostomy:  Bumper at 5 cm. Wash the peristomal skin with warm water. Specialty Bed Required : Yes   [] Low Air Loss   [x] Pressure Redistribution  [] Fluid Immersion  [] Bariatric  [] Total Pressure Relief  [] Other:     Discharge Plan:  Placement for patient upon discharge: skilled nursing       Patient/Caregiver Teaching:    [] Indicates understanding       [] Needs reinforcement  [] Unsuccessful      [] Verbal Understanding  [] Demonstrated understanding       [x] No evidence of learning  [] Refused teaching         [] N/A    Contact the Wound Ostomy RN on-call Monday-Friday [phone] via [employer] by searching \"wound\" under \"groups\" and selecting the on-call clinician.         Electronically signed by Dorothy Resendiz RN, [address] [place] [address] on 12/5/2022 at 10:38 AM

## 2022-12-05 NOTE — PROGRESS NOTES
Terrance Fischer, Dhaval Bourne [place]  Urology Progress Note     Subjective:   S/p cystoscopy with right ureteral stent insertion on 12/3/2022 for obstructing right ureteral stone with septic shock  Patient continues to be intubated  Pressor requirement coming down, Levophed at 4 however she is still running low in 24-55A systolic  Cr normal at 9.11  WBC 24  SPT Yellow outpt       Patient Vitals for the past 24 hrs:   BP Temp Temp src Pulse Resp SpO2 Weight   12/05/22 0645 (!) 86/52 -- -- (!) 107 21 95 % --   12/05/22 0630 (!) 91/49 -- -- (!) 110 20 96 % --   12/05/22 0615 (!) 99/52 -- -- (!) 114 19 96 % --   12/05/22 0600 (!) 94/57 99.7 °F (37.6 °C) Esophageal (!) 118 22 96 % 190 lb 11.2 oz (86.5 kg)   12/05/22 0545 104/67 -- -- (!) 114 23 96 % --   12/05/22 0530 103/73 -- -- (!) 109 22 95 % --   12/05/22 0515 98/71 -- -- (!) 108 22 97 % --   12/05/22 0500 (!) 99/55 -- -- (!) 110 19 96 % --   12/05/22 0445 102/69 -- -- (!) 109 19 96 % --   12/05/22 0430 101/73 -- -- (!) 108 21 96 % --   12/05/22 0415 98/82 -- -- (!) 108 19 97 % --   12/05/22 0400 99/68 99.7 °F (37.6 °C) Esophageal (!) 111 21 95 % --   12/05/22 0346 -- -- -- (!) 126 17 97 % --   12/05/22 0345 100/60 -- -- (!) 115 22 94 % --   12/05/22 0330 -- -- -- (!) 107 18 97 % --   12/05/22 0315 108/87 -- -- (!) 106 18 96 % --   12/05/22 0300 103/76 -- -- (!) 106 19 96 % --   12/05/22 0245 100/70 -- -- (!) 104 18 97 % --   12/05/22 0230 (!) 95/51 -- -- (!) 106 17 94 % --   12/05/22 0215 101/62 -- -- (!) 107 19 95 % --   12/05/22 0200 105/73 -- -- (!) 103 19 96 % --   12/05/22 0145 102/75 -- -- (!) 103 19 97 % --   12/05/22 0130 101/75 -- -- (!) 104 18 97 % --   12/05/22 0115 97/61 -- -- (!) 105 18 95 % --   12/05/22 0100 (!) 95/51 -- -- (!) 108 18 94 % --   12/05/22 0045 (!) 90/53 -- -- (!) 110 18 94 % --   12/05/22 0030 (!) 97/50 -- -- (!) 109 19 95 % --   12/05/22 0015 (!) 86/50 -- -- (!) 110 17 94 % --   12/05/22 0000 (!) 91/55 99.5 °F (37.5 °C) CORE (!) 112 17 93 % --   12/04/22 2345 92/66 -- -- (!) 113 19 94 % --   12/04/22 2330 111/72 -- -- (!) 110 22 95 % --   12/04/22 2323 -- -- -- (!) 110 19 95 % --   12/04/22 2315 114/84 -- -- (!) 107 21 95 % --   12/04/22 2300 112/83 -- -- (!) 108 21 95 % --   12/04/22 2245 (!) 138/113 -- -- (!) 119 (!) 36 96 % --   12/04/22 2230 -- -- -- (!) 111 18 96 % --   12/04/22 2215 104/71 -- -- (!) 110 18 96 % --   12/04/22 2200 96/65 100.2 °F (37.9 °C) Esophageal (!) 112 18 95 % --   12/04/22 2145 96/74 -- -- (!) 111 18 95 % --   12/04/22 2130 102/73 -- -- (!) 112 19 95 % --   12/04/22 2115 104/77 -- -- (!) 111 19 96 % --   12/04/22 2100 108/81 -- -- (!) 109 21 96 % --   12/04/22 2045 109/78 -- -- (!) 111 21 96 % --   12/04/22 2030 112/77 -- -- (!) 111 20 96 % --   12/04/22 2015 (!) 116/93 -- -- (!) 110 20 96 % --   12/04/22 2012 -- -- -- (!) 112 21 96 % --   12/04/22 2000 103/77 (!) 100.6 °F (38.1 °C) Esophageal (!) 110 25 95 % --   12/04/22 1945 103/74 -- -- (!) 108 17 96 % --   12/04/22 1930 106/81 -- -- (!) 108 16 96 % --   12/04/22 1830 (!) 112/95 -- -- (!) 110 20 96 % --   12/04/22 1815 103/73 -- -- (!) 111 17 95 % --   12/04/22 1800 111/84 -- -- (!) 108 22 96 % --   12/04/22 1745 124/86 -- -- (!) 108 18 96 % --   12/04/22 1730 118/83 -- -- (!) 110 22 96 % --   12/04/22 1715 117/84 -- -- (!) 109 16 95 % --   12/04/22 1700 113/85 -- -- (!) 110 19 96 % --   12/04/22 1645 107/86 -- -- (!) 109 20 96 % --   12/04/22 1630 117/85 -- -- (!) 109 20 96 % --   12/04/22 1615 (!) 114/95 -- -- (!) 110 20 95 % --   12/04/22 1600 120/81 (!) 100.8 °F (38.2 °C) Esophageal (!) 112 22 95 % --   12/04/22 1548 -- -- -- (!) 109 17 96 % --   12/04/22 1545 (!) 125/91 -- -- (!) 111 20 95 % --   12/04/22 1530 113/84 -- -- (!) 111 20 95 % --   12/04/22 1515 109/82 -- -- (!) 109 21 96 % --   12/04/22 1500 111/80 -- -- (!) 110 20 97 % --   12/04/22 1445 101/80 -- -- (!) 106 20 96 % --   12/04/22 1430 99/80 -- -- (!) 106 18 96 % -- 12/04/22 1415 108/74 -- -- (!) 106 19 96 % --   12/04/22 1400 101/77 -- -- (!) 107 18 96 % --   12/04/22 1345 102/87 -- -- (!) 102 17 96 % --   12/04/22 1330 105/74 -- -- (!) 104 18 96 % --   12/04/22 1315 107/80 -- -- (!) 103 20 97 % --   12/04/22 1300 100/77 -- -- (!) 104 16 97 % --   12/04/22 1245 98/76 -- -- (!) 101 17 96 % --   12/04/22 1230 103/73 -- -- (!) 103 17 97 % --   12/04/22 1215 95/76 -- -- (!) 108 19 96 % --   12/04/22 1205 -- -- -- (!) 104 16 96 % --   12/04/22 1200 95/71 99.7 °F (37.6 °C) Esophageal (!) 106 16 97 % --   12/04/22 1145 95/62 -- -- (!) 108 16 95 % --   12/04/22 1130 (!) 91/53 -- -- (!) 107 16 95 % --   12/04/22 1115 95/65 -- -- (!) 109 16 94 % --   12/04/22 1100 99/63 -- -- (!) 111 17 95 % --   12/04/22 1045 107/74 -- -- (!) 108 16 95 % --   12/04/22 1030 (!) 151/78 -- -- (!) 113 24 96 % --   12/04/22 1015 139/77 -- -- (!) 108 16 95 % --   12/04/22 1000 (!) 143/79 100.2 °F (37.9 °C) Esophageal (!) 108 16 95 % --   12/04/22 0945 122/68 -- -- (!) 109 17 95 % --   12/04/22 0930 125/69 -- -- (!) 116 18 96 % --   12/04/22 0915 120/67 -- -- (!) 115 17 96 % --   12/04/22 0900 115/70 -- -- (!) 109 19 96 % --   12/04/22 0847 -- -- -- (!) 108 20 96 % --   12/04/22 0845 115/71 -- -- (!) 108 20 96 % --   12/04/22 0830 110/79 -- -- (!) 110 20 96 % --   12/04/22 0815 106/71 -- -- (!) 109 20 96 % --   12/04/22 0800 108/68 100.4 °F (38 °C) Esophageal (!) 106 20 98 % --   12/04/22 0745 100/61 -- -- 100 20 98 % --   12/04/22 0730 98/68 -- -- (!) 102 20 98 % --   12/04/22 0715 100/67 -- -- (!) 103 20 98 % --       Intake/Output Summary (Last 24 hours) at 12/5/2022 0708  Last data filed at 12/5/2022 8500  Gross per 24 hour   Intake 3272.54 ml   Output 4380 ml   Net -1107.46 ml       Recent Labs     12/03/22  1014 12/04/22  0512 12/05/22  0418   WBC 36.1* 28.2* 24.2*   HGB 12.3 10.9* 12.2   HCT 40.5 35.0* 39.6   MCV 89.4 87.1 88.4    181 163     Recent Labs     12/03/22  1014 12/04/22 0512 12/05/22  0418   * 142 148*   K 4.0 3.4* 3.4*   CL 96* 111* 112*   CO2 19* 21 22   PHOS  --  1.2* 1.9*   BUN 48* 27* 14   CREATININE 2.24* 0.76 0.53       Recent Labs     12/03/22  1104   COLORU Yellow   PHUR 8.0   WBCUA 50    RBCUA 20 TO 50   BACTERIA MANY*   SPECGRAV 1.011   LEUKOCYTESUR LARGE*   UROBILINOGEN Normal   BILIRUBINUR NEGATIVE       Additional Lab/culture results:    Physical Exam:   Intubated, sedated and mechanically ventilated  Neck: Supple   Chest: bilateral symmetrical chest rise/ Non labored breathing   Circulatory: Peripheries warm , well perfused   P/A: soft, nondistended  Davies in situ with clear yellow urine      Interval Imaging Findings:    Impression:    Patient Active Problem List   Diagnosis    Aphasia    Multiple sclerosis (HCC)    Nonverbal    Gastrostomy tube dependent (HCC)    Dislodged gastrostomy tube    Neurogenic bladder    Hemorrhagic cystitis    Hypokalemia    Urinary tract infection associated with indwelling urethral catheter ([facility])    Tachycardia    Gross hematuria    Lactic acidosis    Chronic indwelling Davies catheter    Peripheral vascular disease ([facility])    Recurrent UTI (urinary tract infection)    History of pulmonary embolism    Hypertension    Altered mental status    Toxic metabolic encephalopathy    ESBL E. coli carrier    Septic shock ([facility])    Status post colostomy ([facility])    Lower paraplegia ([facility])    Sepsis ([facility])    Pneumonia due to organism    Extended spectrum beta lactamase (ESBL) resistance    Abdominal pain    Ileus ([facility])    Decubitus ulcer of coccygeal region, stage 4 ([facility])    Proteus mirabilis infection    Lactate blood increase    E-coli UTI    Sepsis due to Escherichia coli ([facility])    E coli bacteremia    Nephrostomy tube displaced ([facility])    Pyelonephritis    Obstructive uropathy    Bandemia    SIRS (systemic inflammatory response syndrome) ([facility])    Gram-neg septicemia ([facility])    Pneumatosis coli   Is post cystoscopy, right ureteral stent insertion for right ureteral obstructing stone with septic shock    Plan:   Supportive care per primary team  WBC 24 from 28, on amikacin and merrem per critical care team.  Follow-up urine and blood cultures for tailoring appropriate antibiotics  Creatinine at baseline   Maintain SPT  Will need to follow-up for definitive stone treatment        Ashleigh Chairez MD  7:08 AM 12/5/2022

## 2022-12-05 NOTE — PROGRESS NOTES
Physician Progress Note      Aron Bartlett  CSN #:                  [id number]  :                       1980  ADMIT DATE:       12/3/2022 10:03 AM  DISCH DATE:  RESPONDING  PROVIDER #:        Simi Edwards          QUERY TEXT:    Pt admitted with sepsis with septic shock, and UTI Noted to have ureteral   stones with hydronephrosis requiring ureteral stent placement and a chronic   suprapubic  catheter that was noted in ED dark and cloudy output. Catheter   exchanged on 12/3 during cystoscopy. Please clarify one of the following: The medical record reflects the following:  Risk Factors: age, sepsis, chronic suprapubic catheter  Clinical Indicators: admitted with sepsis with septic shock, and UTI Noted to   have ureteral stones with hydronephrosis requiring ureteral stent placement   and a chronic suprapubic  catheter that was noted in ED dark and cloudy   output. Catheter exchanged on 12/3 during cystoscopy. Treatment: iv antibiotics, urology consult, ureteral stent placement with   oemr change        Thank Iliana Gabriel RN BSN  CCDS  Email [email or web address]  Cell [phone]  office hours M-F 6am to 2:30pm  Options provided:  -- Sepsis due to combination of ureteral stones and chronic suprapubic   catheter  -- Other - I will add my own diagnosis  -- Disagree - Not applicable / Not valid  -- Disagree - Clinically unable to determine / Unknown  -- Refer to Clinical Documentation Reviewer    PROVIDER RESPONSE TEXT:    this patients sepsis is due to combination of ureteral stones and chronic   suprapubic catheter    Query created by:  Yaneli Hylton on 2022 10:00 AM      Electronically signed by:  Simi Edwards 2022 10:08 AM

## 2022-12-05 NOTE — CONSULTS
General Surgery  Consult    PATIENT NAME: Faith Zhong  AGE: 43 y.o. MEDICAL RECORD NO. [id number]  DATE: 12/5/2022  SURGEON: Efren Hamilton MD  PRIMARY CARE PHYSICIAN: Giovana Byrne MD    Patient evaluated at the request of  Dr. Ramana Winter  Reason for evaluation: Pneumatosis    Patient information was obtained from past medical records. History/Exam limitations: mental status and acuity of illness. Patient seen in the ICU. IMPRESSION:     Patient Active Problem List   Diagnosis    Aphasia    Multiple sclerosis ([facility])    Nonverbal    Gastrostomy tube dependent ([address]    Dislodged gastrostomy tube    Neurogenic bladder    Hemorrhagic cystitis    Hypokalemia    Urinary tract infection associated with indwelling urethral catheter ([facility])    Tachycardia    Gross hematuria    Lactic acidosis    Chronic indwelling Davies catheter    Peripheral vascular disease ([facility])    Recurrent UTI (urinary tract infection)    History of pulmonary embolism    Hypertension    Altered mental status    Toxic metabolic encephalopathy    ESBL E. coli carrier    Septic shock ([facility])    Status post colostomy ([facility])    Lower paraplegia ([facility])    Sepsis ([address]    Pneumonia due to organism    Extended spectrum beta lactamase (ESBL) resistance    Abdominal pain    Ileus ([facility])    Decubitus ulcer of coccygeal region, stage 4 ([facility])    Proteus mirabilis infection    Lactate blood increase    E-coli UTI    Sepsis due to Escherichia coli ([facility])    E coli bacteremia    Nephrostomy tube displaced ([address]    Pyelonephritis    Obstructive uropathy    Bandemia    SIRS (systemic inflammatory response syndrome) ([facility])    Gram-neg septicemia ([address]    Pneumatosis coli       Sepsis  Multifocal pneumonia  UTI  Renal calculi  History of colostomy  History of suprapubic catheter  Pseudo pneumatosis on CT scan    PLAN:     Patient seen and evaluated. History, physical exam, laboratory, and radiographic findings reviewed and discussed with attending.   CT scan reviewed. As noted in radiologist read, pseudopneumatosis likely secondary to bubbles associated with diarrheal disease and not true pneumatosis. No acute surgical intervention planned at this time. Pressor requirement is improving, abdominal exam is benign, and colostomy is functioning. Okay to continue feeds and anticoagulation from surgery perspective. Surgery to sign off at this time  Remainder of care and disposition per primary team.     HISTORY:   History of Chief Complaint:    Shivam Hernandez is a 43y.o. year old female who is currently admitted to the MICU after presenting from long-term facility with altered mental status and was intubated in the emergency department for sepsis of unknown etiology. She was found to have positive UA, and CT chest on 12/3 showing concern for multifocal pneumonia, right hydronephrosis secondary to obstructive stones, as well as a sending colon pseudo pneumatosis. She was started on cefepime and vancomycin and admitted to the MICU. She has history of G-tube placement, neurogenic bladder with chronic indwelling suprapubic catheter, and history of pulmonary embolus on heparin. On evaluation, she is afebrile, mildly sinus tach to 105, mildly hypotensive requiring 1 of levo and mechanically ventilated. Labs this morning were significant for white count of 24.2 but down trended from 36.1 on admission. Lactate 0.9. Abdomen is soft and nondistended. She has left lower quadrant colostomy that has been producing liquid stool.      Past Medical History   has a past medical history of Aphasia, Aphasia, Contracture of joint, lower leg, Depressed, HTN (hypertension), Hx MRSA infection, Hydronephrosis, Movement disorder, Multiple sclerosis ([address] Neurogenic bladder, Non-verbal learning disorder, Peripheral vascular disease ([address] Polyneuropathy, Pressure ulcer, Pulmonary embolism ([address] Pulmonary infarction ([address] Quadriplegia ([address] Tachycardia, and UTI (lower urinary tract infection). Past Surgical History   has a past surgical history that includes Gastrostomy tube placement; colostomy; amputation (Right); Leg amputation through femur; and IR GUIDED NEPHROSTOMY CATH PLACEMENT RIGHT (9/27/2020). Medications  Prior to Admission medications    Medication Sig Start Date End Date Taking? Authorizing Provider   atropine 1 % ophthalmic solution 3 drops every 4 hours as needed Give 3 drops sublingually for secretion    Historical Provider, MD   metoprolol tartrate (LOPRESSOR) 25 MG tablet Take 25 mg by mouth three times daily    Historical Provider, MD   miconazole (MICOTIN) 2 % powder Apply topically 2 times daily. 1/25/20   Maribell Dickinson MD   midodrine (PROAMATINE) 5 MG tablet Take 1 tablet by mouth 2 times daily (with meals) 1/25/20   Maribell Dickinson MD   acetaminophen-codeine (TYLENOL/CODEINE #3) 300-30 MG per tablet Take 1 tablet by mouth every 8 hours as needed for Pain.     Historical Provider, MD   D-Mannose 500 MG CAPS Take 3 capsules by mouth three times daily    Historical Provider, MD   atropine 1 % ophthalmic solution 2-4 drops every 4 hours as needed    Historical Provider, MD   metoclopramide (REGLAN) 5 MG/5ML solution Take 5 mLs by mouth 3 times daily 8/2/19   Melita Stage P Blood, DO   rivaroxaban (XARELTO) 20 MG TABS tablet Take 1 tablet by mouth daily 5/24/19   Tristian Jimenez MD   sertraline (ZOLOFT) 25 MG tablet 1 tablet by Per G Tube route daily 5/24/19   Tristian Jimenez MD   docusate (COLACE) 50 MG/5ML liquid Take 10 mLs by mouth daily 5/24/19   Tristian Jimenez MD   Multiple Vitamins-Minerals (CENTRUM/CERTA-DERICK WITH MINERALS ORAL) solution 15 mLs by Per G Tube route daily 5/24/19   Tristian Jimenez MD   lansoprazole (PREVACID SOLUTAB) 30 MG disintegrating tablet 1 tablet by Per G Tube route every morning (before breakfast) 5/24/19   Tristian Jimenez MD   Cranberry 250 MG CAPS Take 250 mg by mouth 2 times daily    Historical Provider, MD   Scopolamine Base (SCOPOLAMINE TD) Place 1 mg onto the skin every 72 hours     Historical Provider, MD   baclofen (LIORESAL) 10 MG tablet 10 mg by Per G Tube route 3 times daily Crush into G tube     Historical Provider, MD   gabapentin (NEURONTIN) 300 MG capsule 300 mg by Per G Tube route 3 times daily    Historical Provider, MD   medroxyPROGESTERone (DEPO-PROVERA) 150 MG/ML injection Inject 150 mg into the muscle every 3 months On the      Historical Provider, MD   acetaminophen (TYLENOL) 80 MG/0.8ML suspension 650 mg by Per G Tube route every 6 hours as needed for Fever or Pain     Historical Provider, MD   glycopyrrolate (ROBINUL) 1 MG tablet Take 1 mg by mouth 3 times daily     Historical Provider, MD    Scheduled Meds:   rivaroxaban  20 mg Oral Daily    meropenem  1,000 mg IntraVENous Q8H    sodium chloride flush  5-40 mL IntraVENous 2 times per day    gabapentin  300 mg Per G Tube TID    pantoprazole (PROTONIX) 40 mg injection  40 mg IntraVENous Daily    metoclopramide  5 mg Oral TID    [Held by provider] metoprolol tartrate  25 mg Oral TID    midodrine  5 mg Oral BID WC     Continuous Infusions:   lactated ringers 100 mL/hr at 22 1126    propofol 10 mcg/kg/min (22 1100)    norepinephrine 1 mcg/min (22 1016)    sodium chloride 10 mL/hr at 22 1100     PRN Meds:.albuterol, ipratropium, sodium chloride flush, sodium chloride, polyethylene glycol, acetaminophen **OR** acetaminophen, ondansetron  Allergies  has No Known Allergies. Family History  family history includes No Known Problems in her father and mother. Social History   reports that she has never smoked. She has never used smokeless tobacco.   reports no history of alcohol use. reports no history of drug use.     Review of Systems  Unable to obtain secondary to mental status    PHYSICAL:   VITALS  Temp  Av.8 °F (37.7 °C)  Min: 99 °F (37.2 °C)  Max: 100.8 °F (43.7 °C)  Systolic (14BCR), ZXR:214 , Min:86 , GKA:755   Diastolic (27DMX), Av, Min:49, Max:113  Pulse  Av.1  Min: 100  Max: 126  Resp  Av.5  Min: 16  Max: 36  SpO2: 99 % SpO2  Av.7 %  Min: 92 %  Max: 100 %     General: Resting in bed, intubated and sedated  Head: NC/AT. EENT: ETT in place, no rhinorrhea  Mouth/Throat: Oral mucus membranes are moist.  Neck:  Supple. No LAD. Cardiovascular: Sinus tach, on 1 , warm, well perfused. Respiratory:  Equal chest rise bilaterally. Mechanically ventilated  Abdomen:  Soft, non tender, non distended. Left lower quadrant colostomy in place with liquid stool, suprapubic catheter in place, G-tube in place. Neuro: Motor and sensory grossly intact. Extremities: Status post right AKA. Chronic contractures of left lower extremity  Skin:  No rashes or lesions. Psych: Unable to obtain secondary to mental status    LABS:     Recent Labs     22  1014 22  1104 22  0512 22  0418   WBC 36.1*  --  28.2* 24.2*   HGB 12.3  --  10.9* 12.2   HCT 40.5  --  35.0* 39.6     --  181 163   *  --  142 148*   K 4.0  --  3.4* 3.4*   CL 96*  --  111* 112*   CO2 19*  --  21 22   BUN 48*  --  27* 14   CREATININE 2.24*  --  0.76 0.53   MG  --   --  1.9 1.5*   PHOS  --   --  1.2* 1.9*   CALCIUM 8.9  --  8.5* 9.3   INR 1.3  --   --   --    AST 58*  --   --   --    ALT 39*  --   --   --    BILITOT 0.4  --   --   --    NITRU  --  NEGATIVE  --   --    COLORU  --  Yellow  --   --    BACTERIA  --  MANY*  --   --      Recent Labs     22  1014   ALKPHOS 141*   ALT 39*   AST 58*   BILITOT 0.4   LABALBU 3.0*       RADIOLOGY:   XR CHEST PORTABLE    Result Date: 2022  Mild right basilar airspace opacities. This could represent atelectasis or pneumonia in the appropriate clinical setting     XR CHEST PORTABLE    Result Date: 12/3/2022  Satisfactory line and tube placement. Thank you for the interesting evaluation. Further recommendations to follow.     Conrad Cavanaugh MD  2022, 12:31 PM Attending Note    Seen as a general surgery consult in 3010 for a radiology reading of pseudo pneumatosis not confirmed by chemistries or clinical exam  I have reviewed the above TECSS note(s) and I either performed the key elements of the medical history and physical exam or was present with the resident when the key elements of the medical history and physical exam were performed. I have discussed the findings, established the care plan and recommendations with Resident Jessica Herzog.     Nara Ambrosio MD  12/5/2022  2:42 PM

## 2022-12-05 NOTE — PLAN OF CARE
Problem: Discharge Planning  Goal: Discharge to home or other facility with appropriate resources  Outcome: Progressing     Problem: Nutrition Deficit:  Goal: Optimize nutritional status  Outcome: Progressing     Problem: Confusion  Goal: Confusion, delirium, dementia, or psychosis is improved or at baseline  Description: INTERVENTIONS:  1. Assess for possible contributors to thought disturbance, including medications, impaired vision or hearing, underlying metabolic abnormalities, dehydration, psychiatric diagnoses, and notify attending LIP  2. [place] high risk fall precautions, as indicated  3. Provide frequent short contacts to provide reality reorientation, refocusing and direction  4. Decrease environmental stimuli, including noise as appropriate  5. Monitor and intervene to maintain adequate nutrition, hydration, elimination, sleep and activity  6. If unable to ensure safety without constant attention obtain sitter and review sitter guidelines with assigned personnel  7.  Initiate Psychosocial CNS and Spiritual Care consult, as indicated  Outcome: Progressing     Problem: Neurosensory - Adult  Goal: Achieves stable or improved neurological status  Outcome: Progressing  Goal: Absence of seizures  Outcome: Progressing  Goal: Remains free of injury related to seizures activity  Outcome: Progressing  Goal: Achieves maximal functionality and self care  Outcome: Progressing     Problem: Respiratory - Adult  Goal: Achieves optimal ventilation and oxygenation  12/5/2022 0607 by Inderjit Leroy RN  Outcome: Progressing  12/5/2022 0504 by Benita Perez RCP  Outcome: Progressing     Problem: Gastrointestinal - Adult  Goal: Minimal or absence of nausea and vomiting  Outcome: Progressing  Goal: Maintains or returns to baseline bowel function  Outcome: Progressing  Goal: Maintains adequate nutritional intake  Outcome: Progressing  Goal: Establish and maintain optimal ostomy function  Outcome: Progressing     Problem: Genitourinary - Adult  Goal: Urinary catheter remains patent  Outcome: Progressing     Problem: Metabolic/Fluid and Electrolytes - Adult  Goal: Electrolytes maintained within normal limits  Outcome: Progressing  Goal: Hemodynamic stability and optimal renal function maintained  Outcome: Progressing  Goal: Glucose maintained within prescribed range  Outcome: Progressing     Problem: Skin/Tissue Integrity - Adult  Goal: Skin integrity remains intact  Outcome: Progressing  Goal: Incisions, wounds, or drain sites healing without S/S of infection  Outcome: Progressing  Goal: Oral mucous membranes remain intact  Outcome: Progressing     Problem: Pain  Goal: Verbalizes/displays adequate comfort level or baseline comfort level  Outcome: Progressing     Problem: Skin/Tissue Integrity  Goal: Absence of new skin breakdown  Description: 1. Monitor for areas of redness and/or skin breakdown  2. Assess vascular access sites hourly  3. Every 4-6 hours minimum:  Change oxygen saturation probe site  4. Every 4-6 hours:  If on nasal continuous positive airway pressure, respiratory therapy assess nares and determine need for appliance change or resting period.   Outcome: Progressing     Problem: Safety - Adult  Goal: Free from fall injury  Outcome: Progressing     Problem: ABCDS Injury Assessment  Goal: Absence of physical injury  Outcome: Progressing

## 2022-12-05 NOTE — PLAN OF CARE
Problem: Respiratory - Adult  Goal: Achieves optimal ventilation and oxygenation  12/5/2022 1128 by Hugo Briscoe RCP  Outcome: Progressing  12/5/2022 1122 by Joanna Dean RN  Outcome: Progressing  12/5/2022 0607 by Aure Lord RN  Outcome: Progressing  12/5/2022 0504 by Luis Velazquez RCP  Outcome: Progressing

## 2022-12-05 NOTE — PROGRESS NOTES
INTENSIVE CARE UNIT  Resident Physician Progress Note    Patient - Italo Christian  Date of Admission -  12/3/2022 10:03 AM  Date of Evaluation -  12/5/2022  Room and Bed Number -  6614/7978-68   Hospital Day - 2    SUBJECTIVE:     HISTORY OF PRESENT ILLNESS:    Italo Christian is a 43 y.o. female presented with altered mental status. Patient has a history of MS, nonverbal at baseline, hypertension, hydronephrosis, neurogenic bladder (chronic indwelling suprapubic catheter), G tube dependent,  PVD, PE. This patient presented to the emergency department with a GCS of 3. She was immediately intubated. She did have tachycardia thereafter up to the 130s, blood pressures 128/87 on Levophed, SPO2 100% on FiO2 of 100 respiratory rate of 20. Blood work demonstrated lactate of 1.36, sodium 130, chloride 96, CO2 19, WBC 36.1, creatinine 3.08. Pierre Medin Urinalysis remarkable for hemoglobin, many bacteria, large leukocyte esterase. She was given vancomycin and cefepime. CT head showed no acute intracranial abnormality. CT chest abdomen pelvis demonstrating likely pneumonia, innumerable renal calculi on the right side ranging in 5 to 8 mm in size. There is a stack of obstructing calculi in the right ureter ranging from 5 to 11 mm, causing severe right hydronephrosis and hydroureter. Tiny gas pockets in the ascending colon showing pseudo pneumatosis. A central line was placed in left IJ. She was started on propofol and levophed. Was given Cefepime and Vancomycin. Admitted to ICU. Urology and ID consulted. Taken to OR for urgent cystoscopy for R ureteral stent insertion. Suprapubic catheter was exchanged. ID consulted, recommended single time dose of Amikacin, Meropenem. OVERNIGHT EVENTS:      No acute events overnight. Remains intubated. On pressors.     Febrile overnight: Tmax 100.8  T 99.7, , RR 24, /77, SPO2 96%  Vent settings: PRVC, FiO2 30, PEEP 5, RR 16 Vt 380, PIP 15  ABG 12/4: pH 7.491, PCO2 29.6, PO2 66.9, HCO3 22.6, SAO2 95  ABG 12/3 AM: Ph 7.407, PCO2 35.5, PO2 99.3, HCO3 22.3, BE 2    Labs  Na 148, K 3.4, Cl 112, CO2 22, iCa 1.20,   Cr 0.53 (0.76, 2.24), BUN 14 (27)  Mg 1.5, Phos 1.9  WBC 24.2 (28.2, 36.1), Hgb 12.2,   Procal 29.13 (>100)      Micro  Blood culture - Enterobacter, bacteroides  Urine cultures pending  Respiratory cultures pending    Abx  Meropenem 1g Q8H    Glucose levels appropriate    Continuous infusions  Levophed 4 mcg/min  Propofol 20 mcg/kg/min  LR 100ml/hr    Urine output 4380, 2.1 cc/kg/hr      Plan  Continue Meropenem  Replete Mg and Phos  Consult general surgery for bacteroides bacteremia  Procalcitonin, lactate, repeat CXR  Consult to dietary - requested recs, no start on feeds until surgical input  Follow up respiratory cultures  Midodrine to TID    Feeding Diet: Diet NPO    Fluids /hr    Family notified     Analgesic Tylenol 650 PRN    Sedation Propofol    Thrombo-prophylaxis Lovenox     Mobility Bed rest     Heads up 30*    Ulcer prophylaxis Pantoprazole 40mg IV QD    Glycemic control WNL    Spontaneous breathing trial  No    Bowel regimen/urine output Glycolax 17g QD PRN if decreased colostomy output     Indwelling catheter/lines: L IJ CVC (12/3), PIC 12/3 - L hand, R hand, R hand, OGT (12/3), G tube (7/2022), Suprapubic cath (12/3. Viki Shah replaced), colostomy    De-escalation Wean off pressors/ventilator      OBJECTIVE:     VITAL SIGNS:  Patient Vitals for the past 8 hrs:   BP Temp Temp src Pulse Resp SpO2 Weight   12/05/22 0645 (!) 86/52 -- -- (!) 107 21 95 % --   12/05/22 0630 (!) 91/49 -- -- (!) 110 20 96 % --   12/05/22 0615 (!) 99/52 -- -- (!) 114 19 96 % --   12/05/22 0600 (!) 94/57 99.7 °F (37.6 °C) Esophageal (!) 118 22 96 % 190 lb 11.2 oz (86.5 kg)   12/05/22 0545 104/67 -- -- (!) 114 23 96 % --   12/05/22 0530 103/73 -- -- (!) 109 22 95 % --   12/05/22 0515 98/71 -- -- (!) 108 22 97 % --   12/05/22 0500 (!) 99/55 -- -- (!) 110 19 96 % --   12/05/22 0445 102/69 -- -- (!) 109 19 96 % --   22 0430 101/73 -- -- (!) 108 21 96 % --   22 0415 98/82 -- -- (!) 108 19 97 % --   22 0400 99/68 99.7 °F (37.6 °C) Esophageal (!) 111 21 95 % --   22 0346 -- -- -- (!) 126 17 97 % --   22 0345 100/60 -- -- (!) 115 22 94 % --   22 0330 -- -- -- (!) 107 18 97 % --   22 0315 108/87 -- -- (!) 106 18 96 % --   22 0300 103/76 -- -- (!) 106 19 96 % --   22 0245 100/70 -- -- (!) 104 18 97 % --   22 0230 (!) 95/51 -- -- (!) 106 17 94 % --   22 0215 101/62 -- -- (!) 107 19 95 % --   22 0200 105/73 -- -- (!) 103 19 96 % --   22 0145 102/75 -- -- (!) 103 19 97 % --   22 0130 101/75 -- -- (!) 104 18 97 % --   22 0115 97/61 -- -- (!) 105 18 95 % --   22 0100 (!) 95/51 -- -- (!) 108 18 94 % --   22 0045 (!) 90/53 -- -- (!) 110 18 94 % --   22 0030 (!) 97/50 -- -- (!) 109 19 95 % --   22 0015 (!) 86/50 -- -- (!) 110 17 94 % --   22 0000 (!) 91/55 99.5 °F (37.5 °C) CORE (!) 112 17 93 % --   22 2345 92/66 -- -- (!) 113 19 94 % --       Last Body weight:   Wt Readings from Last 3 Encounters:   22 190 lb 11.2 oz (86.5 kg)   22 150 lb (68 kg)   20 154 lb 5.2 oz (70 kg)       Body Mass Index : Body mass index is 37.24 kg/m². Tmax over 24 hours: Temp (24hrs), Av.1 °F (37.8 °C), Min:99.5 °F (37.5 °C), Max:100.8 °F (38.2 °C)      Ins/Outs: In: 3272.5 [I.V.:2669.9]  Out: 9028 [Urine:3030]    PHYSICAL EXAM:  Constitutional: Intubated. Sedated. No acute distress. HEENT: PERRLA, EOMI, sclera clear, anictericm nudkube trachea  Respiratory: intubated; ronchi bilaterally  Cardiovascular: regular rate and rhythm, no murmur or rub  Abdomen: soft, nondistended. A colostomy bag is present. A G tube is present. A suprapubic catheter is present. Surrounding skin appears normal with no erythema, breakdown, drainage. Extremities:  Right AKA.  Peripheral pulses normal, no pedal edema, no clubbing or cyanosis; Left leg with increased tone, knee flexed  Skin: No erythema in intertriginous regions. There is sacrococcygeal skin hypopigmentation from previous breakdown. No skin ulcers.      MEDICATIONS:  Scheduled Meds:   potassium phosphate IVPB  20 mmol IntraVENous Once    magnesium sulfate  2,000 mg IntraVENous Once    rivaroxaban  20 mg Oral Daily    meropenem  1,000 mg IntraVENous Q8H    sodium chloride flush  5-40 mL IntraVENous 2 times per day    gabapentin  300 mg Per G Tube TID    pantoprazole (PROTONIX) 40 mg injection  40 mg IntraVENous Daily    metoclopramide  5 mg Oral TID    [Held by provider] metoprolol tartrate  25 mg Oral TID    midodrine  5 mg Oral BID WC    amikacin (AMIKIN) IVPB  5 mg/kg (Ideal) IntraVENous Once       Continuous Infusions:   lactated ringers Stopped (12/05/22 8424)    propofol 15 mcg/kg/min (12/05/22 0647)    norepinephrine 4 mcg/min (12/05/22 0647)    sodium chloride Stopped (12/04/22 2139)       PRN Meds:   albuterol, 2.5 mg, PRN  ipratropium, 0.5 mg, Q6H PRN  sodium chloride flush, 5-40 mL, PRN  sodium chloride, , PRN  polyethylene glycol, 17 g, Daily PRN  acetaminophen, 650 mg, Q6H PRN   Or  acetaminophen, 650 mg, Q6H PRN  ondansetron, 4 mg, Q6H PRN      SUPPORT DEVICES: [x] Ventilator [] BIPAP  [] Nasal Cannula [] Room Air    VENT SETTINGS (Comprehensive) (if applicable):See above  Vent Information  Ventilator ID: [id number]  Equipment Changed: HME, Expiratory Filter  Ventilator Initiate: Yes  Vent Mode: AC/PRVC  Additional Respiratory Assessments  Heart Rate: (!) 107  Resp: 21  SpO2: 95 %  End Tidal CO2: 31 (%)  Position: Semi-[place]'s  Humidification Source: HME  Skin Barrier Applied: No  Lab Results   Component Value Date/Time    MODE [facility] 12/05/2022 04:19 AM       ABGs:   See above  No results found for: PH, PCO2, PO2, HCO3, O2SAT    DATA:  Complete Blood Count:   Recent Labs     12/03/22  1014 12/04/22  0512 12/05/22  0418   WBC 36.1* 28.2* 24.2*   RBC 4.53 4.02 4.48   HGB 12.3 10.9* 12.2   HCT 40.5 35.0* 39.6   MCV 89.4 87.1 88.4   MCH 27.2 27.1 27.2   MCHC 30.4 31.1 30.8   RDW 15.9* 15.5* 15.5*    181 163   MPV 10.7 11.0 10.8        Last 3 Blood Glucose:   Recent Labs     12/03/22  1014 12/04/22  0512 12/05/22  0418   GLUCOSE 144* 121* 81        PT/INR:    Lab Results   Component Value Date/Time    PROTIME 13.8 12/03/2022 10:14 AM    INR 1.3 12/03/2022 10:14 AM     PTT:    Lab Results   Component Value Date/Time    APTT 40.1 07/06/2020 10:07 AM       Basic Metabolic Profile:   Recent Labs     12/03/22  1014 12/04/22  0512 12/05/22  0418   * 142 148*   K 4.0 3.4* 3.4*   CL 96* 111* 112*   CO2 19* 21 22   BUN 48* 27* 14   CREATININE 2.24* 0.76 0.53   GLUCOSE 144* 121* 81   PHOS  --  1.2* 1.9*       Liver Function:  Recent Labs     12/03/22  1014   PROT 8.2   LABALBU 3.0*   ALT 39*   AST 58*   ALKPHOS 141*   BILITOT 0.4       Magnesium:   Lab Results   Component Value Date/Time    MG 1.5 12/05/2022 04:18 AM    MG 1.9 12/04/2022 05:12 AM    MG 1.8 01/24/2020 05:46 AM     Phosphorus:   Lab Results   Component Value Date/Time    PHOS 1.9 12/05/2022 04:18 AM    PHOS 1.2 12/04/2022 05:12 AM    PHOS 2.7 07/29/2019 05:59 AM     Ionized Calcium:   Lab Results   Component Value Date/Time    CAION 1.20 12/05/2022 04:18 AM    CAION 1.18 12/04/2022 05:12 AM    CAION 4.66 04/08/2013 07:15 AM        Urinalysis:   Lab Results   Component Value Date/Time    NITRU NEGATIVE 12/03/2022 11:04 AM    COLORU Yellow 12/03/2022 11:04 AM    PHUR 8.0 12/03/2022 11:04 AM    WBCUA 50  12/03/2022 11:04 AM    RBCUA 20 TO 50 12/03/2022 11:04 AM    MUCUS 2+ 11/30/2021 10:26 AM    TRICHOMONAS NOT REPORTED 11/30/2021 10:26 AM    YEAST NOT REPORTED 11/30/2021 10:26 AM    BACTERIA MANY 12/03/2022 11:04 AM    SPECGRAV 1.011 12/03/2022 11:04 AM    LEUKOCYTESUR LARGE 12/03/2022 11:04 AM    UROBILINOGEN Normal 12/03/2022 11:04 AM    BILIRUBINUR NEGATIVE 12/03/2022 11:04 AM    BILIRUBINUR NEGATIVE 03/11/2012 01:01 PM    GLUCOSEU NEGATIVE 12/03/2022 11:04 AM    GLUCOSEU NEGATIVE 03/11/2012 01:01 PM    Juan Pedro NEGATIVE 12/03/2022 11:04 AM    AMORPHOUS 1+ 08/02/2022 09:00 AM       HgBA1c:  No results found for: LABA1C  TSH:    Lab Results   Component Value Date/Time    TSH 0.84 05/19/2019 07:50 AM     Lactic Acid:   Lab Results   Component Value Date/Time    LACTA 2.0 01/08/2017 12:12 PM      Troponin: No results for input(s): TROPONINI in the last 72 hours. Microbiology:  Blood and urine cultures pending    Other Labs:  Results for orders placed or performed during the hospital encounter of 12/03/22   Blood Culture 1    Specimen: Blood   Result Value Ref Range    Specimen Description . BLOOD     Special Requests LH     Culture POSITIVE Blood Culture (A)     Culture DIRECT GRAM STAIN FROM BOTTLE: GRAM NEGATIVE RODS     Culture       Bacteroides fragilis Detected: Methodology- Polymerase Chain Reaction (PCR)    Culture       Enterobacterales group  (not E. cloacae complex, E. coli, K. oxytoca, K. pneumoniae, S. marcescens, or Proteus species)  Culture Results to Follow  Methodology- Polymerase Chain Reaction (PCR)      Culture       (NOTE) Direct Gram Stain from bottle and Polymerase Chain Reaction (PCR) results called to and read back by:NELIDA HOOPER AT 1000 ON 12/4/22   Culture, Blood 2    Specimen: Blood   Result Value Ref Range    Specimen Description . BLOOD     Special Requests R AC 10ML     Culture POSITIVE Blood Culture (A)     Culture DIRECT GRAM STAIN FROM BOTTLE: GRAM NEGATIVE RODS     Culture       (NOTE) DIRECT GRAM STAIN FROM BOTTLE:NELIDA HOOPER AT 8645 AT 12/4/22   Culture, Urine    Specimen: Urine, clean catch   Result Value Ref Range    Specimen Description . CLEAN CATCH URINE     Culture CULTURE IN PROGRESS    MRSA DNA Probe, Nasal    Specimen: Nasal   Result Value Ref Range    Specimen Description . NASAL SWAB     MRSA, DNA, Nasal NEGATIVE NEGATIVE   ELECTROLYTES PLUS Result Value Ref Range    POC Sodium 133 (L) 138 - 146 mmol/L    POC Potassium 3.6 3.5 - 4.5 mmol/L    POC Chloride 103 98 - 107 mmol/L    POC TCO2 20 (L) 22 - 30 mmol/L    Anion Gap 11 7 - 16 mmol/L   Hemoglobin and hematocrit, blood   Result Value Ref Range    POC Hemoglobin 15.3 12.0 - 16.0 g/dL    POC Hematocrit 45 36 - 46 %   CALCIUM, IONIC (POC)   Result Value Ref Range    POC Ionized Calcium 1.19 1.15 - 1.33 mmol/L   CBC with Auto Differential   Result Value Ref Range    WBC 36.1 (HH) 3.5 - 11.3 k/uL    RBC 4.53 3.95 - 5.11 m/uL    Hemoglobin 12.3 11.9 - 15.1 g/dL    Hematocrit 40.5 36.3 - 47.1 %    MCV 89.4 82.6 - 102.9 fL    MCH 27.2 25.2 - 33.5 pg    MCHC 30.4 28.4 - 34.8 g/dL    RDW 15.9 (H) 11.8 - 14.4 %    Platelets 755 738 - 407 k/uL    MPV 10.7 8.1 - 13.5 fL    NRBC Automated 0.0 0.0 per 100 WBC    Immature Granulocytes 4 (H) 0 %    Seg Neutrophils 81 (H) 36 - 66 %    Lymphocytes 8 (L) 24 - 44 %    Monocytes 7 1 - 7 %    Eosinophils % 0 (L) 1 - 4 %    Basophils 0 0 - 2 %    Absolute Immature Granulocyte 1.44 (H) 0.00 - 0.30 k/uL    Segs Absolute 29.24 (H) 1.8 - 7.7 k/uL    Absolute Lymph # 2.89 1.0 - 4.8 k/uL    Absolute Mono # 2.53 (H) 0.1 - 0.8 k/uL    Absolute Eos # 0.00 0.0 - 0.4 k/uL    Basophils Absolute 0.00 0.0 - 0.2 k/uL    Morphology ANISOCYTOSIS PRESENT     Morphology INCREASED BANDS PRESENT    CMP   Result Value Ref Range    Glucose 144 (H) 70 - 99 mg/dL    BUN 48 (H) 6 - 20 mg/dL    Creatinine 2.24 (H) 0.50 - 0.90 mg/dL    Est, Glom Filt Rate 27 (L) >60 mL/min/1.73m2    Calcium 8.9 8.6 - 10.4 mg/dL    Sodium 130 (L) 135 - 144 mmol/L    Potassium 4.0 3.7 - 5.3 mmol/L    Chloride 96 (L) 98 - 107 mmol/L    CO2 19 (L) 20 - 31 mmol/L    Anion Gap 15 9 - 17 mmol/L    Alkaline Phosphatase 141 (H) 35 - 104 U/L    ALT 39 (H) 5 - 33 U/L    AST 58 (H) <32 U/L    Total Bilirubin 0.4 0.3 - 1.2 mg/dL    Total Protein 8.2 6.4 - 8.3 g/dL    Albumin 3.0 (L) 3.5 - 5.2 g/dL    Albumin/Globulin Ratio 0.6 (L) 1.0 - 2.5   Protime-INR   Result Value Ref Range    Protime 13.8 (H) 9.1 - 12.3 sec    INR 1.3    Lactate, Sepsis   Result Value Ref Range    Lactic Acid, Sepsis, Whole Blood 1.6 0.5 - 1.9 mmol/L   Urinalysis with Microscopic   Result Value Ref Range    Color, UA Yellow Yellow    Turbidity UA Turbid (A) Clear    Glucose, Ur NEGATIVE NEGATIVE    Bilirubin Urine NEGATIVE NEGATIVE    Ketones, Urine NEGATIVE NEGATIVE    Specific Gravity, UA 1.011 1.005 - 1.030    Urine Hgb LARGE (A) NEGATIVE    pH, UA 8.0 5.0 - 8.0    Protein, UA 3+ (A) NEGATIVE    Urobilinogen, Urine Normal Normal    Nitrite, Urine NEGATIVE NEGATIVE    Leukocyte Esterase, Urine LARGE (A) NEGATIVE    WBC, UA 50  0 - 5 /HPF    RBC, UA 20 TO 50 0 - 2 /HPF    Epithelial Cells UA 5 TO 10 0 - 5 /HPF    Bacteria, UA MANY (A) None   Procalcitonin   Result Value Ref Range    Procalcitonin >100.00 (H) <0.09 ng/mL   Basic Metabolic Panel w/ Reflex to MG   Result Value Ref Range    Glucose 121 (H) 70 - 99 mg/dL    BUN 27 (H) 6 - 20 mg/dL    Creatinine 0.76 0.50 - 0.90 mg/dL    Est, Glom Filt Rate >60 >60 mL/min/1.73m2    Calcium 8.5 (L) 8.6 - 10.4 mg/dL    Sodium 142 135 - 144 mmol/L    Potassium 3.4 (L) 3.7 - 5.3 mmol/L    Chloride 111 (H) 98 - 107 mmol/L    CO2 21 20 - 31 mmol/L    Anion Gap 10 9 - 17 mmol/L   Calcium, Ionized   Result Value Ref Range    Calcium, Ionized 1.18 1.13 - 1.33 mmol/L   Magnesium   Result Value Ref Range    Magnesium 1.9 1.6 - 2.6 mg/dL   Phosphorus   Result Value Ref Range    Phosphorus 1.2 (L) 2.6 - 4.5 mg/dL   CBC with Auto Differential   Result Value Ref Range    WBC 28.2 (H) 3.5 - 11.3 k/uL    RBC 4.02 3.95 - 5.11 m/uL    Hemoglobin 10.9 (L) 11.9 - 15.1 g/dL    Hematocrit 35.0 (L) 36.3 - 47.1 %    MCV 87.1 82.6 - 102.9 fL    MCH 27.1 25.2 - 33.5 pg    MCHC 31.1 28.4 - 34.8 g/dL    RDW 15.5 (H) 11.8 - 14.4 %    Platelets 882 706 - 597 k/uL    MPV 11.0 8.1 - 13.5 fL    NRBC Automated 0.1 (H) 0.0 per 100 WBC    Immature Granulocytes 0 0 %    Seg Neutrophils 89 (H) 36 - 66 %    Lymphocytes 4 (L) 24 - 44 %    Monocytes 7 1 - 7 %    Eosinophils % 0 (L) 1 - 4 %    Basophils 0 0 - 2 %    Absolute Immature Granulocyte 0.00 0.00 - 0.30 k/uL    Segs Absolute 25.10 (H) 1.8 - 7.7 k/uL    Absolute Lymph # 1.13 1.0 - 4.8 k/uL    Absolute Mono # 1.97 (H) 0.1 - 0.8 k/uL    Absolute Eos # 0.00 0.0 - 0.4 k/uL    Basophils Absolute 0.00 0.0 - 0.2 k/uL    Morphology ANISOCYTOSIS PRESENT     Morphology INCREASED BANDS PRESENT    Vancomycin Level, Random   Result Value Ref Range    Vancomycin Rm 10.3 ug/mL   Basic Metabolic Panel w/ Reflex to MG   Result Value Ref Range    Glucose 81 70 - 99 mg/dL    BUN 14 6 - 20 mg/dL    Creatinine 0.53 0.50 - 0.90 mg/dL    Est, Glom Filt Rate >60 >60 mL/min/1.73m2    Calcium 9.3 8.6 - 10.4 mg/dL    Sodium 148 (H) 135 - 144 mmol/L    Potassium 3.4 (L) 3.7 - 5.3 mmol/L    Chloride 112 (H) 98 - 107 mmol/L    CO2 22 20 - 31 mmol/L    Anion Gap 14 9 - 17 mmol/L   Calcium, Ionized   Result Value Ref Range    Calcium, Ionized 1.20 1.13 - 1.33 mmol/L   Magnesium   Result Value Ref Range    Magnesium 1.5 (L) 1.6 - 2.6 mg/dL   Phosphorus   Result Value Ref Range    Phosphorus 1.9 (L) 2.6 - 4.5 mg/dL   CBC with Auto Differential   Result Value Ref Range    WBC 24.2 (H) 3.5 - 11.3 k/uL    RBC 4.48 3.95 - 5.11 m/uL    Hemoglobin 12.2 11.9 - 15.1 g/dL    Hematocrit 39.6 36.3 - 47.1 %    MCV 88.4 82.6 - 102.9 fL    MCH 27.2 25.2 - 33.5 pg    MCHC 30.8 28.4 - 34.8 g/dL    RDW 15.5 (H) 11.8 - 14.4 %    Platelets 160 587 - 980 k/uL    MPV 10.8 8.1 - 13.5 fL    NRBC Automated 0.0 0.0 per 100 WBC    Immature Granulocytes 1 (H) 0 %    Seg Neutrophils 84 (H) 36 - 66 %    Lymphocytes 6 (L) 24 - 44 %    Monocytes 9 (H) 1 - 7 %    Eosinophils % 0 (L) 1 - 4 %    Basophils 0 0 - 2 %    Absolute Immature Granulocyte 0.24 0.00 - 0.30 k/uL    Segs Absolute 20.33 (H) 1.8 - 7.7 k/uL    Absolute Lymph # 1.45 1.0 - 4.8 k/uL    Absolute Mono # 2.18 (H) 0.1 - 0.8 k/uL    Absolute Eos # 0.00 0.0 - 0.4 k/uL    Basophils Absolute 0.00 0.0 - 0.2 k/uL    Morphology ANISOCYTOSIS PRESENT    Venous Blood Gas, POC   Result Value Ref Range    pH, Niraj 7.249 (L) 7.320 - 7.430    pCO2, Niraj 46.7 41.0 - 51.0 mm Hg    pO2, Niraj 130.2 (H) 30.0 - 50.0 mm Hg    HCO3, Venous 20.4 (L) 22.0 - 29.0 mmol/L    Negative Base Excess, Niraj 7 (H) 0.0 - 2.0    O2 Sat, Niraj 98 (H) 60.0 - 85.0 %   Creatinine W/GFR Point of Care   Result Value Ref Range    POC Creatinine 3.08 (H) 0.51 - 1.19 mg/dL    eGFR, POC 19 mL/min/1.73m2   POCT urea (BUN)   Result Value Ref Range    POC BUN 54 (H) 8 - 26 mg/dL   Lactic Acid, POC   Result Value Ref Range    POC Lactic Acid 1.36 0.56 - 1.39 mmol/L   POCT Glucose   Result Value Ref Range    POC Glucose 153 (H) 74 - 100 mg/dL   Mixed Venous Gas, POC   Result Value Ref Range    PH MIXED 7.207 (L) 7.310 - 7.410    PCO2, Mixed 35.5 (L) 42.0 - 52.0 mm Hg    PO2, Mixed 60.7 (H) 35.0 - 45.0 mm Hg    HCO3, Mixed 14.1 (L) 23.0 - 29.0 mmol/L    Negative Base Excess, Mixed 13 (H) 0.0 - 2.0    O2 Sat, Mixed 85 (H) 60.0 - 80.0 %    O2 Device/Flow/% Adult Ventilator     Hugo Test POSITIVE     Sample Site Right Radial Artery     Mode PRVC     FIO2 60.0    POC Glucose Fingerstick   Result Value Ref Range    POC Glucose 182 (H) 65 - 105 mg/dL   Arterial Blood Gas, POC   Result Value Ref Range    POC pH 7.407 7.350 - 7.450    POC pCO2 35.5 35.0 - 48.0 mm Hg    POC PO2 99.3 83.0 - 108.0 mm Hg    POC HCO3 22.3 21.0 - 28.0 mmol/L    Negative Base Excess, Art 2 0.0 - 2.0    POC O2 SAT 98 94.0 - 98.0 %    O2 Device/Flow/% Adult Ventilator     Hugo Test POSITIVE     Sample Site Right Radial Artery     Mode PRVC     FIO2 40.0    POCT Glucose   Result Value Ref Range    POC Glucose 119 (H) 74 - 100 mg/dL   Arterial Blood Gas, POC   Result Value Ref Range    POC pH 7.491 (H) 7.350 - 7.450    POC pCO2 29.6 (L) 35.0 - 48.0 mm Hg    POC PO2 66.9 (L) 83.0 - 108.0 mm Hg    POC HCO3 22.6 21.0 - 28.0 mmol/L    Positive Base Excess, Art 0 0.0 - 3.0    POC O2 SAT 95 94.0 - 98.0 %    O2 Device/Flow/% Adult Ventilator     Hugo Test POSITIVE     Sample Site Right Radial Artery     Mode PRVC     FIO2 30.0    POCT Glucose   Result Value Ref Range    POC Glucose 88 74 - 100 mg/dL       Radiology/Imaging:  FL[facility]O FOR SURGICAL PROCEDURES   Final Result      XR CHEST PORTABLE   Final Result   Satisfactory line and tube placement. CT Head W/O Contrast   Final Result   No acute intracranial abnormality. CT CHEST ABDOMEN PELVIS WO CONTRAST Additional Contrast? None   Final Result   1. ETT now terminates 12 mm above denys. 2. Patchy perihilar and more confluent posterior segment airspace   consolidations likely combination of pneumonia and atelectasis. Trace right   pleural effusion. 3. Innumerable right renal calculi most in the 5-8 mm size range. Moderate   to severe right hydronephrosis and hydroureter. There is a stack of 4   obstructing calculi in the distal right ureter ranging from 5-11 mm   accounting for finding. 11 mm calculus is most distal and positioned about 2   cm from the uterovesical junction. 4. Liquid stool in the colon suggests diarrheal disease. Tiny gas pockets   along the ascending colon wall in the dependent portion appear to be pseudo   pneumatosis. XR CHEST PORTABLE   Final Result   Chest x-ray:      1. ETT is just entering the right mainstem bronchus by few mm. Repositioning   advised. 2. Some progression of mild elevation right hemidiaphragm. 3. Patchy perihilar opacities probably vascular. Abdomen:      1. NG tube terminates in the left upper quadrant. Side port is positioned in   the midline probably in the distal gastric antrum. 2. Cholelithiasis. 3. Peg tube in place. 4. No small bowel distension. Findings were discussed with MORTEZA PAGE at 11:07 a.m. on 12/3/2022.          XR ABDOMEN FOR NG/OG/NE TUBE PLACEMENT   Final Result Chest x-ray:      1. ETT is just entering the right mainstem bronchus by few mm. Repositioning   advised. 2. Some progression of mild elevation right hemidiaphragm. 3. Patchy perihilar opacities probably vascular. Abdomen:      1. NG tube terminates in the left upper quadrant. Side port is positioned in   the midline probably in the distal gastric antrum. 2. Cholelithiasis. 3. Peg tube in place. 4. No small bowel distension. Findings were discussed with MORTEZA PAGE at 11:07 a.m. on 12/3/2022.              ASSESSMENT:     Patient Active Problem List    Diagnosis Date Noted    Pyelonephritis 12/04/2022    Obstructive uropathy 12/04/2022    Bandemia 12/04/2022    SIRS (systemic inflammatory response syndrome) ([address] 12/04/2022    Gram-neg septicemia ([address] 12/04/2022    Pneumatosis coli 12/04/2022    Nephrostomy tube displaced ([address] 09/26/2020    E coli bacteremia     E-coli UTI 01/23/2020    Sepsis due to Escherichia coli ([address] 01/23/2020    Lactate blood increase 01/22/2020    Proteus mirabilis infection 08/02/2019    Decubitus ulcer of coccygeal region, stage 4 ([address] 07/30/2019    Ileus ([address] 07/29/2019    Urinary tract infection associated with indwelling urethral catheter ([address] 07/28/2019    Abdominal pain 07/28/2019    Pneumonia due to organism     Extended spectrum beta lactamase (ESBL) resistance     Sepsis ([address] 06/25/2019    Lower paraplegia ([address]     Toxic metabolic encephalopathy 37/72/4391    Status post colostomy ([address] 05/19/2019    ESBL E. coli carrier     Septic shock (HCC)     Altered mental status     Lactic acidosis 05/17/2019    Chronic indwelling Davies catheter 05/17/2019    Peripheral vascular disease ([address] 05/17/2019    Recurrent UTI (urinary tract infection) 05/17/2019    History of pulmonary embolism 05/17/2019    Hypertension 05/17/2019    Gross hematuria 01/11/2017    Tachycardia     Hemorrhagic cystitis 01/09/2017    Hypokalemia 01/09/2017    Neurogenic bladder     Aphasia 2015    Multiple sclerosis ([facility] 2015    Nonverbal 2015    Gastrostomy tube dependent ([facility] 2015    Dislodged gastrostomy tube 2015        PLAN:   PLAN/MEDICAL DECISION MAKING:  Neurologic:   Neuro checks per protocol  Propofol for sedation  Cardiovascular:  MAP goal 65  Levophed  Midodrine 5mg BID  Hold home metoprolol  Pulmonary:  Maintain oxygen sats >92%  Pulmonary toilet  Vent Information  Ventilator ID: [id number]  Equipment Changed: HME, Expiratory Filter  Ventilator Initiate: Yes  Vent Mode: AC/PRVC  GI/Nutrition  Glycolax 17g  Ulcer Prophylaxis: Pantoprazole 40mg IV QD  Dietary consult  Diet:Diet NPO    Renal/Fluid/Electrolyte  IV Fluids: LR @ 100 /hr  I/O: In: 3272.5 [I.V.:2669.9]  Out: 2922 [Urine:3030]  Urine output 4380, 2.1 cc/kg/hr  Monitor electrolytes, replace PRN   ID  WBC:   Lab Results   Component Value Date    WBC 24.2 (H) 2022     Tmax: Temp (24hrs), Av.1 °F (37.8 °C), Min:99.5 °F (37.5 °C), Max:100.8 °F (38.2 °C)  Antimicrobials: Meropenem   Bcx: Enterobacterales group, bacteroides - fu speciation   FU Bcx, Ucx    Hematology:  Recent Labs     22  1014 22  0512 22  0418   HGB 12.3 10.9* 12.2      Endocrine:   glucose controlled - most recent BGL is   Recent Labs     22  1014 22  0512 22  0418   GLUCOSE 144* 121* 81     DVT Prophylaxis  On Xarelto  Discharge Needs:    , PT, OT, ST, SW, and Case Management      CODE STATUS: Full Code      DISPOSITION:  [x] To remain ICU:   [] OK for out of ICU from Critical Care standpoint     Attending Physician Statement  I have discussed the care of Denia Thorne, including pertinent history and exam findings,  with the resident. I have seen and examined the patient and the key elements of all parts of the encounter have been performed by me. I agree with the assessment, plan and orders as documented by the resident with additions .   Septic shock   Sepsis   Cont antibiotics and iv fluids   Cont vent support          Total critical care time caring for this patient with life threatening, unstable organ failure, including direct patient contact, management of life support systems, review of data including imaging and labs, discussions with other team members and physicians at least 27   Min so far today, excluding procedures. Electronically signed by Abeba Yang MD on   12/5/22 at 10:43 PM EST    Please note that this chart was generated using voice recognition Dragon dictation software. Although every effort was made to ensure the accuracy of this automated transcription, some errors in transcription may have occurred.

## 2022-12-05 NOTE — CARE COORDINATION
Post Acute Facility/Agency List     Provided  pt's mother  with the following list, the list includes the overall star ratings obtained from CMS per the Medicare Web site (www.Medicare.gov):     [] [address]  [] Acute Inpatient Rehabilitation Facilities  [x] Skilled Nursing Facilities  [] Home Care    Provided verbal instructions on how to utilize the QR Code to obtain additional detailed star ratings from www. Medicare. gov

## 2022-12-06 LAB
ABSOLUTE EOS #: 0.38 K/UL (ref 0–0.4)
ABSOLUTE IMMATURE GRANULOCYTE: 0 K/UL (ref 0–0.3)
ABSOLUTE LYMPH #: 1.54 K/UL (ref 1–4.8)
ABSOLUTE MONO #: 2.88 K/UL (ref 0.1–0.8)
ALLEN TEST: POSITIVE
ANION GAP SERPL CALCULATED.3IONS-SCNC: 13 MMOL/L (ref 9–17)
BASOPHILS # BLD: 0 % (ref 0–2)
BASOPHILS ABSOLUTE: 0 K/UL (ref 0–0.2)
BUN BLDV-MCNC: 16 MG/DL (ref 6–20)
CALCIUM IONIZED: 1.25 MMOL/L (ref 1.13–1.33)
CALCIUM SERPL-MCNC: 9.6 MG/DL (ref 8.6–10.4)
CHLORIDE BLD-SCNC: 109 MMOL/L (ref 98–107)
CO2: 22 MMOL/L (ref 20–31)
CREAT SERPL-MCNC: 0.41 MG/DL (ref 0.5–0.9)
CULTURE: ABNORMAL
CULTURE: NORMAL
DIRECT EXAM: NORMAL
EOSINOPHILS RELATIVE PERCENT: 2 % (ref 1–4)
FIO2: 30
GFR SERPL CREATININE-BSD FRML MDRD: >60 ML/MIN/1.73M2
GLUCOSE BLD-MCNC: 72 MG/DL (ref 70–99)
GLUCOSE BLD-MCNC: 83 MG/DL (ref 74–100)
HCT VFR BLD CALC: 40.1 % (ref 36.3–47.1)
HEMOGLOBIN: 12.5 G/DL (ref 11.9–15.1)
IMMATURE GRANULOCYTES: 0 %
LYMPHOCYTES # BLD: 8 % (ref 24–44)
Lab: ABNORMAL
Lab: ABNORMAL
MAGNESIUM: 1.8 MG/DL (ref 1.6–2.6)
MCH RBC QN AUTO: 27.4 PG (ref 25.2–33.5)
MCHC RBC AUTO-ENTMCNC: 31.2 G/DL (ref 28.4–34.8)
MCV RBC AUTO: 87.9 FL (ref 82.6–102.9)
MONOCYTES # BLD: 15 % (ref 1–7)
MORPHOLOGY: ABNORMAL
NRBC AUTOMATED: 0 PER 100 WBC
O2 DEVICE/FLOW/%: ABNORMAL
PDW BLD-RTO: 15.7 % (ref 11.8–14.4)
PHOSPHORUS: 2.8 MG/DL (ref 2.6–4.5)
PLATELET # BLD: 178 K/UL (ref 138–453)
PMV BLD AUTO: 12.3 FL (ref 8.1–13.5)
POC HCO3: 22.9 MMOL/L (ref 21–28)
POC O2 SATURATION: 96 % (ref 94–98)
POC PCO2: 32.9 MM HG (ref 35–48)
POC PH: 7.45 (ref 7.35–7.45)
POC PO2: 74.4 MM HG (ref 83–108)
POSITIVE BASE EXCESS, ART: 0 (ref 0–3)
POTASSIUM SERPL-SCNC: 3.7 MMOL/L (ref 3.7–5.3)
RBC # BLD: 4.56 M/UL (ref 3.95–5.11)
SAMPLE SITE: ABNORMAL
SEG NEUTROPHILS: 75 % (ref 36–66)
SEGMENTED NEUTROPHILS ABSOLUTE COUNT: 14.4 K/UL (ref 1.8–7.7)
SODIUM BLD-SCNC: 144 MMOL/L (ref 135–144)
SPECIMEN DESCRIPTION: ABNORMAL
SPECIMEN DESCRIPTION: ABNORMAL
SPECIMEN DESCRIPTION: NORMAL
WBC # BLD: 19.2 K/UL (ref 3.5–11.3)

## 2022-12-06 PROCEDURE — 82330 ASSAY OF CALCIUM: CPT

## 2022-12-06 PROCEDURE — 6360000002 HC RX W HCPCS: Performed by: INTERNAL MEDICINE

## 2022-12-06 PROCEDURE — 82947 ASSAY GLUCOSE BLOOD QUANT: CPT

## 2022-12-06 PROCEDURE — 80048 BASIC METABOLIC PNL TOTAL CA: CPT

## 2022-12-06 PROCEDURE — 94003 VENT MGMT INPAT SUBQ DAY: CPT

## 2022-12-06 PROCEDURE — 36415 COLL VENOUS BLD VENIPUNCTURE: CPT

## 2022-12-06 PROCEDURE — 85025 COMPLETE CBC W/AUTO DIFF WBC: CPT

## 2022-12-06 PROCEDURE — 83735 ASSAY OF MAGNESIUM: CPT

## 2022-12-06 PROCEDURE — 84100 ASSAY OF PHOSPHORUS: CPT

## 2022-12-06 PROCEDURE — 2000000000 HC ICU R&B

## 2022-12-06 PROCEDURE — 36600 WITHDRAWAL OF ARTERIAL BLOOD: CPT

## 2022-12-06 PROCEDURE — 82803 BLOOD GASES ANY COMBINATION: CPT

## 2022-12-06 PROCEDURE — 2580000003 HC RX 258: Performed by: INTERNAL MEDICINE

## 2022-12-06 PROCEDURE — 99291 CRITICAL CARE FIRST HOUR: CPT | Performed by: INTERNAL MEDICINE

## 2022-12-06 PROCEDURE — 6370000000 HC RX 637 (ALT 250 FOR IP)

## 2022-12-06 PROCEDURE — 6360000002 HC RX W HCPCS: Performed by: HEALTH CARE PROVIDER

## 2022-12-06 PROCEDURE — 2580000003 HC RX 258: Performed by: STUDENT IN AN ORGANIZED HEALTH CARE EDUCATION/TRAINING PROGRAM

## 2022-12-06 PROCEDURE — 6370000000 HC RX 637 (ALT 250 FOR IP): Performed by: STUDENT IN AN ORGANIZED HEALTH CARE EDUCATION/TRAINING PROGRAM

## 2022-12-06 PROCEDURE — 2580000003 HC RX 258: Performed by: HEALTH CARE PROVIDER

## 2022-12-06 PROCEDURE — 99233 SBSQ HOSP IP/OBS HIGH 50: CPT | Performed by: INTERNAL MEDICINE

## 2022-12-06 PROCEDURE — C9113 INJ PANTOPRAZOLE SODIUM, VIA: HCPCS | Performed by: STUDENT IN AN ORGANIZED HEALTH CARE EDUCATION/TRAINING PROGRAM

## 2022-12-06 PROCEDURE — 6360000002 HC RX W HCPCS: Performed by: STUDENT IN AN ORGANIZED HEALTH CARE EDUCATION/TRAINING PROGRAM

## 2022-12-06 RX ADMIN — METOCLOPRAMIDE HYDROCHLORIDE 5 MG: 5 SOLUTION ORAL at 09:46

## 2022-12-06 RX ADMIN — MEROPENEM 1000 MG: 1 INJECTION, POWDER, FOR SOLUTION INTRAVENOUS at 22:20

## 2022-12-06 RX ADMIN — MIDODRINE HYDROCHLORIDE 5 MG: 5 TABLET ORAL at 20:24

## 2022-12-06 RX ADMIN — MIDODRINE HYDROCHLORIDE 5 MG: 5 TABLET ORAL at 14:33

## 2022-12-06 RX ADMIN — MEROPENEM 1000 MG: 1 INJECTION, POWDER, FOR SOLUTION INTRAVENOUS at 14:33

## 2022-12-06 RX ADMIN — SODIUM CHLORIDE, PRESERVATIVE FREE 10 ML: 5 INJECTION INTRAVENOUS at 09:01

## 2022-12-06 RX ADMIN — RIVAROXABAN 20 MG: 20 TABLET, FILM COATED ORAL at 18:08

## 2022-12-06 RX ADMIN — GABAPENTIN 300 MG: 300 CAPSULE ORAL at 20:24

## 2022-12-06 RX ADMIN — MEROPENEM 1000 MG: 1 INJECTION, POWDER, FOR SOLUTION INTRAVENOUS at 05:26

## 2022-12-06 RX ADMIN — GABAPENTIN 300 MG: 300 CAPSULE ORAL at 14:33

## 2022-12-06 RX ADMIN — SODIUM CHLORIDE, POTASSIUM CHLORIDE, SODIUM LACTATE AND CALCIUM CHLORIDE: 600; 310; 30; 20 INJECTION, SOLUTION INTRAVENOUS at 18:14

## 2022-12-06 RX ADMIN — SODIUM CHLORIDE, PRESERVATIVE FREE 40 MG: 5 INJECTION INTRAVENOUS at 09:00

## 2022-12-06 RX ADMIN — GABAPENTIN 300 MG: 300 CAPSULE ORAL at 09:00

## 2022-12-06 RX ADMIN — METOCLOPRAMIDE HYDROCHLORIDE 5 MG: 5 SOLUTION ORAL at 03:45

## 2022-12-06 RX ADMIN — SODIUM CHLORIDE, PRESERVATIVE FREE 10 ML: 5 INJECTION INTRAVENOUS at 20:24

## 2022-12-06 RX ADMIN — MIDODRINE HYDROCHLORIDE 5 MG: 5 TABLET ORAL at 09:00

## 2022-12-06 RX ADMIN — SODIUM CHLORIDE, POTASSIUM CHLORIDE, SODIUM LACTATE AND CALCIUM CHLORIDE: 600; 310; 30; 20 INJECTION, SOLUTION INTRAVENOUS at 09:15

## 2022-12-06 RX ADMIN — SODIUM CHLORIDE, POTASSIUM CHLORIDE, SODIUM LACTATE AND CALCIUM CHLORIDE 10 ML: 600; 310; 30; 20 INJECTION, SOLUTION INTRAVENOUS at 03:46

## 2022-12-06 RX ADMIN — PROPOFOL 10 MCG/KG/MIN: 10 INJECTION, EMULSION INTRAVENOUS at 04:01

## 2022-12-06 ASSESSMENT — PULMONARY FUNCTION TESTS
PIF_VALUE: 15
PIF_VALUE: 17
PIF_VALUE: 47
PIF_VALUE: 15
PIF_VALUE: 16
PIF_VALUE: 15
PIF_VALUE: 13
PIF_VALUE: 13
PIF_VALUE: 15

## 2022-12-06 NOTE — PROGRESS NOTES
[facility] -   Infectious diseases evaluation  admission date 12/3/2022    reason for consultation:   sepsis    Impression :   Current:  Bandemia  proCalcitonin elevation 29  Septic shock levophed 40  R obstructive pyelonephritis -   severe R hydro w many ureteral stones  Stent placed 12/3  GNR septicemia - bacteroides and enterobacteriae  Multifocal infiltrates, likely atelectasis - less likely pneumonia  MS   Neurogenic bladder w SP omer  R AKA amputation by hx  Past colostomy  Possible ascending colon pseudo pneumatosis on CT  Concerning mostly w bacteroides and 2 different bacteriaceae in the blood     Other:    Discussion / summary of stay / plan of care     Recommendations   12/3 Meropenem -stop 12/6 12/6 zosyn   BC - Bacteroides proteus and morganella - most likely source is the abdomen  Possible ascending colon pseudo pneumatosis  General surgery on board, med tx for now  CT chest  - mostly like P congestion and atelectasis  - CXR 12/5 stable    Infection Control Recommendations   Mica Precautions  Contact Isolation     Antimicrobial Stewardship Recommendations   Simplification of therapy  Targeted therapy  History of Present Illness:   Initial history:  Deanna Halsted is a 43y.o.-year-old female w MS and neurogenic bladder and chronic SP omer, Gtube and hx PE on xeralto  Cojes w altered mentation and intubated in ER - S IRS+ and tachycardia and hypotension started on levophed  UA + for infection  CT chest showed many infiltrates and some might be multifocal pneumonia   R kid w hydronephrosis and many  ureteral stones.   Ascending colon pseudo pneumatosis  Started on cefepime and vanco and we are called  Bandemia  NUZHAT     took her to OR for a R stent  ID called    She is on the vent 40 FIO2 and 5 peep  Pupils minimally reactive  Sp salivary and beige  Abd soft   Urine slightly blood tinged and no pus  No rash  Levophed 40    Interval changes 12/6/2022   Patient Vitals for the past 8 hrs:   BP Temp Temp src Pulse Resp SpO2 Weight   12/06/22 0801 -- -- -- (!) 116 21 97 % --   12/06/22 0700 (!) 84/56 -- -- (!) 104 18 94 % --   12/06/22 0513 -- -- -- (!) 111 20 95 % --   12/06/22 0500 97/67 -- -- (!) 109 20 98 % 185 lb 13.6 oz (84.3 kg)   12/06/22 0400 101/80 99.5 °F (37.5 °C) Esophageal (!) 117 20 97 % --   12/06/22 0335 -- -- -- (!) 115 17 96 % --   12/06/22 0300 (!) 78/50 -- -- (!) 116 19 96 % --   12/06/22 0200 (!) 83/50 -- -- (!) 121 20 96 % --     Temp LG  WBc better 28  Creat  0.7 better  BC x 2 GNR, one w bacteroides and enterobacteriaceae pend  U cx in process     12/5  Patient is more alert coughing on the vent agitated, seems appropriate trying to formulate words  Sputum is thick yellow, and sputum culture so far is negative  WBC improved to 24  Bacteria in the blood gram-negative rods pending ID  Urine cultures multiple organisms. Summary of relevant labs:  Labs:  W 36 - 28-24  Creat 3 - 2.4 - 0.7   Lactic Acid, Sepsis, Whole Blood1.6   [id number]   MYV11 High U/L AST58 High    [id number] High    Micro:  BC x 2 GNR, one w bacteroides and enterobacteriaceae   U cx multiple bacteria  Sputum culture no significant bacteria    Nasal MRSA neg  Imaging:  Chest x-ray 12/5  Mild right basilar airspace opacities. This could represent atelectasis or   pneumonia         CT AP chest  Impression:  1. ETT now terminates 12 mm above denys. 2. Patchy perihilar and more confluent posterior segment airspace   consolidations likely combination of pneumonia and atelectasis. Trace right   pleural effusion. 3. Innumerable right renal calculi most in the 5-8 mm size range. Moderate   to severe right hydronephrosis and hydroureter. There is a stack of 4   obstructing calculi in the distal right ureter ranging from 5-11 mm   accounting for finding. 11 mm calculus is most distal and positioned about 2   cm from the uterovesical junction.    4. Liquid stool in the colon suggests diarrheal disease. Tiny gas pockets   along the ascending colon wall in the dependent portion appear to be pseudo   pneumatosis. CT brain neg      I have personally reviewed the past medical history, past surgical history, medications, social history, and family history, and I haveupdated the database accordingly. Allergies:   Patient has no known allergies. Review of Systems:     Review of Systems   Unable to perform ROS: Intubated     Physical Examination :       Physical Exam  Constitutional:       Appearance: She is ill-appearing. She is not toxic-appearing. Comments: On the vent   HENT:      Head: Normocephalic and atraumatic. Mouth/Throat:      Mouth: Mucous membranes are moist.   Eyes:      General: No scleral icterus. Conjunctiva/sclera: Conjunctivae normal.   Cardiovascular:      Rate and Rhythm: Regular rhythm. Tachycardia present. Heart sounds: No murmur heard. No friction rub. No gallop. Pulmonary:      Breath sounds: No stridor. No rhonchi. Abdominal:      Palpations: There is no mass. Hernia: No hernia is present. Genitourinary:     Comments: uRine layne  Musculoskeletal:         General: No tenderness or signs of injury. Cervical back: Neck supple. No rigidity or tenderness. Skin:     Coloration: Skin is not pale. Findings: No erythema.    Neurological:      Comments: Arousing agitated   Psychiatric:      Comments: Agitated on the ventilator       Past Medical History:     Past Medical History:   Diagnosis Date    Aphasia     Aphasia     Contracture of joint, lower leg     Depressed     HTN (hypertension)     Hx MRSA infection resolved 1/2017    2 negative nasal screens 1/2017 (hx in heel in 2013)    Hydronephrosis     Movement disorder     tremor    Multiple sclerosis (HCC)     Neurogenic bladder     Non-verbal learning disorder     Peripheral vascular disease (HCC)     Polyneuropathy     Pressure ulcer     Coccyx, Heel    Pulmonary embolism (HCC)     Pulmonary infarction (HCC)     Quadriplegia (HCC)     Tachycardia     UTI (lower urinary tract infection)        Past Surgical  History:     Past Surgical History:   Procedure Laterality Date    AMPUTATION Right     COLOSTOMY      CYSTOSCOPY Right 12/3/2022    CYSTOSCOPY URETERAL STENT INSERTION performed by Tamanna Patrick MD at [facility]      IR NEPHROSTOMY PERCUTANEOUS RIGHT  9/27/2020    IR NEPHROSTOMY PERCUTANEOUS RIGHT 9/27/2020 Sandra Toussaint MD [facility] SPECIAL PROCEDURES    LEG AMPUTATION THROUGH FEMUR         Medications:      midodrine  5 mg Oral TID    rivaroxaban  20 mg Oral Daily    meropenem  1,000 mg IntraVENous Q8H    sodium chloride flush  5-40 mL IntraVENous 2 times per day    gabapentin  300 mg Per G Tube TID    pantoprazole (PROTONIX) 40 mg injection  40 mg IntraVENous Daily    metoclopramide  5 mg Oral TID    [Held by provider] metoprolol tartrate  25 mg Oral TID       Social History:     Social History     Socioeconomic History    Marital status: Single     Spouse name: Not on file    Number of children: Not on file    Years of education: Not on file    Highest education level: Not on file   Occupational History    Not on file   Tobacco Use    Smoking status: Never    Smokeless tobacco: Never   Vaping Use    Vaping Use: Never used   Substance and Sexual Activity    Alcohol use: No    Drug use: No    Sexual activity: Never   Other Topics Concern    Not on file   Social History Narrative    Not on file     Social Determinants of Health     Financial Resource Strain: Not on file   Food Insecurity: Not on file   Transportation Needs: Not on file   Physical Activity: Not on file   Stress: Not on file   Social Connections: Not on file   Intimate Partner Violence: Not on file   Housing Stability: Not on file       Family History:     Family History   Problem Relation Age of Onset    No Known Problems Mother     No Known Problems Father       Medical Decision Making:   I have independently reviewed/ordered the following labs:    CBC with Differential:   Recent Labs     12/05/22  0418 12/06/22  0455   WBC 24.2* 19.2*   HGB 12.2 12.5   HCT 39.6 40.1    178   LYMPHOPCT 6* 8*   MONOPCT 9* 15*       BMP:  Recent Labs     12/05/22  0418 12/06/22  0455   * 144   K 3.4* 3.7   * 109*   CO2 22 22   BUN 14 16   CREATININE 0.53 0.41*   MG 1.5* 1.8       Hepatic Function Panel:   Recent Labs     12/03/22  1014   PROT 8.2   LABALBU 3.0*   BILITOT 0.4   ALKPHOS 141*   ALT 39*   AST 58*       No results for input(s): RPR in the last 72 hours. No results for input(s): HIV in the last 72 hours. No results for input(s): BC in the last 72 hours. Lab Results   Component Value Date/Time    CREATININE 0.41 12/06/2022 04:55 AM    GLUCOSE 72 12/06/2022 04:55 AM    GLUCOSE 69 03/11/2012 12:33 PM       Detailed results: Thank you for allowing us to participate in the care of this patient. Please call with questions. This note is created with the assistance of a speech recognition program.  While intending to generate adocument that actually reflects the content of the visit, the document can still have some errors including those of syntax and sound a like substitutions which may escape proof reading. It such instances, actual meaningcan be extrapolated by contextual diversion.     Karyna Del Rosario MD  Office: [phone]  Perfect serve / office [phone]

## 2022-12-06 NOTE — PROGRESS NOTES
INTENSIVE CARE UNIT  Resident Physician Progress Note    Patient - Faith Zhong  Date of Admission -  12/3/2022 10:03 AM  Date of Evaluation -  12/6/2022  Room and Bed Number -  2604/6092-72   Hospital Day - 3    SUBJECTIVE:     HISTORY OF PRESENT ILLNESS:    Faith Zhong is a 43 y.o. female presented with altered mental status. Patient has a history of MS, nonverbal at baseline, hypertension, hydronephrosis, neurogenic bladder (chronic indwelling suprapubic catheter), G tube dependent,  PVD, PE. This patient presented to the emergency department with a GCS of 3. She was immediately intubated. She did have tachycardia thereafter up to the 130s, blood pressures 128/87 on Levophed, SPO2 100% on FiO2 of 100 respiratory rate of 20. Blood work demonstrated lactate of 1.36, sodium 130, chloride 96, CO2 19, WBC 36.1, creatinine 3.08. Tanda Bun Urinalysis remarkable for hemoglobin, many bacteria, large leukocyte esterase. She was given vancomycin and cefepime. CT head showed no acute intracranial abnormality. CT chest abdomen pelvis demonstrating likely pneumonia, innumerable renal calculi on the right side ranging in 5 to 8 mm in size. There is a stack of obstructing calculi in the right ureter ranging from 5 to 11 mm, causing severe right hydronephrosis and hydroureter. Tiny gas pockets in the ascending colon showing pseudo pneumatosis. A central line was placed in left IJ. She was started on propofol and levophed. Was given Cefepime and Vancomycin. Admitted to ICU. Urology and ID consulted. Taken to OR for urgent cystoscopy for R ureteral stent insertion. Suprapubic catheter was exchanged. ID consulted, recommended single time dose of Amikacin, Meropenem. Pressor and ventilator needs subsequently decreased. Blood culture growing Bacteroides fragilis, enterobacerales group, gram negative rods. Urine culture showing multiple organisms. Respiratory culture negative.      OVERNIGHT EVENTS:      No acute events overnight. Remains intubated. On pressors. Improving requirements. No surgical intervention/additional testing per general surgery. Febrile overnight: Tmax 100.8  T 99.5, , RR 20, BP 92/60, SPO2 97%  Vent settings: AC/PRVC, FiO2 30, PEEP 5, RR 16 Vt 380, PIP 12  ABG 12/6: 7.451, PCO2 32.9, PO2 74.4, HCO3 22.9, SAO2 96  ABG 12/5: pH 7.491, PCO2 29.6, PO2 66.9, HCO3 22.6, SAO2 95    RSBI: 52      Labs  Na 144, K 3.7, Cl 109, CO2 22, iCa 1.25,   Cr 0.41 (0.76, 2.24), BUN 16  Mg 1.8, Phos 2.8  WBC  19.2 (24.2, 28.2, 36.1), Hgb 12.5,   Procal 29.13 (>100)      Micro  Blood culture - Enterobacter, bacteroides  Urine cultures multiple organisms  Respiratory cultures negative    Abx  Meropenem 1g Q8H    Glucose levels appropriate    Continuous infusions  Levophed 1 mcg/min  Propofol 10 mcg/kg/min  LR 100ml/hr    Urine output 3390, 1.67 cc/kg/hr      Plan  Continue Meropenem  Monitor lytes  Continue tube feeds; Advance slowly 20ml/hr  Midodrine 5mg to TID  Wean pressors/vent - attempt extubation today  Hold Reglan, recheck stool output tomorrow    Feeding Diet: Diet NPO  ADULT TUBE FEEDING; Orogastric; Peptide Based High Protein; Continuous; 20; Yes; 0; Other (specify); Hold at 10 overnight; 20; 30; Q 4 hours    Fluids /hr    Family notified     Analgesic Tylenol 650 PRN    Sedation Propofol    Thrombo-prophylaxis Xarelto    Mobility Bed rest     Heads up 30*    Ulcer prophylaxis Pantoprazole 40mg IV QD    Glycemic control WNL    Spontaneous breathing trial  No    Bowel regimen/urine output Glycolax 17g QD PRN if decreased colostomy output     Indwelling catheter/lines: L IJ CVC (12/3), PIC 12/3 - L hand, R hand, R hand, OGT (12/3), G tube (7/2022), Suprapubic cath (12/3. Saurabh Harmon replaced), colostomy    De-escalation Wean off pressors/ventilator      OBJECTIVE:     VITAL SIGNS:  Patient Vitals for the past 8 hrs:   BP Temp Temp src Pulse Resp SpO2 Weight   12/06/22 0801 -- -- -- (!) 116 21 97 % -- 22 0700 (!) 84/56 -- -- (!) 104 18 94 % --   22 0513 -- -- -- (!) 111 20 95 % --   22 0500 97/67 -- -- (!) 109 20 98 % 185 lb 13.6 oz (84.3 kg)   22 0400 101/80 99.5 °F (37.5 °C) Esophageal (!) 117 20 97 % --   22 0335 -- -- -- (!) 115 17 96 % --   22 0300 (!) 78/50 -- -- (!) 116 19 96 % --   22 0200 (!) 83/50 -- -- (!) 121 20 96 % --   22 0100 103/69 -- -- (!) 110 20 96 % --       Last Body weight:   Wt Readings from Last 3 Encounters:   22 185 lb 13.6 oz (84.3 kg)   22 150 lb (68 kg)   20 154 lb 5.2 oz (70 kg)       Body Mass Index : Body mass index is 36.3 kg/m². Tmax over 24 hours: Temp (24hrs), Av.1 °F (37.3 °C), Min:99 °F (37.2 °C), Max:99.5 °F (37.5 °C)      Ins/Outs:   In: 2736.7 [I.V.:1877.1; NG/GT:202]  Out: 3390 [Urine:1100]    PHYSICAL EXAM:  Constitutional: Intubated. Sedated. No acute distress. HEENT: PERRLA, EOMI, left eye deviation (chronic), sclera clear, anictericm midline trachea  Respiratory: intubated; ronchi bilaterally  Cardiovascular: regular rate and rhythm, no murmur or rub  Abdomen: soft, nondistended. A colostomy bag is present. A G tube is present. A suprapubic catheter is present. Surrounding skin appears normal with no erythema, breakdown, drainage. Extremities:  Right AKA. Peripheral pulses normal, no pedal edema, no clubbing or cyanosis; Left leg with increased tone, knee flexed  Skin: No erythema in intertriginous regions. There is sacrococcygeal skin hypopigmentation from previous breakdown. No skin ulcers.      MEDICATIONS:  Scheduled Meds:   midodrine  5 mg Oral TID    rivaroxaban  20 mg Oral Daily    meropenem  1,000 mg IntraVENous Q8H    sodium chloride flush  5-40 mL IntraVENous 2 times per day    gabapentin  300 mg Per G Tube TID    pantoprazole (PROTONIX) 40 mg injection  40 mg IntraVENous Daily    metoclopramide  5 mg Oral TID    [Held by provider] metoprolol tartrate  25 mg Oral TID Continuous Infusions:   lactated ringers Stopped (12/06/22 0526)    propofol 10 mcg/kg/min (12/06/22 0719)    norepinephrine 1 mcg/min (12/05/22 1016)    sodium chloride 10 mL/hr at 12/06/22 0343       PRN Meds:   albuterol, 2.5 mg, PRN  ipratropium, 0.5 mg, Q6H PRN  sodium chloride flush, 5-40 mL, PRN  sodium chloride, , PRN  polyethylene glycol, 17 g, Daily PRN  acetaminophen, 650 mg, Q6H PRN   Or  acetaminophen, 650 mg, Q6H PRN  ondansetron, 4 mg, Q6H PRN      SUPPORT DEVICES: [x] Ventilator [] BIPAP  [] Nasal Cannula [] Room Air    VENT SETTINGS (Comprehensive) (if applicable):See above  Vent Information  Ventilator ID: [id number]  Equipment Changed: HME, Expiratory Filter  Ventilator Initiate: Yes  Vent Mode: AC/PRVC  Additional Respiratory Assessments  Heart Rate: (!) 116  Resp: 21  SpO2: 97 %  End Tidal CO2: 30 (%)  Position: Semi-[place]'s  Humidification Source: HME  Skin Barrier Applied: No  Lab Results   Component Value Date/Time    MODE [facility] 12/05/2022 04:19 AM       ABGs:   See above  No results found for: PH, PCO2, PO2, HCO3, O2SAT    DATA:  Complete Blood Count:   Recent Labs     12/04/22  0512 12/05/22 0418 12/06/22  0455   WBC 28.2* 24.2* 19.2*   RBC 4.02 4.48 4.56   HGB 10.9* 12.2 12.5   HCT 35.0* 39.6 40.1   MCV 87.1 88.4 87.9   MCH 27.1 27.2 27.4   MCHC 31.1 30.8 31.2   RDW 15.5* 15.5* 15.7*    163 178   MPV 11.0 10.8 12.3        Last 3 Blood Glucose:   Recent Labs     12/03/22  1014 12/04/22  0512 12/05/22 0418 12/06/22  0455   GLUCOSE 144* 121* 81 72        PT/INR:    Lab Results   Component Value Date/Time    PROTIME 13.8 12/03/2022 10:14 AM    INR 1.3 12/03/2022 10:14 AM     PTT:    Lab Results   Component Value Date/Time    APTT 40.1 07/06/2020 10:07 AM       Basic Metabolic Profile:   Recent Labs     12/04/22  0512 12/05/22  0418 12/06/22  0455    148* 144   K 3.4* 3.4* 3.7   * 112* 109*   CO2 21 22 22   BUN 27* 14 16   CREATININE 0.76 0.53 0.41*   GLUCOSE 121* 81 72 PHOS 1.2* 1.9* 2.8       Liver Function:  Recent Labs     12/03/22  1014   PROT 8.2   LABALBU 3.0*   ALT 39*   AST 58*   ALKPHOS 141*   BILITOT 0.4       Magnesium:   Lab Results   Component Value Date/Time    MG 1.8 12/06/2022 04:55 AM    MG 1.5 12/05/2022 04:18 AM    MG 1.9 12/04/2022 05:12 AM     Phosphorus:   Lab Results   Component Value Date/Time    PHOS 2.8 12/06/2022 04:55 AM    PHOS 1.9 12/05/2022 04:18 AM    PHOS 1.2 12/04/2022 05:12 AM     Ionized Calcium:   Lab Results   Component Value Date/Time    CAION 1.25 12/06/2022 04:55 AM    CAION 1.20 12/05/2022 04:18 AM    CAION 1.18 12/04/2022 05:12 AM        Urinalysis:   Lab Results   Component Value Date/Time    NITRU NEGATIVE 12/03/2022 11:04 AM    COLORU Yellow 12/03/2022 11:04 AM    PHUR 8.0 12/03/2022 11:04 AM    WBCUA 50  12/03/2022 11:04 AM    RBCUA 20 TO 50 12/03/2022 11:04 AM    MUCUS 2+ 11/30/2021 10:26 AM    TRICHOMONAS NOT REPORTED 11/30/2021 10:26 AM    YEAST NOT REPORTED 11/30/2021 10:26 AM    BACTERIA MANY 12/03/2022 11:04 AM    SPECGRAV 1.011 12/03/2022 11:04 AM    LEUKOCYTESUR LARGE 12/03/2022 11:04 AM    UROBILINOGEN Normal 12/03/2022 11:04 AM    BILIRUBINUR NEGATIVE 12/03/2022 11:04 AM    BILIRUBINUR NEGATIVE 03/11/2012 01:01 PM    GLUCOSEU NEGATIVE 12/03/2022 11:04 AM    GLUCOSEU NEGATIVE 03/11/2012 01:01 PM    KETUA NEGATIVE 12/03/2022 11:04 AM    AMORPHOUS 1+ 08/02/2022 09:00 AM       HgBA1c:  No results found for: LABA1C  TSH:    Lab Results   Component Value Date/Time    TSH 0.84 05/19/2019 07:50 AM     Lactic Acid:   Lab Results   Component Value Date/Time    LACTA 2.0 01/08/2017 12:12 PM      Troponin: No results for input(s): TROPONINI in the last 72 hours. Microbiology:  Blood and urine cultures pending    Other Labs:  Results for orders placed or performed during the hospital encounter of 12/03/22   Blood Culture 1    Specimen: Blood   Result Value Ref Range    Specimen Description . BLOOD     Special Requests LH Culture POSITIVE Blood Culture (A)     Culture DIRECT GRAM STAIN FROM BOTTLE: GRAM NEGATIVE RODS     Culture       Bacteroides fragilis Detected: Methodology- Polymerase Chain Reaction (PCR)    Culture       Enterobacterales group  (not E. cloacae complex, E. coli, K. oxytoca, K. pneumoniae, S. marcescens, or Proteus species)  Culture Results to Follow  Methodology- Polymerase Chain Reaction (PCR)      Culture BACTEROIDES FRAGILIS UNABLE TO GROW IN CULTURE (A)     Culture MORGANELLA MORGANII (A)     Culture       (NOTE) Direct Gram Stain from bottle and Polymerase Chain Reaction (PCR) results called to and read back by:NELIDA HOOPER AT 1000 ON 12/4/22       Susceptibility    Morganella morganii - BACTERIAL SUSCEPTIBILITY PANEL JOAQUIN     ampicillin >=32 Resistant      aztreonam <=1 Sensitive      ceFAZolin >=64 Resistant      cefTRIAXone <=1 Sensitive      ciprofloxacin >=4 Resistant      gentamicin <=1 Sensitive      tobramycin <=1 Sensitive      trimethoprim-sulfamethoxazole <=20 Sensitive      piperacillin-tazobactam <=4 Sensitive    Culture, Blood 2    Specimen: Blood   Result Value Ref Range    Specimen Description . BLOOD     Special Requests R AC 10ML     Culture POSITIVE Blood Culture (A)     Culture DIRECT GRAM STAIN FROM BOTTLE: GRAM NEGATIVE RODS     Culture PROTEUS MIRABILIS (A)     Culture       (NOTE) DIRECT GRAM STAIN FROM BOTTLE:NELIDA HOOPER AT 1045 AT 12/4/22       Susceptibility    Proteus mirabilis - BACTERIAL SUSCEPTIBILITY PANEL JOAQUIN     amikacin 4 Sensitive      ampicillin 4 Sensitive      aztreonam <=1 Sensitive      ceFAZolin 8 Sensitive      cefTRIAXone <=1 Sensitive      ciprofloxacin >=4 Resistant      gentamicin >=16 Resistant      tobramycin 8 Intermediate      trimethoprim-sulfamethoxazole >=320 Resistant      piperacillin-tazobactam <=4 Sensitive    Culture, Urine    Specimen: Urine, clean catch   Result Value Ref Range    Specimen Description . CLEAN CATCH URINE     Culture       Several types of bacteria were identified in this specimen. Further ID and susceptibility testing is generally not helpful in this circumstance and has not been performed. Consider recollection if clinically indicated. Please contact the Micro lab ([phone]) if you feel the management ofthis particular situation would be helped by further testing. MRSA DNA Probe, Nasal    Specimen: Nasal   Result Value Ref Range    Specimen Description . NASAL SWAB     MRSA, DNA, Nasal NEGATIVE NEGATIVE   Culture, Respiratory    Specimen: Sputum Aspirated   Result Value Ref Range    Specimen Description . ASPIRATED SPUTUM     Direct Exam < 10 EPITHELIAL CELLS/LPF     Direct Exam >10, <25 NEUTROPHILS/LPF     Direct Exam NO SIGNIFICANT PATHOGENS SEEN     Culture PENDING    ELECTROLYTES PLUS   Result Value Ref Range    POC Sodium 133 (L) 138 - 146 mmol/L    POC Potassium 3.6 3.5 - 4.5 mmol/L    POC Chloride 103 98 - 107 mmol/L    POC TCO2 20 (L) 22 - 30 mmol/L    Anion Gap 11 7 - 16 mmol/L   Hemoglobin and hematocrit, blood   Result Value Ref Range    POC Hemoglobin 15.3 12.0 - 16.0 g/dL    POC Hematocrit 45 36 - 46 %   CALCIUM, IONIC (POC)   Result Value Ref Range    POC Ionized Calcium 1.19 1.15 - 1.33 mmol/L   CBC with Auto Differential   Result Value Ref Range    WBC 36.1 (HH) 3.5 - 11.3 k/uL    RBC 4.53 3.95 - 5.11 m/uL    Hemoglobin 12.3 11.9 - 15.1 g/dL    Hematocrit 40.5 36.3 - 47.1 %    MCV 89.4 82.6 - 102.9 fL    MCH 27.2 25.2 - 33.5 pg    MCHC 30.4 28.4 - 34.8 g/dL    RDW 15.9 (H) 11.8 - 14.4 %    Platelets 022 886 - 533 k/uL    MPV 10.7 8.1 - 13.5 fL    NRBC Automated 0.0 0.0 per 100 WBC    Immature Granulocytes 4 (H) 0 %    Seg Neutrophils 81 (H) 36 - 66 %    Lymphocytes 8 (L) 24 - 44 %    Monocytes 7 1 - 7 %    Eosinophils % 0 (L) 1 - 4 %    Basophils 0 0 - 2 %    Absolute Immature Granulocyte 1.44 (H) 0.00 - 0.30 k/uL    Segs Absolute 29.24 (H) 1.8 - 7.7 k/uL    Absolute Lymph # 2.89 1.0 - 4.8 k/uL    Absolute Mono # 2.53 (H) 0.1 - 0.8 k/uL    Absolute Eos # 0.00 0.0 - 0.4 k/uL    Basophils Absolute 0.00 0.0 - 0.2 k/uL    Morphology ANISOCYTOSIS PRESENT     Morphology INCREASED BANDS PRESENT    CMP   Result Value Ref Range    Glucose 144 (H) 70 - 99 mg/dL    BUN 48 (H) 6 - 20 mg/dL    Creatinine 2.24 (H) 0.50 - 0.90 mg/dL    Est, Glom Filt Rate 27 (L) >60 mL/min/1.73m2    Calcium 8.9 8.6 - 10.4 mg/dL    Sodium 130 (L) 135 - 144 mmol/L    Potassium 4.0 3.7 - 5.3 mmol/L    Chloride 96 (L) 98 - 107 mmol/L    CO2 19 (L) 20 - 31 mmol/L    Anion Gap 15 9 - 17 mmol/L    Alkaline Phosphatase 141 (H) 35 - 104 U/L    ALT 39 (H) 5 - 33 U/L    AST 58 (H) <32 U/L    Total Bilirubin 0.4 0.3 - 1.2 mg/dL    Total Protein 8.2 6.4 - 8.3 g/dL    Albumin 3.0 (L) 3.5 - 5.2 g/dL    Albumin/Globulin Ratio 0.6 (L) 1.0 - 2.5   Protime-INR   Result Value Ref Range    Protime 13.8 (H) 9.1 - 12.3 sec    INR 1.3    Lactate, Sepsis   Result Value Ref Range    Lactic Acid, Sepsis, Whole Blood 1.6 0.5 - 1.9 mmol/L   Urinalysis with Microscopic   Result Value Ref Range    Color, UA Yellow Yellow    Turbidity UA Turbid (A) Clear    Glucose, Ur NEGATIVE NEGATIVE    Bilirubin Urine NEGATIVE NEGATIVE    Ketones, Urine NEGATIVE NEGATIVE    Specific Gravity, UA 1.011 1.005 - 1.030    Urine Hgb LARGE (A) NEGATIVE    pH, UA 8.0 5.0 - 8.0    Protein, UA 3+ (A) NEGATIVE    Urobilinogen, Urine Normal Normal    Nitrite, Urine NEGATIVE NEGATIVE    Leukocyte Esterase, Urine LARGE (A) NEGATIVE    WBC, UA 50  0 - 5 /HPF    RBC, UA 20 TO 50 0 - 2 /HPF    Epithelial Cells UA 5 TO 10 0 - 5 /HPF    Bacteria, UA MANY (A) None   Procalcitonin   Result Value Ref Range    Procalcitonin >100.00 (H) <0.09 ng/mL   Basic Metabolic Panel w/ Reflex to MG   Result Value Ref Range    Glucose 121 (H) 70 - 99 mg/dL    BUN 27 (H) 6 - 20 mg/dL    Creatinine 0.76 0.50 - 0.90 mg/dL    Est, Glom Filt Rate >60 >60 mL/min/1.73m2    Calcium 8.5 (L) 8.6 - 10.4 mg/dL    Sodium 142 135 - 144 mmol/L Potassium 3.4 (L) 3.7 - 5.3 mmol/L    Chloride 111 (H) 98 - 107 mmol/L    CO2 21 20 - 31 mmol/L    Anion Gap 10 9 - 17 mmol/L   Calcium, Ionized   Result Value Ref Range    Calcium, Ionized 1.18 1.13 - 1.33 mmol/L   Magnesium   Result Value Ref Range    Magnesium 1.9 1.6 - 2.6 mg/dL   Phosphorus   Result Value Ref Range    Phosphorus 1.2 (L) 2.6 - 4.5 mg/dL   CBC with Auto Differential   Result Value Ref Range    WBC 28.2 (H) 3.5 - 11.3 k/uL    RBC 4.02 3.95 - 5.11 m/uL    Hemoglobin 10.9 (L) 11.9 - 15.1 g/dL    Hematocrit 35.0 (L) 36.3 - 47.1 %    MCV 87.1 82.6 - 102.9 fL    MCH 27.1 25.2 - 33.5 pg    MCHC 31.1 28.4 - 34.8 g/dL    RDW 15.5 (H) 11.8 - 14.4 %    Platelets 462 356 - 048 k/uL    MPV 11.0 8.1 - 13.5 fL    NRBC Automated 0.1 (H) 0.0 per 100 WBC    Immature Granulocytes 0 0 %    Seg Neutrophils 89 (H) 36 - 66 %    Lymphocytes 4 (L) 24 - 44 %    Monocytes 7 1 - 7 %    Eosinophils % 0 (L) 1 - 4 %    Basophils 0 0 - 2 %    Absolute Immature Granulocyte 0.00 0.00 - 0.30 k/uL    Segs Absolute 25.10 (H) 1.8 - 7.7 k/uL    Absolute Lymph # 1.13 1.0 - 4.8 k/uL    Absolute Mono # 1.97 (H) 0.1 - 0.8 k/uL    Absolute Eos # 0.00 0.0 - 0.4 k/uL    Basophils Absolute 0.00 0.0 - 0.2 k/uL    Morphology ANISOCYTOSIS PRESENT     Morphology INCREASED BANDS PRESENT    Vancomycin Level, Random   Result Value Ref Range    Vancomycin Rm 10.3 ug/mL   Basic Metabolic Panel w/ Reflex to MG   Result Value Ref Range    Glucose 81 70 - 99 mg/dL    BUN 14 6 - 20 mg/dL    Creatinine 0.53 0.50 - 0.90 mg/dL    Est, Glom Filt Rate >60 >60 mL/min/1.73m2    Calcium 9.3 8.6 - 10.4 mg/dL    Sodium 148 (H) 135 - 144 mmol/L    Potassium 3.4 (L) 3.7 - 5.3 mmol/L    Chloride 112 (H) 98 - 107 mmol/L    CO2 22 20 - 31 mmol/L    Anion Gap 14 9 - 17 mmol/L   Calcium, Ionized   Result Value Ref Range    Calcium, Ionized 1.20 1.13 - 1.33 mmol/L   Magnesium   Result Value Ref Range    Magnesium 1.5 (L) 1.6 - 2.6 mg/dL   Phosphorus   Result Value Ref Range    Phosphorus 1.9 (L) 2.6 - 4.5 mg/dL   CBC with Auto Differential   Result Value Ref Range    WBC 24.2 (H) 3.5 - 11.3 k/uL    RBC 4.48 3.95 - 5.11 m/uL    Hemoglobin 12.2 11.9 - 15.1 g/dL    Hematocrit 39.6 36.3 - 47.1 %    MCV 88.4 82.6 - 102.9 fL    MCH 27.2 25.2 - 33.5 pg    MCHC 30.8 28.4 - 34.8 g/dL    RDW 15.5 (H) 11.8 - 14.4 %    Platelets 724 752 - 316 k/uL    MPV 10.8 8.1 - 13.5 fL    NRBC Automated 0.0 0.0 per 100 WBC    Immature Granulocytes 1 (H) 0 %    Seg Neutrophils 84 (H) 36 - 66 %    Lymphocytes 6 (L) 24 - 44 %    Monocytes 9 (H) 1 - 7 %    Eosinophils % 0 (L) 1 - 4 %    Basophils 0 0 - 2 %    Absolute Immature Granulocyte 0.24 0.00 - 0.30 k/uL    Segs Absolute 20.33 (H) 1.8 - 7.7 k/uL    Absolute Lymph # 1.45 1.0 - 4.8 k/uL    Absolute Mono # 2.18 (H) 0.1 - 0.8 k/uL    Absolute Eos # 0.00 0.0 - 0.4 k/uL    Basophils Absolute 0.00 0.0 - 0.2 k/uL    Morphology ANISOCYTOSIS PRESENT    Lactic Acid   Result Value Ref Range    Lactic Acid, Whole Blood 0.9 0.7 - 2.1 mmol/L   Procalcitonin   Result Value Ref Range    Procalcitonin 29.13 (H) <0.09 ng/mL   Basic Metabolic Panel w/ Reflex to MG   Result Value Ref Range    Glucose 72 70 - 99 mg/dL    BUN 16 6 - 20 mg/dL    Creatinine 0.41 (L) 0.50 - 0.90 mg/dL    Est, Glom Filt Rate >60 >60 mL/min/1.73m2    Calcium 9.6 8.6 - 10.4 mg/dL    Sodium 144 135 - 144 mmol/L    Potassium 3.7 3.7 - 5.3 mmol/L    Chloride 109 (H) 98 - 107 mmol/L    CO2 22 20 - 31 mmol/L    Anion Gap 13 9 - 17 mmol/L   Calcium, Ionized   Result Value Ref Range    Calcium, Ionized 1.25 1.13 - 1.33 mmol/L   Magnesium   Result Value Ref Range    Magnesium 1.8 1.6 - 2.6 mg/dL   Phosphorus   Result Value Ref Range    Phosphorus 2.8 2.6 - 4.5 mg/dL   CBC with Auto Differential   Result Value Ref Range    WBC 19.2 (H) 3.5 - 11.3 k/uL    RBC 4.56 3.95 - 5.11 m/uL    Hemoglobin 12.5 11.9 - 15.1 g/dL    Hematocrit 40.1 36.3 - 47.1 %    MCV 87.9 82.6 - 102.9 fL    MCH 27.4 25.2 - 33.5 pg MCHC 31.2 28.4 - 34.8 g/dL    RDW 15.7 (H) 11.8 - 14.4 %    Platelets 599 980 - 088 k/uL    MPV 12.3 8.1 - 13.5 fL    NRBC Automated 0.0 0.0 per 100 WBC    Immature Granulocytes 0 0 %    Seg Neutrophils 75 (H) 36 - 66 %    Lymphocytes 8 (L) 24 - 44 %    Monocytes 15 (H) 1 - 7 %    Eosinophils % 2 1 - 4 %    Basophils 0 0 - 2 %    Absolute Immature Granulocyte 0.00 0.00 - 0.30 k/uL    Segs Absolute 14.40 (H) 1.8 - 7.7 k/uL    Absolute Lymph # 1.54 1.0 - 4.8 k/uL    Absolute Mono # 2.88 (H) 0.1 - 0.8 k/uL    Absolute Eos # 0.38 0.0 - 0.4 k/uL    Basophils Absolute 0.00 0.0 - 0.2 k/uL    Morphology ANISOCYTOSIS PRESENT    Venous Blood Gas, POC   Result Value Ref Range    pH, Niraj 7.249 (L) 7.320 - 7.430    pCO2, Niraj 46.7 41.0 - 51.0 mm Hg    pO2, Niraj 130.2 (H) 30.0 - 50.0 mm Hg    HCO3, Venous 20.4 (L) 22.0 - 29.0 mmol/L    Negative Base Excess, Niraj 7 (H) 0.0 - 2.0    O2 Sat, Niraj 98 (H) 60.0 - 85.0 %   Creatinine W/GFR Point of Care   Result Value Ref Range    POC Creatinine 3.08 (H) 0.51 - 1.19 mg/dL    eGFR, POC 19 mL/min/1.73m2   POCT urea (BUN)   Result Value Ref Range    POC BUN 54 (H) 8 - 26 mg/dL   Lactic Acid, POC   Result Value Ref Range    POC Lactic Acid 1.36 0.56 - 1.39 mmol/L   POCT Glucose   Result Value Ref Range    POC Glucose 153 (H) 74 - 100 mg/dL   Mixed Venous Gas, POC   Result Value Ref Range    PH MIXED 7.207 (L) 7.310 - 7.410    PCO2, Mixed 35.5 (L) 42.0 - 52.0 mm Hg    PO2, Mixed 60.7 (H) 35.0 - 45.0 mm Hg    HCO3, Mixed 14.1 (L) 23.0 - 29.0 mmol/L    Negative Base Excess, Mixed 13 (H) 0.0 - 2.0    O2 Sat, Mixed 85 (H) 60.0 - 80.0 %    O2 Device/Flow/% Adult Ventilator     Hugo Test POSITIVE     Sample Site Right Radial Artery     Mode PRVC     FIO2 60.0    POC Glucose Fingerstick   Result Value Ref Range    POC Glucose 182 (H) 65 - 105 mg/dL   Arterial Blood Gas, POC   Result Value Ref Range    POC pH 7.407 7.350 - 7.450    POC pCO2 35.5 35.0 - 48.0 mm Hg    POC PO2 99.3 83.0 - 108.0 mm Hg POC HCO3 22.3 21.0 - 28.0 mmol/L    Negative Base Excess, Art 2 0.0 - 2.0    POC O2 SAT 98 94.0 - 98.0 %    O2 Device/Flow/% Adult Ventilator     Huog Test POSITIVE     Sample Site Right Radial Artery     Mode PRVC     FIO2 40.0    POCT Glucose   Result Value Ref Range    POC Glucose 119 (H) 74 - 100 mg/dL   Arterial Blood Gas, POC   Result Value Ref Range    POC pH 7.491 (H) 7.350 - 7.450    POC pCO2 29.6 (L) 35.0 - 48.0 mm Hg    POC PO2 66.9 (L) 83.0 - 108.0 mm Hg    POC HCO3 22.6 21.0 - 28.0 mmol/L    Positive Base Excess, Art 0 0.0 - 3.0    POC O2 SAT 95 94.0 - 98.0 %    O2 Device/Flow/% Adult Ventilator     Hugo Test POSITIVE     Sample Site Right Radial Artery     Mode PRVC     FIO2 30.0    POCT Glucose   Result Value Ref Range    POC Glucose 88 74 - 100 mg/dL   Arterial Blood Gas, POC   Result Value Ref Range    POC pH 7.451 (H) 7.350 - 7.450    POC pCO2 32.9 (L) 35.0 - 48.0 mm Hg    POC PO2 74.4 (L) 83.0 - 108.0 mm Hg    POC HCO3 22.9 21.0 - 28.0 mmol/L    Positive Base Excess, Art 0 0.0 - 3.0    POC O2 SAT 96 94.0 - 98.0 %    O2 Device/Flow/% Adult Ventilator     Hugo Test POSITIVE     Sample Site Right Radial Artery     FIO2 30.0    POCT Glucose   Result Value Ref Range    POC Glucose 83 74 - 100 mg/dL       Radiology/Imaging:  XR CHEST PORTABLE   Final Result   Mild right basilar airspace opacities. This could represent atelectasis or   pneumonia in the appropriate clinical setting         FLUORO FOR SURGICAL PROCEDURES   Final Result      XR CHEST PORTABLE   Final Result   Satisfactory line and tube placement. CT Head W/O Contrast   Final Result   No acute intracranial abnormality. CT CHEST ABDOMEN PELVIS WO CONTRAST Additional Contrast? None   Final Result   1. ETT now terminates 12 mm above denys. 2. Patchy perihilar and more confluent posterior segment airspace   consolidations likely combination of pneumonia and atelectasis. Trace right   pleural effusion.    3. Innumerable right renal calculi most in the 5-8 mm size range. Moderate   to severe right hydronephrosis and hydroureter. There is a stack of 4   obstructing calculi in the distal right ureter ranging from 5-11 mm   accounting for finding. 11 mm calculus is most distal and positioned about 2   cm from the uterovesical junction. 4. Liquid stool in the colon suggests diarrheal disease. Tiny gas pockets   along the ascending colon wall in the dependent portion appear to be pseudo   pneumatosis. XR CHEST PORTABLE   Final Result   Chest x-ray:      1. ETT is just entering the right mainstem bronchus by few mm. Repositioning   advised. 2. Some progression of mild elevation right hemidiaphragm. 3. Patchy perihilar opacities probably vascular. Abdomen:      1. NG tube terminates in the left upper quadrant. Side port is positioned in   the midline probably in the distal gastric antrum. 2. Cholelithiasis. 3. Peg tube in place. 4. No small bowel distension. Findings were discussed with MORTEZA PAGE at 11:07 a.m. on 12/3/2022. XR ABDOMEN FOR NG/OG/NE TUBE PLACEMENT   Final Result   Chest x-ray:      1. ETT is just entering the right mainstem bronchus by few mm. Repositioning   advised. 2. Some progression of mild elevation right hemidiaphragm. 3. Patchy perihilar opacities probably vascular. Abdomen:      1. NG tube terminates in the left upper quadrant. Side port is positioned in   the midline probably in the distal gastric antrum. 2. Cholelithiasis. 3. Peg tube in place. 4. No small bowel distension. Findings were discussed with MORTEZA PAGE at 11:07 a.m. on 12/3/2022.              ASSESSMENT:     Patient Active Problem List    Diagnosis Date Noted    Pyelonephritis 12/04/2022    Obstructive uropathy 12/04/2022    Bandemia 12/04/2022    SIRS (systemic inflammatory response syndrome) ([facility] 12/04/2022    Gram-neg septicemia ([facility] 12/04/2022    Pneumatosis coli 12/04/2022 Nephrostomy tube displaced ([facility] [address] 09/26/2020    E coli bacteremia     E-coli UTI 01/23/2020    Sepsis due to Escherichia coli ([facility] [address] 01/23/2020    Lactate blood increase 01/22/2020    Proteus mirabilis infection 08/02/2019    Decubitus ulcer of coccygeal region, stage 4 ([address] 07/30/2019    Ileus ([address] 07/29/2019    Urinary tract infection associated with indwelling urethral catheter ([address] 07/28/2019    Abdominal pain 07/28/2019    Pneumonia due to organism     Extended spectrum beta lactamase (ESBL) resistance     Sepsis ([facility] [address] 06/25/2019    Lower paraplegia ([address]     Toxic metabolic encephalopathy 86/23/5778    Status post colostomy ([facility] [address] 05/19/2019    ESBL E. coli carrier     Septic shock (HCC)     Altered mental status     Lactic acidosis 05/17/2019    Chronic indwelling Davies catheter 05/17/2019    Peripheral vascular disease ([facility] [address] 05/17/2019    Recurrent UTI (urinary tract infection) 05/17/2019    History of pulmonary embolism 05/17/2019    Hypertension 05/17/2019    Gross hematuria 01/11/2017    Tachycardia     Hemorrhagic cystitis 01/09/2017    Hypokalemia 01/09/2017    Neurogenic bladder     Aphasia 08/26/2015    Multiple sclerosis ([facility] [address] 08/26/2015    Nonverbal 08/26/2015    Gastrostomy tube dependent ([facility] [address] 08/26/2015    Dislodged gastrostomy tube 08/26/2015        PLAN:   PLAN/MEDICAL DECISION MAKING:  Neurologic:   Neuro checks per protocol  Propofol for sedation  Cardiovascular:  MAP goal 65  Levophed  Midodrine 5mg TID  Hold home metoprolol  Pulmonary:  Maintain oxygen sats >92%  Pulmonary toilet  Vent Information  Ventilator ID: [id number]  Equipment Changed: HME, Expiratory Filter  Ventilator Initiate: Yes  Vent Mode: AC/PRVC  GI/Nutrition  Glycolax 17g  Ulcer Prophylaxis: Pantoprazole 40mg IV QD  Tube feeds; advance  Diet:Diet NPO; tube feeds through Gtube    Renal/Fluid/Electrolyte  IV Fluids: LR @ 100 /hr  I/O: In: 2736.7 [I.V.:1877.1; NG/GT:202]  Out: 3390 [Urine:1100]  Urine output 3390, 1.67 cc/kg/hr  Monitor electrolytes, replace PRN   ID  WBC:   Lab Results   Component Value Date    WBC 19.2 (H) 2022     Tmax: Temp (24hrs), Av.1 °F (37.3 °C), Min:99 °F (37.2 °C), Max:99.5 °F (37.5 °C)  Antimicrobials: Meropenem   Bcx: Enterobacterales group, bacteroides - fu speciation   FU Bcx, Ucx    Hematology:  Recent Labs     22  0512 228 22  0455   HGB 10.9* 12.2 12.5      Endocrine:   glucose controlled - most recent BGL is   Recent Labs     22  045   GLUCOSE 121* 81 72     DVT Prophylaxis  On Xarelto  Discharge Needs:    , PT, OT, ST, SW, and Case Management      CODE STATUS: Full Code      DISPOSITION:  [x] To remain ICU:   [] OK for out of ICU from [place] Rosenberg MD  Emergency medicine  Attending Physician Statement  I have discussed the care of Colette Simon, including pertinent history and exam findings,  with the resident. I have seen and examined the patient and the key elements of all parts of the encounter have been performed by me. I agree with the assessment, plan and orders as documented by the resident with additions . Cont meropenem   Yevgeniy sbt   Extubate          Total critical care time caring for this patient with life threatening, unstable organ failure, including direct patient contact, management of life support systems, review of data including imaging and labs, discussions with other team members and physicians at least 27   Min so far today, excluding procedures. Electronically signed by Charles Gibbs MD on   22 at 7:01 PM EST    Please note that this chart was generated using voice recognition Dragon dictation software. Although every effort was made to ensure the accuracy of this automated transcription, some errors in transcription may have occurred.

## 2022-12-06 NOTE — PLAN OF CARE
Problem: Discharge Planning  Goal: Discharge to home or other facility with appropriate resources  12/6/2022 0925 by Monie Rose RN  Outcome: Progressing     Problem: Nutrition Deficit:  Goal: Optimize nutritional status  12/6/2022 0925 by Monie Rose RN  Outcome: Progressing     Problem: Confusion  Goal: Confusion, delirium, dementia, or psychosis is improved or at baseline  Description: INTERVENTIONS:  1. Assess for possible contributors to thought disturbance, including medications, impaired vision or hearing, underlying metabolic abnormalities, dehydration, psychiatric diagnoses, and notify attending LIP  2. [place] high risk fall precautions, as indicated  3. Provide frequent short contacts to provide reality reorientation, refocusing and direction  4. Decrease environmental stimuli, including noise as appropriate  5. Monitor and intervene to maintain adequate nutrition, hydration, elimination, sleep and activity  6. If unable to ensure safety without constant attention obtain sitter and review sitter guidelines with assigned personnel  7.  Initiate Psychosocial CNS and Spiritual Care consult, as indicated  12/6/2022 0925 by Monie Rose RN  Outcome: Progressing     Problem: Neurosensory - Adult  Goal: Achieves stable or improved neurological status  12/6/2022 0925 by Monie Rose RN  Outcome: Progressing     Problem: Neurosensory - Adult  Goal: Absence of seizures  12/6/2022 0925 by Monie Rose RN  Outcome: Progressing     Problem: Neurosensory - Adult  Goal: Remains free of injury related to seizures activity  12/6/2022 0925 by Monie Rose RN  Outcome: Progressing     Problem: Neurosensory - Adult  Goal: Achieves maximal functionality and self care  12/6/2022 0925 by Monie Rose RN  Outcome: Progressing     Problem: Respiratory - Adult  Goal: Achieves optimal ventilation and oxygenation  12/6/2022 0925 by Monie Rose RN  Outcome: Progressing     Problem: Gastrointestinal - Adult  Goal: Minimal or absence of nausea and vomiting  12/6/2022 0925 by Avelino Nicholson RN  Outcome: Progressing     Problem: Gastrointestinal - Adult  Goal: Maintains or returns to baseline bowel function  12/6/2022 0925 by Avelino Nicholson RN  Outcome: Progressing     Problem: Gastrointestinal - Adult  Goal: Maintains adequate nutritional intake  12/6/2022 0925 by Avelino Nicholson RN  Outcome: Progressing     Problem: Gastrointestinal - Adult  Goal: Establish and maintain optimal ostomy function  12/6/2022 0925 by Avelino Nicholson RN  Outcome: Progressing     Problem: Genitourinary - Adult  Goal: Urinary catheter remains patent  12/6/2022 0925 by Avelino Nicholson RN  Outcome: Progressing     Problem: Metabolic/Fluid and Electrolytes - Adult  Goal: Electrolytes maintained within normal limits  12/6/2022 0925 by Avelino Nicholson RN  Outcome: Progressing     Problem: Metabolic/Fluid and Electrolytes - Adult  Goal: Hemodynamic stability and optimal renal function maintained  12/6/2022 0925 by Avelino Nicholson RN  Outcome: Progressing     Problem: Metabolic/Fluid and Electrolytes - Adult  Goal: Glucose maintained within prescribed range  12/6/2022 0925 by Avelino Nicholson RN  Outcome: Progressing     Problem: Skin/Tissue Integrity - Adult  Goal: Skin integrity remains intact  12/6/2022 0925 by Avelino Nicholson RN  Outcome: Progressing  Flowsheets (Taken 12/6/2022 0136 by Joselito Pineda RN)  Skin Integrity Remains Intact:   Monitor for areas of redness and/or skin breakdown   Assess vascular access sites hourly   Every 4-6 hours minimum: Change oxygen saturation probe site   Every 4-6 hours: If on nasal continuous positive airway pressure, respiratory therapy assesses nares and determine need for appliance change or resting period     Problem: Skin/Tissue Integrity - Adult  Goal: Incisions, wounds, or drain sites healing without S/S of infection  12/6/2022 0925 by Avelino Nicholson RN  Outcome: Progressing  Flowsheets (Taken 12/6/2022 0136 by Florina Sands RN)  Incisions, Wounds, or Drain Sites Healing Without Sign and Symptoms of Infection:   TWICE DAILY: Assess and document skin integrity   Implement wound care per orders   Initiate isolation precautions as appropriate   Initiate pressure ulcer prevention bundle as indicated     Problem: Skin/Tissue Integrity - Adult  Goal: Oral mucous membranes remain intact  12/6/2022 0925 by Laretta Olszewski, RN  Outcome: Progressing  Flowsheets (Taken 12/6/2022 0136 by Florina Sands RN)  Oral Mucous Membranes Remain Intact:   Assess oral mucosa and hygiene practices   Implement preventative oral hygiene regimen   Implement oral medicated treatments as ordered     Problem: Pain  Goal: Verbalizes/displays adequate comfort level or baseline comfort level  12/6/2022 0925 by Laretta Olszewski, RN  Outcome: Progressing     Problem: Skin/Tissue Integrity  Goal: Absence of new skin breakdown  Description: 1. Monitor for areas of redness and/or skin breakdown  2. Assess vascular access sites hourly  3. Every 4-6 hours minimum:  Change oxygen saturation probe site  4. Every 4-6 hours:  If on nasal continuous positive airway pressure, respiratory therapy assess nares and determine need for appliance change or resting period.   12/6/2022 0925 by Laretta Olszewski, RN  Outcome: Progressing     Problem: Safety - Adult  Goal: Free from fall injury  12/6/2022 0925 by Laretta Olszewski, RN  Outcome: Progressing     Problem: ABCDS Injury Assessment  Goal: Absence of physical injury  12/6/2022 0925 by Laretta Olszewski, RN  Outcome: Progressing

## 2022-12-06 NOTE — PLAN OF CARE
Problem: Discharge Planning  Goal: Discharge to home or other facility with appropriate resources  Outcome: Progressing  Flowsheets (Taken 12/5/2022 2000)  Discharge to home or other facility with appropriate resources:   Identify barriers to discharge with patient and caregiver   Arrange for needed discharge resources and transportation as appropriate   Identify discharge learning needs (meds, wound care, etc)   Arrange for interpreters to assist at discharge as needed   Refer to discharge planning if patient needs post-hospital services based on physician order or complex needs related to functional status, cognitive ability or social support system     Problem: Nutrition Deficit:  Goal: Optimize nutritional status  Outcome: Progressing     Problem: Confusion  Goal: Confusion, delirium, dementia, or psychosis is improved or at baseline  Description: INTERVENTIONS:  1. Assess for possible contributors to thought disturbance, including medications, impaired vision or hearing, underlying metabolic abnormalities, dehydration, psychiatric diagnoses, and notify attending LIP  2. [place] high risk fall precautions, as indicated  3. Provide frequent short contacts to provide reality reorientation, refocusing and direction  4. Decrease environmental stimuli, including noise as appropriate  5. Monitor and intervene to maintain adequate nutrition, hydration, elimination, sleep and activity  6. If unable to ensure safety without constant attention obtain sitter and review sitter guidelines with assigned personnel  7.  Initiate Psychosocial CNS and Spiritual Care consult, as indicated  Outcome: Progressing  Flowsheets (Taken 12/5/2022 2000)  Effect of thought disturbance (confusion, delirium, dementia, or psychosis) are managed with adequate functional status:   Assess for contributors to thought disturbance, including medications, impaired vision or hearing, underlying metabolic abnormalities, dehydration, psychiatric diagnoses, notify [place] high risk fall precautions, as indicated   Provide frequent short contacts to provide reality reorientation, refocusing and direction   Decrease environmental stimuli, including noise as appropriate   Monitor and intervene to maintain adequate nutrition, hydration, elimination, sleep and activity   If unable to ensure safety without constant attention obtain sitter and review sitter guidelines with assigned personnel   Initiate Psychosocial Clinical Nurse Specialist and [facility] consult, as indicated     Problem: Neurosensory - Adult  Goal: Achieves stable or improved neurological status  Outcome: Progressing  Flowsheets (Taken 12/5/2022 2000)  Achieves stable or improved neurological status:   Assess for and report changes in neurological status   Initiate measures to prevent increased intracranial pressure   Maintain blood pressure and fluid volume within ordered parameters to optimize cerebral perfusion and minimize risk of hemorrhage   Monitor temperature, glucose, and sodium.  Initiate appropriate interventions as ordered  Goal: Absence of seizures  Outcome: Progressing  Goal: Remains free of injury related to seizures activity  Outcome: Progressing  Goal: Achieves maximal functionality and self care  Outcome: Progressing     Problem: Neurosensory - Adult  Goal: Absence of seizures  Outcome: Progressing     Problem: Neurosensory - Adult  Goal: Remains free of injury related to seizures activity  Outcome: Progressing     Problem: Neurosensory - Adult  Goal: Achieves maximal functionality and self care  Outcome: Progressing     Problem: Respiratory - Adult  Goal: Achieves optimal ventilation and oxygenation  Outcome: Progressing  Flowsheets (Taken 12/5/2022 2000)  Achieves optimal ventilation and oxygenation:   Assess for changes in respiratory status   Assess for changes in mentation and behavior   Position to facilitate oxygenation and minimize respiratory effort   Oxygen supplementation based on oxygen saturation or arterial blood gases   Initiate smoking cessation protocol as indicated   Encourage broncho-pulmonary hygiene including cough, deep breathe, incentive spirometry   Assess the need for suctioning and aspirate as needed   Assess and instruct to report shortness of breath or any respiratory difficulty   Respiratory therapy support as indicated     Problem: Gastrointestinal - Adult  Goal: Minimal or absence of nausea and vomiting  Outcome: Progressing  Flowsheets (Taken 12/5/2022 2000)  Minimal or absence of nausea and vomiting:   Administer IV fluids as ordered to ensure adequate hydration   Maintain NPO status until nausea and vomiting are resolved   Nasogastric tube to low intermittent suction as ordered   Administer ordered antiemetic medications as needed   Provide nonpharmacologic comfort measures as appropriate   Advance diet as tolerated, if ordered   Nutrition consult to assist patient with adequate nutrition and appropriate food choices  Goal: Maintains or returns to baseline bowel function  Outcome: Progressing  Goal: Maintains adequate nutritional intake  Outcome: Progressing  Goal: Establish and maintain optimal ostomy function  Outcome: Progressing     Problem: Genitourinary - Adult  Goal: Urinary catheter remains patent  Outcome: Progressing     Problem: Metabolic/Fluid and Electrolytes - Adult  Goal: Electrolytes maintained within normal limits  Outcome: Progressing  Flowsheets (Taken 12/5/2022 2000)  Electrolytes maintained within normal limits:   Monitor labs and assess patient for signs and symptoms of electrolyte imbalances   Administer electrolyte replacement as ordered   Fluid restriction as ordered   Instruct patient on fluid and nutrition restrictions as appropriate   Monitor response to electrolyte replacements, including repeat lab results as appropriate  Goal: Hemodynamic stability and optimal renal function maintained  Outcome: Progressing  Flowsheets (Taken 12/5/2022 2000)  Hemodynamic stability and optimal renal function maintained:   Monitor labs and assess for signs and symptoms of volume excess or deficit   Monitor intake, output and patient weight   Monitor urine specific gravity, serum osmolarity and serum sodium as indicated or ordered   Monitor response to interventions for patient's volume status, including labs, urine output, blood pressure (other measures as available)   Encourage oral intake as appropriate   Instruct patient on fluid and nutrition restrictions as appropriate  Goal: Glucose maintained within prescribed range  Outcome: Progressing  Flowsheets (Taken 12/5/2022 2000)  Glucose maintained within prescribed range:   Monitor blood glucose as ordered   Assess for signs and symptoms of hyperglycemia and hypoglycemia   Instruct patient on self management of diabetes and initiate consult as needed   Assess barriers to adequate nutritional intake and initiate nutrition consult as needed   Administer ordered medications to maintain glucose within target range     Problem: Skin/Tissue Integrity - Adult  Goal: Skin integrity remains intact  Outcome: Progressing  Flowsheets (Taken 12/5/2022 2000)  Skin Integrity Remains Intact:   Monitor for areas of redness and/or skin breakdown   Assess vascular access sites hourly   Every 4-6 hours minimum: Change oxygen saturation probe site   Every 4-6 hours: If on nasal continuous positive airway pressure, respiratory therapy assesses nares and determine need for appliance change or resting period  Goal: Incisions, wounds, or drain sites healing without S/S of infection  Outcome: Progressing  Goal: Oral mucous membranes remain intact  Outcome: Progressing     Problem: Pain  Goal: Verbalizes/displays adequate comfort level or baseline comfort level  Outcome: Progressing  Flowsheets (Taken 12/5/2022 2000)  Verbalizes/displays adequate comfort level or baseline comfort level:   Encourage patient to monitor pain and request assistance   Assess pain using appropriate pain scale   Implement non-pharmacological measures as appropriate and evaluate response   Consider cultural and social influences on pain and pain management   Administer analgesics based on type and severity of pain and evaluate response   Notify Licensed Independent Practitioner if interventions unsuccessful or patient reports new pain     Problem: Skin/Tissue Integrity  Goal: Absence of new skin breakdown  Description: 1. Monitor for areas of redness and/or skin breakdown  2. Assess vascular access sites hourly  3. Every 4-6 hours minimum:  Change oxygen saturation probe site  4. Every 4-6 hours:  If on nasal continuous positive airway pressure, respiratory therapy assess nares and determine need for appliance change or resting period.   Outcome: Progressing     Problem: Safety - Adult  Goal: Free from fall injury  Outcome: Progressing     Problem: ABCDS Injury Assessment  Goal: Absence of physical injury  Outcome: Progressing

## 2022-12-06 NOTE — PROGRESS NOTES
Jeferson Jane, [address] [facility], [place], [address], Derrick Salgado [place]  Urology Progress Note     Subjective:   S/p cystoscopy with right ureteral stent insertion on 12/3/2022 for obstructing right ureteral stone with septic shock  Patient continues to be intubated however requirement is low, might be extubated today  No pressors   Cxs: 1 has Bacteroides and the second is pending GNR, on merrem.  Ucx finalized       Patient Vitals for the past 24 hrs:   BP Temp Temp src Pulse Resp SpO2 Weight   12/06/22 0700 (!) 84/56 -- -- (!) 104 18 94 % --   12/06/22 0513 -- -- -- (!) 111 20 95 % --   12/06/22 0500 97/67 -- -- (!) 109 20 98 % 185 lb 13.6 oz (84.3 kg)   12/06/22 0400 101/80 99.5 °F (37.5 °C) Esophageal (!) 117 20 97 % --   12/06/22 0335 -- -- -- (!) 115 17 96 % --   12/06/22 0300 (!) 78/50 -- -- (!) 116 19 96 % --   12/06/22 0200 (!) 83/50 -- -- (!) 121 20 96 % --   12/06/22 0100 103/69 -- -- (!) 110 20 96 % --   12/06/22 0000 105/66 -- -- (!) 110 20 94 % --   12/05/22 2342 -- -- -- (!) 110 18 95 % --   12/05/22 2300 100/65 -- -- (!) 110 20 95 % --   12/05/22 2200 107/67 -- -- (!) 108 20 95 % --   12/05/22 2100 110/77 -- -- (!) 106 20 96 % --   12/05/22 2009 -- -- -- (!) 104 20 95 % --   12/05/22 2008 -- -- -- (!) 101 20 97 % --   12/05/22 2000 -- 99.1 °F (37.3 °C) Esophageal -- -- -- --   12/05/22 1900 92/65 -- -- (!) 105 18 96 % --   12/05/22 1845 98/70 -- -- (!) 103 18 98 % --   12/05/22 1830 97/73 -- -- (!) 103 18 98 % --   12/05/22 1815 97/71 -- -- (!) 104 19 98 % --   12/05/22 1800 99/73 99 °F (37.2 °C) Esophageal (!) 103 20 97 % --   12/05/22 1745 102/74 -- -- (!) 102 20 96 % --   12/05/22 1730 103/72 -- -- (!) 105 20 95 % --   12/05/22 1715 101/74 -- -- (!) 105 18 95 % --   12/05/22 1700 98/74 -- -- (!) 105 19 95 % --   12/05/22 1645 94/73 -- -- (!) 104 20 95 % --   12/05/22 1630 96/74 -- -- (!) 105 22 94 % --   12/05/22 1615 99/71 -- -- (!) 103 21 96 % --   12/05/22 1600 90/64 99.1 °F (37.3 °C) Esophageal (!) 106 19 98 % --   12/05/22 1545 98/71 -- -- (!) 106 19 97 % --   12/05/22 1530 101/71 -- -- (!) 106 19 98 % --   12/05/22 1517 -- -- -- (!) 109 19 95 % --   12/05/22 1515 96/73 -- -- (!) 107 20 95 % --   12/05/22 1500 94/75 -- -- (!) 106 20 95 % --   12/05/22 1445 95/78 -- -- (!) 108 20 95 % --   12/05/22 1430 100/69 -- -- (!) 108 20 95 % --   12/05/22 1415 111/84 -- -- (!) 103 19 96 % --   12/05/22 1400 108/88 99 °F (37.2 °C) Esophageal (!) 101 15 95 % --   12/05/22 1345 109/83 -- -- (!) 102 17 94 % --   12/05/22 1330 107/80 -- -- (!) 103 19 95 % --   12/05/22 1315 (!) 86/52 -- -- (!) 104 19 93 % --   12/05/22 1300 94/66 -- -- (!) 105 20 94 % --   12/05/22 1245 94/70 -- -- (!) 104 20 95 % --   12/05/22 1230 99/77 -- -- (!) 102 21 96 % --   12/05/22 1215 114/86 -- -- (!) 105 18 99 % --   12/05/22 1200 107/77 99.1 °F (37.3 °C) Esophageal (!) 102 19 98 % --   12/05/22 1145 118/74 -- -- (!) 104 18 98 % --   12/05/22 1130 111/80 -- -- (!) 105 19 99 % --   12/05/22 1124 -- -- -- (!) 105 23 100 % --   12/05/22 1115 106/77 -- -- (!) 105 21 98 % --   12/05/22 1100 105/72 -- -- (!) 107 20 98 % --   12/05/22 1045 109/78 -- -- (!) 107 23 97 % --   12/05/22 1030 105/77 -- -- (!) 108 23 97 % --   12/05/22 1015 117/86 -- -- (!) 107 22 97 % --   12/05/22 1000 110/88 99 °F (37.2 °C) Esophageal (!) 104 25 95 % --   12/05/22 0945 120/76 -- -- (!) 104 23 95 % --   12/05/22 0930 119/80 -- -- (!) 106 24 94 % --   12/05/22 0915 120/82 -- -- 100 23 95 % --   12/05/22 0900 115/83 -- -- (!) 102 27 94 % --   12/05/22 0845 123/87 -- -- (!) 101 25 95 % --   12/05/22 0830 110/78 -- -- (!) 104 26 94 % --   12/05/22 0815 117/87 -- -- (!) 103 27 95 % --   12/05/22 0800 119/86 99.3 °F (37.4 °C) Esophageal (!) 104 30 94 % --   12/05/22 0754 -- -- -- (!) 105 28 95 % --   12/05/22 0747 -- -- -- (!) 112 23 93 % --   12/05/22 0745 (!) 107/94 -- -- (!) 111 25 92 % --   12/05/22 0730 110/82 -- -- (!) 107 27 95 % --       Intake/Output Summary (Last 24 hours) at 12/6/2022 0718  Last data filed at 12/6/2022 0400  Gross per 24 hour   Intake 1885.18 ml   Output 3390 ml   Net -1504.82 ml       Recent Labs     12/04/22  0512 12/05/22  0418 12/06/22  0455   WBC 28.2* 24.2* 19.2*   HGB 10.9* 12.2 12.5   HCT 35.0* 39.6 40.1   MCV 87.1 88.4 87.9    163 178     Recent Labs     12/04/22  0512 12/05/22  0418 12/06/22  0455    148* 144   K 3.4* 3.4* 3.7   * 112* 109*   CO2 21 22 22   PHOS 1.2* 1.9* 2.8   BUN 27* 14 16   CREATININE 0.76 0.53 0.41*       Recent Labs     12/03/22  1104   COLORU Yellow   PHUR 8.0   WBCUA 50    RBCUA 20 TO 50   BACTERIA MANY*   SPECGRAV 1.011   LEUKOCYTESUR LARGE*   UROBILINOGEN Normal   BILIRUBINUR NEGATIVE       Additional Lab/culture results:    Physical Exam:   Intubated, sedated and mechanically ventilated  Neck: Supple   Chest: bilateral symmetrical chest rise/ Non labored breathing   Circulatory: Peripheries warm , well perfused   P/A: soft, nondistended  Davies in situ with clear yellow urine      Interval Imaging Findings:    Impression:    Patient Active Problem List   Diagnosis    Aphasia    Multiple sclerosis (HCC)    Nonverbal    Gastrostomy tube dependent ([facility] [address]    Dislodged gastrostomy tube    Neurogenic bladder    Hemorrhagic cystitis    Hypokalemia    Urinary tract infection associated with indwelling urethral catheter (HCC)    Tachycardia    Gross hematuria    Lactic acidosis    Chronic indwelling Davies catheter    Peripheral vascular disease (HCC)    Recurrent UTI (urinary tract infection)    History of pulmonary embolism    Hypertension    Altered mental status    Toxic metabolic encephalopathy    ESBL E. coli carrier    Septic shock (HCC)    Status post colostomy (HCC)    Lower paraplegia (HCC)    Sepsis (HCC)    Pneumonia due to organism    Extended spectrum beta lactamase (ESBL) resistance    Abdominal pain    Ileus (HCC)    Decubitus ulcer of coccygeal region, stage 4 (HCC)    Proteus mirabilis infection Lactate blood increase    E-coli UTI    Sepsis due to Escherichia coli ([facility])    E coli bacteremia    Nephrostomy tube displaced ([address]    Pyelonephritis    Obstructive uropathy    Bandemia    SIRS (systemic inflammatory response syndrome) ([facility])    Gram-neg septicemia ([facility])    Pneumatosis coli   Is post cystoscopy, right ureteral stent insertion for right ureteral obstructing stone with septic shock    Plan:   Supportive care per primary team  on amikacin and merrem per critical care team and ID. 1 has Bacteroides and the second is pending GNR  Follow-up urine and blood cultures for tailoring appropriate antibiotics  Creatinine at baseline   Maintain SPT  Will need to follow-up for definitive stone treatment on an outpt basis  Will see pt in office         Damien Ma MD  7:18 AM 12/6/2022

## 2022-12-06 NOTE — PROGRESS NOTES
[facility] -   Infectious diseases evaluation  admission date 12/3/2022    reason for consultation:   sepsis    Impression :   Current:  Bandemia  proCalcitonin elevation 29  Septic shock levophed 40  R obstructive pyelonephritis -   severe R hydro w many ureteral stones  Stent placed 12/3  GNR septicemia - bacteroides and enterobacteriae  Multifocal infiltrates, likely atelectasis - less likely pneumonia  BC1 + Morganella (S: aztreonam, ceftriaxone, gentamicin, tobramycin, TMP-SMX, piperacillin-tazobactam)   BC 2 + Proteus (S: amikacin, ampicillin, aztreonam, cefazolin, ceftriaxone, piperacillin-tazobactam  MS   Neurogenic bladder w SP omer  R AKA amputation by hx  Past colostomy  Possible ascending colon pseudo pneumatosis on CT    Other:    Discussion / summary of stay / plan of care     Recommendations   Stop vanco   12/3 Meropenem -  Final blood cultures results   BC1 + Morganella (S: aztreonam, ceftriaxone, gentamicin, tobramycin, TMP-SMX, piperacillin-tazobactam)   BC 2 + Proteus (S: amikacin, ampicillin, aztreonam, cefazolin, ceftriaxone, piperacillin-tazobactam  ? Possible ascending colon pseudo pneumatosis?  Might explain the bacteroides septicemia   General surgery on board, not very convinced about the diagnosis for now  CT chest eval, it seems mostly like P congestion and atelectasis     Infection Control Recommendations   [place] Precautions  Contact Isolation     Antimicrobial Stewardship Recommendations   Simplification of therapy  Targeted therapy  History of Present Illness:   Initial history:  Chadd Ramsey is a 43y.o.-year-old female w MS and neurogenic bladder and chronic SP omer, Gtube and hx PE on xeralto  Cojes w altered mentation and intubated in ER - S IRS+ and tachycardia and hypotension started on levophed  UA + for infection  CT chest showed many infiltrates and some might be multifocal pneumonia   R kid w hydronephrosis and many ureteral stones. Ascending colon pseudo pneumatosis  Started on cefepime and vanco and we are called  Bandameena JUAREZ took her to OR for a R stent  ID called    She is on the vent 40 FIO2 and 5 peep  Pupils minimally reactive  Sp salivary and beige  Abd soft   Urine slightly blood tinged and no pus  No rash  Levophed 40    Interval changes  12/6/2022   Patient Vitals for the past 8 hrs:   BP Pulse Resp SpO2   12/06/22 0801 -- (!) 116 21 97 %   12/06/22 0700 (!) 84/56 (!) 104 18 94 %     Temp LG  WBc better 28  Creat  0.7 better  BC x 2 GNR, one w bacteroides and enterobacteriaceae pend  U cx in process     12/5  Patient is more alert coughing on the vent agitated, seems appropriate trying to formulate words  Sputum is thick yellow, and sputum culture so far is negative  WBC improved to 24  Bacteria in the blood gram-negative rods pending ID  Urine cultures multiple organisms. 12/6  Patient opens eyes but does not track movement or voice  WBC 19.2  Weaning off vent, FiO2 30, tube feed  BC positive for Morganella morganii and Proteus mirabilis      Summary of relevant labs:  Labs:  W 36 - 28-24-19.2  Creat 3 - 2.4 - 0.7   Lactic Acid, Sepsis, Whole Blood1.6   [id number]   EBR94 High U/L AST58 High    [id number] High    Micro:  BC x 2 GNR, one w bacteroides and enterobacteriaceae   U cx multiple bacteria  Sputum culture no significant bacteria    Nasal MRSA neg  Imaging:  Chest x-ray 12/5  Mild right basilar airspace opacities. This could represent atelectasis or   pneumonia     CT AP chest  Impression:  1. ETT now terminates 12 mm above denys. 2. Patchy perihilar and more confluent posterior segment airspace   consolidations likely combination of pneumonia and atelectasis. Trace right   pleural effusion. 3. Innumerable right renal calculi most in the 5-8 mm size range. Moderate   to severe right hydronephrosis and hydroureter.   There is a stack of 4   obstructing calculi in the distal right ureter ranging from 5-11 mm   accounting for finding. 11 mm calculus is most distal and positioned about 2   cm from the uterovesical junction. 4. Liquid stool in the colon suggests diarrheal disease. Tiny gas pockets   along the ascending colon wall in the dependent portion appear to be pseudo   pneumatosis. CT brain neg      I have personally reviewed the past medical history, past surgical history, medications, social history, and family history, and I haveupdated the database accordingly. Allergies:   Patient has no known allergies. Review of Systems:     Review of Systems   Unable to perform ROS: Intubated     Physical Examination :       Physical Exam  Constitutional:       Appearance: She is ill-appearing. She is not toxic-appearing. Comments: On the vent   HENT:      Head: Normocephalic and atraumatic. Nose: Nose normal.      Mouth/Throat:      Mouth: Mucous membranes are moist.   Eyes:      General: No scleral icterus. Conjunctiva/sclera: Conjunctivae normal.   Cardiovascular:      Rate and Rhythm: Regular rhythm. Tachycardia present. Heart sounds: No murmur heard. No friction rub. No gallop. Pulmonary:      Breath sounds: No stridor. No rhonchi or rales. Abdominal:      General: There is no distension. Palpations: There is no mass. Hernia: No hernia is present. Genitourinary:     Comments: urine layne  Musculoskeletal:         General: No tenderness, deformity or signs of injury. Cervical back: Neck supple. No rigidity or tenderness. Skin:     Coloration: Skin is not pale. Findings: No erythema.        Past Medical History:     Past Medical History:   Diagnosis Date    Aphasia     Aphasia     Contracture of joint, lower leg     Depressed     HTN (hypertension)     Hx MRSA infection resolved 1/2017    2 negative nasal screens 1/2017 (hx in heel in 2013)    Hydronephrosis     Movement disorder     tremor    Multiple sclerosis ([facility] [address] Neurogenic bladder     Non-verbal learning disorder     Peripheral vascular disease (HCC)     Polyneuropathy     Pressure ulcer     Coccyx, Heel    Pulmonary embolism (HCC)     Pulmonary infarction (HCC)     Quadriplegia (HCC)     Tachycardia     UTI (lower urinary tract infection)        Past Surgical  History:     Past Surgical History:   Procedure Laterality Date    AMPUTATION Right     COLOSTOMY      CYSTOSCOPY Right 12/3/2022    CYSTOSCOPY URETERAL STENT INSERTION performed by Gisella Galloway MD at [place] [id number]      IR NEPHROSTOMY PERCUTANEOUS RIGHT  9/27/2020    IR NEPHROSTOMY PERCUTANEOUS RIGHT 9/27/2020 Teresita Davies MD [facility] SPECIAL PROCEDURES    LEG AMPUTATION THROUGH FEMUR         Medications:      midodrine  5 mg Oral TID    rivaroxaban  20 mg Oral Daily    meropenem  1,000 mg IntraVENous Q8H    sodium chloride flush  5-40 mL IntraVENous 2 times per day    gabapentin  300 mg Per G Tube TID    pantoprazole (PROTONIX) 40 mg injection  40 mg IntraVENous Daily    [Held by provider] metoclopramide  5 mg Oral TID    [Held by provider] metoprolol tartrate  25 mg Oral TID       Social History:     Social History     Socioeconomic History    Marital status: Single     Spouse name: Not on file    Number of children: Not on file    Years of education: Not on file    Highest education level: Not on file   Occupational History    Not on file   Tobacco Use    Smoking status: Never    Smokeless tobacco: Never   Vaping Use    Vaping Use: Never used   Substance and Sexual Activity    Alcohol use: No    Drug use: No    Sexual activity: Never   Other Topics Concern    Not on file   Social History Narrative    Not on file     Social Determinants of Health     Financial Resource Strain: Not on file   Food Insecurity: Not on file   Transportation Needs: Not on file   Physical Activity: Not on file   Stress: Not on file   Social Connections: Not on file   Intimate Partner Violence: Not on file Housing Stability: Not on file       Family History:     Family History   Problem Relation Age of Onset    No Known Problems Mother     No Known Problems Father       Medical Decision Making:   I have independently reviewed/ordered the following labs:    CBC with Differential:   Recent Labs     12/05/22 0418 12/06/22 0455   WBC 24.2* 19.2*   HGB 12.2 12.5   HCT 39.6 40.1    178   LYMPHOPCT 6* 8*   MONOPCT 9* 15*       BMP:  Recent Labs     12/05/22 0418 12/06/22 0455   * 144   K 3.4* 3.7   * 109*   CO2 22 22   BUN 14 16   CREATININE 0.53 0.41*   MG 1.5* 1.8       Hepatic Function Panel:   No results for input(s): PROT, LABALBU, BILIDIR, IBILI, BILITOT, ALKPHOS, ALT, AST in the last 72 hours. No results for input(s): RPR in the last 72 hours. No results for input(s): HIV in the last 72 hours. No results for input(s): BC in the last 72 hours. Lab Results   Component Value Date/Time    CREATININE 0.41 12/06/2022 04:55 AM    GLUCOSE 72 12/06/2022 04:55 AM    GLUCOSE 69 03/11/2012 12:33 PM       Detailed results: Thank you for allowing us to participate in the care of this patient. Please call with questions. This note is created with the assistance of a speech recognition program.  While intending to generate adocument that actually reflects the content of the visit, the document can still have some errors including those of syntax and sound a like substitutions which may escape proof reading. It such instances, actual meaningcan be extrapolated by contextual diversion.     Ludwig Sears  Office: [phone]  Perfect serve / office [phone]

## 2022-12-06 NOTE — PLAN OF CARE
Problem: Respiratory - Adult  Goal: Achieves optimal ventilation and oxygenation  12/6/2022 0547 by Alexa Palmer RCP  Outcome: Progressing

## 2022-12-06 NOTE — PLAN OF CARE
Order obtained for extubation. SpO2 of 99 on 30% FiO2. Patient extubated and placed on 2 liters/min via nasal cannula. Post extubation SpO2 is 99% with HR  115 bpm and RR 20 breaths/min. Patient had moderate cough that was productive of tan sputum. Extubation Well tolerated by patient. .   Breath Sounds: clear    Arturo You, RCP   12:55 PM

## 2022-12-06 NOTE — PROGRESS NOTES
[facility] -   Infectious diseases evaluation  admission date 12/3/2022    reason for consultation:   sepsis    Impression :   Current:  Bandemia  proCalcitonin elevation 29  Septic shock levophed 40  R obstructive pyelonephritis -   severe R hydro w many ureteral stones  Stent placed 12/3  GNR septicemia - bacteroides and enterobacteriae  Multifocal infiltrates, likely atelectasis - less likely pneumonia  MS   Neurogenic bladder w SP omer  R AKA amputation by hx  Past colostomy  Possible ascending colon pseudo pneumatosis on CT    Other:    Discussion / summary of stay / plan of care     Recommendations   Stop vanco   12/3 Meropenem -  Await final blood cultures results   1 has Bacteroides and the second is pending GNR  ? Possible ascending colon pseudo pneumatosis? Might explain the bacteroides septicemia   General surgery on board, not very convinced about the diagnosis for now  CT chest eval, it seems mostly like P congestion and atelectasis     Infection Control Recommendations   [place] Precautions  Contact Isolation     Antimicrobial Stewardship Recommendations   Simplification of therapy  Targeted therapy  History of Present Illness:   Initial history:  Martha Carter is a 43y.o.-year-old female w MS and neurogenic bladder and chronic SP omer, Gtube and hx PE on xeralto  Cojes w altered mentation and intubated in ER - S IRS+ and tachycardia and hypotension started on levophed  UA + for infection  CT chest showed many infiltrates and some might be multifocal pneumonia   R kid w hydronephrosis and many  ureteral stones.   Ascending colon pseudo pneumatosis  Started on cefepime and vanco and we are called  Bandemia  NUZHAT     took her to OR for a R stent  ID called    She is on the vent 40 FIO2 and 5 peep  Pupils minimally reactive  Sp salivary and beige  Abd soft   Urine slightly blood tinged and no pus  No rash  Levophed 40    Interval changes  12/5/2022 Patient Vitals for the past 8 hrs:   BP Temp Temp src Pulse Resp SpO2   12/05/22 2009 -- -- -- (!) 104 20 95 %   12/05/22 2008 -- -- -- (!) 101 20 97 %   12/05/22 1845 98/70 -- -- (!) 103 18 98 %   12/05/22 1830 97/73 -- -- (!) 103 18 98 %   12/05/22 1815 97/71 -- -- (!) 104 19 98 %   12/05/22 1800 99/73 99 °F (37.2 °C) Esophageal (!) 103 20 97 %   12/05/22 1745 102/74 -- -- (!) 102 20 96 %   12/05/22 1730 103/72 -- -- (!) 105 20 95 %   12/05/22 1715 101/74 -- -- (!) 105 18 95 %   12/05/22 1700 98/74 -- -- (!) 105 19 95 %   12/05/22 1645 94/73 -- -- (!) 104 20 95 %   12/05/22 1630 96/74 -- -- (!) 105 22 94 %   12/05/22 1615 99/71 -- -- (!) 103 21 96 %   12/05/22 1600 90/64 99.1 °F (37.3 °C) Esophageal (!) 106 19 98 %   12/05/22 1545 98/71 -- -- (!) 106 19 97 %   12/05/22 1530 101/71 -- -- (!) 106 19 98 %   12/05/22 1517 -- -- -- (!) 109 19 95 %   12/05/22 1515 96/73 -- -- (!) 107 20 95 %   12/05/22 1500 94/75 -- -- (!) 106 20 95 %     Temp LG  WBc better 28  Creat  0.7 better  BC x 2 GNR, one w bacteroides and enterobacteriaceae pend  U cx in process     12/5  Patient is more alert coughing on the vent agitated, seems appropriate trying to formulate words  Sputum is thick yellow, and sputum culture so far is negative  WBC improved to 24  Bacteria in the blood gram-negative rods pending ID  Urine cultures multiple organisms. Summary of relevant labs:  Labs:  W 36 - 28-24  Creat 3 - 2.4 - 0.7   Lactic Acid, Sepsis, Whole Blood1.6   [id number]   WIB28 High U/L AST58 High    [id number] High    Micro:  BC x 2 GNR, one w bacteroides and enterobacteriaceae   U cx multiple bacteria  Sputum culture no significant bacteria    Nasal MRSA neg  Imaging:  Chest x-ray 12/5  Mild right basilar airspace opacities. This could represent atelectasis or   pneumonia     CT AP chest  Impression:  1. ETT now terminates 12 mm above denys.    2. Patchy perihilar and more confluent posterior segment airspace consolidations likely combination of pneumonia and atelectasis. Trace right   pleural effusion. 3. Innumerable right renal calculi most in the 5-8 mm size range. Moderate   to severe right hydronephrosis and hydroureter. There is a stack of 4   obstructing calculi in the distal right ureter ranging from 5-11 mm   accounting for finding. 11 mm calculus is most distal and positioned about 2   cm from the uterovesical junction. 4. Liquid stool in the colon suggests diarrheal disease. Tiny gas pockets   along the ascending colon wall in the dependent portion appear to be pseudo   pneumatosis. CT brain neg      I have personally reviewed the past medical history, past surgical history, medications, social history, and family history, and I haveupdated the database accordingly. Allergies:   Patient has no known allergies. Review of Systems:     Review of Systems   Unable to perform ROS: Intubated     Physical Examination :       Physical Exam  Constitutional:       Appearance: She is ill-appearing. She is not toxic-appearing. Comments: On the vent   HENT:      Head: Normocephalic and atraumatic. Mouth/Throat:      Mouth: Mucous membranes are moist.   Eyes:      General: No scleral icterus. Conjunctiva/sclera: Conjunctivae normal.   Cardiovascular:      Rate and Rhythm: Regular rhythm. Tachycardia present. Heart sounds: No murmur heard. No friction rub. No gallop. Pulmonary:      Breath sounds: No stridor. No rhonchi. Abdominal:      Palpations: There is no mass. Hernia: No hernia is present. Genitourinary:     Comments: uRine layne  Musculoskeletal:         General: No tenderness or signs of injury. Cervical back: Neck supple. No rigidity or tenderness. Skin:     Coloration: Skin is not pale. Findings: No erythema.    Neurological:      Comments: Arousing agitated   Psychiatric:      Comments: Agitated on the ventilator       Past Medical History:     Past Medical History:   Diagnosis Date    Aphasia     Aphasia     Contracture of joint, lower leg     Depressed     HTN (hypertension)     Hx MRSA infection resolved 1/2017    2 negative nasal screens 1/2017 (hx in heel in 2013)    Hydronephrosis     Movement disorder     tremor    Multiple sclerosis (HCC)     Neurogenic bladder     Non-verbal learning disorder     Peripheral vascular disease ([facility] [address]     Polyneuropathy     Pressure ulcer     Coccyx, Heel    Pulmonary embolism (HCC)     Pulmonary infarction (HCC)     Quadriplegia (HCC)     Tachycardia     UTI (lower urinary tract infection)        Past Surgical  History:     Past Surgical History:   Procedure Laterality Date    AMPUTATION Right     COLOSTOMY      CYSTOSCOPY Right 12/3/2022    CYSTOSCOPY URETERAL STENT INSERTION performed by Zoltan Bradley MD at [place] [id number]      IR NEPHROSTOMY PERCUTANEOUS RIGHT  9/27/2020    IR NEPHROSTOMY PERCUTANEOUS RIGHT 9/27/2020 Carmencita Sheth MD [facility] SPECIAL PROCEDURES    LEG AMPUTATION THROUGH FEMUR         Medications:      midodrine  5 mg Oral TID    rivaroxaban  20 mg Oral Daily    meropenem  1,000 mg IntraVENous Q8H    sodium chloride flush  5-40 mL IntraVENous 2 times per day    gabapentin  300 mg Per G Tube TID    pantoprazole (PROTONIX) 40 mg injection  40 mg IntraVENous Daily    metoclopramide  5 mg Oral TID    [Held by provider] metoprolol tartrate  25 mg Oral TID       Social History:     Social History     Socioeconomic History    Marital status: Single     Spouse name: Not on file    Number of children: Not on file    Years of education: Not on file    Highest education level: Not on file   Occupational History    Not on file   Tobacco Use    Smoking status: Never    Smokeless tobacco: Never   Vaping Use    Vaping Use: Never used   Substance and Sexual Activity    Alcohol use: No    Drug use: No    Sexual activity: Never   Other Topics Concern    Not on file   Social History Narrative    Not on file     Social Determinants of Health     Financial Resource Strain: Not on file   Food Insecurity: Not on file   Transportation Needs: Not on file   Physical Activity: Not on file   Stress: Not on file   Social Connections: Not on file   Intimate Partner Violence: Not on file   Housing Stability: Not on file       Family History:     Family History   Problem Relation Age of Onset    No Known Problems Mother     No Known Problems Father       Medical Decision Making:   I have independently reviewed/ordered the following labs:    CBC with Differential:   Recent Labs     12/04/22  0512 12/05/22  0418   WBC 28.2* 24.2*   HGB 10.9* 12.2   HCT 35.0* 39.6    163   LYMPHOPCT 4* 6*   MONOPCT 7 9*       BMP:  Recent Labs     12/04/22  0512 12/05/22  0418    148*   K 3.4* 3.4*   * 112*   CO2 21 22   BUN 27* 14   CREATININE 0.76 0.53   MG 1.9 1.5*       Hepatic Function Panel:   Recent Labs     12/03/22  1014   PROT 8.2   LABALBU 3.0*   BILITOT 0.4   ALKPHOS 141*   ALT 39*   AST 58*       No results for input(s): RPR in the last 72 hours. No results for input(s): HIV in the last 72 hours. No results for input(s): BC in the last 72 hours. Lab Results   Component Value Date/Time    CREATININE 0.53 12/05/2022 04:18 AM    GLUCOSE 81 12/05/2022 04:18 AM    GLUCOSE 69 03/11/2012 12:33 PM       Detailed results: Thank you for allowing us to participate in the care of this patient. Please call with questions. This note is created with the assistance of a speech recognition program.  While intending to generate adocument that actually reflects the content of the visit, the document can still have some errors including those of syntax and sound a like substitutions which may escape proof reading. It such instances, actual meaningcan be extrapolated by contextual diversion.     Denise Jaffe MD  Office: [phone]  Perfect serve / office [phone]

## 2022-12-06 NOTE — PLAN OF CARE
Problem: Respiratory - Adult  Goal: Achieves optimal ventilation and oxygenation  12/6/2022 0844 by David Main RCP  Outcome: Progressing     Problem: OXYGENATION/RESPIRATORY FUNCTION  Goal: Patient will maintain patent airway  Outcome: Ongoing  Goal: Patient will achieve/maintain normal respiratory rate/effort  Respiratory rate and effort will be within normal limits for the patient  Outcome: Ongoing    Problem: MECHANICAL VENTILATION  Goal: Patient will maintain patent airway  Outcome: Ongoing  Goal: Oral health is maintained or improved  Outcome: Ongoing  Goal: ET tube will be managed safely  Outcome: Ongoing  Goal: Ability to express needs and understand communication  Outcome: Ongoing  Goal: Mobility/activity is maintained at optimum level for patient  Outcome: Ongoing    Problem: ASPIRATION PRECAUTIONS  Goal: Patients risk of aspiration is minimized  Outcome: Ongoing    Problem: SKIN INTEGRITY  Goal: Skin integrity is maintained or improved  Outcome: Ongoing

## 2022-12-07 LAB
ABSOLUTE EOS #: 0.44 K/UL (ref 0–0.4)
ABSOLUTE IMMATURE GRANULOCYTE: 0.44 K/UL (ref 0–0.3)
ABSOLUTE LYMPH #: 1.96 K/UL (ref 1–4.8)
ABSOLUTE MONO #: 4.14 K/UL (ref 0.1–0.8)
ANION GAP SERPL CALCULATED.3IONS-SCNC: 9 MMOL/L (ref 9–17)
ANION GAP SERPL CALCULATED.3IONS-SCNC: 9 MMOL/L (ref 9–17)
BASOPHILS # BLD: 0 % (ref 0–2)
BASOPHILS ABSOLUTE: 0 K/UL (ref 0–0.2)
BUN BLDV-MCNC: 14 MG/DL (ref 6–20)
BUN BLDV-MCNC: 16 MG/DL (ref 6–20)
CALCIUM IONIZED: 1.25 MMOL/L (ref 1.13–1.33)
CALCIUM SERPL-MCNC: 8.7 MG/DL (ref 8.6–10.4)
CALCIUM SERPL-MCNC: 9.2 MG/DL (ref 8.6–10.4)
CHLORIDE BLD-SCNC: 110 MMOL/L (ref 98–107)
CHLORIDE BLD-SCNC: 111 MMOL/L (ref 98–107)
CO2: 23 MMOL/L (ref 20–31)
CO2: 23 MMOL/L (ref 20–31)
CREAT SERPL-MCNC: 0.26 MG/DL (ref 0.5–0.9)
CREAT SERPL-MCNC: 0.33 MG/DL (ref 0.5–0.9)
EOSINOPHILS RELATIVE PERCENT: 2 % (ref 1–4)
GFR SERPL CREATININE-BSD FRML MDRD: >60 ML/MIN/1.73M2
GFR SERPL CREATININE-BSD FRML MDRD: >60 ML/MIN/1.73M2
GLUCOSE BLD-MCNC: 72 MG/DL (ref 70–99)
GLUCOSE BLD-MCNC: 81 MG/DL (ref 70–99)
HCT VFR BLD CALC: 34.8 % (ref 36.3–47.1)
HEMOGLOBIN: 11.2 G/DL (ref 11.9–15.1)
IMMATURE GRANULOCYTES: 2 %
LYMPHOCYTES # BLD: 9 % (ref 24–44)
MAGNESIUM: 1.5 MG/DL (ref 1.6–2.6)
MAGNESIUM: 1.8 MG/DL (ref 1.6–2.6)
MCH RBC QN AUTO: 27.3 PG (ref 25.2–33.5)
MCHC RBC AUTO-ENTMCNC: 32.2 G/DL (ref 28.4–34.8)
MCV RBC AUTO: 84.7 FL (ref 82.6–102.9)
MONOCYTES # BLD: 19 % (ref 1–7)
MORPHOLOGY: ABNORMAL
NRBC AUTOMATED: 0 PER 100 WBC
PDW BLD-RTO: 15.5 % (ref 11.8–14.4)
PLATELET # BLD: 185 K/UL (ref 138–453)
PMV BLD AUTO: 11.7 FL (ref 8.1–13.5)
POTASSIUM SERPL-SCNC: 3.1 MMOL/L (ref 3.7–5.3)
POTASSIUM SERPL-SCNC: 3.6 MMOL/L (ref 3.7–5.3)
RBC # BLD: 4.11 M/UL (ref 3.95–5.11)
SEG NEUTROPHILS: 68 % (ref 36–66)
SEGMENTED NEUTROPHILS ABSOLUTE COUNT: 14.82 K/UL (ref 1.8–7.7)
SODIUM BLD-SCNC: 142 MMOL/L (ref 135–144)
SODIUM BLD-SCNC: 143 MMOL/L (ref 135–144)
WBC # BLD: 21.8 K/UL (ref 3.5–11.3)

## 2022-12-07 PROCEDURE — 2580000003 HC RX 258: Performed by: INTERNAL MEDICINE

## 2022-12-07 PROCEDURE — 2580000003 HC RX 258: Performed by: STUDENT IN AN ORGANIZED HEALTH CARE EDUCATION/TRAINING PROGRAM

## 2022-12-07 PROCEDURE — 99291 CRITICAL CARE FIRST HOUR: CPT | Performed by: INTERNAL MEDICINE

## 2022-12-07 PROCEDURE — 6360000002 HC RX W HCPCS: Performed by: STUDENT IN AN ORGANIZED HEALTH CARE EDUCATION/TRAINING PROGRAM

## 2022-12-07 PROCEDURE — 6370000000 HC RX 637 (ALT 250 FOR IP): Performed by: STUDENT IN AN ORGANIZED HEALTH CARE EDUCATION/TRAINING PROGRAM

## 2022-12-07 PROCEDURE — 83735 ASSAY OF MAGNESIUM: CPT

## 2022-12-07 PROCEDURE — C9113 INJ PANTOPRAZOLE SODIUM, VIA: HCPCS | Performed by: STUDENT IN AN ORGANIZED HEALTH CARE EDUCATION/TRAINING PROGRAM

## 2022-12-07 PROCEDURE — 6360000002 HC RX W HCPCS: Performed by: INTERNAL MEDICINE

## 2022-12-07 PROCEDURE — 85025 COMPLETE CBC W/AUTO DIFF WBC: CPT

## 2022-12-07 PROCEDURE — 6370000000 HC RX 637 (ALT 250 FOR IP)

## 2022-12-07 PROCEDURE — 82330 ASSAY OF CALCIUM: CPT

## 2022-12-07 PROCEDURE — 99232 SBSQ HOSP IP/OBS MODERATE 35: CPT | Performed by: INTERNAL MEDICINE

## 2022-12-07 PROCEDURE — 1200000000 HC SEMI PRIVATE

## 2022-12-07 PROCEDURE — 2580000003 HC RX 258: Performed by: HEALTH CARE PROVIDER

## 2022-12-07 PROCEDURE — 80048 BASIC METABOLIC PNL TOTAL CA: CPT

## 2022-12-07 PROCEDURE — 36415 COLL VENOUS BLD VENIPUNCTURE: CPT

## 2022-12-07 RX ORDER — LANSOPRAZOLE
30 KIT
Status: DISCONTINUED | OUTPATIENT
Start: 2022-12-08 | End: 2022-12-11 | Stop reason: HOSPADM

## 2022-12-07 RX ORDER — MAGNESIUM SULFATE IN WATER 40 MG/ML
2000 INJECTION, SOLUTION INTRAVENOUS ONCE
Status: COMPLETED | OUTPATIENT
Start: 2022-12-07 | End: 2022-12-07

## 2022-12-07 RX ORDER — POTASSIUM CHLORIDE 7.45 MG/ML
10 INJECTION INTRAVENOUS
Status: COMPLETED | OUTPATIENT
Start: 2022-12-07 | End: 2022-12-07

## 2022-12-07 RX ADMIN — POTASSIUM CHLORIDE 10 MEQ: 10 INJECTION, SOLUTION INTRAVENOUS at 06:30

## 2022-12-07 RX ADMIN — MIDODRINE HYDROCHLORIDE 5 MG: 5 TABLET ORAL at 16:14

## 2022-12-07 RX ADMIN — MIDODRINE HYDROCHLORIDE 5 MG: 5 TABLET ORAL at 20:53

## 2022-12-07 RX ADMIN — POTASSIUM CHLORIDE 10 MEQ: 10 INJECTION, SOLUTION INTRAVENOUS at 10:56

## 2022-12-07 RX ADMIN — MEROPENEM 1000 MG: 1 INJECTION, POWDER, FOR SOLUTION INTRAVENOUS at 22:19

## 2022-12-07 RX ADMIN — SODIUM CHLORIDE, POTASSIUM CHLORIDE, SODIUM LACTATE AND CALCIUM CHLORIDE: 600; 310; 30; 20 INJECTION, SOLUTION INTRAVENOUS at 04:11

## 2022-12-07 RX ADMIN — POTASSIUM CHLORIDE 10 MEQ: 10 INJECTION, SOLUTION INTRAVENOUS at 08:34

## 2022-12-07 RX ADMIN — SODIUM CHLORIDE, POTASSIUM CHLORIDE, SODIUM LACTATE AND CALCIUM CHLORIDE: 600; 310; 30; 20 INJECTION, SOLUTION INTRAVENOUS at 18:27

## 2022-12-07 RX ADMIN — MAGNESIUM SULFATE HEPTAHYDRATE 2000 MG: 40 INJECTION, SOLUTION INTRAVENOUS at 06:27

## 2022-12-07 RX ADMIN — GABAPENTIN 300 MG: 300 CAPSULE ORAL at 20:53

## 2022-12-07 RX ADMIN — RIVAROXABAN 20 MG: 20 TABLET, FILM COATED ORAL at 18:30

## 2022-12-07 RX ADMIN — POTASSIUM CHLORIDE 10 MEQ: 10 INJECTION, SOLUTION INTRAVENOUS at 09:54

## 2022-12-07 RX ADMIN — GABAPENTIN 300 MG: 300 CAPSULE ORAL at 08:32

## 2022-12-07 RX ADMIN — MEROPENEM 1000 MG: 1 INJECTION, POWDER, FOR SOLUTION INTRAVENOUS at 15:54

## 2022-12-07 RX ADMIN — SODIUM CHLORIDE, PRESERVATIVE FREE 10 ML: 5 INJECTION INTRAVENOUS at 20:53

## 2022-12-07 RX ADMIN — MIDODRINE HYDROCHLORIDE 5 MG: 5 TABLET ORAL at 08:32

## 2022-12-07 RX ADMIN — SODIUM CHLORIDE, PRESERVATIVE FREE 10 ML: 5 INJECTION INTRAVENOUS at 08:32

## 2022-12-07 RX ADMIN — MEROPENEM 1000 MG: 1 INJECTION, POWDER, FOR SOLUTION INTRAVENOUS at 06:26

## 2022-12-07 RX ADMIN — GABAPENTIN 300 MG: 300 CAPSULE ORAL at 13:52

## 2022-12-07 RX ADMIN — SODIUM CHLORIDE, PRESERVATIVE FREE 40 MG: 5 INJECTION INTRAVENOUS at 08:32

## 2022-12-07 ASSESSMENT — ENCOUNTER SYMPTOMS: EYE DISCHARGE: 0

## 2022-12-07 NOTE — PLAN OF CARE
"  Problem: Discharge Planning  Goal: Discharge to home or other facility with appropriate resources  12/7/2022 0952 by Joanie Henriquez RN  Outcome: Progressing  12/7/2022 0611 by Laurel Vallejo RN  Outcome: Progressing     Problem: Nutrition Deficit:  Goal: Optimize nutritional status  12/7/2022 0952 by Joanie Henriquez RN  Outcome: Progressing  12/7/2022 0611 by Laurel Vallejo RN  Outcome: Progressing     Problem: Confusion  Goal: Confusion, delirium, dementia, or psychosis is improved or at baseline  Description: INTERVENTIONS:  1. Assess for possible contributors to thought disturbance, including medications, impaired vision or hearing, underlying metabolic abnormalities, dehydration, psychiatric diagnoses, and notify attending LIP  2. [place] high risk fall precautions, as indicated  3. Provide frequent short contacts to provide reality reorientation, refocusing and direction  4. Decrease environmental stimuli, including noise as appropriate  5. Monitor and intervene to maintain adequate nutrition, hydration, elimination, sleep and activity  6. If unable to ensure safety without constant attention obtain sitter and review sitter guidelines with assigned personnel  7.  Initiate Psychosocial CNS and Spiritual Care consult, as indicated  12/7/2022 3637 by Joanie Henriquez RN  Outcome: Progressing  12/7/2022 0611 by Laurel Vallejo RN  Outcome: Progressing     Problem: Neurosensory - Adult  Goal: Achieves stable or improved neurological status  12/7/2022 0952 by Joanie Henriquez RN  Outcome: Progressing  12/7/2022 0611 by Laurel Vallejo RN  Outcome: Progressing  Goal: Absence of seizures  12/7/2022 0952 by Joanie Henriquez RN  Outcome: Progressing  12/7/2022 0611 by Laurel Vallejo RN  Outcome: Progressing  Goal: Remains free of injury related to seizures activity  12/7/2022 0611 by Laurel Vallejo RN  Outcome: Progressing  Goal: Achieves maximal functionality and self care  12/7/2022 0611 by Laurle Vallejo RN  Outcome: Progressing Problem: Respiratory - Adult  Goal: Achieves optimal ventilation and oxygenation  12/7/2022 0611 by Levi Love RN  Outcome: Progressing     Problem: Gastrointestinal - Adult  Goal: Minimal or absence of nausea and vomiting  12/7/2022 0611 by Levi Love RN  Outcome: Progressing  Goal: Maintains or returns to baseline bowel function  12/7/2022 0611 by Levi Love RN  Outcome: Progressing  Goal: Maintains adequate nutritional intake  12/7/2022 0611 by Levi Love RN  Outcome: Progressing  Goal: Establish and maintain optimal ostomy function  12/7/2022 0611 by Levi Love RN  Outcome: Progressing     Problem: Genitourinary - Adult  Goal: Urinary catheter remains patent  12/7/2022 0611 by Levi Love RN  Outcome: Progressing     Problem: Metabolic/Fluid and Electrolytes - Adult  Goal: Electrolytes maintained within normal limits  12/7/2022 0611 by Levi Love RN  Outcome: Progressing  Goal: Hemodynamic stability and optimal renal function maintained  12/7/2022 0611 by Levi Love RN  Outcome: Progressing  Goal: Glucose maintained within prescribed range  12/7/2022 0611 by Levi Love RN  Outcome: Progressing     Problem: Skin/Tissue Integrity - Adult  Goal: Skin integrity remains intact  12/7/2022 0611 by Levi Love RN  Outcome: Progressing  Goal: Incisions, wounds, or drain sites healing without S/S of infection  12/7/2022 0611 by Levi Love RN  Outcome: Progressing  Goal: Oral mucous membranes remain intact  12/7/2022 0611 by Levi Love RN  Outcome: Progressing     Problem: Pain  Goal: Verbalizes/displays adequate comfort level or baseline comfort level  12/7/2022 0611 by Levi Love RN  Outcome: Progressing     Problem: Skin/Tissue Integrity  Goal: Absence of new skin breakdown  Description: 1. Monitor for areas of redness and/or skin breakdown  2. Assess vascular access sites hourly  3. Every 4-6 hours minimum:  Change oxygen saturation probe site  4.   Every 4-6 hours:  If on nasal continuous positive airway pressure, respiratory therapy assess nares and determine need for appliance change or resting period.   12/7/2022 7324 by Rosey Paulson RN  Outcome: Progressing     Problem: Safety - Adult  Goal: Free from fall injury  12/7/2022 0611 by Rosey Paulson RN  Outcome: Progressing     Problem: ABCDS Injury Assessment  Goal: Absence of physical injury  12/7/2022 0611 by Rosey Paulson RN  Outcome: Progressing PRN    Mary Grace Hester, PTA

## 2022-12-07 NOTE — PLAN OF CARE
Problem: Discharge Planning  Goal: Discharge to home or other facility with appropriate resources  Outcome: Progressing     Problem: Nutrition Deficit:  Goal: Optimize nutritional status  Outcome: Progressing     Problem: Confusion  Goal: Confusion, delirium, dementia, or psychosis is improved or at baseline  Description: INTERVENTIONS:  1. Assess for possible contributors to thought disturbance, including medications, impaired vision or hearing, underlying metabolic abnormalities, dehydration, psychiatric diagnoses, and notify attending LIP  2. [place] high risk fall precautions, as indicated  3. Provide frequent short contacts to provide reality reorientation, refocusing and direction  4. Decrease environmental stimuli, including noise as appropriate  5. Monitor and intervene to maintain adequate nutrition, hydration, elimination, sleep and activity  6. If unable to ensure safety without constant attention obtain sitter and review sitter guidelines with assigned personnel  7.  Initiate Psychosocial CNS and Spiritual Care consult, as indicated  Outcome: Progressing     Problem: Neurosensory - Adult  Goal: Achieves stable or improved neurological status  Outcome: Progressing  Goal: Absence of seizures  Outcome: Progressing  Goal: Remains free of injury related to seizures activity  Outcome: Progressing  Goal: Achieves maximal functionality and self care  Outcome: Progressing     Problem: Respiratory - Adult  Goal: Achieves optimal ventilation and oxygenation  Outcome: Progressing     Problem: Gastrointestinal - Adult  Goal: Minimal or absence of nausea and vomiting  Outcome: Progressing  Goal: Maintains or returns to baseline bowel function  Outcome: Progressing  Goal: Maintains adequate nutritional intake  Outcome: Progressing  Goal: Establish and maintain optimal ostomy function  Outcome: Progressing     Problem: Genitourinary - Adult  Goal: Urinary catheter remains patent  Outcome: Progressing     Problem: Metabolic/Fluid and Electrolytes - Adult  Goal: Electrolytes maintained within normal limits  Outcome: Progressing  Goal: Hemodynamic stability and optimal renal function maintained  Outcome: Progressing  Goal: Glucose maintained within prescribed range  Outcome: Progressing     Problem: Skin/Tissue Integrity - Adult  Goal: Skin integrity remains intact  Outcome: Progressing  Goal: Incisions, wounds, or drain sites healing without S/S of infection  Outcome: Progressing  Goal: Oral mucous membranes remain intact  Outcome: Progressing     Problem: Pain  Goal: Verbalizes/displays adequate comfort level or baseline comfort level  Outcome: Progressing     Problem: Skin/Tissue Integrity  Goal: Absence of new skin breakdown  Description: 1. Monitor for areas of redness and/or skin breakdown  2. Assess vascular access sites hourly  3. Every 4-6 hours minimum:  Change oxygen saturation probe site  4. Every 4-6 hours:  If on nasal continuous positive airway pressure, respiratory therapy assess nares and determine need for appliance change or resting period.   Outcome: Progressing     Problem: Safety - Adult  Goal: Free from fall injury  Outcome: Progressing     Problem: ABCDS Injury Assessment  Goal: Absence of physical injury  Outcome: Progressing

## 2022-12-07 NOTE — PROGRESS NOTES
[facility] -   Infectious diseases evaluation  admission date 12/3/2022    reason for consultation:   sepsis    Impression :   Current:  Bandemia  proCalcitonin elevation 29  Septic shock levophed 40  R obstructive pyelonephritis -   severe R hydro w many ureteral stones  Stent placed 12/3  GNR septicemia - bacteroides and enterobacteriae  Multifocal infiltrates, likely atelectasis - less likely pneumonia  MS   Neurogenic bladder w SP omer  R AKA amputation by hx  Past colostomy  Possible ascending colon pseudo pneumatosis on CT  Concerning mostly w bacteroides and 2 different bacteriaceae in the blood     Other:    Discussion / summary of stay / plan of care     Recommendations   12/3 Meropenem -stop 12/6 12/6 zosyn   BC - Bacteroides proteus and morganella - most likely source is the abdomen  Possible ascending colon pseudo pneumatosis  General surgery on board, med tx for now  CT chest  - mostly like P congestion and atelectasis  - CXR 12/5 stable  12/7 WBC 21  stalling  - followCXR  Infection Control Recommendations   [place] Precautions  Contact Isolation     Antimicrobial Stewardship Recommendations   Simplification of therapy  Targeted therapy  History of Present Illness:   Initial history:  Bob Lo is a 43y.o.-year-old female w MS and neurogenic bladder and chronic SP omer, Gtube and hx PE on xeralto  Cojes w altered mentation and intubated in ER - S IRS+ and tachycardia and hypotension started on levophed  UA + for infection  CT chest showed many infiltrates and some might be multifocal pneumonia   R kid w hydronephrosis and many  ureteral stones.   Ascending colon pseudo pneumatosis  Started on cefepime and vanco and we are called  Bandemia  NUZHAT     took her to OR for a R stent  ID called    She is on the vent 40 FIO2 and 5 peep  Pupils minimally reactive  Sp salivary and beige  Abd soft   Urine slightly blood tinged and no pus  No rash  Levophed 40    Interval changes  12/7/2022   Patient Vitals for the past 8 hrs:   BP Temp Temp src Pulse Resp SpO2 Weight   12/07/22 0900 (!) 117/100 -- -- 84 27 93 % --   12/07/22 0800 119/88 97.3 °F (36.3 °C) Axillary 100 20 95 % --   12/07/22 0700 (!) 117/96 -- -- (!) 101 21 94 % --   12/07/22 0600 (!) 117/101 -- -- (!) 101 20 94 % 186 lb 8.2 oz (84.6 kg)   12/07/22 0500 111/76 -- -- (!) 101 20 94 % --   12/07/22 0400 109/74 99 °F (37.2 °C) Oral (!) 101 18 94 % --   12/07/22 0300 105/72 -- -- 100 20 95 % --   12/07/22 0210 103/71 -- -- (!) 102 15 96 % --   Temp LG  WBc better 28  Creat  0.7 better  BC x 2 GNR, one w bacteroides and enterobacteriaceae pend  U cx in process     12/5  Patient is more alert coughing on the vent agitated, seems appropriate trying to formulate words  Sputum is thick yellow, and sputum culture so far is negative  WBC improved to 24  Bacteria in the blood gram-negative rods pending ID  Urine cultures multiple organisms. 12/7  Patient extubated 12/6   WBC up to 21.8 from 19.2 on 12/6  Off Levophed  Patient response follows with her eyes, mumbles a few words,   Sputum is small, culture normal bill. Abdomen is soft does not seem to be tender to exam  Urine is clear in the Davies,  No diarrhea  No chest x-ray today. Summary of relevant labs:  Labs:  W 36 - 28-24-19.2-21.8   Creat 3 - 2.4 - 0.7   Lactic Acid, Sepsis, Whole Blood1.6   [id number]   SLE56 High U/L AST58 High    [id number] High    Micro:  BC x 2 GNR, one w bacteroides and enterobacteriaceae   BC Morganella sensitive to aztreonam, ceftriaxone, gentamicin, tobramycin, tmp-smx, zosyn  U cx multiple bacteria  Sputum culture no significant bacteria    Nasal MRSA neg  Imaging:  Chest x-ray 12/5  Mild right basilar airspace opacities. This could represent atelectasis or   pneumonia         CT AP chest  Impression:  1. ETT now terminates 12 mm above denys.    2. Patchy perihilar and more confluent posterior segment airspace   consolidations likely combination of pneumonia and atelectasis. Trace right   pleural effusion. 3. Innumerable right renal calculi most in the 5-8 mm size range. Moderate   to severe right hydronephrosis and hydroureter. There is a stack of 4   obstructing calculi in the distal right ureter ranging from 5-11 mm   accounting for finding. 11 mm calculus is most distal and positioned about 2   cm from the uterovesical junction. 4. Liquid stool in the colon suggests diarrheal disease. Tiny gas pockets   along the ascending colon wall in the dependent portion appear to be pseudo   pneumatosis. CT brain neg      I have personally reviewed the past medical history, past surgical history, medications, social history, and family history, and I haveupdated the database accordingly. Allergies:   Patient has no known allergies. Review of Systems:     Review of Systems   Unable to perform ROS: Other (sluggish unable to communicate)   Constitutional:  Positive for activity change. Eyes:  Negative for discharge. Allergic/Immunologic: Negative for immunocompromised state. Physical Examination :       Physical Exam  Constitutional:       General: She is not in acute distress. Appearance: She is not ill-appearing or toxic-appearing. HENT:      Head: Normocephalic and atraumatic. Nose: Nose normal.      Mouth/Throat:      Mouth: Mucous membranes are moist.   Eyes:      General: No scleral icterus. Conjunctiva/sclera: Conjunctivae normal.   Cardiovascular:      Rate and Rhythm: Regular rhythm. Tachycardia present. Heart sounds: No murmur heard. No friction rub. No gallop. Pulmonary:      Breath sounds: No stridor. No rhonchi. Abdominal:      Palpations: There is no mass. Hernia: No hernia is present. Genitourinary:     Comments: Davies  Musculoskeletal:         General: No tenderness or signs of injury. Cervical back: Neck supple.  No rigidity or tenderness. Skin:     Coloration: Skin is not pale. Findings: No erythema.    Neurological:      Comments: Arousing, non responsive   Psychiatric:      Comments: Non responsive       Past Medical History:     Past Medical History:   Diagnosis Date    Aphasia     Aphasia     Contracture of joint, lower leg     Depressed     HTN (hypertension)     Hx MRSA infection resolved 1/2017    2 negative nasal screens 1/2017 (hx in heel in 2013)    Hydronephrosis     Movement disorder     tremor    Multiple sclerosis ([address]     Neurogenic bladder     Non-verbal learning disorder     Peripheral vascular disease ([address]     Polyneuropathy     Pressure ulcer     Coccyx, Heel    Pulmonary embolism (HCC)     Pulmonary infarction (HCC)     Quadriplegia (HCC)     Tachycardia     UTI (lower urinary tract infection)        Past Surgical  History:     Past Surgical History:   Procedure Laterality Date    AMPUTATION Right     COLOSTOMY      CYSTOSCOPY Right 12/3/2022    CYSTOSCOPY URETERAL STENT INSERTION performed by Leslie Elder MD at [facility]. 78      IR NEPHROSTOMY PERCUTANEOUS RIGHT  9/27/2020    IR NEPHROSTOMY PERCUTANEOUS RIGHT 9/27/2020 Melisa Palmer MD [facility] SPECIAL PROCEDURES    LEG AMPUTATION THROUGH FEMUR         Medications:      potassium chloride  10 mEq IntraVENous Q1H    midodrine  5 mg Oral TID    rivaroxaban  20 mg Oral Daily    meropenem  1,000 mg IntraVENous Q8H    sodium chloride flush  5-40 mL IntraVENous 2 times per day    gabapentin  300 mg Per G Tube TID    pantoprazole (PROTONIX) 40 mg injection  40 mg IntraVENous Daily    [Held by provider] metoclopramide  5 mg Oral TID    [Held by provider] metoprolol tartrate  25 mg Oral TID       Social History:     Social History     Socioeconomic History    Marital status: Single     Spouse name: Not on file    Number of children: Not on file    Years of education: Not on file    Highest education level: Not on file Occupational History    Not on file   Tobacco Use    Smoking status: Never    Smokeless tobacco: Never   Vaping Use    Vaping Use: Never used   Substance and Sexual Activity    Alcohol use: No    Drug use: No    Sexual activity: Never   Other Topics Concern    Not on file   Social History Narrative    Not on file     Social Determinants of Health     Financial Resource Strain: Not on file   Food Insecurity: Not on file   Transportation Needs: Not on file   Physical Activity: Not on file   Stress: Not on file   Social Connections: Not on file   Intimate Partner Violence: Not on file   Housing Stability: Not on file       Family History:     Family History   Problem Relation Age of Onset    No Known Problems Mother     No Known Problems Father       Medical Decision Making:   I have independently reviewed/ordered the following labs:    CBC with Differential:   Recent Labs     12/06/22  0455 12/07/22  0637   WBC 19.2* 21.8*   HGB 12.5 11.2*   HCT 40.1 34.8*    185   LYMPHOPCT 8* 9*   MONOPCT 15* 19*     BMP:  Recent Labs     12/06/22  0455 12/07/22  0433    142   K 3.7 3.1*   * 110*   CO2 22 23   BUN 16 16   CREATININE 0.41* 0.33*   MG 1.8 1.5*     Hepatic Function Panel:   No results for input(s): PROT, LABALBU, BILIDIR, IBILI, BILITOT, ALKPHOS, ALT, AST in the last 72 hours. No results for input(s): RPR in the last 72 hours. No results for input(s): HIV in the last 72 hours. No results for input(s): BC in the last 72 hours. Lab Results   Component Value Date/Time    CREATININE 0.33 12/07/2022 04:33 AM    GLUCOSE 72 12/07/2022 04:33 AM    GLUCOSE 69 03/11/2012 12:33 PM       Detailed results: Thank you for allowing us to participate in the care of this patient. Please call with questions.     This note is created with the assistance of a speech recognition program.  While intending to generate adocument that actually reflects the content of the visit, the document can still have some errors including those of syntax and sound a like substitutions which may escape proof reading. It such instances, actual meaningcan be extrapolated by contextual diversion. Mindi Schneider  Office: [phone]  Perfect serve / office [phone]          I have discussed the care of the patient, including pertinent history and exam findings,  with the resident. I have seen and examined the patient and the key elements of all parts of the encounter have been performed by me. I agree with the assessment, plan and orders as documented by the resident.     Laurie Foster, Infectious Diseases

## 2022-12-07 NOTE — PROGRESS NOTES
Critical care team - Resident sign-out to medicine service      Date and time: 12/7/2022 3:36 PM  Patient's name:  Shital Fowler Record Number: [id number]  Patient's account/billing number: [de-identified]  Patient's YOB: 1980  Age: 43 y.o. Date of Admission: 12/3/2022 10:03 AM  Length of stay during current admission: 4    Primary Care Physician: Hieu Downing MD    Code Status: Full Code    Mode of physician to physician communication:        [x] Via telephone   [] In person     Date and time of sign-out: 12/7/2022 3:36 PM    Accepting Internal Medicine resident: Dr. Radhika Allen    Accepting Medicine team: [facility]    Accepting team's attending: Dr. Radhika Allen    Patient's current ICU Bed:  [phone]     Patient's assigned bed on floor:  329        [x] Med-Surg Monitored [] Step-down       [] Psychiatry ICU       [] Psych floor     Reason for ICU admission:     Septic shock secondary to obstructive uropathy    ICU course summary:     Jose Philippe is a 43 y.o. female presented with altered mental status. Patient has a history of MS, nonverbal at baseline, hypertension, hydronephrosis, neurogenic bladder (chronic indwelling suprapubic catheter), G tube dependent,  PVD, PE. This patient presented to the emergency department with a GCS of 3. She was immediately intubated. She did have tachycardia thereafter up to the 130s, blood pressures 128/87 on Levophed, SPO2 100% on FiO2 of 100 respiratory rate of 20. Blood work demonstrated lactate of 1.36, sodium 130, chloride 96, CO2 19, WBC 36.1, creatinine 3.08. Harden Beech Urinalysis remarkable for hemoglobin, many bacteria, large leukocyte esterase. She was given vancomycin and cefepime. CT head showed no acute intracranial abnormality. CT chest abdomen pelvis demonstrating likely pneumonia, innumerable renal calculi on the right side ranging in 5 to 8 mm in size.   There is a stack of obstructing calculi in the right ureter ranging from 5 to 11 mm, causing severe right hydronephrosis and hydroureter. Tiny gas pockets in the ascending colon showing pseudo pneumatosis. A central line was placed in left IJ. She was started on propofol and levophed. Was given Cefepime and Vancomycin. Admitted to ICU. Urology and ID consulted. Taken to OR for urgent cystoscopy for R ureteral stent insertion. Suprapubic catheter was exchanged. ID consulted, recommended single time dose of Amikacin, Meropenem. Pressor and ventilator needs subsequently decreased. Blood culture growing Bacteroides fragilis, proteus, morganella. Urine culture showing multiple organisms. Respiratory culture negative. Extubated 12/6/2022. Tolerated well with no complications. On room air. Weaned off of pressors. Tube feeds started. Stable for transfer to floor.       Procedures during patient's ICU stay:     Cystoscopy w/ ureteral stent placement  Suprapubic catheter exchange    Current Vitals:     /85   Pulse (!) 102   Temp 97.5 °F (36.4 °C) (Axillary)   Resp 22   Ht 5' (1.524 m)   Wt 186 lb 8.2 oz (84.6 kg)   SpO2 96%   BMI 36.43 kg/m²       Cultures:     Blood cultures:                 [] None drawn      [] Negative             []  Positive (Details:  )  Urine Culture:                   [] None drawn      [] Negative             []  Positive (Details:  )  Sputum Culture:               [] None drawn       [] Negative             []  Positive (Details:  )   Endotracheal aspirate:     [] None drawn       [] Negative             []  Positive (Details:  )       Consults:     ID  Urology  Surgery    Assessment:     Patient Active Problem List    Diagnosis Date Noted    Pyelonephritis 12/04/2022    Obstructive uropathy 12/04/2022    Bandemia 12/04/2022    SIRS (systemic inflammatory response syndrome) ([facility] 12/04/2022    Gram-neg septicemia ([facility] 12/04/2022    Pneumatosis coli 12/04/2022    Nephrostomy tube displaced ([facility] 09/26/2020    E coli bacteremia     E-coli UTI 01/23/2020    Sepsis due to

## 2022-12-07 NOTE — PROGRESS NOTES
INTENSIVE CARE UNIT  Resident Physician Progress Note    Patient - Creola Bence  Date of Admission -  12/3/2022 10:03 AM  Date of Evaluation -  12/7/2022  Room and Bed Number -  0403/7977-02   Hospital Day - 4    SUBJECTIVE:     HISTORY OF PRESENT ILLNESS:    Creola Bence is a 43 y.o. female presented with altered mental status. Patient has a history of MS, nonverbal at baseline, hypertension, hydronephrosis, neurogenic bladder (chronic indwelling suprapubic catheter), G tube dependent,  PVD, PE. This patient presented to the emergency department with a GCS of 3. She was immediately intubated. She did have tachycardia thereafter up to the 130s, blood pressures 128/87 on Levophed, SPO2 100% on FiO2 of 100 respiratory rate of 20. Blood work demonstrated lactate of 1.36, sodium 130, chloride 96, CO2 19, WBC 36.1, creatinine 3.08. Radha Farhad Urinalysis remarkable for hemoglobin, many bacteria, large leukocyte esterase. She was given vancomycin and cefepime. CT head showed no acute intracranial abnormality. CT chest abdomen pelvis demonstrating likely pneumonia, innumerable renal calculi on the right side ranging in 5 to 8 mm in size. There is a stack of obstructing calculi in the right ureter ranging from 5 to 11 mm, causing severe right hydronephrosis and hydroureter. Tiny gas pockets in the ascending colon showing pseudo pneumatosis. A central line was placed in left IJ. She was started on propofol and levophed. Was given Cefepime and Vancomycin. Admitted to ICU. Urology and ID consulted. Taken to OR for urgent cystoscopy for R ureteral stent insertion. Suprapubic catheter was exchanged. ID consulted, recommended single time dose of Amikacin, Meropenem. Pressor and ventilator needs subsequently decreased. Blood culture growing Bacteroides fragilis, proteus, morganella. Urine culture showing multiple organisms. Respiratory culture negative. Extubated 12/6/2022. Tolerated well with no complications. OVERNIGHT EVENTS:      No acute events overnight. Extubated yesterday. Tolerating well. T 99, , RR 20, /101, SPO2 94% room air        Labs  Na 142, K 3.1, Cl 110, CO2 23, iCa 1.25,   Cr 0.33  Mg 1.5  CBC pending at time of note  Procal 29.13 (>100)      Micro  Blood culture - Proteus, bacteroides,morganella  Urine cultures multiple organisms  Respiratory cultures negative    Abx  Meropenem 1g Q8H    Glucose levels appropriate    Continuous infusions  LR 100ml/hr    Urine output 1120, 0.55 cc/kg/hr      Plan  Continue Meropenem  Monitor lytes - replaced this AM  Continue tube feeds; Advance today to 35ml/hr  Midodrine 5mg to TID  Reglan held, monitor stool output  Consider removal of LIJ CVC  Consider transfer to floor today    Feeding Diet: Diet NPO  ADULT TUBE FEEDING; Orogastric; Peptide Based High Protein; Continuous; 35; Yes; 0; Other (specify); advance by 10 hourly; 35; 30; Q 4 hours    Fluids /hr    Family notified     Analgesic Tylenol 650 PRN    Sedation Propofol    Thrombo-prophylaxis Xarelto    Mobility Bed rest     Heads up 30*    Ulcer prophylaxis Pantoprazole 40mg IV QD    Glycemic control WNL    Spontaneous breathing trial  No    Bowel regimen/urine output Glycolax 17g QD PRN if decreased colostomy output     Indwelling catheter/lines: L IJ CVC (12/3), PIC 12/3 - L hand, R hand, R hand,  G tube (7/2022), Suprapubic cath (12/3. Sallyanne Chain Sallyanne Chain replaced), colostomy    De-escalation Wean off pressors/ventilator      OBJECTIVE:     VITAL SIGNS:  Patient Vitals for the past 8 hrs:   BP Temp Temp src Pulse Resp SpO2 Weight   12/07/22 0600 (!) 117/101 -- -- (!) 101 20 94 % 186 lb 8.2 oz (84.6 kg)   12/07/22 0500 111/76 -- -- (!) 101 20 94 % --   12/07/22 0400 109/74 99 °F (37.2 °C) Oral (!) 101 18 94 % --   12/07/22 0300 105/72 -- -- 100 20 95 % --   12/07/22 0210 103/71 -- -- (!) 102 15 96 % --   12/07/22 0100 94/75 -- -- 77 19 95 % --   12/07/22 0000 101/67 98.2 °F (36.8 °C) Oral 78 22 95 % -- Last Body weight:   Wt Readings from Last 3 Encounters:   22 186 lb 8.2 oz (84.6 kg)   22 150 lb (68 kg)   20 154 lb 5.2 oz (70 kg)       Body Mass Index : Body mass index is 36.43 kg/m². Tmax over 24 hours: Temp (24hrs), Av.9 °F (37.2 °C), Min:98.2 °F (36.8 °C), Max:99.8 °F (37.7 °C)      Ins/Outs: In: 3556.1 [I.V.:2576. 1; NG/GT:581]  Out: 2785 [Urine:1335]    PHYSICAL EXAM:  Constitutional: Intubated. Sedated. No acute distress. HEENT: PERRLA, EOMI, left eye deviation (chronic), sclera clear, anictericm midline trachea  Respiratory: intubated; ronchi bilaterally  Cardiovascular: regular rate and rhythm, no murmur or rub  Abdomen: soft, nondistended. A colostomy bag is present. A G tube is present. A suprapubic catheter is present. Surrounding skin appears normal with no erythema, breakdown, drainage. Extremities:  Right AKA. Peripheral pulses normal, no pedal edema, no clubbing or cyanosis; Left leg with increased tone, knee flexed  Skin: No erythema in intertriginous regions. There is sacrococcygeal skin hypopigmentation from previous breakdown. No skin ulcers.      MEDICATIONS:  Scheduled Meds:   potassium chloride  10 mEq IntraVENous Q1H    magnesium sulfate  2,000 mg IntraVENous Once    midodrine  5 mg Oral TID    rivaroxaban  20 mg Oral Daily    meropenem  1,000 mg IntraVENous Q8H    sodium chloride flush  5-40 mL IntraVENous 2 times per day    gabapentin  300 mg Per G Tube TID    pantoprazole (PROTONIX) 40 mg injection  40 mg IntraVENous Daily    [Held by provider] metoclopramide  5 mg Oral TID    [Held by provider] metoprolol tartrate  25 mg Oral TID       Continuous Infusions:   lactated ringers 100 mL/hr at 22 0411    propofol Stopped (22 1044)    norepinephrine 1 mcg/min (22 1016)    sodium chloride 10 mL/hr at 22 0343       PRN Meds:   albuterol, 2.5 mg, PRN  ipratropium, 0.5 mg, Q6H PRN  sodium chloride flush, 5-40 mL, PRN  sodium chloride, , PRN  polyethylene glycol, 17 g, Daily PRN  acetaminophen, 650 mg, Q6H PRN   Or  acetaminophen, 650 mg, Q6H PRN  ondansetron, 4 mg, Q6H PRN      SUPPORT DEVICES: [x] Ventilator [] BIPAP  [] Nasal Cannula [] Room Air    VENT SETTINGS (Comprehensive) (if applicable):See above  Vent Information  Ventilator ID: [id number]  Equipment Changed: HME, Expiratory Filter  Ventilator Initiate: Yes  Vent Mode: AC/PRVC  Additional Respiratory Assessments  Heart Rate: (!) 101  Resp: 20  SpO2: 94 %  End Tidal CO2: 30 (%)  Position: Semi-[place]'s  Humidification Source: HME  Skin Barrier Applied: No  Lab Results   Component Value Date/Time    MODE [facility] 12/05/2022 04:19 AM       ABGs:   See above  No results found for: PH, PCO2, PO2, HCO3, O2SAT    DATA:  Complete Blood Count:   Recent Labs     12/05/22 0418 12/06/22 0455   WBC 24.2* 19.2*   RBC 4.48 4.56   HGB 12.2 12.5   HCT 39.6 40.1   MCV 88.4 87.9   MCH 27.2 27.4   MCHC 30.8 31.2   RDW 15.5* 15.7*    178   MPV 10.8 12.3        Last 3 Blood Glucose:   Recent Labs     12/05/22 0418 12/06/22  0455 12/07/22  0433   GLUCOSE 81 72 72        PT/INR:    Lab Results   Component Value Date/Time    PROTIME 13.8 12/03/2022 10:14 AM    INR 1.3 12/03/2022 10:14 AM     PTT:    Lab Results   Component Value Date/Time    APTT 40.1 07/06/2020 10:07 AM       Basic Metabolic Profile:   Recent Labs     12/05/22 0418 12/06/22 0455 12/07/22  0433   * 144 142   K 3.4* 3.7 3.1*   * 109* 110*   CO2 22 22 23   BUN 14 16 16   CREATININE 0.53 0.41* 0.33*   GLUCOSE 81 72 72   PHOS 1.9* 2.8  --        Liver Function:  No results for input(s): PROT, LABALBU, ALT, AST, GGT, ALKPHOS, BILITOT in the last 72 hours.       Magnesium:   Lab Results   Component Value Date/Time    MG 1.5 12/07/2022 04:33 AM    MG 1.8 12/06/2022 04:55 AM    MG 1.5 12/05/2022 04:18 AM     Phosphorus:   Lab Results   Component Value Date/Time    PHOS 2.8 12/06/2022 04:55 AM    PHOS 1.9 12/05/2022 04:18 AM    PHOS 1.2 12/04/2022 05:12 AM     Ionized Calcium:   Lab Results   Component Value Date/Time    CAION 1.25 12/07/2022 04:33 AM    CAION 1.25 12/06/2022 04:55 AM    CAION 1.20 12/05/2022 04:18 AM        Urinalysis:   Lab Results   Component Value Date/Time    NITRU NEGATIVE 12/03/2022 11:04 AM    COLORU Yellow 12/03/2022 11:04 AM    PHUR 8.0 12/03/2022 11:04 AM    WBCUA 50  12/03/2022 11:04 AM    RBCUA 20 TO 50 12/03/2022 11:04 AM    MUCUS 2+ 11/30/2021 10:26 AM    TRICHOMONAS NOT REPORTED 11/30/2021 10:26 AM    YEAST NOT REPORTED 11/30/2021 10:26 AM    BACTERIA MANY 12/03/2022 11:04 AM    SPECGRAV 1.011 12/03/2022 11:04 AM    LEUKOCYTESUR LARGE 12/03/2022 11:04 AM    UROBILINOGEN Normal 12/03/2022 11:04 AM    BILIRUBINUR NEGATIVE 12/03/2022 11:04 AM    BILIRUBINUR NEGATIVE 03/11/2012 01:01 PM    GLUCOSEU NEGATIVE 12/03/2022 11:04 AM    GLUCOSEU NEGATIVE 03/11/2012 01:01 PM    KETUA NEGATIVE 12/03/2022 11:04 AM    AMORPHOUS 1+ 08/02/2022 09:00 AM       HgBA1c:  No results found for: LABA1C  TSH:    Lab Results   Component Value Date/Time    TSH 0.84 05/19/2019 07:50 AM     Lactic Acid:   Lab Results   Component Value Date/Time    LACTA 2.0 01/08/2017 12:12 PM      Troponin: No results for input(s): TROPONINI in the last 72 hours. Microbiology:  Blood and urine cultures pending    Other Labs:  Results for orders placed or performed during the hospital encounter of 12/03/22   Blood Culture 1    Specimen: Blood   Result Value Ref Range    Specimen Description . BLOOD     Special Requests LH     Culture POSITIVE Blood Culture (A)     Culture DIRECT GRAM STAIN FROM BOTTLE: GRAM NEGATIVE RODS     Culture       Bacteroides fragilis Detected: Methodology- Polymerase Chain Reaction (PCR)    Culture       Enterobacterales group  (not E. cloacae complex, E. coli, K. oxytoca, K. pneumoniae, S. marcescens, or Proteus species)  Culture Results to Follow  Methodology- Polymerase Chain Reaction (PCR)      Culture BACTEROIDES FRAGILIS UNABLE TO GROW IN CULTURE (A)     Culture MORGANELLA MORGANII (A)     Culture       (NOTE) Direct Gram Stain from bottle and Polymerase Chain Reaction (PCR) results called to and read back by:NELIDA HOOPER AT 1000 ON 12/4/22       Susceptibility    Morganella morganii - BACTERIAL SUSCEPTIBILITY PANEL JOAQUIN     ampicillin >=32 Resistant      aztreonam <=1 Sensitive      ceFAZolin >=64 Resistant      cefTRIAXone <=1 Sensitive      ciprofloxacin >=4 Resistant      gentamicin <=1 Sensitive      tobramycin <=1 Sensitive      trimethoprim-sulfamethoxazole <=20 Sensitive      piperacillin-tazobactam <=4 Sensitive    Culture, Blood 2    Specimen: Blood   Result Value Ref Range    Specimen Description . BLOOD     Special Requests R AC 10ML     Culture POSITIVE Blood Culture (A)     Culture DIRECT GRAM STAIN FROM BOTTLE: GRAM NEGATIVE RODS     Culture PROTEUS MIRABILIS (A)     Culture       (NOTE) DIRECT GRAM STAIN FROM BOTTLE:NELIDA HOOPER AT 1045 AT 12/4/22       Susceptibility    Proteus mirabilis - BACTERIAL SUSCEPTIBILITY PANEL JOAQUIN     amikacin 4 Sensitive      ampicillin 4 Sensitive      aztreonam <=1 Sensitive      ceFAZolin 8 Sensitive      cefTRIAXone <=1 Sensitive      ciprofloxacin >=4 Resistant      gentamicin >=16 Resistant      tobramycin 8 Intermediate      trimethoprim-sulfamethoxazole >=320 Resistant      piperacillin-tazobactam <=4 Sensitive    Culture, Urine    Specimen: Urine, clean catch   Result Value Ref Range    Specimen Description . CLEAN CATCH URINE     Culture       Several types of bacteria were identified in this specimen. Further ID and susceptibility testing is generally not helpful in this circumstance and has not been performed. Consider recollection if clinically indicated. Please contact the Micro lab ([phone]) if you feel the management ofthis particular situation would be helped by further testing.      MRSA DNA Probe, Nasal    Specimen: Nasal   Result Value Ref Range    Specimen Description [place] NASAL SWAB     MRSA, DNA, Nasal NEGATIVE NEGATIVE   Culture, Respiratory    Specimen: Sputum Aspirated   Result Value Ref Range    Specimen Description . ASPIRATED SPUTUM     Direct Exam < 10 EPITHELIAL CELLS/LPF     Direct Exam >10, <25 NEUTROPHILS/LPF     Direct Exam NO SIGNIFICANT PATHOGENS SEEN     Culture NORMAL RESPIRATORY FLOYD LIGHT GROWTH    ELECTROLYTES PLUS   Result Value Ref Range    POC Sodium 133 (L) 138 - 146 mmol/L    POC Potassium 3.6 3.5 - 4.5 mmol/L    POC Chloride 103 98 - 107 mmol/L    POC TCO2 20 (L) 22 - 30 mmol/L    Anion Gap 11 7 - 16 mmol/L   Hemoglobin and hematocrit, blood   Result Value Ref Range    POC Hemoglobin 15.3 12.0 - 16.0 g/dL    POC Hematocrit 45 36 - 46 %   CALCIUM, IONIC (POC)   Result Value Ref Range    POC Ionized Calcium 1.19 1.15 - 1.33 mmol/L   CBC with Auto Differential   Result Value Ref Range    WBC 36.1 (HH) 3.5 - 11.3 k/uL    RBC 4.53 3.95 - 5.11 m/uL    Hemoglobin 12.3 11.9 - 15.1 g/dL    Hematocrit 40.5 36.3 - 47.1 %    MCV 89.4 82.6 - 102.9 fL    MCH 27.2 25.2 - 33.5 pg    MCHC 30.4 28.4 - 34.8 g/dL    RDW 15.9 (H) 11.8 - 14.4 %    Platelets 521 723 - 222 k/uL    MPV 10.7 8.1 - 13.5 fL    NRBC Automated 0.0 0.0 per 100 WBC    Immature Granulocytes 4 (H) 0 %    Seg Neutrophils 81 (H) 36 - 66 %    Lymphocytes 8 (L) 24 - 44 %    Monocytes 7 1 - 7 %    Eosinophils % 0 (L) 1 - 4 %    Basophils 0 0 - 2 %    Absolute Immature Granulocyte 1.44 (H) 0.00 - 0.30 k/uL    Segs Absolute 29.24 (H) 1.8 - 7.7 k/uL    Absolute Lymph # 2.89 1.0 - 4.8 k/uL    Absolute Mono # 2.53 (H) 0.1 - 0.8 k/uL    Absolute Eos # 0.00 0.0 - 0.4 k/uL    Basophils Absolute 0.00 0.0 - 0.2 k/uL    Morphology ANISOCYTOSIS PRESENT     Morphology INCREASED BANDS PRESENT    CMP   Result Value Ref Range    Glucose 144 (H) 70 - 99 mg/dL    BUN 48 (H) 6 - 20 mg/dL    Creatinine 2.24 (H) 0.50 - 0.90 mg/dL    Est, Glom Filt Rate 27 (L) >60 mL/min/1.73m2    Calcium 8.9 8.6 - 10.4 mg/dL    Sodium 130 (L) 135 - 144 mmol/L    Potassium 4.0 3.7 - 5.3 mmol/L    Chloride 96 (L) 98 - 107 mmol/L    CO2 19 (L) 20 - 31 mmol/L    Anion Gap 15 9 - 17 mmol/L    Alkaline Phosphatase 141 (H) 35 - 104 U/L    ALT 39 (H) 5 - 33 U/L    AST 58 (H) <32 U/L    Total Bilirubin 0.4 0.3 - 1.2 mg/dL    Total Protein 8.2 6.4 - 8.3 g/dL    Albumin 3.0 (L) 3.5 - 5.2 g/dL    Albumin/Globulin Ratio 0.6 (L) 1.0 - 2.5   Protime-INR   Result Value Ref Range    Protime 13.8 (H) 9.1 - 12.3 sec    INR 1.3    Lactate, Sepsis   Result Value Ref Range    Lactic Acid, Sepsis, Whole Blood 1.6 0.5 - 1.9 mmol/L   Urinalysis with Microscopic   Result Value Ref Range    Color, UA Yellow Yellow    Turbidity UA Turbid (A) Clear    Glucose, Ur NEGATIVE NEGATIVE    Bilirubin Urine NEGATIVE NEGATIVE    Ketones, Urine NEGATIVE NEGATIVE    Specific Gravity, UA 1.011 1.005 - 1.030    Urine Hgb LARGE (A) NEGATIVE    pH, UA 8.0 5.0 - 8.0    Protein, UA 3+ (A) NEGATIVE    Urobilinogen, Urine Normal Normal    Nitrite, Urine NEGATIVE NEGATIVE    Leukocyte Esterase, Urine LARGE (A) NEGATIVE    WBC, UA 50  0 - 5 /HPF    RBC, UA 20 TO 50 0 - 2 /HPF    Epithelial Cells UA 5 TO 10 0 - 5 /HPF    Bacteria, UA MANY (A) None   Procalcitonin   Result Value Ref Range    Procalcitonin >100.00 (H) <0.09 ng/mL   Basic Metabolic Panel w/ Reflex to MG   Result Value Ref Range    Glucose 121 (H) 70 - 99 mg/dL    BUN 27 (H) 6 - 20 mg/dL    Creatinine 0.76 0.50 - 0.90 mg/dL    Est, Glom Filt Rate >60 >60 mL/min/1.73m2    Calcium 8.5 (L) 8.6 - 10.4 mg/dL    Sodium 142 135 - 144 mmol/L    Potassium 3.4 (L) 3.7 - 5.3 mmol/L    Chloride 111 (H) 98 - 107 mmol/L    CO2 21 20 - 31 mmol/L    Anion Gap 10 9 - 17 mmol/L   Calcium, Ionized   Result Value Ref Range    Calcium, Ionized 1.18 1.13 - 1.33 mmol/L   Magnesium   Result Value Ref Range    Magnesium 1.9 1.6 - 2.6 mg/dL   Phosphorus   Result Value Ref Range    Phosphorus 1.2 (L) 2.6 - 4.5 mg/dL   CBC with Auto Differential   Result Value Ref Range    WBC 28.2 (H) 3.5 - 11.3 k/uL    RBC 4.02 3.95 - 5.11 m/uL    Hemoglobin 10.9 (L) 11.9 - 15.1 g/dL    Hematocrit 35.0 (L) 36.3 - 47.1 %    MCV 87.1 82.6 - 102.9 fL    MCH 27.1 25.2 - 33.5 pg    MCHC 31.1 28.4 - 34.8 g/dL    RDW 15.5 (H) 11.8 - 14.4 %    Platelets 608 564 - 243 k/uL    MPV 11.0 8.1 - 13.5 fL    NRBC Automated 0.1 (H) 0.0 per 100 WBC    Immature Granulocytes 0 0 %    Seg Neutrophils 89 (H) 36 - 66 %    Lymphocytes 4 (L) 24 - 44 %    Monocytes 7 1 - 7 %    Eosinophils % 0 (L) 1 - 4 %    Basophils 0 0 - 2 %    Absolute Immature Granulocyte 0.00 0.00 - 0.30 k/uL    Segs Absolute 25.10 (H) 1.8 - 7.7 k/uL    Absolute Lymph # 1.13 1.0 - 4.8 k/uL    Absolute Mono # 1.97 (H) 0.1 - 0.8 k/uL    Absolute Eos # 0.00 0.0 - 0.4 k/uL    Basophils Absolute 0.00 0.0 - 0.2 k/uL    Morphology ANISOCYTOSIS PRESENT     Morphology INCREASED BANDS PRESENT    Vancomycin Level, Random   Result Value Ref Range    Vancomycin Rm 10.3 ug/mL   Basic Metabolic Panel w/ Reflex to MG   Result Value Ref Range    Glucose 81 70 - 99 mg/dL    BUN 14 6 - 20 mg/dL    Creatinine 0.53 0.50 - 0.90 mg/dL    Est, Glom Filt Rate >60 >60 mL/min/1.73m2    Calcium 9.3 8.6 - 10.4 mg/dL    Sodium 148 (H) 135 - 144 mmol/L    Potassium 3.4 (L) 3.7 - 5.3 mmol/L    Chloride 112 (H) 98 - 107 mmol/L    CO2 22 20 - 31 mmol/L    Anion Gap 14 9 - 17 mmol/L   Calcium, Ionized   Result Value Ref Range    Calcium, Ionized 1.20 1.13 - 1.33 mmol/L   Magnesium   Result Value Ref Range    Magnesium 1.5 (L) 1.6 - 2.6 mg/dL   Phosphorus   Result Value Ref Range    Phosphorus 1.9 (L) 2.6 - 4.5 mg/dL   CBC with Auto Differential   Result Value Ref Range    WBC 24.2 (H) 3.5 - 11.3 k/uL    RBC 4.48 3.95 - 5.11 m/uL    Hemoglobin 12.2 11.9 - 15.1 g/dL    Hematocrit 39.6 36.3 - 47.1 %    MCV 88.4 82.6 - 102.9 fL    MCH 27.2 25.2 - 33.5 pg    MCHC 30.8 28.4 - 34.8 g/dL    RDW 15.5 (H) 11.8 - 14.4 %    Platelets 341 538 - 745 k/uL    MPV 10.8 8.1 - 13.5 fL    NRBC Automated 0.0 0.0 per 100 WBC    Immature Granulocytes 1 (H) 0 %    Seg Neutrophils 84 (H) 36 - 66 %    Lymphocytes 6 (L) 24 - 44 %    Monocytes 9 (H) 1 - 7 %    Eosinophils % 0 (L) 1 - 4 %    Basophils 0 0 - 2 %    Absolute Immature Granulocyte 0.24 0.00 - 0.30 k/uL    Segs Absolute 20.33 (H) 1.8 - 7.7 k/uL    Absolute Lymph # 1.45 1.0 - 4.8 k/uL    Absolute Mono # 2.18 (H) 0.1 - 0.8 k/uL    Absolute Eos # 0.00 0.0 - 0.4 k/uL    Basophils Absolute 0.00 0.0 - 0.2 k/uL    Morphology ANISOCYTOSIS PRESENT    Lactic Acid   Result Value Ref Range    Lactic Acid, Whole Blood 0.9 0.7 - 2.1 mmol/L   Procalcitonin   Result Value Ref Range    Procalcitonin 29.13 (H) <0.09 ng/mL   Basic Metabolic Panel w/ Reflex to MG   Result Value Ref Range    Glucose 72 70 - 99 mg/dL    BUN 16 6 - 20 mg/dL    Creatinine 0.41 (L) 0.50 - 0.90 mg/dL    Est, Glom Filt Rate >60 >60 mL/min/1.73m2    Calcium 9.6 8.6 - 10.4 mg/dL    Sodium 144 135 - 144 mmol/L    Potassium 3.7 3.7 - 5.3 mmol/L    Chloride 109 (H) 98 - 107 mmol/L    CO2 22 20 - 31 mmol/L    Anion Gap 13 9 - 17 mmol/L   Calcium, Ionized   Result Value Ref Range    Calcium, Ionized 1.25 1.13 - 1.33 mmol/L   Magnesium   Result Value Ref Range    Magnesium 1.8 1.6 - 2.6 mg/dL   Phosphorus   Result Value Ref Range    Phosphorus 2.8 2.6 - 4.5 mg/dL   CBC with Auto Differential   Result Value Ref Range    WBC 19.2 (H) 3.5 - 11.3 k/uL    RBC 4.56 3.95 - 5.11 m/uL    Hemoglobin 12.5 11.9 - 15.1 g/dL    Hematocrit 40.1 36.3 - 47.1 %    MCV 87.9 82.6 - 102.9 fL    MCH 27.4 25.2 - 33.5 pg    MCHC 31.2 28.4 - 34.8 g/dL    RDW 15.7 (H) 11.8 - 14.4 %    Platelets 075 972 - 845 k/uL    MPV 12.3 8.1 - 13.5 fL    NRBC Automated 0.0 0.0 per 100 WBC    Immature Granulocytes 0 0 %    Seg Neutrophils 75 (H) 36 - 66 %    Lymphocytes 8 (L) 24 - 44 %    Monocytes 15 (H) 1 - 7 %    Eosinophils % 2 1 - 4 %    Basophils 0 0 - 2 %    Absolute Immature Granulocyte 0.00 0.00 - 0.30 k/uL    Segs Absolute 14.40 (H) 1.8 - 7.7 k/uL    Absolute Lymph # 1.54 1.0 - 4.8 k/uL    Absolute Mono # 2.88 (H) 0.1 - 0.8 k/uL    Absolute Eos # 0.38 0.0 - 0.4 k/uL    Basophils Absolute 0.00 0.0 - 0.2 k/uL    Morphology ANISOCYTOSIS PRESENT    Basic Metabolic Panel w/ Reflex to MG   Result Value Ref Range    Glucose 72 70 - 99 mg/dL    BUN 16 6 - 20 mg/dL    Creatinine 0.33 (L) 0.50 - 0.90 mg/dL    Est, Glom Filt Rate >60 >60 mL/min/1.73m2    Calcium 9.2 8.6 - 10.4 mg/dL    Sodium 142 135 - 144 mmol/L    Potassium 3.1 (L) 3.7 - 5.3 mmol/L    Chloride 110 (H) 98 - 107 mmol/L    CO2 23 20 - 31 mmol/L    Anion Gap 9 9 - 17 mmol/L   Calcium, Ionized   Result Value Ref Range    Calcium, Ionized 1.25 1.13 - 1.33 mmol/L   Magnesium   Result Value Ref Range    Magnesium 1.5 (L) 1.6 - 2.6 mg/dL   Venous Blood Gas, POC   Result Value Ref Range    pH, Niraj 7.249 (L) 7.320 - 7.430    pCO2, Niraj 46.7 41.0 - 51.0 mm Hg    pO2, Niraj 130.2 (H) 30.0 - 50.0 mm Hg    HCO3, Venous 20.4 (L) 22.0 - 29.0 mmol/L    Negative Base Excess, Niraj 7 (H) 0.0 - 2.0    O2 Sat, Niraj 98 (H) 60.0 - 85.0 %   Creatinine W/GFR Point of Care   Result Value Ref Range    POC Creatinine 3.08 (H) 0.51 - 1.19 mg/dL    eGFR, POC 19 mL/min/1.73m2   POCT urea (BUN)   Result Value Ref Range    POC BUN 54 (H) 8 - 26 mg/dL   Lactic Acid, POC   Result Value Ref Range    POC Lactic Acid 1.36 0.56 - 1.39 mmol/L   POCT Glucose   Result Value Ref Range    POC Glucose 153 (H) 74 - 100 mg/dL   Mixed Venous Gas, POC   Result Value Ref Range    PH MIXED 7.207 (L) 7.310 - 7.410    PCO2, Mixed 35.5 (L) 42.0 - 52.0 mm Hg    PO2, Mixed 60.7 (H) 35.0 - 45.0 mm Hg    HCO3, Mixed 14.1 (L) 23.0 - 29.0 mmol/L    Negative Base Excess, Mixed 13 (H) 0.0 - 2.0    O2 Sat, Mixed 85 (H) 60.0 - 80.0 %    O2 Device/Flow/% Adult Ventilator     Hugo Test POSITIVE     Sample Site Right Radial Artery     Mode PRVC     FIO2 60.0    POC Glucose Fingerstick   Result Value Ref Range    POC Glucose 182 (H) 65 - 105 mg/dL   Arterial Blood Gas, POC   Result Value Ref Range    POC pH 7.407 7.350 - 7.450    POC pCO2 35.5 35.0 - 48.0 mm Hg    POC PO2 99.3 83.0 - 108.0 mm Hg    POC HCO3 22.3 21.0 - 28.0 mmol/L    Negative Base Excess, Art 2 0.0 - 2.0    POC O2 SAT 98 94.0 - 98.0 %    O2 Device/Flow/% Adult Ventilator     Hugo Test POSITIVE     Sample Site Right Radial Artery     Mode PRVC     FIO2 40.0    POCT Glucose   Result Value Ref Range    POC Glucose 119 (H) 74 - 100 mg/dL   Arterial Blood Gas, POC   Result Value Ref Range    POC pH 7.491 (H) 7.350 - 7.450    POC pCO2 29.6 (L) 35.0 - 48.0 mm Hg    POC PO2 66.9 (L) 83.0 - 108.0 mm Hg    POC HCO3 22.6 21.0 - 28.0 mmol/L    Positive Base Excess, Art 0 0.0 - 3.0    POC O2 SAT 95 94.0 - 98.0 %    O2 Device/Flow/% Adult Ventilator     Hugo Test POSITIVE     Sample Site Right Radial Artery     Mode PRVC     FIO2 30.0    POCT Glucose   Result Value Ref Range    POC Glucose 88 74 - 100 mg/dL   Arterial Blood Gas, POC   Result Value Ref Range    POC pH 7.451 (H) 7.350 - 7.450    POC pCO2 32.9 (L) 35.0 - 48.0 mm Hg    POC PO2 74.4 (L) 83.0 - 108.0 mm Hg    POC HCO3 22.9 21.0 - 28.0 mmol/L    Positive Base Excess, Art 0 0.0 - 3.0    POC O2 SAT 96 94.0 - 98.0 %    O2 Device/Flow/% Adult Ventilator     Hugo Test POSITIVE     Sample Site Right Radial Artery     FIO2 30.0    POCT Glucose   Result Value Ref Range    POC Glucose 83 74 - 100 mg/dL       Radiology/Imaging:  XR CHEST PORTABLE   Final Result   Mild right basilar airspace opacities. This could represent atelectasis or   pneumonia in the appropriate clinical setting         FLUORO FOR SURGICAL PROCEDURES   Final Result      XR CHEST PORTABLE   Final Result   Satisfactory line and tube placement. CT Head W/O Contrast   Final Result   No acute intracranial abnormality. CT CHEST ABDOMEN PELVIS WO CONTRAST Additional Contrast? None   Final Result   1. ETT now terminates 12 mm above denys.    2. Patchy perihilar and more confluent posterior segment airspace   consolidations likely combination of pneumonia and atelectasis. Trace right   pleural effusion. 3. Innumerable right renal calculi most in the 5-8 mm size range. Moderate   to severe right hydronephrosis and hydroureter. There is a stack of 4   obstructing calculi in the distal right ureter ranging from 5-11 mm   accounting for finding. 11 mm calculus is most distal and positioned about 2   cm from the uterovesical junction. 4. Liquid stool in the colon suggests diarrheal disease. Tiny gas pockets   along the ascending colon wall in the dependent portion appear to be pseudo   pneumatosis. XR CHEST PORTABLE   Final Result   Chest x-ray:      1. ETT is just entering the right mainstem bronchus by few mm. Repositioning   advised. 2. Some progression of mild elevation right hemidiaphragm. 3. Patchy perihilar opacities probably vascular. Abdomen:      1. NG tube terminates in the left upper quadrant. Side port is positioned in   the midline probably in the distal gastric antrum. 2. Cholelithiasis. 3. Peg tube in place. 4. No small bowel distension. Findings were discussed with MORTEZA PAGE at 11:07 a.m. on 12/3/2022. XR ABDOMEN FOR NG/OG/NE TUBE PLACEMENT   Final Result   Chest x-ray:      1. ETT is just entering the right mainstem bronchus by few mm. Repositioning   advised. 2. Some progression of mild elevation right hemidiaphragm. 3. Patchy perihilar opacities probably vascular. Abdomen:      1. NG tube terminates in the left upper quadrant. Side port is positioned in   the midline probably in the distal gastric antrum. 2. Cholelithiasis. 3. Peg tube in place. 4. No small bowel distension. Findings were discussed with MORTEZA PAGE at 11:07 a.m. on 12/3/2022.              ASSESSMENT:     Patient Active Problem List    Diagnosis Date Noted    Pyelonephritis 12/04/2022    Obstructive uropathy 12/04/2022    Bandemia 12/04/2022    SIRS (systemic inflammatory response syndrome) ([facility] 12/04/2022    Gram-neg septicemia ([address] 12/04/2022    Pneumatosis coli 12/04/2022    Nephrostomy tube displaced ([address] 09/26/2020    E coli bacteremia     E-coli UTI 01/23/2020    Sepsis due to Escherichia coli ([address] 01/23/2020    Lactate blood increase 01/22/2020    Proteus mirabilis infection 08/02/2019    Decubitus ulcer of coccygeal region, stage 4 ([address] 07/30/2019    Ileus ([address] 07/29/2019    Urinary tract infection associated with indwelling urethral catheter ([address] 07/28/2019    Abdominal pain 07/28/2019    Pneumonia due to organism     Extended spectrum beta lactamase (ESBL) resistance     Sepsis ([address] 06/25/2019    Lower paraplegia ([address]     Toxic metabolic encephalopathy 81/95/6464    Status post colostomy ([address] 05/19/2019    ESBL E. coli carrier     Septic shock (HCC)     Altered mental status     Lactic acidosis 05/17/2019    Chronic indwelling Davies catheter 05/17/2019    Peripheral vascular disease ([address] 05/17/2019    Recurrent UTI (urinary tract infection) 05/17/2019    History of pulmonary embolism 05/17/2019    Hypertension 05/17/2019    Gross hematuria 01/11/2017    Tachycardia     Hemorrhagic cystitis 01/09/2017    Hypokalemia 01/09/2017    Neurogenic bladder     Aphasia 08/26/2015    Multiple sclerosis ([address] 08/26/2015    Nonverbal 08/26/2015    Gastrostomy tube dependent ([address] 08/26/2015    Dislodged gastrostomy tube 08/26/2015        PLAN:   PLAN/MEDICAL DECISION MAKING:  Neurologic:   Neuro checks per protocol  Propofol for sedation  Cardiovascular:  MAP goal 65  Levophed  Midodrine 5mg TID  Hold home metoprolol  Pulmonary:  Maintain oxygen sats >92%  Pulmonary toilet  Vent Information  Ventilator ID: [id number]  Equipment Changed: HME, Expiratory Filter  Ventilator Initiate: Yes  Vent Mode: AC/PRVC  GI/Nutrition  Glycolax 17g  Ulcer Prophylaxis: Pantoprazole 40mg IV QD  Tube feeds; advance  Diet:Diet NPO; tube feeds through Gtube    Renal/Fluid/Electrolyte  IV Fluids: LR @ 100 /hr  I/O: In: 3556.1 [I.V.:2576. 1; NG/GT:581]  Out: 2785 [Urine:1335]  Urine output 1120, 0.55 cc/kg/hr  Monitor electrolytes, replace PRN   ID  WBC:   Lab Results   Component Value Date    WBC 19.2 (H) 2022     Tmax: Temp (24hrs), Av.9 °F (37.2 °C), Min:98.2 °F (36.8 °C), Max:99.8 °F (37.7 °C)  Antimicrobials: Meropenem   Bcx: bacteroides, morganella, proteus  FU Bcx, Ucx    Hematology:  Recent Labs     22  0418 22  0455   HGB 12.2 12.5      Endocrine:   glucose controlled - most recent BGL is   Recent Labs     22  0418 22  0455 22  0433   GLUCOSE 81 72 72     DVT Prophylaxis  On Xarelto  Discharge Needs:    , PT, OT, ST, SW, and Case Management      CODE STATUS: Full Code      DISPOSITION:  [x] To remain ICU:   [] OK for out of ICU from [place] Rosenberg MD  Emergency medicine

## 2022-12-08 LAB
ABSOLUTE EOS #: 0.82 K/UL (ref 0–0.44)
ABSOLUTE IMMATURE GRANULOCYTE: 0.33 K/UL (ref 0–0.3)
ABSOLUTE LYMPH #: 2.13 K/UL (ref 1.1–3.7)
ABSOLUTE MONO #: 1.8 K/UL (ref 0.1–1.2)
ANION GAP SERPL CALCULATED.3IONS-SCNC: 8 MMOL/L (ref 9–17)
BASOPHILS # BLD: 1 % (ref 0–2)
BASOPHILS ABSOLUTE: 0.16 K/UL (ref 0–0.2)
BUN BLDV-MCNC: 13 MG/DL (ref 6–20)
CALCIUM IONIZED: 1.13 MMOL/L (ref 1.13–1.33)
CALCIUM SERPL-MCNC: 8.5 MG/DL (ref 8.6–10.4)
CHLORIDE BLD-SCNC: 105 MMOL/L (ref 98–107)
CO2: 24 MMOL/L (ref 20–31)
CREAT SERPL-MCNC: 0.22 MG/DL (ref 0.5–0.9)
EOSINOPHILS RELATIVE PERCENT: 5 % (ref 1–4)
GFR SERPL CREATININE-BSD FRML MDRD: >60 ML/MIN/1.73M2
GLUCOSE BLD-MCNC: 72 MG/DL (ref 70–99)
HCT VFR BLD CALC: 35.2 % (ref 36.3–47.1)
HEMOGLOBIN: 10.9 G/DL (ref 11.9–15.1)
IMMATURE GRANULOCYTES: 2 %
LYMPHOCYTES # BLD: 13 % (ref 24–43)
MAGNESIUM: 1.6 MG/DL (ref 1.6–2.6)
MCH RBC QN AUTO: 27.2 PG (ref 25.2–33.5)
MCHC RBC AUTO-ENTMCNC: 31 G/DL (ref 28.4–34.8)
MCV RBC AUTO: 87.8 FL (ref 82.6–102.9)
MONOCYTES # BLD: 11 % (ref 3–12)
MORPHOLOGY: ABNORMAL
NRBC AUTOMATED: 0 PER 100 WBC
PDW BLD-RTO: 15.3 % (ref 11.8–14.4)
PLATELET # BLD: 247 K/UL (ref 138–453)
PMV BLD AUTO: 10.9 FL (ref 8.1–13.5)
POTASSIUM SERPL-SCNC: 3.3 MMOL/L (ref 3.7–5.3)
RBC # BLD: 4.01 M/UL (ref 3.95–5.11)
SEG NEUTROPHILS: 68 % (ref 36–65)
SEGMENTED NEUTROPHILS ABSOLUTE COUNT: 11.16 K/UL (ref 1.5–8.1)
SODIUM BLD-SCNC: 137 MMOL/L (ref 135–144)
WBC # BLD: 16.4 K/UL (ref 3.5–11.3)

## 2022-12-08 PROCEDURE — 99232 SBSQ HOSP IP/OBS MODERATE 35: CPT | Performed by: INTERNAL MEDICINE

## 2022-12-08 PROCEDURE — 6370000000 HC RX 637 (ALT 250 FOR IP): Performed by: STUDENT IN AN ORGANIZED HEALTH CARE EDUCATION/TRAINING PROGRAM

## 2022-12-08 PROCEDURE — 2580000003 HC RX 258: Performed by: INTERNAL MEDICINE

## 2022-12-08 PROCEDURE — 2580000003 HC RX 258: Performed by: STUDENT IN AN ORGANIZED HEALTH CARE EDUCATION/TRAINING PROGRAM

## 2022-12-08 PROCEDURE — 82330 ASSAY OF CALCIUM: CPT

## 2022-12-08 PROCEDURE — 6360000002 HC RX W HCPCS: Performed by: INTERNAL MEDICINE

## 2022-12-08 PROCEDURE — 1200000000 HC SEMI PRIVATE

## 2022-12-08 PROCEDURE — 80048 BASIC METABOLIC PNL TOTAL CA: CPT

## 2022-12-08 PROCEDURE — 6370000000 HC RX 637 (ALT 250 FOR IP)

## 2022-12-08 PROCEDURE — 83735 ASSAY OF MAGNESIUM: CPT

## 2022-12-08 PROCEDURE — 99233 SBSQ HOSP IP/OBS HIGH 50: CPT | Performed by: INTERNAL MEDICINE

## 2022-12-08 PROCEDURE — 6360000002 HC RX W HCPCS: Performed by: STUDENT IN AN ORGANIZED HEALTH CARE EDUCATION/TRAINING PROGRAM

## 2022-12-08 PROCEDURE — 85025 COMPLETE CBC W/AUTO DIFF WBC: CPT

## 2022-12-08 PROCEDURE — 36415 COLL VENOUS BLD VENIPUNCTURE: CPT

## 2022-12-08 RX ORDER — MAGNESIUM OXIDE 400 MG/1
400 TABLET ORAL 3 TIMES DAILY
Status: DISCONTINUED | OUTPATIENT
Start: 2022-12-08 | End: 2022-12-08

## 2022-12-08 RX ORDER — LANOLIN ALCOHOL/MO/W.PET/CERES
400 CREAM (GRAM) TOPICAL 3 TIMES DAILY
Status: DISCONTINUED | OUTPATIENT
Start: 2022-12-08 | End: 2022-12-08

## 2022-12-08 RX ORDER — POTASSIUM CHLORIDE 20 MEQ/1
40 TABLET, EXTENDED RELEASE ORAL ONCE
Status: DISCONTINUED | OUTPATIENT
Start: 2022-12-08 | End: 2022-12-08

## 2022-12-08 RX ORDER — MAGNESIUM SULFATE IN WATER 40 MG/ML
2000 INJECTION, SOLUTION INTRAVENOUS ONCE
Status: COMPLETED | OUTPATIENT
Start: 2022-12-08 | End: 2022-12-08

## 2022-12-08 RX ADMIN — LANSOPRAZOLE 30 MG: KIT at 05:45

## 2022-12-08 RX ADMIN — MAGNESIUM SULFATE HEPTAHYDRATE 2000 MG: 40 INJECTION, SOLUTION INTRAVENOUS at 12:33

## 2022-12-08 RX ADMIN — GABAPENTIN 300 MG: 300 CAPSULE ORAL at 20:35

## 2022-12-08 RX ADMIN — MIDODRINE HYDROCHLORIDE 5 MG: 5 TABLET ORAL at 20:35

## 2022-12-08 RX ADMIN — POTASSIUM BICARBONATE 40 MEQ: 782 TABLET, EFFERVESCENT ORAL at 08:45

## 2022-12-08 RX ADMIN — MIDODRINE HYDROCHLORIDE 5 MG: 5 TABLET ORAL at 14:09

## 2022-12-08 RX ADMIN — SODIUM CHLORIDE, PRESERVATIVE FREE 10 ML: 5 INJECTION INTRAVENOUS at 08:45

## 2022-12-08 RX ADMIN — SODIUM CHLORIDE, PRESERVATIVE FREE 10 ML: 5 INJECTION INTRAVENOUS at 20:36

## 2022-12-08 RX ADMIN — POTASSIUM CHLORIDE: 149 INJECTION, SOLUTION, CONCENTRATE INTRAVENOUS at 13:12

## 2022-12-08 RX ADMIN — RIVAROXABAN 20 MG: 20 TABLET, FILM COATED ORAL at 17:38

## 2022-12-08 RX ADMIN — PIPERACILLIN AND TAZOBACTAM 4500 MG: 4; .5 INJECTION, POWDER, FOR SOLUTION INTRAVENOUS at 21:26

## 2022-12-08 RX ADMIN — GABAPENTIN 300 MG: 300 CAPSULE ORAL at 14:09

## 2022-12-08 RX ADMIN — PIPERACILLIN AND TAZOBACTAM 4500 MG: 4; .5 INJECTION, POWDER, FOR SOLUTION INTRAVENOUS at 14:44

## 2022-12-08 RX ADMIN — SODIUM CHLORIDE: 9 INJECTION, SOLUTION INTRAVENOUS at 05:50

## 2022-12-08 RX ADMIN — GABAPENTIN 300 MG: 300 CAPSULE ORAL at 08:44

## 2022-12-08 RX ADMIN — MAGNESIUM GLUCONATE 500 MG ORAL TABLET 400 MG: 500 TABLET ORAL at 08:44

## 2022-12-08 RX ADMIN — SODIUM CHLORIDE, POTASSIUM CHLORIDE, SODIUM LACTATE AND CALCIUM CHLORIDE: 600; 310; 30; 20 INJECTION, SOLUTION INTRAVENOUS at 03:24

## 2022-12-08 RX ADMIN — MIDODRINE HYDROCHLORIDE 5 MG: 5 TABLET ORAL at 08:44

## 2022-12-08 RX ADMIN — MEROPENEM 1000 MG: 1 INJECTION, POWDER, FOR SOLUTION INTRAVENOUS at 05:53

## 2022-12-08 ASSESSMENT — ENCOUNTER SYMPTOMS: EYE DISCHARGE: 0

## 2022-12-08 NOTE — CARE COORDINATION
Transitional planning:  Nurse rounds:   Received report from unit RN, pt is on room air now. Has transfer for med surg. PEG for TF, nonverbal.     Bartolo [id number], pt's mother, she was in the shower and doesn't have a pen handy to write down NCM number to call when she decides what facility she would like pt to go to. She said just keep her going to [facility], for now, she hasn't decided. NCM to f/u with mother, again.

## 2022-12-08 NOTE — PROGRESS NOTES
Comprehensive Nutrition Assessment    Type and Reason for Visit:  Reassess    Nutrition Recommendations/Plan:   Suggest increasing TF goal to 45 mL/hr now that pt is off propofol. This will provide 1080 kcal and 95 g pro/day. Will continue to monitor TF tolerance/adequacy. Modify feeds as needed. Malnutrition Assessment:  Malnutrition Status:  Insufficient data (12/05/22 6197)    Context:  Acute Illness     Findings of the 6 clinical characteristics of malnutrition:  Energy Intake:  Mild decrease in energy intake (Comment)  Weight Loss:  No significant weight loss     Body Fat Loss:  Unable to assess     Muscle Mass Loss:  Unable to assess    Fluid Accumulation:  No significant fluid accumulation     Strength:  Not Performed    Nutrition Assessment:    Chart reviewed. Pt was extubated on 12/6/22. Pt remains NPO and on TF; Peptide Based High Protein Formula at 35 mL/hr continues at this time. RN reports pt is tolerating feeds well. Writer contacted [facility]; staff reports pt has been on Peptamen 1.5 tube feeding, but unable to verify rate as previous orders have been \"deleted\" in their system. Meds/labs reviewed. Nutrition Related Findings:    meds/labs reviewed; PEG, colostomy in place; R AKA Wound Type:  (KATHYA)       Current Nutrition Intake & Therapies:    Diet NPO  ADULT TUBE FEEDING; Orogastric; Peptide Based High Protein; Continuous; 35; Yes; 0; Other (specify); advance by 10 hourly; 35; 30; Q 4 hours  Current Tube Feeding (TF) Orders:  Formula: Peptide Based High Protein  Schedule: Continuous  Feeding Regimen: 35 mL/hr  Current TF & Flush Orders Provides: 35 mL/hr =840 kcal and 74 g pro/day  Goal TF & Flush Orders Provides: 45 mL/hr =1080 kcal and 95 g pro/day  Additional Calorie Sources:  none    Anthropometric Measures:  Height: 5' (152.4 cm)  Ideal Body Weight (IBW): 100 lbs (45 kg)    Admission Body Weight: 220 lb (99.8 kg)  Current Body Weight: 186 lb 8.2 oz (84.6 kg), 220.5 % IBW. Current BMI (kg/m2): 36.4        Weight Adjustment For: Amputation  Total Adjusted Percentage (Calculated): 10.1  Adjusted Ideal Body Weight (lbs) (Calculated): 89.9 lbs  Adjusted Ideal Body Weight (kg) (Calculated): 40.86 kg  Adjusted % Ideal Body Weight (Calculated): 245.2  Adjusted BMI (kg/m2) (Calculated): 40.1  BMI Categories: Obese Class 3 (BMI 40.0 or greater)    Estimated Daily Nutrient Needs:  Energy Requirements Based On: Kcal/kg  Weight Used for Energy Requirements: Ideal  Energy (kcal/day): 900-1000 kcals/day  Weight Used for Protein Requirements: Ideal  Protein (g/day): 80 gm/day     Fluid (ml/day): per MD    Nutrition Diagnosis:   Inadequate oral intake related to cognitive or neurological impairment as evidenced by NPO or clear liquid status due to medical condition, nutrition support - enteral nutrition    Nutrition Interventions:   Food and/or Nutrient Delivery: Suggest increasing TF goal to 45 mL/hr now that pt is off propofol. This will provide 1080 kcal and 95 g pro/day. Nutrition Education/Counseling: No recommendation at this time  Coordination of Nutrition Care: Continue to monitor while inpatient       Goals:  Previous Goal Met: Goal(s) Achieved  Goals: Meet at least 75% of estimated needs, prior to discharge       Nutrition Monitoring and Evaluation:   Behavioral-Environmental Outcomes: None Identified  Food/Nutrient Intake Outcomes: Enteral Nutrition Intake/Tolerance  Physical Signs/Symptoms Outcomes: Biochemical Data, Nutrition Focused Physical Findings, Skin, Weight, Fluid Status or Edema    Discharge Planning:     Too soon to determine     [address], LD  Contact: [phone]

## 2022-12-08 NOTE — PLAN OF CARE
Problem: Discharge Planning  Goal: Discharge to home or other facility with appropriate resources  12/7/2022 2021 by Clemente Robbins RN  Outcome: Progressing  12/7/2022 0952 by Rey Colon RN  Outcome: Progressing     Problem: Nutrition Deficit:  Goal: Optimize nutritional status  12/7/2022 2021 by Clemente Robbins RN  Outcome: Progressing  12/7/2022 0952 by Rey Colon RN  Outcome: Progressing     Problem: Confusion  Goal: Confusion, delirium, dementia, or psychosis is improved or at baseline  Description: INTERVENTIONS:  1. Assess for possible contributors to thought disturbance, including medications, impaired vision or hearing, underlying metabolic abnormalities, dehydration, psychiatric diagnoses, and notify attending LIP  2. [place] high risk fall precautions, as indicated  3. Provide frequent short contacts to provide reality reorientation, refocusing and direction  4. Decrease environmental stimuli, including noise as appropriate  5. Monitor and intervene to maintain adequate nutrition, hydration, elimination, sleep and activity  6. If unable to ensure safety without constant attention obtain sitter and review sitter guidelines with assigned personnel  7.  Initiate Psychosocial CNS and Spiritual Care consult, as indicated  12/7/2022 2021 by Clemente Robbins RN  Outcome: Progressing  12/7/2022 5805 by Rey Colon RN  Outcome: Progressing     Problem: Neurosensory - Adult  Goal: Achieves stable or improved neurological status  12/7/2022 2021 by Clemente Robbins RN  Outcome: Progressing  12/7/2022 0952 by Rey Colon RN  Outcome: Progressing     Problem: Respiratory - Adult  Goal: Achieves optimal ventilation and oxygenation  Outcome: Progressing     Problem: Gastrointestinal - Adult  Goal: Minimal or absence of nausea and vomiting  Outcome: Progressing     Problem: Genitourinary - Adult  Goal: Urinary catheter remains patent  Outcome: Progressing     Problem: Metabolic/Fluid and Electrolytes - Adult  Goal: Electrolytes maintained within normal limits  Outcome: Progressing     Problem: Skin/Tissue Integrity - Adult  Goal: Skin integrity remains intact  Outcome: Progressing  Goal: Incisions, wounds, or drain sites healing without S/S of infection  Outcome: Progressing  Goal: Oral mucous membranes remain intact  Outcome: Progressing     Problem: Skin/Tissue Integrity  Goal: Absence of new skin breakdown  Description: 1. Monitor for areas of redness and/or skin breakdown  2. Assess vascular access sites hourly  3. Every 4-6 hours minimum:  Change oxygen saturation probe site  4. Every 4-6 hours:  If on nasal continuous positive airway pressure, respiratory therapy assess nares and determine need for appliance change or resting period.   Outcome: Progressing     Problem: Pain  Goal: Verbalizes/displays adequate comfort level or baseline comfort level  Outcome: Progressing     Problem: Safety - Adult  Goal: Free from fall injury  Outcome: Progressing     Problem: ABCDS Injury Assessment  Goal: Absence of physical injury  Outcome: Progressing

## 2022-12-08 NOTE — PROGRESS NOTES
INTENSIVE CARE UNIT  Resident Physician Progress Note    Patient - Radha Alvarez  Date of Admission -  12/3/2022 10:03 AM  Date of Evaluation -  12/8/2022  Room and Bed Number -  3010/3010-01   Hospital Day - 5    SUBJECTIVE:     HISTORY OF PRESENT ILLNESS:    Radha Alvarez is a 43 y.o. female presented with altered mental status. Patient has a history of MS, nonverbal at baseline, hypertension, hydronephrosis, neurogenic bladder (chronic indwelling suprapubic catheter), G tube dependent,  PVD, PE. This patient presented to the emergency department with a GCS of 3. She was immediately intubated. She did have tachycardia thereafter up to the 130s, blood pressures 128/87 on Levophed, SPO2 100% on FiO2 of 100 respiratory rate of 20. Blood work demonstrated lactate of 1.36, sodium 130, chloride 96, CO2 19, WBC 36.1, creatinine 3.08. Lavanda Candle Urinalysis remarkable for hemoglobin, many bacteria, large leukocyte esterase. She was given vancomycin and cefepime. CT head showed no acute intracranial abnormality. CT chest abdomen pelvis demonstrating likely pneumonia, innumerable renal calculi on the right side ranging in 5 to 8 mm in size. There is a stack of obstructing calculi in the right ureter ranging from 5 to 11 mm, causing severe right hydronephrosis and hydroureter. Tiny gas pockets in the ascending colon showing pseudo pneumatosis. A central line was placed in left IJ. She was started on propofol and levophed. Was given Cefepime and Vancomycin. Admitted to ICU. Urology and ID consulted. Taken to OR for urgent cystoscopy for R ureteral stent insertion. Suprapubic catheter was exchanged. ID consulted, recommended single time dose of Amikacin, Meropenem. Pressor and ventilator needs subsequently decreased. Blood culture growing Bacteroides fragilis, proteus, morganella. Urine culture showing multiple organisms. Respiratory culture negative. Extubated 12/6/2022. Tolerated well with no complications. OVERNIGHT EVENTS:      No acute events overnight. Awaiting bed on floors      T 98.6, , RR 26, /96, SPO2 96% room air        Labs  Na 137, K 3.3, Cl 105, CO2 24, iCa 1.13,   Cr 0.22  Mg 1.6  WBC 16.4, 10.9, 247  Procal 29.13 (>100)      Micro  Blood culture - Proteus, bacteroides,morganella  Urine cultures multiple organisms  Respiratory cultures negative    Abx  Meropenem 1g Q8H    Glucose levels appropriate    Continuous infusions  LR 100ml/hr    Urine output 2075, 1.02cc/kg/hr      Plan  DC Meropenem; Switch to Zosyn per ID     Monitor lytes - Change IVF to  0.5NS w/ 40meq K 75ml/hr; MgSO4 2gm IV  Continue tube feeds; 35ml/hr  Midodrine 5mg to TID  Reglan held, monitor stool output  Consider transfer to floor today    Feeding Diet: Diet NPO  ADULT TUBE FEEDING; Orogastric; Peptide Based High Protein; Continuous; 35; Yes; 0; Other (specify); advance by 10 hourly; 35; 30; Q 4 hours    Fluids /hr    Family notified     Analgesic Tylenol 650 PRN    Sedation None    Thrombo-prophylaxis Xarelto    Mobility Bed rest     Heads up 30*    Ulcer prophylaxis Lansoprazole 30 QD     Glycemic control WNL    Spontaneous breathing trial  No    Bowel regimen/urine output Glycolax 17g QD PRN if decreased colostomy output     Indwelling catheter/lines: PIV R hand ,  G tube (7/2022), Suprapubic cath (12/3. Emma Cabrera replaced), colostomy    De-escalation Ready for floor      OBJECTIVE:     VITAL SIGNS:  Patient Vitals for the past 8 hrs:   BP Temp Temp src Pulse Resp SpO2 Weight   12/08/22 0700 116/74 -- -- (!) 108 26 94 % --   12/08/22 0645 -- -- -- (!) 108 26 95 % --   12/08/22 0630 -- -- -- (!) 105 25 93 % --   12/08/22 0628 (!) 114/97 -- -- (!) 107 26 95 % --   12/08/22 0615 -- -- -- (!) 102 27 94 % --   12/08/22 0600 -- 98.6 °F (37 °C) Axillary (!) 104 20 94 % 186 lb 8.2 oz (84.6 kg)   12/08/22 0545 -- -- -- (!) 103 21 94 % --   12/08/22 0530 -- -- -- (!) 104 21 94 % --   12/08/22 0515 -- -- -- 98 23 93 % -- 22 0500 (!) 115/99 -- -- (!) 102 22 93 % --   22 0445 -- -- -- 93 19 92 % --   22 0430 -- -- -- 95 22 94 % --   22 0415 -- -- -- 96 21 94 % --   22 0400 104/72 98.1 °F (36.7 °C) Axillary 76 16 95 % --   22 0345 -- -- -- 74 24 96 % --   22 0330 -- -- -- 98 20 92 % --   22 0315 -- -- -- 98 20 93 % --   22 0300 121/65 -- -- 100 24 94 % --   22 0245 -- -- -- 94 19 93 % --   22 0230 -- -- -- 98 20 94 % --   22 0215 -- -- -- 93 15 92 % --   22 0200 111/71 -- -- 94 18 93 % --   22 0145 -- -- -- 91 21 95 % --   22 0130 -- -- -- 93 22 96 % --   22 0115 -- -- -- 93 20 96 % --   22 0100 111/73 -- -- 95 20 96 % --   22 0045 -- -- -- 100 21 96 % --   22 0030 -- -- -- 96 22 96 % --   22 0015 -- -- -- 97 19 98 % --   22 0000 116/76 99 °F (37.2 °C) Axillary (!) 102 22 95 % --   22 2345 -- -- -- (!) 103 23 94 % --   22 2330 -- -- -- (!) 105 21 94 % --       Last Body weight:   Wt Readings from Last 3 Encounters:   22 186 lb 8.2 oz (84.6 kg)   22 150 lb (68 kg)   20 154 lb 5.2 oz (70 kg)       Body Mass Index : Body mass index is 36.43 kg/m². Tmax over 24 hours: Temp (24hrs), Av.4 °F (36.9 °C), Min:97.3 °F (36.3 °C), Max:99.5 °F (37.5 °C)      Ins/Outs: In: 3126.9 [I.V.:1463.6; NG/GT:935]  Out: 2550 [Urine:]    PHYSICAL EXAM:  Constitutional: Intubated. Sedated. No acute distress. HEENT: PERRLA, EOMI, left eye deviation (chronic), sclera clear, anictericm midline trachea  Respiratory: intubated; ronchi bilaterally  Cardiovascular: regular rate and rhythm, no murmur or rub  Abdomen: soft, nondistended. A colostomy bag is present. A G tube is present. A suprapubic catheter is present. Surrounding skin appears normal with no erythema, breakdown, drainage. Extremities:  Right AKA. Peripheral pulses normal, no pedal edema, no clubbing or cyanosis;  Left leg with increased tone, knee flexed  Skin: No erythema in intertriginous regions. There is sacrococcygeal skin hypopigmentation from previous breakdown. No skin ulcers.      MEDICATIONS:  Scheduled Meds:   potassium chloride  40 mEq Oral Once    lansoprazole  30 mg Per G Tube QAM AC    midodrine  5 mg Oral TID    rivaroxaban  20 mg Oral Daily    meropenem  1,000 mg IntraVENous Q8H    sodium chloride flush  5-40 mL IntraVENous 2 times per day    gabapentin  300 mg Per G Tube TID    [Held by provider] metoclopramide  5 mg Oral TID    [Held by provider] metoprolol tartrate  25 mg Oral TID       Continuous Infusions:   lactated ringers 50 mL/hr at 12/08/22 0558    sodium chloride Stopped (12/08/22 0553)       PRN Meds:   albuterol, 2.5 mg, PRN  ipratropium, 0.5 mg, Q6H PRN  sodium chloride flush, 5-40 mL, PRN  sodium chloride, , PRN  polyethylene glycol, 17 g, Daily PRN  acetaminophen, 650 mg, Q6H PRN   Or  acetaminophen, 650 mg, Q6H PRN  ondansetron, 4 mg, Q6H PRN      SUPPORT DEVICES: [x] Ventilator [] BIPAP  [] Nasal Cannula [] Room Air    VENT SETTINGS (Comprehensive) (if applicable):See above  Vent Information  Ventilator ID: [id number]  Equipment Changed: HME, Expiratory Filter  Ventilator Initiate: Yes  Vent Mode: AC/PRVC  Additional Respiratory Assessments  Heart Rate: (!) 108  Resp: 26  SpO2: 94 %  End Tidal CO2: 30 (%)  Position: Semi-[place]'s  Humidification Source: HME  Skin Barrier Applied: No  Lab Results   Component Value Date/Time    MODE [facility] 12/05/2022 04:19 AM       ABGs:   See above  No results found for: PH, PCO2, PO2, HCO3, O2SAT    DATA:  Complete Blood Count:   Recent Labs     12/06/22  0455 12/07/22  0637 12/08/22  0504   WBC 19.2* 21.8* 16.4*   RBC 4.56 4.11 4.01   HGB 12.5 11.2* 10.9*   HCT 40.1 34.8* 35.2*   MCV 87.9 84.7 87.8   MCH 27.4 27.3 27.2   MCHC 31.2 32.2 31.0   RDW 15.7* 15.5* 15.3*    185 247   MPV 12.3 11.7 10.9        Last 3 Blood Glucose:   Recent Labs     12/06/22  2002 12/07/22  0433 12/07/22  1541 12/08/22  0504   GLUCOSE 72 72 81 72        PT/INR:    Lab Results   Component Value Date/Time    PROTIME 13.8 12/03/2022 10:14 AM    INR 1.3 12/03/2022 10:14 AM     PTT:    Lab Results   Component Value Date/Time    APTT 40.1 07/06/2020 10:07 AM       Basic Metabolic Profile:   Recent Labs     12/06/22  0455 12/07/22  0433 12/07/22  1541 12/08/22  0504    142 143 137   K 3.7 3.1* 3.6* 3.3*   * 110* 111* 105   CO2 22 23 23 24   BUN 16 16 14 13   CREATININE 0.41* 0.33* 0.26* 0.22*   GLUCOSE 72 72 81 72   PHOS 2.8  --   --   --        Liver Function:  No results for input(s): PROT, LABALBU, ALT, AST, GGT, ALKPHOS, BILITOT in the last 72 hours.       Magnesium:   Lab Results   Component Value Date/Time    MG 1.6 12/08/2022 05:04 AM    MG 1.8 12/07/2022 03:41 PM    MG 1.5 12/07/2022 04:33 AM     Phosphorus:   Lab Results   Component Value Date/Time    PHOS 2.8 12/06/2022 04:55 AM    PHOS 1.9 12/05/2022 04:18 AM    PHOS 1.2 12/04/2022 05:12 AM     Ionized Calcium:   Lab Results   Component Value Date/Time    CAION 1.13 12/08/2022 05:04 AM    CAION 1.25 12/07/2022 04:33 AM    CAION 1.25 12/06/2022 04:55 AM        Urinalysis:   Lab Results   Component Value Date/Time    NITRU NEGATIVE 12/03/2022 11:04 AM    COLORU Yellow 12/03/2022 11:04 AM    PHUR 8.0 12/03/2022 11:04 AM    WBCUA 50  12/03/2022 11:04 AM    RBCUA 20 TO 50 12/03/2022 11:04 AM    MUCUS 2+ 11/30/2021 10:26 AM    TRICHOMONAS NOT REPORTED 11/30/2021 10:26 AM    YEAST NOT REPORTED 11/30/2021 10:26 AM    BACTERIA MANY 12/03/2022 11:04 AM    SPECGRAV 1.011 12/03/2022 11:04 AM    LEUKOCYTESUR LARGE 12/03/2022 11:04 AM    UROBILINOGEN Normal 12/03/2022 11:04 AM    BILIRUBINUR NEGATIVE 12/03/2022 11:04 AM    BILIRUBINUR NEGATIVE 03/11/2012 01:01 PM    GLUCOSEU NEGATIVE 12/03/2022 11:04 AM    GLUCOSEU NEGATIVE 03/11/2012 01:01 PM    KETUA NEGATIVE 12/03/2022 11:04 AM    AMORPHOUS 1+ 08/02/2022 09:00 AM       HgBA1c:  No results found for: LABA1C  TSH:    Lab Results   Component Value Date/Time    TSH 0.84 05/19/2019 07:50 AM     Lactic Acid:   Lab Results   Component Value Date/Time    LACTA 2.0 01/08/2017 12:12 PM      Troponin: No results for input(s): TROPONINI in the last 72 hours. Microbiology:  Blood and urine cultures pending    Other Labs:  Results for orders placed or performed during the hospital encounter of 12/03/22   Blood Culture 1    Specimen: Blood   Result Value Ref Range    Specimen Description . BLOOD     Special Requests LH     Culture POSITIVE Blood Culture (A)     Culture DIRECT GRAM STAIN FROM BOTTLE: GRAM NEGATIVE RODS     Culture       Bacteroides fragilis Detected: Methodology- Polymerase Chain Reaction (PCR)    Culture       Enterobacterales group  (not E. cloacae complex, E. coli, K. oxytoca, K. pneumoniae, S. marcescens, or Proteus species)  Culture Results to Follow  Methodology- Polymerase Chain Reaction (PCR)      Culture BACTEROIDES FRAGILIS UNABLE TO GROW IN CULTURE (A)     Culture MORGANELLA MORGANII (A)     Culture       (NOTE) Direct Gram Stain from bottle and Polymerase Chain Reaction (PCR) results called to and read back by:NELIDA HOOPER AT 1000 ON 12/4/22       Susceptibility    Morganella morganii - BACTERIAL SUSCEPTIBILITY PANEL JOAQUIN     ampicillin >=32 Resistant      aztreonam <=1 Sensitive      ceFAZolin >=64 Resistant      cefTRIAXone <=1 Sensitive      ciprofloxacin >=4 Resistant      gentamicin <=1 Sensitive      tobramycin <=1 Sensitive      trimethoprim-sulfamethoxazole <=20 Sensitive      piperacillin-tazobactam <=4 Sensitive    Culture, Blood 2    Specimen: Blood   Result Value Ref Range    Specimen Description . BLOOD     Special Requests R AC 10ML     Culture POSITIVE Blood Culture (A)     Culture DIRECT GRAM STAIN FROM BOTTLE: GRAM NEGATIVE RODS     Culture PROTEUS MIRABILIS (A)     Culture       (NOTE) DIRECT GRAM STAIN FROM BOTTLE:NELIDA HOOPER AT 1045 AT 12/4/22       Susceptibility Proteus mirabilis - BACTERIAL SUSCEPTIBILITY PANEL JOAQUIN     amikacin 4 Sensitive      ampicillin 4 Sensitive      aztreonam <=1 Sensitive      ceFAZolin 8 Sensitive      cefTRIAXone <=1 Sensitive      ciprofloxacin >=4 Resistant      gentamicin >=16 Resistant      tobramycin 8 Intermediate      trimethoprim-sulfamethoxazole >=320 Resistant      piperacillin-tazobactam <=4 Sensitive    Culture, Urine    Specimen: Urine, clean catch   Result Value Ref Range    Specimen Description . CLEAN CATCH URINE     Culture       Several types of bacteria were identified in this specimen. Further ID and susceptibility testing is generally not helpful in this circumstance and has not been performed. Consider recollection if clinically indicated. Please contact the Micro lab ([phone]) if you feel the management ofthis particular situation would be helped by further testing. MRSA DNA Probe, Nasal    Specimen: Nasal   Result Value Ref Range    Specimen Description . NASAL SWAB     MRSA, DNA, Nasal NEGATIVE NEGATIVE   Culture, Respiratory    Specimen: Sputum Aspirated   Result Value Ref Range    Specimen Description . ASPIRATED SPUTUM     Direct Exam < 10 EPITHELIAL CELLS/LPF     Direct Exam >10, <25 NEUTROPHILS/LPF     Direct Exam NO SIGNIFICANT PATHOGENS SEEN     Culture NORMAL RESPIRATORY FLOYD LIGHT GROWTH    ELECTROLYTES PLUS   Result Value Ref Range    POC Sodium 133 (L) 138 - 146 mmol/L    POC Potassium 3.6 3.5 - 4.5 mmol/L    POC Chloride 103 98 - 107 mmol/L    POC TCO2 20 (L) 22 - 30 mmol/L    Anion Gap 11 7 - 16 mmol/L   Hemoglobin and hematocrit, blood   Result Value Ref Range    POC Hemoglobin 15.3 12.0 - 16.0 g/dL    POC Hematocrit 45 36 - 46 %   CALCIUM, IONIC (POC)   Result Value Ref Range    POC Ionized Calcium 1.19 1.15 - 1.33 mmol/L   CBC with Auto Differential   Result Value Ref Range    WBC 36.1 (HH) 3.5 - 11.3 k/uL    RBC 4.53 3.95 - 5.11 m/uL    Hemoglobin 12.3 11.9 - 15.1 g/dL    Hematocrit 40.5 36.3 - 47.1 %    MCV 89.4 82.6 - 102.9 fL    MCH 27.2 25.2 - 33.5 pg    MCHC 30.4 28.4 - 34.8 g/dL    RDW 15.9 (H) 11.8 - 14.4 %    Platelets 926 593 - 824 k/uL    MPV 10.7 8.1 - 13.5 fL    NRBC Automated 0.0 0.0 per 100 WBC    Immature Granulocytes 4 (H) 0 %    Seg Neutrophils 81 (H) 36 - 66 %    Lymphocytes 8 (L) 24 - 44 %    Monocytes 7 1 - 7 %    Eosinophils % 0 (L) 1 - 4 %    Basophils 0 0 - 2 %    Absolute Immature Granulocyte 1.44 (H) 0.00 - 0.30 k/uL    Segs Absolute 29.24 (H) 1.8 - 7.7 k/uL    Absolute Lymph # 2.89 1.0 - 4.8 k/uL    Absolute Mono # 2.53 (H) 0.1 - 0.8 k/uL    Absolute Eos # 0.00 0.0 - 0.4 k/uL    Basophils Absolute 0.00 0.0 - 0.2 k/uL    Morphology ANISOCYTOSIS PRESENT     Morphology INCREASED BANDS PRESENT    CMP   Result Value Ref Range    Glucose 144 (H) 70 - 99 mg/dL    BUN 48 (H) 6 - 20 mg/dL    Creatinine 2.24 (H) 0.50 - 0.90 mg/dL    Est, Glom Filt Rate 27 (L) >60 mL/min/1.73m2    Calcium 8.9 8.6 - 10.4 mg/dL    Sodium 130 (L) 135 - 144 mmol/L    Potassium 4.0 3.7 - 5.3 mmol/L    Chloride 96 (L) 98 - 107 mmol/L    CO2 19 (L) 20 - 31 mmol/L    Anion Gap 15 9 - 17 mmol/L    Alkaline Phosphatase 141 (H) 35 - 104 U/L    ALT 39 (H) 5 - 33 U/L    AST 58 (H) <32 U/L    Total Bilirubin 0.4 0.3 - 1.2 mg/dL    Total Protein 8.2 6.4 - 8.3 g/dL    Albumin 3.0 (L) 3.5 - 5.2 g/dL    Albumin/Globulin Ratio 0.6 (L) 1.0 - 2.5   Protime-INR   Result Value Ref Range    Protime 13.8 (H) 9.1 - 12.3 sec    INR 1.3    Lactate, Sepsis   Result Value Ref Range    Lactic Acid, Sepsis, Whole Blood 1.6 0.5 - 1.9 mmol/L   Urinalysis with Microscopic   Result Value Ref Range    Color, UA Yellow Yellow    Turbidity UA Turbid (A) Clear    Glucose, Ur NEGATIVE NEGATIVE    Bilirubin Urine NEGATIVE NEGATIVE    Ketones, Urine NEGATIVE NEGATIVE    Specific Gravity, UA 1.011 1.005 - 1.030    Urine Hgb LARGE (A) NEGATIVE    pH, UA 8.0 5.0 - 8.0    Protein, UA 3+ (A) NEGATIVE    Urobilinogen, Urine Normal Normal    Nitrite, Urine NEGATIVE NEGATIVE    Leukocyte Esterase, Urine LARGE (A) NEGATIVE    WBC, UA 50  0 - 5 /HPF    RBC, UA 20 TO 50 0 - 2 /HPF    Epithelial Cells UA 5 TO 10 0 - 5 /HPF    Bacteria, UA MANY (A) None   Procalcitonin   Result Value Ref Range    Procalcitonin >100.00 (H) <0.09 ng/mL   Basic Metabolic Panel w/ Reflex to MG   Result Value Ref Range    Glucose 121 (H) 70 - 99 mg/dL    BUN 27 (H) 6 - 20 mg/dL    Creatinine 0.76 0.50 - 0.90 mg/dL    Est, Glom Filt Rate >60 >60 mL/min/1.73m2    Calcium 8.5 (L) 8.6 - 10.4 mg/dL    Sodium 142 135 - 144 mmol/L    Potassium 3.4 (L) 3.7 - 5.3 mmol/L    Chloride 111 (H) 98 - 107 mmol/L    CO2 21 20 - 31 mmol/L    Anion Gap 10 9 - 17 mmol/L   Calcium, Ionized   Result Value Ref Range    Calcium, Ionized 1.18 1.13 - 1.33 mmol/L   Magnesium   Result Value Ref Range    Magnesium 1.9 1.6 - 2.6 mg/dL   Phosphorus   Result Value Ref Range    Phosphorus 1.2 (L) 2.6 - 4.5 mg/dL   CBC with Auto Differential   Result Value Ref Range    WBC 28.2 (H) 3.5 - 11.3 k/uL    RBC 4.02 3.95 - 5.11 m/uL    Hemoglobin 10.9 (L) 11.9 - 15.1 g/dL    Hematocrit 35.0 (L) 36.3 - 47.1 %    MCV 87.1 82.6 - 102.9 fL    MCH 27.1 25.2 - 33.5 pg    MCHC 31.1 28.4 - 34.8 g/dL    RDW 15.5 (H) 11.8 - 14.4 %    Platelets 334 307 - 206 k/uL    MPV 11.0 8.1 - 13.5 fL    NRBC Automated 0.1 (H) 0.0 per 100 WBC    Immature Granulocytes 0 0 %    Seg Neutrophils 89 (H) 36 - 66 %    Lymphocytes 4 (L) 24 - 44 %    Monocytes 7 1 - 7 %    Eosinophils % 0 (L) 1 - 4 %    Basophils 0 0 - 2 %    Absolute Immature Granulocyte 0.00 0.00 - 0.30 k/uL    Segs Absolute 25.10 (H) 1.8 - 7.7 k/uL    Absolute Lymph # 1.13 1.0 - 4.8 k/uL    Absolute Mono # 1.97 (H) 0.1 - 0.8 k/uL    Absolute Eos # 0.00 0.0 - 0.4 k/uL    Basophils Absolute 0.00 0.0 - 0.2 k/uL    Morphology ANISOCYTOSIS PRESENT     Morphology INCREASED BANDS PRESENT    Vancomycin Level, Random   Result Value Ref Range    Vancomycin Rm 10.3 ug/mL   Basic Metabolic Panel w/ Reflex to MG   Result Value Ref Range    Glucose 81 70 - 99 mg/dL    BUN 14 6 - 20 mg/dL    Creatinine 0.53 0.50 - 0.90 mg/dL    Est, Glom Filt Rate >60 >60 mL/min/1.73m2    Calcium 9.3 8.6 - 10.4 mg/dL    Sodium 148 (H) 135 - 144 mmol/L    Potassium 3.4 (L) 3.7 - 5.3 mmol/L    Chloride 112 (H) 98 - 107 mmol/L    CO2 22 20 - 31 mmol/L    Anion Gap 14 9 - 17 mmol/L   Calcium, Ionized   Result Value Ref Range    Calcium, Ionized 1.20 1.13 - 1.33 mmol/L   Magnesium   Result Value Ref Range    Magnesium 1.5 (L) 1.6 - 2.6 mg/dL   Phosphorus   Result Value Ref Range    Phosphorus 1.9 (L) 2.6 - 4.5 mg/dL   CBC with Auto Differential   Result Value Ref Range    WBC 24.2 (H) 3.5 - 11.3 k/uL    RBC 4.48 3.95 - 5.11 m/uL    Hemoglobin 12.2 11.9 - 15.1 g/dL    Hematocrit 39.6 36.3 - 47.1 %    MCV 88.4 82.6 - 102.9 fL    MCH 27.2 25.2 - 33.5 pg    MCHC 30.8 28.4 - 34.8 g/dL    RDW 15.5 (H) 11.8 - 14.4 %    Platelets 870 305 - 088 k/uL    MPV 10.8 8.1 - 13.5 fL    NRBC Automated 0.0 0.0 per 100 WBC    Immature Granulocytes 1 (H) 0 %    Seg Neutrophils 84 (H) 36 - 66 %    Lymphocytes 6 (L) 24 - 44 %    Monocytes 9 (H) 1 - 7 %    Eosinophils % 0 (L) 1 - 4 %    Basophils 0 0 - 2 %    Absolute Immature Granulocyte 0.24 0.00 - 0.30 k/uL    Segs Absolute 20.33 (H) 1.8 - 7.7 k/uL    Absolute Lymph # 1.45 1.0 - 4.8 k/uL    Absolute Mono # 2.18 (H) 0.1 - 0.8 k/uL    Absolute Eos # 0.00 0.0 - 0.4 k/uL    Basophils Absolute 0.00 0.0 - 0.2 k/uL    Morphology ANISOCYTOSIS PRESENT    Lactic Acid   Result Value Ref Range    Lactic Acid, Whole Blood 0.9 0.7 - 2.1 mmol/L   Procalcitonin   Result Value Ref Range    Procalcitonin 29.13 (H) <0.09 ng/mL   Basic Metabolic Panel w/ Reflex to MG   Result Value Ref Range    Glucose 72 70 - 99 mg/dL    BUN 16 6 - 20 mg/dL    Creatinine 0.41 (L) 0.50 - 0.90 mg/dL    Est, Glom Filt Rate >60 >60 mL/min/1.73m2    Calcium 9.6 8.6 - 10.4 mg/dL    Sodium 144 135 - 144 mmol/L    Potassium 3.7 3.7 - 5.3 mmol/L Chloride 109 (H) 98 - 107 mmol/L    CO2 22 20 - 31 mmol/L    Anion Gap 13 9 - 17 mmol/L   Calcium, Ionized   Result Value Ref Range    Calcium, Ionized 1.25 1.13 - 1.33 mmol/L   Magnesium   Result Value Ref Range    Magnesium 1.8 1.6 - 2.6 mg/dL   Phosphorus   Result Value Ref Range    Phosphorus 2.8 2.6 - 4.5 mg/dL   CBC with Auto Differential   Result Value Ref Range    WBC 19.2 (H) 3.5 - 11.3 k/uL    RBC 4.56 3.95 - 5.11 m/uL    Hemoglobin 12.5 11.9 - 15.1 g/dL    Hematocrit 40.1 36.3 - 47.1 %    MCV 87.9 82.6 - 102.9 fL    MCH 27.4 25.2 - 33.5 pg    MCHC 31.2 28.4 - 34.8 g/dL    RDW 15.7 (H) 11.8 - 14.4 %    Platelets 526 750 - 576 k/uL    MPV 12.3 8.1 - 13.5 fL    NRBC Automated 0.0 0.0 per 100 WBC    Immature Granulocytes 0 0 %    Seg Neutrophils 75 (H) 36 - 66 %    Lymphocytes 8 (L) 24 - 44 %    Monocytes 15 (H) 1 - 7 %    Eosinophils % 2 1 - 4 %    Basophils 0 0 - 2 %    Absolute Immature Granulocyte 0.00 0.00 - 0.30 k/uL    Segs Absolute 14.40 (H) 1.8 - 7.7 k/uL    Absolute Lymph # 1.54 1.0 - 4.8 k/uL    Absolute Mono # 2.88 (H) 0.1 - 0.8 k/uL    Absolute Eos # 0.38 0.0 - 0.4 k/uL    Basophils Absolute 0.00 0.0 - 0.2 k/uL    Morphology ANISOCYTOSIS PRESENT    Basic Metabolic Panel w/ Reflex to MG   Result Value Ref Range    Glucose 72 70 - 99 mg/dL    BUN 16 6 - 20 mg/dL    Creatinine 0.33 (L) 0.50 - 0.90 mg/dL    Est, Glom Filt Rate >60 >60 mL/min/1.73m2    Calcium 9.2 8.6 - 10.4 mg/dL    Sodium 142 135 - 144 mmol/L    Potassium 3.1 (L) 3.7 - 5.3 mmol/L    Chloride 110 (H) 98 - 107 mmol/L    CO2 23 20 - 31 mmol/L    Anion Gap 9 9 - 17 mmol/L   Calcium, Ionized   Result Value Ref Range    Calcium, Ionized 1.25 1.13 - 1.33 mmol/L   Magnesium   Result Value Ref Range    Magnesium 1.5 (L) 1.6 - 2.6 mg/dL   CBC with Auto Differential   Result Value Ref Range    WBC 21.8 (H) 3.5 - 11.3 k/uL    RBC 4.11 3.95 - 5.11 m/uL    Hemoglobin 11.2 (L) 11.9 - 15.1 g/dL    Hematocrit 34.8 (L) 36.3 - 47.1 %    MCV 84.7 82.6 - 102.9 fL    MCH 27.3 25.2 - 33.5 pg    MCHC 32.2 28.4 - 34.8 g/dL    RDW 15.5 (H) 11.8 - 14.4 %    Platelets 184 614 - 382 k/uL    MPV 11.7 8.1 - 13.5 fL    NRBC Automated 0.0 0.0 per 100 WBC    Immature Granulocytes 2 (H) 0 %    Seg Neutrophils 68 (H) 36 - 66 %    Lymphocytes 9 (L) 24 - 44 %    Monocytes 19 (H) 1 - 7 %    Eosinophils % 2 1 - 4 %    Basophils 0 0 - 2 %    Absolute Immature Granulocyte 0.44 (H) 0.00 - 0.30 k/uL    Segs Absolute 14.82 (H) 1.8 - 7.7 k/uL    Absolute Lymph # 1.96 1.0 - 4.8 k/uL    Absolute Mono # 4.14 (H) 0.1 - 0.8 k/uL    Absolute Eos # 0.44 (H) 0.0 - 0.4 k/uL    Basophils Absolute 0.00 0.0 - 0.2 k/uL    Morphology ANISOCYTOSIS PRESENT    Basic Metabolic Panel   Result Value Ref Range    Glucose 81 70 - 99 mg/dL    BUN 14 6 - 20 mg/dL    Creatinine 0.26 (L) 0.50 - 0.90 mg/dL    Est, Glom Filt Rate >60 >60 mL/min/1.73m2    Calcium 8.7 8.6 - 10.4 mg/dL    Sodium 143 135 - 144 mmol/L    Potassium 3.6 (L) 3.7 - 5.3 mmol/L    Chloride 111 (H) 98 - 107 mmol/L    CO2 23 20 - 31 mmol/L    Anion Gap 9 9 - 17 mmol/L   Magnesium   Result Value Ref Range    Magnesium 1.8 1.6 - 2.6 mg/dL   Basic Metabolic Panel w/ Reflex to MG   Result Value Ref Range    Glucose 72 70 - 99 mg/dL    BUN 13 6 - 20 mg/dL    Creatinine 0.22 (L) 0.50 - 0.90 mg/dL    Est, Glom Filt Rate >60 >60 mL/min/1.73m2    Calcium 8.5 (L) 8.6 - 10.4 mg/dL    Sodium 137 135 - 144 mmol/L    Potassium 3.3 (L) 3.7 - 5.3 mmol/L    Chloride 105 98 - 107 mmol/L    CO2 24 20 - 31 mmol/L    Anion Gap 8 (L) 9 - 17 mmol/L   Calcium, Ionized   Result Value Ref Range    Calcium, Ionized 1.13 1.13 - 1.33 mmol/L   Magnesium   Result Value Ref Range    Magnesium 1.6 1.6 - 2.6 mg/dL   CBC with Auto Differential   Result Value Ref Range    WBC 16.4 (H) 3.5 - 11.3 k/uL    RBC 4.01 3.95 - 5.11 m/uL    Hemoglobin 10.9 (L) 11.9 - 15.1 g/dL    Hematocrit 35.2 (L) 36.3 - 47.1 %    MCV 87.8 82.6 - 102.9 fL    MCH 27.2 25.2 - 33.5 pg    MCHC 31.0 28.4 - 34.8 g/dL    RDW 15.3 (H) 11.8 - 14.4 %    Platelets 779 715 - 615 k/uL    MPV 10.9 8.1 - 13.5 fL    NRBC Automated 0.0 0.0 per 100 WBC    Immature Granulocytes 2 (H) 0 %    Seg Neutrophils 68 (H) 36 - 65 %    Lymphocytes 13 (L) 24 - 43 %    Monocytes 11 3 - 12 %    Eosinophils % 5 (H) 1 - 4 %    Basophils 1 0 - 2 %    Absolute Immature Granulocyte 0.33 (H) 0.00 - 0.30 k/uL    Segs Absolute 11.16 (H) 1.50 - 8.10 k/uL    Absolute Lymph # 2.13 1.10 - 3.70 k/uL    Absolute Mono # 1.80 (H) 0.10 - 1.20 k/uL    Absolute Eos # 0.82 (H) 0.00 - 0.44 k/uL    Basophils Absolute 0.16 0.00 - 0.20 k/uL    Morphology ANISOCYTOSIS PRESENT    Venous Blood Gas, POC   Result Value Ref Range    pH, Niraj 7.249 (L) 7.320 - 7.430    pCO2, Niraj 46.7 41.0 - 51.0 mm Hg    pO2, Niraj 130.2 (H) 30.0 - 50.0 mm Hg    HCO3, Venous 20.4 (L) 22.0 - 29.0 mmol/L    Negative Base Excess, Niraj 7 (H) 0.0 - 2.0    O2 Sat, Niraj 98 (H) 60.0 - 85.0 %   Creatinine W/GFR Point of Care   Result Value Ref Range    POC Creatinine 3.08 (H) 0.51 - 1.19 mg/dL    eGFR, POC 19 mL/min/1.73m2   POCT urea (BUN)   Result Value Ref Range    POC BUN 54 (H) 8 - 26 mg/dL   Lactic Acid, POC   Result Value Ref Range    POC Lactic Acid 1.36 0.56 - 1.39 mmol/L   POCT Glucose   Result Value Ref Range    POC Glucose 153 (H) 74 - 100 mg/dL   Mixed Venous Gas, POC   Result Value Ref Range    PH MIXED 7.207 (L) 7.310 - 7.410    PCO2, Mixed 35.5 (L) 42.0 - 52.0 mm Hg    PO2, Mixed 60.7 (H) 35.0 - 45.0 mm Hg    HCO3, Mixed 14.1 (L) 23.0 - 29.0 mmol/L    Negative Base Excess, Mixed 13 (H) 0.0 - 2.0    O2 Sat, Mixed 85 (H) 60.0 - 80.0 %    O2 Device/Flow/% Adult Ventilator     Hugo Test POSITIVE     Sample Site Right Radial Artery     Mode PRVC     FIO2 60.0    POC Glucose Fingerstick   Result Value Ref Range    POC Glucose 182 (H) 65 - 105 mg/dL   Arterial Blood Gas, POC   Result Value Ref Range    POC pH 7.407 7.350 - 7.450    POC pCO2 35.5 35.0 - 48.0 mm Hg    POC PO2 99.3 83.0 - 108.0 mm Hg    POC HCO3 22.3 21.0 - 28.0 mmol/L    Negative Base Excess, Art 2 0.0 - 2.0    POC O2 SAT 98 94.0 - 98.0 %    O2 Device/Flow/% Adult Ventilator     Hugo Test POSITIVE     Sample Site Right Radial Artery     Mode PRVC     FIO2 40.0    POCT Glucose   Result Value Ref Range    POC Glucose 119 (H) 74 - 100 mg/dL   Arterial Blood Gas, POC   Result Value Ref Range    POC pH 7.491 (H) 7.350 - 7.450    POC pCO2 29.6 (L) 35.0 - 48.0 mm Hg    POC PO2 66.9 (L) 83.0 - 108.0 mm Hg    POC HCO3 22.6 21.0 - 28.0 mmol/L    Positive Base Excess, Art 0 0.0 - 3.0    POC O2 SAT 95 94.0 - 98.0 %    O2 Device/Flow/% Adult Ventilator     Hugo Test POSITIVE     Sample Site Right Radial Artery     Mode PRVC     FIO2 30.0    POCT Glucose   Result Value Ref Range    POC Glucose 88 74 - 100 mg/dL   Arterial Blood Gas, POC   Result Value Ref Range    POC pH 7.451 (H) 7.350 - 7.450    POC pCO2 32.9 (L) 35.0 - 48.0 mm Hg    POC PO2 74.4 (L) 83.0 - 108.0 mm Hg    POC HCO3 22.9 21.0 - 28.0 mmol/L    Positive Base Excess, Art 0 0.0 - 3.0    POC O2 SAT 96 94.0 - 98.0 %    O2 Device/Flow/% Adult Ventilator     Hugo Test POSITIVE     Sample Site Right Radial Artery     FIO2 30.0    POCT Glucose   Result Value Ref Range    POC Glucose 83 74 - 100 mg/dL       Radiology/Imaging:  XR CHEST PORTABLE   Final Result   Mild right basilar airspace opacities. This could represent atelectasis or   pneumonia in the appropriate clinical setting         FLUORO FOR SURGICAL PROCEDURES   Final Result      XR CHEST PORTABLE   Final Result   Satisfactory line and tube placement. CT Head W/O Contrast   Final Result   No acute intracranial abnormality. CT CHEST ABDOMEN PELVIS WO CONTRAST Additional Contrast? None   Final Result   1. ETT now terminates 12 mm above denys. 2. Patchy perihilar and more confluent posterior segment airspace   consolidations likely combination of pneumonia and atelectasis. Trace right   pleural effusion.    3. Innumerable right renal calculi most in the 5-8 mm size range. Moderate   to severe right hydronephrosis and hydroureter. There is a stack of 4   obstructing calculi in the distal right ureter ranging from 5-11 mm   accounting for finding. 11 mm calculus is most distal and positioned about 2   cm from the uterovesical junction. 4. Liquid stool in the colon suggests diarrheal disease. Tiny gas pockets   along the ascending colon wall in the dependent portion appear to be pseudo   pneumatosis. XR CHEST PORTABLE   Final Result   Chest x-ray:      1. ETT is just entering the right mainstem bronchus by few mm. Repositioning   advised. 2. Some progression of mild elevation right hemidiaphragm. 3. Patchy perihilar opacities probably vascular. Abdomen:      1. NG tube terminates in the left upper quadrant. Side port is positioned in   the midline probably in the distal gastric antrum. 2. Cholelithiasis. 3. Peg tube in place. 4. No small bowel distension. Findings were discussed with MORTEZA PAGE at 11:07 a.m. on 12/3/2022. XR ABDOMEN FOR NG/OG/NE TUBE PLACEMENT   Final Result   Chest x-ray:      1. ETT is just entering the right mainstem bronchus by few mm. Repositioning   advised. 2. Some progression of mild elevation right hemidiaphragm. 3. Patchy perihilar opacities probably vascular. Abdomen:      1. NG tube terminates in the left upper quadrant. Side port is positioned in   the midline probably in the distal gastric antrum. 2. Cholelithiasis. 3. Peg tube in place. 4. No small bowel distension. Findings were discussed with MORTEZA PAGE at 11:07 a.m. on 12/3/2022.              ASSESSMENT:     Patient Active Problem List    Diagnosis Date Noted    Pyelonephritis 12/04/2022    Obstructive uropathy 12/04/2022    Bandemia 12/04/2022    SIRS (systemic inflammatory response syndrome) ([facility] 12/04/2022    Gram-neg septicemia ([facility] 12/04/2022    Pneumatosis coli 12/04/2022 Nephrostomy tube displaced ([facility] [address] 09/26/2020    E coli bacteremia     E-coli UTI 01/23/2020    Sepsis due to Escherichia coli ([facility] [address] 01/23/2020    Lactate blood increase 01/22/2020    Proteus mirabilis infection 08/02/2019    Decubitus ulcer of coccygeal region, stage 4 ([address] 07/30/2019    Ileus ([address] 07/29/2019    Urinary tract infection associated with indwelling urethral catheter ([address] 07/28/2019    Abdominal pain 07/28/2019    Pneumonia due to organism     Extended spectrum beta lactamase (ESBL) resistance     Sepsis ([facility] [address] 06/25/2019    Lower paraplegia ([address]     Toxic metabolic encephalopathy 68/77/3796    Status post colostomy ([facility] [address] 05/19/2019    ESBL E. coli carrier     Septic shock (HCC)     Altered mental status     Lactic acidosis 05/17/2019    Chronic indwelling Davies catheter 05/17/2019    Peripheral vascular disease ([facility] [address] 05/17/2019    Recurrent UTI (urinary tract infection) 05/17/2019    History of pulmonary embolism 05/17/2019    Hypertension 05/17/2019    Gross hematuria 01/11/2017    Tachycardia     Hemorrhagic cystitis 01/09/2017    Hypokalemia 01/09/2017    Neurogenic bladder     Aphasia 08/26/2015    Multiple sclerosis ([facility] [address] 08/26/2015    Nonverbal 08/26/2015    Gastrostomy tube dependent ([facility] [address] 08/26/2015    Dislodged gastrostomy tube 08/26/2015        PLAN:   PLAN/MEDICAL DECISION MAKING:  Neurologic:   Neuro checks per protocol  Propofol for sedation  Cardiovascular:  MAP goal 65  Levophed  Midodrine 5mg TID  Hold home metoprolol  Pulmonary:  Maintain oxygen sats >92%  Pulmonary toilet  Vent Information  Ventilator ID: [id number]  Equipment Changed: HME, Expiratory Filter  Ventilator Initiate: Yes  Vent Mode: AC/PRVC  GI/Nutrition  Glycolax 17g  Ulcer Prophylaxis: Pantoprazole 40mg IV QD  Tube feeds; advance  Diet:Diet NPO; tube feeds through Gtube    Renal/Fluid/Electrolyte  IV Fluids: LR @ 100 /hr  I/O: In: 3126.9 [I.V.:1463.6; NG/GT:935]  Out: 2550 [Urine:2075]  Urine output 1120, 0.55 cc/kg/hr  Monitor electrolytes, replace PRN   ID  WBC:   Lab Results   Component Value Date    WBC 16.4 (H) 2022     Tmax: Temp (24hrs), Av.4 °F (36.9 °C), Min:97.3 °F (36.3 °C), Max:99.5 °F (37.5 °C)  Antimicrobials: Meropenem   Bcx: bacteroides, morganella, proteus  FU Bcx, Ucx    Hematology:  Recent Labs     22  0455 22  0637 22  0504   HGB 12.5 11.2* 10.9*      Endocrine:   glucose controlled - most recent BGL is   Recent Labs     22  0433 22  1541 22  0504   GLUCOSE 72 81 72     DVT Prophylaxis  On Xarelto  Discharge Needs:    , PT, OT, ST, SW, and Case Management      CODE STATUS: Full Code      DISPOSITION:  [x] To remain ICU:   [] OK for out of ICU from [place] [address], MD  Emergency medicine

## 2022-12-08 NOTE — PLAN OF CARE
Problem: Discharge Planning  Goal: Discharge to home or other facility with appropriate resources  12/8/2022 0913 by Mik Lopez RN  Outcome: Progressing     Problem: Nutrition Deficit:  Goal: Optimize nutritional status  12/8/2022 0913 by Mik Lopez RN  Outcome: Progressing     Problem: Confusion  Goal: Confusion, delirium, dementia, or psychosis is improved or at baseline  Description: INTERVENTIONS:  1. Assess for possible contributors to thought disturbance, including medications, impaired vision or hearing, underlying metabolic abnormalities, dehydration, psychiatric diagnoses, and notify attending LIP  2. [place] high risk fall precautions, as indicated  3. Provide frequent short contacts to provide reality reorientation, refocusing and direction  4. Decrease environmental stimuli, including noise as appropriate  5. Monitor and intervene to maintain adequate nutrition, hydration, elimination, sleep and activity  6. If unable to ensure safety without constant attention obtain sitter and review sitter guidelines with assigned personnel  7.  Initiate Psychosocial CNS and Spiritual Care consult, as indicated  12/8/2022 0913 by Mik Lopez RN  Outcome: Progressing     Problem: Neurosensory - Adult  Goal: Achieves stable or improved neurological status  12/8/2022 0913 by Mik Lopez RN  Outcome: Progressing     Problem: Neurosensory - Adult  Goal: Absence of seizures  12/8/2022 0913 by Mik Lopez RN  Outcome: Progressing     Problem: Neurosensory - Adult  Goal: Remains free of injury related to seizures activity  12/8/2022 0913 by Mik Lopez RN  Outcome: Progressing     Problem: Neurosensory - Adult  Goal: Achieves maximal functionality and self care  12/8/2022 0913 by Mik Lopez RN  Outcome: Progressing     Problem: Respiratory - Adult  Goal: Achieves optimal ventilation and oxygenation  12/8/2022 0913 by Mik Lopez RN  Outcome: Progressing     Problem: Gastrointestinal - Adult  Goal: Minimal or absence of nausea and vomiting  12/8/2022 0913 by Justyna Mahmood RN  Outcome: Progressing     Problem: Gastrointestinal - Adult  Goal: Maintains or returns to baseline bowel function  12/8/2022 0913 by Justyna Mahmood RN  Outcome: Progressing     Problem: Gastrointestinal - Adult  Goal: Maintains adequate nutritional intake  12/8/2022 0913 by Justyna Mahmood RN  Outcome: Progressing     Problem: Gastrointestinal - Adult  Goal: Establish and maintain optimal ostomy function  12/8/2022 0913 by Justyna Mahmood RN  Outcome: Progressing     Problem: Genitourinary - Adult  Goal: Urinary catheter remains patent  12/8/2022 0913 by Justyna Mahmood RN  Outcome: Progressing     Problem: Metabolic/Fluid and Electrolytes - Adult  Goal: Electrolytes maintained within normal limits  12/8/2022 0913 by Justyna Mahmood RN  Outcome: Progressing     Problem: Metabolic/Fluid and Electrolytes - Adult  Goal: Hemodynamic stability and optimal renal function maintained  12/8/2022 0913 by Justyna Mahmood RN  Outcome: Progressing     Problem: Metabolic/Fluid and Electrolytes - Adult  Goal: Glucose maintained within prescribed range  12/8/2022 0913 by Justyna Mahmood RN  Outcome: Progressing     Problem: Skin/Tissue Integrity - Adult  Goal: Skin integrity remains intact  12/8/2022 0913 by Justyna Mahmood RN  Outcome: Progressing     Problem: Skin/Tissue Integrity - Adult  Goal: Incisions, wounds, or drain sites healing without S/S of infection  12/8/2022 0913 by Justyna Mahmood RN  Outcome: Progressing     Problem: Skin/Tissue Integrity - Adult  Goal: Oral mucous membranes remain intact  12/8/2022 0913 by Justyna Mahmood RN  Outcome: Progressing     Problem: Pain  Goal: Verbalizes/displays adequate comfort level or baseline comfort level  12/8/2022 0913 by Justyna Mahmood RN  Outcome: Progressing     Problem: Skin/Tissue Integrity  Goal: Absence of new skin breakdown  Description: 1.   Monitor for areas of redness and/or skin breakdown  2. Assess vascular access sites hourly  3. Every 4-6 hours minimum:  Change oxygen saturation probe site  4. Every 4-6 hours:  If on nasal continuous positive airway pressure, respiratory therapy assess nares and determine need for appliance change or resting period.   12/8/2022 0913 by Justyna Mahmood RN  Outcome: Progressing     Problem: Safety - Adult  Goal: Free from fall injury  12/8/2022 0913 by Justyna Mahmood RN  Outcome: Progressing     Problem: ABCDS Injury Assessment  Goal: Absence of physical injury  12/8/2022 0913 by Justyna Mahmood RN  Outcome: Progressing

## 2022-12-08 NOTE — PROGRESS NOTES
[facility] -   Infectious diseases evaluation  admission date 12/3/2022    reason for consultation:   sepsis    Impression :   Current:  Bandemia  proCalcitonin elevation 29  Septic shock levophed 40  R obstructive pyelonephritis -   severe R hydro w many ureteral stones  Stent placed 12/3  GNR septicemia - bacteroides and enterobacteriae  Multifocal infiltrates, likely atelectasis - less likely pneumonia  MS   Neurogenic bladder w SP omer  R AKA amputation by hx  Past colostomy  Possible ascending colon pseudo pneumatosis on CT  Concerning mostly w bacteroides and 2 different bacteriaceae in the blood     Other:    Discussion / summary of stay / plan of care     Recommendations   12/3 Meropenem -stop 12/6 12/6 zosyn till 12/12 - DC planning  Can switch to invanz at DC , once bed available  BC - Bacteroides proteus and morganella - most likely source is the abdomen  Possible ascending colon pseudo pneumatosis  General surgery on board, med tx for now  Bandemia better 16  CT chest  - mostly like P congestion and atelectasis  - CXR 12/5 stable    Infection Control Recommendations   Beatrice Precautions  Contact Isolation     Antimicrobial Stewardship Recommendations   Simplification of therapy  Targeted therapy  History of Present Illness:   Initial history:  Matthew Antunez is a 43y.o.-year-old female w MS and neurogenic bladder and chronic SP omer, Gtube and hx PE on xeralto  Cojes w altered mentation and intubated in ER - S IRS+ and tachycardia and hypotension started on levophed  UA + for infection  CT chest showed many infiltrates and some might be multifocal pneumonia   R kid w hydronephrosis and many  ureteral stones.   Ascending colon pseudo pneumatosis  Started on cefepime and vanco and we are called  Bandemia  NUZHAT     took her to OR for a R stent  ID called    She is on the vent 40 FIO2 and 5 peep  Pupils minimally reactive  Sp salivary and beige  Abd soft   Urine slightly blood tinged and no pus  No rash  Levophed 40    Interval changes  12/8/2022   Patient Vitals for the past 8 hrs:   BP Temp Temp src Pulse Resp SpO2   12/08/22 1000 -- 99.4 °F (37.4 °C) Axillary -- -- --   12/08/22 0800 -- 98.5 °F (36.9 °C) Axillary -- -- --   12/08/22 0700 116/74 -- -- (!) 108 26 94 %   12/08/22 0645 -- -- -- (!) 108 26 95 %   12/08/22 0630 -- -- -- (!) 105 25 93 %   12/08/22 0628 (!) 114/97 -- -- (!) 107 26 95 %   Temp LG  WBc better 28  Creat  0.7 better  BC x 2 GNR, one w bacteroides and enterobacteriaceae pend  U cx in process     12/5  Patient is more alert coughing on the vent agitated, seems appropriate trying to formulate words  Sputum is thick yellow, and sputum culture so far is negative  WBC improved to 24  Bacteria in the blood gram-negative rods pending ID  Urine cultures multiple organisms. 12/7  Patient extubated 12/6   WBC up to 21.8 from 19.2 on 12/6  Off Levophed  Patient response follows with her eyes, mumbles a few words,   Sputum is small, culture normal bill. Abdomen is soft does not seem to be tender to exam  Urine is clear in the Davies,  No diarrhea  No chest x-ray today. 12/8  Afebrile   WBC 16.4  Extubated, on room air  Patient responds by nodding/shaking her head, tries to speak  Davies, clear urine      Summary of relevant labs:  Labs:  W 36 - 28-24-19.2-21.8 -16.4  Creat 3 - 2.4 - 0.7   Lactic Acid, Sepsis, Whole Blood1.6   [id number]   EKT41 High U/L AST58 High    Xagovjhfttbet85.13 High    Micro:  BC x 2 GNR, one w bacteroides and enterobacteriaceae   BC Morganella sensitive to aztreonam, ceftriaxone, gentamicin, tobramycin, tmp-smx, zosyn  U cx multiple bacteria  Sputum culture no significant bacteria    Nasal MRSA neg  Imaging:  Chest x-ray 12/5  Mild right basilar airspace opacities. This could represent atelectasis or   pneumonia         CT AP chest  Impression:  1. ETT now terminates 12 mm above denys.    2. Patchy perihilar and more confluent posterior segment airspace   consolidations likely combination of pneumonia and atelectasis. Trace right   pleural effusion. 3. Innumerable right renal calculi most in the 5-8 mm size range. Moderate   to severe right hydronephrosis and hydroureter. There is a stack of 4   obstructing calculi in the distal right ureter ranging from 5-11 mm   accounting for finding. 11 mm calculus is most distal and positioned about 2   cm from the uterovesical junction. 4. Liquid stool in the colon suggests diarrheal disease. Tiny gas pockets   along the ascending colon wall in the dependent portion appear to be pseudo   pneumatosis. CT brain neg      I have personally reviewed the past medical history, past surgical history, medications, social history, and family history, and I haveupdated the database accordingly. Allergies:   Patient has no known allergies. Review of Systems:     Review of Systems   Unable to perform ROS: Other (sluggish unable to communicate)   Constitutional:  Positive for activity change. Eyes:  Negative for discharge. Allergic/Immunologic: Negative for immunocompromised state. Physical Examination :       Physical Exam  Constitutional:       General: She is not in acute distress. Appearance: She is not ill-appearing or toxic-appearing. HENT:      Head: Normocephalic and atraumatic. Nose: Nose normal.      Mouth/Throat:      Mouth: Mucous membranes are moist.   Eyes:      General: No scleral icterus. Conjunctiva/sclera: Conjunctivae normal.   Cardiovascular:      Rate and Rhythm: Regular rhythm. Tachycardia present. Heart sounds: No murmur heard. No friction rub. No gallop. Pulmonary:      Effort: No respiratory distress. Breath sounds: No stridor. No rhonchi. Abdominal:      Palpations: There is no mass. Tenderness: There is no abdominal tenderness. Hernia: No hernia is present.    Genitourinary:     Comments: Davies  Musculoskeletal:         General: No tenderness or signs of injury. Cervical back: Neck supple. No rigidity or tenderness. Skin:     Coloration: Skin is not pale. Findings: No erythema.    Neurological:      Comments: Arousing, non responsive   Psychiatric:      Comments: Non responsive       Past Medical History:     Past Medical History:   Diagnosis Date    Aphasia     Aphasia     Contracture of joint, lower leg     Depressed     HTN (hypertension)     Hx MRSA infection resolved 1/2017    2 negative nasal screens 1/2017 (hx in heel in 2013)    Hydronephrosis     Movement disorder     tremor    Multiple sclerosis ([address]     Neurogenic bladder     Non-verbal learning disorder     Peripheral vascular disease ([address]     Polyneuropathy     Pressure ulcer     Coccyx, Heel    Pulmonary embolism (HCC)     Pulmonary infarction (HCC)     Quadriplegia (HCC)     Tachycardia     UTI (lower urinary tract infection)        Past Surgical  History:     Past Surgical History:   Procedure Laterality Date    AMPUTATION Right     COLOSTOMY      CYSTOSCOPY Right 12/3/2022    CYSTOSCOPY URETERAL STENT INSERTION performed by Ingrid Zee MD at [place] [id number]      IR NEPHROSTOMY PERCUTANEOUS RIGHT  9/27/2020    IR NEPHROSTOMY PERCUTANEOUS RIGHT 9/27/2020 Maia Wilson MD [facility] SPECIAL PROCEDURES    LEG AMPUTATION THROUGH FEMUR         Medications:      piperacillin-tazobactam  4,500 mg IntraVENous Q8H    magnesium sulfate  2,000 mg IntraVENous Once    lansoprazole  30 mg Per G Tube QAM AC    midodrine  5 mg Oral TID    rivaroxaban  20 mg Oral Daily    sodium chloride flush  5-40 mL IntraVENous 2 times per day    gabapentin  300 mg Per G Tube TID    [Held by provider] metoclopramide  5 mg Oral TID    [Held by provider] metoprolol tartrate  25 mg Oral TID       Social History:     Social History     Socioeconomic History    Marital status: Single     Spouse name: Not on file    Number of children: Not on file    Years of education: Not on file    Highest education level: Not on file   Occupational History    Not on file   Tobacco Use    Smoking status: Never    Smokeless tobacco: Never   Vaping Use    Vaping Use: Never used   Substance and Sexual Activity    Alcohol use: No    Drug use: No    Sexual activity: Never   Other Topics Concern    Not on file   Social History Narrative    Not on file     Social Determinants of Health     Financial Resource Strain: Not on file   Food Insecurity: Not on file   Transportation Needs: Not on file   Physical Activity: Not on file   Stress: Not on file   Social Connections: Not on file   Intimate Partner Violence: Not on file   Housing Stability: Not on file       Family History:     Family History   Problem Relation Age of Onset    No Known Problems Mother     No Known Problems Father       Medical Decision Making:   I have independently reviewed/ordered the following labs:    CBC with Differential:   Recent Labs     12/07/22  0637 12/08/22  0504   WBC 21.8* 16.4*   HGB 11.2* 10.9*   HCT 34.8* 35.2*    247   LYMPHOPCT 9* 13*   MONOPCT 19* 11     BMP:  Recent Labs     12/07/22  1541 12/08/22  0504    137   K 3.6* 3.3*   * 105   CO2 23 24   BUN 14 13   CREATININE 0.26* 0.22*   MG 1.8 1.6     Hepatic Function Panel:   No results for input(s): PROT, LABALBU, BILIDIR, IBILI, BILITOT, ALKPHOS, ALT, AST in the last 72 hours. No results for input(s): RPR in the last 72 hours. No results for input(s): HIV in the last 72 hours. No results for input(s): BC in the last 72 hours. Lab Results   Component Value Date/Time    CREATININE 0.22 12/08/2022 05:04 AM    GLUCOSE 72 12/08/2022 05:04 AM    GLUCOSE 69 03/11/2012 12:33 PM       Detailed results: Thank you for allowing us to participate in the care of this patient. Please call with questions.     This note is created with the assistance of a speech recognition program.  While intending to generate adocument that actually reflects the content of the visit, the document can still have some errors including those of syntax and sound a like substitutions which may escape proof reading. It such instances, actual meaningcan be extrapolated by contextual diversion. Justin Mayes  Office: [phone]  Perfect serve / office [phone]          I have discussed the care of the patient, including pertinent history and exam findings,  with the resident. I have seen and examined the patient and the key elements of all parts of the encounter have been performed by me. I agree with the assessment, plan and orders as documented by the resident.     Laurie Foster, Infectious Diseases

## 2022-12-09 LAB
ABSOLUTE EOS #: 0.65 K/UL (ref 0–0.44)
ABSOLUTE IMMATURE GRANULOCYTE: 0.31 K/UL (ref 0–0.3)
ABSOLUTE LYMPH #: 2.14 K/UL (ref 1.1–3.7)
ABSOLUTE MONO #: 1.33 K/UL (ref 0.1–1.2)
BASOPHILS # BLD: 1 % (ref 0–2)
BASOPHILS ABSOLUTE: 0.08 K/UL (ref 0–0.2)
EOSINOPHILS RELATIVE PERCENT: 5 % (ref 1–4)
HCT VFR BLD CALC: 35.8 % (ref 36.3–47.1)
HEMOGLOBIN: 11 G/DL (ref 11.9–15.1)
IMMATURE GRANULOCYTES: 2 %
LYMPHOCYTES # BLD: 15 % (ref 24–43)
MCH RBC QN AUTO: 26.8 PG (ref 25.2–33.5)
MCHC RBC AUTO-ENTMCNC: 30.7 G/DL (ref 28.4–34.8)
MCV RBC AUTO: 87.1 FL (ref 82.6–102.9)
MONOCYTES # BLD: 10 % (ref 3–12)
NRBC AUTOMATED: 0 PER 100 WBC
PDW BLD-RTO: 15.4 % (ref 11.8–14.4)
PLATELET # BLD: 343 K/UL (ref 138–453)
PMV BLD AUTO: 11.1 FL (ref 8.1–13.5)
RBC # BLD: 4.11 M/UL (ref 3.95–5.11)
RBC # BLD: ABNORMAL 10*6/UL
SEG NEUTROPHILS: 67 % (ref 36–65)
SEGMENTED NEUTROPHILS ABSOLUTE COUNT: 9.44 K/UL (ref 1.5–8.1)
WBC # BLD: 14 K/UL (ref 3.5–11.3)

## 2022-12-09 PROCEDURE — 36415 COLL VENOUS BLD VENIPUNCTURE: CPT

## 2022-12-09 PROCEDURE — 2580000003 HC RX 258: Performed by: STUDENT IN AN ORGANIZED HEALTH CARE EDUCATION/TRAINING PROGRAM

## 2022-12-09 PROCEDURE — 6370000000 HC RX 637 (ALT 250 FOR IP)

## 2022-12-09 PROCEDURE — 6370000000 HC RX 637 (ALT 250 FOR IP): Performed by: STUDENT IN AN ORGANIZED HEALTH CARE EDUCATION/TRAINING PROGRAM

## 2022-12-09 PROCEDURE — 99232 SBSQ HOSP IP/OBS MODERATE 35: CPT | Performed by: INTERNAL MEDICINE

## 2022-12-09 PROCEDURE — 6360000002 HC RX W HCPCS: Performed by: STUDENT IN AN ORGANIZED HEALTH CARE EDUCATION/TRAINING PROGRAM

## 2022-12-09 PROCEDURE — 1200000000 HC SEMI PRIVATE

## 2022-12-09 PROCEDURE — 85025 COMPLETE CBC W/AUTO DIFF WBC: CPT

## 2022-12-09 RX ADMIN — MIDODRINE HYDROCHLORIDE 5 MG: 5 TABLET ORAL at 15:26

## 2022-12-09 RX ADMIN — MIDODRINE HYDROCHLORIDE 5 MG: 5 TABLET ORAL at 20:27

## 2022-12-09 RX ADMIN — GABAPENTIN 300 MG: 300 CAPSULE ORAL at 15:26

## 2022-12-09 RX ADMIN — SODIUM CHLORIDE: 9 INJECTION, SOLUTION INTRAVENOUS at 11:40

## 2022-12-09 RX ADMIN — LANSOPRAZOLE 30 MG: KIT at 06:21

## 2022-12-09 RX ADMIN — GABAPENTIN 300 MG: 300 CAPSULE ORAL at 20:27

## 2022-12-09 RX ADMIN — RIVAROXABAN 20 MG: 20 TABLET, FILM COATED ORAL at 18:53

## 2022-12-09 RX ADMIN — PIPERACILLIN AND TAZOBACTAM 4500 MG: 4; .5 INJECTION, POWDER, FOR SOLUTION INTRAVENOUS at 20:27

## 2022-12-09 RX ADMIN — SODIUM CHLORIDE, PRESERVATIVE FREE 10 ML: 5 INJECTION INTRAVENOUS at 11:40

## 2022-12-09 RX ADMIN — MIDODRINE HYDROCHLORIDE 5 MG: 5 TABLET ORAL at 09:55

## 2022-12-09 RX ADMIN — GABAPENTIN 300 MG: 300 CAPSULE ORAL at 09:55

## 2022-12-09 RX ADMIN — PIPERACILLIN AND TAZOBACTAM 4500 MG: 4; .5 INJECTION, POWDER, FOR SOLUTION INTRAVENOUS at 11:41

## 2022-12-09 ASSESSMENT — ENCOUNTER SYMPTOMS: EYE DISCHARGE: 0

## 2022-12-09 NOTE — PLAN OF CARE
Problem: Discharge Planning  Goal: Discharge to home or other facility with appropriate resources  12/9/2022 0138 by Christine Wheeler RN  Outcome: Progressing  12/8/2022 1944 by Darryl Hernandez RN  Outcome: Progressing     Problem: Nutrition Deficit:  Goal: Optimize nutritional status  12/9/2022 0138 by Christine Wheeler RN  Outcome: Progressing  12/8/2022 1944 by Darryl Hernandez RN  Outcome: Progressing  Flowsheets (Taken 12/8/2022 1138 by Chante Muir RD, LD)  Nutrient intake appropriate for improving, restoring, or maintaining nutritional needs: Recommend, monitor, and adjust tube feedings and TPN/PPN based on assessed needs     Problem: Confusion  Goal: Confusion, delirium, dementia, or psychosis is improved or at baseline  Description: INTERVENTIONS:  1. Assess for possible contributors to thought disturbance, including medications, impaired vision or hearing, underlying metabolic abnormalities, dehydration, psychiatric diagnoses, and notify attending LIP  2. [place] high risk fall precautions, as indicated  3. Provide frequent short contacts to provide reality reorientation, refocusing and direction  4. Decrease environmental stimuli, including noise as appropriate  5. Monitor and intervene to maintain adequate nutrition, hydration, elimination, sleep and activity  6. If unable to ensure safety without constant attention obtain sitter and review sitter guidelines with assigned personnel  7.  Initiate Psychosocial CNS and Spiritual Care consult, as indicated  12/9/2022 0138 by Christine Wheeler RN  Outcome: Progressing  12/8/2022 1944 by Darryl Hernandez RN  Outcome: Progressing     Problem: Neurosensory - Adult  Goal: Achieves stable or improved neurological status  12/9/2022 0138 by Christine Wheeler RN  Outcome: Progressing  12/8/2022 1944 by Darryl Hernandez RN  Outcome: Progressing  Goal: Absence of seizures  12/9/2022 0138 by Christine Wheeler RN  Outcome: Progressing  12/8/2022 1944 by Darryl Hernandez RN  Outcome: Progressing  Goal: Remains free of injury related to seizures activity  12/9/2022 0138 by Tessa Horne RN  Outcome: Progressing  12/8/2022 1944 by Adam Acuna RN  Outcome: Progressing  Goal: Achieves maximal functionality and self care  12/9/2022 0138 by Tessa Horne RN  Outcome: Progressing  12/8/2022 1944 by Adam Acuna RN  Outcome: Progressing     Problem: Respiratory - Adult  Goal: Achieves optimal ventilation and oxygenation  12/9/2022 0138 by Tessa Horne RN  Outcome: Progressing  12/8/2022 1944 by Adam Acuna RN  Outcome: Progressing     Problem: Gastrointestinal - Adult  Goal: Minimal or absence of nausea and vomiting  12/9/2022 0138 by Tessa Horne RN  Outcome: Progressing  12/8/2022 1944 by Adam Acuna RN  Outcome: Progressing  Goal: Maintains or returns to baseline bowel function  12/9/2022 0138 by Tessa Horne RN  Outcome: Progressing  12/8/2022 1944 by Adam Acuna RN  Outcome: Progressing  Goal: Maintains adequate nutritional intake  12/9/2022 0138 by Tessa Horne RN  Outcome: Progressing  12/8/2022 1944 by Adam Acuna RN  Outcome: Progressing  Goal: Establish and maintain optimal ostomy function  12/9/2022 0138 by Tessa Horne RN  Outcome: Progressing  12/8/2022 1944 by Adam Acuna RN  Outcome: Progressing     Problem: Genitourinary - Adult  Goal: Urinary catheter remains patent  12/9/2022 0138 by Tessa Horne RN  Outcome: Progressing  12/8/2022 1944 by Adam Acuna RN  Outcome: Progressing     Problem: Metabolic/Fluid and Electrolytes - Adult  Goal: Electrolytes maintained within normal limits  12/9/2022 0138 by Tessa Horne RN  Outcome: Progressing  12/8/2022 1944 by Adam Acuna RN  Outcome: Progressing  Goal: Hemodynamic stability and optimal renal function maintained  12/9/2022 0138 by Tessa Horne RN  Outcome: Progressing  12/8/2022 1944 by Adam Acuna RN  Outcome: Progressing  Goal: Glucose maintained within prescribed range  12/9/2022 0138 by Justin Thompson RN  Outcome: Progressing  12/8/2022 1944 by Ramo Donis RN  Outcome: Progressing     Problem: Skin/Tissue Integrity - Adult  Goal: Skin integrity remains intact  12/9/2022 0138 by Justin Thompson RN  Outcome: Progressing  12/8/2022 1944 by Ramo Donis RN  Outcome: Progressing  Goal: Incisions, wounds, or drain sites healing without S/S of infection  12/9/2022 0138 by Justin Thompson RN  Outcome: Progressing  12/8/2022 1944 by Ramo Donis RN  Outcome: Progressing  Goal: Oral mucous membranes remain intact  12/9/2022 0138 by Justin Thompson RN  Outcome: Progressing  12/8/2022 1944 by Ramo Donis RN  Outcome: Progressing     Problem: Pain  Goal: Verbalizes/displays adequate comfort level or baseline comfort level  12/9/2022 0138 by Justin Thompson RN  Outcome: Progressing  12/8/2022 1944 by Ramo Donis RN  Outcome: Progressing  Flowsheets  Taken 12/8/2022 1600 by Dione Alexandre RN  Verbalizes/displays adequate comfort level or baseline comfort level: Assess pain using appropriate pain scale  Taken 12/8/2022 1200 by Dione Alexandre RN  Verbalizes/displays adequate comfort level or baseline comfort level: Assess pain using appropriate pain scale     Problem: Skin/Tissue Integrity  Goal: Absence of new skin breakdown  Description: 1. Monitor for areas of redness and/or skin breakdown  2. Assess vascular access sites hourly  3. Every 4-6 hours minimum:  Change oxygen saturation probe site  4. Every 4-6 hours:  If on nasal continuous positive airway pressure, respiratory therapy assess nares and determine need for appliance change or resting period.   12/9/2022 0138 by Justin Thompson RN  Outcome: Progressing  12/8/2022 1944 by Ramo Donis RN  Outcome: Progressing     Problem: Safety - Adult  Goal: Free from fall injury  12/9/2022 0138 by Justin Thompson RN  Outcome: Progressing  12/8/2022 1944 by Ramo Donis RN  Outcome: Progressing     Problem: ABCDS Injury Assessment  Goal: Absence of physical injury  12/9/2022 0138 by Cassandra Johnson RN  Outcome: Progressing  12/8/2022 1944 by Jerry Briggs RN  Outcome: Progressing

## 2022-12-09 NOTE — PROGRESS NOTES
Physical Therapy        Physical Therapy Cancel Note      DATE: 2022    NAME: Kirsten Schwab  MRN: [id number]   : 1980      Patient not seen this date for Physical Therapy due to:    Patient at baseline functional level with no acute PT needs. 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 Pt dependent for functional mobility at baseline. Will defer PT evaluation at this time. Please reorder PT if future needs arise.        Electronically signed by Pedro Luis Hansen PT on 2022 at 1:54 PM

## 2022-12-09 NOTE — PLAN OF CARE
Problem: Discharge Planning  Goal: Discharge to home or other facility with appropriate resources  12/8/2022 1944 by Kevin Rodriguez RN  Outcome: Progressing  12/8/2022 0913 by Uma Contreras RN  Outcome: Progressing  Flowsheets (Taken 12/8/2022 0800)  Discharge to home or other facility with appropriate resources: Identify barriers to discharge with patient and caregiver     Problem: Nutrition Deficit:  Goal: Optimize nutritional status  12/8/2022 1944 by Kevin Rodriguez RN  Outcome: Progressing  Flowsheets (Taken 12/8/2022 1138 by Francesca De Jesus RD, LD)  Nutrient intake appropriate for improving, restoring, or maintaining nutritional needs: Recommend, monitor, and adjust tube feedings and TPN/PPN based on assessed needs  12/8/2022 0913 by Uma Contreras RN  Outcome: Progressing     Problem: Confusion  Goal: Confusion, delirium, dementia, or psychosis is improved or at baseline  Description: INTERVENTIONS:  1. Assess for possible contributors to thought disturbance, including medications, impaired vision or hearing, underlying metabolic abnormalities, dehydration, psychiatric diagnoses, and notify attending LIP  2. [place] high risk fall precautions, as indicated  3. Provide frequent short contacts to provide reality reorientation, refocusing and direction  4. Decrease environmental stimuli, including noise as appropriate  5. Monitor and intervene to maintain adequate nutrition, hydration, elimination, sleep and activity  6. If unable to ensure safety without constant attention obtain sitter and review sitter guidelines with assigned personnel  7.  Initiate Psychosocial CNS and Spiritual Care consult, as indicated  12/8/2022 1944 by Kevin Rodriguez RN  Outcome: Progressing  12/8/2022 0913 by Uma Contreras RN  Outcome: Progressing  Flowsheets (Taken 12/8/2022 0800)  Effect of thought disturbance (confusion, delirium, dementia, or psychosis) are managed with adequate functional status:   Assess for contributors to thought disturbance, including medications, impaired vision or hearing, underlying metabolic abnormalities, dehydration, psychiatric diagnoses, notify [facility] high risk fall precautions, as indicated   Provide frequent short contacts to provide reality reorientation, refocusing and direction   Decrease environmental stimuli, including noise as appropriate     Problem: Neurosensory - Adult  Goal: Achieves stable or improved neurological status  12/8/2022 1944 by Yosvany Ramirez RN  Outcome: Progressing  12/8/2022 0913 by Justyna Mahmood RN  Outcome: Progressing  Flowsheets (Taken 12/8/2022 0800)  Achieves stable or improved neurological status: Assess for and report changes in neurological status  Goal: Absence of seizures  12/8/2022 1944 by Yosvany Ramirez RN  Outcome: Progressing  12/8/2022 0913 by Justyna Mahmood RN  Outcome: Progressing  Flowsheets (Taken 12/8/2022 0800)  Absence of seizures: Monitor for seizure activity.   If seizure occurs, document type and location of movements and any associated apnea  Goal: Remains free of injury related to seizures activity  12/8/2022 1944 by Yosvany Ramirez RN  Outcome: Progressing  12/8/2022 0913 by Justyna Mahmood RN  Outcome: Progressing  Flowsheets (Taken 12/8/2022 0800)  Remains free of injury related to seizure activity: Maintain airway, patient safety  and administer oxygen as ordered  Goal: Achieves maximal functionality and self care  12/8/2022 1944 by Yosvany Ramirez RN  Outcome: Progressing  12/8/2022 0913 by Justyna Mahmood RN  Outcome: Progressing  Flowsheets (Taken 12/8/2022 0800)  Achieves maximal functionality and self care: Monitor swallowing and airway patency with patient fatigue and changes in neurological status     Problem: Gastrointestinal - Adult  Goal: Minimal or absence of nausea and vomiting  12/8/2022 1944 by Yosvany Ramirez RN  Outcome: Progressing  12/8/2022 0913 by Justyna Mahmood RN  Outcome: Progressing  Flowsheets (Taken 12/8/2022 0800)  Minimal or absence of nausea and vomiting: Administer IV fluids as ordered to ensure adequate hydration  Goal: Maintains or returns to baseline bowel function  12/8/2022 1944 by Ann Chan RN  Outcome: Progressing  12/8/2022 0913 by Jamal Ramires RN  Outcome: Progressing  Flowsheets (Taken 12/8/2022 0800)  Maintains or returns to baseline bowel function: Assess bowel function  Goal: Maintains adequate nutritional intake  12/8/2022 1944 by Ann Chan RN  Outcome: Progressing  12/8/2022 0913 by Jamal Ramires RN  Outcome: Progressing  Flowsheets (Taken 12/8/2022 0800)  Maintains adequate nutritional intake: Monitor intake and output, weight and lab values  Goal: Establish and maintain optimal ostomy function  12/8/2022 1944 by Ann Chan RN  Outcome: Progressing  12/8/2022 0913 by Jamal Ramires RN  Outcome: Progressing  Flowsheets (Taken 12/8/2022 0800)  Establish and maintain optimal ostomy function: Monitor output from ostomies     Problem: Genitourinary - Adult  Goal: Urinary catheter remains patent  12/8/2022 1944 by Ann Chan RN  Outcome: Progressing  12/8/2022 0913 by Jamal Ramires RN  Outcome: Progressing     Problem: Metabolic/Fluid and Electrolytes - Adult  Goal: Electrolytes maintained within normal limits  12/8/2022 1944 by Ann Chan RN  Outcome: Progressing  12/8/2022 0913 by Jamal Ramires RN  Outcome: Progressing  Flowsheets (Taken 12/8/2022 0800)  Electrolytes maintained within normal limits: Monitor labs and assess patient for signs and symptoms of electrolyte imbalances  Goal: Hemodynamic stability and optimal renal function maintained  12/8/2022 1944 by Ann Chan RN  Outcome: Progressing  12/8/2022 0913 by Jamal Ramires RN  Outcome: Progressing  Flowsheets (Taken 12/8/2022 0800)  Hemodynamic stability and optimal renal function maintained: Monitor labs and assess for signs and symptoms of volume excess or deficit  Goal: Glucose maintained within prescribed range  12/8/2022 1944 by Stew Walker RN  Outcome: Progressing  12/8/2022 0913 by Subhash Gallardo RN  Outcome: Progressing  Flowsheets (Taken 12/8/2022 0800)  Glucose maintained within prescribed range: Monitor blood glucose as ordered     Problem: Skin/Tissue Integrity - Adult  Goal: Skin integrity remains intact  12/8/2022 1944 by Stew Walker RN  Outcome: Progressing  12/8/2022 0913 by Subhash Gallardo RN  Outcome: Progressing  Flowsheets (Taken 12/8/2022 0800)  Skin Integrity Remains Intact: Monitor for areas of redness and/or skin breakdown  Goal: Incisions, wounds, or drain sites healing without S/S of infection  12/8/2022 1944 by Stew Walker RN  Outcome: Progressing  12/8/2022 0913 by Subhash Gallardo RN  Outcome: Progressing  Flowsheets (Taken 12/8/2022 0800)  Incisions, Wounds, or Drain Sites Healing Without Sign and Symptoms of Infection: TWICE DAILY: Assess and document skin integrity  Goal: Oral mucous membranes remain intact  12/8/2022 1944 by Stew Walker RN  Outcome: Progressing  12/8/2022 0913 by Subhash Gallardo RN  Outcome: Progressing  Flowsheets (Taken 12/8/2022 0800)  Oral Mucous Membranes Remain Intact: Assess oral mucosa and hygiene practices     Problem: Pain  Goal: Verbalizes/displays adequate comfort level or baseline comfort level  12/8/2022 1944 by Stew Walker RN  Outcome: Progressing  Flowsheets  Taken 12/8/2022 1600 by Subhash Gallardo RN  Verbalizes/displays adequate comfort level or baseline comfort level: Assess pain using appropriate pain scale  Taken 12/8/2022 1200 by Subhash Gallardo RN  Verbalizes/displays adequate comfort level or baseline comfort level: Assess pain using appropriate pain scale  12/8/2022 0913 by Subhash Gallardo RN  Outcome: Progressing  Flowsheets (Taken 12/8/2022 0800)  Verbalizes/displays adequate comfort level or baseline comfort level: Assess pain using appropriate pain scale     Problem: Skin/Tissue Integrity  Goal: Absence of new skin breakdown  Description: 1. Monitor for areas of redness and/or skin breakdown  2. Assess vascular access sites hourly  3. Every 4-6 hours minimum:  Change oxygen saturation probe site  4. Every 4-6 hours:  If on nasal continuous positive airway pressure, respiratory therapy assess nares and determine need for appliance change or resting period.   12/8/2022 1944 by Fabian Wylie RN  Outcome: Progressing  12/8/2022 0913 by Rajan Kim RN  Outcome: Progressing     Problem: Safety - Adult  Goal: Free from fall injury  12/8/2022 1944 by Fabian Wylie RN  Outcome: Progressing  12/8/2022 0913 by Rajan Kim RN  Outcome: Progressing  Flowsheets (Taken 12/8/2022 0800)  Free From Fall Injury: Instruct family/caregiver on patient safety     Problem: ABCDS Injury Assessment  Goal: Absence of physical injury  12/8/2022 1944 by Fabian Wylie RN  Outcome: Progressing  12/8/2022 0913 by Rajan Kim RN  Outcome: Progressing  Flowsheets (Taken 12/8/2022 0800)  Absence of Physical Injury: Implement safety measures based on patient assessment

## 2022-12-09 NOTE — PROGRESS NOTES
[facility]up[facility] [address]  Occupational Therapy Not Seen Note    DATE: 2022    NAME: Chadd Ramsey  MRN: [id number]   : 1980      Patient not seen this date for Occupational Therapy due to:    Per EMR and RN, the Pt is Dependent at Baseline. Pt has no acute OT needs at this time. OT Evaluation // Services will be deferred. If there is a change in Pt status, please reorder OT Services.       Electronically signed by JAY Tang OTR/L on 2022 at 2:13 PM

## 2022-12-09 NOTE — FLOWSHEET NOTE
Pts mews score is a 4 from vitals taken below.  Writer notified on call NP.     12/09/22 0000   Vital Signs   Temp 97.7 °F (36.5 °C)   Temp Source Axillary   Heart Rate 98   Heart Rate Source Monitor   Resp 22   BP (!) 97/59   MAP (Calculated) 72   BP Location Left upper arm   BP Method Automatic   Patient Position Semi fowlers   Level of Consciousness 1   MEWS Score 4   Oxygen Therapy   SpO2 96 %   O2 Device None (Room air)

## 2022-12-10 LAB
ABSOLUTE EOS #: 0.73 K/UL (ref 0–0.4)
ABSOLUTE IMMATURE GRANULOCYTE: 0.24 K/UL (ref 0–0.3)
ABSOLUTE LYMPH #: 2.44 K/UL (ref 1–4.8)
ABSOLUTE MONO #: 1.34 K/UL (ref 0.1–0.8)
BASOPHILS # BLD: 0 % (ref 0–2)
BASOPHILS ABSOLUTE: 0 K/UL (ref 0–0.2)
EOSINOPHILS RELATIVE PERCENT: 6 % (ref 1–4)
HCT VFR BLD CALC: 37.7 % (ref 36.3–47.1)
HEMOGLOBIN: 11.5 G/DL (ref 11.9–15.1)
IMMATURE GRANULOCYTES: 2 %
LACTIC ACID, SEPSIS WHOLE BLOOD: 0.9 MMOL/L (ref 0.5–1.9)
LACTIC ACID, SEPSIS WHOLE BLOOD: 0.9 MMOL/L (ref 0.5–1.9)
LYMPHOCYTES # BLD: 20 % (ref 24–44)
MCH RBC QN AUTO: 26.9 PG (ref 25.2–33.5)
MCHC RBC AUTO-ENTMCNC: 30.5 G/DL (ref 28.4–34.8)
MCV RBC AUTO: 88.3 FL (ref 82.6–102.9)
MONOCYTES # BLD: 11 % (ref 1–7)
MORPHOLOGY: ABNORMAL
NRBC AUTOMATED: 0 PER 100 WBC
PDW BLD-RTO: 15.7 % (ref 11.8–14.4)
PLATELET # BLD: 396 K/UL (ref 138–453)
PMV BLD AUTO: 10.7 FL (ref 8.1–13.5)
RBC # BLD: 4.27 M/UL (ref 3.95–5.11)
SEG NEUTROPHILS: 61 % (ref 36–66)
SEGMENTED NEUTROPHILS ABSOLUTE COUNT: 7.45 K/UL (ref 1.8–7.7)
WBC # BLD: 12.2 K/UL (ref 3.5–11.3)

## 2022-12-10 PROCEDURE — 99232 SBSQ HOSP IP/OBS MODERATE 35: CPT | Performed by: INTERNAL MEDICINE

## 2022-12-10 PROCEDURE — 6370000000 HC RX 637 (ALT 250 FOR IP): Performed by: FAMILY MEDICINE

## 2022-12-10 PROCEDURE — 6370000000 HC RX 637 (ALT 250 FOR IP): Performed by: STUDENT IN AN ORGANIZED HEALTH CARE EDUCATION/TRAINING PROGRAM

## 2022-12-10 PROCEDURE — 6370000000 HC RX 637 (ALT 250 FOR IP)

## 2022-12-10 PROCEDURE — 6360000002 HC RX W HCPCS: Performed by: STUDENT IN AN ORGANIZED HEALTH CARE EDUCATION/TRAINING PROGRAM

## 2022-12-10 PROCEDURE — 2580000003 HC RX 258: Performed by: STUDENT IN AN ORGANIZED HEALTH CARE EDUCATION/TRAINING PROGRAM

## 2022-12-10 PROCEDURE — 2580000003 HC RX 258: Performed by: NURSE PRACTITIONER

## 2022-12-10 PROCEDURE — 36415 COLL VENOUS BLD VENIPUNCTURE: CPT

## 2022-12-10 PROCEDURE — 83605 ASSAY OF LACTIC ACID: CPT

## 2022-12-10 PROCEDURE — 1200000000 HC SEMI PRIVATE

## 2022-12-10 PROCEDURE — 85025 COMPLETE CBC W/AUTO DIFF WBC: CPT

## 2022-12-10 PROCEDURE — 2580000003 HC RX 258: Performed by: FAMILY MEDICINE

## 2022-12-10 PROCEDURE — 6370000000 HC RX 637 (ALT 250 FOR IP): Performed by: NURSE PRACTITIONER

## 2022-12-10 RX ORDER — 0.9 % SODIUM CHLORIDE 0.9 %
250 INTRAVENOUS SOLUTION INTRAVENOUS ONCE
Status: COMPLETED | OUTPATIENT
Start: 2022-12-10 | End: 2022-12-10

## 2022-12-10 RX ORDER — BACLOFEN 10 MG/1
10 TABLET ORAL 3 TIMES DAILY
Status: DISCONTINUED | OUTPATIENT
Start: 2022-12-10 | End: 2022-12-11 | Stop reason: HOSPADM

## 2022-12-10 RX ORDER — GLYCOPYRROLATE 1 MG/1
1 TABLET ORAL 3 TIMES DAILY
Status: DISCONTINUED | OUTPATIENT
Start: 2022-12-10 | End: 2022-12-11 | Stop reason: HOSPADM

## 2022-12-10 RX ORDER — SCOLOPAMINE TRANSDERMAL SYSTEM 1 MG/1
1 PATCH, EXTENDED RELEASE TRANSDERMAL
Status: DISCONTINUED | OUTPATIENT
Start: 2022-12-10 | End: 2022-12-11 | Stop reason: HOSPADM

## 2022-12-10 RX ORDER — 0.9 % SODIUM CHLORIDE 0.9 %
500 INTRAVENOUS SOLUTION INTRAVENOUS ONCE
Status: COMPLETED | OUTPATIENT
Start: 2022-12-10 | End: 2022-12-10

## 2022-12-10 RX ORDER — SERTRALINE HYDROCHLORIDE 25 MG/1
25 TABLET, FILM COATED ORAL DAILY
Status: DISCONTINUED | OUTPATIENT
Start: 2022-12-10 | End: 2022-12-11 | Stop reason: HOSPADM

## 2022-12-10 RX ORDER — MIDODRINE HYDROCHLORIDE 5 MG/1
5 TABLET ORAL ONCE
Status: COMPLETED | OUTPATIENT
Start: 2022-12-10 | End: 2022-12-10

## 2022-12-10 RX ADMIN — PIPERACILLIN AND TAZOBACTAM 4500 MG: 4; .5 INJECTION, POWDER, FOR SOLUTION INTRAVENOUS at 18:55

## 2022-12-10 RX ADMIN — PIPERACILLIN AND TAZOBACTAM 4500 MG: 4; .5 INJECTION, POWDER, FOR SOLUTION INTRAVENOUS at 10:09

## 2022-12-10 RX ADMIN — MIDODRINE HYDROCHLORIDE 5 MG: 5 TABLET ORAL at 00:42

## 2022-12-10 RX ADMIN — SODIUM CHLORIDE, PRESERVATIVE FREE 10 ML: 5 INJECTION INTRAVENOUS at 20:02

## 2022-12-10 RX ADMIN — SODIUM CHLORIDE 250 ML: 9 INJECTION, SOLUTION INTRAVENOUS at 00:31

## 2022-12-10 RX ADMIN — RIVAROXABAN 20 MG: 20 TABLET, FILM COATED ORAL at 18:41

## 2022-12-10 RX ADMIN — GLYCOPYRROLATE 1 MG: 1 TABLET ORAL at 16:11

## 2022-12-10 RX ADMIN — GABAPENTIN 300 MG: 300 CAPSULE ORAL at 09:58

## 2022-12-10 RX ADMIN — MIDODRINE HYDROCHLORIDE 5 MG: 5 TABLET ORAL at 20:02

## 2022-12-10 RX ADMIN — GABAPENTIN 300 MG: 300 CAPSULE ORAL at 20:02

## 2022-12-10 RX ADMIN — BACLOFEN 10 MG: 10 TABLET ORAL at 16:11

## 2022-12-10 RX ADMIN — SODIUM CHLORIDE 500 ML: 9 INJECTION, SOLUTION INTRAVENOUS at 12:06

## 2022-12-10 RX ADMIN — SODIUM CHLORIDE 500 ML: 9 INJECTION, SOLUTION INTRAVENOUS at 03:07

## 2022-12-10 RX ADMIN — SERTRALINE 25 MG: 25 TABLET, FILM COATED ORAL at 16:11

## 2022-12-10 RX ADMIN — LANSOPRAZOLE 30 MG: KIT at 06:30

## 2022-12-10 RX ADMIN — GABAPENTIN 300 MG: 300 CAPSULE ORAL at 14:22

## 2022-12-10 RX ADMIN — BACLOFEN 10 MG: 10 TABLET ORAL at 20:02

## 2022-12-10 RX ADMIN — PIPERACILLIN AND TAZOBACTAM 4500 MG: 4; .5 INJECTION, POWDER, FOR SOLUTION INTRAVENOUS at 03:09

## 2022-12-10 RX ADMIN — GLYCOPYRROLATE 1 MG: 1 TABLET ORAL at 20:02

## 2022-12-10 RX ADMIN — MIDODRINE HYDROCHLORIDE 5 MG: 5 TABLET ORAL at 09:58

## 2022-12-10 RX ADMIN — MIDODRINE HYDROCHLORIDE 5 MG: 5 TABLET ORAL at 14:22

## 2022-12-10 ASSESSMENT — ENCOUNTER SYMPTOMS: EYE DISCHARGE: 0

## 2022-12-10 NOTE — PROGRESS NOTES
[facility] -   Infectious diseases evaluation  admission date 12/3/2022    reason for consultation:   sepsis    Impression :   Current:  Bandemia  proCalcitonin elevation 29  Septic shock levophed 40  R obstructive pyelonephritis -   severe R hydro w many ureteral stones  Stent placed 12/3  GNR septicemia - bacteroides and enterobacteriae  Multifocal infiltrates, likely atelectasis - less likely pneumonia  MS   Neurogenic bladder w SP omer  R AKA amputation by hx  Past colostomy  Possible ascending colon pseudo pneumatosis on CT  Concerning mostly w bacteroides and 2 different bacteriaceae in the blood     Other:    Discussion / summary of stay / plan of care     Recommendations   12/3 Meropenem -stop 12/6 12/6 zosyn till 12/12 - DC planning  Can switch to invanz at [place] , once bed available  BC - Bacteroides proteus and morganella - most likely source is the abdomen  Possible ascending colon pseudo pneumatosis  General surgery on board, med tx for now  Bandemia better 16  CT chest  - mostly like P congestion and atelectasis  - CXR 12/5 stable    Infection Control Recommendations   [place] Precautions  Contact Isolation     Antimicrobial Stewardship Recommendations   Simplification of therapy  Targeted therapy  History of Present Illness:   Initial history:  Marcela Galarza is a 43y.o.-year-old female w MS and neurogenic bladder and chronic SP omer, Gtube and hx PE on xeralto  Cojes w altered mentation and intubated in ER - S IRS+ and tachycardia and hypotension started on levophed  UA + for infection  CT chest showed many infiltrates and some might be multifocal pneumonia   R kid w hydronephrosis and many  ureteral stones.   Ascending colon pseudo pneumatosis  Started on cefepime and vanco and we are called  Bandemia  NUZHAT     took her to OR for a R stent  ID called    She is on the vent 40 FIO2 and 5 peep  Pupils minimally reactive  Sp salivary and beige  Abd soft   Urine slightly blood tinged and no pus  No rash  Levophed 40    Interval changes  12/10/2022   Patient Vitals for the past 8 hrs:   BP Temp Pulse Resp SpO2   12/10/22 1141 116/77 98 °F (36.7 °C) (!) 102 18 94 %   12/10/22 0856 (!) 75/48 97.9 °F (36.6 °C) (!) 102 18 94 %   Temp LG  WBc better 28  Creat  0.7 better  BC x 2 GNR, one w bacteroides and enterobacteriaceae pend  U cx in process     12/5  Patient is more alert coughing on the vent agitated, seems appropriate trying to formulate words  Sputum is thick yellow, and sputum culture so far is negative  WBC improved to 24  Bacteria in the blood gram-negative rods pending ID  Urine cultures multiple organisms. 12/7  Patient extubated 12/6   WBC up to 21.8 from 19.2 on 12/6  Off Levophed  Patient response follows with her eyes, mumbles a few words,   Sputum is small, culture normal bill. Abdomen is soft does not seem to be tender to exam  Urine is clear in the Davies,  No diarrhea  No chest x-ray today. 12/8  Afebrile   WBC 16.4  Extubated, on room air  Patient responds by nodding/shaking her head, tries to speak  Davies, clear urine    12/10  Afebrile, on room air  WBC 12.2   Patient hypotensive overnight - given midodrine   Patient able to nod and shake her head in response to some questions - no pain, SOB, chest tightness, headache, fever/chills or nausea. Summary of relevant labs:  Labs:  W 36 - 28-24-19.2-21.8 -16.4 - 14 - 12.2   Creat 3 - 2.4 - 0.7   Lactic Acid, Sepsis, Whole Blood1.6   [id number]   GDG63 High U/L AST58 High    Ynmnifrelyhbl81.13 High    Micro:  BC x 2 GNR, one w bacteroides and enterobacteriaceae   BC Morganella sensitive to aztreonam, ceftriaxone, gentamicin, tobramycin, tmp-smx, zosyn  U cx multiple bacteria  Sputum culture no significant bacteria    Nasal MRSA neg  Imaging:  Chest x-ray 12/5  Mild right basilar airspace opacities. This could represent atelectasis or   pneumonia         CT AP chest  Impression:  1.  ETT now terminates 12 mm above denys. 2. Patchy perihilar and more confluent posterior segment airspace   consolidations likely combination of pneumonia and atelectasis. Trace right   pleural effusion. 3. Innumerable right renal calculi most in the 5-8 mm size range. Moderate   to severe right hydronephrosis and hydroureter. There is a stack of 4   obstructing calculi in the distal right ureter ranging from 5-11 mm   accounting for finding. 11 mm calculus is most distal and positioned about 2   cm from the uterovesical junction. 4. Liquid stool in the colon suggests diarrheal disease. Tiny gas pockets   along the ascending colon wall in the dependent portion appear to be pseudo   pneumatosis. CT brain neg      I have personally reviewed the past medical history, past surgical history, medications, social history, and family history, and I haveupdated the database accordingly. Allergies:   Patient has no known allergies. Review of Systems:     Review of Systems   Unable to perform ROS: Other (sluggish unable to communicate)   Constitutional:  Positive for activity change. Eyes:  Negative for discharge. Allergic/Immunologic: Negative for immunocompromised state. Physical Examination :       Physical Exam  Constitutional:       General: She is not in acute distress. Appearance: She is not ill-appearing or toxic-appearing. HENT:      Head: Normocephalic and atraumatic. Nose: Nose normal.      Mouth/Throat:      Mouth: Mucous membranes are moist.      Pharynx: No oropharyngeal exudate. Eyes:      General: No scleral icterus. Conjunctiva/sclera: Conjunctivae normal.   Cardiovascular:      Rate and Rhythm: Regular rhythm. Tachycardia present. Heart sounds: No murmur heard. No friction rub. No gallop. Pulmonary:      Effort: No respiratory distress. Breath sounds: No stridor. No rhonchi. Abdominal:      Palpations: There is no mass.       Tenderness: There is no abdominal tenderness. Hernia: No hernia is present. Genitourinary:     Comments: Davies  Musculoskeletal:         General: No tenderness or signs of injury. Cervical back: Neck supple. No rigidity or tenderness. Skin:     Coloration: Skin is not pale. Findings: No erythema.    Neurological:      Comments: Arousing, non responsive   Psychiatric:      Comments: Non responsive       Past Medical History:     Past Medical History:   Diagnosis Date    Aphasia     Aphasia     Contracture of joint, lower leg     Depressed     HTN (hypertension)     Hx MRSA infection resolved 1/2017    2 negative nasal screens 1/2017 (hx in heel in 2013)    Hydronephrosis     Movement disorder     tremor    Multiple sclerosis ([facility] [address]     Neurogenic bladder     Non-verbal learning disorder     Peripheral vascular disease ([facility] [address]     Polyneuropathy     Pressure ulcer     Coccyx, Heel    Pulmonary embolism (HCC)     Pulmonary infarction (HCC)     Quadriplegia (HCC)     Tachycardia     UTI (lower urinary tract infection)        Past Surgical  History:     Past Surgical History:   Procedure Laterality Date    AMPUTATION Right     COLOSTOMY      CYSTOSCOPY Right 12/3/2022    CYSTOSCOPY URETERAL STENT INSERTION performed by Scotty Jones MD at [facility].       IR NEPHROSTOMY PERCUTANEOUS RIGHT  9/27/2020    IR NEPHROSTOMY PERCUTANEOUS RIGHT 9/27/2020 Jessica Wilson MD [facility]    LEG AMPUTATION THROUGH FEMUR         Medications:      sodium chloride  500 mL IntraVENous Once    piperacillin-tazobactam  4,500 mg IntraVENous Q8H    lansoprazole  30 mg Per G Tube QAM AC    midodrine  5 mg Oral TID    rivaroxaban  20 mg Oral Daily    sodium chloride flush  5-40 mL IntraVENous 2 times per day    gabapentin  300 mg Per G Tube TID    [Held by provider] metoclopramide  5 mg Oral TID    [Held by provider] metoprolol tartrate  25 mg Oral TID       Social History:     Social History Socioeconomic History    Marital status: Single     Spouse name: Not on file    Number of children: Not on file    Years of education: Not on file    Highest education level: Not on file   Occupational History    Not on file   Tobacco Use    Smoking status: Never    Smokeless tobacco: Never   Vaping Use    Vaping Use: Never used   Substance and Sexual Activity    Alcohol use: No    Drug use: No    Sexual activity: Never   Other Topics Concern    Not on file   Social History Narrative    Not on file     Social Determinants of Health     Financial Resource Strain: Not on file   Food Insecurity: Not on file   Transportation Needs: Not on file   Physical Activity: Not on file   Stress: Not on file   Social Connections: Not on file   Intimate Partner Violence: Not on file   Housing Stability: Not on file       Family History:     Family History   Problem Relation Age of Onset    No Known Problems Mother     No Known Problems Father       Medical Decision Making:   I have independently reviewed/ordered the following labs:    CBC with Differential:   Recent Labs     12/09/22  0605 12/10/22  0800   WBC 14.0* 12.2*   HGB 11.0* 11.5*   HCT 35.8* 37.7    396   LYMPHOPCT 15* 20*   MONOPCT 10 11*     BMP:  Recent Labs     12/07/22  1541 12/08/22  0504    137   K 3.6* 3.3*   * 105   CO2 23 24   BUN 14 13   CREATININE 0.26* 0.22*   MG 1.8 1.6     Hepatic Function Panel:   No results for input(s): PROT, LABALBU, BILIDIR, IBILI, BILITOT, ALKPHOS, ALT, AST in the last 72 hours. No results for input(s): RPR in the last 72 hours. No results for input(s): HIV in the last 72 hours. No results for input(s): BC in the last 72 hours. Lab Results   Component Value Date/Time    CREATININE 0.22 12/08/2022 05:04 AM    GLUCOSE 72 12/08/2022 05:04 AM    GLUCOSE 69 03/11/2012 12:33 PM       Detailed results: Thank you for allowing us to participate in the care of this patient. Please call with questions.     This note is created with the assistance of a speech recognition program.  While intending to generate adocument that actually reflects the content of the visit, the document can still have some errors including those of syntax and sound a like substitutions which may escape proof reading. It such instances, actual meaningcan be extrapolated by contextual diversion. [facility]  Office: [phone]  Perfect serve / office [phone]    ATTESTATION:    I have discussed the case, including pertinent history and exam findings with the residents and students. I have seen and examined the patient and the key elements of the encounter have been performed by me. I was present when the student obtained his information or examined the patient. I have reviewed the laboratory data, other diagnostic studies and discussed them with the residents. I have updated the medical record where necessary. I agree with the assessment, plan and orders as documented by the resident/ student.     Alexx Bernstein MD.

## 2022-12-10 NOTE — PROGRESS NOTES
FREE TEXT NON BILLABLE PULMONARY NOTE:    Patient seen and evaluated independently by me. Hx of severe right hydronephrosis with pyelonephritis - mitigated with ureteral stent, GNR septicemia and atelectatic lung disease. Currently saturating well on room air. Pulmonary service to sign off at this time, but please do not hesitate to call/contact us with questions or concerns.       Josephine Ward MD  Pulm/CCM

## 2022-12-10 NOTE — PROGRESS NOTES
[facility] [place] -   Infectious diseases evaluation  admission date 12/3/2022    reason for consultation:   sepsis    Impression :   Current:  Bandemia  proCalcitonin elevation 29  Septic shock levophed 40  R obstructive pyelonephritis -   severe R hydro w many ureteral stones  Stent placed 12/3  GNR septicemia - bacteroides and enterobacteriae  Multifocal infiltrates, likely atelectasis - less likely pneumonia  MS   Neurogenic bladder w SP omer  R AKA amputation by hx  Past colostomy  Possible ascending colon pseudo pneumatosis on CT  Concerning mostly w bacteroides and 2 different bacteriaceae in the blood     Other:    Discussion / summary of stay / plan of care     Recommendations   12/3 Meropenem -stop 12/6 12/6 zosyn till 12/12 -   Can switch to invanz at [place] till 12/12 , once bed available  BC - Bacteroides proteus and morganella - most likely source is the abdomen  Possible ascending colon pseudo pneumatosis  General surgery on board, med tx for now  Bandemia better 16  CT chest  - mostly like P congestion and atelectasis  - CXR 12/5 stable    Infection Control Recommendations   [place] Precautions  Contact Isolation     Antimicrobial Stewardship Recommendations   Simplification of therapy  Targeted therapy  History of Present Illness:   Initial history:  Bob Lo is a 43y.o.-year-old female w MS and neurogenic bladder and chronic SP omer, Gtube and hx PE on xeralto  Cojes w altered mentation and intubated in ER - S IRS+ and tachycardia and hypotension started on levophed  UA + for infection  CT chest showed many infiltrates and some might be multifocal pneumonia   R kid w hydronephrosis and many  ureteral stones.   Ascending colon pseudo pneumatosis  Started on cefepime and vanco and we are called  Bandemia  NUZHAT     took her to OR for a R stent  ID called    She is on the vent 40 FIO2 and 5 peep  Pupils minimally reactive  Sp salivary and beige  Abd soft   Urine slightly blood tinged and no pus  No rash  Levophed 40    Interval changes  12/9/2022   Patient Vitals for the past 8 hrs:   BP Temp Temp src Pulse Resp SpO2   12/09/22 2030 (!) 89/55 99.8 °F (37.7 °C) Axillary 71 14 95 %   12/09/22 1515 105/75 -- -- (!) 108 16 97 %     Temp LG  WBc better 28  Creat  0.7 better  BC x 2 GNR, one w bacteroides and enterobacteriaceae pend  U cx in process     12/5  Patient is more alert coughing on the vent agitated, seems appropriate trying to formulate words  Sputum is thick yellow, and sputum culture so far is negative  WBC improved to 24  Bacteria in the blood gram-negative rods pending ID  Urine cultures multiple organisms. 12/7  Patient extubated 12/6   WBC up to 21.8 from 19.2 on 12/6  Off Levophed  Patient response follows with her eyes, mumbles a few words,   Sputum is small, culture normal bill. Abdomen is soft does not seem to be tender to exam  Urine is clear in the Davies,  No diarrhea  No chest x-ray today. 12/8  Afebrile   WBC 16.4  Extubated, on room air  Patient responds by nodding/shaking her head, tries to speak  Davies, clear urine    12/9  Remains sluggish, follows with her eyes, but does not respond with not on off, but not much appropriate otherwise to the situation. Abdomen is soft lungs are clear. Summary of relevant labs:  Labs:  W 36 - 28-24-19.2-21.8 -16.4  Creat 3 - 2.4 - 0.7   Lactic Acid, Sepsis, Whole Blood1.6   [id number]   TUV71 High U/L AST58 High    Mllbsnycdbuvz75.13 High    Micro:  BC x 2 GNR, one w bacteroides and enterobacteriaceae   BC Morganella sensitive to aztreonam, ceftriaxone, gentamicin, tobramycin, tmp-smx, zosyn  U cx multiple bacteria  Sputum culture no significant bacteria    Nasal MRSA neg  Imaging:  Chest x-ray 12/5  Mild right basilar airspace opacities. This could represent atelectasis or   pneumonia         CT AP chest  Impression:  1. ETT now terminates 12 mm above denys.    2. Patchy perihilar and more confluent posterior segment airspace   consolidations likely combination of pneumonia and atelectasis. Trace right   pleural effusion. 3. Innumerable right renal calculi most in the 5-8 mm size range. Moderate   to severe right hydronephrosis and hydroureter. There is a stack of 4   obstructing calculi in the distal right ureter ranging from 5-11 mm   accounting for finding. 11 mm calculus is most distal and positioned about 2   cm from the uterovesical junction. 4. Liquid stool in the colon suggests diarrheal disease. Tiny gas pockets   along the ascending colon wall in the dependent portion appear to be pseudo   pneumatosis. CT brain neg      I have personally reviewed the past medical history, past surgical history, medications, social history, and family history, and I haveupdated the database accordingly. Allergies:   Patient has no known allergies. Review of Systems:     Review of Systems   Unable to perform ROS: Other (sluggish unable to communicate)   Constitutional:  Positive for activity change. Eyes:  Negative for discharge. Allergic/Immunologic: Negative for immunocompromised state. Physical Examination :       Physical Exam  Constitutional:       General: She is not in acute distress. Appearance: She is not ill-appearing or toxic-appearing. HENT:      Head: Normocephalic and atraumatic. Nose: Nose normal.      Mouth/Throat:      Mouth: Mucous membranes are moist.   Eyes:      General: No scleral icterus. Conjunctiva/sclera: Conjunctivae normal.   Cardiovascular:      Rate and Rhythm: Regular rhythm. Tachycardia present. Heart sounds: No murmur heard. No friction rub. No gallop. Pulmonary:      Effort: No respiratory distress. Breath sounds: No stridor. No wheezing, rhonchi or rales. Abdominal:      Palpations: There is no mass. Tenderness: There is no abdominal tenderness. Hernia: No hernia is present. Genitourinary:     Comments: Davies  Musculoskeletal:         General: No tenderness or signs of injury. Cervical back: Neck supple. No rigidity or tenderness. Skin:     Coloration: Skin is not pale. Findings: No erythema.    Neurological:      Comments: Arousing, non responsive   Psychiatric:      Comments: Non responsive       Past Medical History:     Past Medical History:   Diagnosis Date    Aphasia     Aphasia     Contracture of joint, lower leg     Depressed     HTN (hypertension)     Hx MRSA infection resolved 1/2017    2 negative nasal screens 1/2017 (hx in heel in 2013)    Hydronephrosis     Movement disorder     tremor    Multiple sclerosis ([address]     Neurogenic bladder     Non-verbal learning disorder     Peripheral vascular disease ([address]     Polyneuropathy     Pressure ulcer     Coccyx, Heel    Pulmonary embolism (HCC)     Pulmonary infarction (HCC)     Quadriplegia (HCC)     Tachycardia     UTI (lower urinary tract infection)        Past Surgical  History:     Past Surgical History:   Procedure Laterality Date    AMPUTATION Right     COLOSTOMY      CYSTOSCOPY Right 12/3/2022    CYSTOSCOPY URETERAL STENT INSERTION performed by Rashada Philip MD at [facility].       IR NEPHROSTOMY PERCUTANEOUS RIGHT  9/27/2020    IR NEPHROSTOMY PERCUTANEOUS RIGHT 9/27/2020 Joan Willis MD [facility]    LEG AMPUTATION THROUGH FEMUR         Medications:      piperacillin-tazobactam  4,500 mg IntraVENous Q8H    lansoprazole  30 mg Per G Tube QAM AC    midodrine  5 mg Oral TID    rivaroxaban  20 mg Oral Daily    sodium chloride flush  5-40 mL IntraVENous 2 times per day    gabapentin  300 mg Per G Tube TID    [Held by provider] metoclopramide  5 mg Oral TID    [Held by provider] metoprolol tartrate  25 mg Oral TID       Social History:     Social History     Socioeconomic History    Marital status: Single     Spouse name: Not on file    Number of children: Not on file Years of education: Not on file    Highest education level: Not on file   Occupational History    Not on file   Tobacco Use    Smoking status: Never    Smokeless tobacco: Never   Vaping Use    Vaping Use: Never used   Substance and Sexual Activity    Alcohol use: No    Drug use: No    Sexual activity: Never   Other Topics Concern    Not on file   Social History Narrative    Not on file     Social Determinants of Health     Financial Resource Strain: Not on file   Food Insecurity: Not on file   Transportation Needs: Not on file   Physical Activity: Not on file   Stress: Not on file   Social Connections: Not on file   Intimate Partner Violence: Not on file   Housing Stability: Not on file       Family History:     Family History   Problem Relation Age of Onset    No Known Problems Mother     No Known Problems Father       Medical Decision Making:   I have independently reviewed/ordered the following labs:    CBC with Differential:   Recent Labs     12/08/22  0504 12/09/22  0605   WBC 16.4* 14.0*   HGB 10.9* 11.0*   HCT 35.2* 35.8*    343   LYMPHOPCT 13* 15*   MONOPCT 11 10       BMP:  Recent Labs     12/07/22  1541 12/08/22  0504    137   K 3.6* 3.3*   * 105   CO2 23 24   BUN 14 13   CREATININE 0.26* 0.22*   MG 1.8 1.6       Hepatic Function Panel:   No results for input(s): PROT, LABALBU, BILIDIR, IBILI, BILITOT, ALKPHOS, ALT, AST in the last 72 hours. No results for input(s): RPR in the last 72 hours. No results for input(s): HIV in the last 72 hours. No results for input(s): BC in the last 72 hours. Lab Results   Component Value Date/Time    CREATININE 0.22 12/08/2022 05:04 AM    GLUCOSE 72 12/08/2022 05:04 AM    GLUCOSE 69 03/11/2012 12:33 PM       Detailed results: Thank you for allowing us to participate in the care of this patient. Please call with questions.     This note is created with the assistance of a speech recognition program.  While intending to generate adocument that actually reflects the content of the visit, the document can still have some errors including those of syntax and sound a like substitutions which may escape proof reading. It such instances, actual meaningcan be extrapolated by contextual diversion. Gwen Weeks MD  Office: [phone]  Perfect serve / office [phone]          I have discussed the care of the patient, including pertinent history and exam findings,  with the resident. I have seen and examined the patient and the key elements of all parts of the encounter have been performed by me. I agree with the assessment, plan and orders as documented by the resident.     Laurie Foster, Infectious Diseases

## 2022-12-10 NOTE — PROGRESS NOTES
Patient's blood pressure was low throughout night. At 2030 on 12/09 BP 89/55- gave midodrine. 2330 BP 87/58, one time dose of midodrine given and 250 ml bolus ordered and administered. At 0200 bp 87/62, 500 ml bolus ordered and administered. Recheck at 0515 BP was 88/59, writer notified Ioana Young NP, no new orders, writer advised to monitor.

## 2022-12-10 NOTE — PLAN OF CARE
Problem: Discharge Planning  Goal: Discharge to home or other facility with appropriate resources  Outcome: Progressing     Problem: Nutrition Deficit:  Goal: Optimize nutritional status  Outcome: Progressing     Problem: Confusion  Goal: Confusion, delirium, dementia, or psychosis is improved or at baseline  Description: INTERVENTIONS:  1. Assess for possible contributors to thought disturbance, including medications, impaired vision or hearing, underlying metabolic abnormalities, dehydration, psychiatric diagnoses, and notify attending LIP  2. [place] high risk fall precautions, as indicated  3. Provide frequent short contacts to provide reality reorientation, refocusing and direction  4. Decrease environmental stimuli, including noise as appropriate  5. Monitor and intervene to maintain adequate nutrition, hydration, elimination, sleep and activity  6. If unable to ensure safety without constant attention obtain sitter and review sitter guidelines with assigned personnel  7.  Initiate Psychosocial CNS and Spiritual Care consult, as indicated  Outcome: Progressing     Problem: Neurosensory - Adult  Goal: Achieves stable or improved neurological status  Outcome: Progressing  Goal: Absence of seizures  Outcome: Progressing  Goal: Remains free of injury related to seizures activity  Outcome: Progressing  Goal: Achieves maximal functionality and self care  Outcome: Progressing     Problem: Respiratory - Adult  Goal: Achieves optimal ventilation and oxygenation  Outcome: Progressing     Problem: Gastrointestinal - Adult  Goal: Minimal or absence of nausea and vomiting  Outcome: Progressing  Goal: Maintains or returns to baseline bowel function  Outcome: Progressing  Goal: Maintains adequate nutritional intake  Outcome: Progressing  Goal: Establish and maintain optimal ostomy function  Outcome: Progressing     Problem: Genitourinary - Adult  Goal: Urinary catheter remains patent  Outcome: Progressing     Problem: Metabolic/Fluid and Electrolytes - Adult  Goal: Electrolytes maintained within normal limits  Outcome: Progressing  Goal: Hemodynamic stability and optimal renal function maintained  Outcome: Progressing  Goal: Glucose maintained within prescribed range  Outcome: Progressing     Problem: Skin/Tissue Integrity - Adult  Goal: Incisions, wounds, or drain sites healing without S/S of infection  Outcome: Progressing  Goal: Oral mucous membranes remain intact  Outcome: Progressing     Problem: Pain  Goal: Verbalizes/displays adequate comfort level or baseline comfort level  Outcome: Progressing  Flowsheets (Taken 12/9/2022 1515)  Verbalizes/displays adequate comfort level or baseline comfort level: Assess pain using appropriate pain scale     Problem: Skin/Tissue Integrity  Goal: Absence of new skin breakdown  Description: 1. Monitor for areas of redness and/or skin breakdown  2. Assess vascular access sites hourly  3. Every 4-6 hours minimum:  Change oxygen saturation probe site  4. Every 4-6 hours:  If on nasal continuous positive airway pressure, respiratory therapy assess nares and determine need for appliance change or resting period.   Outcome: Progressing     Problem: Safety - Adult  Goal: Free from fall injury  Outcome: Progressing     Problem: ABCDS Injury Assessment  Goal: Absence of physical injury  Outcome: Progressing

## 2022-12-10 NOTE — H&P
[facility]  Office: [address], DO, Micky Pabon, DO, Audrey Curry, DO, Savanna Garcia Blood, DO, Ramon Garsia MD, Leana Grace MD, Denton Ponce MD, Renold Galeazzi, MD,  Shine Wilkerson MD, Froylan Leong MD, Evan Carcamo, DO, Andrew Ralph MD,  Angelito Hernandez MD, Freada Osgood, MD, Morgan Colin, DO, Luz Elena Bennett MD, Tod León MD, Kwaku Kirkpatrick, DO, Juju Preston MD, Jazmine Abbott MD, Giovany Mario MD, Bruna Gonzalez MD, Wiliam Lyles, DO, Dyana [place] MD, Júnior Dolan MD, Ze Stuart, CNP,  Payam Gates, CNP, Rhianna Griffith, CNP, Cami Dockery, CNP,  Jennifer Flannery, [place], Mc Leone, CNP, Lillian Coley, CNP, Alesia Howard, CNP, Gloria Lucio, CNP, Vivienne Jo, CNP, Nicho Barajas PA-C, Min Sheldon, [facility], Rosaura Elder, [facility], [place], [address] [address]      HISTORY AND PHYSICAL EXAMINATION            Date:   12/10/2022  Patient name:  Gudelia Argueta  Date of admission:  12/3/2022 10:03 AM  MRN:   [id number]  Account:  [de-identified]  YOB: 1980  PCP:    Bj Cedillo MD  Room:   [address] [place] [address]  Code Status:    Full Code    Chief Complaint:     Chief Complaint   Patient presents with    Altered Mental Status       History Obtained From:     electronic medical record, nursing staff, patient nonverbal.  No family at bedside. History of Present Illness: The patient is a 43 y.o. Non- / non  female who presents with Altered Mental Status   and she is admitted to the hospital for the management of  Septic shock ([facility] [address] Patient was sent to emergency room from nursing home with altered mental status with GCS of 3. She was noted to have discharge around suprapubic catheter and was lethargic. Avani Calvillo She was intubated in emergency room. Initial evaluation showed tachycardia 130s, hypotension requiring pressor support. Central line was placed in left IJ.   Lab evaluation showed leukocytosis 36.1, creatinine 3.08. Urine showed large leukoesterase many bacteria. Patient had prior history of MRSA. Patient was started on vancomycin, cefepime for encephalopathy due to UTI. CT chest abdomen pelvis was performed and showed pneumonia with multiple renal calculi severe hydronephrosis on the right with hydroureter. Patient was admitted in ICU. ID and urology was consulted. Patient underwent cystoscopy with right ureteral stent placement on 12/3/2022. Blood culture was positive for Morganella morganii, urine culture showed multiple organisms. Antibiotics were switched to meropenem. CT head was negative for acute intracranial abnormality. Patient has suprapubic catheter in place and is diverting colostomy. Past Medical History:     Past Medical History:   Diagnosis Date    Aphasia     Aphasia     Contracture of joint, lower leg     Depressed     HTN (hypertension)     Hx MRSA infection resolved 1/2017    2 negative nasal screens 1/2017 (hx in heel in 2013)    Hydronephrosis     Movement disorder     tremor    Multiple sclerosis ([address]     Neurogenic bladder     Non-verbal learning disorder     Peripheral vascular disease ([address]     Polyneuropathy     Pressure ulcer     Coccyx, Heel    Pulmonary embolism (HCC)     Pulmonary infarction (HCC)     Quadriplegia (HCC)     Tachycardia     UTI (lower urinary tract infection)         Past Surgical History:     Past Surgical History:   Procedure Laterality Date    AMPUTATION Right     COLOSTOMY      CYSTOSCOPY Right 12/3/2022    CYSTOSCOPY URETERAL STENT INSERTION performed by Scotty Jones MD at [facility]. [facility]      IR NEPHROSTOMY PERCUTANEOUS RIGHT  9/27/2020    IR NEPHROSTOMY PERCUTANEOUS RIGHT 9/27/2020 Jessica Wilson MD [address] [facility]          Medications Prior to Admission:     Prior to Admission medications    Medication Sig Start Date End Date Taking?  Authorizing Provider atropine 1 % ophthalmic solution 3 drops every 4 hours as needed Give 3 drops sublingually for secretion    Historical Provider, MD   metoprolol tartrate (LOPRESSOR) 25 MG tablet Take 25 mg by mouth three times daily    Historical Provider, MD   miconazole (MICOTIN) 2 % powder Apply topically 2 times daily. 1/25/20   Corry Calvo MD   midodrine (PROAMATINE) 5 MG tablet Take 1 tablet by mouth 2 times daily (with meals) 1/25/20   Corry Calvo MD   acetaminophen-codeine (TYLENOL/CODEINE #3) 300-30 MG per tablet Take 1 tablet by mouth every 8 hours as needed for Pain.     Historical Provider, MD   D-Mannose 500 MG CAPS Take 3 capsules by mouth three times daily    Historical Provider, MD   atropine 1 % ophthalmic solution 2-4 drops every 4 hours as needed    Historical Provider, MD   metoclopramide (REGLAN) 5 MG/5ML solution Take 5 mLs by mouth 3 times daily 8/2/19   Gee LUIS Blood, DO   rivaroxaban (XARELTO) 20 MG TABS tablet Take 1 tablet by mouth daily 5/24/19   Elliot Brock MD   sertraline (ZOLOFT) 25 MG tablet 1 tablet by Per G Tube route daily 5/24/19   Elliot Brock MD   docusate (COLACE) 50 MG/5ML liquid Take 10 mLs by mouth daily 5/24/19   Elliot Brock MD   Multiple Vitamins-Minerals (CENTRUM/CERTA-DERICK WITH MINERALS ORAL) solution 15 mLs by Per G Tube route daily 5/24/19   Elliot Brock MD   lansoprazole (PREVACID SOLUTAB) 30 MG disintegrating tablet 1 tablet by Per G Tube route every morning (before breakfast) 5/24/19   Elliot Brock MD   Cranberry 250 MG CAPS Take 250 mg by mouth 2 times daily    Historical Provider, MD   Scopolamine Base (SCOPOLAMINE TD) Place 1 mg onto the skin every 72 hours     Historical Provider, MD   baclofen (LIORESAL) 10 MG tablet 10 mg by Per G Tube route 3 times daily Crush into G tube     Historical Provider, MD   gabapentin (NEURONTIN) 300 MG capsule 300 mg by Per G Tube route 3 times daily    Historical Provider, MD   medroxyPROGESTERone (DEPO-PROVERA) 150 MG/ML injection Inject 150 mg into the muscle every 3 months On the      Historical Provider, MD   acetaminophen (TYLENOL) 80 MG/0.8ML suspension 650 mg by Per G Tube route every 6 hours as needed for Fever or Pain     Historical Provider, MD   glycopyrrolate (ROBINUL) 1 MG tablet Take 1 mg by mouth 3 times daily     Historical Provider, MD        Allergies:     Patient has no known allergies. Social History:     Tobacco:    reports that she has never smoked. She has never used smokeless tobacco.  Alcohol:      reports no history of alcohol use. Drug Use:  reports no history of drug use. Family History:     Family History   Problem Relation Age of Onset    No Known Problems Mother     No Known Problems Father        Review of Systems:     Positive and Negative as described in HPI. Review of Systems   Unable to perform ROS: Patient nonverbal     Physical Exam:   BP (!) 88/59   Pulse 97   Temp 98.5 °F (36.9 °C) (Axillary)   Resp 14   Ht 5' (1.524 m)   Wt 201 lb 11.5 oz (91.5 kg)   SpO2 95%   BMI 39.40 kg/m²   Temp (24hrs), Av °F (37.2 °C), Min:98.5 °F (36.9 °C), Max:99.8 °F (37.7 °C)    No results for input(s): POCGLU in the last 72 hours. Intake/Output Summary (Last 24 hours) at 12/10/2022 0830  Last data filed at 12/10/2022 0542  Gross per 24 hour   Intake 370 ml   Output 3475 ml   Net -3105 ml     Physical Exam  Vitals and nursing note reviewed. Constitutional:       General: She is not in acute distress. Appearance: She is not diaphoretic. HENT:      Head: Normocephalic and atraumatic. Nose:      Right Sinus: No maxillary sinus tenderness or frontal sinus tenderness. Left Sinus: No maxillary sinus tenderness or frontal sinus tenderness. Mouth/Throat:      Pharynx: No oropharyngeal exudate. Eyes:      General: No scleral icterus. Conjunctiva/sclera: Conjunctivae normal.      Pupils: Pupils are equal, round, and reactive to light.    Neck:      Thyroid: No thyromegaly. Vascular: No JVD. Cardiovascular:      Rate and Rhythm: Normal rate and regular rhythm. Pulses:           Dorsalis pedis pulses are 2+ on the right side and 2+ on the left side. Heart sounds: Normal heart sounds. No murmur heard. Pulmonary:      Effort: Pulmonary effort is normal.      Breath sounds: Normal breath sounds. No wheezing or rales. Abdominal:      Palpations: Abdomen is soft. There is no mass. Tenderness: There is no abdominal tenderness. Comments: Diabetic colostomy,  Suprapubic catheter in place. Musculoskeletal:      Cervical back: Full passive range of motion without pain and neck supple. Lymphadenopathy:      Head:      Right side of head: No submandibular adenopathy. Left side of head: No submandibular adenopathy. Cervical: No cervical adenopathy. Skin:     General: Skin is warm. Neurological:      Mental Status: Mental status is at baseline. Motor: No tremor. Comments: Spastic quadriplegia   Psychiatric:         Behavior: Behavior is uncooperative. Cognition and Memory: Cognition is impaired. Investigations:      Laboratory Testing:  Recent Results (from the past 24 hour(s))   Lactate, Sepsis    Collection Time: 12/10/22 12:34 AM   Result Value Ref Range    Lactic Acid, Sepsis, Whole Blood 0.9 0.5 - 1.9 mmol/L   Lactate, Sepsis    Collection Time: 12/10/22  2:36 AM   Result Value Ref Range    Lactic Acid, Sepsis, Whole Blood 0.9 0.5 - 1.9 mmol/L       Imaging/Diagonstics:    CT Head W/O Contrast    Result Date: 12/3/2022  No acute intracranial abnormality. XR CHEST PORTABLE    Result Date: 12/5/2022  Mild right basilar airspace opacities. This could represent atelectasis or pneumonia in the appropriate clinical setting     XR CHEST PORTABLE    Result Date: 12/3/2022  Satisfactory line and tube placement. XR CHEST PORTABLE    Result Date: 12/3/2022  Chest x-ray: 1.  ETT is just entering the right mainstem bronchus by few mm. Repositioning advised. 2. Some progression of mild elevation right hemidiaphragm. 3. Patchy perihilar opacities probably vascular. Abdomen: 1. NG tube terminates in the left upper quadrant. Side port is positioned in the midline probably in the distal gastric antrum. 2. Cholelithiasis. 3. Peg tube in place. 4. No small bowel distension. Findings were discussed with MORTEZA PAGE at 11:07 a.m. on 12/3/2022. XR ABDOMEN FOR NG/OG/NE TUBE PLACEMENT    Result Date: 12/3/2022  Chest x-ray: 1. ETT is just entering the right mainstem bronchus by few mm. Repositioning advised. 2. Some progression of mild elevation right hemidiaphragm. 3. Patchy perihilar opacities probably vascular. Abdomen: 1. NG tube terminates in the left upper quadrant. Side port is positioned in the midline probably in the distal gastric antrum. 2. Cholelithiasis. 3. Peg tube in place. 4. No small bowel distension. Findings were discussed with MORTEZA PAGE at 11:07 a.m. on 12/3/2022. CT CHEST ABDOMEN PELVIS WO CONTRAST Additional Contrast? None    Result Date: 12/3/2022  1. ETT now terminates 12 mm above denys. 2. Patchy perihilar and more confluent posterior segment airspace consolidations likely combination of pneumonia and atelectasis. Trace right pleural effusion. 3. Innumerable right renal calculi most in the 5-8 mm size range. Moderate to severe right hydronephrosis and hydroureter. There is a stack of 4 obstructing calculi in the distal right ureter ranging from 5-11 mm accounting for finding. 11 mm calculus is most distal and positioned about 2 cm from the uterovesical junction. 4. Liquid stool in the colon suggests diarrheal disease. Tiny gas pockets along the ascending colon wall in the dependent portion appear to be pseudo pneumatosis.         Assessment :      Primary Problem  Septic shock [facility])    Active Hospital Problems    Diagnosis Date Noted    Pyelonephritis [N12] 12/04/2022     Priority: Medium Obstructive uropathy [N13.9] 12/04/2022     Priority: Medium    Bandemia [D72.825] 12/04/2022     Priority: Medium    SIRS (systemic inflammatory response syndrome) ([facility] [address] [R65.10] 12/04/2022     Priority: Medium    Gram-neg septicemia ([facility] [address] [A41.50] 12/04/2022     Priority: Medium    Pneumatosis coli [K63.89] 12/04/2022     Priority: Medium    Septic shock ([facility] [address] [A41.9, R65.21]        Plan:     Patient status Admit as inpatient in the  Med/Surge    Septic shock secondary to gram-negative UTI treated with Levophed, pressor support, antibiotic. Patient currently on midodrine. Metoprolol on hold due to hypotension. Moderate obstructive pyelonephritis secondary to kidney stones s/p cystoscopy and stent placement. Outpatient follow-up with urology for definitive treatment of stone. Neurogenic bladder s/p Davies catheter  Multiple sclerosis with functional/spastic quadriplegia -resume scopolamine patch. Dysphagia-patient to feed. H/o thromboembolism -on long-term Xarelto. No family at bedside   Will need to go back to SNF   Re-evaluate next am ,   Repeat CBC and BMP   Consultations:   IP CONSULT TO DIETITIAN  IP CONSULT TO UROLOGY  IP CONSULT TO INFECTIOUS DISEASES  IP CONSULT TO INFECTIOUS DISEASES  IP CONSULT TO GENERAL SURGERY  IP CONSULT TO DIETITIAN  IP CONSULT TO IV TEAM    Patient is admitted as inpatient status because of co-morbidities listed above, severity of signs and symptoms as outlined, requirement for current medical therapies and most importantly because of direct risk to patient if care not provided in a hospital setting.     Arlette Oakes MD  12/10/2022    Copy sent to Dr. Anel Salamanca MD    (Please note that portions of this note were completed with a voice recognition program. Efforts were made to edit the dictations but occasionally words are mis-transcribed.)

## 2022-12-10 NOTE — PLAN OF CARE
Problem: Discharge Planning  Goal: Discharge to home or other facility with appropriate resources  12/10/2022 0429 by Analisa Little RN  Outcome: Progressing     Problem: Nutrition Deficit:  Goal: Optimize nutritional status  12/10/2022 0429 by Analisa Little RN  Outcome: Progressing     Problem: Confusion  Goal: Confusion, delirium, dementia, or psychosis is improved or at baseline  Description: INTERVENTIONS:  1. Assess for possible contributors to thought disturbance, including medications, impaired vision or hearing, underlying metabolic abnormalities, dehydration, psychiatric diagnoses, and notify attending LIP  2. [place] high risk fall precautions, as indicated  3. Provide frequent short contacts to provide reality reorientation, refocusing and direction  4. Decrease environmental stimuli, including noise as appropriate  5. Monitor and intervene to maintain adequate nutrition, hydration, elimination, sleep and activity  6. If unable to ensure safety without constant attention obtain sitter and review sitter guidelines with assigned personnel  7.  Initiate Psychosocial CNS and Spiritual Care consult, as indicated  12/10/2022 0429 by Analisa Little RN  Outcome: Progressing     Problem: Neurosensory - Adult  Goal: Achieves stable or improved neurological status  12/10/2022 0429 by Analisa Little RN  Outcome: Progressing     Problem: Neurosensory - Adult  Goal: Absence of seizures  12/10/2022 0429 by Analisa Little RN  Outcome: Progressing     Problem: Neurosensory - Adult  Goal: Remains free of injury related to seizures activity  12/10/2022 0429 by Analisa Little RN  Outcome: Progressing     Problem: Neurosensory - Adult  Goal: Achieves maximal functionality and self care  12/10/2022 0429 by Analisa Little RN  Outcome: Progressing     Problem: Respiratory - Adult  Goal: Achieves optimal ventilation and oxygenation  12/10/2022 0429 by Analisa Little RN  Outcome: Progressing     Problem: Gastrointestinal - Adult  Goal: Minimal or absence of nausea and vomiting  12/10/2022 0429 by Raul Montiel RN  Outcome: Progressing     Problem: Gastrointestinal - Adult  Goal: Maintains or returns to baseline bowel function  12/10/2022 0429 by Raul Montiel RN  Outcome: Progressing     Problem: Gastrointestinal - Adult  Goal: Maintains adequate nutritional intake  12/10/2022 0429 by Raul Montiel RN  Outcome: Progressing     Problem: Gastrointestinal - Adult  Goal: Establish and maintain optimal ostomy function  12/10/2022 0429 by Raul Montiel RN  Outcome: Progressing     Problem: Genitourinary - Adult  Goal: Urinary catheter remains patent  12/10/2022 0429 by Raul Montiel RN  Outcome: Progressing     Problem: Metabolic/Fluid and Electrolytes - Adult  Goal: Electrolytes maintained within normal limits  12/10/2022 0429 by Raul Montiel RN  Outcome: Progressing     Problem: Metabolic/Fluid and Electrolytes - Adult  Goal: Hemodynamic stability and optimal renal function maintained  12/10/2022 0429 by Raul Montiel RN  Outcome: Progressing     Problem: Metabolic/Fluid and Electrolytes - Adult  Goal: Glucose maintained within prescribed range  12/10/2022 0429 by Raul Montiel RN  Outcome: Progressing     Problem: Skin/Tissue Integrity - Adult  Goal: Skin integrity remains intact  Outcome: Progressing     Problem: Skin/Tissue Integrity - Adult  Goal: Incisions, wounds, or drain sites healing without S/S of infection  12/10/2022 0429 by Raul Montiel RN  Outcome: Progressing     Problem: Skin/Tissue Integrity - Adult  Goal: Oral mucous membranes remain intact  12/10/2022 0429 by Raul Montiel RN  Outcome: Progressing     Problem: Pain  Goal: Verbalizes/displays adequate comfort level or baseline comfort level  12/10/2022 0429 by Raul Montiel RN  Outcome: Progressing     Problem: Skin/Tissue Integrity  Goal: Absence of new skin breakdown  Description: 1. Monitor for areas of redness and/or skin breakdown  2.   Assess vascular access sites hourly  3. Every 4-6 hours minimum:  Change oxygen saturation probe site  4. Every 4-6 hours:  If on nasal continuous positive airway pressure, respiratory therapy assess nares and determine need for appliance change or resting period.   12/10/2022 0429 by Yolanda Romo RN  Outcome: Progressing     Problem: Safety - Adult  Goal: Free from fall injury  12/10/2022 0429 by Yolanda Romo RN  Outcome: Progressing     Problem: ABCDS Injury Assessment  Goal: Absence of physical injury  12/10/2022 0429 by Yolanda Romo RN  Outcome: Progressing

## 2022-12-10 NOTE — PROGRESS NOTES
[facility] -   Infectious diseases evaluation  admission date 12/3/2022    reason for consultation:   sepsis    Impression :   Current:  Bandemia  proCalcitonin elevation 29  Septic shock levophed 40  R obstructive pyelonephritis -   severe R hydro w many ureteral stones  Stent placed 12/3  GNR septicemia - bacteroides and enterobacteriae  Multifocal infiltrates, likely atelectasis - less likely pneumonia  MS   Neurogenic bladder w SP omer  R AKA amputation by hx  Past colostomy  Possible ascending colon pseudo pneumatosis on CT  Concerning mostly w bacteroides and 2 different enterobacteriaceae in the blood     Discussion / summary of stay / plan of care     Recommendations   12/3 Meropenem -stop 12/6 12/6 zosyn till 12/12 - DC planning  Can switch to invanz at DC , once bed available  BC - Bacteroides proteus and morganella - most likely source is the abdomen  Possible ascending colon pseudo pneumatosis  General surgery on board, med tx for now  Bandemia better 16  CT chest  - mostly like P congestion and atelectasis  - CXR 12/5 stable    Infection Control Recommendations   [place] Precautions  Contact Isolation     Antimicrobial Stewardship Recommendations   Simplification of therapy  Targeted therapy  History of Present Illness:   Initial history:  Creola Bence is a 43y.o.-year-old female w MS and neurogenic bladder and chronic SP omer, Gtube and hx PE on xeralto  Cojes w altered mentation and intubated in ER - S IRS+ and tachycardia and hypotension started on levophed  UA + for infection  CT chest showed many infiltrates and some might be multifocal pneumonia   R kid w hydronephrosis and many  ureteral stones.   Ascending colon pseudo pneumatosis  Started on cefepime and vanco and we are called  Bandemia  NUZHAT     took her to OR for a R stent  ID called    She is on the vent 40 FIO2 and 5 peep  Pupils minimally reactive  Sp salivary and beige  Abd soft Urine slightly blood tinged and no pus  No rash  Levophed 40    Interval changes  12/10/2022   Patient Vitals for the past 8 hrs:   BP Temp Pulse Resp SpO2   12/10/22 1415 96/63 -- 100 -- --   12/10/22 1141 116/77 98 °F (36.7 °C) (!) 102 18 94 %   12/10/22 0856 (!) 75/48 97.9 °F (36.6 °C) (!) 102 18 94 %     Temp LG  WBc better 28  Creat  0.7 better  BC x 2 GNR, one w bacteroides and enterobacteriaceae pend  U cx in process     12/5  Patient is more alert coughing on the vent agitated, seems appropriate trying to formulate words  Sputum is thick yellow, and sputum culture so far is negative  WBC improved to 24  Bacteria in the blood gram-negative rods pending ID  Urine cultures multiple organisms. 12/7  Patient extubated 12/6   WBC up to 21.8 from 19.2 on 12/6  Off Levophed  Patient response follows with her eyes, mumbles a few words,   Sputum is small, culture normal bill. Abdomen is soft does not seem to be tender to exam  Urine is clear in the Davies,  No diarrhea  No chest x-ray today. 12/8  Afebrile   WBC 16.4  Extubated, on room air  Patient responds by nodding/shaking her head, tries to speak  Davies, clear urine    CURRENT EVALUATION : 12/10/2022     Afebrile  VS stable  Patient hypotensive overnight - given midodrine     Patient feels better  Patient able to nod and shake her head in response to some questions - no pain, SOB, chest tightness, headache, fever/chills or nausea.    No complaints  No new issues per RN    WBC 12.2     Summary of relevant labs: 12/10/2022    Labs:  W 36 - 28-24-19.2-21.8 -16.4 - 14 - 12.2   Creat 3 - 2.4 - 0.7   Lactic Acid, Sepsis, Whole Blood1.6   [id number]   RSM27 High U/L AST58 High    Cvermcudgpuph74.13 High    Micro:  BC x 2 GNR, one w bacteroides and enterobacteriaceae   BC Morganella sensitive to aztreonam, ceftriaxone, gentamicin, tobramycin, tmp-smx, zosyn  U cx multiple bacteria  Sputum culture no significant bacteria    Nasal MRSA neg  Imaging:  Chest x-ray 12/5  Mild right basilar airspace opacities. This could represent atelectasis or   pneumonia         CT AP chest  Impression:  1. ETT now terminates 12 mm above denys. 2. Patchy perihilar and more confluent posterior segment airspace   consolidations likely combination of pneumonia and atelectasis. Trace right   pleural effusion. 3. Innumerable right renal calculi most in the 5-8 mm size range. Moderate   to severe right hydronephrosis and hydroureter. There is a stack of 4   obstructing calculi in the distal right ureter ranging from 5-11 mm   accounting for finding. 11 mm calculus is most distal and positioned about 2   cm from the uterovesical junction. 4. Liquid stool in the colon suggests diarrheal disease. Tiny gas pockets   along the ascending colon wall in the dependent portion appear to be pseudo   pneumatosis. CT brain neg      I have personally reviewed the past medical history, past surgical history, medications, social history, and family history, and I haveupdated the database accordingly. Allergies:   Patient has no known allergies. Review of Systems:     Review of Systems   Unable to perform ROS: Other (sluggish unable to communicate)   Constitutional:  Positive for activity change. Eyes:  Negative for discharge. Allergic/Immunologic: Negative for immunocompromised state. Physical Examination :       Physical Exam  Constitutional:       General: She is not in acute distress. Appearance: She is not ill-appearing or toxic-appearing. HENT:      Head: Normocephalic and atraumatic. Nose: Nose normal.      Mouth/Throat:      Mouth: Mucous membranes are moist.      Pharynx: No oropharyngeal exudate. Eyes:      General: No scleral icterus. Conjunctiva/sclera: Conjunctivae normal.   Cardiovascular:      Rate and Rhythm: Regular rhythm. Tachycardia present. Heart sounds: No murmur heard. No friction rub. No gallop.    Pulmonary:      Effort: No respiratory distress. Breath sounds: No stridor. No rhonchi. Abdominal:      Palpations: There is no mass. Tenderness: There is no abdominal tenderness. Hernia: No hernia is present. Genitourinary:     Comments: Davies  Musculoskeletal:         General: No tenderness or signs of injury. Cervical back: Neck supple. No rigidity or tenderness. Skin:     Coloration: Skin is not pale. Findings: No erythema.    Neurological:      Comments: Arousing, non responsive   Psychiatric:      Comments: Non responsive       Past Medical History:     Past Medical History:   Diagnosis Date    Aphasia     Aphasia     Contracture of joint, lower leg     Depressed     HTN (hypertension)     Hx MRSA infection resolved 1/2017    2 negative nasal screens 1/2017 (hx in heel in 2013)    Hydronephrosis     Movement disorder     tremor    Multiple sclerosis ([address]     Neurogenic bladder     Non-verbal learning disorder     Peripheral vascular disease ([address]     Polyneuropathy     Pressure ulcer     Coccyx, Heel    Pulmonary embolism (HCC)     Pulmonary infarction (HCC)     Quadriplegia (HCC)     Tachycardia     UTI (lower urinary tract infection)        Past Surgical  History:     Past Surgical History:   Procedure Laterality Date    AMPUTATION Right     COLOSTOMY      CYSTOSCOPY Right 12/3/2022    CYSTOSCOPY URETERAL STENT INSERTION performed by Tamanna Patrick MD at [facility]. 78      IR NEPHROSTOMY PERCUTANEOUS RIGHT  9/27/2020    IR NEPHROSTOMY PERCUTANEOUS RIGHT 9/27/2020 Sandra Toussaint MD [facility] SPECIAL PROCEDURES    LEG AMPUTATION THROUGH FEMUR         Medications:      baclofen  10 mg Per G Tube TID    glycopyrrolate  1 mg Oral TID    scopolamine  1 patch TransDERmal Q72H    sertraline  25 mg Per G Tube Daily    piperacillin-tazobactam  4,500 mg IntraVENous Q8H    lansoprazole  30 mg Per G Tube QAM AC    midodrine  5 mg Oral TID    rivaroxaban  20 mg Oral Daily    sodium chloride 12/08/2022 05:04 AM    GLUCOSE 69 03/11/2012 12:33 PM       Detailed results: Thank you for allowing us to participate in the care of this patient. Please call with questions. This note is created with the assistance of a speech recognition program.  While intending to generate adocument that actually reflects the content of the visit, the document can still have some errors including those of syntax and sound a like substitutions which may escape proof reading. It such instances, actual meaningcan be extrapolated by contextual diversion. MD Teresa Craven contributed to the evaluation of this patient    ATTESTATION:    I have discussed the case, including pertinent history and exam findings with the residents and students. I have seen and examined the patient and the key elements of the encounter have been performed by me. I was present when the student obtained his information or examined the patient. I have reviewed the laboratory data, other diagnostic studies and discussed them with the residents. I have updated the medical record where necessary. I agree with the assessment, plan and orders as documented by the resident/ student. Trudi Dowd MD.      Office: [phone]  Perfect serve / office [phone]          I have discussed the care of the patient, including pertinent history and exam findings,  with the resident. I have seen and examined the patient and the key elements of all parts of the encounter have been performed by me. I agree with the assessment, plan and orders as documented by the resident.     Laurie Foster, Infectious Diseases

## 2022-12-11 VITALS
DIASTOLIC BLOOD PRESSURE: 55 MMHG | SYSTOLIC BLOOD PRESSURE: 90 MMHG | OXYGEN SATURATION: 98 % | TEMPERATURE: 99 F | HEART RATE: 110 BPM | HEIGHT: 60 IN | RESPIRATION RATE: 16 BRPM | BODY MASS INDEX: 39.6 KG/M2 | WEIGHT: 201.72 LBS

## 2022-12-11 LAB
ABSOLUTE EOS #: 0 K/UL (ref 0–0.4)
ABSOLUTE IMMATURE GRANULOCYTE: 0 K/UL (ref 0–0.3)
ABSOLUTE LYMPH #: 3.15 K/UL (ref 1–4.8)
ABSOLUTE MONO #: 1.57 K/UL (ref 0.1–0.8)
BASOPHILS # BLD: 0 % (ref 0–2)
BASOPHILS ABSOLUTE: 0 K/UL (ref 0–0.2)
EOSINOPHILS RELATIVE PERCENT: 0 % (ref 1–4)
HCT VFR BLD CALC: 37.7 % (ref 36.3–47.1)
HEMOGLOBIN: 11.8 G/DL (ref 11.9–15.1)
IMMATURE GRANULOCYTES: 0 %
LYMPHOCYTES # BLD: 22 % (ref 24–44)
MCH RBC QN AUTO: 27.1 PG (ref 25.2–33.5)
MCHC RBC AUTO-ENTMCNC: 31.3 G/DL (ref 28.4–34.8)
MCV RBC AUTO: 86.5 FL (ref 82.6–102.9)
MONOCYTES # BLD: 11 % (ref 1–7)
MORPHOLOGY: ABNORMAL
NRBC AUTOMATED: 0 PER 100 WBC
PDW BLD-RTO: 15.6 % (ref 11.8–14.4)
PLATELET # BLD: 511 K/UL (ref 138–453)
PMV BLD AUTO: 10.8 FL (ref 8.1–13.5)
RBC # BLD: 4.36 M/UL (ref 3.95–5.11)
SARS-COV-2, RAPID: NOT DETECTED
SEG NEUTROPHILS: 67 % (ref 36–66)
SEGMENTED NEUTROPHILS ABSOLUTE COUNT: 9.58 K/UL (ref 1.8–7.7)
SPECIMEN DESCRIPTION: NORMAL
WBC # BLD: 14.3 K/UL (ref 3.5–11.3)

## 2022-12-11 PROCEDURE — 6360000002 HC RX W HCPCS: Performed by: STUDENT IN AN ORGANIZED HEALTH CARE EDUCATION/TRAINING PROGRAM

## 2022-12-11 PROCEDURE — 6370000000 HC RX 637 (ALT 250 FOR IP)

## 2022-12-11 PROCEDURE — 76937 US GUIDE VASCULAR ACCESS: CPT

## 2022-12-11 PROCEDURE — 2709999900 HC NON-CHARGEABLE SUPPLY

## 2022-12-11 PROCEDURE — 05HF33Z INSERTION OF INFUSION DEVICE INTO LEFT CEPHALIC VEIN, PERCUTANEOUS APPROACH: ICD-10-PCS | Performed by: FAMILY MEDICINE

## 2022-12-11 PROCEDURE — 6370000000 HC RX 637 (ALT 250 FOR IP): Performed by: STUDENT IN AN ORGANIZED HEALTH CARE EDUCATION/TRAINING PROGRAM

## 2022-12-11 PROCEDURE — 2580000003 HC RX 258: Performed by: STUDENT IN AN ORGANIZED HEALTH CARE EDUCATION/TRAINING PROGRAM

## 2022-12-11 PROCEDURE — 85025 COMPLETE CBC W/AUTO DIFF WBC: CPT

## 2022-12-11 PROCEDURE — 99232 SBSQ HOSP IP/OBS MODERATE 35: CPT | Performed by: INTERNAL MEDICINE

## 2022-12-11 PROCEDURE — 36415 COLL VENOUS BLD VENIPUNCTURE: CPT

## 2022-12-11 PROCEDURE — 6370000000 HC RX 637 (ALT 250 FOR IP): Performed by: FAMILY MEDICINE

## 2022-12-11 PROCEDURE — 87635 SARS-COV-2 COVID-19 AMP PRB: CPT

## 2022-12-11 RX ORDER — MIDODRINE HYDROCHLORIDE 5 MG/1
5 TABLET ORAL 3 TIMES DAILY
Qty: 90 TABLET | Refills: 3 | Status: SHIPPED | OUTPATIENT
Start: 2022-12-11

## 2022-12-11 RX ADMIN — MIDODRINE HYDROCHLORIDE 5 MG: 5 TABLET ORAL at 15:29

## 2022-12-11 RX ADMIN — GABAPENTIN 300 MG: 300 CAPSULE ORAL at 20:03

## 2022-12-11 RX ADMIN — PIPERACILLIN AND TAZOBACTAM 4500 MG: 4; .5 INJECTION, POWDER, FOR SOLUTION INTRAVENOUS at 11:36

## 2022-12-11 RX ADMIN — GLYCOPYRROLATE 1 MG: 1 TABLET ORAL at 15:29

## 2022-12-11 RX ADMIN — BACLOFEN 10 MG: 10 TABLET ORAL at 15:29

## 2022-12-11 RX ADMIN — GLYCOPYRROLATE 1 MG: 1 TABLET ORAL at 08:54

## 2022-12-11 RX ADMIN — MIDODRINE HYDROCHLORIDE 5 MG: 5 TABLET ORAL at 08:54

## 2022-12-11 RX ADMIN — BACLOFEN 10 MG: 10 TABLET ORAL at 20:03

## 2022-12-11 RX ADMIN — MIDODRINE HYDROCHLORIDE 5 MG: 5 TABLET ORAL at 20:03

## 2022-12-11 RX ADMIN — PIPERACILLIN AND TAZOBACTAM 4500 MG: 4; .5 INJECTION, POWDER, FOR SOLUTION INTRAVENOUS at 02:21

## 2022-12-11 RX ADMIN — RIVAROXABAN 20 MG: 20 TABLET, FILM COATED ORAL at 16:59

## 2022-12-11 RX ADMIN — GABAPENTIN 300 MG: 300 CAPSULE ORAL at 08:54

## 2022-12-11 RX ADMIN — GABAPENTIN 300 MG: 300 CAPSULE ORAL at 15:29

## 2022-12-11 RX ADMIN — GLYCOPYRROLATE 1 MG: 1 TABLET ORAL at 20:03

## 2022-12-11 RX ADMIN — LANSOPRAZOLE 30 MG: KIT at 08:54

## 2022-12-11 RX ADMIN — SERTRALINE 25 MG: 25 TABLET, FILM COATED ORAL at 08:54

## 2022-12-11 RX ADMIN — BACLOFEN 10 MG: 10 TABLET ORAL at 08:54

## 2022-12-11 ASSESSMENT — ENCOUNTER SYMPTOMS: EYE DISCHARGE: 0

## 2022-12-11 NOTE — PROGRESS NOTES
[facility] -   Infectious diseases evaluation  admission date 12/3/2022    reason for consultation:   sepsis    Impression :     Bandemia  proCalcitonin elevation 29  Septic shock  Required vasopressors  R obstructive pyelonephritis -   severe R hydro w many ureteral stones  Stent placed 12/3  GNR septicemia - bacteroides and enterobacteria  Multifocal infiltrates, likely atelectasis - less likely pneumonia  MS   Neurogenic bladder w SP omer  R AKA amputation by hx  Past colostomy  Possible ascending colon pseudo pneumatosis on CT  Concerning mostly w bacteroides and 2 different enterobacteriaceae in the blood     Discussion / summary of stay / plan of care     Recommendations   Prior antibiotics: Meropenem . Start 12/3 . Stop 12/6  Current:  12/6 zosyn until 12/12 . Will complete antibiotics on 12-12-22  BC - Bacteroides proteus and morganella - most likely source is the abdomen  Possible ascending colon pseudo pneumatosis  General surgery on board, med tx for now  Bandemia better 16  CT chest  - mostly like P congestion and atelectasis     Infection Control Recommendations   [place] Precautions  Contact Isolation     Antimicrobial Stewardship Recommendations   Simplification of therapy  Targeted therapy  History of Present Illness:   Initial history:  Terry Friedman is a 43y.o.-year-old female w MS and neurogenic bladder and chronic SP omer, Gtube and hx PE on xeralto  Cojes w altered mentation and intubated in ER - S IRS+ and tachycardia and hypotension started on levophed  UA + for infection  CT chest showed many infiltrates and some might be multifocal pneumonia   R kid w hydronephrosis and many  ureteral stones.   Ascending colon pseudo pneumatosis  Started on cefepime and vanco and we are called  Bandemia  NUZHAT     took her to OR for a R stent  ID called    She is on the vent 40 FIO2 and 5 peep  Pupils minimally reactive  Sp salivary and beige  Abd soft Urine slightly blood tinged and no pus  No rash  Levophed 40    Interval changes  12/11/2022   Patient Vitals for the past 8 hrs:   BP Temp Temp src Pulse Resp SpO2   12/11/22 0845 (!) 90/52 -- -- (!) 120 -- --   12/11/22 0800 (!) 161/84 98.9 °F (37.2 °C) Axillary (!) 119 16 98 %     Temp LG  WBc better 28  Creat  0.7 better  BC x 2 GNR, one w bacteroides and enterobacteriaceae pend  U cx in process     12/5  Patient is more alert coughing on the vent agitated, seems appropriate trying to formulate words  Sputum is thick yellow, and sputum culture so far is negative  WBC improved to 24  Bacteria in the blood gram-negative rods pending ID  Urine cultures multiple organisms. 12/7  Patient extubated 12/6   WBC up to 21.8 from 19.2 on 12/6  Off Levophed  Patient response follows with her eyes, mumbles a few words,   Sputum is small, culture normal bill. Abdomen is soft does not seem to be tender to exam  Urine is clear in the Davies,  No diarrhea  No chest x-ray today. 12/8  Afebrile   WBC 16.4  Extubated, on room air  Patient responds by nodding/shaking her head, tries to speak  Davies, clear urine    CURRENT EVALUATION : 12/11/2022     Afebrile  VS stable  BP low. On midodrine    Patient feels better  No complaints  No new issues per RN    Patient able to nod and shake her head in response to some questions  Denies pain, SOB, chest tightness, headache, fever/chills or nausea.      Midline catheter inserted  She will discharge to [facility] this evening      RR 16  02 sat 98    WBC 12.2     Summary of relevant labs: 12/11/2022    Labs:  W 36 - 28-24-19.2-21.8 -16.4 - 14 - 12.2   Creat 3 - 2.4 - 0.7   Lactic Acid, Sepsis, Whole Blood1.6   [id number]   OPQ68 High U/L AST58 High    Zlbsdvrebuvjo92.13 High    Micro:  BC x 2 GNR, one w bacteroides and enterobacteriaceae   BC Morganella sensitive to aztreonam, ceftriaxone, gentamicin, tobramycin, tmp-smx, zosyn  U cx multiple bacteria  Sputum culture no significant bacteria    Nasal MRSA neg  Imaging:  Chest x-ray 12/5  Mild right basilar airspace opacities. This could represent atelectasis or   pneumonia         CT AP chest  Impression:  1. ETT now terminates 12 mm above denys. 2. Patchy perihilar and more confluent posterior segment airspace   consolidations likely combination of pneumonia and atelectasis. Trace right   pleural effusion. 3. Innumerable right renal calculi most in the 5-8 mm size range. Moderate   to severe right hydronephrosis and hydroureter. There is a stack of 4   obstructing calculi in the distal right ureter ranging from 5-11 mm   accounting for finding. 11 mm calculus is most distal and positioned about 2   cm from the uterovesical junction. 4. Liquid stool in the colon suggests diarrheal disease. Tiny gas pockets   along the ascending colon wall in the dependent portion appear to be pseudo   pneumatosis. CT brain neg      I have personally reviewed the past medical history, past surgical history, medications, social history, and family history, and I haveupdated the database accordingly. Allergies:   Patient has no known allergies. Review of Systems:     Review of Systems   Unable to perform ROS: Other (sluggish unable to communicate)   Constitutional:  Positive for activity change. Eyes:  Negative for discharge. Allergic/Immunologic: Negative for immunocompromised state. Physical Examination :       Physical Exam  Constitutional:       General: She is not in acute distress. Appearance: She is not ill-appearing or toxic-appearing. HENT:      Head: Normocephalic and atraumatic. Nose: Nose normal.      Mouth/Throat:      Mouth: Mucous membranes are moist.      Pharynx: No oropharyngeal exudate. Eyes:      General: No scleral icterus. Conjunctiva/sclera: Conjunctivae normal.   Cardiovascular:      Rate and Rhythm: Regular rhythm. Tachycardia present.       Heart sounds: No murmur heard.    No friction rub. No gallop. Pulmonary:      Effort: No respiratory distress. Breath sounds: No stridor. No rhonchi. Abdominal:      Palpations: There is no mass. Tenderness: There is no abdominal tenderness. Hernia: No hernia is present. Genitourinary:     Comments: Davies  Musculoskeletal:         General: No tenderness or signs of injury. Cervical back: Neck supple. No rigidity or tenderness. Skin:     Coloration: Skin is not pale. Findings: No erythema.    Neurological:      Comments: Arousing, non responsive   Psychiatric:      Comments: Non responsive       Past Medical History:     Past Medical History:   Diagnosis Date    Aphasia     Aphasia     Contracture of joint, lower leg     Depressed     HTN (hypertension)     Hx MRSA infection resolved 1/2017    2 negative nasal screens 1/2017 (hx in heel in 2013)    Hydronephrosis     Movement disorder     tremor    Multiple sclerosis ([address]     Neurogenic bladder     Non-verbal learning disorder     Peripheral vascular disease ([address]     Polyneuropathy     Pressure ulcer     Coccyx, Heel    Pulmonary embolism (HCC)     Pulmonary infarction (HCC)     Quadriplegia (HCC)     Tachycardia     UTI (lower urinary tract infection)        Past Surgical  History:     Past Surgical History:   Procedure Laterality Date    AMPUTATION Right     COLOSTOMY      CYSTOSCOPY Right 12/3/2022    CYSTOSCOPY URETERAL STENT INSERTION performed by Won Hart MD at [facility]. 78      IR NEPHROSTOMY PERCUTANEOUS RIGHT  9/27/2020    IR NEPHROSTOMY PERCUTANEOUS RIGHT 9/27/2020 Luann Small MD [facility] SPECIAL PROCEDURES    LEG AMPUTATION THROUGH FEMUR         Medications:      baclofen  10 mg Per G Tube TID    glycopyrrolate  1 mg Oral TID    scopolamine  1 patch TransDERmal Q72H    sertraline  25 mg Per G Tube Daily    piperacillin-tazobactam  4,500 mg IntraVENous Q8H    lansoprazole  30 mg Per G Tube QAM AC    midodrine  5 mg Oral TID    rivaroxaban  20 mg Oral Daily    sodium chloride flush  5-40 mL IntraVENous 2 times per day    gabapentin  300 mg Per G Tube TID    [Held by provider] metoclopramide  5 mg Oral TID    [Held by provider] metoprolol tartrate  25 mg Oral TID       Social History:     Social History     Socioeconomic History    Marital status: Single     Spouse name: Not on file    Number of children: Not on file    Years of education: Not on file    Highest education level: Not on file   Occupational History    Not on file   Tobacco Use    Smoking status: Never    Smokeless tobacco: Never   Vaping Use    Vaping Use: Never used   Substance and Sexual Activity    Alcohol use: No    Drug use: No    Sexual activity: Never   Other Topics Concern    Not on file   Social History Narrative    Not on file     Social Determinants of Health     Financial Resource Strain: Not on file   Food Insecurity: Not on file   Transportation Needs: Not on file   Physical Activity: Not on file   Stress: Not on file   Social Connections: Not on file   Intimate Partner Violence: Not on file   Housing Stability: Not on file       Family History:     Family History   Problem Relation Age of Onset    No Known Problems Mother     No Known Problems Father       Medical Decision Making:   I have independently reviewed/ordered the following labs:    CBC with Differential:   Recent Labs     12/10/22  0800 12/11/22  0711   WBC 12.2* 14.3*   HGB 11.5* 11.8*   HCT 37.7 37.7    511*   LYMPHOPCT 20* 22*   MONOPCT 11* 11*       BMP:  No results for input(s): NA, K, CL, CO2, BUN, CREATININE, CA, MG in the last 72 hours. Hepatic Function Panel:   No results for input(s): PROT, LABALBU, BILIDIR, IBILI, BILITOT, ALKPHOS, ALT, AST in the last 72 hours. No results for input(s): RPR in the last 72 hours. No results for input(s): HIV in the last 72 hours. No results for input(s): BC in the last 72 hours.   Lab Results   Component Value Date/Time CREATININE 0.22 12/08/2022 05:04 AM    GLUCOSE 72 12/08/2022 05:04 AM    GLUCOSE 69 03/11/2012 12:33 PM       Detailed results: Thank you for allowing us to participate in the care of this patient. Please call with questions. This note is created with the assistance of a speech recognition program.  While intending to generate adocument that actually reflects the content of the visit, the document can still have some errors including those of syntax and sound a like substitutions which may escape proof reading. It such instances, actual meaningcan be extrapolated by contextual diversion.         Paula Ruiz MD          Office: [phone]  Perfect serve / office [phone]

## 2022-12-11 NOTE — PROGRESS NOTES
[facility]  Office: [address], , Min Glaser, DO, Ifeoma Hurt, DO, Mirna Tanner, DO, Flo Almeida MD, Rossana Bales MD, Princess Armand MD, Racheal Sanchez MD,  Krista Mcfarlane MD, Charlee Lucero MD, Ladell Scheuermann, DO, Spencer Nobles MD,  Berenice Camacho MD, Odell Manzo MD, Willi Slaughter, DO, Meryl Andrea MD, Kristin Encinas MD, Jocelynn Mcnulty, DO, Radha Cordova MD, Angelina Newton MD, Irina Fuller MD, Peri Mitchell MD, Rory Hearn DO, Luis Marcus MD, Luna Morgan MD, Wanda Last, CNP,  Mirza Contreras, CNP, Lisseth Greenwood, CNP, Ofelia Frias, CNP,  Anastasiia Knight, [facility], Latasha Perez, CNP, Donna Kurtz, CNP, Leandro Sesay, CNP, Arleen Alpers, CNP, Kallie Jessica, CNP, Jerene Oppenheim, PA-C, Farida Silverio, CNS, Justin Almeida, CNP, Gigi Cole, [facility]      Daily Progress Note     Admit Date: 12/3/2022  Bed/Room No.  0329/0329-02  Admitting Physician : Princess Armand MD  Code Status :[address] [facility] Day:  LOS: 8 days   Chief Complaint:     Chief Complaint   Patient presents with    Altered Mental Status     Principal Problem:    Septic shock [facility])  Active Problems:    Pyelonephritis    Obstructive uropathy    Bandemia    SIRS (systemic inflammatory response syndrome) ([facility] [address]    Gram-neg septicemia ([facility] [address]    Pneumatosis coli  Resolved Problems:    * No resolved hospital problems. *    Subjective : Interval History/Significant events :  12/11/22    Patient remained stable. She has been back to her baseline. Blood pressure has been low. She is on midodrine. Patient does not seem to be in any pain. She seems to nod head yes and no. Vitals - Stable afebrile  Labs -leukocytosis 14.3. Nursing notes , Consults notes reviewed. Overnight events and updates discussed with Nursing staff .    Background History:         Martha Carter is 43 y.o. female  Who was admitted to the hospital on 12/3/2022 for treatment of Septic shock ([facility] [address] Patient was sent to emergency room from nursing home with altered mental status with GCS of 3. She was noted to have discharge around suprapubic catheter and was lethargic. Young Quintanilla She was intubated in emergency room. Initial evaluation showed tachycardia 130s, hypotension requiring pressor support. Central line was placed in left IJ. Lab evaluation showed leukocytosis 36.1, creatinine 3.08. Urine showed large leukoesterase many bacteria. Patient had prior history of MRSA. Patient was started on vancomycin, cefepime for encephalopathy due to UTI. CT chest abdomen pelvis was performed and showed pneumonia with multiple renal calculi severe hydronephrosis on the right with hydroureter. Patient was admitted in ICU. ID and urology was consulted. Patient underwent cystoscopy with right ureteral stent placement on 12/3/2022. Blood culture was positive for Morganella morganii, urine culture showed multiple organisms. Antibiotics were switched to meropenem. CT head was negative for acute intracranial abnormality. Patient has suprapubic catheter in place and is diverting colostomy.     PMH:  Past Medical History:   Diagnosis Date    Aphasia     Aphasia     Contracture of joint, lower leg     Depressed     HTN (hypertension)     Hx MRSA infection resolved 1/2017    2 negative nasal screens 1/2017 (hx in heel in 2013)    Hydronephrosis     Movement disorder     tremor    Multiple sclerosis ([address]     Neurogenic bladder     Non-verbal learning disorder     Peripheral vascular disease ([address]     Polyneuropathy     Pressure ulcer     Coccyx, Heel    Pulmonary embolism (HCC)     Pulmonary infarction (HCC)     Quadriplegia (HCC)     Tachycardia     UTI (lower urinary tract infection)       Allergies: No Known Allergies   Medications :  baclofen, 10 mg, Per G Tube, TID  glycopyrrolate, 1 mg, Oral, TID  scopolamine, 1 patch, TransDERmal, Q72H  sertraline, 25 mg, Per G Tube, Daily  piperacillin-tazobactam, 4,500 mg, IntraVENous, Q8H  lansoprazole, 30 mg, Per G Tube, QAM AC  midodrine, 5 mg, Oral, TID  rivaroxaban, 20 mg, Oral, Daily  sodium chloride flush, 5-40 mL, IntraVENous, 2 times per day  gabapentin, 300 mg, Per G Tube, TID  [Held by provider] metoclopramide, 5 mg, Oral, TID  [Held by provider] metoprolol tartrate, 25 mg, Oral, TID        Review of Systems   Review of Systems   Unable to perform ROS: Patient nonverbal   Objective :      Current Vitals : Temp: 98.9 °F (37.2 °C),  Heart Rate: (!) 119, Resp: 16, BP: (!) 161/84, SpO2: 98 %  Last 24 Hrs Vitals   Patient Vitals for the past 24 hrs:   BP Temp Temp src Pulse Resp SpO2   12/11/22 0800 (!) 161/84 98.9 °F (37.2 °C) Axillary (!) 119 16 98 %   12/10/22 2047 105/71 98.6 °F (37 °C) Axillary (!) 105 16 93 %   12/10/22 1634 98/60 98.6 °F (37 °C) -- 99 17 95 %   12/10/22 1415 96/63 -- -- 100 -- --   12/10/22 1141 116/77 98 °F (36.7 °C) -- (!) 102 18 94 %   12/10/22 0856 (!) 75/48 97.9 °F (36.6 °C) -- (!) 102 18 94 %     Intake / output   12/09 1901 - 12/11 0700  In: [facility]   Out: [address] [Urine:8759]  Physical Exam:  Physical Exam  Vitals and nursing note reviewed. Constitutional:       General: She is not in acute distress. Appearance: She is obese. She is not diaphoretic. Comments: Nonverbal at baseline. HENT:      Head: Normocephalic and atraumatic. Nose:      Right Sinus: No maxillary sinus tenderness or frontal sinus tenderness. Left Sinus: No maxillary sinus tenderness or frontal sinus tenderness. Mouth/Throat:      Pharynx: No oropharyngeal exudate. Eyes:      General: No scleral icterus. Conjunctiva/sclera: Conjunctivae normal.      Pupils: Pupils are equal, round, and reactive to light. Neck:      Thyroid: No thyromegaly. Vascular: No JVD. Cardiovascular:      Rate and Rhythm: Normal rate and regular rhythm.       Pulses:           Dorsalis pedis pulses are 2+ on the right side and 2+ on the left side. Heart sounds: Normal heart sounds. No murmur heard. Pulmonary:      Effort: Pulmonary effort is normal.      Breath sounds: Normal breath sounds. No wheezing or rales. Abdominal:      Palpations: Abdomen is soft. There is no mass. Tenderness: There is no abdominal tenderness. Comments: Diverting colostomy, suprapubic catheter in place. G-tube for feeding. Musculoskeletal:      Cervical back: Full passive range of motion without pain and neck supple. Lymphadenopathy:      Head:      Right side of head: No submandibular adenopathy. Left side of head: No submandibular adenopathy. Cervical: No cervical adenopathy. Skin:     General: Skin is warm. Neurological:      Mental Status: Mental status is at baseline. Motor: No tremor. Comments: Spastic quadriplegia   Psychiatric:         Behavior: Behavior is uncooperative. Cognition and Memory: Cognition is impaired. Laboratory findings:    Recent Labs     12/09/22  0605 12/10/22  0800 12/11/22  0711   WBC 14.0* 12.2* 14.3*   HGB 11.0* 11.5* 11.8*   HCT 35.8* 37.7 37.7    396 511*     No results for input(s): NA, K, CL, CO2, GLUCOSE, BUN, CREATININE, MG, CALCIUM, CAION, PHOS, PSA in the last 72 hours. No results for input(s): PROT, LABALBU, LABA1C, H0WNOPY, H3BCSDJ, FT4, TSH, AST, ALT, LDH, GGT, ALKPHOS, BILITOT, BILIDIR, AMMONIA, AMYLASE, LIPASE, LACTATE, CHOL, HDL, LDLCHOLESTEROL, CHOLHDLRATIO, TRIG, VLDL, BNP, TROPONINI, CKTOTAL, CKMB, CKMBINDEX, RF, ALVIN in the last 72 hours.        Specific Gravity, UA   Date Value Ref Range Status   12/03/2022 1.011 1.005 - 1.030 Final     Protein, UA   Date Value Ref Range Status   12/03/2022 3+ (A) NEGATIVE Final     RBC, UA   Date Value Ref Range Status   12/03/2022 20 TO 50 0 - 2 /HPF Final     Bacteria, UA   Date Value Ref Range Status   12/03/2022 MANY (A) None Final     Nitrite, Urine   Date Value Ref Range Status   12/03/2022 NEGATIVE NEGATIVE Final     WBC, UA   Date Value Ref Range Status   12/03/2022 50  0 - 5 /HPF Final     Leukocyte Esterase, Urine   Date Value Ref Range Status   12/03/2022 LARGE (A) NEGATIVE Final       Imaging / Clinical Data :-   XR CHEST PORTABLE    Result Date: 12/5/2022  Mild right basilar airspace opacities. This could represent atelectasis or pneumonia in the appropriate clinical setting        Clinical Course : stable  Assessment and Plan  :        Septic shock secondary to gram-negative UTI treated with Levophed, pressor support, antibiotic. Patient currently on midodrine. Metoprolol on hold due to hypotension. meropenem stopped on 12/6/2022 continue Zosyn until 12/12/2022. Invanz on discharge. Moderate obstructive pyelonephritis secondary to kidney stones s/p cystoscopy and stent placement. Outpatient follow-up with urology for definitive treatment of stone. Neurogenic bladder s/p Davies catheter  Multiple sclerosis with functional/spastic quadriplegia -resume scopolamine patch. Dysphagia-patient to feed. H/o thromboembolism -on long-term Xarelto. If patient has bed available at skilled nursing facility can be discharged on [address]  for COVID-19 infection. Place midline for antibiotic  Discharge to skilled nursing facility  Plan and updates discussed with patient ,  answers  explained to satisfaction.    Plan discussed with Staff Yevgeniy Solorzano RN     (Please note that portions of this note were completed with a voice recognition program. Efforts were made to edit the dictations but occasionally words are mis-transcribed.)      Lynnette Lemon MD  12/11/2022

## 2022-12-11 NOTE — CARE COORDINATION
Spoke with Maggy Marinelli at [facility] to see if they can take patient back today needing 1 more dose of Invanz and to see if they can use a peripheral IV. She will call me back    [phone] call back from Maggy Marinelli at [facility], they need a midline. Notified Dr. Luna Garcia via PS    [phone] spoke with Bernadette Ahmadi at [facility] transportation scheduled for 8pm    1404 midline placed. Maggy Marinelli at [facility] notified of transportation time. Patient's mother Jori De Dios notified of transport time, agreeable.     150 Princess Neff RN updated, given number for report    Discharge [facility] Department  Written by: Mickey Sahu RN    Patient Name: Tj Goldberg  Attending Provider: Evan Antonio MD  Admit Date: 12/3/2022 10:03 AM  MRN: [id number]  Account: [de-identified]                     : 1980  Discharge Date: 2022        Disposition: [facility] Norberto Rossi RN

## 2022-12-11 NOTE — DISCHARGE INSTR - COC
Continuity of Care Form    Patient Name: Colette Simon   :  1980  MRN:  [id number]    [facility] date:  12/3/2022  Discharge date:  2022    Code Status Order: Full Code   Advance Directives:     Admitting Physician:  No admitting provider for patient encounter. PCP: Tyson Mohan MD    Discharging Nurse: [address] Unit/Room#: 3832/2692-26  Discharging Unit Phone Number: [phone]    Emergency Contact:   Extended Emergency Contact Information  [facility] Phone: [phone]  Mobile Phone: [phone]  Relation: Parent   needed? No  Secondary Emergency Contact: frances esparza  Mobile Phone: [phone]  Relation: Brother/Sister   needed?  No    Past Surgical History:  Past Surgical History:   Procedure Laterality Date    AMPUTATION Right     COLOSTOMY      CYSTOSCOPY Right 12/3/2022    CYSTOSCOPY URETERAL STENT INSERTION performed by Debbie Jain MD at [place] [address]      IR NEPHROSTOMY PERCUTANEOUS RIGHT  2020    IR NEPHROSTOMY PERCUTANEOUS RIGHT 2020 Tonny Anguiano MD [facility] SPECIAL PROCEDURES    LEG AMPUTATION THROUGH FEMUR         Immunization History:   Immunization History   Administered Date(s) Administered    COVID-19, PFIZER Bivalent BOOSTER, (age 12y+), IM, 30 mcg/0.3 mL dose 2022    COVID-19, PFIZER PURPLE top, DILUTE for use, (age 15 y+), 30mcg/0.3mL 2020, 2021, 10/21/2021    Influenza Vaccine, unspecified formulation 10/09/2016    Pneumococcal Conjugate 13-valent (Adrian Claude) 10/09/2016       Active Problems:  Patient Active Problem List   Diagnosis Code    Aphasia R47.01    Multiple sclerosis ([address] G35    Nonverbal R47.01    Gastrostomy tube dependent ([facility] Z93.1    Dislodged gastrostomy tube T85.528A    Neurogenic bladder N31.9    Hemorrhagic cystitis N30.91    Hypokalemia E87.6    Urinary tract infection associated with indwelling urethral catheter ([facility] [address] T83.511A, N39.0    Tachycardia R00.0    Gross hematuria R31.0    Lactic acidosis E87.20    Chronic indwelling Davies catheter Z97.8    Peripheral vascular disease ([facility]) I73.9    Recurrent UTI (urinary tract infection) N39.0    History of pulmonary embolism Z86.711    Hypertension I10    Altered mental status R64.55    Toxic metabolic encephalopathy T33.7    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    Septic shock ([facility]) A41.9, R65.21    Status post colostomy ([facility] [address] Z93.3    Lower paraplegia ([facility]) G82.20    Sepsis ([facility] [address] A41.9    Pneumonia due to organism J18.9    Extended spectrum beta lactamase (ESBL) resistance Z16.12    Abdominal pain R10.9    Ileus ([facility]) K56.7    Decubitus ulcer of coccygeal region, stage 4 ([facility]) L89.154    Proteus mirabilis infection A49.8    Lactate blood increase R79.89    E-coli UTI N39.0, B96.20    Sepsis due to Escherichia coli ([facility]) A41.51    E coli bacteremia R78.81, B96.20    Nephrostomy tube displaced ([facility] [address] T83.022A    Pyelonephritis N12    Obstructive uropathy N13.9    Bandemia D72.825    SIRS (systemic inflammatory response syndrome) ([facility]) R65.10    Gram-neg septicemia ([facility]) A41.50    Pneumatosis coli K63.89       Isolation/Infection:   Isolation            Contact          Patient Infection Status       Infection Onset Added Last Indicated Last Indicated By Review Planned Expiration Resolved Resolved By    MRSA 01/22/20 01/22/20 01/22/20 MRSA DNA Probe, Nasal        MDRO (multi-drug resistant organism)  01/04/19 01/04/19 Elena Romero, RN        Proteus - Urine 8/2022  Providencia - Urine 1/2020    Proteus- blood 12/2022    ESBL (Extended Spectrum Beta Lactamase)  06/29/18 03/03/21 Culture, Urine        E.  Coli - urine 1/2020      Resolved    COVID-19 (Rule Out) 11/09/20 11/09/20 11/09/20 Covid-19 Ambulatory (Ordered)   11/10/20 Rule-Out Test Resulted    COVID-19 (Rule Out) 09/26/20 09/26/20 09/26/20 COVID-19 (Ordered)   09/26/20 Rule-Out Test Resulted    COVID-19 (Rule Out) 08/16/20 08/16/20 08/16/20 Covid-19 Ambulatory (Ordered)   08/18/20 Rule-Out Test Resulted    MRSA  01/09/17 01/09/17 Antionette Curiel RN   01/11/17 Antionette Curiel RN    Resolved - 2 negative nasal screens 1/2017  Heel - 2/2013            Nurse Assessment:  Last Vital Signs: BP (!) 90/52   Pulse (!) 120   Temp 98.9 °F (37.2 °C) (Axillary)   Resp 16   Ht 5' (1.524 m)   Wt 201 lb 11.5 oz (91.5 kg)   SpO2 98%   BMI 39.40 kg/m²     Last documented pain score (0-10 scale):    Last Weight:   Wt Readings from Last 1 Encounters:   12/09/22 201 lb 11.5 oz (91.5 kg)     Mental Status:   Non-verbal    IV Access:  - Midline left cefalic placed 53/36/7097    Nursing Mobility/ADLs:  Walking   Dependent  Transfer  Dependent  Bathing  Dependent  Dressing  Dependent  Toileting  Dependent  Feeding  Dependent  Med Admin  Dependent  Med Delivery   crushed    Wound Care Documentation and Therapy:  Wound 05/20/19 Coccyx (Active)   Number of days: 1301       Wound 09/27/20 Other (Comment) Lateral;Lower;Right (Active)   Number of days: 805        Elimination:  Continence: Bowel: No  Bladder: No  Urinary Catheter:  Superpubic catheter placed 12/3/2022    Colostomy/Ileostomy/Ileal Conduit: Yes  Colostomy LLQ-Stomal Appliance: Clean, dry & intact  Colostomy LLQ-Stoma  Assessment: Pink, Moist  Colostomy LLQ-Peristomal Assessment: Clean, dry & intact  Colostomy LLQ-Treatment: Other (Comment) (Bag emptied)  Colostomy LLQ-Stool Appearance: Loose, Watery  Colostomy LLQ-Stool Color: Brown  Colostomy LLQ-Stool Amount: Small  Colostomy LLQ-Output (mL): 400 ml    Date of Last BM: 12/11/2022    Intake/Output Summary (Last 24 hours) at 12/11/2022 1141  Last data filed at 12/11/2022 0000  Gross per 24 hour   Intake 1304 ml   Output 2650 ml   Net -1346 ml     I/O last 3 completed shifts:   In: 0727 [NG/GT:1554]  Out: 9186 [Urine:3850; Stool:675]    Safety Concerns:     Aspiration Risk    Impairments/Disabilities:      Paralysis - Unable to move limbs    Nutrition Therapy:  Current Nutrition Therapy:   - Tube Feedings:  Low Calorie/High Protein    Routes of Feeding: Gastrostomy Tube  Liquids: No Liquids  Daily Fluid Restriction: no  Last Modified Barium Swallow with Video (Video Swallowing Test): not done    Treatments at the Time of Hospital Discharge:   Respiratory Treatments: n/a  Oxygen Therapy:  is not on home oxygen therapy. Ventilator:    - No ventilator support    Rehab Therapies: Physical Therapy and Occupational Therapy  Weight Bearing Status/Restrictions: No weight bearing restrictions  Other Medical Equipment (for information only, NOT a DME order):  hospital bed  Other Treatments: ***    Patient's personal belongings (please select all that are sent with patient):  Personal belongings    RN SIGNATURE:  Electronically signed by Carlos Teresa RN on 12/11/22 at 3:25 PM EST    CASE MANAGEMENT/SOCIAL WORK SECTION    Inpatient Status Date: ***    Readmission Risk Assessment Score:  Readmission Risk              Risk of Unplanned Readmission:  17           Discharging to Facility/ Agency   Name: [facility]  Address:  Phone:  Fax:    Dialysis Facility (if applicable)   Name:  Address:  Dialysis Schedule:  Phone:  Fax:    / signature: Electronically signed by Kristyn Reis RN on 12/11/22 at 2:05 PM EST    PHYSICIAN SECTION    Prognosis: Guarded    Condition at Discharge: Stable    Rehab Potential (if transferring to Rehab): Guarded    Recommended Labs or Other Treatments After Discharge: PT OT . Antibiotics as needed. Physician Certification: I certify the above information and transfer of Italo Christian  is necessary for the continuing treatment of the diagnosis listed and that she requires [facility] for greater 30 days.      Update Admission H&P: No change in H&P    PHYSICIAN SIGNATURE:  Electronically signed by Pierre Bacon MD on 12/11/22 at 11:42 AM EST

## 2022-12-11 NOTE — PLAN OF CARE
Problem: Discharge Planning  Goal: Discharge to home or other facility with appropriate resources  Outcome: Progressing     Problem: Nutrition Deficit:  Goal: Optimize nutritional status  Outcome: Progressing     Problem: Confusion  Goal: Confusion, delirium, dementia, or psychosis is improved or at baseline  Description: INTERVENTIONS:  1. Assess for possible contributors to thought disturbance, including medications, impaired vision or hearing, underlying metabolic abnormalities, dehydration, psychiatric diagnoses, and notify attending LIP  2. [place] high risk fall precautions, as indicated  3. Provide frequent short contacts to provide reality reorientation, refocusing and direction  4. Decrease environmental stimuli, including noise as appropriate  5. Monitor and intervene to maintain adequate nutrition, hydration, elimination, sleep and activity  6. If unable to ensure safety without constant attention obtain sitter and review sitter guidelines with assigned personnel  7.  Initiate Psychosocial CNS and Spiritual Care consult, as indicated  Outcome: Progressing     Problem: Neurosensory - Adult  Goal: Achieves stable or improved neurological status  Outcome: Progressing  Goal: Absence of seizures  Outcome: Progressing  Goal: Remains free of injury related to seizures activity  Outcome: Progressing  Goal: Achieves maximal functionality and self care  Outcome: Progressing     Problem: Respiratory - Adult  Goal: Achieves optimal ventilation and oxygenation  Outcome: Progressing     Problem: Gastrointestinal - Adult  Goal: Minimal or absence of nausea and vomiting  Outcome: Progressing  Goal: Maintains or returns to baseline bowel function  Outcome: Progressing  Goal: Maintains adequate nutritional intake  Outcome: Progressing  Goal: Establish and maintain optimal ostomy function  Outcome: Progressing     Problem: Genitourinary - Adult  Goal: Urinary catheter remains patent  Outcome: Progressing     Problem: Metabolic/Fluid and Electrolytes - Adult  Goal: Electrolytes maintained within normal limits  Outcome: Progressing  Goal: Hemodynamic stability and optimal renal function maintained  Outcome: Progressing  Goal: Glucose maintained within prescribed range  Outcome: Progressing     Problem: Skin/Tissue Integrity - Adult  Goal: Skin integrity remains intact  Outcome: Progressing  Flowsheets (Taken 12/10/2022 2000)  Skin Integrity Remains Intact: Monitor for areas of redness and/or skin breakdown  Goal: Incisions, wounds, or drain sites healing without S/S of infection  Outcome: Progressing  Flowsheets (Taken 12/10/2022 2000)  Incisions, Wounds, or Drain Sites Healing Without Sign and Symptoms of Infection: TWICE DAILY: Assess and document skin integrity  Goal: Oral mucous membranes remain intact  Outcome: Progressing  Flowsheets (Taken 12/10/2022 2000)  Oral Mucous Membranes Remain Intact: Assess oral mucosa and hygiene practices     Problem: Pain  Goal: Verbalizes/displays adequate comfort level or baseline comfort level  Outcome: Progressing     Problem: Skin/Tissue Integrity  Goal: Absence of new skin breakdown  Description: 1. Monitor for areas of redness and/or skin breakdown  2. Assess vascular access sites hourly  3. Every 4-6 hours minimum:  Change oxygen saturation probe site  4. Every 4-6 hours:  If on nasal continuous positive airway pressure, respiratory therapy assess nares and determine need for appliance change or resting period.   Outcome: Progressing     Problem: Safety - Adult  Goal: Free from fall injury  Outcome: Progressing  Flowsheets (Taken 12/10/2022 2000)  Free From Fall Injury: Instruct family/caregiver on patient safety     Problem: ABCDS Injury Assessment  Goal: Absence of physical injury  Outcome: Progressing  Flowsheets (Taken 12/10/2022 2000)  Absence of Physical Injury: Implement safety measures based on patient assessment

## 2022-12-11 NOTE — PROCEDURES
Product type [employer]  History/Labs/Allergies Reviewed  Placed By Kingsley Daley. Felisa Maurer RN  Assisted By GARRY  Time out Performed using Two Identifiers  Lot # [id number]  Expiration date 6/30/2024  Catheter size 20 gauge  Total length inserted 8 cm  External catheter length 0 cm  Location Left cephalic vein . 26 cm  Number of attempts 1  Estimated blood loss 0 ml  Placement verified by- positive blood return & flushes easily  Special equipment used- ultrasound & AST  Catheter secured with statlock  Dressing applied- Tegaderm CHG  Lidocaine administered intradermally conc.1% 1 mL  Midline education:   [ x ] Discussed with patient/Family or POA prior to procedure. Risks and Benefits along with reason for procedure were discussed and teaching was reinforced with an education handout on Midline insertion. [facility] FAQ Catheter Associated Blood Stream Infections and [address] [facility] [address] REV. 7/13 Nursing and Booklet left at bedside or in chart. Patient (Family or POA) acknowledged understanding of information taught and agreed to procedure. [  ] Was not discussed with patient/family or POA due to pts medical status at time of procedure. pts family or POA not available to discuss Midline education.  [facility] FAQ Catheter Associated Blood Stream Infections and [facility] REV. 7/13 Nursing and Booklet left at bedside or in chart     Izzy Monroe RN

## 2022-12-12 NOTE — DISCHARGE SUMMARY
[facility]  Office: [phone]  [facility] DO, Alexandra Macias MD, Naheed Gamble MD, Aguilar Lopez MD, Santosh Arias MD, Norberto Dos Santos MD, Dayna Medel MD, James Brody MD, Yossi Jones MD, Lara Swann MD, Alex Deutsch DO, Karen Mccauley MD, Ajay Tovar MD, Dionna Nair DO, Antonina Zapata MD,  [facility] DO, Alberto Villa MD, Keysha Ayala MD, Fady Robert CNP, [facility] CNP, [facility] CNP, Kalin  CNS, Cedrick Gómez CNP, Linsey Camargo CNP, Jess Cardozo CNP, Mabel Car CNP, Ana M Bowling CNP, Nathan Rehman PA-C, Mccoy Baumgarten CNP, Racheal Still CNP, Maxi Arnold CNP, Beatriz King CNP, Rolley Blizzard CNP, Claudia Howardnt, [address] [place] [address]      Discharge Summary     Patient ID: Ana Alfaro  :  1980   MRN: [id number]     ACCOUNT:  [de-identified]   Patient Location : [id number]  Patient's PCP: Savanah Nowak MD  Admit Date: 12/3/2022   Discharge Date: 2022     Length of Stay: 8  Code Status:  Prior  Admitting Physician: No admitting provider for patient encounter.   Discharge Physician: Aguilar Lopez MD     Active Discharge Diagnosis :     Primary Problem  Septic shock [facility])      [facility] Problems    Diagnosis Date Noted    Pyelonephritis [N12] 2022     Priority: Medium    Obstructive uropathy [N13.9] 2022     Priority: Medium    Bandemia [D72.825] 2022     Priority: Medium    SIRS (systemic inflammatory response syndrome) ([facility] [R65.10] 2022     Priority: Medium    Gram-neg septicemia ([facility] [A41.50] 2022     Priority: Medium    Pneumatosis coli [K63.89] 2022     Priority: Medium    Septic shock ([facility] [A41.9, R65.21]        Admission Condition:  fair     Discharged Condition: stable    Hospital Stay:     Hospital Course:  Ana Alfaro is a 43 y.o. female who was admitted for the management of Septic shock ([address] , presented to ER with Altered Mental Status  Patient was sent to emergency room from nursing home with altered mental status with GCS of 3. She was noted to have discharge around suprapubic catheter and was lethargic. Nehacecilio Wyatt She was intubated in emergency room. Initial evaluation showed tachycardia 130s, hypotension requiring pressor support. Central line was placed in left IJ. Lab evaluation showed leukocytosis 36.1, creatinine 3.08. Urine showed large leukoesterase many bacteria. Patient had prior history of MRSA. Patient was started on vancomycin, cefepime for encephalopathy due to UTI. CT chest abdomen pelvis was performed and showed pneumonia with multiple renal calculi severe hydronephrosis on the right with hydroureter. Patient was admitted in ICU. ID and urology was consulted. Patient underwent cystoscopy with right ureteral stent placement on 12/3/2022. Blood culture was positive for Morganella morganii, urine culture showed multiple organisms. Antibiotics were switched to meropenem. CT head was negative for acute intracranial abnormality. Patient has suprapubic catheter in place and is diverting colostomy. Significant therapeutic interventions:   Septic shock secondary to gram-negative UTI treated with Levophed, pressor support, antibiotic. Patient currently on midodrine. Metoprolol on hold due to hypotension. meropenem stopped on 12/6/2022 continue Zosyn until 12/12/2022. Invanz on discharge. Moderate obstructive pyelonephritis secondary to kidney stones s/p cystoscopy and stent placement. Outpatient follow-up with urology for definitive treatment of stone. Neurogenic bladder s/p Davies catheter  Multiple sclerosis with functional/spastic quadriplegia -resume scopolamine patch. Dysphagia-patient to feed. H/o thromboembolism -on long-term Xarelto.     Significant Diagnostic Studies:   Labs / Micro:/Radiology  Recent Labs     12/11/22  0711 12/10/22  0800 12/09/22  0605   WBC 14.3* 12.2* 14.0*   HGB 11.8* 11.5* 11.0*   HCT 37.7 37.7 35.8*   MCV 86.5 88.3 87.1   * 396 343     Labs Renal Latest Ref Rng & Units 12/8/2022 12/7/2022 12/7/2022 12/6/2022 12/5/2022   BUN 6 - 20 mg/dL 13 14 16 16 14   Cr 0.50 - 0.90 mg/dL 0.22(L) 0.26(L) 0.33(L) 0.41(L) 0.53   K 3.7 - 5.3 mmol/L 3. 3(L) 3. 6(L) 3. 1(L) 3.7 3. 4(L)   Na 135 - 144 mmol/L 137 143 142 144 148(H)     Lab Results   Component Value Date    ALT 39 (H) 12/03/2022    AST 58 (H) 12/03/2022    ALKPHOS 141 (H) 12/03/2022    BILITOT 0.4 12/03/2022     Lab Results   Component Value Date    TSH 0.84 05/19/2019     Lab Results   Component Value Date    HEPCAB NONREACTIVE 07/06/2017     Lab Results   Component Value Date/Time    COLORU Yellow 12/03/2022 11:04 AM    NITRU NEGATIVE 12/03/2022 11:04 AM    GLUCOSEU NEGATIVE 12/03/2022 11:04 AM    GLUCOSEU NEGATIVE 03/11/2012 01:01 PM    Vester Mutters NEGATIVE 12/03/2022 11:04 AM    UROBILINOGEN Normal 12/03/2022 11:04 AM    BILIRUBINUR NEGATIVE 12/03/2022 11:04 AM    BILIRUBINUR NEGATIVE 03/11/2012 01:01 PM     No results found for: LABA1C  No results found for: EAG  Lab Results   Component Value Date    INR 1.3 12/03/2022    INR 1.1 09/21/2020    INR 1.2 08/13/2020    PROTIME 13.8 (H) 12/03/2022    PROTIME 13.9 09/21/2020    PROTIME 14.9 (H) 08/13/2020       No results found. Consultations:    Consults:     Final Specialist Recommendations/Findings:   IP CONSULT TO DIETITIAN  IP CONSULT TO UROLOGY  IP CONSULT TO INFECTIOUS DISEASES  IP CONSULT TO INFECTIOUS DISEASES  IP CONSULT TO GENERAL SURGERY  IP CONSULT TO DIETITIAN  IP CONSULT TO IV TEAM  IP CONSULT TO IV TEAM      The patient was seen and examined on day of discharge and this discharge summary is in conjunction with any daily progress note from day of discharge. Discharge plan:     Disposition: Skilled Nursing facility     Physician Follow Up:      [facility] [address] [id number]  [phone]    Follow up  Call tos chedule follow up for your ureteral stent and stones within 2 weeks       Requiring Further Evaluation/Follow Up POST HOSPITALIZATION/Incidental Findings: Follow-up with primary care physician. Complete antibiotic as advised    Diet:  On tube feed    Activity: Limited due to cerebral palsy    Instructions to Patient: Medication as advised. Discharge Medications:      Medication List        START taking these medications      ertapenem  infusion  Commonly known as: INVanz  Infuse 1,000 mg intravenously every 24 hours for 1 day Compound per protocol. CHANGE how you take these medications      atropine 1 % ophthalmic solution  What changed: Another medication with the same name was removed. Continue taking this medication, and follow the directions you see here. midodrine 5 MG tablet  Commonly known as: PROAMATINE  Take 1 tablet by mouth in the morning, at noon, and at bedtime  What changed: when to take this            CONTINUE taking these medications      acetaminophen 80 MG/0.8ML suspension  Commonly known as: TYLENOL     baclofen 10 MG tablet  Commonly known as: LIORESAL     CENTRUM/CERTA-DERICK with minerals oral solution  15 mLs by Per G Tube route daily     Cranberry 250 MG Caps     D-Mannose 500 MG Caps     docusate 50 MG/5ML liquid  Commonly known as: COLACE  Take 10 mLs by mouth daily     gabapentin 300 MG capsule  Commonly known as: NEURONTIN     glycopyrrolate 1 MG tablet  Commonly known as: ROBINUL     lansoprazole 30 MG disintegrating tablet  Commonly known as: PREVACID SOLUTAB  1 tablet by Per G Tube route every morning (before breakfast)     medroxyPROGESTERone 150 MG/ML injection  Commonly known as: DEPO-PROVERA     miconazole 2 % powder  Commonly known as: MICOTIN  Apply topically 2 times daily.      rivaroxaban 20 MG Tabs tablet  Commonly known as: XARELTO  Take 1 tablet by mouth daily     SCOPOLAMINE TD     sertraline 25 MG tablet  Commonly known as: ZOLOFT  1 tablet by Per G Tube route daily            STOP taking these medications      metoclopramide 5 MG/5ML solution  Commonly known as: REGLAN     metoprolol tartrate 25 MG tablet  Commonly known as: LOPRESSOR     TYLENOL/CODEINE #3 300-30 MG per tablet  Generic drug: acetaminophen-codeine               Where to Get Your Medications        These medications were sent to [facility] [address] [facility], [address] [place] [address]  [address], [address]  [address]      Phone: [phone]   midodrine 5 MG tablet       You can get these medications from any pharmacy    Bring a paper prescription for each of these medications  ertapenem  infusion         Time Spent on discharge is  33 mins in patient examination, evaluation, counseling as well as medication reconciliation, prescriptions for required medications, discharge plan and follow up. Electronically signed by   Magan Reyes MD  12/12/2022        Thank you Dr. Robin Hawley MD for the opportunity to be involved in this patient's care.

## 2023-01-05 ENCOUNTER — APPOINTMENT (OUTPATIENT)
Dept: GENERAL RADIOLOGY | Age: 43
End: 2023-01-05
Payer: MEDICARE

## 2023-01-05 ENCOUNTER — HOSPITAL ENCOUNTER (EMERGENCY)
Age: 43
Discharge: HOME | End: 2023-01-05
Attending: EMERGENCY MEDICINE | Admitting: SURGERY
Payer: MEDICARE

## 2023-01-05 VITALS
RESPIRATION RATE: 17 BRPM | HEART RATE: 74 BPM | OXYGEN SATURATION: 99 % | DIASTOLIC BLOOD PRESSURE: 77 MMHG | TEMPERATURE: 98.6 F | SYSTOLIC BLOOD PRESSURE: 107 MMHG

## 2023-01-05 DIAGNOSIS — K94.20 PROBLEM WITH GASTROSTOMY TUBE (HCC): Primary | ICD-10-CM

## 2023-01-05 PROCEDURE — 49465 FLUORO EXAM OF G/COLON TUBE: CPT

## 2023-01-05 PROCEDURE — 99283 EMERGENCY DEPT VISIT LOW MDM: CPT

## 2023-01-05 PROCEDURE — 6360000004 HC RX CONTRAST MEDICATION: Performed by: EMERGENCY MEDICINE

## 2023-01-05 RX ADMIN — DIATRIZOATE MEGLUMINE AND DIATRIZOATE SODIUM 30 ML: 660; 100 LIQUID ORAL; RECTAL at 14:33

## 2023-01-05 ASSESSMENT — PAIN - FUNCTIONAL ASSESSMENT: PAIN_FUNCTIONAL_ASSESSMENT: NONE - DENIES PAIN

## 2023-01-05 NOTE — ED TRIAGE NOTES
Pt lives at [facility] and today had her G-tub pulled out. Pt sent to ER via EMS for replacement.

## 2023-01-05 NOTE — DISCHARGE INSTRUCTIONS
Your G-tube was replaced. It appears the G-tube had been out for a while as it did require dilation to get back in. Is very important in the G-tube remains in place, if it falls out again please see care immediately. Please continue following up with your primary care provider. Please return to the emergency department if you develop any concerning or worsening symptoms.

## 2023-01-05 NOTE — CONSULTS
General Surgery  Consult    PATIENT NAME: Lakia Arroyo  AGE: 43 y.o. MEDICAL RECORD NO. [id number]  DATE: 1/5/2023  SURGEON: Dr. Ziggy Redd: Pat Zaragoza MD    Patient evaluated at the request of  Dr. Quentin Michele  Reason for evaluation: G-tube replacement    Patient information was obtained from past medical records. History/Exam limitations: mental status, due to condition, and baseline aphasia. IMPRESSION:     Patient Active Problem List   Diagnosis    Aphasia    Multiple sclerosis (HCC)    Nonverbal    Gastrostomy tube dependent ([address]    Dislodged gastrostomy tube    Neurogenic bladder    Hemorrhagic cystitis    Hypokalemia    Urinary tract infection associated with indwelling urethral catheter ([facility])    Tachycardia    Gross hematuria    Lactic acidosis    Chronic indwelling Davies catheter    Peripheral vascular disease ([facility])    Recurrent UTI (urinary tract infection)    History of pulmonary embolism    Hypertension    Altered mental status    Toxic metabolic encephalopathy    ESBL E. coli carrier    Septic shock ([facility])    Status post colostomy ([facility])    Lower paraplegia ([facility])    Sepsis ([facility])    Pneumonia due to organism    Extended spectrum beta lactamase (ESBL) resistance    Abdominal pain    Ileus ([facility])    Decubitus ulcer of coccygeal region, stage 4 ([facility])    Proteus mirabilis infection    Lactate blood increase    E-coli UTI    Sepsis due to Escherichia coli ([facility])    E coli bacteremia    Nephrostomy tube displaced ([address]    Pyelonephritis    Obstructive uropathy    Bandemia    SIRS (systemic inflammatory response syndrome) ([facility])    Gram-neg septicemia ([address]    Pneumatosis coli   42 y/o female with a medical history including paraplegia and aphasia who presents to the ED with gastrostomy tube displacement      PLAN:   Previous G-tube tract dilated to 6 mm using Hegar dilators. 16 Malay gastrostomy tube replaced at bedside. Tube at 3 cm at the skin.  Tube contrast study performed demonstrating contrast within the stomach without extraluminal extravasation. Patient tolerated well. No immediate complications. Dispo per ED      HISTORY:   History of Chief Complaint:    Delvin Cooks is a 43 y.o. female with extensive medical history including paraplegia, aphasia, poor p.o. intake requiring gastrostomy tube feeding, who presents to the emergency department from [facility] after her gastrostomy tube became dislodged. According to [facility], patient's gastrostomy tube became dislodged early this morning. General surgery was consulted for G-tube replacement. Initial attempt to pass 16 Inge Olp gastrostomy tube to previous tract were unsuccessful. Tract was then dilated to 6 mm using Hegar dilators, after which a 16 Irish gastrostomy tube was easily passed through tract and secured in place at 3 cm at the skin. To contrast that he was performed which showed contrast within the stomach without any extraluminal extravasation. No immediate complications were noted. Patient tolerated procedure well. Okay to return back to facility. Past Medical History   has a past medical history of Aphasia, Aphasia, Contracture of joint, lower leg, Depressed, HTN (hypertension), Hx MRSA infection, Hydronephrosis, Movement disorder, Multiple sclerosis ([address] Neurogenic bladder, Non-verbal learning disorder, Peripheral vascular disease ([address] Polyneuropathy, Pressure ulcer, Pulmonary embolism ([address] Pulmonary infarction ([address] Quadriplegia ([address] Tachycardia, and UTI (lower urinary tract infection). Past Surgical History   has a past surgical history that includes Gastrostomy tube placement; colostomy; amputation (Right); Leg amputation through femur; IR GUIDED NEPHROSTOMY CATH PLACEMENT RIGHT (9/27/2020); and Cystoscopy (Right, 12/3/2022). Medications  Prior to Admission medications    Medication Sig Start Date End Date Taking?  Authorizing Provider   midodrine (PROAMATINE) 5 MG tablet Take 1 tablet by mouth in the morning, at noon, and at bedtime 12/11/22   Ubaldo Rodríguez MD   miconazole (MICOTIN) 2 % powder Apply topically 2 times daily. 1/25/20   Ny Dolan MD   D-Mannose 500 MG CAPS Take 3 capsules by mouth three times daily    Historical Provider, MD   atropine 1 % ophthalmic solution 2-4 drops every 4 hours as needed    Historical Provider, MD   rivaroxaban (XARELTO) 20 MG TABS tablet Take 1 tablet by mouth daily 5/24/19   Makayla Butler MD   sertraline (ZOLOFT) 25 MG tablet 1 tablet by Per G Tube route daily 5/24/19   Makayla Butler MD   docusate (COLACE) 50 MG/5ML liquid Take 10 mLs by mouth daily 5/24/19   Makayla Butler MD   Multiple Vitamins-Minerals (CENTRUM/CERTA-DERICK WITH MINERALS ORAL) solution 15 mLs by Per G Tube route daily 5/24/19   Makayla Butler MD   lansoprazole (PREVACID SOLUTAB) 30 MG disintegrating tablet 1 tablet by Per G Tube route every morning (before breakfast) 5/24/19   Makayla Butler MD   Cranberry 250 MG CAPS Take 250 mg by mouth 2 times daily    Historical Provider, MD   Scopolamine Base (SCOPOLAMINE TD) Place 1 mg onto the skin every 72 hours     Historical Provider, MD   baclofen (LIORESAL) 10 MG tablet 10 mg by Per G Tube route 3 times daily Crush into G tube     Historical Provider, MD   gabapentin (NEURONTIN) 300 MG capsule 300 mg by Per G Tube route 3 times daily    Historical Provider, MD   medroxyPROGESTERone (DEPO-PROVERA) 150 MG/ML injection Inject 150 mg into the muscle every 3 months On the 11th     Historical Provider, MD   acetaminophen (TYLENOL) 80 MG/0.8ML suspension 650 mg by Per G Tube route every 6 hours as needed for Fever or Pain     Historical Provider, MD   glycopyrrolate (ROBINUL) 1 MG tablet Take 1 mg by mouth 3 times daily     Historical Provider, MD    Scheduled Meds:  Continuous Infusions:  PRN Meds:. Allergies  has No Known Allergies. Family History  family history includes No Known Problems in her father and mother.   Social History   reports that she has never smoked. She has never used smokeless tobacco.   reports no history of alcohol use. reports no history of drug use. Review of Systems  Review of systems limited by patient mental status    PHYSICAL:   VITALS:  oral temperature is 98.6 °F (37 °C). Her blood pressure is 107/77 and her pulse is 74. Her respiration is 17 and oxygen saturation is 99%. CONSTITUTIONAL: Awake, nonverbal  HEENT: Head is normocephalic, atraumatic. NECK: Soft, trachea midline and straight  LUNGS: Chest expands equally bilaterally upon respiration, no accessory muscle used. CARDIOVASCULAR: Regular rate and rhythm  ABDOMEN: soft, nontender, nondistended, previous gastrostomy tube tract in left upper quadrant, no active bleeding or drainage from tract. Scar consistent with surgical history. Suprapubic catheter in place  EXTREMITIES: Chronic lower extremity contractures    LABS:   No results for input(s): WBC, HGB, HCT, PLT, NA, K, CL, CO2, BUN, CREATININE, MG, PHOS, CALCIUM, PTT, INR, AST, ALT, BILITOT, BILIDIR, NITRU, COLORU, BACTERIA in the last 72 hours. Invalid input(s): PT, WBCU, RBCU, LEUKOCYTESUA  No results for input(s): ALKPHOS, ALT, AST, BILITOT, BILIDIR, LABALBU, AMYLASE, LIPASE in the last 72 hours. RADIOLOGY:     XR INJ CONTRAST GASTRIC TUBE PERC    Result Date: 1/5/2023  Gastrostomy tube is appropriately positioned. Thank you for the interesting evaluation. Further recommendations to follow. Shivani Song DO  1/5/2023, 1:57 PM  Attestation signed by Marjorie Sneed MD    I personally evaluated the patient and directed the medical decision making with Resident/ELDON after the physical/radiologic exam and laboratory values were reviewed and confirmed. G-tube change as outlined above. Contrast injection reviewed and appropriate. Dispo per ED.      Marjorie Sneed MD

## 2023-01-05 NOTE — ED PROVIDER NOTES
[facility]     Emergency Department     Faculty Attestation    I performed a history and physical examination of the patient and discussed management with the resident. I reviewed the residents note and agree with the documented findings and plan of care. Any areas of disagreement are noted on the chart. I was personally present for the key portions of any procedures. I have documented in the chart those procedures where I was not present during the key portions. I have reviewed the emergency nurses triage note. I agree with the chief complaint, past medical history, past surgical history, allergies, medications, social and family history as documented unless otherwise noted below. For Physician Assistant/ Nurse Practitioner cases/documentation I have personally evaluated this patient and have completed at least one if not all key elements of the E/M (history, physical exam, and MDM). Additional findings are as noted.       Primary Care Physician:  Ciara Parra MD    CHIEF COMPLAINT       Chief Complaint   Patient presents with    G Tube Complications       RECENT VITALS:   Temp: 98.6 °F (37 °C),  Heart Rate: 74, Resp: 17, BP: 107/77    LABS:  Labs Reviewed - No data to display      PERTINENT ATTENDING PHYSICIAN COMMENTS:    Here for G-tube replacement apparently fell out sometime this morning the resident attempted to place it was unsuccessful I attempted as well there seems to be no obvious track beyond about a centimeter and 1/2 cm will consult surgery    Ankush Trejo MD,  MD, [facility]  Attending Emergency  Physician              Torres Monge MD  01/05/23 [id number]

## 2023-01-05 NOTE — ED PROVIDER NOTES
[address]  ED  Emergency Department Encounter  Emergency Medicine Resident     Pt Name: Allison Huynh  MRN: [id number]  Catalinagfkarin 1980  Date of evaluation: 1/5/23  PCP:  Parmjit Costello MD    [address] [place] [address]       Chief Complaint   Patient presents with    G Tube Complications       HISTORY OFPRESENT ILLNESS  (Location/Symptom, Timing/Onset, Context/Setting, Quality, Duration, Modifying Factors,Severity.)      Allison Huynh is a 43 y. o.yo female who presents with EMS. Arrives from [address] [place] [address]. Per EMS they were called out for a displaced G-tube. EMS state on their arrival they found patient in her room with a bandage over G-tube site. They state there were no nursing staff to help them determine any other information. Patient is nonverbal, unable to obtain any further history. PAST MEDICAL / SURGICAL / SOCIAL / FAMILY HISTORY      has a past medical history of Aphasia, Aphasia, Contracture of joint, lower leg, Depressed, HTN (hypertension), Hx MRSA infection, Hydronephrosis, Movement disorder, Multiple sclerosis (HCC), Neurogenic bladder, Non-verbal learning disorder, Peripheral vascular disease ([address] Polyneuropathy, Pressure ulcer, Pulmonary embolism ([address] Pulmonary infarction ([address] Quadriplegia ([address] Tachycardia, and UTI (lower urinary tract infection). has a past surgical history that includes Gastrostomy tube placement; colostomy; amputation (Right); Leg amputation through femur; IR GUIDED NEPHROSTOMY CATH PLACEMENT RIGHT (9/27/2020); and Cystoscopy (Right, 12/3/2022). Social History     Socioeconomic History    Marital status: Single     Spouse name: Not on file    Number of children: Not on file    Years of education: Not on file    Highest education level: Not on file   Occupational History    Not on file   Tobacco Use    Smoking status: Never    Smokeless tobacco: Never   Vaping Use    Vaping Use: Never used   Substance and Sexual Activity    Alcohol use:  No Drug use: No    Sexual activity: Never   Other Topics Concern    Not on file   Social History Narrative    Not on file     Social Determinants of Health     Financial Resource Strain: Not on file   Food Insecurity: Not on file   Transportation Needs: Not on file   Physical Activity: Not on file   Stress: Not on file   Social Connections: Not on file   Intimate Partner Violence: Not on file   Housing Stability: Not on file       Family History   Problem Relation Age of Onset    No Known Problems Mother     No Known Problems Father         Allergies:  Patient has no known allergies. Home Medications:  Prior to Admission medications    Medication Sig Start Date End Date Taking? Authorizing Provider   midodrine (PROAMATINE) 5 MG tablet Take 1 tablet by mouth in the morning, at noon, and at bedtime 12/11/22   Manuel Benavides MD   miconazole (MICOTIN) 2 % powder Apply topically 2 times daily.  1/25/20   Yemi Puentes MD   D-Mannose 500 MG CAPS Take 3 capsules by mouth three times daily    Historical Provider, MD   atropine 1 % ophthalmic solution 2-4 drops every 4 hours as needed    Historical Provider, MD   rivaroxaban (XARELTO) 20 MG TABS tablet Take 1 tablet by mouth daily 5/24/19   Milagros Reynolds MD   sertraline (ZOLOFT) 25 MG tablet 1 tablet by Per G Tube route daily 5/24/19   Milagros Reynolds MD   docusate (COLACE) 50 MG/5ML liquid Take 10 mLs by mouth daily 5/24/19   Milagros Reynolds MD   Multiple Vitamins-Minerals (CENTRUM/CERTA-DERICK WITH MINERALS ORAL) solution 15 mLs by Per G Tube route daily 5/24/19   Milagros Reynolds MD   lansoprazole (PREVACID SOLUTAB) 30 MG disintegrating tablet 1 tablet by Per G Tube route every morning (before breakfast) 5/24/19   Milagros Reynolds MD   Cranberry 250 MG CAPS Take 250 mg by mouth 2 times daily    Historical Provider, MD   Scopolamine Base (SCOPOLAMINE TD) Place 1 mg onto the skin every 72 hours     Historical Provider, MD   baclofen (LIORESAL) 10 MG tablet 10 mg by Per Crystal Izaguirre Tube route 3 times daily Crush into G tube     Historical Provider, MD   gabapentin (NEURONTIN) 300 MG capsule 300 mg by Per G Tube route 3 times daily    Historical Provider, MD   medroxyPROGESTERone (DEPO-PROVERA) 150 MG/ML injection Inject 150 mg into the muscle every 3 months On the 11th     Historical Provider, MD   acetaminophen (TYLENOL) 80 MG/0.8ML suspension 650 mg by Per G Tube route every 6 hours as needed for Fever or Pain     Historical Provider, MD   glycopyrrolate (ROBINUL) 1 MG tablet Take 1 mg by mouth 3 times daily     Historical Provider, MD       REVIEW OFSYSTEMS    (2-9 systems for level 4, 10 or more for level 5)      Review of Systems   Unable to perform ROS: Patient nonverbal     PHYSICAL EXAM   (up to 7 for level 4, 8 or more forlevel 5)      INITIAL VITALS:   Vitals:    01/05/23 1221   BP: 107/77   Pulse: 74   Resp: 17   Temp: 98.6 °F (37 °C)   TempSrc: Oral   SpO2: 99%         Physical Exam  Vitals reviewed. Constitutional:       Comments: Awake, looking around room, appears in no acute distress   HENT:      Head: Normocephalic and atraumatic. Right Ear: External ear normal.      Left Ear: External ear normal.      Nose: Nose normal.   Eyes:      General:         Right eye: No discharge. Left eye: No discharge. Cardiovascular:      Rate and Rhythm: Normal rate and regular rhythm. Pulmonary:      Effort: Pulmonary effort is normal. No respiratory distress. Abdominal:      Palpations: Abdomen is soft. Comments: Bandage noted over G-tube site. Ostomy in place. Musculoskeletal:      Cervical back: Normal range of motion. Skin:     General: Skin is warm. Capillary Refill: Capillary refill takes less than 2 seconds.        DIFFERENTIAL  DIAGNOSIS     PLAN (LABS / IMAGING / EKG):  Orders Placed This Encounter   Procedures    XR INJ CONTRAST GASTRIC TUBE [facility]    Inpatient consult to General Surgery       MEDICATIONS ORDERED:  Orders Placed This Encounter Medications    diatrizoate meglumine-sodium (GASTROGRAFIN) 66-10 % solution 30 mL         DDX: Displaced G-tube    Initial MDM/Plan: 43 y.o. female who presents with EMS for concern for displaced G-tube. Patient is nonverbal, EMS is unable to obtain any history from patient's facility. Patient appears in no acute distress, does have bandage over old G-tube site. Patient has stable vital signs, afebrile. Abdomen soft, nontender. We will plan to discuss with [facility] to obtain further information about why patient is here. Per chart review patient has been seen here before for G-tube displacement. When she was seen here in the beginning of November she initially came in with a 17 Western Lily G-tube that was replaced with a 16 Western Lily. We will attempt to replace G-tube with 16 Western Lily. Will reassess. DIAGNOSTIC RESULTS / EMERGENCYDEPARTMENT COURSE / MDM     LABS:  Labs Reviewed - No data to display      RADIOLOGY:  XR INJ CONTRAST GASTRIC TUBE PERC    Result Date: 1/5/2023  EXAMINATION: ONE SUPINE XRAY VIEW(S) OF THE ABDOMEN 1/5/2023 2:31 pm COMPARISON: None. HISTORY: Gastrostomy tube placement. FINDINGS: Contrast injected via gastrostomy tube opacifies the stomach and proximal duodenum. No extravasation of contrast is seen. Gastrostomy tube is appropriately positioned. EKG  None    All EKG's are interpreted by the Emergency Department Physicianwho either signs or Co-signs this chart in the absence of a cardiologist.    EMERGENCY DEPARTMENT COURSE:  ED Course as of 01/05/23 1517   Thu Jan 05, 2023   1259 Able to get in contact with [address] [place] [address]. Spoke with Chiquis Braun. State they are only sending patient in to have G-tube replaced, stated it felt out this morning. They state patient has no other complaints. Attempted to replace G-tube with a 12 Western Lily however unable to do so. General surgery consulted.  [AB]   1936 General surgery at bedside [AB]   6026 Surgery was able to replace G-tube after multiple dilations. It appears G-tube has been out much longer than just this morning. Contrasted study shows G-tube is in place. Will discharge back to facility. Advise close follow-up with PCP. Provided with strict return precautions. [AB]      ED Course User Index  [AB] Chang Salmon DO          PROCEDURES:  None    CONSULTS:  IP CONSULT TO GENERAL SURGERY    CRITICAL CARE:  See attending physician note    FINAL IMPRESSION      1.  Problem with gastrostomy tube [facility])          DISPOSITION / PLAN     DISPOSITION Decision To Discharge 01/05/2023 02:47:58 PM      PATIENT REFERRED TO:  [facility] ED  [address] [facility]  [phone]  Go to   If symptoms worsen    Master Palma MD  [address] [place] [id number] [phone]    Schedule an appointment as soon as possible for a visit in 3 days      DISCHARGE MEDICATIONS:  New Prescriptions    No medications on file       Eugenia Yee DO  Emergency Medicine Resident    (Please note that portions of this note were completed with a voice recognition program.Efforts were made to edit the dictations but occasionally words are mis-transcribed.)        Chang Salmon DO  Resident  01/05/23 9170

## 2023-01-05 NOTE — ED NOTES
Resident informed writer that while patient's SNF stated her g-tube fell out today, the degree to which it's closed indicates it has been some time since the tube was in place. Writer attempted contact with [employer] Ombudsman/KIRA, received voicemail. Writer left voicemail.      [address] [place] [address] EMS ETA 5:00pm.     CHARIS Whelan  01/05/23 1453

## 2023-01-09 ENCOUNTER — SOCIAL WORK (OUTPATIENT)
Dept: EMERGENCY DEPT | Age: 43
End: 2023-01-09

## 2023-01-09 NOTE — PROGRESS NOTES
Writer received call from YAZ HAN of [address] office requesting patient's admission status. Writer informed Sarah patient was discharged. YAZ HAN stated she will follow up with patient in SNF.

## 2023-02-08 ENCOUNTER — HOSPITAL ENCOUNTER (OUTPATIENT)
Age: 43
Setting detail: SPECIMEN
Discharge: HOME | End: 2023-02-08
Payer: COMMERCIAL

## 2023-02-08 LAB
ANION GAP SERPL CALCULATED.3IONS-SCNC: 10 MMOL/L (ref 9–17)
BUN SERPL-MCNC: 13 MG/DL (ref 6–20)
BUN/CREAT BLD: 31 (ref 9–20)
CALCIUM SERPL-MCNC: 10.1 MG/DL (ref 8.6–10.4)
CHLORIDE SERPL-SCNC: 104 MMOL/L (ref 98–107)
CO2 SERPL-SCNC: 25 MMOL/L (ref 20–31)
CREAT SERPL-MCNC: 0.42 MG/DL (ref 0.5–0.9)
GFR SERPL CREATININE-BSD FRML MDRD: >60 ML/MIN/1.73M2
GLUCOSE SERPL-MCNC: 93 MG/DL (ref 70–99)
HCT VFR BLD AUTO: 44.6 % (ref 36.3–47.1)
HGB BLD-MCNC: 13.2 G/DL (ref 11.9–15.1)
MCH RBC QN AUTO: 25.6 PG (ref 25.2–33.5)
MCHC RBC AUTO-ENTMCNC: 29.6 G/DL (ref 28.4–34.8)
MCV RBC AUTO: 86.6 FL (ref 82.6–102.9)
NRBC AUTOMATED: 0 PER 100 WBC
PDW BLD-RTO: 15.3 % (ref 11.8–14.4)
PLATELET # BLD AUTO: 439 K/UL (ref 138–453)
PMV BLD AUTO: 10.1 FL (ref 8.1–13.5)
POTASSIUM SERPL-SCNC: 4 MMOL/L (ref 3.7–5.3)
RBC # BLD: 5.15 M/UL (ref 3.95–5.11)
SODIUM SERPL-SCNC: 139 MMOL/L (ref 135–144)
WBC # BLD AUTO: 8.1 K/UL (ref 3.5–11.3)

## 2023-02-08 PROCEDURE — P9603 ONE-WAY ALLOW PRORATED MILES: HCPCS

## 2023-02-08 PROCEDURE — 80048 BASIC METABOLIC PNL TOTAL CA: CPT

## 2023-02-08 PROCEDURE — 85027 COMPLETE CBC AUTOMATED: CPT

## 2023-02-08 PROCEDURE — 36415 COLL VENOUS BLD VENIPUNCTURE: CPT

## 2023-02-09 ENCOUNTER — HOSPITAL ENCOUNTER (OUTPATIENT)
Age: 43
Setting detail: SPECIMEN
Discharge: HOME | End: 2023-02-09

## 2023-02-09 LAB
ALBUMIN SERPL-MCNC: 3.9 G/DL (ref 3.5–5.2)
ALP SERPL-CCNC: 77 U/L (ref 35–104)
ALT SERPL-CCNC: 17 U/L (ref 5–33)
ANION GAP SERPL CALCULATED.3IONS-SCNC: 9 MMOL/L (ref 9–17)
AST SERPL-CCNC: 16 U/L
BILIRUB SERPL-MCNC: 0.2 MG/DL (ref 0.3–1.2)
BUN SERPL-MCNC: 15 MG/DL (ref 6–20)
BUN/CREAT BLD: ABNORMAL (ref 9–20)
CALCIUM SERPL-MCNC: 10 MG/DL (ref 8.6–10.4)
CHLORIDE SERPL-SCNC: 105 MMOL/L (ref 98–107)
CO2 SERPL-SCNC: 24 MMOL/L (ref 20–31)
CREAT SERPL-MCNC: <0.4 MG/DL (ref 0.5–0.9)
GFR SERPL CREATININE-BSD FRML MDRD: ABNORMAL ML/MIN/1.73M2
GLUCOSE SERPL-MCNC: 98 MG/DL (ref 70–99)
HCT VFR BLD AUTO: 41.1 % (ref 36.3–47.1)
HGB BLD-MCNC: 12.1 G/DL (ref 11.9–15.1)
MCH RBC QN AUTO: 25.5 PG (ref 25.2–33.5)
MCHC RBC AUTO-ENTMCNC: 29.4 G/DL (ref 28.4–34.8)
MCV RBC AUTO: 86.5 FL (ref 82.6–102.9)
NRBC AUTOMATED: 0 PER 100 WBC
PDW BLD-RTO: 15.5 % (ref 11.8–14.4)
PLATELET # BLD AUTO: 468 K/UL (ref 138–453)
PMV BLD AUTO: 10.1 FL (ref 8.1–13.5)
POTASSIUM SERPL-SCNC: 4.2 MMOL/L (ref 3.7–5.3)
PROT SERPL-MCNC: 9.3 G/DL (ref 6.4–8.3)
RBC # BLD: 4.75 M/UL (ref 3.95–5.11)
SODIUM SERPL-SCNC: 138 MMOL/L (ref 135–144)
WBC # BLD AUTO: 11.7 K/UL (ref 3.5–11.3)

## 2023-02-09 PROCEDURE — 80053 COMPREHEN METABOLIC PANEL: CPT

## 2023-02-09 PROCEDURE — 36415 COLL VENOUS BLD VENIPUNCTURE: CPT

## 2023-02-09 PROCEDURE — 85027 COMPLETE CBC AUTOMATED: CPT

## 2023-02-09 PROCEDURE — P9603 ONE-WAY ALLOW PRORATED MILES: HCPCS

## 2023-02-13 ENCOUNTER — HOSPITAL ENCOUNTER (INPATIENT)
Age: 43
LOS: 1 days | Discharge: NURSING, REHAB OR LONG-TERM CARE | DRG: 698 | End: 2023-02-15
Attending: EMERGENCY MEDICINE | Admitting: INTERNAL MEDICINE
Payer: MEDICARE

## 2023-02-13 ENCOUNTER — APPOINTMENT (OUTPATIENT)
Dept: GENERAL RADIOLOGY | Age: 43
DRG: 698 | End: 2023-02-13
Payer: MEDICARE

## 2023-02-13 DIAGNOSIS — N30.01 ACUTE CYSTITIS WITH HEMATURIA: Primary | ICD-10-CM

## 2023-02-13 DIAGNOSIS — R31.0 GROSS HEMATURIA: ICD-10-CM

## 2023-02-13 PROBLEM — N39.0 UTI (URINARY TRACT INFECTION): Status: ACTIVE | Noted: 2023-02-13

## 2023-02-13 LAB
ABSOLUTE EOS #: 0.49 K/UL (ref 0–0.44)
ABSOLUTE IMMATURE GRANULOCYTE: <0.03 K/UL (ref 0–0.3)
ABSOLUTE LYMPH #: 2.5 K/UL (ref 1.1–3.7)
ABSOLUTE MONO #: 0.78 K/UL (ref 0.1–1.2)
ALBUMIN SERPL-MCNC: 3.8 G/DL (ref 3.5–5.2)
ALBUMIN/GLOBULIN RATIO: 0.7 (ref 1–2.5)
ALP SERPL-CCNC: 70 U/L (ref 35–104)
ALT SERPL-CCNC: 14 U/L (ref 5–33)
ANION GAP SERPL CALCULATED.3IONS-SCNC: 11 MMOL/L (ref 9–17)
AST SERPL-CCNC: 16 U/L
BACTERIA: ABNORMAL
BASOPHILS # BLD: 0 % (ref 0–2)
BASOPHILS ABSOLUTE: 0.03 K/UL (ref 0–0.2)
BILIRUB SERPL-MCNC: 0.2 MG/DL (ref 0.3–1.2)
BILIRUBIN URINE: NEGATIVE
BUN SERPL-MCNC: 12 MG/DL (ref 6–20)
CALCIUM SERPL-MCNC: 9.6 MG/DL (ref 8.6–10.4)
CASTS UA: ABNORMAL /LPF (ref 0–8)
CHLORIDE SERPL-SCNC: 100 MMOL/L (ref 98–107)
CO2 SERPL-SCNC: 22 MMOL/L (ref 20–31)
COLOR: ABNORMAL
CREAT SERPL-MCNC: 0.3 MG/DL (ref 0.5–0.9)
EOSINOPHILS RELATIVE PERCENT: 5 % (ref 1–4)
EPITHELIAL CELLS UA: ABNORMAL /HPF (ref 0–5)
GFR SERPL CREATININE-BSD FRML MDRD: >60 ML/MIN/1.73M2
GLUCOSE SERPL-MCNC: 84 MG/DL (ref 70–99)
GLUCOSE UR STRIP.AUTO-MCNC: NEGATIVE MG/DL
HCT VFR BLD AUTO: 42.3 % (ref 36.3–47.1)
HGB BLD-MCNC: 12.7 G/DL (ref 11.9–15.1)
IMMATURE GRANULOCYTES: 0 %
INR PPP: 1.1
KETONES UR STRIP.AUTO-MCNC: NEGATIVE MG/DL
LACTIC ACID, SEPSIS WHOLE BLOOD: 1 MMOL/L (ref 0.5–1.9)
LEUKOCYTE ESTERASE UR QL STRIP.AUTO: ABNORMAL
LYMPHOCYTES # BLD: 27 % (ref 24–43)
MAGNESIUM SERPL-MCNC: 2.2 MG/DL (ref 1.6–2.6)
MCH RBC QN AUTO: 26.1 PG (ref 25.2–33.5)
MCHC RBC AUTO-ENTMCNC: 30 G/DL (ref 28.4–34.8)
MCV RBC AUTO: 86.9 FL (ref 82.6–102.9)
MONOCYTES # BLD: 8 % (ref 3–12)
NITRITE UR QL STRIP.AUTO: POSITIVE
NRBC AUTOMATED: 0 PER 100 WBC
PARTIAL THROMBOPLASTIN TIME: 28 SEC (ref 20.5–30.5)
PDW BLD-RTO: 15.5 % (ref 11.8–14.4)
PLATELET # BLD AUTO: 404 K/UL (ref 138–453)
PMV BLD AUTO: 9.6 FL (ref 8.1–13.5)
POTASSIUM SERPL-SCNC: 4.1 MMOL/L (ref 3.7–5.3)
PROT SERPL-MCNC: 8.9 G/DL (ref 6.4–8.3)
PROT UR STRIP.AUTO-MCNC: >9 MG/DL (ref 5–8)
PROT UR STRIP.AUTO-MCNC: ABNORMAL MG/DL
PROTHROMBIN TIME: 11.6 SEC (ref 9.1–12.3)
RBC # BLD: 4.87 M/UL (ref 3.95–5.11)
RBC # BLD: ABNORMAL 10*6/UL
RBC CLUMPS #/AREA URNS AUTO: ABNORMAL /HPF (ref 0–4)
SEG NEUTROPHILS: 60 % (ref 36–65)
SEGMENTED NEUTROPHILS ABSOLUTE COUNT: 5.53 K/UL (ref 1.5–8.1)
SODIUM SERPL-SCNC: 133 MMOL/L (ref 135–144)
SPECIFIC GRAVITY UA: 1.02 (ref 1–1.03)
TROPONIN I SERPL DL<=0.01 NG/ML-MCNC: 10 NG/L (ref 0–14)
TROPONIN I SERPL DL<=0.01 NG/ML-MCNC: 14 NG/L (ref 0–14)
TURBIDITY: ABNORMAL
URINE HGB: ABNORMAL
UROBILINOGEN, URINE: NORMAL
WBC # BLD AUTO: 9.4 K/UL (ref 3.5–11.3)
WBC UA: ABNORMAL /HPF (ref 0–5)

## 2023-02-13 PROCEDURE — 6360000002 HC RX W HCPCS: Performed by: NURSE PRACTITIONER

## 2023-02-13 PROCEDURE — 96372 THER/PROPH/DIAG INJ SC/IM: CPT

## 2023-02-13 PROCEDURE — 99285 EMERGENCY DEPT VISIT HI MDM: CPT

## 2023-02-13 PROCEDURE — 87040 BLOOD CULTURE FOR BACTERIA: CPT

## 2023-02-13 PROCEDURE — G0378 HOSPITAL OBSERVATION PER HR: HCPCS

## 2023-02-13 PROCEDURE — 85025 COMPLETE CBC W/AUTO DIFF WBC: CPT

## 2023-02-13 PROCEDURE — 71045 X-RAY EXAM CHEST 1 VIEW: CPT

## 2023-02-13 PROCEDURE — 83735 ASSAY OF MAGNESIUM: CPT

## 2023-02-13 PROCEDURE — 84484 ASSAY OF TROPONIN QUANT: CPT

## 2023-02-13 PROCEDURE — 83605 ASSAY OF LACTIC ACID: CPT

## 2023-02-13 PROCEDURE — 85610 PROTHROMBIN TIME: CPT

## 2023-02-13 PROCEDURE — 85730 THROMBOPLASTIN TIME PARTIAL: CPT

## 2023-02-13 PROCEDURE — 51702 INSERT TEMP BLADDER CATH: CPT

## 2023-02-13 PROCEDURE — 80053 COMPREHEN METABOLIC PANEL: CPT

## 2023-02-13 PROCEDURE — 81001 URINALYSIS AUTO W/SCOPE: CPT

## 2023-02-13 PROCEDURE — 96361 HYDRATE IV INFUSION ADD-ON: CPT

## 2023-02-13 PROCEDURE — 2580000003 HC RX 258: Performed by: NURSE PRACTITIONER

## 2023-02-13 PROCEDURE — 93005 ELECTROCARDIOGRAM TRACING: CPT | Performed by: STUDENT IN AN ORGANIZED HEALTH CARE EDUCATION/TRAINING PROGRAM

## 2023-02-13 PROCEDURE — 6360000002 HC RX W HCPCS: Performed by: STUDENT IN AN ORGANIZED HEALTH CARE EDUCATION/TRAINING PROGRAM

## 2023-02-13 PROCEDURE — 2580000003 HC RX 258: Performed by: STUDENT IN AN ORGANIZED HEALTH CARE EDUCATION/TRAINING PROGRAM

## 2023-02-13 PROCEDURE — 96365 THER/PROPH/DIAG IV INF INIT: CPT

## 2023-02-13 PROCEDURE — 36415 COLL VENOUS BLD VENIPUNCTURE: CPT

## 2023-02-13 PROCEDURE — 87086 URINE CULTURE/COLONY COUNT: CPT

## 2023-02-13 RX ORDER — 0.9 % SODIUM CHLORIDE 0.9 %
1000 INTRAVENOUS SOLUTION INTRAVENOUS ONCE
Status: COMPLETED | OUTPATIENT
Start: 2023-02-13 | End: 2023-02-13

## 2023-02-13 RX ORDER — POLYETHYLENE GLYCOL 3350 17 G/17G
17 POWDER, FOR SOLUTION ORAL DAILY PRN
Status: DISCONTINUED | OUTPATIENT
Start: 2023-02-13 | End: 2023-02-15 | Stop reason: HOSPADM

## 2023-02-13 RX ORDER — SODIUM CHLORIDE 0.9 % (FLUSH) 0.9 %
5-40 SYRINGE (ML) INJECTION PRN
Status: DISCONTINUED | OUTPATIENT
Start: 2023-02-13 | End: 2023-02-15 | Stop reason: HOSPADM

## 2023-02-13 RX ORDER — SODIUM CHLORIDE 0.9 % (FLUSH) 0.9 %
5-40 SYRINGE (ML) INJECTION EVERY 12 HOURS SCHEDULED
Status: DISCONTINUED | OUTPATIENT
Start: 2023-02-13 | End: 2023-02-15 | Stop reason: HOSPADM

## 2023-02-13 RX ORDER — ACETAMINOPHEN 325 MG/1
650 TABLET ORAL EVERY 6 HOURS PRN
Status: DISCONTINUED | OUTPATIENT
Start: 2023-02-13 | End: 2023-02-15 | Stop reason: HOSPADM

## 2023-02-13 RX ORDER — ONDANSETRON 2 MG/ML
4 INJECTION INTRAMUSCULAR; INTRAVENOUS EVERY 6 HOURS PRN
Status: DISCONTINUED | OUTPATIENT
Start: 2023-02-13 | End: 2023-02-15 | Stop reason: HOSPADM

## 2023-02-13 RX ORDER — ENOXAPARIN SODIUM 100 MG/ML
40 INJECTION SUBCUTANEOUS DAILY
Status: DISCONTINUED | OUTPATIENT
Start: 2023-02-13 | End: 2023-02-14

## 2023-02-13 RX ORDER — ACETAMINOPHEN 650 MG/1
650 SUPPOSITORY RECTAL EVERY 6 HOURS PRN
Status: DISCONTINUED | OUTPATIENT
Start: 2023-02-13 | End: 2023-02-15 | Stop reason: HOSPADM

## 2023-02-13 RX ORDER — SODIUM CHLORIDE 9 MG/ML
INJECTION, SOLUTION INTRAVENOUS PRN
Status: DISCONTINUED | OUTPATIENT
Start: 2023-02-13 | End: 2023-02-15 | Stop reason: HOSPADM

## 2023-02-13 RX ORDER — ONDANSETRON 4 MG/1
4 TABLET, ORALLY DISINTEGRATING ORAL EVERY 8 HOURS PRN
Status: DISCONTINUED | OUTPATIENT
Start: 2023-02-13 | End: 2023-02-15 | Stop reason: HOSPADM

## 2023-02-13 RX ADMIN — SODIUM CHLORIDE 1000 ML: 9 INJECTION, SOLUTION INTRAVENOUS at 19:06

## 2023-02-13 RX ADMIN — SODIUM CHLORIDE, PRESERVATIVE FREE 10 ML: 5 INJECTION INTRAVENOUS at 22:07

## 2023-02-13 RX ADMIN — ENOXAPARIN SODIUM 40 MG: 100 INJECTION SUBCUTANEOUS at 22:07

## 2023-02-13 RX ADMIN — CEFEPIME 1000 MG: 1 INJECTION, POWDER, FOR SOLUTION INTRAMUSCULAR; INTRAVENOUS at 20:32

## 2023-02-13 ASSESSMENT — PAIN SCALES - WONG BAKER: WONGBAKER_NUMERICALRESPONSE: 0

## 2023-02-13 ASSESSMENT — PAIN - FUNCTIONAL ASSESSMENT: PAIN_FUNCTIONAL_ASSESSMENT: NONE - DENIES PAIN

## 2023-02-14 ENCOUNTER — APPOINTMENT (OUTPATIENT)
Dept: GENERAL RADIOLOGY | Age: 43
DRG: 698 | End: 2023-02-14
Payer: MEDICARE

## 2023-02-14 LAB
EKG ATRIAL RATE: 88 BPM
EKG P AXIS: 43 DEGREES
EKG P-R INTERVAL: 134 MS
EKG Q-T INTERVAL: 356 MS
EKG QRS DURATION: 70 MS
EKG QTC CALCULATION (BAZETT): 430 MS
EKG R AXIS: 3 DEGREES
EKG T AXIS: 32 DEGREES
EKG VENTRICULAR RATE: 88 BPM

## 2023-02-14 PROCEDURE — 6370000000 HC RX 637 (ALT 250 FOR IP): Performed by: NURSE PRACTITIONER

## 2023-02-14 PROCEDURE — 6360000002 HC RX W HCPCS: Performed by: NURSE PRACTITIONER

## 2023-02-14 PROCEDURE — 93010 ELECTROCARDIOGRAM REPORT: CPT | Performed by: INTERNAL MEDICINE

## 2023-02-14 PROCEDURE — 1200000000 HC SEMI PRIVATE

## 2023-02-14 PROCEDURE — 05HY33Z INSERTION OF INFUSION DEVICE INTO UPPER VEIN, PERCUTANEOUS APPROACH: ICD-10-PCS | Performed by: UROLOGY

## 2023-02-14 PROCEDURE — 2580000003 HC RX 258: Performed by: NURSE PRACTITIONER

## 2023-02-14 PROCEDURE — 74018 RADEX ABDOMEN 1 VIEW: CPT

## 2023-02-14 PROCEDURE — 51702 INSERT TEMP BLADDER CATH: CPT

## 2023-02-14 PROCEDURE — 96376 TX/PRO/DX INJ SAME DRUG ADON: CPT

## 2023-02-14 PROCEDURE — 99232 SBSQ HOSP IP/OBS MODERATE 35: CPT | Performed by: NURSE PRACTITIONER

## 2023-02-14 RX ORDER — LANSOPRAZOLE
30 KIT
Status: DISCONTINUED | OUTPATIENT
Start: 2023-02-14 | End: 2023-02-15 | Stop reason: HOSPADM

## 2023-02-14 RX ORDER — MIDODRINE HYDROCHLORIDE 5 MG/1
5 TABLET ORAL 3 TIMES DAILY
Status: DISCONTINUED | OUTPATIENT
Start: 2023-02-14 | End: 2023-02-15 | Stop reason: HOSPADM

## 2023-02-14 RX ORDER — SCOLOPAMINE TRANSDERMAL SYSTEM 1 MG/1
1 PATCH, EXTENDED RELEASE TRANSDERMAL
Status: DISCONTINUED | OUTPATIENT
Start: 2023-02-14 | End: 2023-02-15 | Stop reason: HOSPADM

## 2023-02-14 RX ORDER — GLYCOPYRROLATE 1 MG/1
1 TABLET ORAL 3 TIMES DAILY
Status: DISCONTINUED | OUTPATIENT
Start: 2023-02-14 | End: 2023-02-15 | Stop reason: HOSPADM

## 2023-02-14 RX ORDER — BACLOFEN 10 MG/1
10 TABLET ORAL 3 TIMES DAILY
Status: DISCONTINUED | OUTPATIENT
Start: 2023-02-14 | End: 2023-02-15 | Stop reason: HOSPADM

## 2023-02-14 RX ORDER — GABAPENTIN 300 MG/1
300 CAPSULE ORAL 3 TIMES DAILY
Status: DISCONTINUED | OUTPATIENT
Start: 2023-02-14 | End: 2023-02-15 | Stop reason: HOSPADM

## 2023-02-14 RX ORDER — SERTRALINE HYDROCHLORIDE 25 MG/1
25 TABLET, FILM COATED ORAL DAILY
Status: DISCONTINUED | OUTPATIENT
Start: 2023-02-14 | End: 2023-02-15 | Stop reason: HOSPADM

## 2023-02-14 RX ADMIN — BACLOFEN 10 MG: 10 TABLET ORAL at 21:17

## 2023-02-14 RX ADMIN — SERTRALINE 25 MG: 25 TABLET, FILM COATED ORAL at 09:21

## 2023-02-14 RX ADMIN — BACLOFEN 10 MG: 10 TABLET ORAL at 09:22

## 2023-02-14 RX ADMIN — SODIUM CHLORIDE, PRESERVATIVE FREE 10 ML: 5 INJECTION INTRAVENOUS at 21:11

## 2023-02-14 RX ADMIN — MIDODRINE HYDROCHLORIDE 5 MG: 5 TABLET ORAL at 09:21

## 2023-02-14 RX ADMIN — GLYCOPYRROLATE 1 MG: 1 TABLET ORAL at 21:17

## 2023-02-14 RX ADMIN — LANSOPRAZOLE 30 MG: KIT at 09:22

## 2023-02-14 RX ADMIN — MIDODRINE HYDROCHLORIDE 5 MG: 5 TABLET ORAL at 14:54

## 2023-02-14 RX ADMIN — CEFEPIME 1000 MG: 1 INJECTION, POWDER, FOR SOLUTION INTRAMUSCULAR; INTRAVENOUS at 21:10

## 2023-02-14 RX ADMIN — GABAPENTIN 300 MG: 300 CAPSULE ORAL at 09:22

## 2023-02-14 RX ADMIN — SODIUM CHLORIDE, PRESERVATIVE FREE 10 ML: 5 INJECTION INTRAVENOUS at 10:30

## 2023-02-14 RX ADMIN — GLYCOPYRROLATE 1 MG: 1 TABLET ORAL at 14:54

## 2023-02-14 RX ADMIN — GLYCOPYRROLATE 1 MG: 1 TABLET ORAL at 09:21

## 2023-02-14 RX ADMIN — MIDODRINE HYDROCHLORIDE 5 MG: 5 TABLET ORAL at 21:17

## 2023-02-14 RX ADMIN — CEFEPIME 1000 MG: 1 INJECTION, POWDER, FOR SOLUTION INTRAMUSCULAR; INTRAVENOUS at 10:30

## 2023-02-14 RX ADMIN — DOCUSATE SODIUM LIQUID 100 MG: 100 LIQUID ORAL at 09:22

## 2023-02-14 RX ADMIN — GABAPENTIN 300 MG: 300 CAPSULE ORAL at 21:17

## 2023-02-14 RX ADMIN — RIVAROXABAN 20 MG: 20 TABLET, FILM COATED ORAL at 09:21

## 2023-02-14 RX ADMIN — BACLOFEN 10 MG: 10 TABLET ORAL at 14:53

## 2023-02-14 RX ADMIN — GABAPENTIN 300 MG: 300 CAPSULE ORAL at 14:53

## 2023-02-14 NOTE — CARE COORDINATION
Spoke with Angeles Flores at [facility], patient is a long term resident and may return

## 2023-02-14 NOTE — CONSULTS
Geraldine Saeed, [facility]   Urology Consultation      Patient:  Jose Castillo  MRN: [id number]  YOB: 1980    CHIEF COMPLAINT:  Retained right ureteral stent; UTI    HISTORY OF PRESENT ILLNESS:   The patient is a 43 y.o. female who presents from [facility] due to hematuria noted in her suprapubic catheter. She has known neurogenic bladder with chronic SP tube and follows with Dr. Chika Collins. Last SP exchange was 1/22/23. She presented to [facility] ED abck on 12/3/2022 septic due to infected right ureteral stone. She went to OR emergently for cystoscopy and stent placement. She has not had her urolithiasis treated and KUB here shows stent still in position. She also has acute UTI with tl hematuria. She is on Xarelto for history of PE. Patient is non-verbal, history obtained from chart review. She currently is not septic and her Cr is normal at 0.5. Urine culture is in progress. She is currently on Cefepime. She shakes her head no when asked if she has any flank or abdominal pains. Patient's old records, notes and chart reviewed and summarized above.     Past Medical History:    Past Medical History:   Diagnosis Date    Aphasia     Aphasia     Contracture of joint, lower leg     Depressed     HTN (hypertension)     Hx MRSA infection resolved 1/2017    2 negative nasal screens 1/2017 (hx in heel in 2013)    Hydronephrosis     Movement disorder     tremor    Multiple sclerosis ([address]     Neurogenic bladder     Non-verbal learning disorder     Peripheral vascular disease ([address]     Polyneuropathy     Pressure ulcer     Coccyx, Heel    Pulmonary embolism (HCC)     Pulmonary infarction (HCC)     Quadriplegia (HCC)     Tachycardia     UTI (lower urinary tract infection)        Past Surgical History:    Past Surgical History:   Procedure Laterality Date    AMPUTATION Right     COLOSTOMY      CYSTOSCOPY Right 12/3/2022    CYSTOSCOPY URETERAL STENT INSERTION performed by Bryan Sena MD at [facility]. 78      IR NEPHROSTOMY PERCUTANEOUS RIGHT  9/27/2020    IR NEPHROSTOMY PERCUTANEOUS RIGHT 9/27/2020 Tal Leblanc MD [facility] SPECIAL PROCEDURES    LEG AMPUTATION THROUGH FEMUR         Medications:      Current Facility-Administered Medications:     baclofen (LIORESAL) tablet 10 mg, 10 mg, Per G Tube, TID, Myron Hinson APRN - NP, 10 mg at 02/14/23 6068    docusate (COLACE) 50 MG/5ML liquid 100 mg, 100 mg, Per G Tube, Daily, Myron Hinson APRN - NP, 100 mg at 02/14/23 1832    gabapentin (NEURONTIN) capsule 300 mg, 300 mg, Per G Tube, TID, LIT Cheng - NP, 300 mg at 02/14/23 5225    glycopyrrolate (ROBINUL) tablet 1 mg, 1 mg, PEG Tube, TID, Myron Hinson APRN - NP, 1 mg at 02/14/23 0921    midodrine (PROAMATINE) tablet 5 mg, 5 mg, PEG Tube, TID, Myron Hinson APRN - NP, 5 mg at 02/14/23 4312    rivaroxaban (XARELTO) tablet 20 mg, 20 mg, PEG Tube, Daily, LIT Cheng - NP, 20 mg at 02/14/23 3800    scopolamine (TRANSDERM-SCOP) transdermal patch 1 patch, 1 patch, TransDERmal, Q72H, LIT Cheng - NP, 1 patch at 02/14/23 4668    sertraline (ZOLOFT) tablet 25 mg, 25 mg, Per G Tube, Daily, LIT Cheng - NP, 25 mg at 02/14/23 5503    lansoprazole suspension SUSP 30 mg, 30 mg, Per G Tube, QAM AC, LIT Cheng - NP, 30 mg at 02/14/23 3688    sodium chloride flush 0.9 % injection 5-40 mL, 5-40 mL, IntraVENous, 2 times per day, LIT Rodriguez CNP, 10 mL at 02/14/23 1030    sodium chloride flush 0.9 % injection 5-40 mL, 5-40 mL, IntraVENous, PRN, LIT Rodriguez CNP    0.9 % sodium chloride infusion, , IntraVENous, PRN, LIT Rodriguez CNP    ondansetron (ZOFRAN-ODT) disintegrating tablet 4 mg, 4 mg, Oral, Q8H PRN **OR** ondansetron (ZOFRAN) injection 4 mg, 4 mg, IntraVENous, Q6H PRN, LIT Rodriguez CNP    acetaminophen (TYLENOL) tablet 650 mg, 650 mg, Oral, Q6H PRN **OR** acetaminophen (TYLENOL) suppository 650 mg, 650 mg, Rectal, Q6H PRN, LIT Rodriguez CNP    polyethylene glycol (GLYCOLAX) packet 17 g, 17 g, Oral, Daily PRN, LIT Rodriguez CNP    cefepime (MAXIPIME) 1,000 mg in sterile water 10 mL IV syringe, 1,000 mg, IntraVENous, Q12H, LIT Rodriguez CNP, 1,000 mg at 02/14/23 1030    Allergies:   No Known Allergies    Social History:   Social History     Socioeconomic History    Marital status: Single     Spouse name: Not on file    Number of children: Not on file    Years of education: Not on file    Highest education level: Not on file   Occupational History    Not on file   Tobacco Use    Smoking status: Never    Smokeless tobacco: Never   Vaping Use    Vaping Use: Never used   Substance and Sexual Activity    Alcohol use: No    Drug use: No    Sexual activity: Never   Other Topics Concern    Not on file   Social History Narrative    Not on file     Social Determinants of Health     Financial Resource Strain: Not on file   Food Insecurity: Not on file   Transportation Needs: Not on file   Physical Activity: Not on file   Stress: Not on file   Social Connections: Not on file   Intimate Partner Violence: Not on file   Housing Stability: Not on file       Family History:    Family History   Problem Relation Age of Onset    No Known Problems Mother     No Known Problems Father        Physical Exam:      This a 43 y.o. female   Vitals:    02/14/23 0711   BP: 105/74   Pulse: 88   Resp: 16   Temp: 98.3 °F (36.8 °C)   SpO2: 98%       Constitutional: Patient in no acute distress  Neuro: Alert. Responds to yes/no questions appropriately with shaking/nodding head  Psych: Unable to assess  Head: Atraumatic and normocephalic  Abdomen: Soft, non-tender, non-distended. CVA tenderness none.  Sp tube in place draining translucent red urine   Bladder: Non-tender and not distended  Skin: No rashes or bruising present    Labs:  Recent Labs     02/13/23  1858   WBC 9.4 HGB 12.7   HCT 42.3   MCV 86.9        Recent Labs     02/13/23  1858   *   K 4.1      CO2 22   BUN 12   CREATININE 0.30*       Recent Labs     02/13/23  1901   COLORU Red*   PHUR >9.0*   WBCUA TOO NUMEROUS TO COUNT   RBCUA TOO NUMEROUS TO COUNT   BACTERIA FEW*   SPECGRAV 1.016   LEUKOCYTESUR LARGE*   UROBILINOGEN Normal   BILIRUBINUR NEGATIVE       Culture results:  Ucx pending    -----------------------------------------------------------------  Imaging Results:  XR ABDOMEN (KUB) (SINGLE AP VIEW)    Result Date: 2/14/2023  EXAMINATION: ONE SUPINE XRAY VIEW(S) OF THE ABDOMEN 2/14/2023 7:52 am COMPARISON: 01/05/2023 HISTORY: ORDERING SYSTEM PROVIDED HISTORY: verift if ureteral stent in still in place/pt nonverbal TECHNOLOGIST PROVIDED HISTORY: verift if ureteral stent in still in place/pt nonverbal FINDINGS: Right ureteral stent is in place. Multiple renal calculi are detected. . There is a nonspecific bowel gas pattern. No acute bony abnormality detected. Right ureteral stent is again noted.            Assessment and Plan   Impression:  44 yo female    problem list -   -UTI  -Right nephrolithiasis  -Retained right ureteral stent in position  -Neurogenic Bladder  -Chronic Suprapubic catheter   -Gross hematuria    Patient Active Problem List   Diagnosis    Aphasia    Multiple sclerosis ([address]    Nonverbal    Gastrostomy tube dependent ([address]    Dislodged gastrostomy tube    Neurogenic bladder    Hemorrhagic cystitis    Hypokalemia    Urinary tract infection associated with indwelling urethral catheter (HCC)    Tachycardia    Gross hematuria    Lactic acidosis    Chronic indwelling Davies catheter    Peripheral vascular disease (HCC)    Recurrent UTI (urinary tract infection)    History of pulmonary embolism    Hypertension    Altered mental status    Toxic metabolic encephalopathy    ESBL E. coli carrier    Septic shock ([address]    Status post colostomy ([address]    Lower paraplegia ([address]    Sepsis ([address]    Pneumonia due to organism    Extended spectrum beta lactamase (ESBL) resistance    Abdominal pain    Ileus (HCC)    Decubitus ulcer of coccygeal region, stage 4 (HCC)    Proteus mirabilis infection    Lactate blood increase    E-coli UTI    Sepsis due to Escherichia coli ([address]    E coli bacteremia    Nephrostomy tube displaced ([address]    Pyelonephritis    Obstructive uropathy    Bandemia    SIRS (systemic inflammatory response syndrome) (HCC)    Gram-neg septicemia (HCC)    Pneumatosis coli    UTI (urinary tract infection)       Plan:   -Maintain SP catheter  -Continue Cefepime, Follow-up urine and blood cultures for tailoring appropriate antibiotics  -OK to resume xarelto. Please page urology if catheter is not draining  -IV hydration/fluids  -Creatinine at baseline   -Patient needs stones treated and right stent exchange as soon as possible. We will touch base with [facility] urology and [facility] to assure she gets appropriate and quick follow up  -Patient may benefit from pre-operative antibiotics due to history of recurrent UTIs and MRDO/ESBL UTIs in the past.    Thank you for involving us in the care of Rachell. Should you have any questions, please do not hesitate to contact us at any time.     Nishant Bunch PA-C  Urology Service   1:56 PM 2/14/2023

## 2023-02-14 NOTE — ED NOTES
The following labs were labeled with appropriate pt sticker and tubed to lab:     [] Blue     [] Lavender   [] on ice  [x] Green/yellow  [] Green/black [] on ice  [] Johnella Crass  [] on ice  [] Yellow  [] Red  [] Type/ Screen  [] ABG  [] VBG    [] COVID-19 swab    [] Rapid  [] PCR  [] Flu swab  [] Peds Viral Panel     [] Urine Sample  [] Fecal Sample  [] Pelvic Cultures  [] Blood Cultures  [] X 2  [] STREP Cultures         Martínez Summers RN  02/13/23 2005

## 2023-02-14 NOTE — ED NOTES
Report given to [facility] floor RN; all questions addressed.       Nia Xiong RN  02/13/23 2053

## 2023-02-14 NOTE — H&P
[facility]  Office: [address], DO, Eloinarea Navi, DO, Jessica Cunningham, DO, Julien Cobb Blood, DO, Alberto Mckeon MD, Fernanda Alicea MD, Cathy Owusu MD, Emil Torres MD,  Tuan Ozuna MD, Lianne Naranjo MD, Landen Oneil, DO, Jeanne Amador MD,  Jane Miller MD, Stacey Harrison MD, Sam Jones DO, Yung Blanco MD, Yulissa Grossman MD, Fide Larose DO, Joseph Garcia MD, Jm Keen MD, Shon Farrell MD, Adilia Ramirez MD, Jacquie Sharpe DO, Miladis Franks MD, Cris Navarro MD, Isaiah Garza, [facility],  Stas Perez, [facility], Bela Esparza, [facility], Jarrett Cortes, CNP,  Stuart De La Rosa, [facility], Emily Almonte, CNP, Elizabeth Castrejon, CNP, Claudeen Quarry, CNP, Leonor Lazo, CNP, Compa Saenz, CNP, Rowan Oppenheim, [facility], Ashlee Hung, CNS, Evita Banda, CNP, Missy Stuart, [address]    HISTORY AND PHYSICAL EXAMINATION            Date:   2/14/2023  Patient name:  Rhianna Oglesby  Date of admission:  2/13/2023  6:43 PM  MRN:   [id number]  Account:  [de-identified]  YOB: 1980  PCP:    Ciara Parra MD  Room:   7806/6470-04  Code Status:    Full Code    Chief Complaint:     Chief Complaint   Patient presents with    Hematuria     From [facility]       History Obtained From:     patient, electronic medical record. -- Patient is nonverbal - only answers questions with yes or no via head movements. History of Present Illness:     Rhianna Oglesby is a 43 y.o. Non- / non  female who presents with Hematuria (From [facility])   and is admitted to the hospital for the management of UTI (urinary tract infection). 69-year-old female with a past medical history listed below who presents to the emergency department for evaluation of worsening mentation/change in patient urine.   She has a significant urologic history including recent admissions for MDRO UTIs in the past.  Has required ICU admissions previously. Work-up in the emergency department included chest x-ray, negative for acute findings. UA did demonstrate a pH of greater than 9.0 with 2+ protein, turbidity and red color with TNTC WBCs, few bacteria and moderate hemoglobin and large leuk esterase. Follow-up abdomen x-ray was ordered based on recent stent placement, demonstrating patent stent    Has a neurogenic bladder and MS. Suprapubic catheter in place, most recently changed out on 1/22/2023. According to nursing home staff they noticed that patient's urine was dark in color. Patient is on Xarelto as well as a cranberry concentrate. Patient is at mental baseline for her. Past Medical History:     Past Medical History:   Diagnosis Date    Aphasia     Aphasia     Contracture of joint, lower leg     Depressed     HTN (hypertension)     Hx MRSA infection resolved 1/2017    2 negative nasal screens 1/2017 (hx in heel in 2013)    Hydronephrosis     Movement disorder     tremor    Multiple sclerosis ([address]     Neurogenic bladder     Non-verbal learning disorder     Peripheral vascular disease ([address]     Polyneuropathy     Pressure ulcer     Coccyx, Heel    Pulmonary embolism (HCC)     Pulmonary infarction (HCC)     Quadriplegia (HCC)     Tachycardia     UTI (lower urinary tract infection)         Past Surgical History:     Past Surgical History:   Procedure Laterality Date    AMPUTATION Right     COLOSTOMY      CYSTOSCOPY Right 12/3/2022    CYSTOSCOPY URETERAL STENT INSERTION performed by Oma Fields MD at [facility]. 78      IR NEPHROSTOMY PERCUTANEOUS RIGHT  9/27/2020    IR NEPHROSTOMY PERCUTANEOUS RIGHT 9/27/2020 Arminda Ziegler MD [facility]          Medications Prior to Admission:     Prior to Admission medications    Medication Sig Start Date End Date Taking?  Authorizing Provider   midodrine (PROAMATINE) 5 MG tablet Take 1 tablet by mouth in the morning, at noon, and at bedtime 12/11/22   Neetu Anderson MD   miconazole (MICOTIN) 2 % powder Apply topically 2 times daily. 1/25/20   Cindy Espinoza MD   D-Mannose 500 MG CAPS Take 3 capsules by mouth three times daily    Historical Provider, MD   atropine 1 % ophthalmic solution 2-4 drops every 4 hours as needed    Historical Provider, MD   rivaroxaban (XARELTO) 20 MG TABS tablet Take 1 tablet by mouth daily 5/24/19   Gretchen Hamilton MD   sertraline (ZOLOFT) 25 MG tablet 1 tablet by Per G Tube route daily 5/24/19   Gretchen Hamilton MD   docusate (COLACE) 50 MG/5ML liquid Take 10 mLs by mouth daily 5/24/19   Gretchen Hamilton MD   Multiple Vitamins-Minerals (CENTRUM/CERTA-DERICK WITH MINERALS ORAL) solution 15 mLs by Per G Tube route daily 5/24/19   Gretchen Hamilton MD   lansoprazole (PREVACID SOLUTAB) 30 MG disintegrating tablet 1 tablet by Per G Tube route every morning (before breakfast) 5/24/19   Gretchen Hamilton MD   Cranberry 250 MG CAPS Take 250 mg by mouth 2 times daily    Historical Provider, MD   Scopolamine Base (SCOPOLAMINE TD) Place 1 mg onto the skin every 72 hours     Historical Provider, MD   baclofen (LIORESAL) 10 MG tablet 10 mg by Per G Tube route 3 times daily Crush into G tube     Historical Provider, MD   gabapentin (NEURONTIN) 300 MG capsule 300 mg by Per G Tube route 3 times daily    Historical Provider, MD   medroxyPROGESTERone (DEPO-PROVERA) 150 MG/ML injection Inject 150 mg into the muscle every 3 months On the 11th     Historical Provider, MD   acetaminophen (TYLENOL) 80 MG/0.8ML suspension 650 mg by Per G Tube route every 6 hours as needed for Fever or Pain     Historical Provider, MD   glycopyrrolate (ROBINUL) 1 MG tablet Take 1 mg by mouth 3 times daily     Historical Provider, MD        Allergies:     Patient has no known allergies. Social History:     Tobacco:    reports that she has never smoked.  She has never used smokeless tobacco.  Alcohol:      reports no history of alcohol use. Drug Use:  reports no history of drug use. Family History:     Family History   Problem Relation Age of Onset    No Known Problems Mother     No Known Problems Father        Review of Systems:     ROS limited secondary to patient cognitive disability and nonverbal.     GI: no abdominal pain      Physical Exam:   /74   Pulse 88   Temp 98.3 °F (36.8 °C) (Oral)   Resp 16   Ht 5' (1.524 m)   Wt 199 lb 15.3 oz (90.7 kg)   SpO2 98%   BMI 39.05 kg/m²   Temp (24hrs), Av.6 °F (37 °C), Min:98.1 °F (36.7 °C), Max:99.3 °F (37.4 °C)    No results for input(s): POCGLU in the last 72 hours. Intake/Output Summary (Last 24 hours) at 2023 1433  Last data filed at 2023 0900  Gross per 24 hour   Intake 120 ml   Output 820 ml   Net -700 ml       General Appearance: alert and in no acute distress  Head: normocephalic, atraumatic  Eye: no icterus, redness, pupils equal and reactive, conjunctiva clear  Ear: normal external ear, no discharge, hearing intact  Nose: no drainage noted  Mouth: mucous membranes moist  Neck: supple, no carotid bruits, thyroid not palpable  Lungs: Bilateral equal air entry, clear to ausculation, no wheezing, rales or rhonchi, normal effort  Cardiovascular: normal rate, regular rhythm, no murmur, gallop, rub  Abdomen: Soft, nontender, nondistended, normal bowel sounds, no hepatomegaly or splenomegaly. PEG tube on left. Colostomy bag in place with stool in bag. Suprapubic catheter with hypergranulation tissue, no discharge or erythema noted. Neurologic: There are no new focal motor or sensory deficits. Nonverbal.   Skin: No rashes, bruising or bleeding on exposed skin area. Stage IV coccyx ulceration. Extremities: peripheral pulses palpable, no pedal edema or calf pain with palpation. Right AKA.    Psych: pleasant affect    Investigations:      Laboratory Testing:  Recent Results (from the past 24 hour(s))   Culture, Blood 1    Collection Time: 23  6:00 PM Specimen: Other   Result Value Ref Range    Specimen Description Unknown HIDE     Special Requests RT ACUB 10ML     Culture NO GROWTH 12 HOURS    EKG 12 Lead    Collection Time: 02/13/23  6:48 PM   Result Value Ref Range    Ventricular Rate 88 BPM    Atrial Rate 88 BPM    P-R Interval 134 ms    QRS Duration 70 ms    Q-T Interval 356 ms    QTc Calculation (Bazett) 430 ms    P Axis 43 degrees    R Axis 3 degrees    T Axis 32 degrees   CBC with Auto Differential    Collection Time: 02/13/23  6:58 PM   Result Value Ref Range    WBC 9.4 3.5 - 11.3 k/uL    RBC 4.87 3.95 - 5.11 m/uL    Hemoglobin 12.7 11.9 - 15.1 g/dL    Hematocrit 42.3 36.3 - 47.1 %    MCV 86.9 82.6 - 102.9 fL    MCH 26.1 25.2 - 33.5 pg    MCHC 30.0 28.4 - 34.8 g/dL    RDW 15.5 (H) 11.8 - 14.4 %    Platelets 042 309 - 905 k/uL    MPV 9.6 8.1 - 13.5 fL    NRBC Automated 0.0 0.0 per 100 WBC    Seg Neutrophils 60 36 - 65 %    Lymphocytes 27 24 - 43 %    Monocytes 8 3 - 12 %    Eosinophils % 5 (H) 1 - 4 %    Basophils 0 0 - 2 %    Immature Granulocytes 0 0 %    Segs Absolute 5.53 1.50 - 8.10 k/uL    Absolute Lymph # 2.50 1.10 - 3.70 k/uL    Absolute Mono # 0.78 0.10 - 1.20 k/uL    Absolute Eos # 0.49 (H) 0.00 - 0.44 k/uL    Basophils Absolute 0.03 0.00 - 0.20 k/uL    Absolute Immature Granulocyte <0.03 0.00 - 0.30 k/uL    RBC Morphology ANISOCYTOSIS PRESENT    Comprehensive Metabolic Panel    Collection Time: 02/13/23  6:58 PM   Result Value Ref Range    Glucose 84 70 - 99 mg/dL    BUN 12 6 - 20 mg/dL    Creatinine 0.30 (L) 0.50 - 0.90 mg/dL    Est, Glom Filt Rate >60 >60 mL/min/1.73m2    Calcium 9.6 8.6 - 10.4 mg/dL    Sodium 133 (L) 135 - 144 mmol/L    Potassium 4.1 3.7 - 5.3 mmol/L    Chloride 100 98 - 107 mmol/L    CO2 22 20 - 31 mmol/L    Anion Gap 11 9 - 17 mmol/L    Alkaline Phosphatase 70 35 - 104 U/L    ALT 14 5 - 33 U/L    AST 16 <32 U/L    Total Bilirubin 0.2 (L) 0.3 - 1.2 mg/dL    Total Protein 8.9 (H) 6.4 - 8.3 g/dL    Albumin 3.8 3.5 - 5.2 g/dL    Albumin/Globulin Ratio 0.7 (L) 1.0 - 2.5   Magnesium    Collection Time: 02/13/23  6:58 PM   Result Value Ref Range    Magnesium 2.2 1.6 - 2.6 mg/dL   Troponin    Collection Time: 02/13/23  6:58 PM   Result Value Ref Range    Troponin, High Sensitivity 14 0 - 14 ng/L   Lactate, Sepsis    Collection Time: 02/13/23  6:58 PM   Result Value Ref Range    Lactic Acid, Sepsis, Whole Blood 1.0 0.5 - 1.9 mmol/L   Protime-INR    Collection Time: 02/13/23  6:58 PM   Result Value Ref Range    Protime 11.6 9.1 - 12.3 sec    INR 1.1    APTT    Collection Time: 02/13/23  6:58 PM   Result Value Ref Range    PTT 28.0 20.5 - 30.5 sec   Urinalysis with Microscopic    Collection Time: 02/13/23  7:01 PM   Result Value Ref Range    Color, UA Red (A) Yellow    Turbidity UA Turbid (A) Clear    Glucose, Ur NEGATIVE NEGATIVE    Bilirubin Urine NEGATIVE NEGATIVE    Ketones, Urine NEGATIVE NEGATIVE    Specific Gravity, UA 1.016 1.005 - 1.030    Urine Hgb MODERATE (A) NEGATIVE    pH, UA >9.0 (H) 5.0 - 8.0    Protein, UA 2+ (A) NEGATIVE    Urobilinogen, Urine Normal Normal    Nitrite, Urine POSITIVE (A) NEGATIVE    Leukocyte Esterase, Urine LARGE (A) NEGATIVE    WBC, UA TOO NUMEROUS TO COUNT 0 - 5 /HPF    RBC, UA TOO NUMEROUS TO COUNT 0 - 4 /HPF    Casts UA  0 - 8 /LPF     10 TO 20 HYALINE Reference range defined for non-centrifuged specimen. Epithelial Cells UA 2 TO 5 0 - 5 /HPF    Bacteria, UA FEW (A) None   Culture, Blood 1    Collection Time: 02/13/23  7:05 PM    Specimen: Blood   Result Value Ref Range    Specimen Description . BLOOD     Special Requests L HAND 10ML     Culture NO GROWTH 12 HOURS    Troponin    Collection Time: 02/13/23  7:57 PM   Result Value Ref Range    Troponin, High Sensitivity 10 0 - 14 ng/L       Imaging/Diagnostics:  XR CHEST PORTABLE    Result Date: 2/13/2023  Negative limited portable chest radiograph.        Assessment :      Hospital Problems             Last Modified POA    * (Principal) UTI (urinary tract infection) 2/13/2023 Yes    Aphasia 2/14/2023 Yes    Multiple sclerosis ([facility] 2/14/2023 Yes    Gastrostomy tube dependent ([facility] 2/14/2023 Yes    Neurogenic bladder 2/14/2023 Yes    Hemorrhagic cystitis 2/14/2023 Yes    Chronic indwelling Davies catheter 2/14/2023 Yes    History of pulmonary embolism 2/14/2023 Yes    Hypertension 2/14/2023 Yes    ESBL E. coli carrier 2/14/2023 Yes    Status post colostomy ([facility] 2/14/2023 Yes    Extended spectrum beta lactamase (ESBL) resistance 2/14/2023 Yes    Decubitus ulcer of coccygeal region, stage 4 ([facility] 2/14/2023 Yes       Plan:     Patient status inpatient in the [facility]    UTI with chronic suprapubic cath/history of cystoscopy with stent placement from December 2022/history of hemorrhagic cystitis: With multiple MDRO bacteria previously patient continues on cefepime. Waiting for culture to speciate. Check ABD x-ray to verify stent placement/absence. Consult urology for further assistance. She follows with [facility]  Coccyx ulceration: Stage IV, present on admission  Dysphagia: S/p G-tube  History of MS  Primary hypertension: Her home medications have been restarted  History of pulmonary embolism: Xarelto restarted  History of colostomy  GI/DVT prophylaxis: Protonix, Xarelto    Consultations:   IP CONSULT TO HOSPITALIST  IP CONSULT TO UROLOGY      Patient is admitted as inpatient status because of co-morbidities listed above, severity of signs and symptoms as outlined, requirement for current medical therapies and most importantly because of direct risk to patient if care not provided in a hospital setting. Expected length of stay > 48 hours.     LIT Ballard NP  2/14/2023  2:33 PM    Copy sent to Dr. Lynnette Brock MD

## 2023-02-14 NOTE — ED PROVIDER NOTES
[address]  ED  Emergency Department Encounter  Emergency Medicine Resident     Pt Name:Emily Wing  MRN: [id number]  [place] 1980  Date of evaluation: 2/13/23  PCP:  Sharmin Wells MD  Note Started: 7:05 PM EST      CHIEF COMPLAINT       Chief Complaint   Patient presents with    Hematuria     From [facility]       HISTORY OF PRESENT ILLNESS  (Location/Symptom, Timing/Onset, Context/Setting, Quality, Duration, Modifying Factors, Severity.)      Suzie Romo is a 43 y.o. female who presents from [address] [place] [address] as she had dark urine. Past medical history is known for PE, pneumonia as well as urosepsis. Patient is nonverbal.  Has a neurogenic bladder and MS. According to nursing home staff they noticed that patient's urine was dark in color. Is on Xarelto as well as a cranberry concentrate. Patient is at mental baseline for her. Does have a suprapubic catheter as well. PAST MEDICAL / SURGICAL / SOCIAL / FAMILY HISTORY      has a past medical history of Aphasia, Aphasia, Contracture of joint, lower leg, Depressed, HTN (hypertension), Hx MRSA infection, Hydronephrosis, Movement disorder, Multiple sclerosis (HCC), Neurogenic bladder, Non-verbal learning disorder, Peripheral vascular disease ([address] Polyneuropathy, Pressure ulcer, Pulmonary embolism ([address] Pulmonary infarction ([address] Quadriplegia ([address] Tachycardia, and UTI (lower urinary tract infection). has a past surgical history that includes Gastrostomy tube placement; colostomy; amputation (Right); Leg amputation through femur; IR GUIDED NEPHROSTOMY CATH PLACEMENT RIGHT (9/27/2020); and Cystoscopy (Right, 12/3/2022).       Social History     Socioeconomic History    Marital status: Single     Spouse name: Not on file    Number of children: Not on file    Years of education: Not on file    Highest education level: Not on file   Occupational History    Not on file   Tobacco Use    Smoking status: Never    Smokeless tobacco: Never   Vaping Use    Vaping Use: Never used   Substance and Sexual Activity    Alcohol use: No    Drug use: No    Sexual activity: Never   Other Topics Concern    Not on file   Social History Narrative    Not on file     Social Determinants of Health     Financial Resource Strain: Not on file   Food Insecurity: Not on file   Transportation Needs: Not on file   Physical Activity: Not on file   Stress: Not on file   Social Connections: Not on file   Intimate Partner Violence: Not on file   Housing Stability: Not on file       Family History   Problem Relation Age of Onset    No Known Problems Mother     No Known Problems Father        Allergies:  Patient has no known allergies. Home Medications:  Prior to Admission medications    Medication Sig Start Date End Date Taking? Authorizing Provider   midodrine (PROAMATINE) 5 MG tablet Take 1 tablet by mouth in the morning, at noon, and at bedtime 12/11/22   Amber Ruggiero MD   miconazole (MICOTIN) 2 % powder Apply topically 2 times daily.  1/25/20   Rommel Adams MD   D-Mannose 500 MG CAPS Take 3 capsules by mouth three times daily    Historical Provider, MD   atropine 1 % ophthalmic solution 2-4 drops every 4 hours as needed    Historical Provider, MD   rivaroxaban (XARELTO) 20 MG TABS tablet Take 1 tablet by mouth daily 5/24/19   Jerome Ferro MD   sertraline (ZOLOFT) 25 MG tablet 1 tablet by Per G Tube route daily 5/24/19   Jerome Ferro MD   docusate (COLACE) 50 MG/5ML liquid Take 10 mLs by mouth daily 5/24/19   Jerome Ferro MD   Multiple Vitamins-Minerals (CENTRUM/CERTA-DERICK WITH MINERALS ORAL) solution 15 mLs by Per G Tube route daily 5/24/19   Jerome Ferro MD   lansoprazole (PREVACID SOLUTAB) 30 MG disintegrating tablet 1 tablet by Per G Tube route every morning (before breakfast) 5/24/19   Jerome Ferro MD   Cranberry 250 MG CAPS Take 250 mg by mouth 2 times daily    Historical Provider, MD   Scopolamine Base (SCOPOLAMINE TD) Place 1 mg onto the skin every 72 hours     Historical Provider, MD   baclofen (LIORESAL) 10 MG tablet 10 mg by Per G Tube route 3 times daily Crush into G tube     Historical Provider, MD   gabapentin (NEURONTIN) 300 MG capsule 300 mg by Per G Tube route 3 times daily    Historical Provider, MD   medroxyPROGESTERone (DEPO-PROVERA) 150 MG/ML injection Inject 150 mg into the muscle every 3 months On the 11th     Historical Provider, MD   acetaminophen (TYLENOL) 80 MG/0.8ML suspension 650 mg by Per G Tube route every 6 hours as needed for Fever or Pain     Historical Provider, MD   glycopyrrolate (ROBINUL) 1 MG tablet Take 1 mg by mouth 3 times daily     Historical Provider, MD         REVIEW OF SYSTEMS       Review of Systems   Unable to perform ROS: Patient nonverbal     PHYSICAL EXAM      INITIAL VITALS:   /84   Pulse 90   Temp 99.3 °F (37.4 °C) (Oral)   Resp 15   Wt 200 lb (90.7 kg)   SpO2 93%   BMI 39.06 kg/m²     Physical Exam  Constitutional:       General: She is not in acute distress. Appearance: She is not ill-appearing, toxic-appearing or diaphoretic. HENT:      Head: Normocephalic and atraumatic. Cardiovascular:      Rate and Rhythm: Normal rate and regular rhythm. Heart sounds: No murmur heard. No friction rub. No gallop. Pulmonary:      Effort: No respiratory distress. Breath sounds: No stridor. No wheezing, rhonchi or rales. Chest:      Chest wall: No tenderness. Abdominal:      General: There is no distension. Palpations: There is no mass. Tenderness: There is no abdominal tenderness. There is no guarding or rebound. Hernia: No hernia is present.       Comments: Suprapubic catheter in place, no surrounding erythema there is some mild purulent looking drainage, ostomy site appears intact with no surrounding erythema purulent discharge, feeding tube intact with no surrounding purulent discharge   Musculoskeletal:         General: No swelling, tenderness, deformity or signs of injury. Right lower leg: No edema. Left lower leg: No edema. Skin:     Capillary Refill: Capillary refill takes less than 2 seconds. Coloration: Skin is not jaundiced or pale. Findings: No bruising, erythema, lesion or rash. Neurological:      Mental Status: She is oriented to person, place, and time. DDX/DIAGNOSTIC RESULTS / EMERGENCY DEPARTMENT COURSE / MDM     Medical Decision Making  77-year-old female present with hematuria. Is nonverbal will obtain broad work-up. There is a grape juice light-colored urine in the Davies bag. Will obtain blood cultures as well as urinalysis, chest x-ray EKG troponins. Will reassess. Differential diagnosis is propria, hematuria, change in color secondary to diet or medication, arrhythmia, pneumonia, ACS, electrolyte abnormality    Amount and/or Complexity of Data Reviewed  External Data Reviewed: labs and notes. Labs: ordered. Decision-making details documented in ED Course. Radiology: ordered. ECG/medicine tests: ordered. Risk  Prescription drug management. Decision regarding hospitalization. Risk Details: No findings of acute septic shock. Patient otherwise hemodynamically stable. No electrolyte abnormality. No findings of ACS. Findings of UTI with gross hematuria. Will admit patient to [facility] for further management. We will treat with cefepime given history of Pseudomonas that was receptive to this.         EKG  EKG Interpretation    Interpreted by me    Rhythm: normal sinus   Rate: normal  Axis: normal  Ectopy: none  Conduction: normal  ST Segments: no acute change  T Waves: no acute change  Q Waves: none    Clinical Impression: no acute changes and normal EKG    All EKG's are interpreted by the Emergency Department Physician who either signs or Co-signs this chart in the absence of a cardiologist.    EMERGENCY DEPARTMENT COURSE:  Sepsis Times and Checklist  Vital Signs: BP: 107/84  Heart Rate: 90 Resp: 15  Temp: 99.3 °F (37.4 °C) SpO2: 93 %  SIRS (>2) Non Pregnant       Temp > 38.3C or < 36C   HR > 90   RR > 20   WBC > 12 or < 4 or >10% bands  SIRS (>2) Pregnant 20 weeks until 3 days postpartum   Temp > 38C or <36C   HR >110   RR > 24   WBC >15 or < 4 or >10% bands   SIRS (>2) and confirmed or suspected source of infection = Sepsis  Is Sepsis due to likely bacterial infection?: Yes    Sepsis Identified:  Date: 2/13/23   Time: 1900  Sepsis Orders:   CBC(required): Yes   CMP: Yes   PT/PTT: Yes   Blood Cultures x2(required): Yes   Urinalysis and Urine Culture: Yes   Lactate(required): Yes   Antibiotics Given (within 3 hours of sepsis identification, after blood cultures):  Yes    (If unable to obtain IV access for IV antibiotics within 3 hours of identification of sepsis, IM antibiotics is acceptable.)              If lactate >2.0 MUST repeat within 6 hours      IV Fluid Bolus:  Is lactate > 4.0:  No    ED Course as of 02/13/23 2050 Mon Feb 13, 2023 1906 EKG Interpretation   Interpreted by Nuvia Mock DO    Rhythm: normal sinus   Rate: normal  Axis: normal  Ectopy: none  Conduction: normal  ST Segments: normal  T Waves: normal  Q Waves: none    Clinical Impression: no acute changes normal EKG     [WK]   1919 No lactate normal WBC count [WK]   1929 Lactic Acid, Sepsis, Whole Blood: 1.0 [MS]   2020 Patient with significant hematuria as well as UTI. We will consult [facility] for admission. [MS]   2043 The pt has significant hematuria with prior resistance thought no septic at this time the HR has climbed on pt with active bleeding and not a good candidate for observation service given multiple comorbidity. [WK]      ED Course User Index  [MS] Teresa Larson DO  [WK] Nuvia Mock DO       PROCEDURES:      CONSULTS:  IP CONSULT TO HOSPITALIST    CRITICAL CARE:  There was significant risk of life threatening deterioration of patient's condition requiring my direct management.  Critical care time minutes, excluding any documented procedures. FINAL IMPRESSION      1. Acute cystitis with hematuria    2. Gross hematuria          DISPOSITION / PLAN     DISPOSITION Admitted 02/13/2023 08:44:02 PM      PATIENT REFERRED TO:  No follow-up provider specified.     DISCHARGE MEDICATIONS:  New Prescriptions    No medications on file       Emmanuelle Albarado DO  Emergency Medicine Resident    (Please note that portions of thisnote were completed with a voice recognition program.  Efforts were made to edit the dictations but occasionally words are mis-transcribed.)        Galilea Valles DO  Resident  02/13/23 2051

## 2023-02-14 NOTE — PLAN OF CARE
Problem: Skin/Tissue Integrity  Goal: Absence of new skin breakdown  Description: 1. Monitor for areas of redness and/or skin breakdown  2. Assess vascular access sites hourly  3. Every 4-6 hours minimum:  Change oxygen saturation probe site  4. Every 4-6 hours:  If on nasal continuous positive airway pressure, respiratory therapy assess nares and determine need for appliance change or resting period.   2/14/2023 1020 by Kelli Cox RN  Outcome: Progressing  2/13/2023 2152 by Karen Castellanos RN  Outcome: Progressing     Problem: Safety - Adult  Goal: Free from fall injury  2/14/2023 1020 by Kelli Cox RN  Outcome: Progressing  2/13/2023 2152 by Karen Castellanos RN  Outcome: Progressing

## 2023-02-14 NOTE — CARE COORDINATION
Case Management Assessment  Initial Evaluation    Date/Time of Evaluation: 2/14/2023 10:23 AM  Assessment Completed by: Nunu Watson RN    If patient is discharged prior to next notation, then this note serves as note for discharge by case management. Patient Name: [employer]                   YOB: 1980  Diagnosis: UTI (urinary tract infection) [N39.0]  Gross hematuria [R31.0]  Acute cystitis with hematuria [N30.01]                   Date / Time: 2/13/2023  6:43 PM    Patient Admission Status: Observation   Readmission Risk (Low < 19, Mod (19-27), High > 27): Readmission Risk Score: 13.4    Current PCP: Avi Alcaraz MD  PCP verified by CM? Chart Reviewed: Yes      History Provided by: Child/Family (mother Jose Alejandro Alejo over the phone)  Patient Orientation:      Patient Cognition:      Hospitalization in the last 30 days (Readmission):  No    If yes, Readmission Assessment in  Navigator will be completed. Advance Directives:      Code Status: Full Code   Patient's Primary Decision Maker is:      Primary Decision Maker (Active): Remy Thomas - Parent, Legal Guardian - [phone]    Discharge Planning:    Patient lives with: Other (Comment) (ECF) Type of Home: Long-Term Care  Primary Care Giver: Other (Comment) (ECF)  Patient Support Systems include: Parent, Other (Comment) (ECF staff)   Current Financial resources:    Current community resources:    Current services prior to admission: Extended Care Facility            Current DME:              Type of Home Care services:  None    ADLS  Prior functional level:    Current functional level:      PT AM-PAC:   /24  OT AM-PAC:   /24    Family can provide assistance at DC: Would you like Case Management to discuss the discharge plan with any other family members/significant others, and if so, who?     Plans to Return to Present Housing: Yes  Other Identified Issues/Barriers to RETURNING to current housing: IV antibiotics  Potential Assistance needed at discharge: Transportation            Potential DME:    Patient expects to discharge to: Long-term care  Plan for transportation at discharge:      Financial    Payor: [address] [place] [address] [address] / Plan: MEDICARE PART A AND B / Product Type: *No Product type* /     Does insurance require precert for SNF: No    Potential assistance Purchasing Medications: No  Meds-to-Beds request:        [address], [address] [facility] [phone] - F [phone]  [address] [place] [address]  Phone: [phone] Fax: [phone]      Notes:    Factors facilitating achievement of predicted outcomes:     Barriers to discharge: Additional Case Management Notes: return to [facility], left VM with Vaughn Riggs in admissions    The Plan for Transition of Care is related to the following treatment goals of UTI (urinary tract infection) [N39.0]  Gross hematuria [R31.0]  Acute cystitis with hematuria [T36.24]    IF APPLICABLE: The Patient and/or patient representative Miles Gillette and her family were provided with a choice of provider and agrees with the discharge plan. Freedom of choice list with basic dialogue that supports the patient's individualized plan of care/goals and shares the quality data associated with the providers was provided to:     Patient Representative Name:       The Patient and/or Patient Representative Agree with the Discharge Plan?       Sis Lozoya RN  Case Management Department  Ph: [phone] Fax:

## 2023-02-14 NOTE — PROGRESS NOTES
Comprehensive Nutrition Assessment    Type and Reason for Visit:  Positive Nutrition Screen    Nutrition Recommendations/Plan:   Recommend 2.0 Calorie (TwoCal HN) formula with goal rate of 65mL/hr x 14 hrs nocturnal to provide 1820kcal,76gmsPRO/day   Continue to monitor TF initiation/tolerance, weights, labs and follow up     Malnutrition Assessment:  Malnutrition Status:  No malnutrition (02/14/23 5077)    Context:  Acute Illness     Findings of the 6 clinical characteristics of malnutrition:  Energy Intake:  No significant decrease in energy intake  Weight Loss:  No significant weight loss     Body Fat Loss:  No significant body fat loss     Muscle Mass Loss:  No significant muscle mass loss    Fluid Accumulation:  No significant fluid accumulation     Strength:  Not Performed    Nutrition Assessment:    Chart reviewed for a positive nutrition screen related to patient NPO, dependent on enteral support for all nutrition and hydration needs. Patient admitted related to UTI. Patient is a resident at a [facility]. Patient receives Nutren 2.0 at 65mL/hr x 14 hours (on 7pm-off 9am), per ECF orders (1820kcal, 76gmsPRO, 639mL free H2O in formula). Nutrition Related Findings:    Meds/Labs reviewed Wound Type:  (Colostomy; Gastrostomy tube)       Current Nutrition Intake & Therapies:    Average Meal Intake: NPO  Average Supplements Intake: NPO  Diet NPO    Anthropometric Measures:  Height: 5' (152.4 cm)  Ideal Body Weight (IBW): 100 lbs (45 kg)    Admission Body Weight: 199 lb 15.3 oz (90.7 kg)  Current Body Weight: 199 lb (90.3 kg),   IBW.  Weight Source:  (estimated)  Current BMI (kg/m2): 38.9  Weight Adjustment For: Amputation  Total Adjusted Percentage (Calculated): 11.8  Adjusted BMI (kg/m2) (Calculated): 43.5  BMI Categories: Obese Class 2 (BMI 35.0 -39.9)    Estimated Daily Nutrient Needs:  Energy Requirements Based On: Formula  Weight Used for Energy Requirements: Current  Energy (kcal/day): 1800kcal/day  Weight Used for Protein Requirements: Ideal  Protein (g/day): 70-90gmsPRO/day  Fluid (ml/day): Per MD    Nutrition Diagnosis:   Inadequate oral intake related to cognitive or neurological impairment as evidenced by NPO or clear liquid status due to medical condition, nutrition support - enteral nutrition    Nutrition Interventions:   Food and/or Nutrient Delivery: Start Tube Feeding  Nutrition Education/Counseling: No recommendation at this time  Coordination of Nutrition Care: Continue to monitor while inpatient  Plan of Care discussed with: RN    Goals:     Goals: Meet at least 75% of estimated needs, within 7 days       Nutrition Monitoring and Evaluation:   Behavioral-Environmental Outcomes: None Identified  Food/Nutrient Intake Outcomes: Enteral Nutrition Intake/Tolerance  Physical Signs/Symptoms Outcomes: Biochemical Data, Nutrition Focused Physical Findings, Weight, GI Status    Discharge Planning:     Too soon to determine     Erica Aviles RD  Contact: [phone]

## 2023-02-14 NOTE — PROGRESS NOTES
[facility] [address]  PROGRESS NOTE    Shift date: 2.13.2023  Shift day: Monday   Shift # 2    Room # 9901/4494-88   Name: Regan Meehan                Orthodox: unknown   Place of Yazidi: unknown    Referral: Routine Visit    Admit Date & Time: 2/13/2023  6:43 PM    Assessment:  Regan Meehan is a 43 y.o. female in the hospital. Upon entering the room writer observes patient preparing to be moved to [facility]. Patient appeared passive. Nurse states that patient has support and they have been updated regarding the patient's condition and plan of care. Intervention:  Writer introduced self and title as  Writer offered space for the nurse  to express feelings, needs, and concerns and provided a ministry presence. Outcome:  Patient appears to be calm and coping    Plan:  Chaplains will remain available to offer spiritual and emotional support as needed.       Electronically signed by Tiffanie Hercules on 2/14/2023 at 1:18 AM.  [facility]  [phone]       02/13/23 2050   Encounter Summary   Service Provided For: Patient   Referral/Consult From: [facility] Family members   Last Encounter  02/13/23   Complexity of Encounter Low   Begin Time 2050   End Time  2100   Total Time Calculated 10 min   Encounter    Type Initial Screen/Assessment   Assessment/Intervention/Outcome   Assessment Unable to assess   Intervention Explored Coping Skills/Resources   Outcome Coping     Electronically signed by Berenice Gutierrez on 2/14/2023 at 1:18 AM

## 2023-02-14 NOTE — ED TRIAGE NOTES
Patient was brought by [facility] to room 09 from the [address] [place] [address] for hematuria. Patient has a suprapubic catheter with purple coloring urine in the bag. Report stated the hematuria started a couple days ago. Patient has a colostomy bag, peg tube. Patient is non verbal- bed bound patient. Patient will shake her head to yes and no questions. Patient on room air.

## 2023-02-15 VITALS
HEART RATE: 86 BPM | SYSTOLIC BLOOD PRESSURE: 122 MMHG | WEIGHT: 199.96 LBS | RESPIRATION RATE: 20 BRPM | DIASTOLIC BLOOD PRESSURE: 71 MMHG | BODY MASS INDEX: 39.26 KG/M2 | TEMPERATURE: 97.2 F | HEIGHT: 60 IN | OXYGEN SATURATION: 100 %

## 2023-02-15 PROCEDURE — 76937 US GUIDE VASCULAR ACCESS: CPT

## 2023-02-15 PROCEDURE — 6360000002 HC RX W HCPCS: Performed by: NURSE PRACTITIONER

## 2023-02-15 PROCEDURE — 99232 SBSQ HOSP IP/OBS MODERATE 35: CPT | Performed by: INTERNAL MEDICINE

## 2023-02-15 PROCEDURE — APPSS45 APP SPLIT SHARED TIME 31-45 MINUTES: Performed by: NURSE PRACTITIONER

## 2023-02-15 PROCEDURE — 6370000000 HC RX 637 (ALT 250 FOR IP): Performed by: NURSE PRACTITIONER

## 2023-02-15 PROCEDURE — 2580000003 HC RX 258: Performed by: NURSE PRACTITIONER

## 2023-02-15 PROCEDURE — 2709999900 HC NON-CHARGEABLE SUPPLY

## 2023-02-15 RX ADMIN — DOCUSATE SODIUM LIQUID 100 MG: 100 LIQUID ORAL at 09:37

## 2023-02-15 RX ADMIN — BACLOFEN 10 MG: 10 TABLET ORAL at 09:37

## 2023-02-15 RX ADMIN — LANSOPRAZOLE 30 MG: KIT at 06:11

## 2023-02-15 RX ADMIN — BACLOFEN 10 MG: 10 TABLET ORAL at 14:16

## 2023-02-15 RX ADMIN — MIDODRINE HYDROCHLORIDE 5 MG: 5 TABLET ORAL at 09:36

## 2023-02-15 RX ADMIN — RIVAROXABAN 20 MG: 20 TABLET, FILM COATED ORAL at 09:36

## 2023-02-15 RX ADMIN — GLYCOPYRROLATE 1 MG: 1 TABLET ORAL at 09:36

## 2023-02-15 RX ADMIN — CEFEPIME 1000 MG: 1 INJECTION, POWDER, FOR SOLUTION INTRAMUSCULAR; INTRAVENOUS at 09:37

## 2023-02-15 RX ADMIN — GLYCOPYRROLATE 1 MG: 1 TABLET ORAL at 14:16

## 2023-02-15 RX ADMIN — GABAPENTIN 300 MG: 300 CAPSULE ORAL at 14:16

## 2023-02-15 RX ADMIN — SERTRALINE 25 MG: 25 TABLET, FILM COATED ORAL at 09:37

## 2023-02-15 RX ADMIN — MIDODRINE HYDROCHLORIDE 5 MG: 5 TABLET ORAL at 14:16

## 2023-02-15 RX ADMIN — SODIUM CHLORIDE, PRESERVATIVE FREE 10 ML: 5 INJECTION INTRAVENOUS at 09:37

## 2023-02-15 RX ADMIN — GABAPENTIN 300 MG: 300 CAPSULE ORAL at 09:36

## 2023-02-15 NOTE — CARE COORDINATION
[facility] Flow/Interdisciplinary Rounds Progress Note    Quality Flow Rounds held on February 15, 2023 at [address] [facility] Attending:  Bedside Nurse, , and Nursing Unit Leadership        Anticipated Discharge Date:   today    Anticipated Discharge Disposition:SNF    Readmission Risk              Risk of Unplanned Readmission:  16           Discussed patient goal for the day, patient clinical progression, and barriers to discharge. The following Goal(s) of the Day/Commitment(s) have been identified:   plan dc today to [address], will need cefepime @ dc , need midline placed. Need f/u with Dr Nasrin price 1st available 3/14 @ 9:15. [facility]. Unknown Lauth with [address] [facility] updated.       Krista Thomas RN  February 15, 2023

## 2023-02-15 NOTE — PROCEDURES
Extended Dwell peripheral catheter insertion note:    Reason for placement:  Device inserted - 20g 8cm extended dwell Catheter  Ultrasound preassessment done. Target vessel is right cephalic vein. Vessel depth = 1.75 cm. Vein measures 0.27 Cm. CVR is 16%. (Preffered area based CVR is less than 20%). Placed using ANTT fashion, US prepared with sterile probe cover. Visualization of needle entry into vessel, 1 attempt using AST technique. Total 8cm inserted, 0cm external.   Easy aspiration of dark non-pulsating blood. Flushes easily with 10ml of 0.9 NS.   CHG-TSM dressing placed. Injection cap and swab cap applied. No bleeding or hematoma noted. Estimated blood loss = 0ml. Instructions given on insertion and care. RN aware no lab draws without a physician order, line is power injectable, keep clamped when not in use, pulsatile 10ml NS flush every 8 hours when not in use or after intermittent use with medication. Line ready for use.     Lot # = [id number]  Expires =  2024-09-30     - Cuco Petit VAT  Time out - 1832  Start time - 4497  End time - 2321

## 2023-02-15 NOTE — PROGRESS NOTES
[facility]  Office: [address], DO, Domonique Sheikh DO, Ruba Pedraza DO, Davina Tanner, DO, Yaneth Gan MD, Ramsey Carbajal MD, Mary Jane Thakkar MD, Claude Lomas MD,  Nat Fernandes MD, Stephan Wu MD, Monica Ferrer DO, Lavonne Flores MD,  Vianey Mancilla MD, Aurora Lopez MD, Rosette Link DO, Christina Broderick MD, Shade Barclay MD, Abel Nickerson DO, Peggy Corona MD, Tod Tolentino MD, Alexandra Vargas MD, Ruperto Del Toro MD, Curt Negro DO, Jair Beauchamp MD, Radha Thomas MD, Margo Prado, Lucian Coy, CNP, Swetha Jamil, CNP, Juhi Almanzar, CNP,  Myron Hinson, [facility], Thelma Gomez, CNP, John Garcia, CNP, Genna Roche, CNP, Michelle Schuster, CNP, Belton Severs, CNP, KRISTY OropezaC, Lynette Yousif, CNS, Lucinda Gilbert, CNP, Micah Bolanos, CNP         Alexys Palomares Patricia [address]    Progress Note    2/15/2023    10:18 AM    Name:   Chet Justice  MRN:     [id number]     Acct:      [de-identified]   Room:   [address] [place] [address] Day:  1  Admit Date:  2/13/2023  6:43 PM    PCP:   Tate Rahman MD  Code Status:  Full Code    Subjective:     C/C:   Chief Complaint   Patient presents with    Hematuria     From [facility] History Status: improved. Patient seen and evaluated in room no acute events overnight. Tolerating antibiotic therapy. Discussed with urology patient is to have close follow-up with [facility] urology postdischarge. Okay for antibiotics prior to follow-up. Midline to be placed. Brief History:     41-year-old female with a past medical history listed below who presents to the emergency department for evaluation of worsening mentation/change in patient urine. She has a significant urologic history including recent admissions for MDRO UTIs in the past.  Has required ICU admissions previously.       Work-up in the emergency department included chest x-ray, negative for acute findings. UA did demonstrate a pH of greater than 9.0 with 2+ protein, turbidity and red color with TNTC WBCs, few bacteria and moderate hemoglobin and large leuk esterase. Follow-up abdomen x-ray was ordered based on recent stent placement, demonstrating patent stent    Review of Systems:     KATHYA nonverbal  Medications: Allergies:  No Known Allergies    Current Meds:   Scheduled Meds:    baclofen  10 mg Per G Tube TID    docusate  100 mg Per G Tube Daily    gabapentin  300 mg Per G Tube TID    glycopyrrolate  1 mg PEG Tube TID    midodrine  5 mg PEG Tube TID    rivaroxaban  20 mg PEG Tube Daily    scopolamine  1 patch TransDERmal Q72H    sertraline  25 mg Per G Tube Daily    lansoprazole  30 mg Per G Tube QAM AC    sodium chloride flush  5-40 mL IntraVENous 2 times per day    cefepime  1,000 mg IntraVENous Q12H     Continuous Infusions:    sodium chloride       PRN Meds: sodium chloride flush, sodium chloride, ondansetron **OR** ondansetron, acetaminophen **OR** acetaminophen, polyethylene glycol    Data:     Past Medical History:   has a past medical history of Aphasia, Aphasia, Contracture of joint, lower leg, Depressed, HTN (hypertension), Hx MRSA infection, Hydronephrosis, Movement disorder, Multiple sclerosis ([address] Neurogenic bladder, Non-verbal learning disorder, Peripheral vascular disease ([address] Polyneuropathy, Pressure ulcer, Pulmonary embolism ([address] Pulmonary infarction ([address] Quadriplegia ([address] Tachycardia, and UTI (lower urinary tract infection). Social History:   reports that she has never smoked. She has never used smokeless tobacco. She reports that she does not drink alcohol and does not use drugs.      Family History:   Family History   Problem Relation Age of Onset    No Known Problems Mother     No Known Problems Father        Vitals:  /71   Pulse 86   Temp 97.2 °F (36.2 °C)   Resp 20   Ht 5' (1.524 m)   Wt 199 lb 15.3 oz (90.7 kg)   SpO2 100%   BMI 39.05 kg/m²   Temp (24hrs), Av.6 °F (36.4 °C), Min:97.2 °F (36.2 °C), Max:97.9 °F (36.6 °C)    No results for input(s): POCGLU in the last 72 hours. I/O (24Hr): Intake/Output Summary (Last 24 hours) at 2/15/2023 1018  Last data filed at 2/15/2023 0625  Gross per 24 hour   Intake 200 ml   Output 900 ml   Net -700 ml       Labs:  Hematology:  Recent Labs     23   WBC 9.4   RBC 4.87   HGB 12.7   HCT 42.3   MCV 86.9   MCH 26.1   MCHC 30.0   RDW 15.5*      MPV 9.6   INR 1.1     Chemistry:  Recent Labs     23   *  --    K 4.1  --      --    CO2 22  --    GLUCOSE 84  --    BUN 12  --    CREATININE 0.30*  --    MG 2.2  --    ANIONGAP 11  --    LABGLOM >60  --    CALCIUM 9.6  --    TROPHS 14 10     Recent Labs     23   PROT 8.9*   LABALBU 3.8   AST 16   ALT 14   ALKPHOS 70   BILITOT 0.2*     ABG:  Lab Results   Component Value Date/Time    POCPH 7.451 2022 03:57 AM    POCPCO2 32.9 2022 03:57 AM    POCPO2 74.4 2022 03:57 AM    POCHCO3 22.9 2022 03:57 AM    NBEA 2 2022 04:12 AM    PBEA 0 2022 03:57 AM    IMP8EZW 10 2017 12:27 PM    JKWH4FBF 96 2022 03:57 AM    FIO2 30.0 2022 03:57 AM     Lab Results   Component Value Date/Time    SPECIAL L HAND 10ML 2023 07:05 PM     Lab Results   Component Value Date/Time    CULTURE CULTURE IN PROGRESS 2023 07:23 PM       Radiology:  XR ABDOMEN (KUB) (SINGLE AP VIEW)    Result Date: 2023  Right ureteral stent is again noted. XR CHEST PORTABLE    Result Date: 2023  Negative limited portable chest radiograph.        Physical Examination:        General appearance:  alert, cooperative and no distress  Mental Status:  oriented to person, place and time and normal affect  Lungs:  clear to auscultation bilaterally, normal effort  Heart:  regular rate and rhythm, no murmur  Abdomen:  soft, nontender, nondistended, normal bowel sounds, no masses, hepatomegaly, splenomegaly, ostomy  : omer cath with red odiferous urine  Extremities:  no edema, redness, tenderness in the calves  Skin:  no gross lesions, rashes, induration    Assessment:        Hospital Problems             Last Modified POA    * (Principal) UTI (urinary tract infection) 2/13/2023 Yes    Aphasia 2/14/2023 Yes    Multiple sclerosis ([address] 2/14/2023 Yes    Gastrostomy tube dependent ([address] 2/14/2023 Yes    Neurogenic bladder 2/14/2023 Yes    Hemorrhagic cystitis 2/14/2023 Yes    Chronic indwelling Omer catheter 2/14/2023 Yes    History of pulmonary embolism 2/14/2023 Yes    Hypertension 2/14/2023 Yes    ESBL E. coli carrier 2/14/2023 Yes    Status post colostomy ([address] 2/14/2023 Yes    Extended spectrum beta lactamase (ESBL) resistance 2/14/2023 Yes    Decubitus ulcer of coccygeal region, stage 4 ([address] 2/14/2023 Yes       Plan:        UTI with chronic suprapubic cath/history of cystoscopy with stent placement from December 2022/history of hemorrhagic cystitis: With multiple MDRO bacteria previously patient continues on cefepime. Waiting for culture to speciate. Check ABD x-ray to verify stent placement/absence. Consult urology for further assistance. She follows with [facility], will need almost immediate follow-up care after discharge for stone and stent treatment at [facility] ulceration: Stage IV, present on admission  Dysphagia: S/p G-tube, nocturnal tube feeds resumed  History of MS  Primary hypertension: Her home medications have been restarted  History of pulmonary embolism: Xarelto restarted  History of colostomy  GI/DVT prophylaxis: Protonix, Xarelto  Place midline, antibiotics continue post discharge. Follow-up with [facility] urology    On this date 02/15/23 I have spent 33 mins  in direct patient care, reviewing notes, test results and diagnosis and plan of care discussions with the patient, as well as coordination of care.   Plan of care made with MD Vira Bunch, APRN - NP  2/15/2023  10:18 AM

## 2023-02-15 NOTE — DISCHARGE INSTR - COC
Continuity of Care Form    Patient Name: Chet Justice   :  1980  MRN:  [id number]    [facility] date:  2023  Discharge date:  2/15/23    Code Status Order: Full Code   Advance Directives:     Admitting Physician:  Stephan Wu MD  PCP: Tate Rahman MD    Discharging Nurse: [facility] Unit/Room#: 5168/5170-01  Discharging Unit Phone Number: [phone]    Emergency Contact:   Extended Emergency Contact Information  Primary Emergency Contact: Gregg [place] Phone: [phone]  Mobile Phone: [phone]  Relation: Parent   needed? No  Secondary Emergency Contact: frances esparza  Mobile Phone: [phone]  Relation: Brother/Sister   needed?  No    Past Surgical History:  Past Surgical History:   Procedure Laterality Date    AMPUTATION Right     COLOSTOMY      CYSTOSCOPY Right 12/3/2022    CYSTOSCOPY URETERAL STENT INSERTION performed by Bryan Sena MD at [place] [address]      IR NEPHROSTOMY PERCUTANEOUS RIGHT  2020    IR NEPHROSTOMY PERCUTANEOUS RIGHT 2020 Tal Leblanc MD [facility]    LEG AMPUTATION THROUGH FEMUR         Immunization History:   Immunization History   Administered Date(s) Administered    COVID-19, PFIZER Bivalent BOOSTER, DO NOT Dilute, (age 12y+), IM, 30 mcg/0.3 mL 2022    COVID-19, PFIZER PURPLE top, DILUTE for use, (age 15 y+), 30mcg/0.3mL 2020, 2021, 10/21/2021    Influenza Vaccine, unspecified formulation 10/09/2016    Pneumococcal Conjugate 13-valent (Dwayne Raymond) 10/09/2016       Active Problems:  Patient Active Problem List   Diagnosis Code    Aphasia R47.01    Multiple sclerosis ([facility] G35    Nonverbal R47.01    Gastrostomy tube dependent ([facility] Z93.1    Dislodged gastrostomy tube T85.528A    Neurogenic bladder N31.9    Hemorrhagic cystitis N30.91    Hypokalemia E87.6    Urinary tract infection associated with indwelling urethral catheter (HCC) T83.511A, N39.0 Tachycardia R00.0    Gross hematuria R31.0    Lactic acidosis E87.20    Chronic indwelling Davies catheter Z97.8    Peripheral vascular disease ([facility]) I73.9    Recurrent UTI (urinary tract infection) N39.0    History of pulmonary embolism Z86.711    Hypertension I10    Altered mental status U27.40    Toxic metabolic encephalopathy H48.5    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    Septic shock ([facility]) A41.9, R65.21    Status post colostomy ([facility]) Z93.3    Lower paraplegia ([facility]) G82.20    Sepsis ([facility] A41.9    Pneumonia due to organism J18.9    Extended spectrum beta lactamase (ESBL) resistance Z16.12    Abdominal pain R10.9    Ileus ([facility]) K56.7    Decubitus ulcer of coccygeal region, stage 4 ([facility]) L89.154    Proteus mirabilis infection A49.8    Lactate blood increase R79.89    E-coli UTI N39.0, B96.20    Sepsis due to Escherichia coli ([facility]) A41.51    E coli bacteremia R78.81, B96.20    Nephrostomy tube displaced ([facility] T83.022A    Pyelonephritis N12    Obstructive uropathy N13.9    Bandemia D72.825    SIRS (systemic inflammatory response syndrome) ([facility]) R65.10    Gram-neg septicemia ([facility]) A41.50    Pneumatosis coli K63.89    UTI (urinary tract infection) N39.0       Isolation/Infection:   Isolation            Contact          Patient Infection Status       Infection Onset Added Last Indicated Last Indicated By Review Planned Expiration Resolved Resolved By    MRSA 01/22/20 01/22/20 01/22/20 MRSA DNA Probe, Nasal        MDRO (multi-drug resistant organism)  01/04/19 01/04/19 Lucina Gill, MÓNICA        Proteus - Urine 8/2022  Providencia - Urine 1/2020    Proteus- blood 12/2022    ESBL (Extended Spectrum Beta Lactamase)  06/29/18 03/03/21 Culture, Urine        E.  Coli - urine 1/2020      Resolved    COVID-19 (Rule Out) 11/09/20 11/09/20 11/09/20 Covid-19 Ambulatory (Ordered)   11/10/20 Rule-Out Test Resulted    COVID-19 (Rule Out) 09/26/20 09/26/20 09/26/20 COVID-19 (Ordered)   09/26/20 Rule-Out Test Resulted    COVID-19 (Rule Out) 08/16/20 08/16/20 08/16/20 Covid-19 Ambulatory (Ordered)   08/18/20 Rule-Out Test Resulted    MRSA  01/09/17 01/09/17 Adrian Kendall RN   01/11/17 Adrian Kendall RN    Resolved - 2 negative nasal screens 1/2017  Heel - 2/2013            Nurse Assessment:  Last Vital Signs: /71   Pulse 86   Temp 97.2 °F (36.2 °C)   Resp 20   Ht 5' (1.524 m)   Wt 199 lb 15.3 oz (90.7 kg)   SpO2 100%   BMI 39.05 kg/m²     Last documented pain score (0-10 scale):    Last Weight:   Wt Readings from Last 1 Encounters:   02/14/23 199 lb 15.3 oz (90.7 kg)     Mental Status:   nonverbal    IV Access:  - PICC - site  midline, insertion date: 2/15/23    Nursing Mobility/ADLs:  Walking   Dependent  Transfer  Dependent  Bathing  Dependent  Dressing  Dependent  Toileting  Dependent  Feeding  Dependent  Med Admin  Dependent  Med Delivery   crushed and PEG tube    Wound Care Documentation and Therapy:  Wound 05/20/19 Coccyx (Active)   Wound Etiology Pressure Stage 2 02/14/23 0711   Dressing Status Clean;Dry; Intact 02/14/23 0711   Wound Cleansed Soap and water 02/14/23 0711   Dressing/Treatment Barrier film 02/14/23 0638   Wound Assessment Dry 02/14/23 0236   Drainage Amount None 02/14/23 0638   Odor Mild 02/14/23 0638   Hilda-wound Assessment Erosion 02/14/23 0638   Margins Undefined edges 02/14/23 0638   Wound Thickness Description not for Pressure Injury Partial thickness 02/14/23 0236   Number of days: 5435       Wound 09/27/20 Other (Comment) Lateral;Lower;Right (Active)   Number of days: 871        Elimination:  Continence:    Bowel: colostomy  Bladder: suprapubic cath  Urinary Catheter: Last Change Date 1/22/23    Colostomy/Ileostomy/Ileal Conduit: Yes  Colostomy LLQ-Stomal Appliance: 1 piece  Colostomy LLQ-Stoma  Assessment: Pink  Colostomy LLQ-Peristomal Assessment: Clean, dry & intact  Colostomy LLQ-Stool Appearance: Watery  Colostomy LLQ-Stool Color: Brown  Colostomy LLQ-Stool Amount: Medium  Colostomy LLQ-Output (mL): 100 ml    Date of Last BM: n/a    Intake/Output Summary (Last 24 hours) at 2/15/2023 1133  Last data filed at 2/15/2023 9490  Gross per 24 hour   Intake 200 ml   Output 900 ml   Net -700 ml     I/O last 3 completed shifts: In: 320 [NG/GT:320]  Out: 1720 [Urine:1500; Stool:220]    Safety Concerns: At Risk for Falls and Aspiration Risk    Impairments/Disabilities:      Speech and Amputation - R-BKA    Nutrition Therapy:  Current Nutrition Therapy:   - Tube Feedings:  65 ml/hr over 14 hrs per day    Routes of Feeding: Gastrostomy Tube  Liquids: No Liquids  Daily Fluid Restriction: no  Last Modified Barium Swallow with Video (Video Swallowing Test): not done    Treatments at the Time of Hospital Discharge:   Respiratory Treatments: none  Oxygen Therapy:  is not on home oxygen therapy. Ventilator:    - No ventilator support    Rehab Therapies: Physical Therapy and Occupational Therapy  Weight Bearing Status/Restrictions: No weight bearing restrictions  Other Medical Equipment (for information only, NOT a DME order):  none  Other Treatments: none    Patient's personal belongings (please select all that are sent with patient):  mounika    RN SIGNATURE:  Electronically signed by Christie Powers RN on 2/15/23 at 11:44 AM EST    CASE MANAGEMENT/SOCIAL WORK SECTION    Inpatient Status Date: 2/14/23    Readmission Risk Assessment Score:  Readmission Risk              Risk of Unplanned Readmission:  16           Discharging to Facility/ Agency   Name: [facility]  Address:  [facility]:[phone]   Fax:    Dialysis Facility (if applicable)   Name:  Address:  Dialysis Schedule:  Phone:  Fax:    / signature: Electronically signed by Maria De Jesus Reich RN on 2/15/23 at 11:33 AM EST    PHYSICIAN SECTION    Prognosis: Fair    Condition at Discharge: Stable    Rehab Potential (if transferring to Rehab):  Fair    Recommended Labs or Other Treatments After Discharge: Continued antibiotic therapy for an additional 7 days via midline. You will need follow-up with [facility] urology post discharge, appointment has been made. Physician Certification: I certify the above information and transfer of Dilcia Dickson  is necessary for the continuing treatment of the diagnosis listed and that she requires [facility] for less 30 days.      Update Admission H&P: Changes in H&P as follows - see discharge summary    PHYSICIAN SIGNATURE:  Electronically signed by Oliver Elizondo MD on 2/15/23 at 3:32 PM EST

## 2023-02-17 LAB
MICROORGANISM SPEC CULT: NORMAL
SPECIMEN DESCRIPTION: NORMAL

## 2023-02-17 NOTE — DISCHARGE SUMMARY
[facility]  Office: [address], DO, Harika Garcia, DO, Araceli Daryl, DO, Steffi Bravopraveena Tanner, DO, Stephany Arnold MD, Deep Lantigua MD, Mehul Solares MD, Luis Kang MD,  Danny Rene MD, Gavi Ramírez MD, Emily Borjas DO, Tremayne Mendez MD,  Nathaniel Carlos DO, Wan Stephenson MD, Fly Krishna MD, Vinicius Jansen DO, Dave Brand MD, Jeremy Pickering MD, Carleen Mills DO, Jamar Vang MD, Alma Marshall MD, Delio Tran MD, Zeferino Hendrickson MD, Claudell Orion, DO, Talya Thompson MD, Deronda Kawasaki, MD, Leyla Galloway, CNP,  Mack Russell, CNP, Flora Xavier, CNP, Mason Hernandez, CNP,  Anne Keen, [facility], Trupti Espinosa, CNP, Berlinda Boast, CNP, Hannah Bocanegra, CNP, Emani Torres, CNP, Debora Solis, CNP, Natacha Banda, PA-C, Choco Moreira, CNS, Nick Martinez, CNP, Antonio Chauhan, [facility]    Discharge Summary     Patient ID: Christie Moreno  :  1980   MRN: [id number]     ACCOUNT:  [de-identified]   Patient's PCP: Abby Rojas MD  Admit Date: 2023   Discharge Date: 2/15/23  Length of Stay: 1  Code Status:  Prior  Admitting Physician: Gavi Ramírez MD  Discharge Physician: Anne Keen, APRN - NP     Active Discharge Diagnoses:     Hospital Problem Lists:  Principal Problem:    UTI (urinary tract infection)  Active Problems:    Aphasia    Multiple sclerosis ([facility]    Gastrostomy tube dependent [facility])    Neurogenic bladder    Hemorrhagic cystitis    Chronic indwelling Davies catheter    History of pulmonary embolism    Hypertension    ESBL E. coli carrier    Status post colostomy ([facility]    Extended spectrum beta lactamase (ESBL) resistance    Decubitus ulcer of coccygeal region, stage 4 ([facility]  Resolved Problems:    * No resolved hospital problems.  *      Admission Condition:  fair     Discharged Condition: fair    Hospital Stay:     Hospital Course:  Christie Moreno is a 43 y.o. female who was admitted for the management of   UTI (urinary tract infection) , presented to ER with Hematuria (From [address] [facility])    80-year-old female with a past medical history listed below who presents to the emergency department for evaluation of worsening mentation/change in patient urine. She has a significant urologic history including recent admissions for MDRO UTIs in the past.  Has required ICU admissions previously. Work-up in the emergency department included chest x-ray, negative for acute findings. UA did demonstrate a pH of greater than 9.0 with 2+ protein, turbidity and red color with TNTC WBCs, few bacteria and moderate hemoglobin and large leuk esterase. Follow-up abdomen x-ray was ordered based on recent stent placement, demonstrating patent stent      Significant therapeutic interventions:     UTI with chronic suprapubic cath/history of cystoscopy with stent placement from December 2022/history of hemorrhagic cystitis: With multiple MDRO bacteria previously patient continues on cefepime. Waiting for culture to speciate. Check ABD x-ray to verify stent placement/absence. Consult urology for further assistance. She follows with Ran[facility] Sanderson, will need almost immediate follow-up care after discharge for stone and stent treatment at [facility] ulceration: Stage IV, present on admission  Dysphagia: S/p G-tube, nocturnal tube feeds resumed  History of MS  Primary hypertension: Her home medications have been restarted  History of pulmonary embolism: Xarelto restarted  History of colostomy  GI/DVT prophylaxis: Protonix, Xarelto  Place midline, antibiotics continue post discharge.   Follow-up with [facility]erson urology    Significant Diagnostic Studies:   Labs / Micro:  CBC:   Lab Results   Component Value Date/Time    WBC 9.4 02/13/2023 06:58 PM    RBC 4.87 02/13/2023 06:58 PM    RBC 4.56 03/11/2012 12:33 PM    HGB 12.7 02/13/2023 06:58 PM    HCT 42.3 02/13/2023 06:58 PM    MCV 86.9 02/13/2023 06:58 PM    MCH 26.1 02/13/2023 06:58 PM    MCHC 30.0 02/13/2023 06:58 PM    RDW 15.5 02/13/2023 06:58 PM     02/13/2023 06:58 PM     03/11/2012 12:33 PM     BMP:    Lab Results   Component Value Date/Time    GLUCOSE 84 02/13/2023 06:58 PM    GLUCOSE 69 03/11/2012 12:33 PM     02/13/2023 06:58 PM    K 4.1 02/13/2023 06:58 PM     02/13/2023 06:58 PM    CO2 22 02/13/2023 06:58 PM    ANIONGAP 11 02/13/2023 06:58 PM    BUN 12 02/13/2023 06:58 PM    CREATININE 0.30 02/13/2023 06:58 PM    BUNCRER Can not be calculated 02/09/2023 09:59 AM    CALCIUM 9.6 02/13/2023 06:58 PM    LABGLOM >60 02/13/2023 06:58 PM    GFRAA Can not be calculated 08/02/2022 07:10 AM    GFR      08/02/2022 07:10 AM     HFP:    Lab Results   Component Value Date/Time    PROT 8.9 02/13/2023 06:58 PM     CMP:    Lab Results   Component Value Date/Time    GLUCOSE 84 02/13/2023 06:58 PM    GLUCOSE 69 03/11/2012 12:33 PM     02/13/2023 06:58 PM    K 4.1 02/13/2023 06:58 PM     02/13/2023 06:58 PM    CO2 22 02/13/2023 06:58 PM    BUN 12 02/13/2023 06:58 PM    CREATININE 0.30 02/13/2023 06:58 PM    ANIONGAP 11 02/13/2023 06:58 PM    ALKPHOS 70 02/13/2023 06:58 PM    ALT 14 02/13/2023 06:58 PM    AST 16 02/13/2023 06:58 PM    BILITOT 0.2 02/13/2023 06:58 PM    LABALBU 3.8 02/13/2023 06:58 PM    ALBUMIN 0.7 02/13/2023 06:58 PM    LABGLOM >60 02/13/2023 06:58 PM    GFRAA Can not be calculated 08/02/2022 07:10 AM    GFR      08/02/2022 07:10 AM    PROT 8.9 02/13/2023 06:58 PM    CALCIUM 9.6 02/13/2023 06:58 PM     PT/INR:    Lab Results   Component Value Date/Time    PROTIME 11.6 02/13/2023 06:58 PM    INR 1.1 02/13/2023 06:58 PM     PTT:   Lab Results   Component Value Date/Time    APTT 28.0 02/13/2023 06:58 PM     FLP:    Lab Results   Component Value Date/Time    CHOL 149 08/07/2018 06:15 AM    TRIG 86 05/08/2017 05:35 AM    HDL 36 05/08/2017 05:35 AM     U/A:    Lab Results   Component Value Date/Time    COLORU Red 02/13/2023 07:01 PM TURBIDITY Turbid 02/13/2023 07:01 PM    SPECGRAV 1.016 02/13/2023 07:01 PM    HGBUR MODERATE 02/13/2023 07:01 PM    PHUR >9.0 02/13/2023 07:01 PM    PROTEINU 2+ 02/13/2023 07:01 PM    GLUCOSEU NEGATIVE 02/13/2023 07:01 PM    GLUCOSEU NEGATIVE 03/11/2012 01:01 PM    KETUA NEGATIVE 02/13/2023 07:01 PM    BILIRUBINUR NEGATIVE 02/13/2023 07:01 PM    BILIRUBINUR NEGATIVE 03/11/2012 01:01 PM    UROBILINOGEN Normal 02/13/2023 07:01 PM    NITRU POSITIVE 02/13/2023 07:01 PM    LEUKOCYTESUR LARGE 02/13/2023 07:01 PM     TSH:    Lab Results   Component Value Date/Time    TSH 0.84 05/19/2019 07:50 AM       Radiology:  XR ABDOMEN (KUB) (SINGLE AP VIEW)    Result Date: 2/14/2023  Right ureteral stent is again noted. XR CHEST PORTABLE    Result Date: 2/13/2023  Negative limited portable chest radiograph. Consultations:    Consults:     Final Specialist Recommendations/Findings:   IP CONSULT TO HOSPITALIST  IP CONSULT TO UROLOGY      The patient was seen and examined on day of discharge and this discharge summary is in conjunction with any daily progress note from day of discharge. Discharge plan:     Disposition: [facility]    Physician Follow Up: [address] [facility]   [place] [id number]  [phone]    Go on 3/14/2023  appointment time 9:15, need right ureteral stent  exchanged and kidney stones treated (with surgery)       Requiring Further Evaluation/Follow Up POST HOSPITALIZATION/Incidental Findings: Continued antibiotics.   Follow-up with [facility] appointment already made above    Diet: Nocturnal tube feed    Activity: As tolerated    Instructions to Patient: see attached     Discharge Medications:      Medication List        START taking these medications      cefepime  infusion  Commonly known as: MAXIPIME  Infuse 1,000 mg intravenously every 24 hours for 7 days Compound per protocol            CONTINUE taking these medications      acetaminophen 80 MG/0.8ML suspension  Commonly known as: TYLENOL atropine 1 % ophthalmic solution     baclofen 10 MG tablet  Commonly known as: LIORESAL     CENTRUM/CERTA-DERICK with minerals oral solution  15 mLs by Per G Tube route daily     Cranberry 250 MG Caps     D-Mannose 500 MG Caps     docusate 50 MG/5ML liquid  Commonly known as: COLACE  Take 10 mLs by mouth daily     gabapentin 300 MG capsule  Commonly known as: NEURONTIN     glycopyrrolate 1 MG tablet  Commonly known as: ROBINUL     lansoprazole 30 MG disintegrating tablet  Commonly known as: PREVACID SOLUTAB  1 tablet by Per G Tube route every morning (before breakfast)     medroxyPROGESTERone 150 MG/ML injection  Commonly known as: DEPO-PROVERA     miconazole 2 % powder  Commonly known as: MICOTIN  Apply topically 2 times daily. midodrine 5 MG tablet  Commonly known as: PROAMATINE  Take 1 tablet by mouth in the morning, at noon, and at bedtime     rivaroxaban 20 MG Tabs tablet  Commonly known as: XARELTO  Take 1 tablet by mouth daily     SCOPOLAMINE TD     sertraline 25 MG tablet  Commonly known as: ZOLOFT  1 tablet by Per G Tube route daily               Where to Get Your Medications        You can get these medications from any pharmacy    Bring a paper prescription for each of these medications  cefepime  infusion         No discharge procedures on file. Time Spent on discharge is  32 mins in patient examination, evaluation, counseling as well as medication reconciliation, prescriptions for required medications, discharge plan and follow up. Electronically signed by   LIT Cheng NP  2/17/2023  3:59 PM      Thank you Dr. Ttae Rahman MD for the opportunity to be involved in this patient's care.

## 2023-02-18 LAB
MICROORGANISM SPEC CULT: NORMAL
MICROORGANISM SPEC CULT: NORMAL
SERVICE CMNT-IMP: NORMAL
SERVICE CMNT-IMP: NORMAL
SPECIMEN DESCRIPTION: NORMAL
SPECIMEN DESCRIPTION: NORMAL

## 2023-02-27 NOTE — PROGRESS NOTES
Physician Progress Note      PATIENT:               Jah Springer  CSN #:                  [id number]  :                       1980  ADMIT DATE:       2023 6:43 PM  [address] DATE:        2/15/2023 6:04 PM  RESPONDING  PROVIDER #:        Georgie Conner APRN - NP          QUERY TEXT:    Pt admitted with UTI. Pt noted to have chronic suprapubic catheter, right   ureteral stents and ongoing multiple UTI's. If possible, please document in   the progress notes and discharge summary if you are evaluating and/or treating   any of the following: The medical record reflects the following:  Risk Factors: chronic suprapubic catheter, multiple recurrent UTIs with   MRDO/ESBL UTI, right ureteral stent placement in 2022  Clinical Indicators: UA-  - few bacteria, moderate blood, turbid, large   leuk est, and nitrate - positive, with WBC - too numerous to count. RBC- too   numerous to count. Urine culture-- Several types of bacteria were   identified in this specimen. Further ID and susceptibility testing is   generally not helpful in this circumstance and has not been performed. WBC-   5.4. BC- NGTD x 5days. Nursing Notes as ongoing urine analysis. ABD imaging   -  - Right ureteral stent is in place. Multiple renal calculi are   detected. wiht patent stents  noted. Treatment: Urology consult with recommendation to follow up with [facility]   urology for stent and stone treatment. IV Cefepime from previous treatment   plan has continued with infusion line placement as well    Thank you, please contact me for any questions.   Percy Wasserman RN, [facility]  cell- [phone]  Options provided:  -- UTI due to suprapubic catheter  -- UTI due to ureteral stent  -- UTI due to ureteral stent and suprapubic catheter  -- UTI is not due to ureteral stent and suprapubic catheter  -- Other - I will add my own diagnosis  -- Disagree - Not applicable / Not valid  -- Disagree - Clinically unable to determine / Unknown  -- Refer to Clinical Documentation Reviewer    PROVIDER RESPONSE TEXT:    UTI is due to ureteral stent and suprapubic catheter. Query created by: Jose Henriquez on 2/21/2023 11:22 AM      QUERY TEXT:    Patient admitted with UTI. Noted documentation of coccyx stage 4 pressure   ulcer in H&P and progress notes by primary and wound RN evaluation with   nursing noted as stage 2 coccyx pressure ulcer on 2/14/2023 @ 0711 upon   admission. If possible, please clarify the stage of the coccyx pressure   ulcer that was evaluated and  treated from any of the following: The medical record reflects the following:  Risk Factors: coccyx pressure ulcer, MRDO UTIs, MS with neurogenic bladder and   chronic indwelling suprapubic catheter. dependent on repositioning and does   less than 25% of the work. Clinical Indicators: nursing note on 2/14/2023 - stage 2 pressure ulcer with   clean, dry, intact. Treatment: treatment cleansed with soap and water and barrier film applied for   dressing treatment. Thank you, please contact me for any questions. Bharath Riley RN, [facility]  cell- [phone]  Options provided:  -- coccyx stage 4 pressure ulcer confirmed and stage 2 coccyx pressure ulcer   ruled out  -- stage 2 coccyx pressure ulcer confirmed and coccyx stage 4 pressure ulcer   ruled out  -- Other - I will add my own diagnosis  -- Disagree - Not applicable / Not valid  -- Disagree - Clinically unable to determine / Unknown  -- Refer to Clinical Documentation Reviewer    PROVIDER RESPONSE TEXT:    After study, stage 2 coccyx pressure ulcer confirmed and coccyx stage 4   pressure ulcer ruled out.     Query created by: Jose Henriquez on 2/21/2023 11:31 AM      Electronically signed by:  Elodia Martin NP 2/27/2023 7:12 AM

## 2023-03-13 ENCOUNTER — HOSPITAL ENCOUNTER (OUTPATIENT)
Age: 43
Setting detail: SPECIMEN
Discharge: HOME | End: 2023-03-13
Payer: MEDICARE

## 2023-03-13 LAB
ANION GAP SERPL CALCULATED.3IONS-SCNC: 12 MMOL/L (ref 9–17)
BUN SERPL-MCNC: 16 MG/DL (ref 6–20)
BUN/CREAT BLD: 40 (ref 9–20)
CALCIUM SERPL-MCNC: 9.6 MG/DL (ref 8.6–10.4)
CHLORIDE SERPL-SCNC: 106 MMOL/L (ref 98–107)
CO2 SERPL-SCNC: 22 MMOL/L (ref 20–31)
CREAT SERPL-MCNC: 0.4 MG/DL (ref 0.5–0.9)
GFR SERPL CREATININE-BSD FRML MDRD: >60 ML/MIN/1.73M2
GLUCOSE SERPL-MCNC: 82 MG/DL (ref 70–99)
HCT VFR BLD AUTO: 38.5 % (ref 36.3–47.1)
HGB BLD-MCNC: 11.6 G/DL (ref 11.9–15.1)
MCH RBC QN AUTO: 24.9 PG (ref 25.2–33.5)
MCHC RBC AUTO-ENTMCNC: 30.1 G/DL (ref 28.4–34.8)
MCV RBC AUTO: 82.8 FL (ref 82.6–102.9)
NRBC AUTOMATED: 0 PER 100 WBC
PDW BLD-RTO: 15.3 % (ref 11.8–14.4)
PLATELET # BLD AUTO: 420 K/UL (ref 138–453)
PMV BLD AUTO: 10 FL (ref 8.1–13.5)
POTASSIUM SERPL-SCNC: 4 MMOL/L (ref 3.7–5.3)
PREALB SERPL-MCNC: 19.5 MG/DL (ref 20–40)
RBC # BLD: 4.65 M/UL (ref 3.95–5.11)
SODIUM SERPL-SCNC: 140 MMOL/L (ref 135–144)
WBC # BLD AUTO: 9.8 K/UL (ref 3.5–11.3)

## 2023-03-13 PROCEDURE — P9603 ONE-WAY ALLOW PRORATED MILES: HCPCS

## 2023-03-13 PROCEDURE — 36415 COLL VENOUS BLD VENIPUNCTURE: CPT

## 2023-03-13 PROCEDURE — 85027 COMPLETE CBC AUTOMATED: CPT

## 2023-03-13 PROCEDURE — 84134 ASSAY OF PREALBUMIN: CPT

## 2023-03-13 PROCEDURE — 80048 BASIC METABOLIC PNL TOTAL CA: CPT

## 2023-03-15 PROBLEM — N39.0 UTI (URINARY TRACT INFECTION): Status: RESOLVED | Noted: 2023-02-13 | Resolved: 2023-03-15

## 2023-04-25 ENCOUNTER — HOSPITAL ENCOUNTER (EMERGENCY)
Age: 43
Discharge: HOME | End: 2023-04-25
Attending: EMERGENCY MEDICINE
Payer: MEDICARE

## 2023-04-25 ENCOUNTER — APPOINTMENT (OUTPATIENT)
Dept: CT IMAGING | Age: 43
End: 2023-04-25
Payer: MEDICARE

## 2023-04-25 ENCOUNTER — APPOINTMENT (OUTPATIENT)
Dept: GENERAL RADIOLOGY | Age: 43
End: 2023-04-25
Payer: MEDICARE

## 2023-04-25 VITALS
OXYGEN SATURATION: 98 % | RESPIRATION RATE: 18 BRPM | DIASTOLIC BLOOD PRESSURE: 94 MMHG | HEART RATE: 67 BPM | SYSTOLIC BLOOD PRESSURE: 133 MMHG | TEMPERATURE: 98.1 F

## 2023-04-25 DIAGNOSIS — T83.511A URINARY TRACT INFECTION ASSOCIATED WITH CATHETERIZATION OF URINARY TRACT, UNSPECIFIED INDWELLING URINARY CATHETER TYPE, INITIAL ENCOUNTER (HCC): ICD-10-CM

## 2023-04-25 DIAGNOSIS — R31.0 GROSS HEMATURIA: Primary | ICD-10-CM

## 2023-04-25 DIAGNOSIS — N39.0 URINARY TRACT INFECTION ASSOCIATED WITH CATHETERIZATION OF URINARY TRACT, UNSPECIFIED INDWELLING URINARY CATHETER TYPE, INITIAL ENCOUNTER (HCC): ICD-10-CM

## 2023-04-25 LAB
ABSOLUTE EOS #: 0.77 K/UL (ref 0–0.44)
ABSOLUTE IMMATURE GRANULOCYTE: 0.02 K/UL (ref 0–0.3)
ABSOLUTE LYMPH #: 2.97 K/UL (ref 1.1–3.7)
ABSOLUTE MONO #: 1.21 K/UL (ref 0.1–1.2)
ANION GAP SERPL CALCULATED.3IONS-SCNC: 9 MMOL/L (ref 9–17)
BACTERIA: ABNORMAL
BASOPHILS # BLD: 1 % (ref 0–2)
BASOPHILS ABSOLUTE: 0.05 K/UL (ref 0–0.2)
BILIRUBIN URINE: NEGATIVE
BUN SERPL-MCNC: 12 MG/DL (ref 6–20)
BUN/CREAT BLD: ABNORMAL (ref 9–20)
CALCIUM SERPL-MCNC: 9.3 MG/DL (ref 8.6–10.4)
CHLORIDE SERPL-SCNC: 101 MMOL/L (ref 98–107)
CO2 SERPL-SCNC: 25 MMOL/L (ref 20–31)
COLOR: ABNORMAL
CREAT SERPL-MCNC: <0.4 MG/DL (ref 0.5–0.9)
EOSINOPHILS RELATIVE PERCENT: 8 % (ref 1–4)
EPITHELIAL CELLS UA: ABNORMAL /HPF (ref 0–5)
GFR SERPL CREATININE-BSD FRML MDRD: ABNORMAL ML/MIN/1.73M2
GLUCOSE SERPL-MCNC: 71 MG/DL (ref 70–99)
GLUCOSE UR STRIP.AUTO-MCNC: NEGATIVE MG/DL
HCT VFR BLD AUTO: 37.6 % (ref 36.3–47.1)
HGB BLD-MCNC: 11.2 G/DL (ref 11.9–15.1)
IMMATURE GRANULOCYTES: 0 %
KETONES UR STRIP.AUTO-MCNC: NEGATIVE MG/DL
LEUKOCYTE ESTERASE UR QL STRIP.AUTO: ABNORMAL
LYMPHOCYTES # BLD: 32 % (ref 24–43)
MCH RBC QN AUTO: 23.3 PG (ref 25.2–33.5)
MCHC RBC AUTO-ENTMCNC: 29.8 G/DL (ref 28.4–34.8)
MCV RBC AUTO: 78.3 FL (ref 82.6–102.9)
MONOCYTES # BLD: 13 % (ref 3–12)
NITRITE UR QL STRIP.AUTO: POSITIVE
NRBC AUTOMATED: 0 PER 100 WBC
PDW BLD-RTO: 16.1 % (ref 11.8–14.4)
PLATELET # BLD AUTO: 374 K/UL (ref 138–453)
PMV BLD AUTO: 9.8 FL (ref 8.1–13.5)
POTASSIUM SERPL-SCNC: 3.9 MMOL/L (ref 3.7–5.3)
PROT UR STRIP.AUTO-MCNC: 6.5 MG/DL (ref 5–8)
PROT UR STRIP.AUTO-MCNC: ABNORMAL MG/DL
RBC # BLD: 4.8 M/UL (ref 3.95–5.11)
RBC # BLD: ABNORMAL 10*6/UL
RBC CLUMPS #/AREA URNS AUTO: ABNORMAL /HPF (ref 0–2)
SEG NEUTROPHILS: 46 % (ref 36–65)
SEGMENTED NEUTROPHILS ABSOLUTE COUNT: 4.31 K/UL (ref 1.5–8.1)
SODIUM SERPL-SCNC: 135 MMOL/L (ref 135–144)
SPECIFIC GRAVITY UA: 1.02 (ref 1–1.03)
TURBIDITY: ABNORMAL
URINE HGB: ABNORMAL
UROBILINOGEN, URINE: NORMAL
WBC # BLD AUTO: 9.3 K/UL (ref 3.5–11.3)
WBC UA: ABNORMAL /HPF (ref 0–5)

## 2023-04-25 PROCEDURE — 36415 COLL VENOUS BLD VENIPUNCTURE: CPT

## 2023-04-25 PROCEDURE — 81001 URINALYSIS AUTO W/SCOPE: CPT

## 2023-04-25 PROCEDURE — 6360000002 HC RX W HCPCS: Performed by: EMERGENCY MEDICINE

## 2023-04-25 PROCEDURE — 74018 RADEX ABDOMEN 1 VIEW: CPT

## 2023-04-25 PROCEDURE — 96365 THER/PROPH/DIAG IV INF INIT: CPT

## 2023-04-25 PROCEDURE — 80048 BASIC METABOLIC PNL TOTAL CA: CPT

## 2023-04-25 PROCEDURE — 2580000003 HC RX 258: Performed by: EMERGENCY MEDICINE

## 2023-04-25 PROCEDURE — 43762 RPLC GTUBE NO REVJ TRC: CPT

## 2023-04-25 PROCEDURE — 85025 COMPLETE CBC W/AUTO DIFF WBC: CPT

## 2023-04-25 PROCEDURE — 99284 EMERGENCY DEPT VISIT MOD MDM: CPT

## 2023-04-25 PROCEDURE — 74176 CT ABD & PELVIS W/O CONTRAST: CPT

## 2023-04-25 RX ORDER — CEPHALEXIN 500 MG/1
500 CAPSULE ORAL 2 TIMES DAILY
Qty: 14 CAPSULE | Refills: 0 | Status: SHIPPED | OUTPATIENT
Start: 2023-04-25 | End: 2023-05-02

## 2023-04-25 RX ADMIN — CEFTRIAXONE SODIUM 1000 MG: 1 INJECTION, POWDER, FOR SOLUTION INTRAMUSCULAR; INTRAVENOUS at 03:29

## 2023-04-25 ASSESSMENT — ENCOUNTER SYMPTOMS: VOMITING: 0

## 2023-04-25 NOTE — ED NOTES
Pt arrived to ED by EMS from [facility] with c/o of hematuria in omer. Pt has super pubic catheter with bright red blood in omer bag on arrival. Pt G-tube is also not flushing. Pt is nonverbal. Will continue to monitor pt.       Thor Lopez RN  04/25/23 [address] [facility]  04/25/23 [id number]

## 2023-04-25 NOTE — DISCHARGE INSTRUCTIONS
Patient has ureteral stent and kidney stones likely contributing to hematuria. She should be following with urology. Per records she follows up with urology at [facility]. She needs an appointment with them within the next 1 week if possible. Patient does have evidence of UTI. She should be receiving antibiotics as prescribed. Urinary tract infection can also cause blood in the urine. Patient has a suprapubic catheter. She should be additionally following with urology as she has this chronically. Urine culture sent here from [facility] to assess sensitivity to antibiotics. If you do see large amounts of blood I recommend flushing the catheter and see if flushing the Davies catheter improves the amount blood.

## 2023-04-25 NOTE — ED PROVIDER NOTES
EMERGENCY DEPARTMENT ENCOUNTER    Pt Name: Jihan Boyer  MRN: [id number]  [place] 1980  Date of evaluation: 4/25/23  CHIEF COMPLAINT       Chief Complaint   Patient presents with    Hematuria     HISTORY OF PRESENT ILLNESS   40-year-old female presents emergency room with reports of hematuria from suprapubic Davies. Patient has aphasia. She does not communicate verbally but can shake her head yes or no to questions. Patient is not having pain. She lives at a nursing facility and they noticed hematuria today. Patient has had issues with bleeding from the Davies. She has history of hemorrhagic cystitis and kidney stones. Patient arrives with a note stating that G-tube is clogged. REVIEW OF SYSTEMS     Review of Systems   Constitutional:  Negative for fever. Gastrointestinal:  Negative for vomiting. Genitourinary:  Positive for hematuria. Negative for flank pain.    PASTMEDICAL HISTORY     Past Medical History:   Diagnosis Date    Aphasia     Aphasia     Contracture of joint, lower leg     Depressed     HTN (hypertension)     Hx MRSA infection resolved 1/2017    2 negative nasal screens 1/2017 (hx in heel in 2013)    Hydronephrosis     Movement disorder     tremor    Multiple sclerosis ([address]     Neurogenic bladder     Non-verbal learning disorder     Peripheral vascular disease ([address]     Polyneuropathy     Pressure ulcer     Coccyx, Heel    Pulmonary embolism (HCC)     Pulmonary infarction ([address]     Quadriplegia (HCC)     Tachycardia     UTI (lower urinary tract infection)      Past Problem List  Patient Active Problem List   Diagnosis Code    Aphasia R47.01    Multiple sclerosis ([address] G35    Nonverbal R47.01    Gastrostomy tube dependent ([address] Z93.1    Dislodged gastrostomy tube T85.528A    Neurogenic bladder N31.9    Hemorrhagic cystitis N30.91    Hypokalemia E87.6    Urinary tract infection associated with indwelling urethral catheter ([address] T83.511A, N39.0    Tachycardia R00.0    Gross

## 2023-05-29 ENCOUNTER — APPOINTMENT (OUTPATIENT)
Dept: GENERAL RADIOLOGY | Age: 43
End: 2023-05-29
Payer: MEDICARE

## 2023-05-29 ENCOUNTER — HOSPITAL ENCOUNTER (EMERGENCY)
Age: 43
Discharge: HOME | End: 2023-05-29
Attending: EMERGENCY MEDICINE
Payer: MEDICARE

## 2023-05-29 VITALS
OXYGEN SATURATION: 97 % | HEART RATE: 74 BPM | TEMPERATURE: 98.1 F | RESPIRATION RATE: 13 BRPM | SYSTOLIC BLOOD PRESSURE: 109 MMHG | DIASTOLIC BLOOD PRESSURE: 85 MMHG

## 2023-05-29 DIAGNOSIS — K94.20 COMPLICATION OF GASTROSTOMY TUBE (HCC): Primary | ICD-10-CM

## 2023-05-29 PROCEDURE — 49465 FLUORO EXAM OF G/COLON TUBE: CPT

## 2023-05-29 PROCEDURE — 99283 EMERGENCY DEPT VISIT LOW MDM: CPT

## 2023-05-29 PROCEDURE — 6360000004 HC RX CONTRAST MEDICATION: Performed by: STUDENT IN AN ORGANIZED HEALTH CARE EDUCATION/TRAINING PROGRAM

## 2023-05-29 RX ORDER — WATER 1000 ML/1000ML
5 INJECTION, SOLUTION INTRAVENOUS ONCE
Status: DISCONTINUED | OUTPATIENT
Start: 2023-05-29 | End: 2023-05-29 | Stop reason: HOSPADM

## 2023-05-29 RX ORDER — WATER 1000 ML/1000ML
INJECTION, SOLUTION INTRAVENOUS
Status: DISCONTINUED
Start: 2023-05-29 | End: 2023-05-29 | Stop reason: HOSPADM

## 2023-05-29 RX ADMIN — DIATRIZOATE MEGLUMINE AND DIATRIZOATE SODIUM 30 ML: 660; 100 LIQUID ORAL; RECTAL at 02:55

## 2023-05-29 NOTE — ED NOTES
Patient to ED Rm18 via M18 from SNF due to feeding tube clogged as per EMS that happened today. Patient is unable to speak due to current medical condition. Unable to ask medical history at this time. EMS states the only problem today is patient's feeding tube clogged the rest is patients medical baseline. On full cardiac monitor. Side rails up and locked x2.       Maty Waters RN  05/29/23 7531

## 2023-05-29 NOTE — DISCHARGE INSTRUCTIONS
G tube was replaced with a new 16 Fr. Balloon was inflated with 5cc of sterile water and placement confirmed by Xray. Return Krystal Castillo to the emergency department if she develops worsening issues with the G-tube, unable to flush nutrition or medication through, or if she develops fevers, change in vital signs, change in behavior or any other sign or symptom of concern.

## 2023-05-29 NOTE — ED NOTES
Dr. Josie Su at bedside, unsuccessful to [facility][1] the G-tube.      Derrick Colón RN  05/29/23 2264

## 2023-05-29 NOTE — ED NOTES
Patrick consulted for transportation assistance back to [facility] per request of Dr. Cassell Cheadle . Transport set up back to [address]. ETA [id number]. PATRICK spoke with [facility] and informed of ETA.  Pt going to room [address] [place] [address]  05/29/23 8292

## 2023-05-29 NOTE — ED PROVIDER NOTES
And[facility]illing[facility]   Emergency Department  Faculty Attestation       I performed a history and physical examination of the patient and discussed management with the resident. I reviewed the residents note and agree with the documented findings including all diagnostic interpretations and plan of care. Any areas of disagreement are noted on the chart. I was personally present for the key portions of any procedures. I have documented in the chart those procedures where I was not present during the key portions. I have reviewed the emergency nurses triage note. I agree with the chief complaint, past medical history, past surgical history, allergies, medications, social and family history as documented unless otherwise noted below. For Physician Assistant/ Nurse Practitioner cases/documentation I have personally evaluated this patient and have completed at least one if not all key elements of the E/M (history, physical exam, and MDM). Additional findings are as noted. Patient Name: Dimitrios Joseph  MRN: [id number]  : 1980  Primary Care Physician: Carla Wong MD    Date of evaluationa: 2023   Note Started: 3:35 PM EDT    Pertinent Comments     Chief Complaint:   Chief Complaint   Patient presents with    G Tube Complications     Clogged (Pt's living at [facility])        Initial vitals: (If not listed, please see nursing documentation)  ED Triage Vitals [23 0049]   BP Temp Temp Source Pulse Respirations SpO2 Height Weight   98/68 98.1 °F (36.7 °C) Oral 66 14 98 % -- --        HPI/PE/Impression: This is a 43 y.o. female who presents to the Emergency Department from [facility] for clogged G tube. On exam patient is nonverbal resting comfortably in no distress.   G tube with obvious clog on exam. No surrounding edema or tenderness    Medical Decision Making  G tube clogged  Attempted to flush and then residnet attempted with dark soda  Unable to unclog    Replaced with a 16 F Michael G Tube  G tube placed
[facility] ED  Emergency Department Encounter  Emergency Medicine Resident     Pt Name:Emily Quintanilla  MRN: [id number]  [place] 1980  Date of evaluation: 5/29/23  PCP:  Carolyn Bob MD  Note Started: 12:57 AM EDT      [facility]       Chief Complaint   Patient presents with    G Tube Complications     Clogged (Pt's living at SNF)       HISTORY OF PRESENT ILLNESS  (Location/Symptom, Timing/Onset, Context/Setting, Quality, Duration, Modifying Factors, Severity.)      Minda Bennett is a 43 y.o. female who presents with G-tube. Patient brought in by EMS from [address] [place] [address] for concerns for unable to pass meds and feeding through tube. No other reported concerns. Patient has a history of aphasia, quadriplegia, PE on Xarelto, is nonverbal, multiple sclerosis. PAST MEDICAL / SURGICAL / SOCIAL / FAMILY HISTORY      has a past medical history of Aphasia, Aphasia, Contracture of joint, lower leg, Depressed, HTN (hypertension), Hx MRSA infection, Hydronephrosis, Movement disorder, Multiple sclerosis (HCC), Neurogenic bladder, Non-verbal learning disorder, Peripheral vascular disease ([address] Polyneuropathy, Pressure ulcer, Pulmonary embolism ([address] Pulmonary infarction ([address] Quadriplegia ([address] Tachycardia, and UTI (lower urinary tract infection). has a past surgical history that includes Gastrostomy tube placement; colostomy; amputation (Right); Leg amputation through femur; IR GUIDED NEPHROSTOMY CATH PLACEMENT RIGHT (9/27/2020); and Cystoscopy (Right, 12/3/2022). Social History     Socioeconomic History    Marital status: Single     Spouse name: Not on file    Number of children: Not on file    Years of education: Not on file    Highest education level: Not on file   Occupational History    Not on file   Tobacco Use    Smoking status: Never    Smokeless tobacco: Never   Vaping Use    Vaping Use: Never used   Substance and Sexual Activity    Alcohol use: No    Drug use:  No
[id number]  [phone]    If symptoms worsen     DISCHARGE MEDICATIONS: New Prescriptions    No medications on file          [facility],   Emergency Medicine Resident  [address] [facility], [address] [facility]  Resident  05/29/23 9958

## 2023-06-23 ENCOUNTER — HOSPITAL ENCOUNTER (OUTPATIENT)
Age: 43
Setting detail: SPECIMEN
Discharge: HOME | End: 2023-06-23

## 2023-06-23 PROCEDURE — 81001 URINALYSIS AUTO W/SCOPE: CPT

## 2023-06-23 PROCEDURE — 87086 URINE CULTURE/COLONY COUNT: CPT

## 2023-06-24 LAB
BACTERIA URNS QL MICRO: ABNORMAL
BILIRUB UR QL STRIP: NEGATIVE
CLARITY UR: ABNORMAL
COLOR UR: YELLOW
EPI CELLS #/AREA URNS HPF: ABNORMAL /HPF (ref 0–5)
GLUCOSE UR STRIP-MCNC: NEGATIVE MG/DL
HGB UR QL STRIP.AUTO: ABNORMAL
KETONES UR STRIP-MCNC: NEGATIVE MG/DL
LEUKOCYTE ESTERASE UR QL STRIP: ABNORMAL
NITRITE UR QL STRIP: NEGATIVE
PH UR STRIP: 8 [PH] (ref 5–8)
PROT UR STRIP-MCNC: ABNORMAL MG/DL
RBC #/AREA URNS HPF: ABNORMAL /HPF (ref 0–2)
SP GR UR STRIP: 1.01 (ref 1–1.03)
UROBILINOGEN UR STRIP-ACNC: NORMAL
WBC #/AREA URNS HPF: ABNORMAL /HPF (ref 0–5)

## 2023-06-25 LAB
MICROORGANISM SPEC CULT: NORMAL
SERVICE CMNT-IMP: NORMAL
SPECIMEN DESCRIPTION: NORMAL

## 2023-07-09 ENCOUNTER — HOSPITAL ENCOUNTER (OUTPATIENT)
Age: 43
Setting detail: SPECIMEN
Discharge: HOME | End: 2023-07-09

## 2023-07-09 LAB
BACTERIA URNS QL MICRO: ABNORMAL
BILIRUB UR QL STRIP: ABNORMAL
CLARITY UR: ABNORMAL
COLOR UR: ABNORMAL
EPI CELLS #/AREA URNS HPF: ABNORMAL /HPF (ref 0–5)
GLUCOSE UR STRIP-MCNC: NEGATIVE MG/DL
HGB UR QL STRIP.AUTO: ABNORMAL
KETONES UR STRIP-MCNC: NEGATIVE MG/DL
LEUKOCYTE ESTERASE UR QL STRIP: ABNORMAL
NITRITE UR QL STRIP: POSITIVE
PH UR STRIP: 6.5 [PH] (ref 5–8)
PROT UR STRIP-MCNC: ABNORMAL MG/DL
RBC #/AREA URNS HPF: ABNORMAL /HPF (ref 0–2)
SP GR UR STRIP: 1.02 (ref 1–1.03)
UROBILINOGEN UR STRIP-ACNC: NORMAL
WBC #/AREA URNS HPF: ABNORMAL /HPF (ref 0–5)

## 2023-07-09 PROCEDURE — 81001 URINALYSIS AUTO W/SCOPE: CPT

## 2023-07-09 PROCEDURE — 87086 URINE CULTURE/COLONY COUNT: CPT

## 2023-07-10 ENCOUNTER — HOSPITAL ENCOUNTER (OUTPATIENT)
Age: 43
Setting detail: SPECIMEN
Discharge: HOME | End: 2023-07-10
Payer: MEDICARE

## 2023-07-10 LAB
ANION GAP SERPL CALCULATED.3IONS-SCNC: 12 MMOL/L (ref 9–17)
BASOPHILS # BLD: 0.04 K/UL (ref 0–0.2)
BASOPHILS NFR BLD: 1 % (ref 0–2)
BUN SERPL-MCNC: 12 MG/DL (ref 6–20)
CHLORIDE SERPL-SCNC: 102 MMOL/L (ref 98–107)
CO2 SERPL-SCNC: 24 MMOL/L (ref 20–31)
CREAT SERPL-MCNC: <0.4 MG/DL (ref 0.5–0.9)
EOSINOPHIL # BLD: 0.52 K/UL (ref 0–0.44)
EOSINOPHILS RELATIVE PERCENT: 7 % (ref 1–4)
ERYTHROCYTE [DISTWIDTH] IN BLOOD BY AUTOMATED COUNT: 19.7 % (ref 11.8–14.4)
GFR SERPL CREATININE-BSD FRML MDRD: ABNORMAL ML/MIN/1.73M2
HCT VFR BLD AUTO: 41.4 % (ref 36.3–47.1)
HGB BLD-MCNC: 12.4 G/DL (ref 11.9–15.1)
IMM GRANULOCYTES # BLD AUTO: 0.02 K/UL (ref 0–0.3)
IMM GRANULOCYTES NFR BLD: 0 %
LYMPHOCYTES # BLD: 27 % (ref 24–43)
LYMPHOCYTES NFR BLD: 2.12 K/UL (ref 1.1–3.7)
MCH RBC QN AUTO: 23.3 PG (ref 25.2–33.5)
MCHC RBC AUTO-ENTMCNC: 30 G/DL (ref 28.4–34.8)
MCV RBC AUTO: 77.7 FL (ref 82.6–102.9)
MONOCYTES NFR BLD: 0.75 K/UL (ref 0.1–1.2)
MONOCYTES NFR BLD: 10 % (ref 3–12)
NEUTROPHILS NFR BLD: 55 % (ref 36–65)
NEUTS SEG NFR BLD: 4.42 K/UL (ref 1.5–8.1)
NRBC BLD-RTO: 0 PER 100 WBC
PLATELET # BLD AUTO: 469 K/UL (ref 138–453)
PMV BLD AUTO: 11 FL (ref 8.1–13.5)
POTASSIUM SERPL-SCNC: 3.9 MMOL/L (ref 3.7–5.3)
RBC # BLD AUTO: 5.33 M/UL (ref 3.95–5.11)
RBC # BLD: ABNORMAL 10*6/UL
SODIUM SERPL-SCNC: 138 MMOL/L (ref 135–144)
URATE SERPL-MCNC: 2.7 MG/DL (ref 2.4–5.7)
WBC OTHER # BLD: 7.9 K/UL (ref 3.5–11.3)

## 2023-07-10 PROCEDURE — 84520 ASSAY OF UREA NITROGEN: CPT

## 2023-07-10 PROCEDURE — 82565 ASSAY OF CREATININE: CPT

## 2023-07-10 PROCEDURE — 80051 ELECTROLYTE PANEL: CPT

## 2023-07-10 PROCEDURE — 85027 COMPLETE CBC AUTOMATED: CPT

## 2023-07-10 PROCEDURE — P9603 ONE-WAY ALLOW PRORATED MILES: HCPCS

## 2023-07-10 PROCEDURE — 36415 COLL VENOUS BLD VENIPUNCTURE: CPT

## 2023-07-10 PROCEDURE — 84550 ASSAY OF BLOOD/URIC ACID: CPT

## 2023-07-11 LAB
MICROORGANISM SPEC CULT: NORMAL
SERVICE CMNT-IMP: NORMAL
SPECIMEN DESCRIPTION: NORMAL

## 2023-08-09 ENCOUNTER — HOSPITAL ENCOUNTER (EMERGENCY)
Age: 43
Discharge: NURSING, REHAB OR LONG-TERM CARE | End: 2023-08-09
Attending: EMERGENCY MEDICINE
Payer: MEDICARE

## 2023-08-09 ENCOUNTER — APPOINTMENT (OUTPATIENT)
Dept: GENERAL RADIOLOGY | Age: 43
End: 2023-08-09
Payer: MEDICARE

## 2023-08-09 VITALS
HEART RATE: 73 BPM | TEMPERATURE: 98 F | DIASTOLIC BLOOD PRESSURE: 88 MMHG | RESPIRATION RATE: 20 BRPM | OXYGEN SATURATION: 99 % | SYSTOLIC BLOOD PRESSURE: 132 MMHG

## 2023-08-09 DIAGNOSIS — Z43.1 ATTENTION TO G-TUBE (HCC): Primary | ICD-10-CM

## 2023-08-09 PROCEDURE — 43762 RPLC GTUBE NO REVJ TRC: CPT

## 2023-08-09 PROCEDURE — 99283 EMERGENCY DEPT VISIT LOW MDM: CPT

## 2023-08-09 PROCEDURE — 49465 FLUORO EXAM OF G/COLON TUBE: CPT

## 2023-08-09 PROCEDURE — 6360000004 HC RX CONTRAST MEDICATION: Performed by: EMERGENCY MEDICINE

## 2023-08-09 RX ORDER — HYDROCODONE BITARTRATE AND ACETAMINOPHEN 5; 325 MG/1; MG/1
1 TABLET ORAL EVERY 6 HOURS PRN
COMMUNITY

## 2023-08-09 RX ADMIN — DIATRIZOATE MEGLUMINE AND DIATRIZOATE SODIUM 30 ML: 660; 100 LIQUID ORAL; RECTAL at 13:02

## 2023-08-09 NOTE — ED PROVIDER NOTES
[address] [place] [address] ED  EMERGENCY DEPARTMENT ENCOUNTER      Pt Name: Angie Ellis  ZDN:[id number]  [address] [facility] 1980  Date of evaluation: 8/9/23      CHIEF COMPLAINT:   Chief Complaint   Patient presents with    G Tube Complications     Clogged g-tube     HISTORY OF PRESENT ILLNESS    Angie Ellis is a 43 y.o. female who presents with severe baseline medical problems including essentially quadriplegia with multiple sclerosis and alert and oriented x 1 baseline. Patient has a G-tube that becomes intermittently clogged occasionally needing replacement versus just flushing or dark-cola infusion. Same is going on with her G-tube at this time. No other complaints per facility. Symptoms are mild at this time with normal vital signs        REVIEW OF SYSTEMS(2-9 systemsfor level 4, 10 or more for level 5)     Review of Systems   Unable to perform ROS: Other     PASTMEDICALHISTORY   PMH:  has a past medical history of Aphasia, Aphasia, Contracture of joint, lower leg, Depressed, HTN (hypertension), Hx MRSA infection, Hydronephrosis, Movement disorder, Multiple sclerosis (HCC), Neurogenic bladder, Non-verbal learning disorder, Peripheral vascular disease ([address]), Polyneuropathy, Pressure ulcer, Pulmonary embolism ([address]), Pulmonary infarction ([address]), Quadriplegia ([address]), Tachycardia, and UTI (lower urinary tract infection). Surgical History:  has a past surgical history that includes Gastrostomy tube placement; colostomy; amputation (Right); Leg amputation through femur; IR GUIDED NEPHROSTOMY CATH PLACEMENT RIGHT (9/27/2020); and Cystoscopy (Right, 12/3/2022). Social History: reports that she has never smoked. She has never used smokeless tobacco. She reports that she does not drink alcohol and does not use drugs. Family History: Noncontributory at this time  Psychiatric History: Noncontributory at this time    Allergies:has No Known Allergies.       PHYSICALEXAM  (up to 7 for level 4, 8 or more for level

## 2023-08-09 NOTE — ED NOTES
Patient presents to ED via EMS from [facility] with complaints of a G-tube that is intermittently clogged. Patient also is essentially quadriplegic with multiple sclerosis and is alert and oriented x1 at baseline. Upon assessment, writer is unable to flush g-tube and also attempted to infuse dark cola which also failed. There are no other complaints per facility.       Kendall Crespo RN  08/09/23 3529

## 2023-08-10 ENCOUNTER — HOSPITAL ENCOUNTER (OUTPATIENT)
Age: 43
Setting detail: SPECIMEN
Discharge: HOME | End: 2023-08-10
Payer: MEDICARE

## 2023-08-10 LAB
ALBUMIN SERPL-MCNC: 4 G/DL (ref 3.5–5.2)
ALP SERPL-CCNC: 71 U/L (ref 35–104)
ALT SERPL-CCNC: 18 U/L (ref 5–33)
ANION GAP SERPL CALCULATED.3IONS-SCNC: 11 MMOL/L (ref 9–17)
AST SERPL-CCNC: 20 U/L
BILIRUB SERPL-MCNC: 0.2 MG/DL (ref 0.3–1.2)
BUN SERPL-MCNC: 12 MG/DL (ref 6–20)
BUN/CREAT SERPL: ABNORMAL (ref 9–20)
CALCIUM SERPL-MCNC: 10.1 MG/DL (ref 8.6–10.4)
CHLORIDE SERPL-SCNC: 105 MMOL/L (ref 98–107)
CO2 SERPL-SCNC: 25 MMOL/L (ref 20–31)
CREAT SERPL-MCNC: <0.4 MG/DL (ref 0.5–0.9)
ERYTHROCYTE [DISTWIDTH] IN BLOOD BY AUTOMATED COUNT: 19.2 % (ref 11.8–14.4)
GFR SERPL CREATININE-BSD FRML MDRD: ABNORMAL ML/MIN/1.73M2
GLUCOSE SERPL-MCNC: 75 MG/DL (ref 70–99)
HCT VFR BLD AUTO: 43.6 % (ref 36.3–47.1)
HGB BLD-MCNC: 13.1 G/DL (ref 11.9–15.1)
MCH RBC QN AUTO: 23.7 PG (ref 25.2–33.5)
MCHC RBC AUTO-ENTMCNC: 30 G/DL (ref 28.4–34.8)
MCV RBC AUTO: 79 FL (ref 82.6–102.9)
NRBC BLD-RTO: 0 PER 100 WBC
PLATELET # BLD AUTO: 370 K/UL (ref 138–453)
PMV BLD AUTO: 10.6 FL (ref 8.1–13.5)
POTASSIUM SERPL-SCNC: 4.3 MMOL/L (ref 3.7–5.3)
PROT SERPL-MCNC: 9.2 G/DL (ref 6.4–8.3)
RBC # BLD AUTO: 5.52 M/UL (ref 3.95–5.11)
SODIUM SERPL-SCNC: 141 MMOL/L (ref 135–144)
WBC OTHER # BLD: 8.2 K/UL (ref 3.5–11.3)

## 2023-08-10 PROCEDURE — 36415 COLL VENOUS BLD VENIPUNCTURE: CPT

## 2023-08-10 PROCEDURE — 80053 COMPREHEN METABOLIC PANEL: CPT

## 2023-08-10 PROCEDURE — P9603 ONE-WAY ALLOW PRORATED MILES: HCPCS

## 2023-08-10 PROCEDURE — 85027 COMPLETE CBC AUTOMATED: CPT

## 2023-09-28 ENCOUNTER — HOSPITAL ENCOUNTER (OUTPATIENT)
Age: 43
Setting detail: SPECIMEN
Discharge: HOME | End: 2023-09-28
Payer: MEDICARE

## 2023-09-28 LAB
ANION GAP SERPL CALCULATED.3IONS-SCNC: 13 MMOL/L (ref 9–17)
BUN SERPL-MCNC: 12 MG/DL (ref 6–20)
BUN/CREAT SERPL: ABNORMAL (ref 9–20)
CALCIUM SERPL-MCNC: 9.8 MG/DL (ref 8.6–10.4)
CHLORIDE SERPL-SCNC: 101 MMOL/L (ref 98–107)
CO2 SERPL-SCNC: 24 MMOL/L (ref 20–31)
CREAT SERPL-MCNC: <0.4 MG/DL (ref 0.5–0.9)
ERYTHROCYTE [DISTWIDTH] IN BLOOD BY AUTOMATED COUNT: 18.6 % (ref 11.8–14.4)
GFR SERPL CREATININE-BSD FRML MDRD: ABNORMAL ML/MIN/1.73M2
GLUCOSE SERPL-MCNC: 76 MG/DL (ref 70–99)
HCT VFR BLD AUTO: 38.9 % (ref 36.3–47.1)
HGB BLD-MCNC: 11.8 G/DL (ref 11.9–15.1)
MCH RBC QN AUTO: 23.9 PG (ref 25.2–33.5)
MCHC RBC AUTO-ENTMCNC: 30.3 G/DL (ref 28.4–34.8)
MCV RBC AUTO: 78.9 FL (ref 82.6–102.9)
NRBC BLD-RTO: 0 PER 100 WBC
PLATELET # BLD AUTO: 353 K/UL (ref 138–453)
PMV BLD AUTO: 11.1 FL (ref 8.1–13.5)
POTASSIUM SERPL-SCNC: 4.2 MMOL/L (ref 3.7–5.3)
RBC # BLD AUTO: 4.93 M/UL (ref 3.95–5.11)
SODIUM SERPL-SCNC: 138 MMOL/L (ref 135–144)
WBC OTHER # BLD: 8.3 K/UL (ref 3.5–11.3)

## 2023-09-28 PROCEDURE — 36415 COLL VENOUS BLD VENIPUNCTURE: CPT

## 2023-09-28 PROCEDURE — P9603 ONE-WAY ALLOW PRORATED MILES: HCPCS

## 2023-09-28 PROCEDURE — 85027 COMPLETE CBC AUTOMATED: CPT

## 2023-09-28 PROCEDURE — 87086 URINE CULTURE/COLONY COUNT: CPT

## 2023-09-28 PROCEDURE — 80048 BASIC METABOLIC PNL TOTAL CA: CPT

## 2023-09-30 LAB
MICROORGANISM SPEC CULT: NORMAL
SERVICE CMNT-IMP: NORMAL
SPECIMEN DESCRIPTION: NORMAL

## 2023-10-31 ENCOUNTER — HOSPITAL ENCOUNTER (OUTPATIENT)
Age: 43
Setting detail: SPECIMEN
Discharge: HOME | End: 2023-10-31
Payer: MEDICARE

## 2023-10-31 LAB
ANION GAP SERPL CALCULATED.3IONS-SCNC: 10 MMOL/L (ref 9–17)
BUN SERPL-MCNC: 11 MG/DL (ref 6–20)
BUN/CREAT SERPL: ABNORMAL (ref 9–20)
CALCIUM SERPL-MCNC: 9.8 MG/DL (ref 8.6–10.4)
CHLORIDE SERPL-SCNC: 103 MMOL/L (ref 98–107)
CO2 SERPL-SCNC: 26 MMOL/L (ref 20–31)
CREAT SERPL-MCNC: <0.4 MG/DL (ref 0.5–0.9)
ERYTHROCYTE [DISTWIDTH] IN BLOOD BY AUTOMATED COUNT: 19.5 % (ref 11.8–14.4)
GFR SERPL CREATININE-BSD FRML MDRD: ABNORMAL ML/MIN/1.73M2
GLUCOSE SERPL-MCNC: 67 MG/DL (ref 70–99)
HCT VFR BLD AUTO: 43.3 % (ref 36.3–47.1)
HGB BLD-MCNC: 13 G/DL (ref 11.9–15.1)
MCH RBC QN AUTO: 23.9 PG (ref 25.2–33.5)
MCHC RBC AUTO-ENTMCNC: 30 G/DL (ref 28.4–34.8)
MCV RBC AUTO: 79.7 FL (ref 82.6–102.9)
NRBC BLD-RTO: 0 PER 100 WBC
PLATELET # BLD AUTO: 332 K/UL (ref 138–453)
PMV BLD AUTO: 10.6 FL (ref 8.1–13.5)
POTASSIUM SERPL-SCNC: 4.2 MMOL/L (ref 3.7–5.3)
RBC # BLD AUTO: 5.43 M/UL (ref 3.95–5.11)
SODIUM SERPL-SCNC: 139 MMOL/L (ref 135–144)
WBC OTHER # BLD: 7.2 K/UL (ref 3.5–11.3)

## 2023-10-31 PROCEDURE — 85027 COMPLETE CBC AUTOMATED: CPT

## 2023-10-31 PROCEDURE — 80048 BASIC METABOLIC PNL TOTAL CA: CPT

## 2023-10-31 PROCEDURE — 36415 COLL VENOUS BLD VENIPUNCTURE: CPT

## 2023-10-31 PROCEDURE — P9603 ONE-WAY ALLOW PRORATED MILES: HCPCS

## 2023-11-01 ENCOUNTER — HOSPITAL ENCOUNTER (OUTPATIENT)
Age: 43
Setting detail: SPECIMEN
Discharge: HOME | End: 2023-11-01
Payer: MEDICARE

## 2023-11-01 LAB
ALBUMIN SERPL-MCNC: 3.8 G/DL (ref 3.5–5.2)
ALP SERPL-CCNC: 79 U/L (ref 35–104)
ALT SERPL-CCNC: 17 U/L (ref 5–33)
ANION GAP SERPL CALCULATED.3IONS-SCNC: 13 MMOL/L (ref 9–17)
AST SERPL-CCNC: 20 U/L
BACTERIA URNS QL MICRO: ABNORMAL
BILIRUB SERPL-MCNC: 0.2 MG/DL (ref 0.3–1.2)
BILIRUB UR QL STRIP: NEGATIVE
BUN SERPL-MCNC: 9 MG/DL (ref 6–20)
BUN/CREAT SERPL: ABNORMAL (ref 9–20)
CALCIUM SERPL-MCNC: 9.9 MG/DL (ref 8.6–10.4)
CHLORIDE SERPL-SCNC: 103 MMOL/L (ref 98–107)
CLARITY UR: ABNORMAL
CO2 SERPL-SCNC: 20 MMOL/L (ref 20–31)
COLOR UR: YELLOW
CREAT SERPL-MCNC: <0.4 MG/DL (ref 0.5–0.9)
CRYSTALS URNS MICRO: ABNORMAL /HPF
EPI CELLS #/AREA URNS HPF: ABNORMAL /HPF (ref 0–5)
ERYTHROCYTE [DISTWIDTH] IN BLOOD BY AUTOMATED COUNT: 19.5 % (ref 11.8–14.4)
GFR SERPL CREATININE-BSD FRML MDRD: ABNORMAL ML/MIN/1.73M2
GLUCOSE SERPL-MCNC: 65 MG/DL (ref 70–99)
GLUCOSE UR STRIP-MCNC: NEGATIVE MG/DL
HCT VFR BLD AUTO: 45.3 % (ref 36.3–47.1)
HGB BLD-MCNC: 13.7 G/DL (ref 11.9–15.1)
HGB UR QL STRIP.AUTO: ABNORMAL
KETONES UR STRIP-MCNC: NEGATIVE MG/DL
LEUKOCYTE ESTERASE UR QL STRIP: ABNORMAL
MCH RBC QN AUTO: 24.2 PG (ref 25.2–33.5)
MCHC RBC AUTO-ENTMCNC: 30.2 G/DL (ref 28.4–34.8)
MCV RBC AUTO: 80.2 FL (ref 82.6–102.9)
NITRITE UR QL STRIP: POSITIVE
NRBC BLD-RTO: 0 PER 100 WBC
PH UR STRIP: 6.5 [PH] (ref 5–8)
PLATELET # BLD AUTO: 303 K/UL (ref 138–453)
PMV BLD AUTO: 11.8 FL (ref 8.1–13.5)
POTASSIUM SERPL-SCNC: 4.3 MMOL/L (ref 3.7–5.3)
PROT SERPL-MCNC: 8.8 G/DL (ref 6.4–8.3)
PROT UR STRIP-MCNC: NEGATIVE MG/DL
RBC # BLD AUTO: 5.65 M/UL (ref 3.95–5.11)
RBC #/AREA URNS HPF: ABNORMAL /HPF (ref 0–2)
SODIUM SERPL-SCNC: 136 MMOL/L (ref 135–144)
SP GR UR STRIP: 1.01 (ref 1–1.03)
UROBILINOGEN UR STRIP-ACNC: NORMAL EU/DL (ref 0–1)
WBC #/AREA URNS HPF: ABNORMAL /HPF (ref 0–5)
WBC OTHER # BLD: 6.3 K/UL (ref 3.5–11.3)

## 2023-11-01 PROCEDURE — 80053 COMPREHEN METABOLIC PANEL: CPT

## 2023-11-01 PROCEDURE — P9603 ONE-WAY ALLOW PRORATED MILES: HCPCS

## 2023-11-01 PROCEDURE — 87086 URINE CULTURE/COLONY COUNT: CPT

## 2023-11-01 PROCEDURE — 81001 URINALYSIS AUTO W/SCOPE: CPT

## 2023-11-01 PROCEDURE — 36415 COLL VENOUS BLD VENIPUNCTURE: CPT

## 2023-11-01 PROCEDURE — 85027 COMPLETE CBC AUTOMATED: CPT

## 2023-11-02 LAB
MICROORGANISM SPEC CULT: NORMAL
SERVICE CMNT-IMP: NORMAL
SPECIMEN DESCRIPTION: NORMAL

## 2023-11-03 ENCOUNTER — HOSPITAL ENCOUNTER (OUTPATIENT)
Age: 43
Setting detail: SPECIMEN
Discharge: HOME | End: 2023-11-03

## 2023-11-03 LAB
ALBUMIN SERPL-MCNC: 4.4 G/DL (ref 3.5–5.2)
ALP SERPL-CCNC: 81 U/L (ref 35–104)
ALT SERPL-CCNC: 16 U/L (ref 5–33)
ANION GAP SERPL CALCULATED.3IONS-SCNC: 19 MMOL/L (ref 9–17)
AST SERPL-CCNC: 15 U/L
BILIRUB SERPL-MCNC: 0.3 MG/DL (ref 0.3–1.2)
BUN SERPL-MCNC: 23 MG/DL (ref 6–20)
BUN/CREAT SERPL: 29 (ref 9–20)
CALCIUM SERPL-MCNC: 10.9 MG/DL (ref 8.6–10.4)
CHLORIDE SERPL-SCNC: 101 MMOL/L (ref 98–107)
CO2 SERPL-SCNC: 20 MMOL/L (ref 20–31)
CREAT SERPL-MCNC: 0.8 MG/DL (ref 0.5–0.9)
ERYTHROCYTE [DISTWIDTH] IN BLOOD BY AUTOMATED COUNT: 20.4 % (ref 11.8–14.4)
GFR SERPL CREATININE-BSD FRML MDRD: >60 ML/MIN/1.73M2
GLUCOSE SERPL-MCNC: 110 MG/DL (ref 70–99)
HCT VFR BLD AUTO: 50.3 % (ref 36.3–47.1)
HGB BLD-MCNC: 15.6 G/DL (ref 11.9–15.1)
MCH RBC QN AUTO: 23.9 PG (ref 25.2–33.5)
MCHC RBC AUTO-ENTMCNC: 31 G/DL (ref 28.4–34.8)
MCV RBC AUTO: 77.1 FL (ref 82.6–102.9)
NRBC BLD-RTO: 0 PER 100 WBC
PLATELET # BLD AUTO: 464 K/UL (ref 138–453)
PMV BLD AUTO: 10.9 FL (ref 8.1–13.5)
POTASSIUM SERPL-SCNC: 3.6 MMOL/L (ref 3.7–5.3)
PROT SERPL-MCNC: 10.1 G/DL (ref 6.4–8.3)
RBC # BLD AUTO: 6.52 M/UL (ref 3.95–5.11)
SODIUM SERPL-SCNC: 140 MMOL/L (ref 135–144)
WBC OTHER # BLD: 17.9 K/UL (ref 3.5–11.3)

## 2023-11-03 PROCEDURE — 80053 COMPREHEN METABOLIC PANEL: CPT

## 2023-11-03 PROCEDURE — 36415 COLL VENOUS BLD VENIPUNCTURE: CPT

## 2023-11-03 PROCEDURE — P9603 ONE-WAY ALLOW PRORATED MILES: HCPCS

## 2023-11-03 PROCEDURE — 85027 COMPLETE CBC AUTOMATED: CPT

## 2023-11-13 ENCOUNTER — HOSPITAL ENCOUNTER (OUTPATIENT)
Age: 43
Setting detail: SPECIMEN
Discharge: HOME | End: 2023-11-13
Payer: MEDICARE

## 2023-11-13 LAB
ANION GAP SERPL CALCULATED.3IONS-SCNC: 10 MMOL/L (ref 9–17)
BUN SERPL-MCNC: 8 MG/DL (ref 6–20)
BUN/CREAT SERPL: ABNORMAL (ref 9–20)
CALCIUM SERPL-MCNC: 9.6 MG/DL (ref 8.6–10.4)
CHLORIDE SERPL-SCNC: 104 MMOL/L (ref 98–107)
CO2 SERPL-SCNC: 25 MMOL/L (ref 20–31)
CREAT SERPL-MCNC: <0.4 MG/DL (ref 0.5–0.9)
ERYTHROCYTE [DISTWIDTH] IN BLOOD BY AUTOMATED COUNT: 19.7 % (ref 11.8–14.4)
GFR SERPL CREATININE-BSD FRML MDRD: ABNORMAL ML/MIN/1.73M2
GLUCOSE SERPL-MCNC: 89 MG/DL (ref 70–99)
HCT VFR BLD AUTO: 39.6 % (ref 36.3–47.1)
HGB BLD-MCNC: 12.3 G/DL (ref 11.9–15.1)
MCH RBC QN AUTO: 24.5 PG (ref 25.2–33.5)
MCHC RBC AUTO-ENTMCNC: 31.1 G/DL (ref 28.4–34.8)
MCV RBC AUTO: 78.7 FL (ref 82.6–102.9)
NRBC BLD-RTO: 0 PER 100 WBC
PLATELET # BLD AUTO: 387 K/UL (ref 138–453)
PMV BLD AUTO: 10.7 FL (ref 8.1–13.5)
POTASSIUM SERPL-SCNC: 3.8 MMOL/L (ref 3.7–5.3)
RBC # BLD AUTO: 5.03 M/UL (ref 3.95–5.11)
SODIUM SERPL-SCNC: 139 MMOL/L (ref 135–144)
WBC OTHER # BLD: 7.3 K/UL (ref 3.5–11.3)

## 2023-11-13 PROCEDURE — 80048 BASIC METABOLIC PNL TOTAL CA: CPT

## 2023-11-13 PROCEDURE — 85027 COMPLETE CBC AUTOMATED: CPT

## 2023-11-13 PROCEDURE — 36415 COLL VENOUS BLD VENIPUNCTURE: CPT

## 2023-11-13 PROCEDURE — P9603 ONE-WAY ALLOW PRORATED MILES: HCPCS

## 2023-12-03 NOTE — ED PROVIDER NOTES
[facility]     Emergency Department     Faculty Note/ Attestation      Pt Name: Kaylah Thompson                                       MRN: [id number]  [place] 1980  Date of evaluation: 2/13/2023    Patients PCP:    Laura Heaton MD    Attestation  I performed a history and physical examination of the patient and discussed management with the resident. I reviewed the residents note and agree with the documented findings and plan of care. Any areas of disagreement are noted on the chart. I was personally present for the key portions of any procedures. I have documented in the chart those procedures where I was not present during the key portions. I have reviewed the emergency nurses triage note. I agree with the chief complaint, past medical history, past surgical history, allergies, medications, social and family history as documented unless otherwise noted below. For Physician Assistant/ Nurse Practitioner cases/documentation I have personally evaluated this patient and have completed at least one if not all key elements of the E/M (history, physical exam, and MDM). Additional findings are as noted. Initial Screens:        Tomah Coma Scale  Eye Opening: Spontaneous  Best Verbal Response: None  Best Motor Response: Obeys commands  Tomah Coma Scale Score: 11    Vitals:    Vitals:    02/13/23 1847   BP: (!) 115/99   Pulse: 87   Resp: 14   Temp: 99.3 °F (37.4 °C)   TempSrc: Oral   SpO2: 92%   Weight: 200 lb (90.7 kg)       CHIEF COMPLAINT       Chief Complaint   Patient presents with    Hematuria     From [facility]     Patient arrives with purple urine and is concerned from the staff for possible urinary tract infection and she was concerning due to amount of blood in urine with the blood thinner. The pt denying pain in chest or abdomen.       EMERGENCY DEPARTMENT COURSE:     -------------------------  BP: (!) 115/99, Temp: 99.3 °F (37.4 °C), Heart Rate: 87, Resp: 14  Patient wheelchair-bound able to nod and yes and no but not able to provide full detail    Comments  Medical Decision Making  Amount and/or Complexity of Data Reviewed  Independent Historian: EMS     Details: Notes as well as pt is non verbal  External Data Reviewed: notes. Details: Med list and care from Nursing facility  Labs: ordered. Decision-making details documented in ED Course. Radiology: ordered. Decision-making details documented in ED Course. ECG/medicine tests: ordered. Risk  Prescription drug management. Decision regarding hospitalization. ED Course as of 02/13/23 2101 Mon Feb 13, 2023 1906 EKG Interpretation   Interpreted by Isael Dominguez DO    Rhythm: normal sinus   Rate: normal  Axis: normal  Ectopy: none  Conduction: normal  ST Segments: normal  T Waves: normal  Q Waves: none    Clinical Impression: no acute changes normal EKG     [WK]   1919 No lactate normal WBC count [WK]   1929 Lactic Acid, Sepsis, Whole Blood: 1.0 [MS]   2020 Patient with significant hematuria as well as UTI. We will consult [facility] for admission. [MS]   2043 The pt has significant hematuria with prior resistance thought no septic at this time the HR has climbed on pt with active bleeding and not a good candidate for observation service given multiple comorbidity. [WK]      ED Course User Index  [MS] Rosa Elena Martines DO  [WK] DO Isael Mills DO,, DO, RDMS.   Attending Emergency Physician         Isael Dominguez DO  02/13/23 2102 Physical therapy

## 2023-12-29 ENCOUNTER — HOSPITAL ENCOUNTER (INPATIENT)
Age: 43
LOS: 4 days | Discharge: HOME | DRG: 698 | End: 2024-01-03
Attending: EMERGENCY MEDICINE | Admitting: FAMILY MEDICINE
Payer: MEDICARE

## 2023-12-29 ENCOUNTER — APPOINTMENT (OUTPATIENT)
Dept: GENERAL RADIOLOGY | Age: 43
DRG: 698 | End: 2023-12-29
Payer: MEDICARE

## 2023-12-29 DIAGNOSIS — T83.510A URINARY TRACT INFECTION ASSOCIATED WITH CYSTOSTOMY CATHETER, INITIAL ENCOUNTER (HCC): Primary | ICD-10-CM

## 2023-12-29 DIAGNOSIS — A41.9 SEPTICEMIA (HCC): ICD-10-CM

## 2023-12-29 DIAGNOSIS — N39.0 URINARY TRACT INFECTION ASSOCIATED WITH CYSTOSTOMY CATHETER, INITIAL ENCOUNTER (HCC): Primary | ICD-10-CM

## 2023-12-29 LAB
ALBUMIN SERPL-MCNC: 4 G/DL (ref 3.5–5.2)
ALP SERPL-CCNC: 74 U/L (ref 35–104)
ALT SERPL-CCNC: 18 U/L (ref 5–33)
ANION GAP SERPL CALCULATED.3IONS-SCNC: 11 MMOL/L (ref 9–17)
AST SERPL-CCNC: 26 U/L
BACTERIA URNS QL MICRO: ABNORMAL
BASOPHILS # BLD: 0.05 K/UL (ref 0–0.2)
BASOPHILS NFR BLD: 0 % (ref 0–2)
BILIRUB SERPL-MCNC: 0.3 MG/DL (ref 0.3–1.2)
BILIRUB UR QL STRIP: NEGATIVE
BUN SERPL-MCNC: 13 MG/DL (ref 6–20)
BUN/CREAT SERPL: ABNORMAL (ref 9–20)
CALCIUM SERPL-MCNC: 9.8 MG/DL (ref 8.6–10.4)
CHLORIDE SERPL-SCNC: 102 MMOL/L (ref 98–107)
CLARITY UR: ABNORMAL
CO2 SERPL-SCNC: 25 MMOL/L (ref 20–31)
COLOR UR: YELLOW
CREAT SERPL-MCNC: <0.4 MG/DL (ref 0.5–0.9)
CRP SERPL HS-MCNC: 44 MG/L (ref 0–5)
EOSINOPHIL # BLD: 0.37 K/UL (ref 0–0.44)
EOSINOPHILS RELATIVE PERCENT: 3 % (ref 1–4)
EPI CELLS #/AREA URNS HPF: ABNORMAL /HPF (ref 0–5)
ERYTHROCYTE [DISTWIDTH] IN BLOOD BY AUTOMATED COUNT: 18.9 % (ref 11.8–14.4)
FLUAV RNA RESP QL NAA+PROBE: NOT DETECTED
FLUBV RNA RESP QL NAA+PROBE: NOT DETECTED
GFR SERPL CREATININE-BSD FRML MDRD: ABNORMAL ML/MIN/1.73M2
GLUCOSE SERPL-MCNC: 77 MG/DL (ref 70–99)
GLUCOSE UR STRIP-MCNC: NEGATIVE MG/DL
HCT VFR BLD AUTO: 44.4 % (ref 36.3–47.1)
HGB BLD-MCNC: 13.5 G/DL (ref 11.9–15.1)
HGB UR QL STRIP.AUTO: ABNORMAL
IMM GRANULOCYTES # BLD AUTO: 0.05 K/UL (ref 0–0.3)
IMM GRANULOCYTES NFR BLD: 0 %
KETONES UR STRIP-MCNC: NEGATIVE MG/DL
LACTATE BLDV-SCNC: 1.5 MMOL/L (ref 0.5–2.2)
LEUKOCYTE ESTERASE UR QL STRIP: ABNORMAL
LIPASE SERPL-CCNC: 31 U/L (ref 13–60)
LYMPHOCYTES NFR BLD: 2.01 K/UL (ref 1.1–3.7)
LYMPHOCYTES RELATIVE PERCENT: 14 % (ref 24–43)
MCH RBC QN AUTO: 24.2 PG (ref 25.2–33.5)
MCHC RBC AUTO-ENTMCNC: 30.4 G/DL (ref 28.4–34.8)
MCV RBC AUTO: 79.4 FL (ref 82.6–102.9)
MONOCYTES NFR BLD: 1.26 K/UL (ref 0.1–1.2)
MONOCYTES NFR BLD: 9 % (ref 3–12)
NEUTROPHILS NFR BLD: 75 % (ref 36–65)
NEUTS SEG NFR BLD: 10.94 K/UL (ref 1.5–8.1)
NITRITE UR QL STRIP: POSITIVE
NRBC BLD-RTO: 0 PER 100 WBC
PH UR STRIP: 7.5 [PH] (ref 5–8)
PLATELET # BLD AUTO: 346 K/UL (ref 138–453)
PMV BLD AUTO: 10.1 FL (ref 8.1–13.5)
POTASSIUM SERPL-SCNC: 4.7 MMOL/L (ref 3.7–5.3)
PROCALCITONIN SERPL-MCNC: 0.04 NG/ML
PROT SERPL-MCNC: 9.7 G/DL (ref 6.4–8.3)
PROT UR STRIP-MCNC: ABNORMAL MG/DL
RBC # BLD AUTO: 5.59 M/UL (ref 3.95–5.11)
RBC # BLD: ABNORMAL 10*6/UL
RBC #/AREA URNS HPF: ABNORMAL /HPF (ref 0–2)
SARS-COV-2 RNA RESP QL NAA+PROBE: NOT DETECTED
SODIUM SERPL-SCNC: 138 MMOL/L (ref 135–144)
SOURCE: NORMAL
SP GR UR STRIP: 1.02 (ref 1–1.03)
SPECIMEN DESCRIPTION: NORMAL
TROPONIN I SERPL HS-MCNC: <6 NG/L (ref 0–14)
UROBILINOGEN UR STRIP-ACNC: NORMAL EU/DL (ref 0–1)
WBC #/AREA URNS HPF: ABNORMAL /HPF (ref 0–5)
WBC OTHER # BLD: 14.7 K/UL (ref 3.5–11.3)

## 2023-12-29 PROCEDURE — 87186 SC STD MICRODIL/AGAR DIL: CPT

## 2023-12-29 PROCEDURE — 71045 X-RAY EXAM CHEST 1 VIEW: CPT

## 2023-12-29 PROCEDURE — 96361 HYDRATE IV INFUSION ADD-ON: CPT

## 2023-12-29 PROCEDURE — 87154 CUL TYP ID BLD PTHGN 6+ TRGT: CPT

## 2023-12-29 PROCEDURE — 84484 ASSAY OF TROPONIN QUANT: CPT

## 2023-12-29 PROCEDURE — 87077 CULTURE AEROBIC IDENTIFY: CPT

## 2023-12-29 PROCEDURE — 99285 EMERGENCY DEPT VISIT HI MDM: CPT

## 2023-12-29 PROCEDURE — 84145 PROCALCITONIN (PCT): CPT

## 2023-12-29 PROCEDURE — 87636 SARSCOV2 & INF A&B AMP PRB: CPT

## 2023-12-29 PROCEDURE — 81001 URINALYSIS AUTO W/SCOPE: CPT

## 2023-12-29 PROCEDURE — 87184 SC STD DISK METHOD PER PLATE: CPT

## 2023-12-29 PROCEDURE — 96365 THER/PROPH/DIAG IV INF INIT: CPT

## 2023-12-29 PROCEDURE — 87040 BLOOD CULTURE FOR BACTERIA: CPT

## 2023-12-29 PROCEDURE — 85025 COMPLETE CBC W/AUTO DIFF WBC: CPT

## 2023-12-29 PROCEDURE — 86140 C-REACTIVE PROTEIN: CPT

## 2023-12-29 PROCEDURE — 80053 COMPREHEN METABOLIC PANEL: CPT

## 2023-12-29 PROCEDURE — 6360000002 HC RX W HCPCS: Performed by: EMERGENCY MEDICINE

## 2023-12-29 PROCEDURE — 87086 URINE CULTURE/COLONY COUNT: CPT

## 2023-12-29 PROCEDURE — 83690 ASSAY OF LIPASE: CPT

## 2023-12-29 PROCEDURE — 83605 ASSAY OF LACTIC ACID: CPT

## 2023-12-29 PROCEDURE — 93005 ELECTROCARDIOGRAM TRACING: CPT | Performed by: EMERGENCY MEDICINE

## 2023-12-29 PROCEDURE — 2580000003 HC RX 258: Performed by: EMERGENCY MEDICINE

## 2023-12-29 PROCEDURE — 87205 SMEAR GRAM STAIN: CPT

## 2023-12-29 RX ORDER — SODIUM CHLORIDE 0.9 % (FLUSH) 0.9 %
5-40 SYRINGE (ML) INJECTION EVERY 12 HOURS SCHEDULED
Status: DISCONTINUED | OUTPATIENT
Start: 2023-12-29 | End: 2023-12-30

## 2023-12-29 RX ORDER — SODIUM CHLORIDE 9 MG/ML
INJECTION, SOLUTION INTRAVENOUS PRN
Status: DISCONTINUED | OUTPATIENT
Start: 2023-12-29 | End: 2023-12-30

## 2023-12-29 RX ORDER — 0.9 % SODIUM CHLORIDE 0.9 %
30 INTRAVENOUS SOLUTION INTRAVENOUS ONCE
Status: DISCONTINUED | OUTPATIENT
Start: 2023-12-29 | End: 2023-12-30

## 2023-12-29 RX ORDER — SODIUM CHLORIDE 0.9 % (FLUSH) 0.9 %
5-40 SYRINGE (ML) INJECTION PRN
Status: DISCONTINUED | OUTPATIENT
Start: 2023-12-29 | End: 2023-12-30

## 2023-12-29 RX ORDER — 0.9 % SODIUM CHLORIDE 0.9 %
1000 INTRAVENOUS SOLUTION INTRAVENOUS ONCE
Status: COMPLETED | OUTPATIENT
Start: 2023-12-29 | End: 2023-12-29

## 2023-12-29 RX ORDER — 0.9 % SODIUM CHLORIDE 0.9 %
1000 INTRAVENOUS SOLUTION INTRAVENOUS ONCE
Status: DISCONTINUED | OUTPATIENT
Start: 2023-12-29 | End: 2023-12-29

## 2023-12-29 RX ORDER — 0.9 % SODIUM CHLORIDE 0.9 %
1500 INTRAVENOUS SOLUTION INTRAVENOUS ONCE
Status: COMPLETED | OUTPATIENT
Start: 2023-12-29 | End: 2023-12-29

## 2023-12-29 RX ADMIN — SODIUM CHLORIDE 1000 ML: 9 INJECTION, SOLUTION INTRAVENOUS at 21:09

## 2023-12-29 RX ADMIN — SODIUM CHLORIDE 1500 ML: 9 INJECTION, SOLUTION INTRAVENOUS at 23:02

## 2023-12-29 RX ADMIN — CEFTRIAXONE SODIUM 1000 MG: 1 INJECTION, POWDER, FOR SOLUTION INTRAMUSCULAR; INTRAVENOUS at 21:44

## 2023-12-30 ENCOUNTER — APPOINTMENT (OUTPATIENT)
Dept: CT IMAGING | Age: 43
DRG: 698 | End: 2023-12-30
Payer: MEDICARE

## 2023-12-30 PROBLEM — Z78.9 ON ENTERAL NUTRITION AT HOME: Status: ACTIVE | Noted: 2023-12-30

## 2023-12-30 PROBLEM — T83.510A URINARY TRACT INFECTION ASSOCIATED WITH CYSTOSTOMY CATHETER (HCC): Status: ACTIVE | Noted: 2019-07-28

## 2023-12-30 LAB
ALBUMIN SERPL-MCNC: 3.6 G/DL (ref 3.5–5.2)
ALP SERPL-CCNC: 65 U/L (ref 35–104)
ALT SERPL-CCNC: 13 U/L (ref 5–33)
ANION GAP SERPL CALCULATED.3IONS-SCNC: 8 MMOL/L (ref 9–17)
ANION GAP SERPL CALCULATED.3IONS-SCNC: 9 MMOL/L (ref 9–17)
AST SERPL-CCNC: 13 U/L
BILIRUB SERPL-MCNC: 0.2 MG/DL (ref 0.3–1.2)
BUN SERPL-MCNC: 8 MG/DL (ref 6–20)
BUN SERPL-MCNC: 9 MG/DL (ref 6–20)
BUN/CREAT SERPL: ABNORMAL (ref 9–20)
BUN/CREAT SERPL: ABNORMAL (ref 9–20)
CALCIUM SERPL-MCNC: 8.7 MG/DL (ref 8.6–10.4)
CALCIUM SERPL-MCNC: 8.8 MG/DL (ref 8.6–10.4)
CHLORIDE SERPL-SCNC: 104 MMOL/L (ref 98–107)
CHLORIDE SERPL-SCNC: 106 MMOL/L (ref 98–107)
CO2 SERPL-SCNC: 23 MMOL/L (ref 20–31)
CO2 SERPL-SCNC: 24 MMOL/L (ref 20–31)
CREAT SERPL-MCNC: <0.4 MG/DL (ref 0.5–0.9)
CREAT SERPL-MCNC: <0.4 MG/DL (ref 0.5–0.9)
ERYTHROCYTE [DISTWIDTH] IN BLOOD BY AUTOMATED COUNT: 18.6 % (ref 11.8–14.4)
GFR SERPL CREATININE-BSD FRML MDRD: ABNORMAL ML/MIN/1.73M2
GFR SERPL CREATININE-BSD FRML MDRD: ABNORMAL ML/MIN/1.73M2
GLUCOSE BLD-MCNC: 101 MG/DL (ref 65–105)
GLUCOSE BLD-MCNC: 66 MG/DL (ref 65–105)
GLUCOSE BLD-MCNC: 77 MG/DL (ref 65–105)
GLUCOSE BLD-MCNC: 87 MG/DL (ref 65–105)
GLUCOSE BLD-MCNC: 96 MG/DL (ref 65–105)
GLUCOSE SERPL-MCNC: 75 MG/DL (ref 70–99)
GLUCOSE SERPL-MCNC: 81 MG/DL (ref 70–99)
HCT VFR BLD AUTO: 39.5 % (ref 36.3–47.1)
HGB BLD-MCNC: 12.1 G/DL (ref 11.9–15.1)
LACTATE BLDV-SCNC: 0.7 MMOL/L (ref 0.5–1.9)
LACTATE BLDV-SCNC: 1 MMOL/L (ref 0.5–1.9)
MCH RBC QN AUTO: 24.4 PG (ref 25.2–33.5)
MCHC RBC AUTO-ENTMCNC: 30.6 G/DL (ref 28.4–34.8)
MCV RBC AUTO: 79.8 FL (ref 82.6–102.9)
NRBC BLD-RTO: 0 PER 100 WBC
PLATELET # BLD AUTO: 309 K/UL (ref 138–453)
PMV BLD AUTO: 10.4 FL (ref 8.1–13.5)
POTASSIUM SERPL-SCNC: 3.8 MMOL/L (ref 3.7–5.3)
POTASSIUM SERPL-SCNC: 4 MMOL/L (ref 3.7–5.3)
PROT SERPL-MCNC: 8.5 G/DL (ref 6.4–8.3)
RBC # BLD AUTO: 4.95 M/UL (ref 3.95–5.11)
SODIUM SERPL-SCNC: 136 MMOL/L (ref 135–144)
SODIUM SERPL-SCNC: 138 MMOL/L (ref 135–144)
WBC OTHER # BLD: 12 K/UL (ref 3.5–11.3)

## 2023-12-30 PROCEDURE — 2580000003 HC RX 258: Performed by: NURSE PRACTITIONER

## 2023-12-30 PROCEDURE — 6360000004 HC RX CONTRAST MEDICATION: Performed by: NURSE PRACTITIONER

## 2023-12-30 PROCEDURE — 6360000002 HC RX W HCPCS: Performed by: NURSE PRACTITIONER

## 2023-12-30 PROCEDURE — 80048 BASIC METABOLIC PNL TOTAL CA: CPT

## 2023-12-30 PROCEDURE — 6370000000 HC RX 637 (ALT 250 FOR IP): Performed by: NURSE PRACTITIONER

## 2023-12-30 PROCEDURE — 2580000003 HC RX 258

## 2023-12-30 PROCEDURE — 85027 COMPLETE CBC AUTOMATED: CPT

## 2023-12-30 PROCEDURE — 80053 COMPREHEN METABOLIC PANEL: CPT

## 2023-12-30 PROCEDURE — 36415 COLL VENOUS BLD VENIPUNCTURE: CPT

## 2023-12-30 PROCEDURE — 99223 1ST HOSP IP/OBS HIGH 75: CPT | Performed by: INTERNAL MEDICINE

## 2023-12-30 PROCEDURE — 82947 ASSAY GLUCOSE BLOOD QUANT: CPT

## 2023-12-30 PROCEDURE — 74177 CT ABD & PELVIS W/CONTRAST: CPT

## 2023-12-30 PROCEDURE — 1200000000 HC SEMI PRIVATE

## 2023-12-30 PROCEDURE — 99223 1ST HOSP IP/OBS HIGH 75: CPT | Performed by: NURSE PRACTITIONER

## 2023-12-30 PROCEDURE — 83605 ASSAY OF LACTIC ACID: CPT

## 2023-12-30 RX ORDER — LANSOPRAZOLE
15 KIT
Status: DISCONTINUED | OUTPATIENT
Start: 2023-12-30 | End: 2023-12-30 | Stop reason: CLARIF

## 2023-12-30 RX ORDER — OMEPRAZOLE 20 MG/1
20 CAPSULE, DELAYED RELEASE ORAL 2 TIMES DAILY
Status: ON HOLD | COMMUNITY
End: 2024-01-03 | Stop reason: HOSPADM

## 2023-12-30 RX ORDER — SODIUM CHLORIDE 9 MG/ML
INJECTION, SOLUTION INTRAVENOUS CONTINUOUS
Status: DISCONTINUED | OUTPATIENT
Start: 2023-12-30 | End: 2023-12-30

## 2023-12-30 RX ORDER — TAMSULOSIN HYDROCHLORIDE 0.4 MG/1
0.4 CAPSULE ORAL DAILY
COMMUNITY

## 2023-12-30 RX ORDER — 0.9 % SODIUM CHLORIDE 0.9 %
500 INTRAVENOUS SOLUTION INTRAVENOUS ONCE
Status: COMPLETED | OUTPATIENT
Start: 2023-12-30 | End: 2023-12-30

## 2023-12-30 RX ORDER — SODIUM CHLORIDE 9 MG/ML
INJECTION, SOLUTION INTRAVENOUS PRN
Status: DISCONTINUED | OUTPATIENT
Start: 2023-12-30 | End: 2024-01-03 | Stop reason: HOSPADM

## 2023-12-30 RX ORDER — MIDODRINE HYDROCHLORIDE 5 MG/1
5 TABLET ORAL 3 TIMES DAILY
Status: DISCONTINUED | OUTPATIENT
Start: 2023-12-30 | End: 2024-01-03 | Stop reason: HOSPADM

## 2023-12-30 RX ORDER — BACLOFEN 10 MG/1
10 TABLET ORAL 3 TIMES DAILY
Status: DISCONTINUED | OUTPATIENT
Start: 2023-12-30 | End: 2024-01-03 | Stop reason: HOSPADM

## 2023-12-30 RX ORDER — SODIUM CHLORIDE 0.9 % (FLUSH) 0.9 %
10 SYRINGE (ML) INJECTION PRN
Status: DISCONTINUED | OUTPATIENT
Start: 2023-12-30 | End: 2024-01-03 | Stop reason: HOSPADM

## 2023-12-30 RX ORDER — 0.9 % SODIUM CHLORIDE 0.9 %
80 INTRAVENOUS SOLUTION INTRAVENOUS ONCE
Status: DISCONTINUED | OUTPATIENT
Start: 2023-12-30 | End: 2024-01-03 | Stop reason: HOSPADM

## 2023-12-30 RX ORDER — OXYBUTYNIN CHLORIDE 5 MG/1
5 TABLET, EXTENDED RELEASE ORAL DAILY
COMMUNITY

## 2023-12-30 RX ORDER — ACETAMINOPHEN 325 MG/1
650 TABLET ORAL EVERY 6 HOURS PRN
Status: DISCONTINUED | OUTPATIENT
Start: 2023-12-30 | End: 2023-12-30

## 2023-12-30 RX ORDER — 0.9 % SODIUM CHLORIDE 0.9 %
30 INTRAVENOUS SOLUTION INTRAVENOUS ONCE
Status: DISCONTINUED | OUTPATIENT
Start: 2023-12-30 | End: 2023-12-30

## 2023-12-30 RX ORDER — ENOXAPARIN SODIUM 100 MG/ML
40 INJECTION SUBCUTANEOUS DAILY
Status: DISCONTINUED | OUTPATIENT
Start: 2023-12-30 | End: 2023-12-30

## 2023-12-30 RX ORDER — ACETAMINOPHEN 325 MG/1
650 TABLET ORAL EVERY 8 HOURS PRN
Status: ON HOLD | COMMUNITY
End: 2024-01-03 | Stop reason: HOSPADM

## 2023-12-30 RX ORDER — DOCUSATE SODIUM 100 MG/1
100 CAPSULE, LIQUID FILLED ORAL DAILY
Status: ON HOLD | COMMUNITY
End: 2024-01-03 | Stop reason: HOSPADM

## 2023-12-30 RX ORDER — ACETAMINOPHEN 325 MG/1
650 TABLET ORAL EVERY 6 HOURS PRN
Status: DISCONTINUED | OUTPATIENT
Start: 2023-12-30 | End: 2024-01-03 | Stop reason: HOSPADM

## 2023-12-30 RX ORDER — MIDODRINE HYDROCHLORIDE 5 MG/1
5 TABLET ORAL 3 TIMES DAILY
Status: DISCONTINUED | OUTPATIENT
Start: 2023-12-30 | End: 2023-12-30

## 2023-12-30 RX ORDER — ACETAMINOPHEN 650 MG/1
650 SUPPOSITORY RECTAL EVERY 6 HOURS PRN
Status: DISCONTINUED | OUTPATIENT
Start: 2023-12-30 | End: 2023-12-30

## 2023-12-30 RX ORDER — SODIUM CHLORIDE 0.9 % (FLUSH) 0.9 %
5-40 SYRINGE (ML) INJECTION PRN
Status: DISCONTINUED | OUTPATIENT
Start: 2023-12-30 | End: 2024-01-03 | Stop reason: HOSPADM

## 2023-12-30 RX ORDER — GABAPENTIN 300 MG/1
300 CAPSULE ORAL 3 TIMES DAILY
Status: DISCONTINUED | OUTPATIENT
Start: 2023-12-30 | End: 2024-01-03 | Stop reason: HOSPADM

## 2023-12-30 RX ORDER — ONDANSETRON 4 MG/1
4 TABLET, FILM COATED ORAL EVERY 8 HOURS PRN
COMMUNITY

## 2023-12-30 RX ORDER — DEXTROSE AND SODIUM CHLORIDE 5; .9 G/100ML; G/100ML
INJECTION, SOLUTION INTRAVENOUS CONTINUOUS
Status: DISCONTINUED | OUTPATIENT
Start: 2023-12-30 | End: 2024-01-03 | Stop reason: HOSPADM

## 2023-12-30 RX ORDER — ACETAMINOPHEN 650 MG/1
650 SUPPOSITORY RECTAL EVERY 6 HOURS PRN
Status: DISCONTINUED | OUTPATIENT
Start: 2023-12-30 | End: 2024-01-03 | Stop reason: HOSPADM

## 2023-12-30 RX ORDER — DEXTROSE MONOHYDRATE 100 MG/ML
INJECTION, SOLUTION INTRAVENOUS CONTINUOUS PRN
Status: DISCONTINUED | OUTPATIENT
Start: 2023-12-30 | End: 2024-01-03 | Stop reason: HOSPADM

## 2023-12-30 RX ORDER — SODIUM CHLORIDE 0.9 % (FLUSH) 0.9 %
5-40 SYRINGE (ML) INJECTION EVERY 12 HOURS SCHEDULED
Status: DISCONTINUED | OUTPATIENT
Start: 2023-12-30 | End: 2024-01-03 | Stop reason: HOSPADM

## 2023-12-30 RX ADMIN — RIVAROXABAN 20 MG: 20 TABLET, FILM COATED ORAL at 09:38

## 2023-12-30 RX ADMIN — Medication 80 ML: at 16:11

## 2023-12-30 RX ADMIN — IOPAMIDOL 75 ML: 755 INJECTION, SOLUTION INTRAVENOUS at 16:12

## 2023-12-30 RX ADMIN — LANSOPRAZOLE 15 MG: 15 TABLET, ORALLY DISINTEGRATING, DELAYED RELEASE ORAL at 16:51

## 2023-12-30 RX ADMIN — SERTRALINE HYDROCHLORIDE 50 MG: 50 TABLET ORAL at 08:00

## 2023-12-30 RX ADMIN — BACLOFEN 10 MG: 10 TABLET ORAL at 14:45

## 2023-12-30 RX ADMIN — SODIUM CHLORIDE: 9 INJECTION, SOLUTION INTRAVENOUS at 06:49

## 2023-12-30 RX ADMIN — GABAPENTIN 300 MG: 300 CAPSULE ORAL at 08:00

## 2023-12-30 RX ADMIN — DEXTROSE AND SODIUM CHLORIDE: 5; 900 INJECTION, SOLUTION INTRAVENOUS at 21:29

## 2023-12-30 RX ADMIN — IBUPROFEN 600 MG: 100 SUSPENSION ORAL at 10:32

## 2023-12-30 RX ADMIN — MEROPENEM 1000 MG: 1 INJECTION, POWDER, FOR SOLUTION INTRAVENOUS at 08:05

## 2023-12-30 RX ADMIN — MEROPENEM 1000 MG: 1 INJECTION, POWDER, FOR SOLUTION INTRAVENOUS at 16:55

## 2023-12-30 RX ADMIN — SODIUM CHLORIDE, PRESERVATIVE FREE 10 ML: 5 INJECTION INTRAVENOUS at 16:12

## 2023-12-30 RX ADMIN — GABAPENTIN 300 MG: 300 CAPSULE ORAL at 14:45

## 2023-12-30 RX ADMIN — BACLOFEN 10 MG: 10 TABLET ORAL at 08:00

## 2023-12-30 RX ADMIN — ACETAMINOPHEN 650 MG: 325 TABLET ORAL at 06:46

## 2023-12-30 RX ADMIN — GABAPENTIN 300 MG: 300 CAPSULE ORAL at 20:21

## 2023-12-30 RX ADMIN — BACLOFEN 10 MG: 10 TABLET ORAL at 20:20

## 2023-12-30 RX ADMIN — DEXTROSE MONOHYDRATE 125 ML: 100 INJECTION, SOLUTION INTRAVENOUS at 21:29

## 2023-12-30 RX ADMIN — SODIUM CHLORIDE 500 ML: 9 INJECTION, SOLUTION INTRAVENOUS at 09:16

## 2023-12-30 RX ADMIN — LANSOPRAZOLE 15 MG: 15 TABLET, ORALLY DISINTEGRATING, DELAYED RELEASE ORAL at 09:38

## 2023-12-30 RX ADMIN — SODIUM CHLORIDE, PRESERVATIVE FREE 10 ML: 5 INJECTION INTRAVENOUS at 08:01

## 2023-12-30 ASSESSMENT — PAIN SCALES - WONG BAKER: WONGBAKER_NUMERICALRESPONSE: 0

## 2023-12-30 NOTE — H&P
4.7 3.7 - 5.3 mmol/L    Chloride 102 98 - 107 mmol/L    CO2 25 20 - 31 mmol/L    Anion Gap 11 9 - 17 mmol/L    Glucose 77 70 - 99 mg/dL    BUN 13 6 - 20 mg/dL    Creatinine <0.4 (L) 0.5 - 0.9 mg/dL    Est, Glom Filt Rate Can not be calculated >60 mL/min/1.73m2    Bun/Cre Ratio Can not be calculated 9 - 20    Calcium 9.8 8.6 - 10.4 mg/dL    Total Protein 9.7 (H) 6.4 - 8.3 g/dL    Albumin 4.0 3.5 - 5.2 g/dL    Total Bilirubin 0.3 0.3 - 1.2 mg/dL    Alkaline Phosphatase 74 35 - 104 U/L    ALT 18 5 - 33 U/L    AST 26 <32 U/L   Lactic Acid    Collection Time: 12/29/23  8:59 PM   Result Value Ref Range    Lactic Acid 1.5 0.5 - 2.2 mmol/L   Troponin    Collection Time: 12/29/23  8:59 PM   Result Value Ref Range    Troponin, High Sensitivity <6 0 - 14 ng/L   Procalcitonin    Collection Time: 12/29/23  8:59 PM   Result Value Ref Range    Procalcitonin 0.04 <0.09 ng/mL   Lipase    Collection Time: 12/29/23  8:59 PM   Result Value Ref Range    Lipase 31 13 - 60 U/L   C-Reactive Protein    Collection Time: 12/29/23  8:59 PM   Result Value Ref Range    CRP 44.0 (H) 0.0 - 5.0 mg/L   Culture, Blood 1    Collection Time: 12/29/23  8:59 PM    Specimen: Blood   Result Value Ref Range    Specimen Description .BLOOD     Special Requests RT FA, 7 ML     Culture NO GROWTH 12 HOURS    Culture, Blood 1    Collection Time: 12/29/23  9:25 PM    Specimen: Blood   Result Value Ref Range    Specimen Description .BLOOD     Special Requests RT HAND, 3 ML     Culture NO GROWTH 12 HOURS    Lactate, Sepsis    Collection Time: 12/30/23  2:54 AM   Result Value Ref Range    Lactic Acid, Sepsis 1.0 0.5 - 1.9 mmol/L   Comprehensive Metabolic Panel w/ Reflex to MG    Collection Time: 12/30/23  2:54 AM   Result Value Ref Range    Sodium 136 135 - 144 mmol/L    Potassium 4.0 3.7 - 5.3 mmol/L    Chloride 104 98 - 107 mmol/L    CO2 24 20 - 31 mmol/L    Anion Gap 8 (L) 9 - 17 mmol/L    Glucose 81 70 - 99 mg/dL    BUN 9 6 - 20 mg/dL    Creatinine <0.4 (L) 0.5 -

## 2023-12-30 NOTE — PLAN OF CARE
Problem: Skin/Tissue Integrity  Goal: Absence of new skin breakdown  Description: 1.  Monitor for areas of redness and/or skin breakdown  2.  Assess vascular access sites hourly  3.  Every 4-6 hours minimum:  Change oxygen saturation probe site  4.  Every 4-6 hours:  If on nasal continuous positive airway pressure, respiratory therapy assess nares and determine need for appliance change or resting period.  Outcome: Progressing     Problem: Safety - Adult  Goal: Free from fall injury  Outcome: Progressing     Problem: ABCDS Injury Assessment  Goal: Absence of physical injury  Outcome: Progressing     Problem: Pain  Goal: Verbalizes/displays adequate comfort level or baseline comfort level  Outcome: Progressing

## 2023-12-30 NOTE — ED PROVIDER NOTES
EMERGENCY DEPARTMENT ENCOUNTER    Pt Name: Emily Hernandez  MRN: [id number]  Birthdate 1980  Date of evaluation: 12/30/23  CHIEF COMPLAINT       Chief Complaint   Patient presents with    Illness     HISTORY OF PRESENT ILLNESS   HPI  43-year-old female with a history of multiple sclerosis, mental retardation, quadriplegia, nonverbal at baseline was sent from her skilled nursing facility for concern that she appears ill.  History is very limited but EMS states that nursing home staff told them that patient's mother saw her today, was concerned that she looked ill so patient was brought to the ED.  No other history is immediately available, I made multiple attempts to reach out to family members through all the phone numbers in the EMR but was unsuccessful in reaching anyone.    REVIEW OF SYSTEMS     Review of Systems   Unable to perform ROS: Patient nonverbal     PASTMEDICAL HISTORY     Past Medical History:   Diagnosis Date    Aphasia     Aphasia     Contracture of joint, lower leg     Depressed     HTN (hypertension)     Hx MRSA infection resolved 1/2017    2 negative nasal screens 1/2017 (hx in heel in 2013)    Hydronephrosis     Movement disorder     tremor    Multiple sclerosis (HCC)     Neurogenic bladder     Non-verbal learning disorder     Peripheral vascular disease (HCC)     Polyneuropathy     Pressure ulcer     Coccyx, Heel    Pulmonary embolism (HCC)     Pulmonary infarction (HCC)     Quadriplegia (HCC)     Tachycardia     UTI (lower urinary tract infection)      SURGICAL HISTORY       Past Surgical History:   Procedure Laterality Date    AMPUTATION Right     COLOSTOMY      CYSTOSCOPY Right 12/3/2022    CYSTOSCOPY URETERAL STENT INSERTION performed by Donnie Easton MD at [facility] OR    GASTROSTOMY TUBE PLACEMENT      IR NEPHROSTOMY PERCUTANEOUS RIGHT  9/27/2020    IR NEPHROSTOMY PERCUTANEOUS RIGHT 9/27/2020 Bradley VITALE MD [facility] SPECIAL PROCEDURES    LEG AMPUTATION THROUGH FEMUR       CURRENT

## 2023-12-30 NOTE — PLAN OF CARE
Problem: Discharge Planning  Goal: Discharge to home or other facility with appropriate resources  Outcome: Progressing  Flowsheets  Taken 12/30/2023 1403 by Scott Sharpe, RN  Discharge to home or other facility with appropriate resources: Identify barriers to discharge with patient and caregiver    Problem: Skin/Tissue Integrity  Goal: Absence of new skin breakdown  Description: 1.  Monitor for areas of redness and/or skin breakdown  2.  Assess vascular access sites hourly  3.  Every 4-6 hours minimum:  Change oxygen saturation probe site  4.  Every 4-6 hours:  If on nasal continuous positive airway pressure, respiratory therapy assess nares and determine need for appliance change or resting period.  12/30/2023 1403 by Scott Sharpe, RN  Outcome: Progressing     Problem: Safety - Adult  Goal: Free from fall injury  12/30/2023 1403 by Scott Sharpe, RN  Outcome: Progressing  Flowsheets (Taken 12/30/2023 1403)  Free From Fall Injury: Instruct family/caregiver on patient safety     Problem: ABCDS Injury Assessment  Goal: Absence of physical injury  12/30/2023 1403 by Scott Sharpe, RN  Outcome: Progressing  Flowsheets (Taken 12/30/2023 1403)  Absence of Physical Injury: Implement safety measures based on patient assessment     Problem: Pain  Goal: Verbalizes/displays adequate comfort level or baseline comfort level  12/30/2023 1403 by Scott Sharpe, RN  Outcome: Progressing  Flowsheets (Taken 12/30/2023 1403)  Verbalizes/displays adequate comfort level or baseline comfort level: Encourage patient to monitor pain and request assistance     Problem: Nutrition Deficit:  Goal: Optimize nutritional status  Outcome: Progressing  Flowsheets (Taken 12/30/2023 1403)  Nutrient intake appropriate for improving, restoring, or maintaining nutritional needs: Assess nutritional status and recommend course of action

## 2023-12-31 PROBLEM — T83.511A URINARY TRACT INFECTION ASSOCIATED WITH INDWELLING URETHRAL CATHETER (HCC): Status: ACTIVE | Noted: 2023-12-31

## 2023-12-31 PROBLEM — N39.0 URINARY TRACT INFECTION ASSOCIATED WITH INDWELLING URETHRAL CATHETER (HCC): Status: ACTIVE | Noted: 2023-12-31

## 2023-12-31 LAB
ALBUMIN SERPL-MCNC: 3.4 G/DL (ref 3.5–5.2)
ALP SERPL-CCNC: 63 U/L (ref 35–104)
ALT SERPL-CCNC: 12 U/L (ref 5–33)
ANION GAP SERPL CALCULATED.3IONS-SCNC: 10 MMOL/L (ref 9–17)
AST SERPL-CCNC: 13 U/L
BILIRUB SERPL-MCNC: 0.3 MG/DL (ref 0.3–1.2)
BUN SERPL-MCNC: 3 MG/DL (ref 6–20)
BUN/CREAT SERPL: ABNORMAL (ref 9–20)
CALCIUM SERPL-MCNC: 8.8 MG/DL (ref 8.6–10.4)
CHLORIDE SERPL-SCNC: 103 MMOL/L (ref 98–107)
CO2 SERPL-SCNC: 21 MMOL/L (ref 20–31)
CREAT SERPL-MCNC: <0.4 MG/DL (ref 0.5–0.9)
EKG ATRIAL RATE: 114 BPM
EKG P AXIS: 43 DEGREES
EKG P-R INTERVAL: 126 MS
EKG Q-T INTERVAL: 316 MS
EKG QRS DURATION: 62 MS
EKG QTC CALCULATION (BAZETT): 435 MS
EKG R AXIS: -21 DEGREES
EKG T AXIS: 4 DEGREES
EKG VENTRICULAR RATE: 114 BPM
ERYTHROCYTE [DISTWIDTH] IN BLOOD BY AUTOMATED COUNT: 18.3 % (ref 11.8–14.4)
GFR SERPL CREATININE-BSD FRML MDRD: ABNORMAL ML/MIN/1.73M2
GLUCOSE BLD-MCNC: 108 MG/DL (ref 65–105)
GLUCOSE BLD-MCNC: 113 MG/DL (ref 65–105)
GLUCOSE BLD-MCNC: 90 MG/DL (ref 65–105)
GLUCOSE BLD-MCNC: 96 MG/DL (ref 65–105)
GLUCOSE BLD-MCNC: 99 MG/DL (ref 65–105)
GLUCOSE SERPL-MCNC: 95 MG/DL (ref 70–99)
HCT VFR BLD AUTO: 38.2 % (ref 36.3–47.1)
HGB BLD-MCNC: 11.9 G/DL (ref 11.9–15.1)
INR PPP: 1.3
MAGNESIUM SERPL-MCNC: 1.8 MG/DL (ref 1.6–2.6)
MCH RBC QN AUTO: 24.5 PG (ref 25.2–33.5)
MCHC RBC AUTO-ENTMCNC: 31.2 G/DL (ref 28.4–34.8)
MCV RBC AUTO: 78.6 FL (ref 82.6–102.9)
MICROORGANISM SPEC CULT: ABNORMAL
NRBC BLD-RTO: 0 PER 100 WBC
PLATELET # BLD AUTO: 269 K/UL (ref 138–453)
PMV BLD AUTO: 9.5 FL (ref 8.1–13.5)
POTASSIUM SERPL-SCNC: 3.2 MMOL/L (ref 3.7–5.3)
PROT SERPL-MCNC: 8.1 G/DL (ref 6.4–8.3)
PROTHROMBIN TIME: 16.1 SEC (ref 11.5–14.2)
RBC # BLD AUTO: 4.86 M/UL (ref 3.95–5.11)
SERVICE CMNT-IMP: ABNORMAL
SODIUM SERPL-SCNC: 134 MMOL/L (ref 135–144)
SPECIMEN DESCRIPTION: ABNORMAL
WBC OTHER # BLD: 10.3 K/UL (ref 3.5–11.3)

## 2023-12-31 PROCEDURE — 6360000002 HC RX W HCPCS: Performed by: NURSE PRACTITIONER

## 2023-12-31 PROCEDURE — 85610 PROTHROMBIN TIME: CPT

## 2023-12-31 PROCEDURE — 93010 ELECTROCARDIOGRAM REPORT: CPT | Performed by: INTERNAL MEDICINE

## 2023-12-31 PROCEDURE — 2580000003 HC RX 258: Performed by: NURSE PRACTITIONER

## 2023-12-31 PROCEDURE — 85027 COMPLETE CBC AUTOMATED: CPT

## 2023-12-31 PROCEDURE — 1200000000 HC SEMI PRIVATE

## 2023-12-31 PROCEDURE — 36415 COLL VENOUS BLD VENIPUNCTURE: CPT

## 2023-12-31 PROCEDURE — 99233 SBSQ HOSP IP/OBS HIGH 50: CPT | Performed by: INTERNAL MEDICINE

## 2023-12-31 PROCEDURE — 6370000000 HC RX 637 (ALT 250 FOR IP): Performed by: NURSE PRACTITIONER

## 2023-12-31 PROCEDURE — 83735 ASSAY OF MAGNESIUM: CPT

## 2023-12-31 PROCEDURE — 99232 SBSQ HOSP IP/OBS MODERATE 35: CPT | Performed by: NURSE PRACTITIONER

## 2023-12-31 PROCEDURE — 80053 COMPREHEN METABOLIC PANEL: CPT

## 2023-12-31 PROCEDURE — 82947 ASSAY GLUCOSE BLOOD QUANT: CPT

## 2023-12-31 PROCEDURE — 31720 CLEARANCE OF AIRWAYS: CPT

## 2023-12-31 RX ORDER — POTASSIUM CHLORIDE 7.45 MG/ML
10 INJECTION INTRAVENOUS PRN
Status: DISCONTINUED | OUTPATIENT
Start: 2023-12-31 | End: 2024-01-03 | Stop reason: HOSPADM

## 2023-12-31 RX ORDER — INSULIN LISPRO 100 [IU]/ML
0-4 INJECTION, SOLUTION INTRAVENOUS; SUBCUTANEOUS
Status: DISCONTINUED | OUTPATIENT
Start: 2023-12-31 | End: 2024-01-01

## 2023-12-31 RX ORDER — POTASSIUM CHLORIDE 20 MEQ/1
40 TABLET, EXTENDED RELEASE ORAL PRN
Status: DISCONTINUED | OUTPATIENT
Start: 2023-12-31 | End: 2024-01-03 | Stop reason: HOSPADM

## 2023-12-31 RX ORDER — MAGNESIUM SULFATE 1 G/100ML
1000 INJECTION INTRAVENOUS PRN
Status: DISCONTINUED | OUTPATIENT
Start: 2023-12-31 | End: 2024-01-03 | Stop reason: HOSPADM

## 2023-12-31 RX ADMIN — MEROPENEM 1000 MG: 1 INJECTION, POWDER, FOR SOLUTION INTRAVENOUS at 15:34

## 2023-12-31 RX ADMIN — BACLOFEN 10 MG: 10 TABLET ORAL at 13:20

## 2023-12-31 RX ADMIN — LANSOPRAZOLE 15 MG: 15 TABLET, ORALLY DISINTEGRATING, DELAYED RELEASE ORAL at 15:35

## 2023-12-31 RX ADMIN — MEROPENEM 1000 MG: 1 INJECTION, POWDER, FOR SOLUTION INTRAVENOUS at 00:27

## 2023-12-31 RX ADMIN — DEXTROSE AND SODIUM CHLORIDE: 5; 900 INJECTION, SOLUTION INTRAVENOUS at 08:22

## 2023-12-31 RX ADMIN — BACLOFEN 10 MG: 10 TABLET ORAL at 21:26

## 2023-12-31 RX ADMIN — GABAPENTIN 300 MG: 300 CAPSULE ORAL at 08:26

## 2023-12-31 RX ADMIN — SODIUM CHLORIDE 500 ML: 9 INJECTION, SOLUTION INTRAVENOUS at 00:24

## 2023-12-31 RX ADMIN — GABAPENTIN 300 MG: 300 CAPSULE ORAL at 21:27

## 2023-12-31 RX ADMIN — LANSOPRAZOLE 15 MG: 15 TABLET, ORALLY DISINTEGRATING, DELAYED RELEASE ORAL at 06:01

## 2023-12-31 RX ADMIN — BACLOFEN 10 MG: 10 TABLET ORAL at 08:26

## 2023-12-31 RX ADMIN — DEXTROSE AND SODIUM CHLORIDE: 5; 900 INJECTION, SOLUTION INTRAVENOUS at 22:59

## 2023-12-31 RX ADMIN — MEROPENEM 1000 MG: 1 INJECTION, POWDER, FOR SOLUTION INTRAVENOUS at 08:30

## 2023-12-31 RX ADMIN — RIVAROXABAN 20 MG: 20 TABLET, FILM COATED ORAL at 08:26

## 2023-12-31 RX ADMIN — GABAPENTIN 300 MG: 300 CAPSULE ORAL at 13:20

## 2023-12-31 RX ADMIN — POTASSIUM BICARBONATE 40 MEQ: 782 TABLET, EFFERVESCENT ORAL at 13:20

## 2023-12-31 RX ADMIN — SERTRALINE HYDROCHLORIDE 50 MG: 50 TABLET ORAL at 08:26

## 2023-12-31 RX ADMIN — ACETAMINOPHEN 650 MG: 325 TABLET ORAL at 18:37

## 2023-12-31 ASSESSMENT — PAIN SCALES - WONG BAKER: WONGBAKER_NUMERICALRESPONSE: 0

## 2023-12-31 NOTE — CARE COORDINATION
Case Management Assessment  Initial Evaluation    Date/Time of Evaluation: 12/31/2023 1:01 PM  Assessment Completed by: TANYA Saravia, LSW    If patient is discharged prior to next notation, then this note serves as note for discharge by case management.    Patient Name: Emily Hernandez                   YOB: 1980  Diagnosis: Septicemia (HCC) [A41.9]  Sepsis (HCC) [A41.9]  Urinary tract infection associated with cystostomy catheter, initial encounter ([facility]) [T83.510A, N39.0]                   Date / Time: 12/29/2023  8:04 PM    Patient Admission Status: Inpatient   Readmission Risk (Low < 19, Mod (19-27), High > 27): Readmission Risk Score: 15.1    Current PCP: Ollie Vásquez MD  PCP verified by CM? Yes    Chart Reviewed: Yes      History Provided by: Child/Family  Patient Orientation: Self    Patient Cognition: Long Term Memory Deficit    Hospitalization in the last 30 days (Readmission):  No    If yes, Readmission Assessment in  Navigator will be completed.    Advance Directives:      Code Status: Full Code   Patient's Primary Decision Maker is: Legal Next of Kin    Primary Decision Maker (Active): Remy Thomas - Parent, Legal Guardian - [phone]    Discharge Planning:    Patient lives with: Parent Type of Home: Skilled Nursing Facility  Primary Care Giver: Family  Patient Support Systems include: Parent   Current Financial resources: Medicaid, Medicare  Current community resources: ECF/Home Care, Transportation  Current services prior to admission: Skilled Nursing Facility            Current DME:              Type of Home Care services:  Skilled Therapy    ADLS  Prior functional level: Assistance with the following:, Bathing, Dressing, Toileting, Cooking, Housework, Shopping, Mobility  Current functional level: Assistance with the following:, Bathing, Dressing, Toileting, Feeding, Cooking, Housework, Shopping, Mobility    PT AM-PAC:   /24  OT AM-PAC:   /24    Family can provide

## 2023-12-31 NOTE — CONSULTS
GASTROENTEROLOGY  CONSULTATION    Patient: Emily Hernandez   : 1980  Med Rec#: [id number]      REASON FOR CONSULTATION:  PEG tube malfunction    Her gastrostomy tube is now functional with the proper fittings/adapters.      If change-out to a completely new G-tube is desired, please order replacement tube kit to bedside and recall GI for placement.    Recall as needed.      Electronically signed by:  Jose Abdalla M.D.  2023    10:23 AM           
NEGATIVE        -----------------------------------------------------------------  Imaging Results:   CT images reviewed.  Stent in good position on right side.  Stones noted in kidney and bladder.  No hydronephrosis noted.      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 coli carrier    Septic shock ([facility])    Status post colostomy ([facility])    Lower paraplegia ([facility])    Sepsis ([facility])    Pneumonia due to organism    Extended spectrum beta lactamase (ESBL) resistance    Abdominal pain    Ileus ([facility])    Decubitus ulcer of coccygeal region, stage 4 ([facility])    Proteus mirabilis infection    Lactate blood increase    E-coli UTI    Sepsis due to Escherichia coli ([facility])    E coli bacteremia    Nephrostomy tube displaced ([facility])    Pyelonephritis    Obstructive uropathy    Bandemia    SIRS (systemic inflammatory response syndrome) ([facility])    Gram-neg septicemia ([facility])    Pneumatosis coli    On enteral nutrition at home       Plan:     43-year-old female with multiple medical issues including neurogenic bladder secondary to multiple sclerosis, bladder stones, and kidney stones.    She was admitted with UTI.    Her SP tube was changed yesterday.    She is clinically stable and her white blood cell count is improving.  Infectious diseases consulted and managing antibiotics.    CT scan shows no hydronephrosis.    At this point I would recommend treating her infection and having her 
\"FIBRINOGEN\"  Lab Results   Component Value Date/Time    DDIMER 1.10 06/16/2012 10:45 AM     No results found for: \"LDH\"    Lab Results   Component Value Date    SEDRATE 68 (H) 10/18/2021       Lab Results   Component Value Date/Time    COVID19 Not Detected 12/29/2023 08:34 PM    COVID19 Not Detected 12/11/2022 12:13 PM    COVID19 Not Detected 11/09/2020 09:17 AM    COVID19 Not Detected 09/26/2020 09:20 PM    COVID19 Not Detected 08/16/2020 09:54 AM     No results found for requested labs within last 30 days.       Imaging Studies:   XR CHEST PORTABLE    Result Date: 12/29/2023  EXAMINATION: ONE XRAY VIEW OF THE CHEST 12/29/2023 9:27 pm COMPARISON: 12/05/2022 HISTORY: ORDERING SYSTEM PROVIDED HISTORY: illness, c/f pneumonia TECHNOLOGIST PROVIDED HISTORY: illness, c/f pneumonia Reason for Exam: illness, c/f pneumonia FINDINGS: The heart is normal in size.  There are prominent bilateral perihilar markings.  There is chronic elevation the right hemidiaphragm.  There is no pleural effusion or pneumothorax.  The visualized bones are unremarkable.     1. Prominent perihilar markings, which are nonspecific but can be seen with viral process or small airways disease (bronchitis, asthma). 2.  No focal pulmonary consolidation.       Cultures:     Procedure Component Value Units Date/Time   Culture, Blood 1 [[id number]] Collected: 12/29/23 2059   Order Status: Completed Specimen: Blood Updated: 12/29/23 2331    Specimen Description .BLOOD    Special Requests RT FA, 7 ML    Culture NO GROWTH <24 HRS   Culture, Blood 1 [[id number]] Collected: 12/29/23 2125   Order Status: Completed Specimen: Blood Updated: 12/29/23 2330    Specimen Description .BLOOD    Special Requests RT HAND, 3 ML    Culture NO GROWTH <24 HRS   COVID-19 & Influenza Combo [[id number]] Collected: 12/29/23 2034   Order Status: Completed Specimen: Nasopharyngeal Swab Updated: 12/29/23 2117    Specimen Description .NASOPHARYNGEAL SWAB    Source .NASOPHARYNGEAL

## 2023-12-31 NOTE — PLAN OF CARE
Problem: Skin/Tissue Integrity  Goal: Absence of new skin breakdown  Description: 1.  Monitor for areas of redness and/or skin breakdown  2.  Assess vascular access sites hourly  3.  Every 4-6 hours minimum:  Change oxygen saturation probe site  4.  Every 4-6 hours:  If on nasal continuous positive airway pressure, respiratory therapy assess nares and determine need for appliance change or resting period.  12/31/2023 0152 by Eugenia Brownlee, RN  Outcome: Progressing     Problem: Safety - Adult  Goal: Free from fall injury  12/31/2023 0152 by Eugenia Brownlee, RN  Outcome: Progressing  Flowsheets (Taken 12/31/2023 0152)  Free From Fall Injury:   Instruct family/caregiver on patient safety   Based on caregiver fall risk screen, instruct family/caregiver to ask for assistance with transferring infant if caregiver noted to have fall risk factors

## 2024-01-01 LAB
ANION GAP SERPL CALCULATED.3IONS-SCNC: 11 MMOL/L (ref 9–17)
BUN SERPL-MCNC: 7 MG/DL (ref 6–20)
BUN/CREAT SERPL: ABNORMAL (ref 9–20)
CALCIUM SERPL-MCNC: 8.8 MG/DL (ref 8.6–10.4)
CHLORIDE SERPL-SCNC: 104 MMOL/L (ref 98–107)
CO2 SERPL-SCNC: 23 MMOL/L (ref 20–31)
CREAT SERPL-MCNC: <0.4 MG/DL (ref 0.5–0.9)
ERYTHROCYTE [DISTWIDTH] IN BLOOD BY AUTOMATED COUNT: 18.4 % (ref 11.8–14.4)
GFR SERPL CREATININE-BSD FRML MDRD: ABNORMAL ML/MIN/1.73M2
GLUCOSE BLD-MCNC: 100 MG/DL (ref 65–105)
GLUCOSE BLD-MCNC: 106 MG/DL (ref 65–105)
GLUCOSE BLD-MCNC: 107 MG/DL (ref 65–105)
GLUCOSE BLD-MCNC: 110 MG/DL (ref 65–105)
GLUCOSE BLD-MCNC: 91 MG/DL (ref 65–105)
GLUCOSE BLD-MCNC: 98 MG/DL (ref 65–105)
GLUCOSE SERPL-MCNC: 101 MG/DL (ref 70–99)
HCT VFR BLD AUTO: 39 % (ref 36.3–47.1)
HGB BLD-MCNC: 12.3 G/DL (ref 11.9–15.1)
MCH RBC QN AUTO: 24.5 PG (ref 25.2–33.5)
MCHC RBC AUTO-ENTMCNC: 31.5 G/DL (ref 28.4–34.8)
MCV RBC AUTO: 77.5 FL (ref 82.6–102.9)
NRBC BLD-RTO: 0 PER 100 WBC
PLATELET # BLD AUTO: 310 K/UL (ref 138–453)
PMV BLD AUTO: 10.7 FL (ref 8.1–13.5)
POTASSIUM SERPL-SCNC: 3.4 MMOL/L (ref 3.7–5.3)
RBC # BLD AUTO: 5.03 M/UL (ref 3.95–5.11)
SODIUM SERPL-SCNC: 138 MMOL/L (ref 135–144)
WBC OTHER # BLD: 7.3 K/UL (ref 3.5–11.3)

## 2024-01-01 PROCEDURE — 2580000003 HC RX 258: Performed by: NURSE PRACTITIONER

## 2024-01-01 PROCEDURE — 99232 SBSQ HOSP IP/OBS MODERATE 35: CPT | Performed by: NURSE PRACTITIONER

## 2024-01-01 PROCEDURE — 82947 ASSAY GLUCOSE BLOOD QUANT: CPT

## 2024-01-01 PROCEDURE — 6360000002 HC RX W HCPCS: Performed by: NURSE PRACTITIONER

## 2024-01-01 PROCEDURE — 80048 BASIC METABOLIC PNL TOTAL CA: CPT

## 2024-01-01 PROCEDURE — 36415 COLL VENOUS BLD VENIPUNCTURE: CPT

## 2024-01-01 PROCEDURE — 1200000000 HC SEMI PRIVATE

## 2024-01-01 PROCEDURE — 6370000000 HC RX 637 (ALT 250 FOR IP): Performed by: NURSE PRACTITIONER

## 2024-01-01 PROCEDURE — 85027 COMPLETE CBC AUTOMATED: CPT

## 2024-01-01 RX ORDER — INSULIN LISPRO 100 [IU]/ML
0-4 INJECTION, SOLUTION INTRAVENOUS; SUBCUTANEOUS EVERY 4 HOURS
Status: DISCONTINUED | OUTPATIENT
Start: 2024-01-01 | End: 2024-01-03 | Stop reason: HOSPADM

## 2024-01-01 RX ADMIN — RIVAROXABAN 20 MG: 20 TABLET, FILM COATED ORAL at 08:58

## 2024-01-01 RX ADMIN — MEROPENEM 1000 MG: 1 INJECTION, POWDER, FOR SOLUTION INTRAVENOUS at 00:05

## 2024-01-01 RX ADMIN — MEROPENEM 1000 MG: 1 INJECTION, POWDER, FOR SOLUTION INTRAVENOUS at 16:22

## 2024-01-01 RX ADMIN — GABAPENTIN 300 MG: 300 CAPSULE ORAL at 21:47

## 2024-01-01 RX ADMIN — BACLOFEN 10 MG: 10 TABLET ORAL at 08:57

## 2024-01-01 RX ADMIN — LANSOPRAZOLE 15 MG: 15 TABLET, ORALLY DISINTEGRATING, DELAYED RELEASE ORAL at 16:22

## 2024-01-01 RX ADMIN — MEROPENEM 1000 MG: 1 INJECTION, POWDER, FOR SOLUTION INTRAVENOUS at 23:58

## 2024-01-01 RX ADMIN — GABAPENTIN 300 MG: 300 CAPSULE ORAL at 13:30

## 2024-01-01 RX ADMIN — POTASSIUM BICARBONATE 40 MEQ: 782 TABLET, EFFERVESCENT ORAL at 13:30

## 2024-01-01 RX ADMIN — ACETAMINOPHEN 650 MG: 325 TABLET ORAL at 08:58

## 2024-01-01 RX ADMIN — MEROPENEM 1000 MG: 1 INJECTION, POWDER, FOR SOLUTION INTRAVENOUS at 09:02

## 2024-01-01 RX ADMIN — LANSOPRAZOLE 15 MG: 15 TABLET, ORALLY DISINTEGRATING, DELAYED RELEASE ORAL at 05:39

## 2024-01-01 RX ADMIN — MIDODRINE HYDROCHLORIDE 5 MG: 5 TABLET ORAL at 21:47

## 2024-01-01 RX ADMIN — BACLOFEN 10 MG: 10 TABLET ORAL at 21:47

## 2024-01-01 RX ADMIN — DEXTROSE AND SODIUM CHLORIDE: 5; 900 INJECTION, SOLUTION INTRAVENOUS at 18:59

## 2024-01-01 RX ADMIN — GABAPENTIN 300 MG: 300 CAPSULE ORAL at 08:59

## 2024-01-01 RX ADMIN — SERTRALINE HYDROCHLORIDE 50 MG: 50 TABLET ORAL at 08:59

## 2024-01-01 RX ADMIN — BACLOFEN 10 MG: 10 TABLET ORAL at 13:30

## 2024-01-01 ASSESSMENT — PAIN SCALES - WONG BAKER: WONGBAKER_NUMERICALRESPONSE: 0

## 2024-01-01 NOTE — PLAN OF CARE
Problem: Infection - Adult  Goal: Absence of infection at discharge  1/1/2024 1153 by Abhilash Mays, RN  Outcome: Progressing  Flowsheets (Taken 1/1/2024 1153)  Absence of infection at discharge:   Assess and monitor for signs and symptoms of infection   Monitor lab/diagnostic results   Monitor all insertion sites i.e., indwelling lines, tubes and drains   Administer medications as ordered   Instruct and encourage patient and family to use good hand hygiene technique  Note: Patient admitted with UTI. Patient has suprapubic cath. Davies cath care. Infection disease doctor following, Iv antibiotic  12/31/2023 2345 by Yana Walters, RN  Outcome: Progressing

## 2024-01-01 NOTE — PLAN OF CARE
Problem: Discharge Planning  Goal: Discharge to home or other facility with appropriate resources  12/31/2023 2345 by Yana Walters RN  Outcome: Progressing  12/31/2023 2342 by Yana Walters RN  Outcome: Progressing  Flowsheets (Taken 12/31/2023 2130)  Discharge to home or other facility with appropriate resources:   Identify barriers to discharge with patient and caregiver   Arrange for needed discharge resources and transportation as appropriate   Identify discharge learning needs (meds, wound care, etc)     Problem: Skin/Tissue Integrity  Goal: Absence of new skin breakdown  Description: 1.  Monitor for areas of redness and/or skin breakdown  2.  Assess vascular access sites hourly  3.  Every 4-6 hours minimum:  Change oxygen saturation probe site  4.  Every 4-6 hours:  If on nasal continuous positive airway pressure, respiratory therapy assess nares and determine need for appliance change or resting period.  12/31/2023 2345 by Yana Walters RN  Outcome: Progressing  12/31/2023 2342 by Yana Walters RN  Outcome: Progressing     Problem: Safety - Adult  Goal: Free from fall injury  12/31/2023 2345 by Yana Walters RN  Outcome: Progressing  12/31/2023 2342 by Yana Walters RN  Outcome: Progressing     Problem: ABCDS Injury Assessment  Goal: Absence of physical injury  12/31/2023 2345 by Yana Walters RN  Outcome: Progressing  12/31/2023 2342 by Yana Walters RN  Outcome: Progressing     Problem: Pain  Goal: Verbalizes/displays adequate comfort level or baseline comfort level  12/31/2023 2345 by Yana Walters RN  Outcome: Progressing  12/31/2023 2342 by Yana Walters RN  Outcome: Progressing     Problem: Nutrition Deficit:  Goal: Optimize nutritional status  12/31/2023 2345 by Yana Walters RN  Outcome: Progressing  12/31/2023 2342 by Yana Walters RN  Outcome: Progressing     Problem: Anxiety  Goal: Will report anxiety at manageable levels  Description:

## 2024-01-02 ENCOUNTER — APPOINTMENT (OUTPATIENT)
Dept: GENERAL RADIOLOGY | Age: 44
DRG: 698 | End: 2024-01-02
Payer: MEDICARE

## 2024-01-02 LAB
ANION GAP SERPL CALCULATED.3IONS-SCNC: 8 MMOL/L (ref 9–17)
BUN SERPL-MCNC: 7 MG/DL (ref 6–20)
BUN/CREAT SERPL: ABNORMAL (ref 9–20)
CALCIUM SERPL-MCNC: 9.2 MG/DL (ref 8.6–10.4)
CHLORIDE SERPL-SCNC: 106 MMOL/L (ref 98–107)
CO2 SERPL-SCNC: 27 MMOL/L (ref 20–31)
CREAT SERPL-MCNC: <0.4 MG/DL (ref 0.5–0.9)
ERYTHROCYTE [DISTWIDTH] IN BLOOD BY AUTOMATED COUNT: 18.4 % (ref 11.8–14.4)
GFR SERPL CREATININE-BSD FRML MDRD: ABNORMAL ML/MIN/1.73M2
GLUCOSE BLD-MCNC: 100 MG/DL (ref 65–105)
GLUCOSE BLD-MCNC: 85 MG/DL (ref 65–105)
GLUCOSE BLD-MCNC: 85 MG/DL (ref 65–105)
GLUCOSE BLD-MCNC: 90 MG/DL (ref 65–105)
GLUCOSE BLD-MCNC: 96 MG/DL (ref 65–105)
GLUCOSE SERPL-MCNC: 99 MG/DL (ref 70–99)
HCT VFR BLD AUTO: 40.7 % (ref 36.3–47.1)
HGB BLD-MCNC: 12.4 G/DL (ref 11.9–15.1)
MCH RBC QN AUTO: 23.8 PG (ref 25.2–33.5)
MCHC RBC AUTO-ENTMCNC: 30.5 G/DL (ref 28.4–34.8)
MCV RBC AUTO: 78.3 FL (ref 82.6–102.9)
MICROORGANISM SPEC CULT: ABNORMAL
MICROORGANISM SPEC CULT: ABNORMAL
NRBC BLD-RTO: 0 PER 100 WBC
PLATELET # BLD AUTO: 246 K/UL (ref 138–453)
PMV BLD AUTO: 10.4 FL (ref 8.1–13.5)
POTASSIUM SERPL-SCNC: 3.9 MMOL/L (ref 3.7–5.3)
RBC # BLD AUTO: 5.2 M/UL (ref 3.95–5.11)
SODIUM SERPL-SCNC: 141 MMOL/L (ref 135–144)
SPECIMEN DESCRIPTION: ABNORMAL
WBC OTHER # BLD: 6.6 K/UL (ref 3.5–11.3)

## 2024-01-02 PROCEDURE — 99232 SBSQ HOSP IP/OBS MODERATE 35: CPT | Performed by: INTERNAL MEDICINE

## 2024-01-02 PROCEDURE — 80048 BASIC METABOLIC PNL TOTAL CA: CPT

## 2024-01-02 PROCEDURE — 6360000002 HC RX W HCPCS: Performed by: NURSE PRACTITIONER

## 2024-01-02 PROCEDURE — 2580000003 HC RX 258: Performed by: NURSE PRACTITIONER

## 2024-01-02 PROCEDURE — 6360000004 HC RX CONTRAST MEDICATION: Performed by: INTERNAL MEDICINE

## 2024-01-02 PROCEDURE — 6370000000 HC RX 637 (ALT 250 FOR IP): Performed by: NURSE PRACTITIONER

## 2024-01-02 PROCEDURE — 0DH63UZ INSERTION OF FEEDING DEVICE INTO STOMACH, PERCUTANEOUS APPROACH: ICD-10-PCS | Performed by: INTERNAL MEDICINE

## 2024-01-02 PROCEDURE — 43762 RPLC GTUBE NO REVJ TRC: CPT

## 2024-01-02 PROCEDURE — 85027 COMPLETE CBC AUTOMATED: CPT

## 2024-01-02 PROCEDURE — 1200000000 HC SEMI PRIVATE

## 2024-01-02 PROCEDURE — 36415 COLL VENOUS BLD VENIPUNCTURE: CPT

## 2024-01-02 PROCEDURE — 82947 ASSAY GLUCOSE BLOOD QUANT: CPT

## 2024-01-02 PROCEDURE — 49465 FLUORO EXAM OF G/COLON TUBE: CPT

## 2024-01-02 PROCEDURE — 0DP6XUZ REMOVAL OF FEEDING DEVICE FROM STOMACH, EXTERNAL APPROACH: ICD-10-PCS | Performed by: INTERNAL MEDICINE

## 2024-01-02 RX ADMIN — BACLOFEN 10 MG: 10 TABLET ORAL at 21:58

## 2024-01-02 RX ADMIN — GABAPENTIN 300 MG: 300 CAPSULE ORAL at 10:18

## 2024-01-02 RX ADMIN — MEROPENEM 1000 MG: 1 INJECTION, POWDER, FOR SOLUTION INTRAVENOUS at 08:42

## 2024-01-02 RX ADMIN — DEXTROSE AND SODIUM CHLORIDE: 5; 900 INJECTION, SOLUTION INTRAVENOUS at 17:57

## 2024-01-02 RX ADMIN — RIVAROXABAN 20 MG: 20 TABLET, FILM COATED ORAL at 10:18

## 2024-01-02 RX ADMIN — LANSOPRAZOLE 15 MG: 15 TABLET, ORALLY DISINTEGRATING, DELAYED RELEASE ORAL at 06:35

## 2024-01-02 RX ADMIN — MEROPENEM 1000 MG: 1 INJECTION, POWDER, FOR SOLUTION INTRAVENOUS at 16:16

## 2024-01-02 RX ADMIN — BACLOFEN 10 MG: 10 TABLET ORAL at 10:18

## 2024-01-02 RX ADMIN — DIATRIZOATE MEGLUMINE AND DIATRIZOATE SODIUM 120 ML: 660; 100 LIQUID ORAL; RECTAL at 15:29

## 2024-01-02 RX ADMIN — SERTRALINE HYDROCHLORIDE 50 MG: 50 TABLET ORAL at 10:18

## 2024-01-02 RX ADMIN — MEROPENEM 1000 MG: 1 INJECTION, POWDER, FOR SOLUTION INTRAVENOUS at 23:31

## 2024-01-02 RX ADMIN — GABAPENTIN 300 MG: 300 CAPSULE ORAL at 21:58

## 2024-01-02 ASSESSMENT — PAIN SCALES - WONG BAKER

## 2024-01-02 NOTE — PLAN OF CARE
Problem: Musculoskeletal - Adult  Goal: Return mobility to safest level of function  1/2/2024 0257 by Sarah Ashley RN  Outcome: Not Progressing  Goal: Return ADL status to a safe level of function  1/2/2024 0257 by Sarah Ashley RN  Outcome: Not Progressing     Problem: Gastrointestinal - Adult  Goal: Establish and maintain optimal ostomy function  1/2/2024 0257 by Sarah Ashley RN  Outcome: Not Progressing     Problem: Genitourinary - Adult  Goal: Urinary catheter remains patent  1/2/2024 1320 by Abhilash Mays RN  Outcome: Progressing  Note: Patient admitted with UTI. Patient receiving IV antibiotics, ID consult. Writer monitoring temp, labs  1/2/2024 0257 by Sarah Ashley RN  Outcome: Not Progressing

## 2024-01-02 NOTE — DISCHARGE INSTR - COC
Continuity of Care Form    Patient Name: Emily Hernandez   :  1980  MRN:  [id number]    Admit date:  2023  Discharge date:  ***    Code Status Order: Full Code   Advance Directives:     Admitting Physician:  Floridalma Oropeza MD  PCP: Ollie Vásquez MD    Discharging Nurse: ***  Discharging Hospital Unit/Room#:   Discharging Unit Phone Number: ***    Emergency Contact:   Extended Emergency Contact Information  Primary Emergency Contact: Remy Thomas  Home Phone: [phone]  Mobile Phone: [phone]  Relation: Parent   needed? No  Secondary Emergency Contact: frances thomas  Mobile Phone: [phone]  Relation: Brother/Sister   needed? No    Past Surgical History:  Past Surgical History:   Procedure Laterality Date    AMPUTATION Right     RLE    COLOSTOMY      CYSTOSCOPY Right 2022    CYSTOSCOPY URETERAL STENT INSERTION performed by Donnie Easton MD at [facility] OR    GASTROSTOMY TUBE PLACEMENT      IR NEPHROSTOMY PERCUTANEOUS RIGHT  2020    IR NEPHROSTOMY PERCUTANEOUS RIGHT 2020 Bradley VITALE MD [facility] SPECIAL PROCEDURES    LEG AMPUTATION THROUGH FEMUR      SUPRAPUBIC CATHETER         Immunization History:   Immunization History   Administered Date(s) Administered    COVID-19, PFIZER Bivalent, DO NOT Dilute, (age 12y+), IM, 30 mcg/0.3 mL 2022    COVID-19, PFIZER PURPLE top, DILUTE for use, (age 12 y+), 30mcg/0.3mL 2020, 2021, 10/21/2021    COVID-19, PFIZER, (- formula), (age 12y+), IM, 30mcg/0.3mL 10/30/2023    Influenza Vaccine, unspecified formulation 10/09/2016    Pneumococcal, PCV-13, PREVNAR 13, (age 6w+), IM, 0.5mL 10/09/2016       Active Problems:  Patient Active Problem List   Diagnosis Code    Aphasia R47.01    Multiple sclerosis (HCC) G35    Nonverbal R47.01    Gastrostomy tube dependent (HCC) Z93.1    Dislodged gastrostomy tube T85.528A    Neurogenic bladder N31.9    Hemorrhagic cystitis N30.91    Hypokalemia E87.6

## 2024-01-02 NOTE — PLAN OF CARE
Pt is max assist and dependent, non-verbal. Can make eye contact as education and care is provided.   Problem: Musculoskeletal - Adult  Goal: Return mobility to safest level of function  Outcome: Not Progressing  Goal: Return ADL status to a safe level of function  Outcome: Not Progressing     Problem: Gastrointestinal - Adult  Goal: Establish and maintain optimal ostomy function  Outcome: Not Progressing     Problem: Genitourinary - Adult  Goal: Urinary catheter remains patent  Outcome: Not Progressing

## 2024-01-02 NOTE — OP NOTE
01/02/24 0635   Residual Volume (ml) 0 ml 01/02/24 0635       [REMOVED] Urostomy LLQ (Removed)       Findings: See below    Detailed Description of Procedure:   After informed consent from POA/ patients mother, PEG site cleaned with betadine and sterile gauze.  A lubricated 18 F JOAQUIN G tube was inserted in the previous site with minimal resistance. 10 cc balloon inflated with water.    Will check barium PEG study to ensure proper placement after which tube feeds can be resumed.     Electronically signed by Hossein Guadarrama DO on 1/2/2024 at 3:08 PM

## 2024-01-03 VITALS
TEMPERATURE: 98.2 F | HEIGHT: 60 IN | OXYGEN SATURATION: 95 % | DIASTOLIC BLOOD PRESSURE: 95 MMHG | RESPIRATION RATE: 20 BRPM | BODY MASS INDEX: 34.37 KG/M2 | HEART RATE: 83 BPM | SYSTOLIC BLOOD PRESSURE: 134 MMHG | WEIGHT: 175.04 LBS

## 2024-01-03 LAB
GLUCOSE BLD-MCNC: 100 MG/DL (ref 65–105)
GLUCOSE BLD-MCNC: 101 MG/DL (ref 65–105)
GLUCOSE BLD-MCNC: 89 MG/DL (ref 65–105)
GLUCOSE BLD-MCNC: 95 MG/DL (ref 65–105)
GLUCOSE BLD-MCNC: 97 MG/DL (ref 65–105)
MICROORGANISM SPEC CULT: NORMAL
SERVICE CMNT-IMP: NORMAL
SPECIMEN DESCRIPTION: NORMAL

## 2024-01-03 PROCEDURE — 6370000000 HC RX 637 (ALT 250 FOR IP): Performed by: NURSE PRACTITIONER

## 2024-01-03 PROCEDURE — 2580000003 HC RX 258: Performed by: NURSE PRACTITIONER

## 2024-01-03 PROCEDURE — 82947 ASSAY GLUCOSE BLOOD QUANT: CPT

## 2024-01-03 PROCEDURE — 6360000002 HC RX W HCPCS: Performed by: NURSE PRACTITIONER

## 2024-01-03 PROCEDURE — 99239 HOSP IP/OBS DSCHRG MGMT >30: CPT | Performed by: INTERNAL MEDICINE

## 2024-01-03 PROCEDURE — 99213 OFFICE O/P EST LOW 20 MIN: CPT

## 2024-01-03 RX ADMIN — BACLOFEN 10 MG: 10 TABLET ORAL at 08:02

## 2024-01-03 RX ADMIN — MEROPENEM 1000 MG: 1 INJECTION, POWDER, FOR SOLUTION INTRAVENOUS at 08:01

## 2024-01-03 RX ADMIN — RIVAROXABAN 20 MG: 20 TABLET, FILM COATED ORAL at 08:02

## 2024-01-03 RX ADMIN — SERTRALINE HYDROCHLORIDE 50 MG: 50 TABLET ORAL at 08:02

## 2024-01-03 RX ADMIN — GABAPENTIN 300 MG: 300 CAPSULE ORAL at 08:02

## 2024-01-03 RX ADMIN — LANSOPRAZOLE 15 MG: 15 TABLET, ORALLY DISINTEGRATING, DELAYED RELEASE ORAL at 06:15

## 2024-01-03 ASSESSMENT — PAIN SCALES - WONG BAKER

## 2024-01-03 NOTE — PROGRESS NOTES
Nutrition Note    Voice mail received from yesterday (12/31) to make tube feeding continuous. Tube feeding was not started on Saturday (12/30) because the correct G-tube adapter was not available. Tube feeding was started yesterday afternoon and nurse practitioner change tube feeding to continuous. Writer modified tube feeding order to Vital AF 1.2 Julio Cesar goal rate 38 mL/hr. Lead RN and current RN were not aware of on-call directions for dietitian on the weekends. Writer provided updated information to be posted on the unit.        Current Tube Feeding (TF) Orders:   Feeding Route: Gastrostomy  Formula: Peptide Based (Vital AF 1.2 Julio Cesar at 38 mL/hr; 912 mL total volume)  Schedule: Continuous  Additives/Modulars: None  Water Flushes: 165 mL every4 hours (990 mL total volume)  Goal TF & Flush Orders Provides: 1094 kcal, 68 gm of protein and 740 mL free water      Abby ALFORDN, RDN, LDN  Lead Clinical Dietitian  RD Office Phone [phone]      
  [facility]  Progress Note    Emily Hernandez  MRN: [id number]  Date: 12/31/2023  LOS: 1     Reason for F/U :   Probable urinary tract infection    Impression :   Fever without a clear etiology  Concern is for urinary tract infection  Nephrolithiasis with recent right ureteroscopy with laser lithotripsy October 2023 at [facility]  CT scan demonstrates stent in place on the right side and interval decrease in stone burden within the right kidney, bladder calculi persist and a subtle 2 to 3 mm distal right ureteral calculus, and patchy areas of enhancement within the right kidney which may reflect superimposed pyelonephritis-no hydronephrosis  Known history of multiple sclerosis with neurogenic bladder has a chronic indwelling suprapubic catheter  Coagulase-negative staph in 1 out of 2 sets of blood cultures represents a contaminant  History of pulmonary embolism    Recommendations:   The patient remains on meropenem due to history of multidrug-resistant organisms in the past  The CT scan does show some nephrolithiasis though overall improved there are some changes that may represent pyelonephritis  The urine culture data remains pending  Will continue to monitor her progress    Infection Control Recommendations:   Contact precautions    Discharge Planning:   Estimated Length of IV antimicrobials: To be determined  Patient will need Midline Catheter Insertion/ PICC line Insertion: No  Patient will need: Home IV , Infusion Center,  SNF,  LTAC: Undetermined  Patient willneed outpatient wound care: No    Medical Decision making / Summary of Stay:   Emily Hernandez is a 43 y.o.-year-old female who was initially admitted on 12/29/2023.   Emily has multiple sclerosis, hydrocephalus, neurogenic bladder, functional quadriplegia, aphasia, hypertension, depression among other medical problems.     She resides at a facility and has had suprapubic catheter associated urinary tract infections in the past and has 
Comprehensive Nutrition Assessment    Type and Reason for Visit:  Consult (TF order and management)    Nutrition Recommendations/Plan:   NPO diet  Recommend Vital AF 1.2 Julio Cesar at 75 mL/hr for 12 hours per day (9 pm to 9 am)  Monitor tube feeding tolerance, GI function and labs     Malnutrition Assessment:  Malnutrition Status:  At risk for malnutrition (Comment) (12/30/23 7868)        Nutrition Assessment:    Patient admission is related to UTI associated with cystostomy catheter and sepsis. Patient has a medical history for aphasia, multiple sclerosis, G-tube feeding and quadriplegia. Patient is on nocturnal tube feeding, Peptamen at 80 mL/hr for 12 hours per day (960 mL total volume); water flush 165 mL every 4 hours. Recommend Vital AF 1.2 Julio Cesar at 75 mL/hr for 12 hours per day (900 mL tota volume) and water flush 165 mL every 4 hours. Will monitor tube feeding tolerance, GI function and labs.    Nutrition Related Findings:    No edema. Active bowel sounds. Colostomy and G-tube Wound Type: None       Current Nutrition Intake & Therapies:    Average Meal Intake: NPO  Average Supplements Intake: NPO  Diet NPO  ADULT TUBE FEEDING; Gastrostomy; Peptide Based; Cyclic; 75; 9:00 PM; 9:00 AM; 165; Q 4 hours  Current Tube Feeding (TF) Orders:  Feeding Route: Gastrostomy  Formula: Peptide Based  Schedule: Cyclic (12 hours)  Feeding Regimen: Vital AF 1.2 julio cesar  at 75 mL/hr for 12 hours (900 mL total volume)  Additives/Modulars: None  Water Flushes: 165 mL every4 hours (990 mL total volume)  Goal TF & Flush Orders Provides: 1080 kcal and 68 gm of protein    Anthropometric Measures:  Height: 152.4 cm (5')  Ideal Body Weight (IBW): 100 lbs (45 kg)       Current Body Weight: 81.4 kg (179 lb 7.3 oz), 179.5 % IBW. Weight Source: Not Specified  Current BMI (kg/m2): 35        Weight Adjustment For: Quadriplegia  Total Adjusted Percentage (Calculated): 12.5  Adjusted Ideal Body Weight (lbs) (Calculated): 87.5 lbs  Adjusted Ideal Body 
Dr. Patterson at bedside. Dr. Patterson Mother Remy called informed of patient's Gtube accidentally coming out and need for replacement of tube with her consent.  Remy verbalized understanding, writer witnessed conversation of agreeing to bedside placement of Gtube  
G tube came out during repositioning. Writer applied dressing to cover site and notified Karin Siddiqi.   
Gastrografin reviewed.  PEG in adequate position.  OK to use tube as needed.  Please call again if we can help  
Gtube placement verified by  Gtube injection xray. Gtube positioned appropriately. Tube feed restarted.  
[facility]  Office: [phone]  Charanjit March DO, Zachary Perez DO, Martínez Wilder DO, Moisés Tanner DO, Abimael Mendiola MD, Opal Lizarraga MD, Floridalma Oropeza MD, Princess Smith MD,  Beto Swift MD, Nikhil Nair MD, Luis Romero MD,  Jayden Zhong DO, Anabela Osuna MD, Domenico Velez MD, Scott March DO, Maddison Perez MD,  Magdaleno Mario DO, Anahi Bonilla MD, Vinita Bauman MD, Lakshmi Victor MD, Sharad Bailey MD,  Tan Siddiqi MD, Oscar Barrera MD, Shelly Blanco MD, Maribel Painting MD, Renzo Whitehead MD, Ken Aly MD, Lazaro Bartlett DO, Alejandro Gilliam DO, Deonte Alcaraz MD,  Walter Dotson MD, Shirley Waterhouse, CNP,  Susan Mitchell, CNP, Yefri Rodrigez, CNP,  Deborah Cabrera, ROBERTO, Batool Benitez, CNP, Jocelynn Membreno, CNP, Abby Rodgers CNP, Gaby Latham, CNP, Jane Hidalgo, CNP, Pippa Moore, PA-C, Jaimee Treviño, PA-C, Mabel Pittman, CNP, Freya Greenwood, CNS, Maty Vick, CNP, Dagmar Santoyo, CNP, Tracy Schwab, CNP         [facility]   IN-PATIENT SERVICE   [facility]    Progress Note    12/31/2023    12:32 PM    Name:   Emily Hernandez  MRN:     [id number]     Acct:      [id number]   Room:   2024/2024-01  IP Day:  1  Admit Date:  12/29/2023  8:04 PM    PCP:   Ollie Vásquez MD  Code Status:  Full Code    Subjective:     C/C:   Chief Complaint   Patient presents with    Illness     Interval History Status: improved.     Per the nursing notes \"RN attempted to start pt's tube feed but unable d/t not having an adapter for pt's Gtube which is actually a omer catheter. RN called house supervisor but supervisor unable to find adapter large enough for pt's Gtube (omer catheter)\" GI evaluated the patient.  Per their note the gastrostomy tube is functioning and we do have the proper adapters.  They state if the G-tube does need exchange to let them know    Starting tf today   Clinically she looks better   She has been afebrile and less 
[facility]  Progress Note      NAME:  Emily Hernandez  MEDICAL RECORD NUMBER:  [id number]  AGE: 43 y.o.   GENDER: female  : 1980  TODAY'S DATE:  1/3/2024    [facility] nurse referral received for \"chronic wounds\". At time of WOC nurse assessment patient is receiving daily cares - bath. WO nurse is able to visualize entirety of patient skin - no open tang or areas of breakdown noted. Previous area of injury to coccyx is appreciated and healed. Patient has ostomy and suprapubic catheter, sites clear without issues noted. Recommend barrier cream/zinc paste to coccyx twice daily and PRN with every 2 hour turns to maintain skin integrity.   [facility] nurse service to sign off, please re-consult if future needs arise.      Measurements:  Wound 20 Other (Comment) Lateral;Lower;Right (Active)   Number of days: 1193       Wound 23 Sacrum (Active)   Wound Image   24 1123   Wound Etiology Other 24 1123   Dressing Status New dressing applied 24 1123   Wound Cleansed Not Cleansed 24 1123   Dressing/Treatment Zinc paste 24 1123   Dressing Change Due 24 1123   Wound Assessment Pink/red 24 1123   Drainage Amount None (dry) 24 1123   Hilda-wound Assessment Intact;Dry/flaky 24 1123   Number of days: 4       
Mother Remy updated on patient status  
Occupational Therapy    DATE: 2023    NAME: Emily Hernandez  MRN: [id number]   : 1980    Patient not seen this date for Occupational Therapy due to:      [] Cancel by RN or physician due to:    [] Hemodialysis    [] Critical Lab Value Level     [] Blood transfusion in progress    [] Acute or unstable cardiovascular status   _MAP < 55 or more than >115  _HR < 40 or > 130    [] Acute or unstable pulmonary status   -FiO2 > 60%   _RR < 5 or >40    _O2 sats < 85%    [] Strict Bedrest    [] Off Unit for surgery or procedure    [] Off Unit for testing       [] Pending imaging to R/O fracture    [] Refusal by Patient      [] Intubated    [] Other      [x] OT being discontinued at this time. Per RN, patient is at her baseline with no further needs.     [] OT being discontinued at this time as the patient has been transferred to hospice care. No further needs.    Cindy Gibson OTR/L    
PAtient discharged off unit with transport. Discharge packet sent with patient. Attempted to call report a few times to Jeromy Javier. Nurse was on break and was transferred multiple times, eventually left voicemail to nurses station 1 with my callback number.  
Physical Therapy  DATE: 2023    NAME: Emily Hernandez  MRN: [id number]   : 1980    Patient not seen this date for Physical Therapy due to:      [] Cancel by RN or physician due to:    [] Hemodialysis    [] Critical Lab Value Level     [] Blood transfusion in progress    [] Acute or unstable cardiovascular status   _MAP < 55 or more than >115  _HR < 40 or > 130    [] Acute or unstable pulmonary status   -FiO2 > 60%   _RR < 5 or >40    _O2 sats < 85%    [] Strict Bedrest    [] Off Unit for surgery or procedure    [] Off Unit for testing       [] Pending imaging to R/O fracture    [] Refusal by Patient      [x] Other Pt is total care at [facility]. Not appropriate for PT.PT is discontinued.     [] PT being discontinued at this time. Patient independent. No further needs.     [] PT being discontinued at this time as the patient has been transferred to hospice care. No further needs.      NELIDA PEREZ, PT      
RN attempted to start pt's tube feed but unable d/t not having an adapter for pt's Gtube which is actually a omer catheter. RN called house supervisor but supervisor unable to find adapter large enough for pt's Gtube (omer catheter).    2102 RN notified Noa CABA regarding issue. NP states to notify the day shift. No further orders received.    2123 blood glucose 66. RN administered Dextrose 10% as ordered by hypoglycemia protocol and notified Noa CABA. New order received to change continuous IV fluids from 09.% NS to Dextrose 5% 0.9%NS. See MAR.    2213 Glucose rechecked at 101.  
RT called to NT suction patient, small thick secretions present in right nare and suctioned. Small amount clear secretions suctioned nasotracheal. Patient vomited after suctioning; orally suctioned for moderate amount of tube feeding.  
Tube feed stopped due to G tube falling out   
and/or Nutrient Delivery: Continue Current Tube Feeding, Continue NPO  Nutrition Education/Counseling: Education not indicated  Coordination of Nutrition Care: Continue to monitor while inpatient       Goals:  Previous Goal Met: Progressing toward Goal(s)  Goals: Tolerate nutrition support at goal rate       Nutrition Monitoring and Evaluation:      Food/Nutrient Intake Outcomes: Enteral Nutrition Intake/Tolerance  Physical Signs/Symptoms Outcomes: Skin, Weight, GI Status, Fluid Status or Edema, Biochemical Data    Discharge Planning:    Enteral Nutrition     Gualberto Gonzalez RD  Contact: [phone]    
    01/03/24  0428 01/03/24  0617 01/03/24  1043 01/03/24  1145   POCGLU 95 101 89 100         I/O (24Hr):    Intake/Output Summary (Last 24 hours) at 1/3/2024 1250  Last data filed at 1/3/2024 1113  Gross per 24 hour   Intake 2547.43 ml   Output 1525 ml   Net 1022.43 ml         Labs:  Hematology:  Recent Labs     01/01/24  0712 01/02/24  0556   WBC 7.3 6.6   RBC 5.03 5.20*   HGB 12.3 12.4   HCT 39.0 40.7   MCV 77.5* 78.3*   MCH 24.5* 23.8*   MCHC 31.5 30.5   RDW 18.4* 18.4*    246   MPV 10.7 10.4       Chemistry:  Recent Labs     01/01/24  0712 01/02/24  0556    141   K 3.4* 3.9    106   CO2 23 27   GLUCOSE 101* 99   BUN 7 7   CREATININE <0.4* <0.4*   ANIONGAP 11 8*   LABGLOM Can not be calculated Can not be calculated   CALCIUM 8.8 9.2       Recent Labs     01/02/24  1828 01/03/24  0030 01/03/24  0428 01/03/24  0617 01/03/24  1043 01/03/24  1145   POCGLU 85 97 95 101 89 100       ABG:  Lab Results   Component Value Date/Time    POCPH 7.451 12/06/2022 03:57 AM    POCPCO2 32.9 12/06/2022 03:57 AM    POCPO2 74.4 12/06/2022 03:57 AM    POCHCO3 22.9 12/06/2022 03:57 AM    NBEA 2 12/04/2022 04:12 AM    PBEA 0 12/06/2022 03:57 AM    LHT6RRC 10 01/08/2017 12:27 PM    UOCR6TGT 96 12/06/2022 03:57 AM    FIO2 30.0 12/06/2022 03:57 AM     Lab Results   Component Value Date/Time    SPECIAL RT HAND, 3 ML 12/29/2023 09:25 PM     Lab Results   Component Value Date/Time    CULTURE NO GROWTH 4 DAYS 12/29/2023 09:25 PM       Radiology:  CT ABDOMEN PELVIS W IV CONTRAST Additional Contrast? None    Result Date: 12/30/2023  1. Right ureteral stent in place with slight interval decrease in stone burden within the right kidney.  Bladder calculi persists.  There is a subtle 2-3 mm distal right ureteral calculus.  Patchy areas of enhancement within the right kidney which may reflect superimposed pyelonephritis.  No right hydronephrosis. 2. Cholelithiasis.     XR CHEST PORTABLE    Result Date: 12/29/2023  1. Prominent 
 Effort: Pulmonary effort is normal.      Breath sounds: Normal breath sounds.   Abdominal:      General: Bowel sounds are normal.      Palpations: Abdomen is soft.      Comments: There is a PEG tube in place in the left lower quadrant ostomy   Genitourinary:     Comments: There is a suprapubic catheter in place  Musculoskeletal:      Cervical back: Neck supple.      Comments: There is a right-sided above-the-knee amputation   Skin:     General: Skin is warm and dry.   Neurological:      Mental Status: She is alert.         Laboratory data:   I have independently reviewed the followinglabs:  CBC with Differential:   Recent Labs     01/01/24  0712 01/02/24  0556   WBC 7.3 6.6   HGB 12.3 12.4   HCT 39.0 40.7    246       BMP:   Recent Labs     12/31/23  0733 01/01/24  0712 01/02/24  0556   * 138 141   K 3.2* 3.4* 3.9    104 106   CO2 21 23 27   BUN 3* 7 7   CREATININE <0.4* <0.4* <0.4*   MG 1.8  --   --        Hepatic Function Panel:   Recent Labs     12/31/23 0733   PROT 8.1   LABALBU 3.4*   BILITOT 0.3   ALKPHOS 63   ALT 12   AST 13           Lab Results   Component Value Date/Time    PROCAL 0.04 12/29/2023 08:59 PM    PROCAL 29.13 12/05/2022 08:39 AM    PROCAL >100.00 12/03/2022 10:18 AM     Lab Results   Component Value Date/Time    CRP 44.0 12/29/2023 08:59 PM    CRP 11.9 10/18/2021 06:00 AM    CRP 10.9 10/11/2021 07:42 AM     Lab Results   Component Value Date    SEDRATE 68 (H) 10/18/2021         Lab Results   Component Value Date/Time    DDIMER 1.10 06/16/2012 10:45 AM     Lab Results   Component Value Date/Time    FERRITIN 166 04/05/2013 03:05 AM    FERRITIN 185 03/19/2013 10:29 AM     No results found for: \"LDH\"  No results found for: \"FIBRINOGEN\"    Results in Past 30 Days  Result Component Current Result Ref Range Previous Result Ref Range   SARS-CoV-2 RNA, RT PCR Not Detected (12/29/2023) Not Detected Not in Time Range      Lab Results   Component Value Date/Time    COVID19 Not Detected 
as scheduled per dietician  PEG tube had fallen off during repositioning, has been reinserted by GI  History of PE  Continue Xarelto  Tube feedings  Monitor labs    Abimael Mendiola MD  1/2/2024  4:15 PM    
decreased breath sounds. Examination of the left-lower field reveals decreased breath sounds. Decreased breath sounds present. No wheezing, rhonchi or rales.   Abdominal:      General: The ostomy site is clean. Bowel sounds are decreased. There is no distension.      Palpations: Abdomen is soft.      Tenderness: There is no abdominal tenderness.   Genitourinary:     Comments: Suprapubic catheter draining.  Musculoskeletal:      Cervical back: Normal range of motion.      Left knee: Decreased range of motion.      Comments: Left leg contracted      Right Lower Extremity: Right leg is amputated below knee.   Skin:     General: Skin is warm and dry.      Capillary Refill: Capillary refill takes less than 2 seconds.   Neurological:      Mental Status: She is alert.   Psychiatric:         Speech: She is noncommunicative.       Assessment:        Hospital Problems             Last Modified POA    * (Principal) Sepsis ([facility]) 12/30/2023 Yes    Multiple sclerosis ([facility]) 12/30/2023 Yes    Gastrostomy tube dependent ([facility]) 12/30/2023 Yes    Neurogenic bladder 12/30/2023 Yes    Urinary tract infection associated with cystostomy catheter ([facility]) 12/30/2023 Yes    History of pulmonary embolism 12/30/2023 Yes    On enteral nutrition at home 12/30/2023 Yes    Urinary tract infection associated with indwelling urethral catheter ([facility]) 12/31/2023 Yes       Plan:        Sepsis with suprapubic catheter d/t neurogenic bladder with recent history of stent placement and UTI  ID following. Merrem continues. IV hydration continues. Blood cultures pending. Urine culture pending sensitivity   MS  Continue home medications.   G tube dependent  Continue tube feedings as scheduled per dietician  History of PE  Continue Xarelto  Tube feedings  Monitor labs    LIT Middleton - CNP  1/1/2024  10:38 AM

## 2024-01-03 NOTE — PLAN OF CARE
Problem: Discharge Planning  Goal: Discharge to home or other facility with appropriate resources  1/3/2024 1425 by Luis Salomon RN  Outcome: Adequate for Discharge     Problem: Skin/Tissue Integrity  Goal: Absence of new skin breakdown  Description: 1.  Monitor for areas of redness and/or skin breakdown  2.  Assess vascular access sites hourly  3.  Every 4-6 hours minimum:  Change oxygen saturation probe site  4.  Every 4-6 hours:  If on nasal continuous positive airway pressure, respiratory therapy assess nares and determine need for appliance change or resting period.  1/3/2024 1425 by Luis Salomon RN  Outcome: Adequate for Discharge     Problem: Safety - Adult  Goal: Free from fall injury  1/3/2024 1425 by Luis Salomon RN  Outcome: Adequate for Discharge     Problem: ABCDS Injury Assessment  Goal: Absence of physical injury  1/3/2024 1425 by Luis Salomon RN  Outcome: Adequate for Discharge     Problem: Pain  Goal: Verbalizes/displays adequate comfort level or baseline comfort level  1/3/2024 1425 by Luis Salomon RN  Outcome: Adequate for Discharge     Problem: Nutrition Deficit:  Goal: Optimize nutritional status  1/3/2024 1425 by Luis Salomon RN  Outcome: Adequate for Discharge     Problem: Anxiety  Goal: Will report anxiety at manageable levels  Description: INTERVENTIONS:  1. Administer medication as ordered  2. Teach and rehearse alternative coping skills  3. Provide emotional support with 1:1 interaction with staff  1/3/2024 1425 by Luis Salomon RN  Outcome: Adequate for Discharge     Problem: Coping  Goal: Pt/Family able to verbalize concerns and demonstrate effective coping strategies  Description: INTERVENTIONS:  1. Assist patient/family to identify coping skills, available support systems and cultural and spiritual values  2. Provide emotional support, including active listening and acknowledgement of concerns of patient and caregivers  3. Reduce environmental

## 2024-01-03 NOTE — PLAN OF CARE
Problem: Skin/Tissue Integrity  Goal: Absence of new skin breakdown  Description: 1.  Monitor for areas of redness and/or skin breakdown  2.  Assess vascular access sites hourly  3.  Every 4-6 hours minimum:  Change oxygen saturation probe site  4.  Every 4-6 hours:  If on nasal continuous positive airway pressure, respiratory therapy assess nares and determine need for appliance change or resting period.  Outcome: Progressing     Problem: Safety - Adult  Goal: Free from fall injury  Outcome: Progressing     Problem: ABCDS Injury Assessment  Goal: Absence of physical injury  Outcome: Progressing     Problem: Skin/Tissue Integrity - Adult  Goal: Skin integrity remains intact  Outcome: Progressing

## 2024-01-03 NOTE — CARE COORDINATION
Social Work- [facility] will admit today . [facility] will transport at 2PM. Orders faxed. Nurse to call report to [phone]. Notified mom. She is agreeable with brionna mora. Kellie

## 2024-01-03 NOTE — DISCHARGE SUMMARY
lansoprazole 30 MG disintegrating tablet  Commonly known as: PREVACID SOLUTAB  1 tablet by Per G Tube route every morning (before breakfast)            CONTINUE taking these medications      atropine 1 % ophthalmic solution     baclofen 10 MG tablet  Commonly known as: LIORESAL     CENTRUM/CERTA-DERICK with minerals oral solution  15 mLs by Per G Tube route daily     Cranberry 250 MG Caps     gabapentin 300 MG capsule  Commonly known as: NEURONTIN     glycopyrrolate 1 MG tablet  Commonly known as: ROBINUL     midodrine 5 MG tablet  Commonly known as: PROAMATINE  Take 1 tablet by mouth in the morning, at noon, and at bedtime     ondansetron 4 MG tablet  Commonly known as: ZOFRAN     oxyBUTYnin 5 MG extended release tablet  Commonly known as: DITROPAN-XL     rivaroxaban 20 MG Tabs tablet  Commonly known as: XARELTO  Take 1 tablet by mouth daily     SCOPOLAMINE TD     sertraline 25 MG tablet  Commonly known as: ZOLOFT  1 tablet by Per G Tube route daily     tamsulosin 0.4 MG capsule  Commonly known as: FLOMAX            STOP taking these medications      acetaminophen 325 MG tablet  Commonly known as: TYLENOL     D-Mannose 500 MG Caps     docusate sodium 100 MG capsule  Commonly known as: COLACE     HYDROcodone-acetaminophen 5-325 MG per tablet  Commonly known as: NORCO     medroxyPROGESTERone 150 MG/ML injection  Commonly known as: DEPO-PROVERA     miconazole 2 % powder  Commonly known as: MICOTIN     omeprazole 20 MG delayed release capsule  Commonly known as: PRILOSEC            ASK your doctor about these medications      acetaminophen 80 MG/0.8ML suspension  Commonly known as: TYLENOL     docusate 50 MG/5ML liquid  Commonly known as: COLACE  Take 10 mLs by mouth daily              No discharge procedures on file.    Time Spent on discharge is  35 mins in patient examination, evaluation, counseling as well as medication reconciliation, prescriptions for required medications, discharge plan and follow

## 2024-01-05 ENCOUNTER — HOSPITAL ENCOUNTER (OUTPATIENT)
Age: 44
Setting detail: SPECIMEN
Discharge: HOME | End: 2024-01-05

## 2024-01-05 LAB
ANION GAP SERPL CALCULATED.3IONS-SCNC: 13 MMOL/L (ref 9–17)
BUN SERPL-MCNC: 15 MG/DL (ref 6–20)
BUN/CREAT SERPL: ABNORMAL (ref 9–20)
CALCIUM SERPL-MCNC: 10.3 MG/DL (ref 8.6–10.4)
CHLORIDE SERPL-SCNC: 100 MMOL/L (ref 98–107)
CO2 SERPL-SCNC: 23 MMOL/L (ref 20–31)
CREAT SERPL-MCNC: <0.4 MG/DL (ref 0.5–0.9)
ERYTHROCYTE [DISTWIDTH] IN BLOOD BY AUTOMATED COUNT: 18.4 % (ref 11.8–14.4)
GFR SERPL CREATININE-BSD FRML MDRD: ABNORMAL ML/MIN/1.73M2
GLUCOSE SERPL-MCNC: 102 MG/DL (ref 70–99)
HCT VFR BLD AUTO: 41.5 % (ref 36.3–47.1)
HGB BLD-MCNC: 13.1 G/DL (ref 11.9–15.1)
MCH RBC QN AUTO: 24.3 PG (ref 25.2–33.5)
MCHC RBC AUTO-ENTMCNC: 31.6 G/DL (ref 28.4–34.8)
MCV RBC AUTO: 77 FL (ref 82.6–102.9)
NRBC BLD-RTO: 0 PER 100 WBC
PLATELET # BLD AUTO: 424 K/UL (ref 138–453)
PMV BLD AUTO: 10.5 FL (ref 8.1–13.5)
POTASSIUM SERPL-SCNC: 4 MMOL/L (ref 3.7–5.3)
RBC # BLD AUTO: 5.39 M/UL (ref 3.95–5.11)
SODIUM SERPL-SCNC: 136 MMOL/L (ref 135–144)
WBC OTHER # BLD: 8.4 K/UL (ref 3.5–11.3)

## 2024-01-05 PROCEDURE — 85027 COMPLETE CBC AUTOMATED: CPT

## 2024-01-05 PROCEDURE — 80048 BASIC METABOLIC PNL TOTAL CA: CPT

## 2024-01-05 PROCEDURE — P9603 ONE-WAY ALLOW PRORATED MILES: HCPCS

## 2024-01-05 PROCEDURE — 36415 COLL VENOUS BLD VENIPUNCTURE: CPT

## 2024-01-18 ENCOUNTER — HOSPITAL ENCOUNTER (OUTPATIENT)
Age: 44
Setting detail: SPECIMEN
Discharge: HOME | End: 2024-01-18
Payer: MEDICARE

## 2024-01-18 LAB
ANION GAP SERPL CALCULATED.3IONS-SCNC: 9 MMOL/L (ref 9–17)
BACTERIA URNS QL MICRO: ABNORMAL
BILIRUB UR QL STRIP: NEGATIVE
BUN SERPL-MCNC: 18 MG/DL (ref 6–20)
BUN/CREAT SERPL: ABNORMAL (ref 9–20)
CALCIUM SERPL-MCNC: 9.7 MG/DL (ref 8.6–10.4)
CASTS #/AREA URNS LPF: ABNORMAL /LPF (ref 0–8)
CHLORIDE SERPL-SCNC: 100 MMOL/L (ref 98–107)
CLARITY UR: ABNORMAL
CO2 SERPL-SCNC: 28 MMOL/L (ref 20–31)
COLOR UR: YELLOW
CREAT SERPL-MCNC: <0.4 MG/DL (ref 0.5–0.9)
EPI CELLS #/AREA URNS HPF: ABNORMAL /HPF (ref 0–5)
ERYTHROCYTE [DISTWIDTH] IN BLOOD BY AUTOMATED COUNT: 18.1 % (ref 11.8–14.4)
GFR SERPL CREATININE-BSD FRML MDRD: ABNORMAL ML/MIN/1.73M2
GLUCOSE SERPL-MCNC: 88 MG/DL (ref 70–99)
GLUCOSE UR STRIP-MCNC: NEGATIVE MG/DL
HCT VFR BLD AUTO: 38.5 % (ref 36.3–47.1)
HGB BLD-MCNC: 12 G/DL (ref 11.9–15.1)
HGB UR QL STRIP.AUTO: ABNORMAL
KETONES UR STRIP-MCNC: NEGATIVE MG/DL
LEUKOCYTE ESTERASE UR QL STRIP: ABNORMAL
MCH RBC QN AUTO: 24.5 PG (ref 25.2–33.5)
MCHC RBC AUTO-ENTMCNC: 31.2 G/DL (ref 28.4–34.8)
MCV RBC AUTO: 78.6 FL (ref 82.6–102.9)
NITRITE UR QL STRIP: POSITIVE
NRBC BLD-RTO: 0 PER 100 WBC
PH UR STRIP: 7.5 [PH] (ref 5–8)
PLATELET # BLD AUTO: 325 K/UL (ref 138–453)
PMV BLD AUTO: 11.5 FL (ref 8.1–13.5)
POTASSIUM SERPL-SCNC: 4.3 MMOL/L (ref 3.7–5.3)
PROT UR STRIP-MCNC: ABNORMAL MG/DL
RBC # BLD AUTO: 4.9 M/UL (ref 3.95–5.11)
RBC #/AREA URNS HPF: ABNORMAL /HPF (ref 0–4)
SODIUM SERPL-SCNC: 137 MMOL/L (ref 135–144)
SP GR UR STRIP: 1.02 (ref 1–1.03)
UROBILINOGEN UR STRIP-ACNC: NORMAL EU/DL (ref 0–1)
WBC #/AREA URNS HPF: ABNORMAL /HPF (ref 0–5)
WBC OTHER # BLD: 8.9 K/UL (ref 3.5–11.3)

## 2024-01-18 PROCEDURE — 80048 BASIC METABOLIC PNL TOTAL CA: CPT

## 2024-01-18 PROCEDURE — P9603 ONE-WAY ALLOW PRORATED MILES: HCPCS

## 2024-01-18 PROCEDURE — 81001 URINALYSIS AUTO W/SCOPE: CPT

## 2024-01-18 PROCEDURE — 87086 URINE CULTURE/COLONY COUNT: CPT

## 2024-01-18 PROCEDURE — 85027 COMPLETE CBC AUTOMATED: CPT

## 2024-01-18 PROCEDURE — 36415 COLL VENOUS BLD VENIPUNCTURE: CPT

## 2024-01-20 LAB
MICROORGANISM SPEC CULT: NORMAL
SPECIMEN DESCRIPTION: NORMAL

## 2024-01-26 ENCOUNTER — HOSPITAL ENCOUNTER (OUTPATIENT)
Age: 44
Setting detail: SPECIMEN
Discharge: HOME | End: 2024-01-26

## 2024-01-26 LAB
ANION GAP SERPL CALCULATED.3IONS-SCNC: 12 MMOL/L (ref 9–17)
BUN SERPL-MCNC: 14 MG/DL (ref 6–20)
BUN/CREAT SERPL: ABNORMAL (ref 9–20)
CALCIUM SERPL-MCNC: 9.5 MG/DL (ref 8.6–10.4)
CHLORIDE SERPL-SCNC: 102 MMOL/L (ref 98–107)
CO2 SERPL-SCNC: 22 MMOL/L (ref 20–31)
CREAT SERPL-MCNC: <0.4 MG/DL (ref 0.5–0.9)
ERYTHROCYTE [DISTWIDTH] IN BLOOD BY AUTOMATED COUNT: 18.6 % (ref 11.8–14.4)
GFR SERPL CREATININE-BSD FRML MDRD: ABNORMAL ML/MIN/1.73M2
GLUCOSE SERPL-MCNC: 90 MG/DL (ref 70–99)
HCT VFR BLD AUTO: 39.5 % (ref 36.3–47.1)
HGB BLD-MCNC: 12 G/DL (ref 11.9–15.1)
MCH RBC QN AUTO: 24.5 PG (ref 25.2–33.5)
MCHC RBC AUTO-ENTMCNC: 30.4 G/DL (ref 28.4–34.8)
MCV RBC AUTO: 80.6 FL (ref 82.6–102.9)
NRBC BLD-RTO: 0 PER 100 WBC
PLATELET # BLD AUTO: 297 K/UL (ref 138–453)
PMV BLD AUTO: 10.7 FL (ref 8.1–13.5)
POTASSIUM SERPL-SCNC: 4 MMOL/L (ref 3.7–5.3)
RBC # BLD AUTO: 4.9 M/UL (ref 3.95–5.11)
SODIUM SERPL-SCNC: 136 MMOL/L (ref 135–144)
WBC OTHER # BLD: 7.3 K/UL (ref 3.5–11.3)

## 2024-01-26 PROCEDURE — 80048 BASIC METABOLIC PNL TOTAL CA: CPT

## 2024-01-26 PROCEDURE — 85027 COMPLETE CBC AUTOMATED: CPT

## 2024-01-26 PROCEDURE — 36415 COLL VENOUS BLD VENIPUNCTURE: CPT

## 2024-01-26 PROCEDURE — P9603 ONE-WAY ALLOW PRORATED MILES: HCPCS

## 2024-03-26 ENCOUNTER — HOSPITAL ENCOUNTER (OUTPATIENT)
Age: 44
Setting detail: SPECIMEN
Discharge: HOME | End: 2024-03-26
Payer: MEDICARE

## 2024-03-26 LAB
ANION GAP SERPL CALCULATED.3IONS-SCNC: 12 MMOL/L (ref 9–17)
BACTERIA URNS QL MICRO: ABNORMAL
BASOPHILS # BLD: 0.03 K/UL (ref 0–0.2)
BASOPHILS NFR BLD: 1 % (ref 0–2)
BILIRUB UR QL STRIP: NEGATIVE
BUN SERPL-MCNC: 10 MG/DL (ref 6–20)
BUN/CREAT SERPL: ABNORMAL (ref 9–20)
CALCIUM SERPL-MCNC: 9.2 MG/DL (ref 8.6–10.4)
CASTS #/AREA URNS LPF: ABNORMAL /LPF (ref 0–2)
CASTS #/AREA URNS LPF: ABNORMAL /LPF (ref 0–2)
CHLORIDE SERPL-SCNC: 101 MMOL/L (ref 98–107)
CLARITY UR: ABNORMAL
CO2 SERPL-SCNC: 23 MMOL/L (ref 20–31)
COLOR UR: YELLOW
CREAT SERPL-MCNC: <0.4 MG/DL (ref 0.5–0.9)
EOSINOPHIL # BLD: 0.23 K/UL (ref 0–0.44)
EOSINOPHILS RELATIVE PERCENT: 4 % (ref 1–4)
EPI CELLS #/AREA URNS HPF: ABNORMAL /HPF (ref 0–5)
ERYTHROCYTE [DISTWIDTH] IN BLOOD BY AUTOMATED COUNT: 19.6 % (ref 11.8–14.4)
GFR SERPL CREATININE-BSD FRML MDRD: ABNORMAL ML/MIN/1.73M2
GLUCOSE SERPL-MCNC: 82 MG/DL (ref 70–99)
GLUCOSE UR STRIP-MCNC: NEGATIVE MG/DL
HCT VFR BLD AUTO: 43.6 % (ref 36.3–47.1)
HGB BLD-MCNC: 13.8 G/DL (ref 11.9–15.1)
HGB UR QL STRIP.AUTO: ABNORMAL
IMM GRANULOCYTES # BLD AUTO: 0.02 K/UL (ref 0–0.3)
IMM GRANULOCYTES NFR BLD: 0 %
KETONES UR STRIP-MCNC: NEGATIVE MG/DL
LEUKOCYTE ESTERASE UR QL STRIP: ABNORMAL
LYMPHOCYTES NFR BLD: 2.03 K/UL (ref 1.1–3.7)
LYMPHOCYTES RELATIVE PERCENT: 36 % (ref 24–43)
MCH RBC QN AUTO: 25.4 PG (ref 25.2–33.5)
MCHC RBC AUTO-ENTMCNC: 31.7 G/DL (ref 28.4–34.8)
MCV RBC AUTO: 80.1 FL (ref 82.6–102.9)
MONOCYTES NFR BLD: 1.3 K/UL (ref 0.1–1.2)
MONOCYTES NFR BLD: 23 % (ref 3–12)
NEUTROPHILS NFR BLD: 36 % (ref 36–65)
NEUTS SEG NFR BLD: 2.04 K/UL (ref 1.5–8.1)
NITRITE UR QL STRIP: POSITIVE
NRBC BLD-RTO: 0 PER 100 WBC
PH UR STRIP: 7 [PH] (ref 5–8)
PLATELET # BLD AUTO: 278 K/UL (ref 138–453)
PMV BLD AUTO: 11.7 FL (ref 8.1–13.5)
POTASSIUM SERPL-SCNC: 4.3 MMOL/L (ref 3.7–5.3)
PROT UR STRIP-MCNC: NEGATIVE MG/DL
RBC # BLD AUTO: 5.44 M/UL (ref 3.95–5.11)
RBC # BLD: ABNORMAL 10*6/UL
RBC #/AREA URNS HPF: ABNORMAL /HPF (ref 0–2)
SODIUM SERPL-SCNC: 136 MMOL/L (ref 135–144)
SP GR UR STRIP: 1.01 (ref 1–1.03)
UROBILINOGEN UR STRIP-ACNC: NORMAL EU/DL (ref 0–1)
WBC #/AREA URNS HPF: ABNORMAL /HPF (ref 0–5)
WBC OTHER # BLD: 5.7 K/UL (ref 3.5–11.3)

## 2024-03-26 PROCEDURE — P9603 ONE-WAY ALLOW PRORATED MILES: HCPCS

## 2024-03-26 PROCEDURE — 36415 COLL VENOUS BLD VENIPUNCTURE: CPT

## 2024-03-26 PROCEDURE — 81001 URINALYSIS AUTO W/SCOPE: CPT

## 2024-03-26 PROCEDURE — 85025 COMPLETE CBC W/AUTO DIFF WBC: CPT

## 2024-03-26 PROCEDURE — 80048 BASIC METABOLIC PNL TOTAL CA: CPT

## 2024-03-26 PROCEDURE — 87086 URINE CULTURE/COLONY COUNT: CPT

## 2024-03-27 LAB
MICROORGANISM SPEC CULT: NORMAL
SERVICE CMNT-IMP: NORMAL
SPECIMEN DESCRIPTION: NORMAL

## 2024-07-10 PROCEDURE — 87086 URINE CULTURE/COLONY COUNT: CPT

## 2024-07-10 PROCEDURE — 81001 URINALYSIS AUTO W/SCOPE: CPT

## 2024-07-11 ENCOUNTER — HOSPITAL ENCOUNTER (OUTPATIENT)
Age: 44
Setting detail: SPECIMEN
Discharge: HOME | End: 2024-07-11

## 2024-07-11 LAB
AMORPH SED URNS QL MICRO: ABNORMAL
ANION GAP SERPL CALCULATED.3IONS-SCNC: 11 MMOL/L (ref 9–17)
BACTERIA URNS QL MICRO: ABNORMAL
BILIRUB UR QL STRIP: NEGATIVE
BUN SERPL-MCNC: 12 MG/DL (ref 6–20)
BUN/CREAT SERPL: ABNORMAL (ref 9–20)
CALCIUM SERPL-MCNC: 9.8 MG/DL (ref 8.6–10.4)
CHLORIDE SERPL-SCNC: 100 MMOL/L (ref 98–107)
CLARITY UR: ABNORMAL
CO2 SERPL-SCNC: 25 MMOL/L (ref 20–31)
COLOR UR: YELLOW
CREAT SERPL-MCNC: <0.4 MG/DL (ref 0.5–0.9)
EPI CELLS #/AREA URNS HPF: ABNORMAL /HPF (ref 0–5)
ERYTHROCYTE [DISTWIDTH] IN BLOOD BY AUTOMATED COUNT: 16.6 % (ref 11.8–14.4)
GFR, ESTIMATED: ABNORMAL ML/MIN/1.73M2
GLUCOSE SERPL-MCNC: 60 MG/DL (ref 70–99)
GLUCOSE UR STRIP-MCNC: NEGATIVE MG/DL
HCT VFR BLD AUTO: 41.2 % (ref 36.3–47.1)
HGB BLD-MCNC: 12.9 G/DL (ref 11.9–15.1)
HGB UR QL STRIP.AUTO: ABNORMAL
KETONES UR STRIP-MCNC: NEGATIVE MG/DL
LEUKOCYTE ESTERASE UR QL STRIP: ABNORMAL
MCH RBC QN AUTO: 25.6 PG (ref 25.2–33.5)
MCHC RBC AUTO-ENTMCNC: 31.3 G/DL (ref 28.4–34.8)
MCV RBC AUTO: 81.9 FL (ref 82.6–102.9)
NITRITE UR QL STRIP: NEGATIVE
NRBC BLD-RTO: 0 PER 100 WBC
PH UR STRIP: 8 [PH] (ref 5–8)
PLATELET # BLD AUTO: 367 K/UL (ref 138–453)
PMV BLD AUTO: 10.9 FL (ref 8.1–13.5)
POTASSIUM SERPL-SCNC: 3.8 MMOL/L (ref 3.7–5.3)
PROT UR STRIP-MCNC: ABNORMAL MG/DL
RBC # BLD AUTO: 5.03 M/UL (ref 3.95–5.11)
RBC #/AREA URNS HPF: ABNORMAL /HPF (ref 0–2)
SODIUM SERPL-SCNC: 136 MMOL/L (ref 135–144)
SP GR UR STRIP: 1.01 (ref 1–1.03)
UROBILINOGEN UR STRIP-ACNC: NORMAL EU/DL (ref 0–1)
WBC #/AREA URNS HPF: ABNORMAL /HPF (ref 0–5)
WBC OTHER # BLD: 8.7 K/UL (ref 3.5–11.3)

## 2024-07-11 PROCEDURE — 85027 COMPLETE CBC AUTOMATED: CPT

## 2024-07-11 PROCEDURE — P9603 ONE-WAY ALLOW PRORATED MILES: HCPCS

## 2024-07-11 PROCEDURE — 36415 COLL VENOUS BLD VENIPUNCTURE: CPT

## 2024-07-11 PROCEDURE — 80048 BASIC METABOLIC PNL TOTAL CA: CPT

## 2024-07-13 LAB
MICROORGANISM SPEC CULT: NORMAL
SPECIMEN DESCRIPTION: NORMAL

## 2024-08-23 ENCOUNTER — HOSPITAL ENCOUNTER (EMERGENCY)
Age: 44
Discharge: HOME | End: 2024-08-23
Attending: EMERGENCY MEDICINE
Payer: MEDICARE

## 2024-08-23 VITALS
SYSTOLIC BLOOD PRESSURE: 104 MMHG | BODY MASS INDEX: 35.15 KG/M2 | TEMPERATURE: 97.6 F | RESPIRATION RATE: 18 BRPM | HEART RATE: 100 BPM | DIASTOLIC BLOOD PRESSURE: 85 MMHG | WEIGHT: 180 LBS | OXYGEN SATURATION: 95 %

## 2024-08-23 DIAGNOSIS — Z46.6 ENCOUNTER FOR FOLEY CATHETER REPLACEMENT: ICD-10-CM

## 2024-08-23 DIAGNOSIS — N39.0 URINARY TRACT INFECTION WITHOUT HEMATURIA, SITE UNSPECIFIED: Primary | ICD-10-CM

## 2024-08-23 LAB
ANION GAP SERPL CALCULATED.3IONS-SCNC: 8 MMOL/L (ref 9–16)
BACTERIA URNS QL MICRO: ABNORMAL
BASOPHILS # BLD: 0.05 K/UL (ref 0–0.2)
BASOPHILS NFR BLD: 1 % (ref 0–2)
BILIRUB UR QL STRIP: NEGATIVE
BUN SERPL-MCNC: 14 MG/DL (ref 6–20)
CALCIUM SERPL-MCNC: 9.6 MG/DL (ref 8.6–10.4)
CHLORIDE SERPL-SCNC: 108 MMOL/L (ref 98–107)
CLARITY UR: ABNORMAL
CO2 SERPL-SCNC: 26 MMOL/L (ref 20–31)
COLOR UR: YELLOW
CREAT SERPL-MCNC: 0.5 MG/DL (ref 0.5–0.9)
EOSINOPHIL # BLD: 0.75 K/UL (ref 0–0.44)
EOSINOPHILS RELATIVE PERCENT: 10 % (ref 1–4)
EPI CELLS #/AREA URNS HPF: ABNORMAL /HPF (ref 0–5)
ERYTHROCYTE [DISTWIDTH] IN BLOOD BY AUTOMATED COUNT: 17.6 % (ref 11.8–14.4)
GFR, ESTIMATED: >90 ML/MIN/1.73M2
GLUCOSE SERPL-MCNC: 84 MG/DL (ref 74–99)
GLUCOSE UR STRIP-MCNC: NEGATIVE MG/DL
HCT VFR BLD AUTO: 43.9 % (ref 36.3–47.1)
HGB BLD-MCNC: 14 G/DL (ref 11.9–15.1)
HGB UR QL STRIP.AUTO: ABNORMAL
IMM GRANULOCYTES # BLD AUTO: <0.03 K/UL (ref 0–0.3)
IMM GRANULOCYTES NFR BLD: 0 %
KETONES UR STRIP-MCNC: NEGATIVE MG/DL
LEUKOCYTE ESTERASE UR QL STRIP: ABNORMAL
LYMPHOCYTES NFR BLD: 2.49 K/UL (ref 1.1–3.7)
LYMPHOCYTES RELATIVE PERCENT: 32 % (ref 24–43)
MCH RBC QN AUTO: 26.3 PG (ref 25.2–33.5)
MCHC RBC AUTO-ENTMCNC: 31.9 G/DL (ref 28.4–34.8)
MCV RBC AUTO: 82.5 FL (ref 82.6–102.9)
MONOCYTES NFR BLD: 0.89 K/UL (ref 0.1–1.2)
MONOCYTES NFR BLD: 12 % (ref 3–12)
NEUTROPHILS NFR BLD: 45 % (ref 36–65)
NEUTS SEG NFR BLD: 3.51 K/UL (ref 1.5–8.1)
NITRITE UR QL STRIP: NEGATIVE
NRBC BLD-RTO: 0 PER 100 WBC
PH UR STRIP: 7.5 [PH] (ref 5–8)
PLATELET # BLD AUTO: 365 K/UL (ref 138–453)
PMV BLD AUTO: 10.4 FL (ref 8.1–13.5)
POTASSIUM SERPL-SCNC: 4.4 MMOL/L (ref 3.7–5.3)
PROT UR STRIP-MCNC: ABNORMAL MG/DL
RBC # BLD AUTO: 5.32 M/UL (ref 3.95–5.11)
RBC # BLD: ABNORMAL 10*6/UL
RBC #/AREA URNS HPF: ABNORMAL /HPF (ref 0–2)
SODIUM SERPL-SCNC: 142 MMOL/L (ref 136–145)
SP GR UR STRIP: 1.01 (ref 1–1.03)
UROBILINOGEN UR STRIP-ACNC: NORMAL EU/DL (ref 0–1)
WBC #/AREA URNS HPF: ABNORMAL /HPF (ref 0–5)
WBC OTHER # BLD: 7.7 K/UL (ref 3.5–11.3)

## 2024-08-23 PROCEDURE — 85025 COMPLETE CBC W/AUTO DIFF WBC: CPT

## 2024-08-23 PROCEDURE — 87181 SC STD AGAR DILUTION PER AGT: CPT

## 2024-08-23 PROCEDURE — 96360 HYDRATION IV INFUSION INIT: CPT | Performed by: EMERGENCY MEDICINE

## 2024-08-23 PROCEDURE — 96361 HYDRATE IV INFUSION ADD-ON: CPT | Performed by: EMERGENCY MEDICINE

## 2024-08-23 PROCEDURE — 81001 URINALYSIS AUTO W/SCOPE: CPT

## 2024-08-23 PROCEDURE — 87186 SC STD MICRODIL/AGAR DIL: CPT

## 2024-08-23 PROCEDURE — 2580000003 HC RX 258

## 2024-08-23 PROCEDURE — 51701 INSERT BLADDER CATHETER: CPT | Performed by: EMERGENCY MEDICINE

## 2024-08-23 PROCEDURE — 87184 SC STD DISK METHOD PER PLATE: CPT

## 2024-08-23 PROCEDURE — 6370000000 HC RX 637 (ALT 250 FOR IP)

## 2024-08-23 PROCEDURE — 87077 CULTURE AEROBIC IDENTIFY: CPT

## 2024-08-23 PROCEDURE — 87086 URINE CULTURE/COLONY COUNT: CPT

## 2024-08-23 PROCEDURE — 99284 EMERGENCY DEPT VISIT MOD MDM: CPT | Performed by: EMERGENCY MEDICINE

## 2024-08-23 PROCEDURE — 80048 BASIC METABOLIC PNL TOTAL CA: CPT

## 2024-08-23 RX ORDER — SULFAMETHOXAZOLE/TRIMETHOPRIM 800-160 MG
1 TABLET ORAL 2 TIMES DAILY
Qty: 20 TABLET | Refills: 0 | Status: SHIPPED | OUTPATIENT
Start: 2024-08-23 | End: 2024-09-02

## 2024-08-23 RX ORDER — 0.9 % SODIUM CHLORIDE 0.9 %
1000 INTRAVENOUS SOLUTION INTRAVENOUS ONCE
Status: COMPLETED | OUTPATIENT
Start: 2024-08-23 | End: 2024-08-23

## 2024-08-23 RX ORDER — SULFAMETHOXAZOLE/TRIMETHOPRIM 800-160 MG
1 TABLET ORAL ONCE
Status: COMPLETED | OUTPATIENT
Start: 2024-08-23 | End: 2024-08-23

## 2024-08-23 RX ADMIN — SULFAMETHOXAZOLE AND TRIMETHOPRIM 1 TABLET: 800; 160 TABLET ORAL at 20:36

## 2024-08-23 RX ADMIN — SODIUM CHLORIDE 1000 ML: 9 INJECTION, SOLUTION INTRAVENOUS at 17:23

## 2024-08-23 ASSESSMENT — LIFESTYLE VARIABLES
HOW OFTEN DO YOU HAVE A DRINK CONTAINING ALCOHOL: PATIENT UNABLE TO ANSWER
HOW MANY STANDARD DRINKS CONTAINING ALCOHOL DO YOU HAVE ON A TYPICAL DAY: PATIENT UNABLE TO ANSWER

## 2024-08-23 ASSESSMENT — PAIN - FUNCTIONAL ASSESSMENT
PAIN_FUNCTIONAL_ASSESSMENT: NONE - DENIES PAIN
PAIN_FUNCTIONAL_ASSESSMENT: NONE - DENIES PAIN

## 2024-08-23 NOTE — ED PROVIDER NOTES
[facility]  Emergency Department  Faculty Attestation     I performed a history and physical examination of the patient and discussed management with the resident. I reviewed the resident’s note and agree with the documented findings and plan of care. Any areas of disagreement are noted on the chart. I was personally present for the key portions of any procedures. I have documented in the chart those procedures where I was not present during the key portions. I have reviewed the emergency nurses triage note. I agree with the chief complaint, past medical history, past surgical history, allergies, medications, social and family history as documented unless otherwise noted below.    For Physician Assistant/ Nurse Practitioner cases/documentation I have personally evaluated this patient and have completed at least one if not all key elements of the E/M (history, physical exam, and MDM). Additional findings are as noted.    Preliminary note started at 3:26 PM EDT    Primary Care Physician:  Ollie Vásquez MD    Screenings:  [unfilled]    CHIEF COMPLAINT     No chief complaint on file.      RECENT VITALS:   There were no vitals taken for this visit.    LABS:  Labs Reviewed - No data to display    Radiology  No orders to display       CRITICAL CARE: There was a high probability of clinically significant/life threatening deterioration in this patient's condition which required my urgent intervention.  Total critical care time was 10 minutes.  This excludes any time for separately reportable procedures.     EKG:      Attending Physician Additional  Notes    Patient is brought by EMS for accidental removal of suprapubic catheter.  Details are uncertain.  Patient is unable to provide history due to aphasia.  On exam vital signs are pending.  Mouth is dry.  Abdomen is without peritoneal findings.  There is no active bleeding from the suprapubic catheter but some surrounding erythema

## 2024-08-23 NOTE — DISCHARGE INSTRUCTIONS
You were seen and evaluated for replacement of his suprapubic catheter.  Urinary studies showed that you have an active urinary tract infection, please take antibiotics (Bactrim) as directed to completion.  Please follow-up with your primary care provider to discuss your symptoms.

## 2024-08-23 NOTE — ED NOTES
ED to inpatient nurses report      Chief Complaint:  Chief Complaint   Patient presents with    catheter was accidentally removed at [facility]     Present to ED from: [facility]    MOA:     LOC:  at baseline-non verbal-will make eye contact and shake head yes/no intermittantly   Mobility: Fully dependent  Oxygen Baseline: ra    Current needs required: ra   Pending ED orders: labs-pending  Present condition: unchanged    Why did the patient come to the ED? Suprba-pubic catheter replaced   What is the plan? Fluids/  Any procedures or intervention occur? iv/labs  Any safety concerns??total dependent    Mental Status:  Level of Consciousness: Alert (0)    Psych Assessment:   Psychosocial  Psychosocial (WDL): Exceptions to WDL (at baseline-non verbal able to make eye contact and will intermittantly shake head yes/no)  Vital signs   Vitals:    08/23/24 1542 08/23/24 1815   BP: 104/85    Pulse: 100    Resp: 18    Temp: 97.6 °F (36.4 °C)    TempSrc: Oral    SpO2: 100% 97%        Vitals:  Patient Vitals for the past 24 hrs:   BP Temp Temp src Pulse Resp SpO2   08/23/24 1815 -- -- -- -- -- 97 %   08/23/24 1542 104/85 97.6 °F (36.4 °C) Oral 100 18 100 %      Visit Vitals  /85   Pulse 100   Temp 97.6 °F (36.4 °C) (Oral)   Resp 18   SpO2 97%        LDAs:   Peripheral IV 08/23/24 Distal;Left;Other (Comment) Forearm (Active)   Site Assessment Clean, dry & intact 08/23/24 1713   Line Status Blood return noted;Flushed;Normal saline locked 08/23/24 1713       Peripheral IV 08/23/24 Right;Upper Cephalic (Active)   Site Assessment Clean, dry & intact 08/23/24 1716   Line Status Blood return noted;Flushed;Normal saline locked;Specimen collected 08/23/24 1716       Peripheral IV 08/23/24 Right Hand (Active)   Site Assessment Clean, dry & intact 08/23/24 1802   Line Status Blood return noted;Flushed;Normal saline locked;Specimen collected 08/23/24 1802       Ambulatory Status:  Presents to emergency department  because of falls  No    Received from [facility]   Housing Stability: Low Risk  (12/30/2023)    Housing Stability Vital Sign     Unable to Pay for Housing in the Last Year: No     Number of Places Lived in the Last Year: 1     Unstable Housing in the Last Year: No       FAMILY HISTORY       Family History   Problem Relation Age of Onset    No Known Problems Mother     No Known Problems Father        ALLERGIES     Patient has no known allergies.    CURRENT MEDICATIONS       Previous Medications    ACETAMINOPHEN (TYLENOL) 80 MG/0.8ML SUSPENSION    650 mg by Per G Tube route every 6 hours as needed for Fever or Pain     ATROPINE 1 % OPHTHALMIC SOLUTION    2-4 drops every 4 hours as needed    BACLOFEN (LIORESAL) 10 MG TABLET    1 tablet by Per G Tube route 3 times daily Crush into G tube    CRANBERRY 250 MG CAPS    Take 250 mg by mouth 2 times daily    DOCUSATE (COLACE) 50 MG/5ML LIQUID    Take 10 mLs by mouth daily    GABAPENTIN (NEURONTIN) 300 MG CAPSULE    1 capsule by Per G Tube route 3 times daily.    GLYCOPYRROLATE (ROBINUL) 1 MG TABLET    Take 1 tablet by mouth 3 times daily    LANSOPRAZOLE (PREVACID SOLUTAB) 30 MG DISINTEGRATING TABLET    1 tablet by Per G Tube route every morning (before breakfast)    MIDODRINE (PROAMATINE) 5 MG TABLET    Take 1 tablet by mouth in the morning, at noon, and at bedtime    MULTIPLE VITAMINS-MINERALS (CENTRUM/CERTA-DERICK WITH MINERALS ORAL) SOLUTION    15 mLs by Per G Tube route daily    ONDANSETRON (ZOFRAN) 4 MG TABLET    Take 1 tablet by mouth every 8 hours as needed for Nausea or Vomiting    OXYBUTYNIN (DITROPAN-XL) 5 MG EXTENDED RELEASE TABLET    Take 1 tablet by mouth daily    RIVAROXABAN (XARELTO) 20 MG TABS TABLET    Take 1 tablet by mouth daily    SCOPOLAMINE BASE (SCOPOLAMINE TD)    Place 1 mg onto the skin every 72 hours     SERTRALINE (ZOLOFT) 25 MG TABLET    1 tablet by Per G Tube route daily    TAMSULOSIN (FLOMAX) 0.4 MG CAPSULE    Take 1 capsule

## 2024-08-23 NOTE — ED PROVIDER NOTES
[facility] ED  Emergency Department Encounter  Emergency Medicine Resident     Pt Name:Emily Hernandez  MRN: [id number]  Birthdate 1980  Date of evaluation: 8/23/24  PCP:  Ollie Vásquez MD  Note Started: 3:34 PM EDT      CHIEF COMPLAINT       Chief Complaint   Patient presents with    catheter was accidentally removed at [facility]       HISTORY OF PRESENT ILLNESS  (Location/Symptom, Timing/Onset, Context/Setting, Quality, Duration, Modifying Factors, Severity.)      Emily Hernandez is a 43 y.o. female with PMH of aphasia, joint contracture, hydronephrosis, multiple sclerosis, neurogenic bladder, PVD, quadriplegia and UTI who presents via EMS after accidental dislodgment of suprapubic catheter.  Given patient's aphasia, long-term residency, quadriplegia, concern for more comprehensive workup to be considered.    PAST MEDICAL / SURGICAL / SOCIAL / FAMILY HISTORY      has a past medical history of Aphasia, Aphasia, Contracture of joint, lower leg, Depressed, HTN (hypertension), Hx MRSA infection, Hydronephrosis, Movement disorder, Multiple sclerosis (HCC), Neurogenic bladder, Non-verbal learning disorder, Peripheral vascular disease (HCC), Polyneuropathy, Pressure ulcer, Pulmonary embolism (HCC), Pulmonary infarction (HCC), Quadriplegia (HCC), Tachycardia, and UTI (lower urinary tract infection).     has a past surgical history that includes Gastrostomy tube placement; colostomy; amputation (Right); Leg amputation through femur; IR GUIDED NEPHROSTOMY CATH PLACEMENT RIGHT (09/27/2020); Cystoscopy (Right, 12/03/2022); and Suprapubic catheter.      Social History     Socioeconomic History    Marital status: Single     Spouse name: Not on file    Number of children: Not on file    Years of education: Not on file    Highest education level: Not on file   Occupational History    Not on file   Tobacco Use    Smoking status: Never    Smokeless tobacco: Never   Vaping Use    Vaping status: Never Used  0.4 MG capsule Take 1 capsule by mouth daily    Jonathan Celeste MD   midodrine (PROAMATINE) 5 MG tablet Take 1 tablet by mouth in the morning, at noon, and at bedtime 12/11/22   Floridalma Oropeza MD   atropine 1 % ophthalmic solution 2-4 drops every 4 hours as needed    Jonathan Celeste MD   rivaroxaban (XARELTO) 20 MG TABS tablet Take 1 tablet by mouth daily 5/24/19   Daniel Ruano MD   sertraline (ZOLOFT) 25 MG tablet 1 tablet by Per G Tube route daily  Patient taking differently: 2 tablets by Per G Tube route daily 5/24/19   Daniel Ruano MD   docusate (COLACE) 50 MG/5ML liquid Take 10 mLs by mouth daily  Patient not taking: Reported on 12/30/2023 5/24/19   Daniel Ruano MD   Multiple Vitamins-Minerals (CENTRUM/CERTA-DERICK WITH MINERALS ORAL) solution 15 mLs by Per G Tube route daily 5/24/19   Daniel Ruano MD   lansoprazole (PREVACID SOLUTAB) 30 MG disintegrating tablet 1 tablet by Per G Tube route every morning (before breakfast) 5/24/19   Daniel Ruano MD   Cranberry 250 MG CAPS Take 250 mg by mouth 2 times daily    Jonathan Celeste MD   Scopolamine Base (SCOPOLAMINE TD) Place 1 mg onto the skin every 72 hours     Jonathan Celeste MD   baclofen (LIORESAL) 10 MG tablet 1 tablet by Per G Tube route 3 times daily Crush into G tube    Jonathan Celeste MD   gabapentin (NEURONTIN) 300 MG capsule 1 capsule by Per G Tube route 3 times daily.    Jonathan Celeste MD   acetaminophen (TYLENOL) 80 MG/0.8ML suspension 650 mg by Per G Tube route every 6 hours as needed for Fever or Pain   Patient not taking: Reported on 12/30/2023    Jonathan Celeste MD   glycopyrrolate (ROBINUL) 1 MG tablet Take 1 tablet by mouth 3 times daily    Jonathan Celeste MD         REVIEW OF SYSTEMS       Review of Systems   Unable to perform ROS: Patient nonverbal       PHYSICAL EXAM      INITIAL VITALS:   /85   Pulse 100   Temp 97.6 °F (36.4 °C) (Oral)   Resp 18   Wt 81.6 kg (180 lb)   management.        EKG      All EKG's are interpreted by the Emergency Department Physician who either signs or Co-signs this chart in the absence of a cardiologist.    EMERGENCY DEPARTMENT COURSE:      ED Course as of 08/23/24 1952   Fri Aug 23, 2024   1620 Suprapubic catheter inserted without complications, flash of cloudy and turbid yellow urine noted. [SP]   1722 Leukocyte Esterase, Urine(!): LARGE [SP]   1722 Bacteria, UA(!): MANY [SP]   1745 BMP Green top redrawn with butterfly per multiple attempts.  Will resend to lab [SP]   1914 Basic Metabolic Panel(!):    Sodium 142   Potassium 4.4   Chloride 108(!)   CARBON DIOXIDE 26   Anion Gap 8(!)   Glucose 84   BUN,BUNPL 14   Creatinine 0.5   Est, Glom Filt Rate >90   Calcium 9.6 [SP]      ED Course User Index  [SP] Jose Suresh MD       PROCEDURES:      CONSULTS:  IP CONSULT TO VASCULAR ACCESS TEAM    CRITICAL CARE:  There was significant risk of life threatening deterioration of patient's condition requiring my direct management. Critical care time minutes, excluding any documented procedures.    FINAL IMPRESSION      1. Urinary tract infection without hematuria, site unspecified    2. Encounter for Davies catheter replacement          DISPOSITION / PLAN     DISPOSITION Decision To Discharge 08/23/2024 07:47:13 PM  Condition at Disposition: Stable      PATIENT REFERRED TO:  Ollie Vásquez MD  [address] [place] [address]  [address] [place] [id number]  [phone]      As needed, If symptoms worsen      DISCHARGE MEDICATIONS:  New Prescriptions    SULFAMETHOXAZOLE-TRIMETHOPRIM (BACTRIM DS;SEPTRA DS) 800-160 MG PER TABLET    Take 1 tablet by mouth 2 times daily for 10 days       Jose Suresh MD  Emergency Medicine Resident    (Please note that portions of thisnote were completed with a voice recognition program.  Efforts were made to edit the dictations but occasionally words are mis-transcribed.)

## 2024-08-23 NOTE — ED PROVIDER NOTES
Care of Emily Hernandez was assumed from previous attending and is being seen for catheter was accidentally removed at [facility]  .  The patient's initial evaluation and plan have been discussed with the prior provider who initially evaluated the patient.    Handoff taken on the following patient from prior Attending Physician:lesli 7:18 PM EDT      Attestation    I was available and discussed any additional care issues that arose and coordinated the management plans with the resident(s) caring for the patient during my duty period. Any areas of disagreement with resident’s documentation of care or procedures are noted on the chart. I was personally present for the key portions of any/all procedures during my duty period. I have documented in the chart those procedures where I was not present during the key portions.      EMERGENCY DEPARTMENT COURSE / MEDICAL DECISION MAKING:       MEDICATIONS GIVEN:  Orders Placed This Encounter   Medications    sodium chloride 0.9 % bolus 1,000 mL       LABS / RADIOLOGY:     Labs Reviewed   CBC WITH AUTO DIFFERENTIAL - Abnormal; Notable for the following components:       Result Value    RBC 5.32 (*)     MCV 82.5 (*)     RDW 17.6 (*)     Eosinophils % 10 (*)     Eosinophils Absolute 0.75 (*)     All other components within normal limits   URINALYSIS WITH MICROSCOPIC - Abnormal; Notable for the following components:    Turbidity UA Turbid (*)     Urine Hgb LARGE (*)     Protein, UA 3+ (*)     Leukocyte Esterase, Urine LARGE (*)     Bacteria, UA MANY (*)     All other components within normal limits   BASIC METABOLIC PANEL - Abnormal; Notable for the following components:    Chloride 108 (*)     Anion Gap 8 (*)     All other components within normal limits   CULTURE, URINE   PREVIOUS SPECIMEN       CT ABDOMEN PELVIS WO CONTRAST    Result Date: 7/29/2024  CT ABDOMEN PELVIS WO IV CONTRAST  7/29/2024 9:51 AM SIGNS AND SYMPTOMS: Nephrolithiasis TECHNOLOGIST COMMENTS: QUESTION FOR THE  This medication has been handled/signed by PCP already. Will remove duplicate and close out encounter.       RADIOLOGIST: PROTOCOL: Noncontrast CT abdomen and pelvis COMPARISON: 10/30/2023 FINDINGS: Lower chest:Motion artifact from respiration in the lower lobes. Liver:Normal Spleen:Normal Gallbladder:Multiple gallstones Bile ducts:No dilatation Pancreas:Normal Adrenal glands:Normal Kidneys:Calcifications throughout the right kidney. Ureteral stent is present in the right ureter but there still appears to be some hydronephrosis.  The distal pigtail has been pulled down into the upper ureter repositioning this stent into the renal pelvis may be helpful. Ureters:There is a stone in the distal right ureter along the course of the stent measuring approximately 4 mm. Bladder:There is a large bladder stone. Suprapubic catheter is also present Reproductive organs:Uterus unremarkable. Right ovarian or broad ligament cyst present measuring approximately 2 cm broad ligament cyst measuring about 2 cm. Bowel:No bowel dilatation. G-tube in the stomach. No previous partial colectomy. Ostomy site in the left lower quadrant Mesentery:No adenopathy Peritoneum:No free air or free fluid Retroperitoneum:Normal Vessels:No aortic aneurysm Abdominal wall:No hernia Bones:Degenerative changes lumbar spine    *Calcifications throughout the right kidney. *Right ureteral stent there is a stone along the course of the distal stent measurements given above. Repositioning the catheter may help a drainage *There is some right-sided hydronephrosis pigtail stent has been pulled down into the ureter. Repositioning the stent may improve drainage *Large bladder stone All CT scans at this facility use dose modulation  iterative reconstruction and or weight balanced dosing when appropriate to reduce radiation dose to as low as reasonably achievable Electronically signed: Jitendra Sheikh MD.      RECENT VITALS:     Temp: 97.6 °F (36.4 °C),  Pulse: 100, Respirations: 18, BP: 104/85, SpO2: 97 %    This patient is a 43 y.o. Female with suprapubic cath came out, she

## 2024-08-24 NOTE — ED NOTES
Med necessity faxed to [facility]. Patient is returning to [facility].   [facility] [phone]

## 2024-08-24 NOTE — ED NOTES
Pt bedding wet from G-tube leaking.   Pt provided new sheets and blankets.   Tape removed from end of tube that was present on RN takeover at shift change and new G-tube cap placed and tapped for securing.     Old g-tube cap was no longer connected.

## 2024-08-25 LAB
MICROORGANISM SPEC CULT: ABNORMAL
SERVICE CMNT-IMP: ABNORMAL
SPECIMEN DESCRIPTION: ABNORMAL

## 2024-08-26 LAB
MICROORGANISM SPEC CULT: ABNORMAL
SERVICE CMNT-IMP: ABNORMAL
SPECIMEN DESCRIPTION: ABNORMAL

## 2024-08-27 NOTE — PROGRESS NOTES
Attempted to reach out to patient's nursing home, [facility], several times with no answer. Left a message on nurse's station voicemail to call ED pharmacist back regarding patient's test results.     Signed Susan Price, StaceyD   neurologic

## 2024-09-29 ENCOUNTER — HOSPITAL ENCOUNTER (EMERGENCY)
Age: 44
Discharge: NURSING, REHAB OR LONG-TERM CARE | End: 2024-09-30
Attending: EMERGENCY MEDICINE
Payer: MEDICARE

## 2024-09-29 DIAGNOSIS — N30.01 ACUTE CYSTITIS WITH HEMATURIA: ICD-10-CM

## 2024-09-29 DIAGNOSIS — T83.9XXA URINARY CATHETER COMPLICATION, INITIAL ENCOUNTER (HCC): Primary | ICD-10-CM

## 2024-09-29 LAB
AMORPH SED URNS QL MICRO: ABNORMAL
ANION GAP SERPL CALCULATED.3IONS-SCNC: 10 MMOL/L (ref 9–16)
BACTERIA URNS QL MICRO: ABNORMAL
BASOPHILS # BLD: 0.03 K/UL (ref 0–0.2)
BASOPHILS NFR BLD: 1 % (ref 0–2)
BILIRUB UR QL STRIP: NEGATIVE
BUN SERPL-MCNC: 11 MG/DL (ref 6–20)
CALCIUM SERPL-MCNC: 9.4 MG/DL (ref 8.6–10.4)
CASTS #/AREA URNS LPF: ABNORMAL /LPF (ref 0–2)
CASTS #/AREA URNS LPF: ABNORMAL /LPF (ref 0–2)
CHLORIDE SERPL-SCNC: 103 MMOL/L (ref 98–107)
CLARITY UR: ABNORMAL
CO2 SERPL-SCNC: 18 MMOL/L (ref 20–31)
COLOR UR: ABNORMAL
CREAT SERPL-MCNC: 0.4 MG/DL (ref 0.5–0.9)
CRYSTALS URNS MICRO: ABNORMAL /HPF
CRYSTALS URNS MICRO: ABNORMAL /HPF
EOSINOPHIL # BLD: 0.4 K/UL (ref 0–0.44)
EOSINOPHILS RELATIVE PERCENT: 7 % (ref 1–4)
EPI CELLS #/AREA URNS HPF: ABNORMAL /HPF (ref 0–5)
ERYTHROCYTE [DISTWIDTH] IN BLOOD BY AUTOMATED COUNT: 17.2 % (ref 11.8–14.4)
GFR, ESTIMATED: >90 ML/MIN/1.73M2
GLUCOSE SERPL-MCNC: 73 MG/DL (ref 74–99)
GLUCOSE UR STRIP-MCNC: NEGATIVE MG/DL
HCT VFR BLD AUTO: 46 % (ref 36.3–47.1)
HGB BLD-MCNC: 13.9 G/DL (ref 11.9–15.1)
HGB UR QL STRIP.AUTO: ABNORMAL
IMM GRANULOCYTES # BLD AUTO: <0.03 K/UL (ref 0–0.3)
IMM GRANULOCYTES NFR BLD: 0 %
KETONES UR STRIP-MCNC: NEGATIVE MG/DL
LEUKOCYTE ESTERASE UR QL STRIP: ABNORMAL
LYMPHOCYTES NFR BLD: 3.01 K/UL (ref 1.1–3.7)
LYMPHOCYTES RELATIVE PERCENT: 50 % (ref 24–43)
MCH RBC QN AUTO: 26.6 PG (ref 25.2–33.5)
MCHC RBC AUTO-ENTMCNC: 30.2 G/DL (ref 28.4–34.8)
MCV RBC AUTO: 88.1 FL (ref 82.6–102.9)
MONOCYTES NFR BLD: 0.56 K/UL (ref 0.1–1.2)
MONOCYTES NFR BLD: 9 % (ref 3–12)
NEUTROPHILS NFR BLD: 34 % (ref 36–65)
NEUTS SEG NFR BLD: 2.05 K/UL (ref 1.5–8.1)
NITRITE UR QL STRIP: POSITIVE
NRBC BLD-RTO: 0 PER 100 WBC
PH UR STRIP: 7.5 [PH] (ref 5–8)
PLATELET # BLD AUTO: ABNORMAL K/UL (ref 138–453)
PLATELET, FLUORESCENCE: ABNORMAL K/UL (ref 138–453)
PLATELETS.RETICULATED NFR BLD AUTO: 1.3 % (ref 1.1–10.3)
POTASSIUM SERPL-SCNC: 4.8 MMOL/L (ref 3.7–5.3)
PROT UR STRIP-MCNC: ABNORMAL MG/DL
RBC # BLD AUTO: 5.22 M/UL (ref 3.95–5.11)
RBC # BLD: ABNORMAL 10*6/UL
RBC #/AREA URNS HPF: ABNORMAL /HPF (ref 0–2)
SODIUM SERPL-SCNC: 131 MMOL/L (ref 136–145)
SP GR UR STRIP: 1.02 (ref 1–1.03)
UROBILINOGEN UR STRIP-ACNC: NORMAL EU/DL (ref 0–1)
WBC #/AREA URNS HPF: ABNORMAL /HPF (ref 0–5)
WBC OTHER # BLD: 6.1 K/UL (ref 3.5–11.3)

## 2024-09-29 PROCEDURE — 51702 INSERT TEMP BLADDER CATH: CPT

## 2024-09-29 PROCEDURE — 85055 RETICULATED PLATELET ASSAY: CPT

## 2024-09-29 PROCEDURE — 6370000000 HC RX 637 (ALT 250 FOR IP)

## 2024-09-29 PROCEDURE — 2580000003 HC RX 258

## 2024-09-29 PROCEDURE — 81001 URINALYSIS AUTO W/SCOPE: CPT

## 2024-09-29 PROCEDURE — 80048 BASIC METABOLIC PNL TOTAL CA: CPT

## 2024-09-29 PROCEDURE — 87186 SC STD MICRODIL/AGAR DIL: CPT

## 2024-09-29 PROCEDURE — 87181 SC STD AGAR DILUTION PER AGT: CPT

## 2024-09-29 PROCEDURE — 87184 SC STD DISK METHOD PER PLATE: CPT

## 2024-09-29 PROCEDURE — 87086 URINE CULTURE/COLONY COUNT: CPT

## 2024-09-29 PROCEDURE — 87077 CULTURE AEROBIC IDENTIFY: CPT

## 2024-09-29 PROCEDURE — 99283 EMERGENCY DEPT VISIT LOW MDM: CPT

## 2024-09-29 PROCEDURE — 85025 COMPLETE CBC W/AUTO DIFF WBC: CPT

## 2024-09-29 RX ORDER — CEPHALEXIN 500 MG/1
500 CAPSULE ORAL 4 TIMES DAILY
Qty: 27 CAPSULE | Refills: 0 | Status: SHIPPED | OUTPATIENT
Start: 2024-09-29 | End: 2024-09-30

## 2024-09-29 RX ORDER — CEPHALEXIN 500 MG/1
500 CAPSULE ORAL ONCE
Status: DISCONTINUED | OUTPATIENT
Start: 2024-09-29 | End: 2024-09-29

## 2024-09-29 RX ORDER — CEPHALEXIN 500 MG/1
500 CAPSULE ORAL ONCE
Status: COMPLETED | OUTPATIENT
Start: 2024-09-29 | End: 2024-09-29

## 2024-09-29 RX ORDER — WATER 10 ML/10ML
INJECTION INTRAMUSCULAR; INTRAVENOUS; SUBCUTANEOUS
Status: COMPLETED
Start: 2024-09-29 | End: 2024-09-29

## 2024-09-29 RX ADMIN — CEPHALEXIN 500 MG: 500 CAPSULE ORAL at 23:45

## 2024-09-29 RX ADMIN — WATER: 1 INJECTION INTRAMUSCULAR; INTRAVENOUS; SUBCUTANEOUS at 22:11

## 2024-09-29 ASSESSMENT — PAIN - FUNCTIONAL ASSESSMENT: PAIN_FUNCTIONAL_ASSESSMENT: ADULT NONVERBAL PAIN SCALE (NPVS)

## 2024-09-30 VITALS
RESPIRATION RATE: 16 BRPM | SYSTOLIC BLOOD PRESSURE: 126 MMHG | DIASTOLIC BLOOD PRESSURE: 87 MMHG | TEMPERATURE: 99 F | HEART RATE: 72 BPM | OXYGEN SATURATION: 97 %

## 2024-09-30 RX ORDER — CEPHALEXIN 500 MG/1
500 CAPSULE ORAL 4 TIMES DAILY
Qty: 27 CAPSULE | Refills: 0 | Status: SHIPPED | OUTPATIENT
Start: 2024-09-30 | End: 2024-10-07

## 2024-09-30 NOTE — ED PROVIDER NOTES
[facility] ED  Emergency Department Encounter  Emergency Medicine Resident     Pt Name:Emily Hernandez  MRN: [id number]  Birthdate 1980  Date of evaluation: 9/29/24  PCP:  Ollie Vásquez MD  Note Started: 9:49 PM EDT      CHIEF COMPLAINT       Chief Complaint   Patient presents with    Urinary Catheter     Unable to flush omer catheter        HISTORY OF PRESENT ILLNESS  (Location/Symptom, Timing/Onset, Context/Setting, Quality, Duration, Modifying Factors, Severity.)      Emily Hernandez is a 43 y.o. female who presents with urinary catheter issue.  Patient comes from [facility], has chronic suprapubic catheter.  Reported the catheter is not draining well anymore.  Attempted to flush catheter with failure.  Patient has history of multiple sclerosis, aphasia, neurogenic bladder, quadriplegia, bedbound at baseline.    PAST MEDICAL / SURGICAL / SOCIAL / FAMILY HISTORY      has a past medical history of Aphasia, Aphasia, Contracture of joint, lower leg, Depressed, HTN (hypertension), Hx MRSA infection, Hydronephrosis, Movement disorder, Multiple sclerosis (HCC), Neurogenic bladder, Non-verbal learning disorder, Peripheral vascular disease (HCC), Polyneuropathy, Pressure ulcer, Pulmonary embolism (HCC), Pulmonary infarction (HCC), Quadriplegia (HCC), Tachycardia, and UTI (lower urinary tract infection).       has a past surgical history that includes Gastrostomy tube placement; colostomy; amputation (Right); Leg amputation through femur; IR GUIDED NEPHROSTOMY CATH PLACEMENT RIGHT (09/27/2020); Cystoscopy (Right, 12/03/2022); and Suprapubic catheter.      Social History     Socioeconomic History    Marital status: Single     Spouse name: Not on file    Number of children: Not on file    Years of education: Not on file    Highest education level: Not on file   Occupational History    Not on file   Tobacco Use    Smoking status: Never    Smokeless tobacco: Never   Vaping Use    Vaping

## 2024-09-30 NOTE — ED PROVIDER NOTES
Note Started: 9:25 PM EDT         [facility]     Emergency Department     Faculty Attestation    I performed a history and physical examination of the patient and discussed management with the resident. I reviewed the resident’s note and agree with the documented findings and plan of care. Any areas of disagreement are noted on the chart. I was personally present for the key portions of any procedures. I have documented in the chart those procedures where I was not present during the key portions. I have reviewed the emergency nurses triage note. I agree with the chief complaint, past medical history, past surgical history, allergies, medications, social and family history as documented unless otherwise noted below.        For Physician Assistant/ Nurse Practitioner cases/documentation I have personally evaluated this patient and have completed at least one if not all key elements of the E/M (history, physical exam, and MDM). Additional findings are as noted.  I have personally seen and evaluated the patient.  I find the patient's history and physical exam are consistent with the NP/PA documentation.  I agree with the care provided, treatment rendered, disposition and follow-up plan.    43-year-old female transferred from skilled nursing facility.  History of aphasia, quadriplegia, chronic suprapubic Davies catheter.  Unable to flush or drain from suprapubic catheter today.  No other concerns.  Patient unable to voice any concerns.    Exam:  General : Laying on the bed, awake, alert, and in no acute distress  CV : normal rate and regular rhythm  Lungs : Breathing comfortably on room air with no tachypnea, hypoxia, or increased work of breathing  Abdomen: Soft, no grimace with palpation.  Suprapubic catheter in place in the lower abdomen    DDx: Davies catheter problem.  Will replace and check UA and renal function      Medical Decision Making  Amount and/or Complexity of Data Reviewed  Labs:

## 2024-09-30 NOTE — ED NOTES
Pt resting on stretcher, alert, RR even and unlabored. Call light within reach. Lights dimmed for comfort.

## 2024-09-30 NOTE — ED TRIAGE NOTES
Pt presents to the ED by EMS from [facility] with c/o catheter issues. Per Ems report staff at  unable to flush catheter since today. Pt has a hx of MS, quadriplegic. Pt non-verbal at baseline, G-tube and colostomy present. Pt alert, RR even and unlabored. No distress noted. Colostomy emptied by writer, attempted to flush suprapubic catheter without success- Dr. Wander ramos. Pt resting on stretcher.

## 2024-09-30 NOTE — ED NOTES
Kate asked to arrange transport for pt back [facility]. [facility] due to [facility] SW reached out to [facility] (Crystal) who agree to provide transport.  ETA [phone].  [facility]. KATE called [facility] (Keyla) to inform them of ETA .

## 2024-09-30 NOTE — DISCHARGE INSTRUCTIONS
You were seen today in the emergency department for your issue with urinary catheter.  We have evaluated you and determined that you likely have a urinary tract infection.  We now feel you are safe for discharge home.  Please take 1 tablet Keflex 4 times a day for 7 days.    Please return to the emergency department immediately if develop any new or worsening concerns including chest pain, shortness of breath, abdominal pain, nausea, vomiting, diarrhea, weakness, loss consciousness, fever, chills, or any other concerns.    Please call your PCP and schedule appointment within the next 24 to 48 hours for follow-up.

## 2024-09-30 NOTE — ED PROVIDER NOTES
Handoff taken on the following patient from prior Attending Physician:    Pt Name: Emily ISAACS Mary    PCP:  Ollie Vásquez MD         Attestation    I was available and discussed any additional care issues that arose and coordinated the management plans with the resident(s) caring for the patient during my duty period. Any areas of disagreement with resident’s documentation of care or procedures are noted on the chart. I was personally present for the key portions of any/all procedures during my duty period. I have documented in the chart those procedures where I was not present during the key portions.    Patient is a 43-year-old female from nursing facility had a urinary cath issue they were flushing the balloon port 100 cc removed patient has nitrite positive UA currently will treat and culture and then patient can be DC'd with antibiotics     Peterson Amado,   09/29/24 [id number]

## 2024-10-04 LAB
MICROORGANISM SPEC CULT: ABNORMAL
MICROORGANISM SPEC CULT: ABNORMAL
SERVICE CMNT-IMP: ABNORMAL
SPECIMEN DESCRIPTION: ABNORMAL

## 2024-10-10 ENCOUNTER — HOSPITAL ENCOUNTER (OUTPATIENT)
Age: 44
Setting detail: SPECIMEN
Discharge: HOME | End: 2024-10-10

## 2024-10-10 LAB
ANION GAP SERPL CALCULATED.3IONS-SCNC: 12 MMOL/L (ref 9–17)
BUN SERPL-MCNC: 12 MG/DL (ref 6–20)
BUN/CREAT SERPL: ABNORMAL (ref 9–20)
CALCIUM SERPL-MCNC: 9.6 MG/DL (ref 8.6–10.4)
CHLORIDE SERPL-SCNC: 105 MMOL/L (ref 98–107)
CO2 SERPL-SCNC: 21 MMOL/L (ref 20–31)
CREAT SERPL-MCNC: <0.4 MG/DL (ref 0.5–0.9)
GFR, ESTIMATED: ABNORMAL ML/MIN/1.73M2
GLUCOSE SERPL-MCNC: 92 MG/DL (ref 70–99)
POTASSIUM SERPL-SCNC: 3.8 MMOL/L (ref 3.7–5.3)
SODIUM SERPL-SCNC: 138 MMOL/L (ref 135–144)

## 2024-10-10 PROCEDURE — 36415 COLL VENOUS BLD VENIPUNCTURE: CPT

## 2024-10-10 PROCEDURE — P9603 ONE-WAY ALLOW PRORATED MILES: HCPCS

## 2024-10-10 PROCEDURE — 80048 BASIC METABOLIC PNL TOTAL CA: CPT

## 2024-11-14 ENCOUNTER — HOSPITAL ENCOUNTER (OUTPATIENT)
Age: 44
Setting detail: SPECIMEN
Discharge: HOME | End: 2024-11-14

## 2024-11-15 ENCOUNTER — HOSPITAL ENCOUNTER (OUTPATIENT)
Age: 44
Setting detail: SPECIMEN
Discharge: HOME | End: 2024-11-15

## 2024-11-15 LAB
ANION GAP SERPL CALCULATED.3IONS-SCNC: 14 MMOL/L (ref 9–16)
BUN SERPL-MCNC: 12 MG/DL (ref 6–20)
CALCIUM SERPL-MCNC: 9.6 MG/DL (ref 8.6–10.4)
CHLORIDE SERPL-SCNC: 104 MMOL/L (ref 98–107)
CO2 SERPL-SCNC: 19 MMOL/L (ref 20–31)
CREAT SERPL-MCNC: 0.4 MG/DL (ref 0.5–0.9)
GFR, ESTIMATED: >90 ML/MIN/1.73M2
GLUCOSE SERPL-MCNC: 85 MG/DL (ref 74–99)
POTASSIUM SERPL-SCNC: 4.2 MMOL/L (ref 3.7–5.3)
SODIUM SERPL-SCNC: 138 MMOL/L (ref 136–145)

## 2024-11-15 PROCEDURE — 80048 BASIC METABOLIC PNL TOTAL CA: CPT

## 2024-11-15 PROCEDURE — P9603 ONE-WAY ALLOW PRORATED MILES: HCPCS

## 2024-11-15 PROCEDURE — 36415 COLL VENOUS BLD VENIPUNCTURE: CPT

## 2024-11-24 ENCOUNTER — HOSPITAL ENCOUNTER (EMERGENCY)
Age: 44
Discharge: HOME | End: 2024-11-24
Attending: EMERGENCY MEDICINE
Payer: MEDICARE

## 2024-11-24 ENCOUNTER — APPOINTMENT (OUTPATIENT)
Dept: GENERAL RADIOLOGY | Age: 44
End: 2024-11-24
Payer: MEDICARE

## 2024-11-24 VITALS
TEMPERATURE: 98.5 F | RESPIRATION RATE: 20 BRPM | SYSTOLIC BLOOD PRESSURE: 116 MMHG | DIASTOLIC BLOOD PRESSURE: 80 MMHG | HEART RATE: 94 BPM | OXYGEN SATURATION: 100 %

## 2024-11-24 DIAGNOSIS — B37.49 CANDIDURIA: ICD-10-CM

## 2024-11-24 DIAGNOSIS — T83.511A URINARY TRACT INFECTION ASSOCIATED WITH INDWELLING URETHRAL CATHETER, INITIAL ENCOUNTER (HCC): Primary | ICD-10-CM

## 2024-11-24 DIAGNOSIS — N39.0 URINARY TRACT INFECTION ASSOCIATED WITH INDWELLING URETHRAL CATHETER, INITIAL ENCOUNTER (HCC): Primary | ICD-10-CM

## 2024-11-24 LAB
ALBUMIN SERPL-MCNC: 3.9 G/DL (ref 3.5–5.2)
ALBUMIN/GLOB SERPL: 0.8 {RATIO} (ref 1–2.5)
ALP SERPL-CCNC: 56 U/L (ref 35–104)
ALT SERPL-CCNC: 16 U/L (ref 10–35)
ANION GAP SERPL CALCULATED.3IONS-SCNC: 11 MMOL/L (ref 9–16)
AST SERPL-CCNC: 18 U/L (ref 10–35)
BACTERIA URNS QL MICRO: ABNORMAL
BASOPHILS # BLD: 0.03 K/UL (ref 0–0.2)
BASOPHILS NFR BLD: 0 % (ref 0–2)
BILIRUB SERPL-MCNC: 0.2 MG/DL (ref 0–1.2)
BILIRUB UR QL STRIP: NEGATIVE
BUN SERPL-MCNC: 13 MG/DL (ref 6–20)
CA-I BLD-SCNC: 1.25 MMOL/L (ref 1.13–1.33)
CALCIUM SERPL-MCNC: 9.6 MG/DL (ref 8.6–10.4)
CASTS #/AREA URNS LPF: ABNORMAL /LPF (ref 0–2)
CASTS #/AREA URNS LPF: ABNORMAL /LPF (ref 0–2)
CHLORIDE SERPL-SCNC: 104 MMOL/L (ref 98–107)
CLARITY UR: ABNORMAL
CO2 SERPL-SCNC: 22 MMOL/L (ref 20–31)
COLOR UR: YELLOW
CREAT SERPL-MCNC: 0.4 MG/DL (ref 0.6–0.9)
D DIMER PPP FEU-MCNC: <0.27 UG/ML FEU (ref 0–0.57)
EOSINOPHIL # BLD: 0.71 K/UL (ref 0–0.44)
EOSINOPHILS RELATIVE PERCENT: 9 % (ref 1–4)
EPI CELLS #/AREA URNS HPF: ABNORMAL /HPF (ref 0–5)
ERYTHROCYTE [DISTWIDTH] IN BLOOD BY AUTOMATED COUNT: 15.8 % (ref 11.8–14.4)
GFR, ESTIMATED: >90 ML/MIN/1.73M2
GLUCOSE SERPL-MCNC: 93 MG/DL (ref 74–99)
GLUCOSE UR STRIP-MCNC: NEGATIVE MG/DL
HCT VFR BLD AUTO: 42.6 % (ref 36.3–47.1)
HGB BLD-MCNC: 13.3 G/DL (ref 11.9–15.1)
HGB UR QL STRIP.AUTO: ABNORMAL
IMM GRANULOCYTES # BLD AUTO: <0.03 K/UL (ref 0–0.3)
IMM GRANULOCYTES NFR BLD: 0 %
INR PPP: 1.1
KETONES UR STRIP-MCNC: NEGATIVE MG/DL
LACTIC ACID, WHOLE BLOOD: 1.3 MMOL/L (ref 0.7–2.1)
LEUKOCYTE ESTERASE UR QL STRIP: ABNORMAL
LIPASE SERPL-CCNC: 49 U/L (ref 13–60)
LYMPHOCYTES NFR BLD: 2.29 K/UL (ref 1.1–3.7)
LYMPHOCYTES RELATIVE PERCENT: 29 % (ref 24–43)
MAGNESIUM SERPL-MCNC: 2.2 MG/DL (ref 1.6–2.6)
MCH RBC QN AUTO: 26.8 PG (ref 25.2–33.5)
MCHC RBC AUTO-ENTMCNC: 31.2 G/DL (ref 28.4–34.8)
MCV RBC AUTO: 85.9 FL (ref 82.6–102.9)
MONOCYTES NFR BLD: 0.81 K/UL (ref 0.1–1.2)
MONOCYTES NFR BLD: 10 % (ref 3–12)
NEUTROPHILS NFR BLD: 52 % (ref 36–65)
NEUTS SEG NFR BLD: 4.17 K/UL (ref 1.5–8.1)
NITRITE UR QL STRIP: POSITIVE
NRBC BLD-RTO: 0 PER 100 WBC
PARTIAL THROMBOPLASTIN TIME: 34 SEC (ref 23–36.5)
PH UR STRIP: 7.5 [PH] (ref 5–8)
PLATELET # BLD AUTO: 400 K/UL (ref 138–453)
PMV BLD AUTO: 9.8 FL (ref 8.1–13.5)
POTASSIUM SERPL-SCNC: 4.1 MMOL/L (ref 3.7–5.3)
PROT SERPL-MCNC: 8.5 G/DL (ref 6.6–8.7)
PROT UR STRIP-MCNC: ABNORMAL MG/DL
PROTHROMBIN TIME: 14.5 SEC (ref 11.7–14.9)
RBC # BLD AUTO: 4.96 M/UL (ref 3.95–5.11)
RBC # BLD: ABNORMAL 10*6/UL
RBC #/AREA URNS HPF: ABNORMAL /HPF (ref 0–2)
SODIUM SERPL-SCNC: 137 MMOL/L (ref 136–145)
SP GR UR STRIP: 1.01 (ref 1–1.03)
UROBILINOGEN UR STRIP-ACNC: NORMAL EU/DL (ref 0–1)
WBC #/AREA URNS HPF: ABNORMAL /HPF (ref 0–5)
WBC OTHER # BLD: 8 K/UL (ref 3.5–11.3)
YEAST URNS QL MICRO: ABNORMAL

## 2024-11-24 PROCEDURE — 94761 N-INVAS EAR/PLS OXIMETRY MLT: CPT

## 2024-11-24 PROCEDURE — 81001 URINALYSIS AUTO W/SCOPE: CPT

## 2024-11-24 PROCEDURE — 87077 CULTURE AEROBIC IDENTIFY: CPT

## 2024-11-24 PROCEDURE — 85610 PROTHROMBIN TIME: CPT

## 2024-11-24 PROCEDURE — 6370000000 HC RX 637 (ALT 250 FOR IP)

## 2024-11-24 PROCEDURE — 85025 COMPLETE CBC W/AUTO DIFF WBC: CPT

## 2024-11-24 PROCEDURE — 87040 BLOOD CULTURE FOR BACTERIA: CPT

## 2024-11-24 PROCEDURE — 85730 THROMBOPLASTIN TIME PARTIAL: CPT

## 2024-11-24 PROCEDURE — 94640 AIRWAY INHALATION TREATMENT: CPT

## 2024-11-24 PROCEDURE — 80053 COMPREHEN METABOLIC PANEL: CPT

## 2024-11-24 PROCEDURE — 85379 FIBRIN DEGRADATION QUANT: CPT

## 2024-11-24 PROCEDURE — 87186 SC STD MICRODIL/AGAR DIL: CPT

## 2024-11-24 PROCEDURE — 83605 ASSAY OF LACTIC ACID: CPT

## 2024-11-24 PROCEDURE — 71045 X-RAY EXAM CHEST 1 VIEW: CPT

## 2024-11-24 PROCEDURE — 87086 URINE CULTURE/COLONY COUNT: CPT

## 2024-11-24 PROCEDURE — 83690 ASSAY OF LIPASE: CPT

## 2024-11-24 PROCEDURE — 99284 EMERGENCY DEPT VISIT MOD MDM: CPT | Performed by: EMERGENCY MEDICINE

## 2024-11-24 PROCEDURE — 82330 ASSAY OF CALCIUM: CPT

## 2024-11-24 PROCEDURE — 87181 SC STD AGAR DILUTION PER AGT: CPT

## 2024-11-24 PROCEDURE — 83735 ASSAY OF MAGNESIUM: CPT

## 2024-11-24 PROCEDURE — 87184 SC STD DISK METHOD PER PLATE: CPT

## 2024-11-24 RX ORDER — FLUCONAZOLE 50 MG/1
200 TABLET ORAL ONCE
Status: COMPLETED | OUTPATIENT
Start: 2024-11-24 | End: 2024-11-24

## 2024-11-24 RX ORDER — IPRATROPIUM BROMIDE AND ALBUTEROL SULFATE 2.5; .5 MG/3ML; MG/3ML
1 SOLUTION RESPIRATORY (INHALATION) ONCE
Status: COMPLETED | OUTPATIENT
Start: 2024-11-24 | End: 2024-11-24

## 2024-11-24 RX ORDER — CEPHALEXIN 500 MG/1
500 CAPSULE ORAL ONCE
Status: COMPLETED | OUTPATIENT
Start: 2024-11-24 | End: 2024-11-24

## 2024-11-24 RX ORDER — CEPHALEXIN 500 MG/1
500 CAPSULE ORAL 2 TIMES DAILY
Qty: 14 CAPSULE | Refills: 0 | Status: SHIPPED | OUTPATIENT
Start: 2024-11-24 | End: 2024-12-01

## 2024-11-24 RX ADMIN — CEPHALEXIN 500 MG: 500 CAPSULE ORAL at 10:40

## 2024-11-24 RX ADMIN — IPRATROPIUM BROMIDE AND ALBUTEROL SULFATE 1 DOSE: .5; 2.5 SOLUTION RESPIRATORY (INHALATION) at 08:47

## 2024-11-24 RX ADMIN — FLUCONAZOLE 200 MG: 50 TABLET ORAL at 10:39

## 2024-11-24 NOTE — ED PROVIDER NOTES
[facility] ED     Emergency Department     Faculty Attestation    I performed a history and physical examination of the patient and discussed management with the resident. I reviewed the resident’s note and agree with the documented findings and plan of care. Any areas of disagreement are noted on the chart. I was personally present for the key portions of any procedures. I have documented in the chart those procedures where I was not present during the key portions. I have reviewed the emergency nurses triage note. I agree with the chief complaint, past medical history, past surgical history, allergies, medications, social and family history as documented unless otherwise noted below. For Physician Assistant/ Nurse Practitioner cases/documentation I have personally evaluated this patient and have completed at least one if not all key elements of the E/M (history, physical exam, and MDM). Additional findings are as noted.    8:51 AM EST    Patient with history of intellectual disability who is nonverbal also with history of MS and neurogenic bladder sent here by her [facility] due to her suprapubic catheter not draining.  It is unclear how long it has been unable to be drained.  Patient also has a colostomy with a moderate amount of stool in the bag.  There is wheeze on auscultation of lungs bilaterally.  Abdomen is soft and seems to be nontender.  The ostomy and suprapubic catheter sites appear clean without any signs of infection.  Will check labs and a chest x-ray.  Will plan to replace the suprapubic catheter.      Deborah Aaron MD  Attending Emergency  Physician

## 2024-11-24 NOTE — DISCHARGE INSTRUCTIONS
You were seen here for inability to flush your suprapubic catheter, and we have evaluated for any infectious process as your urine had a lot of sediment in it.    On your urinalysis, you seem to have a serial infection, as well as a fungal infection called Candida.    We have given you 1 dose of fluconazole here.  Otherwise, we have given you Keflex for UTI, as this does seem to work in the past.    Please take these medications as prescribed.  Please take the full course.    Please follow-up with your primary care provider.  Recommend a soon as possible.  They be able to follow-up your UTI, and assess whether it has cleared.    Please return to the emergency department with any new or worsening symptoms.  This includes fever and chills, nausea vomiting, shortness of breath or chest pain, lightheadedness, dizziness, change in mental status, or any other new or worsening symptoms.  You may return if your symptoms not improved.

## 2024-11-24 NOTE — ED PROVIDER NOTES
[facility] ED  Emergency Department Encounter  Emergency Medicine Resident     Pt Name:Emily Hernandez  MRN: [id number]  Birthdate 1980  Date of evaluation: 11/24/24  PCP:  Ollie Vásquez MD  Note Started: 8:41 AM EST      CHIEF COMPLAINT       Chief Complaint   Patient presents with    suprapubic not draining        HISTORY OF PRESENT ILLNESS  (Location/Symptom, Timing/Onset, Context/Setting, Quality, Duration, Modifying Factors, Severity.)      Emily Hernandez is a 44 y.o. female with significant pertinent medical history of pyelonephritis, continuous obstructive uropathy, multiple stents placed, suprapubic catheter, colostomy from unknown surgery, and significant demyelinating process that has been going on for many years who presents with lack of drainage from suprapubic catheter.  Per facility notes, staff at facility has been unable to drain the suprapubic catheter, and attempted to flush and was getting extravasation of saline around the suprapubic catheter site.    Patient presents with sediment in the urine, and is meeting SIRS criteria at this time.  She is nonverbal, however does respond to yes/no questions sometimes.    Given that the patient does present with surgical tear, is nonverbal, and unable to adequately assess, will cast broad net with infectious criteria to assess for any infectious process.  Review of systems unable to be obtained due to patient's mental status.    Patient has a history of some yes/no questions.  If workup is negative, and we are able to exchange the suprapubic catheter, patient most likely go home at that time.    PAST MEDICAL / SURGICAL / SOCIAL / FAMILY HISTORY      has a past medical history of Aphasia, Aphasia, Contracture of joint, lower leg, Depressed, HTN (hypertension), Hx MRSA infection, Hydronephrosis, Movement disorder, Multiple sclerosis (HCC), Neurogenic bladder, Non-verbal learning disorder, Peripheral vascular disease (HCC),

## 2024-11-24 NOTE — ED NOTES
Pt to ed via [facility]. Patient arrived due to \"suprapubic catheter not draining.\" Patient is non verbal with hx of quadriplegic and neurogenic bladder with hx of g tube and colostomy. Patient report received suprapubic was recently exchanged, (unsure when) and not draining with leaking into brief. Patient has bright red bleeding upon rolling assessing patient. Appears to be vaginal bleeding. Patient is alert however non verbal. Patient linens changed, placed in gown, new brief and chucks pad placed. Urine sample was able to be obtained, fresh from suprapubic.

## 2024-11-27 LAB
MICROORGANISM SPEC CULT: ABNORMAL
MICROORGANISM SPEC CULT: ABNORMAL
SPECIMEN DESCRIPTION: ABNORMAL

## 2024-11-29 LAB
MICROORGANISM SPEC CULT: NORMAL
MICROORGANISM SPEC CULT: NORMAL
SERVICE CMNT-IMP: NORMAL
SERVICE CMNT-IMP: NORMAL
SPECIMEN DESCRIPTION: NORMAL
SPECIMEN DESCRIPTION: NORMAL

## (undated) DEVICE — GAUZE,SPONGE,FLUFF,6"X6.75",STRL,5/TRAY: Brand: MEDLINE

## (undated) DEVICE — DRAINBAG,ANTI-REFLUX TOWER,L/F,2000ML,LL: Brand: MEDLINE

## (undated) DEVICE — ADAPTER URO SCP UROLOK LL

## (undated) DEVICE — PACK PROCEDURE SURG CYSTO SVMMC LF

## (undated) DEVICE — GUIDEWIRE URO L150CM DIA0.035IN STIFF NIT HYDRPHLC STR TIP

## (undated) DEVICE — CATHETER F BLLN 5CC 20FR INF CTRL 2 W SIL ALLY AND HYDRGEL

## (undated) DEVICE — GLOVE ORANGE PI 7 1/2   MSG9075

## (undated) DEVICE — CATHETER URET 5FR L70CM OPN END SGL LUMN INJ HUB FLEXIMA

## (undated) DEVICE — GLOVE ORANGE PI 7   MSG9070